# Patient Record
Sex: MALE | Race: WHITE | NOT HISPANIC OR LATINO | Employment: FULL TIME | URBAN - METROPOLITAN AREA
[De-identification: names, ages, dates, MRNs, and addresses within clinical notes are randomized per-mention and may not be internally consistent; named-entity substitution may affect disease eponyms.]

---

## 2023-03-30 ENCOUNTER — APPOINTMENT (EMERGENCY)
Dept: RADIOLOGY | Facility: HOSPITAL | Age: 48
End: 2023-03-30

## 2023-03-30 ENCOUNTER — APPOINTMENT (EMERGENCY)
Dept: CT IMAGING | Facility: HOSPITAL | Age: 48
End: 2023-03-30

## 2023-03-30 ENCOUNTER — HOSPITAL ENCOUNTER (OUTPATIENT)
Dept: RADIOLOGY | Facility: HOSPITAL | Age: 48
Discharge: HOME | End: 2023-03-30

## 2023-03-30 ENCOUNTER — HOSPITAL ENCOUNTER (INPATIENT)
Facility: HOSPITAL | Age: 48
LOS: 1 days | End: 2023-03-30
Attending: STUDENT IN AN ORGANIZED HEALTH CARE EDUCATION/TRAINING PROGRAM | Admitting: STUDENT IN AN ORGANIZED HEALTH CARE EDUCATION/TRAINING PROGRAM

## 2023-03-30 VITALS
DIASTOLIC BLOOD PRESSURE: 129 MMHG | SYSTOLIC BLOOD PRESSURE: 208 MMHG | TEMPERATURE: 98.8 F | RESPIRATION RATE: 23 BRPM | WEIGHT: 315 LBS | OXYGEN SATURATION: 100 % | HEART RATE: 102 BPM

## 2023-03-30 DIAGNOSIS — R41.89 UNRESPONSIVE: Primary | ICD-10-CM

## 2023-03-30 DIAGNOSIS — I63.512 CEREBROVASCULAR ACCIDENT (CVA) DUE TO OCCLUSION OF LEFT MIDDLE CEREBRAL ARTERY (HCC): ICD-10-CM

## 2023-03-30 DIAGNOSIS — I63.9 STROKE OF UNKNOWN CAUSE (HCC): ICD-10-CM

## 2023-03-30 PROBLEM — E66.01 OBESITY, CLASS III, BMI 40-49.9 (MORBID OBESITY) (HCC): Status: ACTIVE | Noted: 2023-03-30

## 2023-03-30 PROBLEM — R73.9 HYPERGLYCEMIA: Status: ACTIVE | Noted: 2023-03-30

## 2023-03-30 PROBLEM — I16.1 HYPERTENSIVE EMERGENCY: Status: ACTIVE | Noted: 2023-03-30

## 2023-03-30 PROBLEM — G93.40 ENCEPHALOPATHY: Status: ACTIVE | Noted: 2023-03-30

## 2023-03-30 PROBLEM — E66.813 OBESITY, CLASS III, BMI 40-49.9 (MORBID OBESITY): Status: ACTIVE | Noted: 2023-03-30

## 2023-03-30 LAB
ALBUMIN SERPL BCP-MCNC: 3.7 G/DL (ref 3.5–5)
ALP SERPL-CCNC: 75 U/L (ref 34–104)
ALT SERPL W P-5'-P-CCNC: 37 U/L (ref 7–52)
AMPHETAMINES SERPL QL SCN: NEGATIVE
ANION GAP SERPL CALCULATED.3IONS-SCNC: 9 MMOL/L (ref 4–13)
APAP SERPL-MCNC: <10 UG/ML (ref 10–20)
AST SERPL W P-5'-P-CCNC: 20 U/L (ref 13–39)
BARBITURATES UR QL: NEGATIVE
BASE EXCESS BLDA CALC-SCNC: 0 MMOL/L (ref -2–3)
BASOPHILS # BLD AUTO: 0.05 THOUSANDS/ÂΜL (ref 0–0.1)
BASOPHILS NFR BLD AUTO: 1 % (ref 0–1)
BENZODIAZ UR QL: NEGATIVE
BILIRUB SERPL-MCNC: 0.53 MG/DL (ref 0.2–1)
BUN SERPL-MCNC: 19 MG/DL (ref 5–25)
CA-I BLD-SCNC: 1.23 MMOL/L (ref 1.12–1.32)
CALCIUM SERPL-MCNC: 9 MG/DL (ref 8.4–10.2)
CARDIAC TROPONIN I PNL SERPL HS: 60 NG/L
CFFMA (FUNCTIONAL FIBRINOGEN MAX AMPLITUDE): 20.2 MM (ref 15–32)
CHLORIDE SERPL-SCNC: 103 MMOL/L (ref 96–108)
CK SERPL-CCNC: 81 U/L (ref 39–308)
CKLY30: 2.2 % (ref 0–2.6)
CKR(REACTION TIME): 5.7 MIN (ref 4.6–9.1)
CO2 SERPL-SCNC: 25 MMOL/L (ref 21–32)
COCAINE UR QL: NEGATIVE
CREAT SERPL-MCNC: 1 MG/DL (ref 0.6–1.3)
CRTMA(RAPIDTEG MAX AMPLITUDE): 61.4 MM (ref 52–70)
EOSINOPHIL # BLD AUTO: 0.4 THOUSAND/ÂΜL (ref 0–0.61)
EOSINOPHIL NFR BLD AUTO: 5 % (ref 0–6)
ERYTHROCYTE [DISTWIDTH] IN BLOOD BY AUTOMATED COUNT: 13.4 % (ref 11.6–15.1)
ETHANOL SERPL-MCNC: <10 MG/DL
GFR SERPL CREATININE-BSD FRML MDRD: 90 ML/MIN/1.73SQ M
GLUCOSE SERPL-MCNC: 275 MG/DL (ref 65–140)
GLUCOSE SERPL-MCNC: 280 MG/DL (ref 65–140)
HCO3 BLDA-SCNC: 26.1 MMOL/L (ref 24–30)
HCT VFR BLD AUTO: 46.6 % (ref 36.5–49.3)
HCT VFR BLD CALC: 44 % (ref 36.5–49.3)
HGB BLD-MCNC: 14.8 G/DL (ref 12–17)
HGB BLDA-MCNC: 15 G/DL (ref 12–17)
IMM GRANULOCYTES # BLD AUTO: 0.04 THOUSAND/UL (ref 0–0.2)
IMM GRANULOCYTES NFR BLD AUTO: 1 % (ref 0–2)
LACTATE SERPL-SCNC: 1.7 MMOL/L (ref 0.5–2)
LYMPHOCYTES # BLD AUTO: 2.04 THOUSANDS/ÂΜL (ref 0.6–4.47)
LYMPHOCYTES NFR BLD AUTO: 25 % (ref 14–44)
MCH RBC QN AUTO: 26.8 PG (ref 26.8–34.3)
MCHC RBC AUTO-ENTMCNC: 31.8 G/DL (ref 31.4–37.4)
MCV RBC AUTO: 84 FL (ref 82–98)
METHADONE UR QL: NEGATIVE
MONOCYTES # BLD AUTO: 0.47 THOUSAND/ÂΜL (ref 0.17–1.22)
MONOCYTES NFR BLD AUTO: 6 % (ref 4–12)
NEUTROPHILS # BLD AUTO: 5.31 THOUSANDS/ÂΜL (ref 1.85–7.62)
NEUTS SEG NFR BLD AUTO: 62 % (ref 43–75)
NRBC BLD AUTO-RTO: 0 /100 WBCS
OPIATES UR QL SCN: NEGATIVE
OXYCODONE+OXYMORPHONE UR QL SCN: NEGATIVE
PCO2 BLD: 27 MMOL/L (ref 21–32)
PCO2 BLD: 45.3 MM HG (ref 42–50)
PCP UR QL: NEGATIVE
PH BLD: 7.37 [PH] (ref 7.3–7.4)
PLATELET # BLD AUTO: 321 THOUSANDS/UL (ref 149–390)
PMV BLD AUTO: 10.4 FL (ref 8.9–12.7)
PO2 BLD: 28 MM HG (ref 35–45)
POTASSIUM BLD-SCNC: 4.1 MMOL/L (ref 3.5–5.3)
POTASSIUM SERPL-SCNC: 4 MMOL/L (ref 3.5–5.3)
PROT SERPL-MCNC: 6.7 G/DL (ref 6.4–8.4)
RBC # BLD AUTO: 5.52 MILLION/UL (ref 3.88–5.62)
SALICYLATES SERPL-MCNC: <5 MG/DL (ref 3–20)
SAO2 % BLD FROM PO2: 49 % (ref 60–85)
SODIUM BLD-SCNC: 139 MMOL/L (ref 136–145)
SODIUM SERPL-SCNC: 137 MMOL/L (ref 135–147)
SPECIMEN SOURCE: ABNORMAL
THC UR QL: NEGATIVE
TSH SERPL DL<=0.05 MIU/L-ACNC: 2.73 UIU/ML (ref 0.45–4.5)
WBC # BLD AUTO: 8.31 THOUSAND/UL (ref 4.31–10.16)

## 2023-03-30 PROCEDURE — 4A133J1 MONITORING OF ARTERIAL PULSE, PERIPHERAL, PERCUTANEOUS APPROACH: ICD-10-PCS | Performed by: STUDENT IN AN ORGANIZED HEALTH CARE EDUCATION/TRAINING PROGRAM

## 2023-03-30 PROCEDURE — 5A1935Z RESPIRATORY VENTILATION, LESS THAN 24 CONSECUTIVE HOURS: ICD-10-PCS | Performed by: EMERGENCY MEDICINE

## 2023-03-30 PROCEDURE — 0BH17EZ INSERTION OF ENDOTRACHEAL AIRWAY INTO TRACHEA, VIA NATURAL OR ARTIFICIAL OPENING: ICD-10-PCS | Performed by: EMERGENCY MEDICINE

## 2023-03-30 PROCEDURE — 4A133B1 MONITORING OF ARTERIAL PRESSURE, PERIPHERAL, PERCUTANEOUS APPROACH: ICD-10-PCS | Performed by: STUDENT IN AN ORGANIZED HEALTH CARE EDUCATION/TRAINING PROGRAM

## 2023-03-30 PROCEDURE — 03HY32Z INSERTION OF MONITORING DEVICE INTO UPPER ARTERY, PERCUTANEOUS APPROACH: ICD-10-PCS | Performed by: STUDENT IN AN ORGANIZED HEALTH CARE EDUCATION/TRAINING PROGRAM

## 2023-03-30 RX ORDER — ROCURONIUM BROMIDE 10 MG/ML
INJECTION, SOLUTION INTRAVENOUS CODE/TRAUMA/SEDATION MEDICATION
Status: COMPLETED | OUTPATIENT
Start: 2023-03-30 | End: 2023-03-30

## 2023-03-30 RX ORDER — FENTANYL CITRATE 50 UG/ML
INJECTION, SOLUTION INTRAMUSCULAR; INTRAVENOUS CODE/TRAUMA/SEDATION MEDICATION
Status: COMPLETED | OUTPATIENT
Start: 2023-03-30 | End: 2023-03-30

## 2023-03-30 RX ORDER — CHLORHEXIDINE GLUCONATE 0.12 MG/ML
15 RINSE ORAL EVERY 12 HOURS SCHEDULED
Status: DISCONTINUED | OUTPATIENT
Start: 2023-03-30 | End: 2023-03-30 | Stop reason: HOSPADM

## 2023-03-30 RX ORDER — PROPOFOL 10 MG/ML
INJECTION, EMULSION INTRAVENOUS
Status: COMPLETED | OUTPATIENT
Start: 2023-03-30 | End: 2023-03-30

## 2023-03-30 RX ORDER — ETOMIDATE 2 MG/ML
INJECTION INTRAVENOUS CODE/TRAUMA/SEDATION MEDICATION
Status: COMPLETED | OUTPATIENT
Start: 2023-03-30 | End: 2023-03-30

## 2023-03-30 RX ORDER — FENTANYL CITRATE-0.9 % NACL/PF 10 MCG/ML
50 PLASTIC BAG, INJECTION (ML) INTRAVENOUS CONTINUOUS
Status: DISCONTINUED | OUTPATIENT
Start: 2023-03-30 | End: 2023-03-30 | Stop reason: HOSPADM

## 2023-03-30 RX ORDER — IODIXANOL 320 MG/ML
100 INJECTION, SOLUTION INTRAVASCULAR
Status: COMPLETED | OUTPATIENT
Start: 2023-03-30 | End: 2023-03-30

## 2023-03-30 RX ORDER — FENTANYL CITRATE-0.9 % NACL/PF 10 MCG/ML
50 PLASTIC BAG, INJECTION (ML) INTRAVENOUS CONTINUOUS
Status: CANCELLED | OUTPATIENT
Start: 2023-03-30

## 2023-03-30 RX ORDER — CHLORHEXIDINE GLUCONATE 0.12 MG/ML
15 RINSE ORAL EVERY 12 HOURS SCHEDULED
Status: DISCONTINUED | OUTPATIENT
Start: 2023-03-30 | End: 2023-03-30

## 2023-03-30 RX ORDER — CHLORHEXIDINE GLUCONATE 0.12 MG/ML
15 RINSE ORAL EVERY 12 HOURS SCHEDULED
Status: CANCELLED | OUTPATIENT
Start: 2023-03-30

## 2023-03-30 RX ORDER — PROPOFOL 10 MG/ML
5-50 INJECTION, EMULSION INTRAVENOUS
Status: DISCONTINUED | OUTPATIENT
Start: 2023-03-30 | End: 2023-03-30 | Stop reason: HOSPADM

## 2023-03-30 RX ADMIN — IOHEXOL 100 ML: 350 INJECTION, SOLUTION INTRAVENOUS at 15:46

## 2023-03-30 RX ADMIN — FENTANYL CITRATE 100 MCG: 50 INJECTION, SOLUTION INTRAMUSCULAR; INTRAVENOUS at 15:27

## 2023-03-30 RX ADMIN — Medication 50 MCG/HR: at 16:09

## 2023-03-30 RX ADMIN — ETOMIDATE 45 MG: 2 INJECTION, SOLUTION INTRAVENOUS at 15:18

## 2023-03-30 RX ADMIN — IODIXANOL 100 ML: 320 INJECTION, SOLUTION INTRAVASCULAR at 17:51

## 2023-03-30 RX ADMIN — ROCURONIUM BROMIDE 150 MG: 10 INJECTION, SOLUTION INTRAVENOUS at 15:19

## 2023-03-30 RX ADMIN — PROPOFOL 20 MCG/KG/MIN: 10 INJECTION, EMULSION INTRAVENOUS at 15:24

## 2023-03-30 NOTE — QUICK NOTE
Patient is transferred from [facility]  He is intubated and sedated with Propofol and Fentanyl on presentation    NIHSS:  1a Level of Consciousness: 2 = Not alert, requires repeated stimulation to attend   1b  LOC Questions: 1 = intubated   1c  LOC Commands: 1 = Obeys one correctly   2  Best Gaze: 0 = Normal   3  Visual: 0 = No visual field loss   4  Facial Palsy: 0=Normal symmetric movement   5a  Motor Right Arm: 3=No effort against gravity, limb falls   5b  Motor Left Arm: 0=No drift, limb holds 90 (or 45) degrees for full 10 seconds   6a  Motor Right Le=No movement   6b  Motor Left Le=No movement   7  Limb Ataxia:  0=Absent   8  Sensory: 0=Normal; no sensory loss   9  Best Language:  3=Mute, global aphasia; no usable speech or auditory comprehension   10  Dysarthria: UN = Intubated or other physical barrier   11   Extinction and Inattention (formerly Neglect): 0=No abnormality   Total Score: 18

## 2023-03-30 NOTE — ASSESSMENT & PLAN NOTE
Shirlene Momin III is a 41 y o  male with no known past medical history who has not seen a medical provider throughout his adulthood, who presents to [facility] ED on 3/30/2023 as a trauma and stroke alert  · NIHSS 27 - Patient examined by neurology after intubation and paralytics, exam limited  · CT head: Unremarkable for evidence of acute hemorrhage or large territory infarct  · CTA head and neck: Demonstrated left M2 occlusion    Thrombolytic Decision: Patient not a candidate  Unclear time of onset outside appropriate time window  Case discussed with on-call neuro interventionalist Dru Stevenson  Decision was made to transfer to [facility] for CT perfusion and potential thrombectomy      Plan:  - Transfer to [facility] for CT perfusion and potential thrombectomy  - Goal BP up to 220/110  - MRI brain when able   - Echo  - Recommend: Fasting lipid panel, Hemoglobin A1c, TSH  - Antiplatelet therapy to be determined following CT perfusion/thrombectomy  - Okay to start Atorvastatin 40 mg daily if able   - Euglycemia, Normothermia   - Telemetry  - Frequent neuro checks  - PT/OT/Speech   - Stroke Education   - STAT CT head for any acute change in neuro exam  - Medical management and supportive care per critical care team, notify with changes  - Correction of any metabolic or infectious disturbances

## 2023-03-30 NOTE — CASE MANAGEMENT
CM responded to trauma alert  Patient was transported into trauma bay by [facility] for evaluation  Patient not responsive upon arrival  CM met with patients mother in ED waiting room  General update provided, notified trauma team will be in touch when able  Trauma AP aware of above  No current identified CM needs  CM will follow and update screening, assessment, and discharge planning as appropriate

## 2023-03-30 NOTE — ED PROVIDER NOTES
Emergency Department Airway Evaluation and Management Form    History  Obtained from: EMS  Patient has no known allergies  Chief Complaint   Patient presents with   • Trauma     Patient found on floor next to bed by mother  GCS 9 per EMS     HPI    29-year-old male, found down, altered on arrival, GCS 9, no medical history    Concern for airway protection, will intubate before trauma team takes over    No past medical history on file  No past surgical history on file  No family history on file  I have reviewed and agree with the history as documented      Review of Systems    Physical Exam  BP (!) 208/129 (BP Location: Right arm)   Pulse 102   Temp 98 8 °F (37 1 °C)   Resp (!) 23   Wt (!) 155 kg (341 lb 11 4 oz)   SpO2 100%     Physical Exam    ED Medications  Medications   etomidate (AMIDATE) 2 mg/mL injection (45 mg Intravenous Given 3/30/23 1518)   ROCuronium (ZEMURON) injection (150 mg Intravenous Given 3/30/23 1519)   propofol (DIPRIVAN) 1000 mg in 100 mL infusion (premix) (20 mcg/kg/min × 155 kg Intravenous New Bag 3/30/23 1524)   fentanyl citrate (PF) 100 MCG/2ML (100 mcg Intravenous Given 3/30/23 1527)   iohexol (OMNIPAQUE) 350 MG/ML injection (SINGLE-DOSE) 100 mL (100 mL Intravenous Given 3/30/23 1546)       Intubation  Procedures    Notes      Final Diagnosis  Final diagnoses:   Unresponsive   Cerebrovascular accident (CVA) due to occlusion of left middle cerebral artery [facility])       ED Provider  Electronically Signed by     Dodie Hartmann MD  03/31/23 3406

## 2023-03-30 NOTE — ED PROCEDURE NOTE
Procedure  Intubation    Date/Time: 3/30/2023 3:21 PM  Performed by: Loraine Brittle, MD  Authorized by: Loraine Brittle, MD     Patient location: trauma bay  Other Assisting Provider: Yes (comment) (Dr Yadiel Benitez)    Consent:     Consent obtained:  Emergent situation  Universal protocol:     Required blood products, implants, devices, and special equipment available: yes      Immediately prior to procedure, a time out was called: yes      Patient identity confirmed: Anonymous protocol, patient vented/unresponsive  Pre-procedure details:     Patient status:  Altered mental status    Pretreatment medications:  Etomidate    Paralytics:  Rocuronium  Indications:     Indications for intubation: airway protection    Procedure details:     Preoxygenation:  Bag valve mask    CPR in progress: no      Intubation method:  Oral    Oral intubation technique:  Glidescope    Laryngoscope blade: Mac 4    Tube size (mm):  8 0    Tube type:  Cuffed    Number of attempts:  1    Cricoid pressure: no      Tube visualized through cords: yes    Placement assessment:     ETT to lip:  26    Tube secured with:  ETT kirk    Breath sounds:  Equal    Placement verification: chest rise, condensation, colorimetric ETCO2 device, CXR verification and equal breath sounds      CXR findings:  ETT in proper place  Post-procedure details:     Patient tolerance of procedure:   Tolerated well, no immediate complications                     Loraine Brittle, MD  03/30/23 0493

## 2023-03-30 NOTE — H&P
H&P - Trauma   Gi Christiansen III 41 y o  male MRN: [id number]  Unit/Bed#: ICU 14 Encounter: [id number]    Trauma Alert: Level A   Model of Arrival: Ambulance    Trauma Team: Attending Eber Prieto, Fellow Dipika and VASU Rodgers  Consultants:     Other: {Neurology - STAT consult; arrived at ;     Assessment/Plan   Active Problems / Assessment:   Acute encephalopathy  Acute respiratory failure  Left MCA inferior M2 division occlusion  Possible aspiration     Plan:   Stroke alert  CT head, c-spine, chest, abdomen, pelvis  CTA head  Intubated in trauma bay  Neurology consult  Admit to critical care  Transfer to [facility] for perfusion scan and possible thrombectomy  Frequent neurochecks  C-collar cleared     History of Present Illness     Chief Complaint: Unresponsive  Mechanism:Fall     HPI:    Gi Christiansen III is a 41 y o  male who presents unresponsive to the trauma bay  Per EMS, patient's mother heard a thud and found him on the floor unresponsive in his bedroom  Last known well time was 0700 when he got home from work  Patient arrives to the bay intermittently moaning with withdraw from pain  GCS 9  Patient was not protecting his airway  Per EMS, patient has not been seen by a doctor in many years  No known medical history  Review of Systems   Unable to perform ROS: Intubated     12-point, complete review of systems was reviewed and negative except as stated above  Historical Information     No past medical history on file  No past surgical history on file  Unable to obtain history due to Intubated         There is no immunization history on file for this patient  Last Tetanus: unknown  Family History: Non-contributory     Meds/Allergies   Per EMS, patient does not take any medications  Allergies have not been reviewed;   Not on File    Objective   Initial Vitals:   Temperature: (!) 96 4 °F (35 8 °C) (03/30/23 1610)  Pulse: (!) 119 (03/30/23 1511)  Respirations: 18 (03/30/23 1511)  Blood Pressure: (!) 222/150 (03/30/23 1523)  FiO2 (%): 100 (03/30/23 1619)    Primary Survey:   Airway:        Status: compromised;        Pre-hospital Interventions: none        Hospital Interventions: intubated in ED/trauma bay  Breathing: Hospital Interventions: Of note, patient was intubated in the trauma bay, and this primary survey was completed after intubation  Patient was GCS 9 prior to intubation  Pre-hospital Interventions: oxygen (4L nasal canula)       Effort: normal       Right breath sounds: normal       Left breath sounds: normal  Circulation:        Rhythm: regular       Rate: tachycardia   Right Pulses Left Pulses    R radial: 1+  R femoral: 2+  R pedal: 2+     L radial: 1+  L femoral: 2+  L pedal: 2+       Disability:        GCS: Eye: 1; Verbal: 1 Motor: 1 Total: 3T;        Right Pupil: 4 mm;  round;  reactive         Left Pupil:  4 mm;  round;  reactive      R Motor Strength L Motor Strength    R : 0/5  R dorsiflex: 0/5  R plantarflex: 0/5 L : 0/5  L dorsiflex: 0/5  L plantarflex: 0/5          Exposure:       Completed: Yes      Secondary Survey:  Physical Exam  Constitutional:       General: He is in acute distress  Appearance: He is obese  He is ill-appearing  HENT:      Head: Normocephalic and atraumatic  Right Ear: External ear normal       Left Ear: External ear normal       Nose: Nose normal       Mouth/Throat:      Mouth: Mucous membranes are moist    Eyes:      Pupils: Pupils are equal, round, and reactive to light  Neck:      Comments: c collar placed  Cardiovascular:      Rate and Rhythm: Regular rhythm  Tachycardia present  Pulses: Normal pulses  Heart sounds: Normal heart sounds  Pulmonary:      Effort: Pulmonary effort is normal       Breath sounds: Normal breath sounds  Comments: Post-intubation  Abdominal:      Palpations: Abdomen is soft     Genitourinary:     Comments: Incontinent of urine  Musculoskeletal:      Comments: Unable to hold RUE against gravity  Moving LUE and b/l LE in response to pain   Neurological:      Mental Status: He is unresponsive  Motor: Weakness present  Invasive Devices     Peripheral Intravenous Line  Duration           Peripheral IV 03/30/23 Left Antecubital <1 day    Peripheral IV 03/30/23 Right Antecubital <1 day    Peripheral IV 03/30/23 Right;Upper;Ventral (anterior) Arm <1 day          Arterial Line  Duration           Arterial Line 03/30/23 Radial <1 day          Drain  Duration           NG/OG/Enteral Tube 8 Fr Right mouth <1 day    Urethral Catheter <1 day          Airway  Duration           ETT  Oral 8 mm <1 day              Lab Results:   Results: I have personally reviewed all pertinent laboratory/tests results, BMP/CMP:   Lab Results   Component Value Date    SODIUM 137 03/30/2023    K 4 0 03/30/2023     03/30/2023    CO2 25 03/30/2023    CO2 27 03/30/2023    BUN 19 03/30/2023    CREATININE 1 00 03/30/2023    GLUCOSE 280 (H) 03/30/2023    CALCIUM 9 0 03/30/2023    AST 20 03/30/2023    ALT 37 03/30/2023    ALKPHOS 75 03/30/2023    EGFR 90 03/30/2023   , CBC:   Lab Results   Component Value Date    WBC 8 31 03/30/2023    HGB 14 8 03/30/2023    HCT 46 6 03/30/2023    MCV 84 03/30/2023     03/30/2023    MCH 26 8 03/30/2023    MCHC 31 8 03/30/2023    RDW 13 4 03/30/2023    MPV 10 4 03/30/2023    NRBC 0 03/30/2023   , Coagulation: No results found for: PT, INR, APTT, Lactate: No results found for: LACTATE, Urinalysis: No results found for: COLORU, CLARITYU, SPECGRAV, PHUR, LEUKOCYTESUR, NITRITE, PROTEINUA, GLUCOSEU, KETONESU, BILIRUBINUR, BLOODU, CK:   Lab Results   Component Value Date    CKTOTAL 81 03/30/2023   , Troponin: No results found for: TROPONINI, ABG: No results found for: PHART, HAT6QJB, PO2ART, RFR2SHT, H4RACLNH, BEART, SOURCE and ISTAT: No components found for: VBG    Imaging Results: I have personally reviewed pertinent reports      Chest Xray(s): See below   FAST exam(s): negative for acute findings   CT Scan(s): pending   Additional Xray(s): pending     Other Studies:   TRAUMA - CT chest abdomen pelvis w contrast   Final Result by Ursula Patino MD (03/30 1643)      1  No acute traumatic injury to the chest, abdomen, or pelvis  2   Evidence of pulmonary edema with bibasilar consolidation which may represent superimposed aspiration  3   Mild subcutaneous stranding in the anterior abdominal wall, nonspecific  This could represent soft tissue injury and clinical correlation recommended  I personally discussed this study with Sam Sousa on 3/30/2023 at 4:31 PM                   Workstation performed: [id number]         TRAUMA - CTA head/neck (stroke alert)   Final Result by Thierno Dominguez MD (03/30 1623)   Left MCA inferior M2 division occlusion       No other significant stenosis or large vessel occlusion  No traumatic injury to the cervical spine  Findings were directly discussed with Rafael Michelle at 3:57 PM       Findings also discussed with Rachelle Avina at 3:54 PM                     Workstation performed: [id number]         CT stroke alert brain   Final Result by Thierno Dominguez MD (03/30 1609)      Limited  No definite acute hemorrhage or large vessel distribution infarct  No mass effect or herniation         Findings were directly discussed with Rafael Michelle at 3:57 PM       Findings also discussed with Rachelle Avina at 3:54 PM         Workstation performed: [id number]         XR Trauma multiple (B/RA trauma bay ONLY)   Final Result by Montana Boss MD (03/30 3743)      Limited examination  Right lung grossly clear  Left lung not adequately evaluated but possible opacity medially at the left base  Endotracheal and endogastric tubes appear to be appropriately positioned  The study was marked in [facility] for immediate notification           Workstation performed: [id number]         XR chest 1 view    (Results Pending) Code Status: Prior  Advance Directive and Living Will:      Power of :    POLST:    I have spent 25 minutes with Patient  today in which greater than 50% of this time was spent in counseling/coordination of care regarding Documenting in the medical record, Reviewing / ordering tests, medicine, procedures   and Obtaining or reviewing history

## 2023-03-30 NOTE — RESPIRATORY THERAPY NOTE
03/30/23 1619   Respiratory Assessment   Assessment Type Assess only   General Appearance Sedated   Respiratory Pattern Normal   Chest Assessment Chest expansion symmetrical   Resp Comments Trauma level A for patient found unresponsive  Arrived in trauma bay on NC  Patient was intubated by ER resident with 8 0 and 25@ the lip  Positive color change and BBS heard  Patient was placed on transport vent and transported to [facility] and to ICU pending transport to [facility],   O2 Device vent   Vent Information   Vent type Marquez G5   Marquez C3/G5 Vent Mode (S)CMV   $ Vent Charge-INITIAL Yes   Ventilator Start Yes   $ Vital Capacity Mech/Peak Flow Yes   $ Pulse Oximetry Spot Check Charge Completed   SpO2 100 %   (S)CMV Settings   Resp Rate (BPM) 20 BPM   VT (mL) 450 mL   FIO2 (%) 100 %   PEEP (cmH2O) 6 cmH2O   I:E Ratio 1:2 7   Insp Time (%) 0 8 %   Flow Trigger (LPM) 3   Humidification Heater   Heater Temperature (Set) 98 6 °F (37 °C)   (S)CMV Actuals   Resp Rate (BPM) 20 BPM   VT (mL) 507   MV 10 2   MAP (cmH2O) 13 cmH2O   Peak Pressure (cmH2O) 38 cmH2O   I:E Ratio (Obs) 1:2 7   Insp Resistance 24   Heater Temperature (Obs) 98 6 °F (37 °C)   Static Compliance (mL/cmH20) 36 5 mL/cmH2O   Plateau Pressure (cm H2O) 24 9 cm H2O   (S)CMV Alarms   High Peak Pressure (cmH2O) 40   Low Pressure (cmH2O) 8 cm H2O   High Resp Rate (BPM) 40 BPM   Low Resp Rate (BPM) 8 BPM   High MV (L/min) 15 L/min   Low MV (L/min) 4 L/min   High VT (mL) 1000 mL   Low VT (mL) 250 mL   Apnea Time (s) 20 S   Maintenance   Alarm (pink) cable attached Yes   Resuscitation bag with peep valve at bedside Yes   Water bag changed Yes   Circuit changed Yes   Daily Screen   Patient safety screen outcome: Failed   Not Ready for Weaning due to:  Underline problem not resolved  (Patient was just intubated)   IHI Ventilator Associated Pneumonia Bundle   Head of Bed Elevated HOB 30   ETT  Oral 8 mm   Placement Date/Time: 03/30/23 1521   Previously placed by?: Attending  Mask Ventilation: Ventilated by mask with oral airway (2)  Preoxygenated: Yes  Type: Oral  Tube Size: 8 mm  Laryngoscope: GlideScope  Location: Oral  Insertion attempts: 1  Placeme       Secured at (cm) 25   Measured from [address] by Commercial tube kirk   Site Condition Dry   Cuff Pressure (cm H2O)   (green mlt)   HI-LO Suction  Capped   Respiratory Charges    $ Intubation/Intubation Assist Yes   $ Resuscitation CPR Yes

## 2023-03-30 NOTE — PROCEDURES
Arterial Line Insertion    Date/Time: 3/30/2023 4:20 PM  Performed by: Tammy Boss MD  Authorized by: Tammy Boss MD     Patient location:  ICU  Consent:     Consent obtained:  Emergent situation  Universal protocol:     Immediately prior to procedure a time out was called: yes      Patient identity confirmed: Anonymous protocol, patient vented/unresponsive  Indications:     Indications: hemodynamic monitoring, multiple ABGs and continuous blood pressure monitoring    Pre-procedure details:     Skin preparation:  Chlorhexidine    Preparation: Patient was prepped and draped in sterile fashion    Anesthesia (see MAR for exact dosages): Anesthesia method:  None  Procedure details:     Location / Tip of Catheter:  Radial    Laterality:  Left    Hamilton's test performed: yes      Hamilton's test abnormal: no      Needle gauge:  20 G    Placement technique:  Percutaneous and Seldinger    Number of attempts:  1    Successful placement: yes      Transducer: waveform confirmed    Post-procedure details:     Post-procedure:  Sutured and sterile dressing applied    CMS:  Normal and unchanged    Patient tolerance of procedure:   Tolerated well, no immediate complications

## 2023-03-30 NOTE — PROCEDURES
POC FAST US    Date/Time: 3/30/2023 3:48 PM  Performed by: Arline Katz PA-C  Authorized by: Arline Katz PA-C     Patient location:  Trauma  Other Assisting Provider: No    Procedure details:     Exam Type:  Diagnostic    Indications: blunt abdominal trauma and blunt chest trauma      Technique: FAST      Views obtained:  Heart - Pericardial sac, LUQ - Splenorenal space, RUQ - Blount's Pouch and Suprapubic - Pouch of Jason  FAST Findings:     RUQ (Hepatorenal) free fluid: absent      LUQ (Splenorenal) free fluid: absent      Suprapubic free fluid: absent      Cardiac wall motion: identified      Pericardial effusion: absent    Interpretation:     Impressions: negative

## 2023-03-30 NOTE — CONSULTS
Consultation - Stroke   Artemio Méndez III 41 y o  male MRN: [id number]  Unit/Bed#: ICU 14 Encounter: [id number]      Assessment/Plan   * CVA (cerebral vascular accident) [facility])  [address] is a 41 y o  male with no known past medical history who has not seen a medical provider throughout his adulthood, who presents to [facility] ED on 3/30/2023 as a trauma and stroke alert  · NIHSS 27 - Patient examined by neurology after intubation and paralytics, exam limited  · CT head: Unremarkable for evidence of acute hemorrhage or large territory infarct  · CTA head and neck: Demonstrated left M2 occlusion    Thrombolytic Decision: Patient not a candidate  Unclear time of onset outside appropriate time window  Case discussed with on-call neuro interventionalist Jose Uriostegui  Decision was made to transfer to hospitals for CT perfusion and potential thrombectomy  Plan:  - Transfer to hospitals for CT perfusion and potential thrombectomy  - Goal BP up to 220/110  - MRI brain when able   - Echo  - Recommend: Fasting lipid panel, Hemoglobin A1c, TSH  - Antiplatelet therapy to be determined following CT perfusion/thrombectomy  - Okay to start Atorvastatin 40 mg daily if able   - Euglycemia, Normothermia   - Telemetry  - Frequent neuro checks  - PT/OT/Speech   - Stroke Education   - STAT CT head for any acute change in neuro exam  - Medical management and supportive care per critical care team, notify with changes  - Correction of any metabolic or infectious disturbances    Recommendations for outpatient neurological follow up have yet to be determined      History of Present Illness     Reason for Consult / Principal Problem: Stroke alert, found out with right-sided weakness  Hx and PE limited by: Patient unable to provide history  Patient last known well: 0700 on 3/30/2023  Stroke alert called: 1524 on 3/30/2023  Neurology time of arrival: Attending neurologist responded immediately to the phone call  HPI: Smiley Zavala Cesar Villanueva is a 41 y o   male with no known past medical history who has not seen a medical provider throughout his adulthood, who presents to [facility] ED on 3/30/2023 as a trauma and stroke alert  History obtained per discussion with trauma team and EMS  Patient came home from working night shift this morning 3/30/2023 at 7 AM, which patient's mother reports is the last time she saw him well  Patient's mother reports he then went to his room  At approximately 200, mother states she heard 2 thuds  In his room on the floor unresponsive  EMS was called, BP per EMS with systolics greater than 024  Patient presented to [facility] ED on 3/30/2023 as a trauma and stroke alert  BP on presentation 222/150  Upon arrival, patient noted to be moaning intermittently  GCS of 9  Per discussion with trauma who evaluated the patient on presentation, patient was localizing to central noxious stimuli with LUE  Patient was unable to elevate RUE off the bed, however did move with noxious stimulation  Patient had minimal withdrawal to distal noxious stim in LLE and only movements and toes in RLE with noxious stimulation  No forced gaze deviation per trauma team     By the time neurology was at bedside, patient was already intubated and on paralytics  CT head unremarkable for evidence of acute hemorrhage or large territory infarct  CTA head and neck demonstrated left M2 occlusion  Patient was not an IV TNK candidate as he was outside of the appropriate time window  With BP goal up to 220/110, patient was started on Cardene drip as patient was above   Case discussed with neuro interventionalist and decision was made to transfer patient to [facility] for CT perfusion and potential thrombectomy      Inpatient consult to Neurology  Consult performed by: Katheryn Amato PA-C  Consult ordered by: Jorge Thompson PA-C        Review of Systems  12 point ROS limited to intubation status  Historical Information   No past medical history on file  No past surgical history on file  Social History   Social History     Substance and Sexual Activity   Alcohol Use Not on file     Social History     Substance and Sexual Activity   Drug Use Not on file     No existing history information found  No existing history information found  Social History     Tobacco Use   Smoking Status Not on file   Smokeless Tobacco Not on file     Family History: No family history on file  Review of previous medical records was completed  Meds/Allergies   all current active meds have been reviewed, current meds:   No current facility-administered medications for this encounter    , PTA meds:   None    and     Not on File    Objective   Vitals:Blood pressure (!) 208/129, pulse 102, temperature 98 8 °F (37 1 °C), resp  rate (!) 23, weight (!) 155 kg (341 lb 11 4 oz), SpO2 100 %  ,There is no height or weight on file to calculate BMI  Intake/Output Summary (Last 24 hours) at 3/30/2023 1707  Last data filed at 3/30/2023 1609  Gross per 24 hour   Intake --   Output 130 ml   Net -130 ml       Invasive Devices: Invasive Devices     Peripheral Intravenous Line  Duration           Peripheral IV 03/30/23 Left Antecubital <1 day    Peripheral IV 03/30/23 Right Antecubital <1 day    Peripheral IV 03/30/23 Right;Upper;Ventral (anterior) Arm <1 day          Arterial Line  Duration           Arterial Line 03/30/23 Radial <1 day          Drain  Duration           NG/OG/Enteral Tube 8 Fr Right mouth <1 day    Urethral Catheter <1 day          Airway  Duration           ETT  Oral 8 mm <1 day              Physical/neurologic exam limited as patient was examined following intubation and paralytics  Physical Exam  Vitals and nursing note reviewed  Constitutional:       General: He is not in acute distress  Appearance: He is not ill-appearing, toxic-appearing or diaphoretic  Comments: Sedated on fentanyl and propofol   HENT:      Head: Normocephalic     Eyes: General: No scleral icterus  Right eye: No discharge  Left eye: No discharge  Conjunctiva/sclera: Conjunctivae normal    Neck:      Comments: C-collar in place  Pulmonary:      Comments: Intubated  Skin:     General: Skin is warm and dry  Coloration: Skin is not jaundiced or pale  Neurologic Exam     Mental Status   Patient is comatose, intubated, sedated on fentanyl and propofol  Patient is unarousable, does not follow any commands  Cranial Nerves   Primary gaze midline, conjugate gaze noted  No forced gaze deviation noted  Pupils pinpoint, round, reactive bilaterally  Blink to threat absent bilaterally  Corneal response intact bilaterally  No obvious facial asymmetry noted, limited to intubation status     Motor Exam   No withdrawal to noxious stimuli in all 4 extremities     Sensory Exam   No grimacing to pain with noxious stimuli in all 4 extremities     Gait, Coordination, and Reflexes     Tremor   Resting tremor: absent  Unable to perform coordination testing    Bilateral toes equivocal    No involuntary movements or rhythmic seizure-like activity noted throughout exam     NIHSS:  1a Level of Consciousness: 3 = Coma   1b  LOC Questions: 1 = Answers one correctly   1c  LOC Commands: 2 = Obeys neither correctly   2  Best Gaze: 0 = Normal   3  Visual: 0 = No visual field loss   4  Facial Palsy: 0=Normal symmetric movement   5a  Motor Right Arm: 4=No movement   5b  Motor Left Arm: 4=No movement   6a  Motor Right Le=No movement   6b  Motor Left Le=No movement   7  Limb Ataxia:  UN = Untestable (amputation, fused joint)   8  Sensory: 2=Severe to total sensory loss; patient is not aware of being touched in face, arm, leg   9  Best Language:  3=Mute, global aphasia; no usable speech or auditory comprehension   10  Dysarthria: UN = Intubated or other physical barrier   11   Extinction and Inattention (formerly Neglect): 0=No abnormality   Total Score: 27     Time NIHSS was completed: 1615    Modified Marydel Score:  0 (No baseline symptoms/disability)    Lab Results: I have personally reviewed pertinent reports    Recent Results (from the past 24 hour(s))   POCT Blood Gas (CG8+)    Collection Time: 03/30/23  3:18 PM   Result Value Ref Range    ph, Valeriano ISTAT 7 368 7 300 - 7 400    pCO2, Valeriano i-STAT 45 3 42 0 - 50 0 mm HG    pO2, Valeriano i-STAT 28 0 (L) 35 0 - 45 0 mm HG    BE, i-STAT 0 -2 - 3 mmol/L    HCO3, Valeriano i-STAT 26 1 24 0 - 30 0 mmol/L    CO2, i-STAT 27 21 - 32 mmol/L    O2 Sat, i-STAT 49 (L) 60 - 85 %    SODIUM, I-STAT 139 136 - 145 mmol/l    Potassium, i-STAT 4 1 3 5 - 5 3 mmol/L    Calcium, Ionized i-STAT 1 23 1 12 - 1 32 mmol/L    Hct, i-STAT 44 36 5 - 49 3 %    Hgb, i-STAT 15 0 12 0 - 17 0 g/dl    Glucose, i-STAT 280 (H) 65 - 140 mg/dl    Specimen Type VENOUS    CBC and differential    Collection Time: 03/30/23  3:33 PM   Result Value Ref Range    WBC 8 31 4 31 - 10 16 Thousand/uL    RBC 5 52 3 88 - 5 62 Million/uL    Hemoglobin 14 8 12 0 - 17 0 g/dL    Hematocrit 46 6 36 5 - 49 3 %    MCV 84 82 - 98 fL    MCH 26 8 26 8 - 34 3 pg    MCHC 31 8 31 4 - 37 4 g/dL    RDW 13 4 11 6 - 15 1 %    MPV 10 4 8 9 - 12 7 fL    Platelets 168 449 - 045 Thousands/uL    nRBC 0 /100 WBCs    Neutrophils Relative 62 43 - 75 %    Immat GRANS % 1 0 - 2 %    Lymphocytes Relative 25 14 - 44 %    Monocytes Relative 6 4 - 12 %    Eosinophils Relative 5 0 - 6 %    Basophils Relative 1 0 - 1 %    Neutrophils Absolute 5 31 1 85 - 7 62 Thousands/µL    Immature Grans Absolute 0 04 0 00 - 0 20 Thousand/uL    Lymphocytes Absolute 2 04 0 60 - 4 47 Thousands/µL    Monocytes Absolute 0 47 0 17 - 1 22 Thousand/µL    Eosinophils Absolute 0 40 0 00 - 0 61 Thousand/µL    Basophils Absolute 0 05 0 00 - 0 10 Thousands/µL   Comprehensive metabolic panel    Collection Time: 03/30/23  3:33 PM   Result Value Ref Range    Sodium 137 135 - 147 mmol/L    Potassium 4 0 3 5 - 5 3 mmol/L    Chloride 103 96 - 108 mmol/L    CO2 25 21 "- 32 mmol/L    ANION GAP 9 4 - 13 mmol/L    BUN 19 5 - 25 mg/dL    Creatinine 1 00 0 60 - 1 30 mg/dL    Glucose 275 (H) 65 - 140 mg/dL    Calcium 9 0 8 4 - 10 2 mg/dL    AST 20 13 - 39 U/L    ALT 37 7 - 52 U/L    Alkaline Phosphatase 75 34 - 104 U/L    Total Protein 6 7 6 4 - 8 4 g/dL    Albumin 3 7 3 5 - 5 0 g/dL    Total Bilirubin 0 53 0 20 - 1 00 mg/dL    eGFR 90 ml/min/1 73sq m   CK    Collection Time: 03/30/23  3:33 PM   Result Value Ref Range    Total CK 81 39 - 308 U/L   TEG 6 Global Hemostasis with Lysis    Collection Time: 03/30/23  3:33 PM   Result Value Ref Range    CKR(REACTION TIME) 5 7 4 6 - 9 1 Min    CKLY30 2 2 0 0 - 2 6 %    CRTMA(RAPIDTEG MAX AMPLITUDE) 61 4 52 - 70 mm    CFFMA (FUNCTIONAL FIBRINOGEN MAX AMPLITUDE) 20 2 15 - 32 mm   HS Troponin 0hr (reflex protocol)    Collection Time: 03/30/23  3:33 PM   Result Value Ref Range    hs TnI 0hr 60 (H) \"Refer to ACS Flowchart\"- see link ng/L   TSH    Collection Time: 03/30/23  3:33 PM   Result Value Ref Range    TSH 3RD GENERATON 2 727 0 450 - 4 500 uIU/mL   Ethanol    Collection Time: 03/30/23  3:33 PM   Result Value Ref Range    Ethanol Lvl <59 <84 mg/dL   Salicylate level    Collection Time: 03/30/23  3:33 PM   Result Value Ref Range    Salicylate Lvl <5 3 - 20 mg/dL   Acetaminophen level-If concentration is detectable, please discuss with medical  on call  Collection Time: 03/30/23  3:33 PM   Result Value Ref Range    Acetaminophen Level <10 (L) 10 - 20 ug/mL   ]  Imaging Studies: I have personally reviewed pertinent reports and I have personally reviewed pertinent films in PACS  EKG, Pathology, and Other Studies: I have personally reviewed pertinent reports  VTE Prophylaxis: Patient to be placed on DVT prophylaxis after transfer to [facility]    Counseling / Coordination of Care  Total time spent today 60 minutes  Greater than 50% of total time was spent with the patient and/or family counseling and/or coordination of care    A " description of the counseling/coordination of care:  Patient was seen and evaluated  Discussed with attending  Chart reviewed thoroughly including laboratory and imaging studies  Plan of care discussed with patient and primary team   Discussed CT head and CTA findings with patient's mother as well as plan to transfer to [facility] for CT perfusion and potential thrombectomy  Dictation voice to text software has been used in the creation of this document  Please consider this in light of any contextual or grammatical errors  Topical Sulfur Applications Counseling: Topical Sulfur Counseling: Patient counseled that this medication may cause skin irritation or allergic reactions.  In the event of skin irritation, the patient was advised to reduce the amount of the drug applied or use it less frequently.   The patient verbalized understanding of the proper use and possible adverse effects of topical sulfur application.  All of the patient's questions and concerns were addressed.

## 2023-03-31 PROBLEM — I63.512 ACUTE ISCHEMIC LEFT MCA STROKE (HCC): Status: ACTIVE | Noted: 2023-03-30

## 2023-04-02 PROBLEM — E11.59 TYPE 2 DIABETES MELLITUS WITH CIRCULATORY DISORDER, WITHOUT LONG-TERM CURRENT USE OF INSULIN (HCC): Status: ACTIVE | Noted: 2023-03-30

## 2023-04-02 PROBLEM — I10 HYPERTENSION: Status: ACTIVE | Noted: 2023-04-02

## 2023-04-02 PROBLEM — J69.0 ASPIRATION PNEUMONIA (HCC): Status: ACTIVE | Noted: 2023-04-02

## 2023-04-02 PROBLEM — I16.1 HYPERTENSIVE EMERGENCY: Status: RESOLVED | Noted: 2023-03-30 | Resolved: 2023-04-02

## 2023-04-03 ENCOUNTER — ANESTHESIA EVENT (INPATIENT)
Dept: NON INVASIVE DIAGNOSTICS | Facility: HOSPITAL | Age: 48
End: 2023-04-03

## 2023-04-03 ENCOUNTER — ANESTHESIA (INPATIENT)
Dept: NON INVASIVE DIAGNOSTICS | Facility: HOSPITAL | Age: 48
End: 2023-04-03

## 2023-04-03 RX ORDER — PROPOFOL 10 MG/ML
INJECTION, EMULSION INTRAVENOUS AS NEEDED
Status: DISCONTINUED | OUTPATIENT
Start: 2023-04-03 | End: 2023-04-03

## 2023-04-03 RX ORDER — PROPOFOL 10 MG/ML
INJECTION, EMULSION INTRAVENOUS CONTINUOUS PRN
Status: DISCONTINUED | OUTPATIENT
Start: 2023-04-03 | End: 2023-04-03

## 2023-04-03 RX ORDER — SODIUM CHLORIDE 9 MG/ML
INJECTION, SOLUTION INTRAVENOUS CONTINUOUS PRN
Status: DISCONTINUED | OUTPATIENT
Start: 2023-04-03 | End: 2023-04-03

## 2023-04-03 RX ADMIN — PROPOFOL 100 MG: 10 INJECTION, EMULSION INTRAVENOUS at 13:03

## 2023-04-03 RX ADMIN — SODIUM CHLORIDE: 0.9 INJECTION, SOLUTION INTRAVENOUS at 12:58

## 2023-04-03 RX ADMIN — TOPICAL ANESTHETIC 1 SPRAY: 200 SPRAY DENTAL; PERIODONTAL at 13:05

## 2023-04-03 RX ADMIN — PROPOFOL 130 MCG/KG/MIN: 10 INJECTION, EMULSION INTRAVENOUS at 13:03

## 2023-04-03 NOTE — ANESTHESIA POSTPROCEDURE EVALUATION
Post-Op Assessment Note    CV Status:  Stable  Pain Score: 0    Pain management: adequate     Mental Status:  Alert and sleepy   Hydration Status:  Euvolemic   PONV Controlled:  Controlled   Airway Patency:  Patent      Post Op Vitals Reviewed: Yes      Staff: CRNA         No notable events documented      BP   167/93   Temp      Pulse  88   Resp   18   SpO2   96

## 2023-04-03 NOTE — ANESTHESIA PREPROCEDURE EVALUATION
Procedure:  HODAN    Relevant Problems   CARDIO   (+) Hypertension      ENDO   (+) Type 2 diabetes mellitus with circulatory disorder, without long-term current use of insulin (HCC)      NEURO/PSYCH   (+) Acute ischemic left MCA stroke (HCC)      PULMONARY   (+) Aspiration pneumonia [facility])        Physical Exam    Airway       Dental       Cardiovascular  Cardiovascular exam normal    Pulmonary  Pulmonary exam normal     Other Findings        Anesthesia Plan  ASA Score- 4     Anesthesia Type- IV sedation with anesthesia with ASA Monitors  Additional Monitors:   Airway Plan:           Plan Factors-Exercise tolerance (METS): >4 METS  Induction- intravenous  Postoperative Plan-     Informed Consent- Anesthetic plan and risks discussed with patient

## 2023-04-03 NOTE — UTILIZATION REVIEW
NOTIFICATION OF INPATIENT ADMISSION   AUTHORIZATION REQUEST   SERVICING FACILITY:   [address] [facility] [place], [address] [place] [address]  Tax ID: [id number]  NPI: [id number]   ATTENDING PROVIDER:  Attending Name and NPI#: Kyler Adams [[id number]]  Address: [address] [place] [address] Dr Jeremiah zapata  [facility] [place], [address] [place] [address]  Phone: [phone]     ADMISSION INFORMATION:  Place of Service: Inpatient [address]  60[address]  Place of Service Code: 21  Inpatient Admission Date/Time: 3/30/23  4:03 PM  Discharge Date/Time: 3/30/2023  4:48 PM  Admitting Diagnosis Code/Description:  Unresponsive [R41 89]     UTILIZATION REVIEW CONTACT:  Eli Anthony Utilization   Network Utilization Review Department  Phone: [phone]  Fax: [phone]  Email: [email or web address]  Contact for approvals/pending authorizations, clinical reviews, and discharge  PHYSICIAN ADVISORY SERVICES:  Medical Necessity Denial & Xvvo-kt-Kttq Review  Phone: [phone]  Fax: [phone]  Email: [email or web address]             Sky Mccullough RN  Registered Nurse  Specialty:  Utilization Review  Utilization Review      Signed  Date of Service:  3/30/2023  4:46 PM     Signed        Expand All Collapse All  Initial Clinical Review     Admission: Date/Time/Statement:       Admission Orders (From admission, onward)       Ordered         03/30/23 1602   Inpatient Admission  Once                               Orders Placed This Encounter   Procedures   • Inpatient Admission       Standing Status:   Standing       Number of Occurrences:   1       Order Specific Question:   Level of Care       Answer:   Critical Care [15]       Order Specific Question:   Estimated length of stay       Answer:   More than 2 Midnights       Order Specific Question:   Certification       Answer:   I certify that inpatient services are medically necessary for this patient for a duration of greater than two midnights   See H&P and MD Progress Notes for additional information about the patient's course of treatment                ED Arrival Information               Expected   -    Arrival   3/30/2023 15:15    Acuity   Immediate              Means of arrival   Ambulance    Escorted by   [facility]    Service   Critical Care/ICU    Admission type   Emergency              Arrival complaint   -                  Chief Complaint   Patient presents with   • Trauma       Patient found on floor next to bed by mother  GCS 9 per EMS         Initial Presentation: 41 y o  male from home to ED admitted inpatient due to acute encephalopathy/acute respiratory failure/Left MCA inferior M2 Division occlusion/Possible aspiration  Presented due to being found unresponsive by mother next to bed on floor on day of arrival with last known wellness about 8 hours prior  On exam: intermittently moaning, withdraw from pain  GCS 9   Not protecting airway  Tachycardic  Hs tnl 60  Glucose 275  Ct chest/abdomen showed pulmonary edema with superimposed aspiration  Possible soft tissue injury in abdomen  Cta head and neck showed left MCA inferior M2 Division occlusion  In the ED stroke alert  intubated, started on propofol, given Fentanyl  Continue mechanical ventilation, consult neurology  Frequent neuro checks  C Collar placed        3/30/23 per neurology - patient has CVA  NIHSS 27  Not a candidate for thrombolytics  Discussed with neuro interventionist and recommend to transfer to [facility] for CT perfusion and potential thrombectomy  Goal BP up to 220/110  MRI brain when able  Echo  Antiplatelet therapy to be determined following CT perfusion/thrombectomy  Statin when able  Telemetry  Frequent neuro checks   STAT CT head for any acute change in neuro exam      3/30/23 procedure - arterial line        3/30/23 1648 Transfer to [facility] as higher level of care   for perfusion scan and possible thrombectomy            ED Triage Vitals   Temperature Pulse Respirations Blood Pressure SpO2   03/30/23 1610 03/30/23 1511 03/30/23 1511 03/30/23 1523 03/30/23 1511   (!) 96 4 °F (35 8 °C) (!) 119 18 (!) 222/150 99 %       Temp Source Heart Rate Source Patient Position - Orthostatic VS BP Location FiO2 (%)   03/30/23 1610 03/30/23 1511 03/30/23 1511 03/30/23 1523 03/30/23 1619   Bladder Monitor Lying Right arm 100       Pain Score           --                              Wt Readings from Last 1 Encounters:   03/30/23 (!) 155 kg (341 lb 11 4 oz)      Additional Vital Signs:  03/30/23 1555 -- -- -- -- 233/143 (Abnormal)   -- -- [facility]   03/30/23 1528 -- 122 (Abnormal)   100 % -- -- -- -- BS   03/30/23 1525 -- 125 (Abnormal)   99 % -- -- -- -- BS   03/30/23 1523 -- 125 (Abnormal)   95 % 18 222/150 (Abnormal)              Date and Time Trauma Responsiveness Trauma Length of LOC (Seconds) R Pupil Size (mm) L Pupil Size (mm) R Pupil Reaction L Pupil Reaction   03/31/23 0600 -- -- 3 3 Brisk Brisk   03/31/23 0500 -- -- 3 3 Brisk Brisk   03/31/23 0400 -- -- 3 3 Brisk Brisk   03/31/23 0300 -- -- 3 3 Brisk Brisk   03/31/23 0200 -- -- 3 3 Brisk Brisk   03/31/23 0100 -- -- 3 3 Brisk Brisk   03/31/23 0000 -- -- 3 3 Brisk Brisk   03/30/23 2300 -- -- 2 2 Sluggish Sluggish   03/30/23 2200 -- -- 2 2 Sluggish Sluggish   03/30/23 2100 -- -- 2 2 Sluggish Sluggish   03/30/23 2000 -- -- 2 2 Sluggish Sluggish   03/30/23 1811 -- -- 1 1 Sluggish Sluggish   03/30/23 1618 -- -- 1 1 Sluggish Sluggish   03/30/23 1515 -- -- 4 4 Brisk Brisk   03/30/23 1511 -- -- 3 3 Brisk Brisk      Date and Time Eye Opening Best Verbal Response Best Motor Response [place] Coma Scale Score   03/31/23 0600 1 1 6 8   03/31/23 0500 1 1 6 8   03/31/23 0400 1 1 5 7   03/31/23 0300 1 1 4 6   03/31/23 0200 1 1 4 6   03/31/23 0100 1 1 4 6   03/31/23 0000 1 1 4 6   03/30/23 2300 1 1 4 6   03/30/23 2200 1 1 4 6   03/30/23 2100 1 1 4 6   03/30/23 2000 1 1 4 6   03/30/23 1811 1 1 4 6   03/30/23 1618 1 1 1 3 03/30/23 1530 1 1 1 3   03/30/23 1515 2 2 3 7   03/30/23 1511 2 2 3 7      Pertinent Labs/Diagnostic Test Results:   TRAUMA - CT chest abdomen pelvis w contrast   Final Result by Santi Garzon MD (03/30 1643)       1  No acute traumatic injury to the chest, abdomen, or pelvis        2   Evidence of pulmonary edema with bibasilar consolidation which may represent superimposed aspiration        3   Mild subcutaneous stranding in the anterior abdominal wall, nonspecific  This could represent soft tissue injury and clinical correlation recommended        I personally discussed this study with Jahaira Clemens on 3/30/2023 at 4:31 PM                        Workstation performed: [id number]           TRAUMA - CTA head/neck (stroke alert)   Final Result by Avila Morocho MD (03/30 1623)   Left MCA inferior M2 division occlusion       No other significant stenosis or large vessel occlusion  No traumatic injury to the cervical spine        Findings were directly discussed with Jose Echevarria at 3:57 PM        Findings also discussed with Dalila Mayers at 3:54 PM                           Workstation performed: [id number]           CT stroke alert brain   Final Result by Avila Morocho MD (03/30 1609)       Limited  No definite acute hemorrhage or large vessel distribution infarct  No mass effect or herniation          Findings were directly discussed with Jose Echevarria at 3:57 PM        Findings also discussed with Dalila Mayers at 3:54 PM           Workstation performed: [id number]           XR Trauma multiple (SLB/RA trauma bay ONLY)   Final Result by Nicole Rosas MD (03/30 6504)       Limited examination  Right lung grossly clear    Left lung not adequately evaluated but possible opacity medially at the left base        Endotracheal and endogastric tubes appear to be appropriately positioned        The study was marked in [facility] for immediate notification            Workstation performed: [id number]                  Results from last 7 days   Lab Units 03/30/23  1533 03/30/23  1518   WBC Thousand/uL 8 31  --    HEMOGLOBIN g/dL 14 8  --    I STAT HEMOGLOBIN g/dl  --  15 0   HEMATOCRIT % 46 6  --    HEMATOCRIT, ISTAT %  --  44   PLATELETS Thousands/uL 321  --    NEUTROS ABS Thousands/µL 5 31  --             Results from last 7 days   Lab Units 03/30/23  1533 03/30/23  1518   SODIUM mmol/L 137  --    POTASSIUM mmol/L 4 0  --    CHLORIDE mmol/L 103  --    CO2 mmol/L 25  --    CO2, I-STAT mmol/L  --  27   ANION GAP mmol/L 9  --    BUN mg/dL 19  --    CREATININE mg/dL 1 00  --    EGFR ml/min/1 73sq m 90  --    CALCIUM mg/dL 9 0  --    CALCIUM, IONIZED, ISTAT mmol/L  --  1 23           Results from last 7 days   Lab Units 03/30/23  1533   AST U/L 20   ALT U/L 37   ALK PHOS U/L 75   TOTAL PROTEIN g/dL 6 7   ALBUMIN g/dL 3 7   TOTAL BILIRUBIN mg/dL 0 53      Results from last 7 days   Lab Units 03/30/23  1533   GLUCOSE RANDOM mg/dL 275*           Results from last 7 days   Lab Units 03/30/23  1518   PH, EVELYN I-STAT   7 368   PCO2, EVELYN ISTAT mm HG 45 3   PO2, EVELYN ISTAT mm HG 28 0*   HCO3, EVELYN ISTAT mmol/L 26 1   I STAT BASE EXC mmol/L 0   I STAT O2 SAT % 49*           Results from last 7 days   Lab Units 03/30/23  1533   CK TOTAL U/L 81           Results from last 7 days   Lab Units 03/30/23  1533   HS TNI 0HR ng/L 60*         Results from last 7 days   Lab Units 03/30/23  1533   TSH 3RD GENERATON uIU/mL 2 727           Results from last 7 days   Lab Units 03/30/23  1533   ETHANOL LVL mg/dL <10   ACETAMINOPHEN LVL ug/mL <86*   SALICYLATE LVL mg/dL <5         ED Treatment:   Medication Administration from 03/30/2023 1457 to 03/30/2023 1608        Date/Time Order Dose Route Action Comments       03/30/2023 1518 EDT etomidate (AMIDATE) 2 mg/mL injection 45 mg Intravenous Given --       03/30/2023 1519 EDT ROCuronium (ZEMURON) injection 150 mg Intravenous Given --       03/30/2023 1524 EDT propofol (DIPRIVAN) 1000 mg in 100 mL infusion (premix) 20 mcg/kg/min Intravenous New Bag --       03/30/2023 1527 EDT fentanyl citrate (PF) 100 MCG/2ML 100 mcg Intravenous Given --          Medical History   No past medical history on file  Present on Admission:  **None**        Admitting Diagnosis: Unresponsive [R41 89]  Age/Sex: 41 y o  male  Admission Orders:  Scheduled Medications:  chlorhexidine, 15 mL, Mouth/Throat, Q12H NORRIS        Continuous IV Infusions:  fentaNYL, 50 mcg/hr, Intravenous, Continuous  niCARdipine, 1-15 mg/hr, Intravenous, Titrated  propofol, 5-50 mcg/kg/min, Intravenous, Titrated        PRN Meds: none   Cardio pulmonary monitoring   NPO  Neuro checks every hour  Bilateral SCDs  Mechanical ventilation       IP CONSULT TO NEUROLOGY     Network Utilization Review Department  ATTENTION: Please call with any questions or concerns to [phone] and carefully listen to the prompts so that you are directed to the right person  All voicemails are confidential   Delmus Goes all requests for admission clinical reviews, approved or denied determinations and any other requests to dedicated fax number below belonging to the campus where the patient is receiving treatment   List of dedicated fax numbers for the Facilities:  [address] DENIALS (Administrative/Medical Necessity) [phone]   [address] (Maternity/NICU/Pediatrics) [phone]   913 Celi Albrecht [phone]   Renolupe Sotelo  [phone]   [address] [place] [address] Tamie Buck 28 [phone]   [facility] Giacomo Botello [facility] 134 1102 [facility] [place] [phone]

## 2023-04-08 PROBLEM — R15.9 BOWEL AND BLADDER INCONTINENCE: Status: ACTIVE | Noted: 2023-04-08

## 2023-04-08 PROBLEM — R32 BOWEL AND BLADDER INCONTINENCE: Status: ACTIVE | Noted: 2023-04-08

## 2023-04-08 PROBLEM — J69.0 ASPIRATION PNEUMONIA (HCC): Status: RESOLVED | Noted: 2023-04-02 | Resolved: 2023-04-08

## 2023-04-11 PROBLEM — R79.89 CREATININE ELEVATION: Status: ACTIVE | Noted: 2023-04-11

## 2023-04-11 PROBLEM — R07.89 LEFT-SIDED CHEST WALL PAIN: Status: ACTIVE | Noted: 2023-04-11

## 2023-04-11 PROBLEM — D69.6 THROMBOCYTOPENIA (HCC): Status: ACTIVE | Noted: 2023-04-11

## 2023-04-11 PROBLEM — IMO0002 CREATININE ELEVATION: Status: ACTIVE | Noted: 2023-04-11

## 2023-04-12 ENCOUNTER — APPOINTMENT (INPATIENT)
Dept: NON INVASIVE DIAGNOSTICS | Facility: HOSPITAL | Age: 48
DRG: 176 | End: 2023-04-12
Payer: COMMERCIAL

## 2023-04-12 ENCOUNTER — HOSPITAL ENCOUNTER (INPATIENT)
Facility: HOSPITAL | Age: 48
LOS: 7 days | DRG: 176 | End: 2023-04-19
Attending: EMERGENCY MEDICINE | Admitting: EMERGENCY MEDICINE
Payer: COMMERCIAL

## 2023-04-12 DIAGNOSIS — I26.92 ACUTE SADDLE PULMONARY EMBOLISM WITHOUT ACUTE COR PULMONALE (HCC): Primary | ICD-10-CM

## 2023-04-12 LAB
2HR DELTA HS TROPONIN: 2 NG/L
2HR DELTA HS TROPONIN: 2 NG/L
4HR DELTA HS TROPONIN: -1 NG/L
4HR DELTA HS TROPONIN: -1 NG/L
ALBUMIN SERPL BCP-MCNC: 2.9 G/DL (ref 3.5–5)
ALBUMIN SERPL BCP-MCNC: 2.9 G/DL (ref 3.5–5)
ALP SERPL-CCNC: 66 U/L (ref 46–116)
ALP SERPL-CCNC: 66 U/L (ref 46–116)
ALT SERPL W P-5'-P-CCNC: 29 U/L (ref 12–78)
ALT SERPL W P-5'-P-CCNC: 29 U/L (ref 12–78)
ANION GAP SERPL CALCULATED.3IONS-SCNC: 6 MMOL/L (ref 4–13)
ANION GAP SERPL CALCULATED.3IONS-SCNC: 6 MMOL/L (ref 4–13)
AORTIC ROOT: 3.9 CM
AORTIC ROOT: 3.9 CM
APICAL FOUR CHAMBER EJECTION FRACTION: 57 %
APICAL FOUR CHAMBER EJECTION FRACTION: 57 %
APTT PPP: 34 SECONDS (ref 23–37)
APTT PPP: 34 SECONDS (ref 23–37)
APTT PPP: 42 SECONDS (ref 23–37)
APTT PPP: 42 SECONDS (ref 23–37)
APTT PPP: 45 SECONDS (ref 23–37)
APTT PPP: 71 SECONDS (ref 23–37)
APTT PPP: 71 SECONDS (ref 23–37)
ASCENDING AORTA: 3.9 CM
ASCENDING AORTA: 3.9 CM
AST SERPL W P-5'-P-CCNC: 25 U/L (ref 5–45)
AST SERPL W P-5'-P-CCNC: 25 U/L (ref 5–45)
BASOPHILS NFR BLD MANUAL: 2 % (ref 0–1)
BASOPHILS NFR BLD MANUAL: 2 % (ref 0–1)
BILIRUB DIRECT SERPL-MCNC: 0.18 MG/DL (ref 0–0.2)
BILIRUB DIRECT SERPL-MCNC: 0.18 MG/DL (ref 0–0.2)
BILIRUB SERPL-MCNC: 0.75 MG/DL (ref 0.2–1)
BILIRUB SERPL-MCNC: 0.75 MG/DL (ref 0.2–1)
BUN SERPL-MCNC: 19 MG/DL (ref 5–25)
BUN SERPL-MCNC: 19 MG/DL (ref 5–25)
CALCIUM SERPL-MCNC: 8.7 MG/DL (ref 8.3–10.1)
CALCIUM SERPL-MCNC: 8.7 MG/DL (ref 8.3–10.1)
CARDIAC TROPONIN I PNL SERPL HS: 26 NG/L
CARDIAC TROPONIN I PNL SERPL HS: 26 NG/L
CARDIAC TROPONIN I PNL SERPL HS: 27 NG/L
CARDIAC TROPONIN I PNL SERPL HS: 27 NG/L
CARDIAC TROPONIN I PNL SERPL HS: 29 NG/L
CARDIAC TROPONIN I PNL SERPL HS: 29 NG/L
CHLORIDE SERPL-SCNC: 107 MMOL/L (ref 96–108)
CHLORIDE SERPL-SCNC: 107 MMOL/L (ref 96–108)
CO2 SERPL-SCNC: 23 MMOL/L (ref 21–32)
CO2 SERPL-SCNC: 23 MMOL/L (ref 21–32)
CREAT SERPL-MCNC: 1.08 MG/DL (ref 0.6–1.3)
CREAT SERPL-MCNC: 1.08 MG/DL (ref 0.6–1.3)
ERYTHROCYTE [DISTWIDTH] IN BLOOD BY AUTOMATED COUNT: 14.2 % (ref 11.6–15.1)
ERYTHROCYTE [DISTWIDTH] IN BLOOD BY AUTOMATED COUNT: 14.2 % (ref 11.6–15.1)
FRACTIONAL SHORTENING: 27 (ref 28–44)
FRACTIONAL SHORTENING: 27 (ref 28–44)
GFR SERPL CREATININE-BSD FRML MDRD: 82 ML/MIN/1.73SQ M
GFR SERPL CREATININE-BSD FRML MDRD: 82 ML/MIN/1.73SQ M
GLUCOSE SERPL-MCNC: 116 MG/DL (ref 65–140)
GLUCOSE SERPL-MCNC: 116 MG/DL (ref 65–140)
GLUCOSE SERPL-MCNC: 118 MG/DL (ref 65–140)
GLUCOSE SERPL-MCNC: 118 MG/DL (ref 65–140)
GLUCOSE SERPL-MCNC: 122 MG/DL (ref 65–140)
GLUCOSE SERPL-MCNC: 122 MG/DL (ref 65–140)
GLUCOSE SERPL-MCNC: 131 MG/DL (ref 65–140)
GLUCOSE SERPL-MCNC: 131 MG/DL (ref 65–140)
GLUCOSE SERPL-MCNC: 154 MG/DL (ref 65–140)
GLUCOSE SERPL-MCNC: 154 MG/DL (ref 65–140)
HCT VFR BLD AUTO: 40.8 % (ref 36.5–49.3)
HCT VFR BLD AUTO: 40.8 % (ref 36.5–49.3)
HGB BLD-MCNC: 12.9 G/DL (ref 12–17)
HGB BLD-MCNC: 12.9 G/DL (ref 12–17)
IMM EOSINOPHIL NFR BLD MANUAL: 2 % (ref 0–6)
IMM EOSINOPHIL NFR BLD MANUAL: 2 % (ref 0–6)
INR PPP: 1.19 (ref 0.84–1.19)
INR PPP: 1.19 (ref 0.84–1.19)
INTERVENTRICULAR SEPTUM IN DIASTOLE (PARASTERNAL SHORT AXIS VIEW): 1.4 CM
INTERVENTRICULAR SEPTUM IN DIASTOLE (PARASTERNAL SHORT AXIS VIEW): 1.4 CM
INTERVENTRICULAR SEPTUM: 1.4 CM (ref 0.6–1.1)
INTERVENTRICULAR SEPTUM: 1.4 CM (ref 0.6–1.1)
IVC: 24 MM
IVC: 24 MM
LAAS-AP2: 13.9 CM2
LAAS-AP2: 13.9 CM2
LAAS-AP4: 15.4 CM2
LAAS-AP4: 15.4 CM2
LEFT ATRIUM AREA SYSTOLE SINGLE PLANE A4C: 15.1 CM2
LEFT ATRIUM AREA SYSTOLE SINGLE PLANE A4C: 15.1 CM2
LEFT ATRIUM SIZE: 4.1 CM
LEFT ATRIUM SIZE: 4.1 CM
LEFT INTERNAL DIMENSION IN SYSTOLE: 3.8 CM (ref 2.1–4)
LEFT INTERNAL DIMENSION IN SYSTOLE: 3.8 CM (ref 2.1–4)
LEFT VENTRICLE DIASTOLIC VOLUME (MOD BIPLANE): 168 ML
LEFT VENTRICLE DIASTOLIC VOLUME (MOD BIPLANE): 168 ML
LEFT VENTRICLE SYSTOLIC VOLUME (MOD BIPLANE): 73 ML
LEFT VENTRICLE SYSTOLIC VOLUME (MOD BIPLANE): 73 ML
LEFT VENTRICULAR INTERNAL DIMENSION IN DIASTOLE: 5.2 CM (ref 3.5–6)
LEFT VENTRICULAR INTERNAL DIMENSION IN DIASTOLE: 5.2 CM (ref 3.5–6)
LEFT VENTRICULAR POSTERIOR WALL IN END DIASTOLE: 1.1 CM
LEFT VENTRICULAR POSTERIOR WALL IN END DIASTOLE: 1.1 CM
LEFT VENTRICULAR STROKE VOLUME: 69 ML
LEFT VENTRICULAR STROKE VOLUME: 69 ML
LV EF: 56 %
LV EF: 56 %
LVSV (TEICH): 69 ML
LVSV (TEICH): 69 ML
LYMPHOCYTES NFR BLD: 13 % (ref 14–44)
LYMPHOCYTES NFR BLD: 13 % (ref 14–44)
MACROCYTES BLD QL AUTO: PRESENT
MACROCYTES BLD QL AUTO: PRESENT
MCH RBC QN AUTO: 26.7 PG (ref 26.8–34.3)
MCH RBC QN AUTO: 26.7 PG (ref 26.8–34.3)
MCHC RBC AUTO-ENTMCNC: 31.6 G/DL (ref 31.4–37.4)
MCHC RBC AUTO-ENTMCNC: 31.6 G/DL (ref 31.4–37.4)
MCV RBC AUTO: 84 FL (ref 82–98)
MCV RBC AUTO: 84 FL (ref 82–98)
MONOCYTES NFR BLD AUTO: 1 % (ref 4–12)
MONOCYTES NFR BLD AUTO: 1 % (ref 4–12)
NEUTS SEG NFR BLD AUTO: 82 % (ref 45–77)
NEUTS SEG NFR BLD AUTO: 82 % (ref 45–77)
NT-PROBNP SERPL-MCNC: 2144 PG/ML
NT-PROBNP SERPL-MCNC: 2144 PG/ML
PLATELET # BLD AUTO: 42 THOUSANDS/UL (ref 149–390)
PLATELET # BLD AUTO: 42 THOUSANDS/UL (ref 149–390)
PLATELET BLD QL SMEAR: ABNORMAL
PLATELET BLD QL SMEAR: ABNORMAL
PMV BLD AUTO: 11.3 FL (ref 8.9–12.7)
PMV BLD AUTO: 11.3 FL (ref 8.9–12.7)
POLYCHROMASIA BLD QL SMEAR: PRESENT
POLYCHROMASIA BLD QL SMEAR: PRESENT
POTASSIUM SERPL-SCNC: 4.3 MMOL/L (ref 3.5–5.3)
POTASSIUM SERPL-SCNC: 4.3 MMOL/L (ref 3.5–5.3)
PROT SERPL-MCNC: 6.7 G/DL (ref 6.4–8.4)
PROT SERPL-MCNC: 6.7 G/DL (ref 6.4–8.4)
PROTHROMBIN TIME: 15.3 SECONDS (ref 11.6–14.5)
PROTHROMBIN TIME: 15.3 SECONDS (ref 11.6–14.5)
RA PRESSURE ESTIMATED: 15 MMHG
RA PRESSURE ESTIMATED: 15 MMHG
RBC # BLD AUTO: 4.84 MILLION/UL (ref 3.88–5.62)
RBC # BLD AUTO: 4.84 MILLION/UL (ref 3.88–5.62)
RIGHT ATRIUM AREA SYSTOLE A4C: 22.3 CM2
RIGHT ATRIUM AREA SYSTOLE A4C: 22.3 CM2
RIGHT VENTRICLE ID DIMENSION: 4.2 CM
RIGHT VENTRICLE ID DIMENSION: 4.2 CM
RV PSP: 56 MMHG
RV PSP: 56 MMHG
SL CV LEFT ATRIUM LENGTH A2C: 4.5 CM
SL CV LEFT ATRIUM LENGTH A2C: 4.5 CM
SL CV LV EF: 55
SL CV LV EF: 55
SL CV PED ECHO LEFT VENTRICLE DIASTOLIC VOLUME (MOD BIPLANE) 2D: 132 ML
SL CV PED ECHO LEFT VENTRICLE DIASTOLIC VOLUME (MOD BIPLANE) 2D: 132 ML
SL CV PED ECHO LEFT VENTRICLE SYSTOLIC VOLUME (MOD BIPLANE) 2D: 63 ML
SL CV PED ECHO LEFT VENTRICLE SYSTOLIC VOLUME (MOD BIPLANE) 2D: 63 ML
SODIUM SERPL-SCNC: 136 MMOL/L (ref 135–147)
SODIUM SERPL-SCNC: 136 MMOL/L (ref 135–147)
TOTAL CELLS COUNTED SPEC: 100
TOTAL CELLS COUNTED SPEC: 100
TR MAX PG: 41 MMHG
TR MAX PG: 41 MMHG
TR PEAK VELOCITY: 3.2 M/S
TR PEAK VELOCITY: 3.2 M/S
TRICUSPID ANNULAR PLANE SYSTOLIC EXCURSION: 2.6 CM
TRICUSPID ANNULAR PLANE SYSTOLIC EXCURSION: 2.6 CM
TRICUSPID VALVE PEAK REGURGITATION VELOCITY: 3.21 M/S
TRICUSPID VALVE PEAK REGURGITATION VELOCITY: 3.21 M/S
WBC # BLD AUTO: 8.04 THOUSAND/UL (ref 4.31–10.16)
WBC # BLD AUTO: 8.04 THOUSAND/UL (ref 4.31–10.16)

## 2023-04-12 PROCEDURE — 83880 ASSAY OF NATRIURETIC PEPTIDE: CPT

## 2023-04-12 PROCEDURE — 85730 THROMBOPLASTIN TIME PARTIAL: CPT | Performed by: EMERGENCY MEDICINE

## 2023-04-12 PROCEDURE — 82948 REAGENT STRIP/BLOOD GLUCOSE: CPT

## 2023-04-12 PROCEDURE — 93325 DOPPLER ECHO COLOR FLOW MAPG: CPT

## 2023-04-12 PROCEDURE — 93308 TTE F-UP OR LMTD: CPT

## 2023-04-12 PROCEDURE — 93321 DOPPLER ECHO F-UP/LMTD STD: CPT

## 2023-04-12 PROCEDURE — 82542 COL CHROMOTOGRAPHY QUAL/QUAN: CPT

## 2023-04-12 PROCEDURE — 93970 EXTREMITY STUDY: CPT | Performed by: SURGERY

## 2023-04-12 PROCEDURE — 85730 THROMBOPLASTIN TIME PARTIAL: CPT

## 2023-04-12 PROCEDURE — 99291 CRITICAL CARE FIRST HOUR: CPT | Performed by: EMERGENCY MEDICINE

## 2023-04-12 PROCEDURE — 93970 EXTREMITY STUDY: CPT

## 2023-04-12 PROCEDURE — 85610 PROTHROMBIN TIME: CPT

## 2023-04-12 PROCEDURE — 93325 DOPPLER ECHO COLOR FLOW MAPG: CPT | Performed by: INTERNAL MEDICINE

## 2023-04-12 PROCEDURE — 93308 TTE F-UP OR LMTD: CPT | Performed by: INTERNAL MEDICINE

## 2023-04-12 PROCEDURE — 97167 OT EVAL HIGH COMPLEX 60 MIN: CPT

## 2023-04-12 PROCEDURE — 97163 PT EVAL HIGH COMPLEX 45 MIN: CPT

## 2023-04-12 PROCEDURE — 80076 HEPATIC FUNCTION PANEL: CPT

## 2023-04-12 PROCEDURE — 85007 BL SMEAR W/DIFF WBC COUNT: CPT

## 2023-04-12 PROCEDURE — 84484 ASSAY OF TROPONIN QUANT: CPT

## 2023-04-12 PROCEDURE — 80048 BASIC METABOLIC PNL TOTAL CA: CPT

## 2023-04-12 PROCEDURE — 85027 COMPLETE CBC AUTOMATED: CPT

## 2023-04-12 PROCEDURE — 93321 DOPPLER ECHO F-UP/LMTD STD: CPT | Performed by: INTERNAL MEDICINE

## 2023-04-12 RX ORDER — TAMSULOSIN HYDROCHLORIDE 0.4 MG/1
0.4 CAPSULE ORAL
Status: DISCONTINUED | OUTPATIENT
Start: 2023-04-12 | End: 2023-04-19 | Stop reason: HOSPADM

## 2023-04-12 RX ORDER — INSULIN LISPRO 100 [IU]/ML
1-5 INJECTION, SOLUTION INTRAVENOUS; SUBCUTANEOUS
Status: DISCONTINUED | OUTPATIENT
Start: 2023-04-12 | End: 2023-04-19 | Stop reason: HOSPADM

## 2023-04-12 RX ORDER — SENNOSIDES 8.6 MG
1 TABLET ORAL
Status: DISCONTINUED | OUTPATIENT
Start: 2023-04-12 | End: 2023-04-19 | Stop reason: HOSPADM

## 2023-04-12 RX ORDER — CHLORHEXIDINE GLUCONATE ORAL RINSE 1.2 MG/ML
15 SOLUTION DENTAL EVERY 12 HOURS SCHEDULED
Status: DISCONTINUED | OUTPATIENT
Start: 2023-04-12 | End: 2023-04-14

## 2023-04-12 RX ORDER — HYDRALAZINE HYDROCHLORIDE 25 MG/1
25 TABLET, FILM COATED ORAL EVERY 8 HOURS PRN
Status: DISCONTINUED | OUTPATIENT
Start: 2023-04-12 | End: 2023-04-12

## 2023-04-12 RX ORDER — AMLODIPINE BESYLATE 5 MG/1
5 TABLET ORAL DAILY
Status: DISCONTINUED | OUTPATIENT
Start: 2023-04-12 | End: 2023-04-19 | Stop reason: HOSPADM

## 2023-04-12 RX ORDER — ONDANSETRON 4 MG/1
4 TABLET, ORALLY DISINTEGRATING ORAL EVERY 6 HOURS PRN
Status: DISCONTINUED | OUTPATIENT
Start: 2023-04-12 | End: 2023-04-19 | Stop reason: HOSPADM

## 2023-04-12 RX ORDER — ACETAMINOPHEN 160 MG/5ML
975 SUSPENSION ORAL EVERY 8 HOURS PRN
Status: DISCONTINUED | OUTPATIENT
Start: 2023-04-12 | End: 2023-04-19 | Stop reason: HOSPADM

## 2023-04-12 RX ORDER — ALBUMIN, HUMAN INJ 5% 5 %
25 SOLUTION INTRAVENOUS ONCE
Status: DISCONTINUED | OUTPATIENT
Start: 2023-04-12 | End: 2023-04-12

## 2023-04-12 RX ORDER — ARGATROBAN 1 MG/ML
.1-3 INJECTION INTRAVENOUS
Status: DISCONTINUED | OUTPATIENT
Start: 2023-04-12 | End: 2023-04-15

## 2023-04-12 RX ORDER — SODIUM CHLORIDE, SODIUM GLUCONATE, SODIUM ACETATE, POTASSIUM CHLORIDE, MAGNESIUM CHLORIDE, SODIUM PHOSPHATE, DIBASIC, AND POTASSIUM PHOSPHATE .53; .5; .37; .037; .03; .012; .00082 G/100ML; G/100ML; G/100ML; G/100ML; G/100ML; G/100ML; G/100ML
100 INJECTION, SOLUTION INTRAVENOUS CONTINUOUS
Status: DISPENSED | OUTPATIENT
Start: 2023-04-12 | End: 2023-04-12

## 2023-04-12 RX ORDER — ARGATROBAN 1 MG/ML
.1-3 INJECTION INTRAVENOUS
Status: DISCONTINUED | OUTPATIENT
Start: 2023-04-12 | End: 2023-04-12

## 2023-04-12 RX ORDER — LEVETIRACETAM 750 MG/1
750 TABLET ORAL EVERY 12 HOURS SCHEDULED
Status: DISCONTINUED | OUTPATIENT
Start: 2023-04-12 | End: 2023-04-19 | Stop reason: HOSPADM

## 2023-04-12 RX ORDER — ASPIRIN 81 MG/1
81 TABLET, CHEWABLE ORAL DAILY
Status: DISCONTINUED | OUTPATIENT
Start: 2023-04-12 | End: 2023-04-19 | Stop reason: HOSPADM

## 2023-04-12 RX ORDER — ATORVASTATIN CALCIUM 40 MG/1
40 TABLET, FILM COATED ORAL EVERY EVENING
Status: DISCONTINUED | OUTPATIENT
Start: 2023-04-12 | End: 2023-04-19 | Stop reason: HOSPADM

## 2023-04-12 RX ORDER — CALCIUM CARBONATE 500 MG/1
1000 TABLET, CHEWABLE ORAL 3 TIMES DAILY PRN
Status: DISCONTINUED | OUTPATIENT
Start: 2023-04-12 | End: 2023-04-19 | Stop reason: HOSPADM

## 2023-04-12 RX ORDER — FAMOTIDINE 20 MG/1
20 TABLET, FILM COATED ORAL EVERY 12 HOURS SCHEDULED
Status: DISCONTINUED | OUTPATIENT
Start: 2023-04-12 | End: 2023-04-19 | Stop reason: HOSPADM

## 2023-04-12 RX ADMIN — LEVETIRACETAM 750 MG: 750 TABLET, FILM COATED ORAL at 09:01

## 2023-04-12 RX ADMIN — METOPROLOL TARTRATE 25 MG: 25 TABLET, FILM COATED ORAL at 21:02

## 2023-04-12 RX ADMIN — INSULIN LISPRO 1 UNITS: 100 INJECTION, SOLUTION INTRAVENOUS; SUBCUTANEOUS at 11:43

## 2023-04-12 RX ADMIN — ARGATROBAN 1.3 MCG/KG/MIN: 50 INJECTION, SOLUTION INTRAVENOUS at 23:22

## 2023-04-12 RX ADMIN — AMLODIPINE BESYLATE 5 MG: 5 TABLET ORAL at 09:01

## 2023-04-12 RX ADMIN — SODIUM CHLORIDE, SODIUM GLUCONATE, SODIUM ACETATE, POTASSIUM CHLORIDE AND MAGNESIUM CHLORIDE 100 ML/HR: 526; 502; 368; 37; 30 INJECTION, SOLUTION INTRAVENOUS at 12:06

## 2023-04-12 RX ADMIN — METOPROLOL TARTRATE 25 MG: 25 TABLET, FILM COATED ORAL at 09:01

## 2023-04-12 RX ADMIN — FAMOTIDINE 20 MG: 20 TABLET, FILM COATED ORAL at 09:01

## 2023-04-12 RX ADMIN — ASPIRIN 81 MG 81 MG: 81 TABLET ORAL at 09:01

## 2023-04-12 RX ADMIN — FAMOTIDINE 20 MG: 20 TABLET, FILM COATED ORAL at 21:02

## 2023-04-12 RX ADMIN — ARGATROBAN 0.8 MCG/KG/MIN: 50 INJECTION, SOLUTION INTRAVENOUS at 12:04

## 2023-04-12 RX ADMIN — TAMSULOSIN HYDROCHLORIDE 0.4 MG: 0.4 CAPSULE ORAL at 17:26

## 2023-04-12 RX ADMIN — ARGATROBAN 1.3 MCG/KG/MIN: 50 INJECTION, SOLUTION INTRAVENOUS at 18:28

## 2023-04-12 RX ADMIN — ARGATROBAN 0.3 MCG/KG/MIN: 50 INJECTION, SOLUTION INTRAVENOUS at 10:38

## 2023-04-12 RX ADMIN — ARGATROBAN 0.2 MCG/KG/MIN: 50 INJECTION, SOLUTION INTRAVENOUS at 03:14

## 2023-04-12 RX ADMIN — ATORVASTATIN CALCIUM 40 MG: 40 TABLET, FILM COATED ORAL at 17:26

## 2023-04-12 RX ADMIN — SENNOSIDES 8.6 MG: 8.6 TABLET, FILM COATED ORAL at 21:02

## 2023-04-12 RX ADMIN — LEVETIRACETAM 750 MG: 750 TABLET, FILM COATED ORAL at 21:02

## 2023-04-12 RX ADMIN — SODIUM CHLORIDE, SODIUM GLUCONATE, SODIUM ACETATE, POTASSIUM CHLORIDE AND MAGNESIUM CHLORIDE 100 ML/HR: 526; 502; 368; 37; 30 INJECTION, SOLUTION INTRAVENOUS at 03:23

## 2023-04-12 NOTE — PHYSICAL THERAPY NOTE
Physical Therapy Evaluation     Patient's Name: Annalee Vitale III    Admitting Diagnosis  No admission diagnoses are documented for this encounter  Problem List  Patient Active Problem List   Diagnosis    Acute ischemic left MCA stroke (HCC)    Obesity, Class III, BMI 40-49 9 (morbid obesity) ([address] )    Encephalopathy    Type 2 diabetes mellitus with circulatory disorder, without long-term current use of insulin (HCC)    Hypertension    Bowel and bladder incontinence    Left-sided chest wall pain    Creatinine elevation    Thrombocytopenia (HCC)    Saddle 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 [facility])       Past Medical History  Past Medical History:   Diagnosis Date    Hypertensive emergency 3/30/2023       Past Surgical History  No past surgical history on file  04/12/23 0948   PT Last Visit   PT Visit Date 04/12/23   Note Type   Note type Evaluation   Pain Assessment   Pain Assessment Tool 0-10   Pain Score No Pain   Restrictions/Precautions   Weight Bearing Precautions Per Order No   Other Precautions Cognitive; Chair Alarm; Bed Alarm;Multiple lines;Telemetry; Fall Risk   Home Living   Type of [address] level   Additional Comments Pt with expressive aphasia, unable to obtain information  Pt admitted [facility] where he was receiving therapy for recent CVA  Prior Function   Level of [place] Independent with ADLs; Independent with functional mobility; Independent with IADLS   Lives With Family  (parents)   IADLs Independent with driving; Independent with meal prep; Independent with medication management   Falls in the last 6 months 1 to 4   General   Family/Caregiver Present No   Cognition   Overall Cognitive Status Impaired   Arousal/Participation Alert   Attention Within functional limits   Memory Decreased recall of precautions   Following Commands Follows one step commands with increased time or repetition   Comments Pt with expressive aphasia from recent stroke    Able to nod head yes/no  Subjective   Subjective Pleasant and agreeable to particiapte  RLE Assessment   RLE Assessment   (grossly 3+/5)   LLE Assessment   LLE Assessment   (grossly 4-/5)   Bed Mobility   Supine to Sit 5  Supervision   Additional items HOB elevated; Increased time required;Verbal cues   Additional Comments Left OOB in chair with chair alarm intact and all needs in reach   Transfers   Sit to Stand 3  Moderate assistance   Additional items Assist x 2; Increased time required;Verbal cues   Stand to Sit 3  Moderate assistance   Additional items Assist x 2; Increased time required;Verbal cues   Additional Comments with b/l HHA   Ambulation/Elevation   Gait pattern Excessively slow; Step to;Short stride;Decreased foot clearance   Gait Assistance 4  Minimal assist   Additional items Assist x 2;Verbal cues; Tactile cues   Assistive Device Other (Comment)  (b/l HHA)   Distance 3 ft from bed to chair   Balance   Static Sitting Fair   Dynamic Sitting Fair -   Static Standing Poor +   Dynamic Standing Poor +   Ambulatory Poor +   Activity Tolerance   Activity Tolerance Patient tolerated treatment well   Medical Staff Made Aware OT Kristen   Nurse Made Aware RN cleared pt to be seen by PT   Assessment   Prognosis Good   Problem List Decreased strength;Decreased endurance; Impaired balance;Decreased mobility; Decreased coordination   Assessment Pt seen for high complexity PT evaluation due to decrease in functional mobility status compared to baseline  Pt with active PT eval/treat orders at this time  Pt is a 41 y o  M who presented to [facility] with saddle PE on 04/12/23  Pt with recent admission from [facility]  Pt  has a past medical history of Hypertensive emergency (3/30/2023)  Pt resides with parents in [facility]  Pt presents with decreased strength, balance, endurance that contribute to limitations in bed mobility, functional transfers, functional mobility    Pt requires supervision for bed mobility, Mod A x 2 for STS, and Min A x 2 for short distance ambulation at this time  Pt left upright in bedside chair with chair alarm intact and with all needs in reach  Pt will benefit from skilled therapy in order to address current impairments and functional limitations  PT to follow pt and recommending rehab once medically cleared  The patient's AM-PAC Basic Mobility Inpatient Short Form Raw Score is 10  A Raw score of less than or equal to 16 suggests the patient may benefit from discharge to post-acute rehabilitation services  Please also refer to the recommendation of the Physical Therapist for safe discharge planning  Barriers to Discharge Decreased caregiver support; Inaccessible home environment   Goals   Patient Goals unable to state 2/2 aphasia   STG Expiration Date 04/26/23   Short Term Goal #1 1  Pt will demonstrate ability to perform all aspects of bed mobility with Mod I in order to increase independence and decrease burden on caregivers  2  Pt will demonstrate ability to perform functional transfers with Mod I in order to increase independence and decrease burden on caregivers  3  Pt will demonstrate ability to ambulate 200 ft with least restrictive AD with Mod I in order to return to mobility safely  4  Pt will demonstrate ability to negotiate full flight steps with/without HR and Mod I in order to return to household/community mobility safely  5  Pt will demonstrate improved balance by one grade order to decrease risk of falls  6  Pt will increase b/l LE strength by 1 grade in order to increase ease of functional mobility and transfers  Plan   Treatment/Interventions Functional transfer training;LE strengthening/ROM; Therapeutic exercise; Endurance training;Patient/family training;Equipment eval/education; Bed mobility;Gait training;Spoke to nursing   PT Frequency 3-5x/wk   Recommendation   PT Discharge Recommendation Post acute rehabilitation services   AM-PAC Basic Mobility Inpatient   Turning in Flat Bed Without Bedrails 3   Lying on Back to Sitting on Edge of Flat Bed Without Bedrails 3   Moving Bed to Chair 1   Standing Up From Chair Using Arms 1   Walk in Room 1   Climb 3-5 Stairs With Railing 1   Basic Mobility Inpatient Raw Score 10   Turning Head Towards Sound 4   Follow Simple Instructions 3   Low Function Basic Mobility Raw Score  17   Low Function Basic Mobility Standardized Score  27 46   Highest Level Of Mobility   -HLM Goal 4: Move to chair/commode   -HLM Achieved 4: Move to chair/commode   Modified Knox Scale   Modified Knox Scale 4       Magda Scales, PT, DPT

## 2023-04-12 NOTE — ASSESSMENT & PLAN NOTE
· No home regimen  · Continue amlodipine, metoprolol  · Consider PRN agents if needed for SBP goal less than 160

## 2023-04-12 NOTE — ASSESSMENT & PLAN NOTE
Lab Results   Component Value Date    HGBA1C 8 0 (H) 03/31/2023       Recent Labs     04/11/23  0627 04/11/23  1048 04/11/23  1614 04/11/23  2250   POCGLU 131 127 122 121       Blood Sugar Average: Last 72 hrs:     · Sliding scale insulin before meals and at bedtime, adjust algorithm as needed

## 2023-04-12 NOTE — ASSESSMENT & PLAN NOTE
· As evident on CTA PE study  · Dimer greater than 20  · PESI score is 75 points which is low risk: 1 7 to 3 5% 30-day mortality  · BNP and troponin now  · Echo  · Bilateral lower extremity venous duplex  · Argatroban infusion  · Continuous cardiopulmonary monitoring  · Case was discussed with IR by [facility] provider, patient is not a candidate for IR thrombectomy

## 2023-04-12 NOTE — UTILIZATION REVIEW
Initial Clinical Review    Admission: Date/Time/Statement:   Admission Orders (From admission, onward)     Ordered        03/30/23 1602  Inpatient Admission  Once                      Orders Placed This Encounter   Procedures   • Inpatient Admission     Standing Status:   Standing     Number of Occurrences:   1     Order Specific Question:   Level of Care     Answer:   Critical Care [15]     Order Specific Question:   Estimated length of stay     Answer:   More than 2 Midnights     Order Specific Question:   Certification     Answer:   I certify that inpatient services are medically necessary for this patient for a duration of greater than two midnights  See H&P and MD Progress Notes for additional information about the patient's course of treatment  ED Arrival Information     Expected   -    Arrival   3/30/2023 15:15    Acuity   Immediate            Means of arrival   Ambulance    Escorted by   [facility]    Service   Critical Care/ICU    Admission type   Emergency            Arrival complaint   -           Chief Complaint   Patient presents with   • Trauma     Patient found on floor next to bed by mother  GCS 9 per EMS       Initial Presentation: 41 y o  male from home to ED admitted inpatient due to acute encephalopathy/acute respiratory failure/Left MCA inferior M2 Division occlusion/Possible aspiration  Presented due to being found unresponsive by mother next to bed on floor on day of arrival with last known wellness about 8 hours prior  On exam: intermittently moaning, withdraw from pain  GCS 9   Not protecting airway  Tachycardic  Hs tnl 60  Glucose 275  Ct chest/abdomen showed pulmonary edema with superimposed aspiration  Possible soft tissue injury in abdomen  Cta head and neck showed left MCA inferior M2 Division occlusion  In the ED stroke alert  intubated, started on propofol, given Fentanyl  Continue mechanical ventilation, consult neurology  Frequent neuro checks  C Collar placed  3/30/23 per neurology - patient has CVA  NIHSS 27  Not a candidate for thrombolytics  Discussed with neuro interventionist and recommend to transfer to [facility] for CT perfusion and potential thrombectomy  Goal BP up to 220/110  MRI brain when able  Echo  Antiplatelet therapy to be determined following CT perfusion/thrombectomy  Statin when able  Telemetry  Frequent neuro checks   STAT CT head for any acute change in neuro exam     3/30/23 procedure - arterial line  3/30/23 1648 Transfer to [facility] as higher level of care   for perfusion scan and possible thrombectomy    ED Triage Vitals   Temperature Pulse Respirations Blood Pressure SpO2   03/30/23 1610 03/30/23 1511 03/30/23 1511 03/30/23 1523 03/30/23 1511   (!) 96 4 °F (35 8 °C) (!) 119 18 (!) 222/150 99 %      Temp Source Heart Rate Source Patient Position - Orthostatic VS BP Location FiO2 (%)   03/30/23 1610 03/30/23 1511 03/30/23 1511 03/30/23 1523 03/30/23 1619   Bladder Monitor Lying Right arm 100      Pain Score       --                 Wt Readings from Last 1 Encounters:   04/12/23 (!) 137 kg (301 lb)     Additional Vital Signs:  03/30/23 1555 -- -- -- -- 233/143 (Abnormal)   -- -- [facility] [place]   03/30/23 1528 -- 122 (Abnormal)   100 % -- -- -- -- BS   03/30/23 1525 -- 125 (Abnormal)   99 % -- -- -- -- BS   03/30/23 1523 -- 125 (Abnormal)   95 % 18 222/150 (Abnormal)          Date and Time Trauma Responsiveness Trauma Length of LOC (Seconds) R Pupil Size (mm) L Pupil Size (mm) R Pupil Reaction L Pupil Reaction   03/31/23 0600 -- -- 3 3 Brisk Brisk   03/31/23 0500 -- -- 3 3 Brisk Brisk   03/31/23 0400 -- -- 3 3 Brisk Brisk   03/31/23 0300 -- -- 3 3 Brisk Brisk   03/31/23 0200 -- -- 3 3 Brisk Brisk   03/31/23 0100 -- -- 3 3 Brisk Brisk   03/31/23 0000 -- -- 3 3 Brisk Brisk   03/30/23 2300 -- -- 2 2 Sluggish Sluggish   03/30/23 2200 -- -- 2 2 Sluggish Sluggish   03/30/23 2100 -- -- 2 2 Sluggish Sluggish   03/30/23 2000 -- -- 2 2 Sluggish Sluggish   03/30/23 1811 -- -- 1 1 Sluggish Sluggish   03/30/23 1618 -- -- 1 1 Sluggish Sluggish   03/30/23 1515 -- -- 4 4 Brisk Brisk   03/30/23 1511 -- -- 3 3 Brisk Brisk     Date and Time Eye Opening Best Verbal Response Best Motor Response [place] Coma Scale Score   03/31/23 0600 1 1 6 8   03/31/23 0500 1 1 6 8   03/31/23 0400 1 1 5 7   03/31/23 0300 1 1 4 6   03/31/23 0200 1 1 4 6   03/31/23 0100 1 1 4 6   03/31/23 0000 1 1 4 6   03/30/23 2300 1 1 4 6   03/30/23 2200 1 1 4 6   03/30/23 2100 1 1 4 6   03/30/23 2000 1 1 4 6   03/30/23 1811 1 1 4 6   03/30/23 1618 1 1 1 3   03/30/23 1530 1 1 1 3   03/30/23 1515 2 2 3 7   03/30/23 1511 2 2 3 7     Pertinent Labs/Diagnostic Test Results:   TRAUMA - CT chest abdomen pelvis w contrast   Final Result by Sallie Apley, MD (03/30 1643)      1  No acute traumatic injury to the chest, abdomen, or pelvis  2   Evidence of pulmonary edema with bibasilar consolidation which may represent superimposed aspiration  3   Mild subcutaneous stranding in the anterior abdominal wall, nonspecific  This could represent soft tissue injury and clinical correlation recommended  I personally discussed this study with Bernardo Celeste on 3/30/2023 at 4:31 PM                   Workstation performed: [id number]         TRAUMA - CTA head/neck (stroke alert)   Final Result by Rob Amanda MD (03/30 1623)   Left MCA inferior M2 division occlusion       No other significant stenosis or large vessel occlusion  No traumatic injury to the cervical spine  Findings were directly discussed with Phoebe Montoya at 3:57 PM       Findings also discussed with Fady Ly at 3:54 PM                     Workstation performed: [id number]         CT stroke alert brain   Final Result by Rob Amanda MD (03/30 1606)      Limited  No definite acute hemorrhage or large vessel distribution infarct  No mass effect or herniation         Findings were directly discussed with Mitchell Morrison at 3:57 PM       Findings also discussed with Zara Andrade at 3:54 PM         Workstation performed: [id number]         XR Trauma multiple (SLB/SLRA trauma bay ONLY)   Final Result by Loretta Jason MD (03/30 2264)      Limited examination  Right lung grossly clear  Left lung not adequately evaluated but possible opacity medially at the left base  Endotracheal and endogastric tubes appear to be appropriately positioned  The study was marked in [facility] for immediate notification           Workstation performed: [id number]           Results from last 7 days   Lab Units 04/12/23  0313 04/11/23  1612 04/11/23  1222 04/08/23  0629 04/06/23  0413   WBC Thousand/uL 8 04 10 05 9 70 7 72 8 78   HEMOGLOBIN g/dL 12 9 14 1 13 9 13 7 13 9   HEMATOCRIT % 40 8 43 2 44 4 44 1 44 6   PLATELETS Thousands/uL 42* 49* 48* 197 306   NEUTROS ABS Thousands/µL  --  6 81 7 01  --   --      Results from last 7 days   Lab Units 04/12/23  0313 04/11/23  1222 04/09/23  0602 04/08/23  0629 04/06/23  0848 04/06/23  0432   SODIUM mmol/L 136 135 136 134*  --  134*   POTASSIUM mmol/L 4 3 4 1 4 4 4 1  --  3 9   CHLORIDE mmol/L 107 104 109* 106  --  107   CO2 mmol/L 23 26 24 23  --  25   ANION GAP mmol/L 6 5 3* 5  --  2*   BUN mg/dL 19 19 20 20  --  17   CREATININE mg/dL 1 08 1 20 1 06 1 00  --  0 85   EGFR ml/min/1 73sq m 82 72 84 90  --  105   CALCIUM mg/dL 8 7 9 7 8 8 9 2  --  8 9   MAGNESIUM mg/dL  --   --   --   --  2 4  --      Results from last 7 days   Lab Units 04/12/23 0313 04/11/23  1222   AST U/L 25 39   ALT U/L 29 36   ALK PHOS U/L 66 71   TOTAL PROTEIN g/dL 6 7 7 4   ALBUMIN g/dL 2 9* 3 4*   TOTAL BILIRUBIN mg/dL 0 75 0 94   BILIRUBIN DIRECT mg/dL 0 18  --      Results from last 7 days   Lab Units 04/12/23  0313 04/11/23  1222 04/09/23  0602 04/08/23  0629 04/06/23  0432   GLUCOSE RANDOM mg/dL 118 92 124 137 130             Results from last 7 days   Lab Units 04/12/23  0728 04/12/23  4947 04/12/23  0313 04/10/23  2108 04/10/23  1842 04/10/23  1655   HS TNI 0HR ng/L  --   --  27  --   --  36   HS TNI 2HR ng/L  --  29  --   --  36  --    HSTNI D2 ng/L  --  2  --   --  0  --    HS TNI 4HR ng/L 26  --   --  35  --   --    HSTNI D4 ng/L -1  --   --  -1  --   --                  ED Treatment:   Medication Administration from 03/30/2023 1457 to 03/30/2023 1608       Date/Time Order Dose Route Action Comments     03/30/2023 1518 EDT etomidate (AMIDATE) 2 mg/mL injection 45 mg Intravenous Given --     03/30/2023 1519 EDT ROCuronium (ZEMURON) injection 150 mg Intravenous Given --     03/30/2023 1524 EDT propofol (DIPRIVAN) 1000 mg in 100 mL infusion (premix) 20 mcg/kg/min Intravenous New Bag --     03/30/2023 1527 EDT fentanyl citrate (PF) 100 MCG/2ML 100 mcg Intravenous Given --        Past Medical History:   Diagnosis Date   • Hypertensive emergency 3/30/2023     Present on Admission:  **None**      Admitting Diagnosis: Unresponsive [R41 89]  Age/Sex: 41 y o  male  Admission Orders:  Scheduled Medications:  No current facility-administered medications for this encounter  Continuous IV Infusions:  fentaNYL, 50 mcg/hr, Intravenous, Continuous  niCARdipine, 1-15 mg/hr, Intravenous, Titrated  propofol, 5-50 mcg/kg/min, Intravenous, Titrated      PRN Meds: none   No current facility-administered medications for this encounter  Cardio pulmonary monitoring   NPO  Neuro checks every hour  Bilateral SCDs  Mechanical ventilation  IP CONSULT TO NEUROLOGY    Network Utilization Review Department  ATTENTION: Please call with any questions or concerns to [phone] and carefully listen to the prompts so that you are directed to the right person  All voicemails are confidential   [facility] all requests for admission clinical reviews, approved or denied determinations and any other requests to dedicated fax number below belonging to the campus where the patient is receiving treatment   List of dedicated fax numbers for the Facilities:  FACILITY NAME UR FAX NUMBER   ADMISSION DENIALS (Administrative/Medical Necessity) [phone]   [address]  (Maternity/NICU/Pediatrics) [phone]   [address] [phone]   [address] [address] [facility] [phone]   [facility] Giacomo joselyn [facility] [address] [facility] [phone]

## 2023-04-12 NOTE — H&P
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)  Assessment & Plan  · As evident on CTA PE study  · Dimer greater than 20  · PESI score is 75 points which is low risk: 1 7 to 3 5% 30-day mortality  · BNP and troponin now  · Echo  · Bilateral lower extremity venous duplex  · Argatroban infusion  · Continuous cardiopulmonary monitoring  · Case was discussed with IR by [facility] provider, patient is not a candidate for IR thrombectomy    Thrombocytopenia ([facility] [address] )  Assessment & Plan  · New onset this admission  · 4Ts for HIT: Score is 8 points which is a high probability of HIT or about 64% likelihood per MD Calc calculator  · Transition from subcu heparin to subcu Arixtra by [facility] providers  · Will transition to argatroban infusion for saddle PE  · Coags now  · CBC now  · Heparin antibody by serotonin release  · Peripheral smear    Acute ischemic left MCA stroke ([facility] [address] )  Assessment & Plan  · With global aphasia, improving R sided weakness, motor planning difficulties, R inattention, and bowel/bladder incontinence  · CTA with L M2 division  · Not a candidate for systemic thrombolytics, and not a candidate for thrombectomy  · 3/31 MRI Brain: Large L MCA infarct with L BG involvement and superimposed microhemorrhage with mild mass effect and stable 3-mm L to R midling shift on most recent CTH (4/3)   · Echo: EF 55%, LA dilatation, no RWMA, no PFO, no thrombus or mass  · Thrombosis panel with abnormal antithrombin, Protein C/S activity, but per Neuro unclear significance in setting of acute stroke  Would repeat as outpatient    · Will need Zio Patch/Loop Recorder with Cards Referral as outpatient  · Discussed with Neurology on 4/9: Continue Keppra until outpatient f/u given location, temporal slowing, extent of lesion  · Outpatient f/u with Neurology, Cardiology, and PCP  Plan:  · Every hour neurochecks while initiating argatroban infusion  · PT OT evaluation and treatment as indicated  · Continue ASA and statin    Hypertension  Assessment & Plan  · No home regimen  · Continue amlodipine, metoprolol  · Consider PRN agents if needed for SBP goal less than 160    Type 2 diabetes mellitus with circulatory disorder, without long-term current use of insulin [facility])  Assessment & Plan  Lab Results   Component Value Date    HGBA1C 8 0 (H) 03/31/2023       Recent Labs     04/11/23  0627 04/11/23  1048 04/11/23  1614 04/11/23  2250   POCGLU 131 127 122 121       Blood Sugar Average: Last 72 hrs:     · Sliding scale insulin before meals and at bedtime, adjust algorithm as needed    Obesity, Class III, BMI 40-49 9 (morbid obesity) ([facility] [address] )  Assessment & Plan  · Low-carb diet  · Encourage lifestyle change         History of Present Illness     HPI: Karen Parada III is a 41 y o  who presents from [facility]  His recent inpatient admission was for a acute ischemic left MCA stroke  He initially presented to [address] [place] [address] on 3/30/2023 to the trauma bay as a level A alert  He was unresponsive and intubated in the trauma bay  He was subsequently found to have a large acute ischemic left MCA stroke  A further detailed history of present illness by Dr Balwinder Berrios of PM&R is found below for reference  He is admitted to P5 ICU today due to a large saddle pulmonary embolus for closer work-up and monitoring  He was also recently found to have thrombocytopenia for which work-up will be initiated  At present, patient offers no complaints  He has no pain and he is not short of breath  He attempts conversation but has significant expressive aphasia  He is able to answer yes or no questions with head nods  He is alert and oriented to person, place, and time      As per discharge summary HPI per "Dr Edmonds Marker of PM&R ([facility]): Rene Yeboah III is a 41 y  o  male with medical history of HTN who presented to the [address] on 3/30 after his mother heard a thud and found him unresponsive in his bedroom  GCS 9 on arrival  Noted to have weakness in RUE/RLE  CTH, C-Spine, CAP were all negative for traumatic imaging  CTA showed M2 occlusion on the L  He was outside of the window for systemic thrombolysis, was loaded with ASA and was transferred to [facility] for evaluation for thrombectomy  He required intubation for airway protection  NIHSS 27 on initial presentation  Perfusion scan showed completed infarct in his L temporal lobe with no salvageable penumbra  No intervention was performed  TTE 3/31 showed EF 55% and dilated L atrium  Neuro recommended HODAN to r/o PFO  He was started on ASA/Lipitor  He was able to be extubated on 3/31  CT C/A/P did showed bibasilar consolidation with UL interlobular thickening and mild ground glass opacity  He was started on CTX for 5 days for aspiration PNA, this was eventually switched to Cefazolin (MRSA, COVID/Flu/RSV/ urine strep/legionella were all negative)  Repeat CTH on 3/31 with MCA infarct and cytotoxic edema and mild mass effect without hemorrhagic transformation  Stroke burden primarily temporal lobe  MRI 3/31 showed petechial hemorrhage in stroke bed with L MCA infarct with BG involvement and microhemorhage with mild mass effect without midline shift  NSx consulted with no plans for intervention  Ok to continue ASA, and start DVT PPx from their standpoint  Repeat CTH on 4/2 showed L MCA infarct with new 3mm R midline shift  He passed SLP eval for reg/thins on 4/1  He continued to have issues with confusion/lethargy  EEG was ordered which was unremarkable   Critical Care started Keppra 750mg BID given the location and extent of his CVA and suspicion that waxing/waning encephalopathy was post-ictal state - plan to treat for 1 week  Ciarra Lovell  He " "transitioned out of the ICU on 4/2  DANO was performed and thrombosis panel was ordered  Dano showed EF 55%, no RWMA, LA dilated, no thrombus/mass, no PFO  Thrombosis panel showed low Protein C/S and antithrombin - but significance per Neuro is unclear in setting of acute CVA  Neuro recommended outpatient loop/Zio  His strength has improved, but remains with global aphasia, R visual inattention, R sided weakness  Course c/b accelerated HTN, and new diagnosis T2DM - started on insulin  Admitted to the [facility] on 4/8/2023  \"    History obtained from chart review  Review of Systems   All other systems reviewed and are negative  Historical Information   Past Medical History:  3/30/2023: Hypertensive emergency No past surgical history on file  Current Outpatient Medications   Medication Instructions   • acetaminophen (TYLENOL) 975 mg, Oral, Every 8 hours PRN   • amLODIPine (NORVASC) 5 mg, Oral, Daily   • aspirin 81 mg, Oral, Daily   • atorvastatin (LIPITOR) 40 mg, Oral, Every evening   • calcium carbonate (TUMS) 1,000 mg, Oral, 3 times daily PRN   • famotidine (PEPCID) 20 mg, Oral, Every 12 hours scheduled   • fondaparinux (ARIXTRA) 10 mg, Subcutaneous, Every 24 hours   • hydrALAZINE (APRESOLINE) 25 mg, Oral, Every 8 hours PRN   • levETIRAcetam (KEPPRA) 750 mg, Oral, Every 12 hours scheduled   • metoprolol tartrate (LOPRESSOR) 25 mg, Oral, Every 12 hours scheduled   • ondansetron (ZOFRAN-ODT) 4 mg, Oral, Every 6 hours PRN   • oxyCODONE (ROXICODONE) 2 5 mg, Oral, Every 6 hours PRN   • senna (SENOKOT) 8 6 mg, Oral, Daily at bedtime   • sodium chloride 125 mL/hr (125 mL/hr), Intravenous, Continuous    No Known Allergies   Social History     Tobacco Use   • Smoking status: Never   • Smokeless tobacco: Never   Vaping Use   • Vaping Use: Never used   Substance Use Topics   • Alcohol use: Never   • Drug use: Never    No family history on file         Objective                            Vitals I/O      Most Recent Min/Max in " 24hrs   Temp 98 °F (36 7 °C) Temp  Min: 97 4 °F (36 3 °C)  Max: 98 4 °F (36 9 °C)   Pulse 77 Pulse  Min: 77  Max: 91   Resp (!) 27 Resp  Min: 16  Max: 27   /84 BP  Min: 119/72  Max: 144/84   O2 Sat 93 % SpO2  Min: 93 %  Max: 98 %    No intake or output data in the 24 hours ending 04/12/23 0304      Diet William/CHO Controlled; Consistent Carbohydrate Diet Level 3 (6 carb servings/90 grams CHO/meal); Sodium 4 GM (CUAUHTEMOC)     Invasive Monitoring Physical exam    Physical Exam  Vitals reviewed  Constitutional:       General: He is not in acute distress  Appearance: He is not ill-appearing, toxic-appearing or diaphoretic  HENT:      Mouth/Throat:      Mouth: Mucous membranes are moist    Eyes:      Extraocular Movements: Extraocular movements intact  Pupils: Pupils are equal, round, and reactive to light  Cardiovascular:      Rate and Rhythm: Normal rate and regular rhythm  Pulses: Normal pulses  Heart sounds: Normal heart sounds  Pulmonary:      Effort: Pulmonary effort is normal       Breath sounds: Normal breath sounds and air entry  Abdominal:      General: There is no distension  Palpations: Abdomen is soft  Tenderness: There is no abdominal tenderness  Musculoskeletal:      Right lower leg: No edema  Left lower leg: No edema  Comments: MUNGUIA  Right hand  is plus 4 out of 5  All other extremities +5 strength  Skin:     General: Skin is warm and dry  Capillary Refill: Capillary refill takes less than 2 seconds  Coloration: Skin is not cyanotic, jaundiced, mottled or pale  Neurological:      General: No focal deficit present  Mental Status: He is alert and oriented to person, place, and time  GCS: GCS eye subscore is 4  GCS verbal subscore is 3  GCS motor subscore is 6  Sensory: Sensation is intact  Motor: Motor function is intact        Comments: Significant expressive aphasia              Diagnostic Studies      EKG: NSR in 70s on telemetry  Imaging:  I have personally reviewed pertinent reports     and I have personally reviewed pertinent films in PACS     Medications:  Scheduled PRN   amLODIPine, 5 mg, Daily  aspirin, 81 mg, Daily  atorvastatin, 40 mg, QPM  chlorhexidine, 15 mL, Q12H NORRIS  famotidine, 20 mg, Q12H [address]  insulin lispro, 1-5 Units, TID AC  insulin lispro, 1-5 Units, HS  levETIRAcetam, 750 mg, Q12H NORRIS  metoprolol tartrate, 25 mg, Q12H NORRIS  senna, 1 tablet, HS      acetaminophen, 975 mg, Q8H PRN  calcium carbonate, 1,000 mg, TID PRN  ondansetron, 4 mg, Q6H PRN  oxyCODONE, 2 5 mg, Q6H PRN       Continuous    argatroban, 0 1-3 mcg/kg/min         Labs:    CBC    Recent Labs     04/11/23  1222 04/11/23  1612   WBC 9 70 10 05   HGB 13 9 14 1   HCT 44 4 43 2   PLT 48* 49*     BMP    Recent Labs     04/11/23  1222   SODIUM 135   K 4 1      CO2 26   AGAP 5   BUN 19   CREATININE 1 20   CALCIUM 9 7       Coags    No recent results     Additional Electrolytes  No recent results       Blood Gas    No recent results  No recent results LFTs  Recent Labs     04/11/23  1222   ALT 36   AST 39   ALKPHOS 71   ALB 3 4*   TBILI 0 94       Infectious  No recent results  Glucose  Recent Labs     04/11/23  1222   GLUC 92             Anticipated Length of Stay is > 2 midnights  Nura Burton PA-C

## 2023-04-12 NOTE — OCCUPATIONAL THERAPY NOTE
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)  Active Problems:    Acute ischemic left MCA stroke (HCC)    Obesity, Class III, BMI 40-49 9 (morbid obesity) ([facility] )    Type 2 diabetes mellitus with circulatory disorder, without long-term current use of insulin ([facility] )    Hypertension    Thrombocytopenia ([facility] )    Past Medical History  Past Medical History:   Diagnosis Date    Hypertensive emergency 3/30/2023     Past Surgical History  No past surgical history on file  04/12/23 0947   OT Last Visit   OT Visit Date 04/12/23   Note Type   Note type Evaluation   Pain Assessment   Pain Assessment Tool 0-10   Pain Score No Pain   Restrictions/Precautions   Weight Bearing Precautions Per Order No   Other Precautions Cognitive; Chair Alarm; Bed Alarm;Multiple lines;Telemetry;O2;Fall Risk;Pain;Visual impairment  (expressive aphasia, R visual inattention,)   Home Living   Type of [address] [place] [address] One level;Stairs to enter with rails; Ramped entrance   Additional Comments unable to gather information from pt at this time 2' aphasia  Prior Function   Level of [place] Independent with functional mobility; Independent with ADLs; Independent with IADLS   Lives With [facility] Help From Family   IADLs Independent with driving; Independent with meal prep; Independent with medication management   Falls in the last 6 months 1 to 4   Vocational Full time employment   Lifestyle   Autonomy pta, pt was I W ADL/IADL, no AD mobility  Reciprocal Relationships supportive family   Service to Others per mothers report 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 [email or web address]   Intrinsic Gratification per pts mother  pt enjoys playing video games, collecting sports memorabilia  likes baseball, phillies, yankees and iron pigs     Subjective   Subjective pt able to appropriately answer yes/no questions with head nods, unable to answer further in depth 2' expressive aphasia  ADL   Where Assessed Edge of bed   Eating Assistance 5  Supervision/Setup   Grooming Assistance 4  Minimal Assistance   UB Bathing Assistance 4  Minimal Assistance   LB Bathing Assistance 3  Moderate Assistance   UB Dressing Assistance 4  Minimal Assistance   LB Dressing Assistance 3  Moderate Assistance   Toileting Assistance  3  Moderate Assistance   Functional Assistance 3  Moderate Assistance   Bed Mobility   Supine to Sit 5  Supervision   Additional items Increased time required;Verbal cues   Additional Comments found in bed, left in chair w all needs in reach and alarm on   Transfers   Sit to Stand 3  Moderate assistance   Additional items Increased time required;Verbal cues; Assist x 2   Stand to Sit 3  Moderate assistance   Additional items Increased time required;Verbal cues; Assist x 2   Additional Comments c b/l HHA   Functional Mobility   Functional Mobility 4  Minimal assistance   Additional Comments ax2, EOB>chair  Additional items Hand hold assistance   Balance   Static Sitting Fair   Dynamic Sitting Fair -   Static Standing Poor +   Dynamic Standing Poor +   Ambulatory Poor +   Activity Tolerance   Activity Tolerance Patient tolerated treatment well   Medical Staff Made Aware DPT Lney Melendrez 2' pts med complexity, comorbidities and regression from baseline  Nurse Made Aware ok per RN   RUE Assessment   RUE Assessment WFL  (overall 4/5 strength)   LUE Assessment   LUE Assessment WFL  (5/5)   Hand Function   Gross Motor Coordination Functional   Fine Motor Coordination Functional   Vision - Complex Assessment   Additional Comments (S)  per OT at [facility], pt w/ R sided visual field cuts- would recommend visual cues/visual stimulation in R visual field to promote R sided attn/visual field attention     Psychosocial   Psychosocial (WDL) WDL   Perception   Inattention/Neglect Cues to attend right visual field;Cues to attend to right side of body   Cognition   Overall Cognitive Status Impaired   Arousal/Participation Alert; Responsive   Attention Within functional limits   Orientation Level Oriented X4   Memory Decreased recall of precautions   Following Commands Follows one step commands with increased time or repetition   Comments pt pleasant and cooperative, overall fair safety awareness and insight to condition  requires inc time to follow one step commands  Assessment   Limitation Decreased ADL status; Decreased UE ROM; Decreased UE strength;Decreased cognition;Decreased Safe judgement during ADL;Decreased endurance;Decreased self-care trans;Decreased high-level ADLs; Decreased fine motor control   Prognosis Good   Assessment Pt is a 41 y o  male admitted 4/12/23 from [facility] w/ acute saddle pulmonary embolism  He was recently dc's from inpatient stay 4/8/23 to [facility]  He initially presented with L MCA, and upon review of [facility] OT notes, it does appear that he was making G progress while there  He does have some residual RUE deficits (strength), R visual field cuts, and expressive aphasia  has a past medical history of Hypertensive emergency  Pt lives w parents  in a [facility] with ramp vs 3 [place]AT[place]- unable to obtain further info regarding home set-up at this time 2' expressive aphasia  Prior to initial presentation, pt was independent w/ ADL/IADL and functional mobility, was driving and was not using any DME at baseline  Currently, pt is Min Ax1 for UB ADL, Mod Ax1 for LB ADL, and completed transfers/FM w Mod Ax2  Pt is limited at this time 2* decreased endurance/activtiy tolerance, decreased cognition, decreased ADL/High-level ADL status, decreased self-care trans, decreased safety awareness, limited home support and is a fall risk   This impacts pt's ability to complete UB and LB dressing and bathing, toileting, transfers, functional mobility, community mobility, home and health maintenance, and safe engagement in typical daily routine  The patient's raw score on the AM-PAC Daily Activity inpatient short form is 15, standardized score is 34 69, less than 39 4  Patients at this level are likely to benefit from discharge to post-acute rehabilitation services  Please refer to the recommendation of the Occupational Therapist for safe discharge planning  From OT standpoint, pt should D/C to STR when medically stable  Pt will benefit from continued acute OT services 2-3 x/wk for 10-14 days to meet goals  Goals   Patient Goals pt unable to vocalize, but does seem eager to engage in OT session, with goals of getting better  LTG Time Frame 10-14   Long Term Goal #1 see below   Plan   Treatment Interventions ADL retraining;Functional transfer training;UE strengthening/ROM; Endurance training;Visual perceptual retraining;Cognitive reorientation;Patient/family training;Equipment evaluation/education; Compensatory technique education; Activityengagement; Energy conservation; Neuromuscular reeducation; Fine motor coordination activities   Goal Expiration Date 04/26/23   OT Frequency 3-5x/wk   Recommendation   OT Discharge Recommendation Post acute rehabilitation services   [facility] Daily Activity Inpatient   Lower Body Dressing 2   Bathing 2   Toileting 2   Upper Body Dressing 3   Grooming 3   Eating 3   Daily Activity Raw Score 15   Daily Activity Standardized Score (Calc for Raw Score >=11) 34 69   AM-[facility] Applied Cognition Inpatient   Following a Speech/Presentation 3   Understanding Ordinary Conversation 3   Taking Medications 2   Remembering Where Things Are Placed or Put Away 2   Remembering List of 4-5 Errands 1   Taking Care of Complicated Tasks 1   Applied Cognition Raw Score 12   Applied Cognition Standardized Score 28 82   Modified Nodaway Scale   Modified Nodaway Scale 4   End of Consult   Education Provided Yes   Patient Position at End of Consult Bedside chair;Bed/Chair alarm activated; All needs within reach   Nurse Communication Nurse aware of consult     Pt will complete functional mobility with Mod I using appropriate DME as needed  Pt will complete UB dressing and bathing with Mod I using appropriate DME as needed  Pt will complete LB dressing and bathing with Mod I using appropriate DME as needed  Pt will complete transfers with Mod I using appropriate DME as needed  Pt will complete toileting with Mod I using appropriate DME as needed  Pt will complete home maintenance task with Mod I using appropriate DME as needed  Pt will utilize energy conservation techniques throughout functional activity/ADL s/p skilled education  Pt will demonstrate increased safety awareness during functional tasks/ADL's s/p skilled education  Pt will increase activity tolerance to 30 minutes in order to complete ADL's/ functional tasks, using appropriate DME as needed  Pt will engage in ongoing cog assessment w/ G participation to assist with safe d/c planning  Pt will inc UE ROM +10 degrees b/l in order to increase overall ability to engage in typical daily routine  Pt will inc UE strength +1 MMT in order to increase overall ability to engage in typical daily routine  Pt will engage in visual perceptual retraining activities to promote overall attn to R side of body/environment       Debbie Silva, MOT, OTR/L

## 2023-04-12 NOTE — ASSESSMENT & PLAN NOTE
· New onset this admission  · 4Ts for HIT: Score is 8 points which is a high probability of HIT or about 64% likelihood per MD Calc calculator  · Transition from subcu heparin to subcu Arixtra by [facility] providers  · Will transition to argatroban infusion for saddle PE  · Coags now  · CBC now  · Heparin antibody by serotonin release  · Peripheral smear

## 2023-04-12 NOTE — ASSESSMENT & PLAN NOTE
· With global aphasia, improving R sided weakness, motor planning difficulties, R inattention, and bowel/bladder incontinence  · CTA with L M2 division  · Not a candidate for systemic thrombolytics, and not a candidate for thrombectomy  · 3/31 MRI Brain: Large L MCA infarct with L BG involvement and superimposed microhemorrhage with mild mass effect and stable 3-mm L to R midling shift on most recent CTH (4/3)   · Echo: EF 55%, LA dilatation, no RWMA, no PFO, no thrombus or mass  · Thrombosis panel with abnormal antithrombin, Protein C/S activity, but per Neuro unclear significance in setting of acute stroke  Would repeat as outpatient  · Will need Zio Patch/Loop Recorder with Cards Referral as outpatient  · Discussed with Neurology on 4/9: Continue Keppra until outpatient f/u given location, temporal slowing, extent of lesion  · Outpatient f/u with Neurology, Cardiology, and PCP      Plan:  · Every hour neurochecks while initiating argatroban infusion  · PT OT evaluation and treatment as indicated  · Continue ASA and statin

## 2023-04-12 NOTE — PLAN OF CARE
Problem: OCCUPATIONAL THERAPY ADULT  Goal: Performs self-care activities at highest level of function for planned discharge setting  See evaluation for individualized goals  Description: Treatment Interventions: ADL retraining, Functional transfer training, UE strengthening/ROM, Endurance training, Visual perceptual retraining, Cognitive reorientation, Patient/family training, Equipment evaluation/education, Compensatory technique education, Activityengagement, Energy conservation, Neuromuscular reeducation, Fine motor coordination activities          See flowsheet documentation for full assessment, interventions and recommendations  Note: Limitation: Decreased ADL status, Decreased UE ROM, Decreased UE strength, Decreased cognition, Decreased Safe judgement during ADL, Decreased endurance, Decreased self-care trans, Decreased high-level ADLs, Decreased fine motor control  Prognosis: Good  Assessment: Pt is a 41 y o  male admitted 4/12/23 from [facility] w/ acute saddle pulmonary embolism  He was recently dc's from inpatient stay 4/8/23 to [facility]  He initially presented with L MCA, and upon review of [facility] OT notes, it does appear that he was making G progress while there  He does have some residual RUE deficits (strength), R visual field cuts, and expressive aphasia  has a past medical history of Hypertensive emergency  Pt lives w parents  in a [facility] with ramp vs 3 [employer]- unable to obtain further info regarding home set-up at this time 2' expressive aphasia  Prior to initial presentation, pt was independent w/ ADL/IADL and functional mobility, was driving and was not using any DME at baseline  Currently, pt is Min Ax1 for UB ADL, Mod Ax1 for LB ADL, and completed transfers/FM w Mod Ax2  Pt is limited at this time 2* decreased endurance/activtiy tolerance, decreased cognition, decreased ADL/High-level ADL status, decreased self-care trans, decreased safety awareness, limited home support and is a fall risk   This impacts pt's ability to complete UB and LB dressing and bathing, toileting, transfers, functional mobility, community mobility, home and health maintenance, and safe engagement in typical daily routine  The patient's raw score on the AM-PAC Daily Activity inpatient short form is 15, standardized score is 34 69, less than 39 4  Patients at this level are likely to benefit from discharge to post-acute rehabilitation services  Please refer to the recommendation of the Occupational Therapist for safe discharge planning  From OT standpoint, pt should D/C to STR when medically stable  Pt will benefit from continued acute OT services 2-3 x/wk for 10-14 days to meet goals       OT Discharge Recommendation: Post acute rehabilitation services

## 2023-04-13 PROBLEM — R33.9 URINARY RETENTION: Status: ACTIVE | Noted: 2023-04-13

## 2023-04-13 PROBLEM — I82.403 DVT OF LOWER EXTREMITY, BILATERAL (HCC): Status: ACTIVE | Noted: 2023-04-13

## 2023-04-13 LAB
ANION GAP SERPL CALCULATED.3IONS-SCNC: 3 MMOL/L (ref 4–13)
ANION GAP SERPL CALCULATED.3IONS-SCNC: 3 MMOL/L (ref 4–13)
APTT PPP: 38 SECONDS (ref 23–37)
APTT PPP: 38 SECONDS (ref 23–37)
APTT PPP: 65 SECONDS (ref 23–37)
APTT PPP: 65 SECONDS (ref 23–37)
APTT PPP: 67 SECONDS (ref 23–37)
APTT PPP: 67 SECONDS (ref 23–37)
APTT PPP: 68 SECONDS (ref 23–37)
APTT PPP: 68 SECONDS (ref 23–37)
BUN SERPL-MCNC: 15 MG/DL (ref 5–25)
BUN SERPL-MCNC: 15 MG/DL (ref 5–25)
CALCIUM SERPL-MCNC: 8.4 MG/DL (ref 8.3–10.1)
CALCIUM SERPL-MCNC: 8.4 MG/DL (ref 8.3–10.1)
CHLORIDE SERPL-SCNC: 108 MMOL/L (ref 96–108)
CHLORIDE SERPL-SCNC: 108 MMOL/L (ref 96–108)
CO2 SERPL-SCNC: 24 MMOL/L (ref 21–32)
CO2 SERPL-SCNC: 24 MMOL/L (ref 21–32)
CREAT SERPL-MCNC: 0.88 MG/DL (ref 0.6–1.3)
CREAT SERPL-MCNC: 0.88 MG/DL (ref 0.6–1.3)
ERYTHROCYTE [DISTWIDTH] IN BLOOD BY AUTOMATED COUNT: 13.9 % (ref 11.6–15.1)
ERYTHROCYTE [DISTWIDTH] IN BLOOD BY AUTOMATED COUNT: 13.9 % (ref 11.6–15.1)
GFR SERPL CREATININE-BSD FRML MDRD: 103 ML/MIN/1.73SQ M
GFR SERPL CREATININE-BSD FRML MDRD: 103 ML/MIN/1.73SQ M
GLUCOSE SERPL-MCNC: 118 MG/DL (ref 65–140)
GLUCOSE SERPL-MCNC: 118 MG/DL (ref 65–140)
GLUCOSE SERPL-MCNC: 119 MG/DL (ref 65–140)
GLUCOSE SERPL-MCNC: 119 MG/DL (ref 65–140)
GLUCOSE SERPL-MCNC: 140 MG/DL (ref 65–140)
GLUCOSE SERPL-MCNC: 140 MG/DL (ref 65–140)
GLUCOSE SERPL-MCNC: 145 MG/DL (ref 65–140)
GLUCOSE SERPL-MCNC: 145 MG/DL (ref 65–140)
GLUCOSE SERPL-MCNC: 152 MG/DL (ref 65–140)
GLUCOSE SERPL-MCNC: 152 MG/DL (ref 65–140)
GLUCOSE SERPL-MCNC: 153 MG/DL (ref 65–140)
GLUCOSE SERPL-MCNC: 153 MG/DL (ref 65–140)
HCT VFR BLD AUTO: 39.9 % (ref 36.5–49.3)
HCT VFR BLD AUTO: 39.9 % (ref 36.5–49.3)
HGB BLD-MCNC: 12.4 G/DL (ref 12–17)
HGB BLD-MCNC: 12.4 G/DL (ref 12–17)
MCH RBC QN AUTO: 26.3 PG (ref 26.8–34.3)
MCH RBC QN AUTO: 26.3 PG (ref 26.8–34.3)
MCHC RBC AUTO-ENTMCNC: 31.1 G/DL (ref 31.4–37.4)
MCHC RBC AUTO-ENTMCNC: 31.1 G/DL (ref 31.4–37.4)
MCV RBC AUTO: 85 FL (ref 82–98)
MCV RBC AUTO: 85 FL (ref 82–98)
PLATELET # BLD AUTO: 51 THOUSANDS/UL (ref 149–390)
PLATELET # BLD AUTO: 51 THOUSANDS/UL (ref 149–390)
PMV BLD AUTO: 12.2 FL (ref 8.9–12.7)
PMV BLD AUTO: 12.2 FL (ref 8.9–12.7)
POTASSIUM SERPL-SCNC: 4 MMOL/L (ref 3.5–5.3)
POTASSIUM SERPL-SCNC: 4 MMOL/L (ref 3.5–5.3)
RBC # BLD AUTO: 4.71 MILLION/UL (ref 3.88–5.62)
RBC # BLD AUTO: 4.71 MILLION/UL (ref 3.88–5.62)
SODIUM SERPL-SCNC: 135 MMOL/L (ref 135–147)
SODIUM SERPL-SCNC: 135 MMOL/L (ref 135–147)
WBC # BLD AUTO: 7.62 THOUSAND/UL (ref 4.31–10.16)
WBC # BLD AUTO: 7.62 THOUSAND/UL (ref 4.31–10.16)

## 2023-04-13 PROCEDURE — 97116 GAIT TRAINING THERAPY: CPT

## 2023-04-13 PROCEDURE — 97530 THERAPEUTIC ACTIVITIES: CPT

## 2023-04-13 PROCEDURE — 85027 COMPLETE CBC AUTOMATED: CPT

## 2023-04-13 PROCEDURE — 97112 NEUROMUSCULAR REEDUCATION: CPT

## 2023-04-13 PROCEDURE — 99233 SBSQ HOSP IP/OBS HIGH 50: CPT | Performed by: ANESTHESIOLOGY

## 2023-04-13 PROCEDURE — 82948 REAGENT STRIP/BLOOD GLUCOSE: CPT

## 2023-04-13 PROCEDURE — 85730 THROMBOPLASTIN TIME PARTIAL: CPT | Performed by: EMERGENCY MEDICINE

## 2023-04-13 PROCEDURE — 97110 THERAPEUTIC EXERCISES: CPT

## 2023-04-13 PROCEDURE — 0T9B70Z DRAINAGE OF BLADDER WITH DRAINAGE DEVICE, VIA NATURAL OR ARTIFICIAL OPENING: ICD-10-PCS | Performed by: ANESTHESIOLOGY

## 2023-04-13 PROCEDURE — 80048 BASIC METABOLIC PNL TOTAL CA: CPT

## 2023-04-13 RX ADMIN — AMLODIPINE BESYLATE 5 MG: 5 TABLET ORAL at 08:55

## 2023-04-13 RX ADMIN — ARGATROBAN 1.8 MCG/KG/MIN: 50 INJECTION, SOLUTION INTRAVENOUS at 14:33

## 2023-04-13 RX ADMIN — ARGATROBAN 1.8 MCG/KG/MIN: 50 INJECTION, SOLUTION INTRAVENOUS at 20:18

## 2023-04-13 RX ADMIN — ARGATROBAN 1.8 MCG/KG/MIN: 50 INJECTION, SOLUTION INTRAVENOUS at 02:59

## 2023-04-13 RX ADMIN — LEVETIRACETAM 750 MG: 750 TABLET, FILM COATED ORAL at 21:07

## 2023-04-13 RX ADMIN — ARGATROBAN 1.8 MCG/KG/MIN: 50 INJECTION, SOLUTION INTRAVENOUS at 17:00

## 2023-04-13 RX ADMIN — METOPROLOL TARTRATE 25 MG: 25 TABLET, FILM COATED ORAL at 21:07

## 2023-04-13 RX ADMIN — ASPIRIN 81 MG 81 MG: 81 TABLET ORAL at 08:55

## 2023-04-13 RX ADMIN — ATORVASTATIN CALCIUM 40 MG: 40 TABLET, FILM COATED ORAL at 17:00

## 2023-04-13 RX ADMIN — METOPROLOL TARTRATE 25 MG: 25 TABLET, FILM COATED ORAL at 08:56

## 2023-04-13 RX ADMIN — CHLORHEXIDINE GLUCONATE 15 ML: 1.2 SOLUTION ORAL at 20:27

## 2023-04-13 RX ADMIN — SENNOSIDES 8.6 MG: 8.6 TABLET, FILM COATED ORAL at 21:07

## 2023-04-13 RX ADMIN — ARGATROBAN 1.8 MCG/KG/MIN: 50 INJECTION, SOLUTION INTRAVENOUS at 06:11

## 2023-04-13 RX ADMIN — CHLORHEXIDINE GLUCONATE 15 ML: 1.2 SOLUTION ORAL at 08:56

## 2023-04-13 RX ADMIN — ARGATROBAN 1.8 MCG/KG/MIN: 50 INJECTION, SOLUTION INTRAVENOUS at 23:45

## 2023-04-13 RX ADMIN — ARGATROBAN 1.8 MCG/KG/MIN: 50 INJECTION, SOLUTION INTRAVENOUS at 09:41

## 2023-04-13 RX ADMIN — INSULIN LISPRO 1 UNITS: 100 INJECTION, SOLUTION INTRAVENOUS; SUBCUTANEOUS at 22:17

## 2023-04-13 RX ADMIN — FAMOTIDINE 20 MG: 20 TABLET, FILM COATED ORAL at 21:07

## 2023-04-13 RX ADMIN — LEVETIRACETAM 750 MG: 750 TABLET, FILM COATED ORAL at 08:55

## 2023-04-13 RX ADMIN — FAMOTIDINE 20 MG: 20 TABLET, FILM COATED ORAL at 08:55

## 2023-04-13 RX ADMIN — TAMSULOSIN HYDROCHLORIDE 0.4 MG: 0.4 CAPSULE ORAL at 17:00

## 2023-04-13 NOTE — ASSESSMENT & PLAN NOTE
· With global aphasia, improving R sided weakness, motor planning difficulties, R inattention, and bowel/bladder incontinence  · CTA with L M2 division  · Not a candidate for systemic thrombolytics, and not a candidate for thrombectomy  · 3/31 MRI Brain: Large L MCA infarct with L BG involvement and superimposed microhemorrhage with mild mass effect and stable 3-mm L to R midling shift on most recent CTH (4/3)   · Echo: EF 55%, LA dilatation, no RWMA, no PFO, no thrombus or mass  · Thrombosis panel with abnormal antithrombin, Protein C/S activity, but per Neuro unclear significance in setting of acute stroke  Would repeat as outpatient  · Will need Zio Patch/Loop Recorder with Cards Referral as outpatient  · Discussed with Neurology on 4/9: Continue Keppra until outpatient f/u given location, temporal slowing, extent of lesion  · Outpatient f/u with Neurology, Cardiology, and PCP      Plan:  · Q2 neuro checks  · PT OT evaluation and treatment as indicated  · Continue ASA and statin

## 2023-04-13 NOTE — ASSESSMENT & PLAN NOTE
· New onset this admission  · 4Ts for HIT: Score is 8 points which is a high probability of HIT or about 64% likelihood per MD Calc calculator  · Transition from subcu heparin to subcu Arixtra by [facility] providers  · Will transition to argatroban infusion for saddle PE  · CBC daily  · Heparin antibody by serotonin release  · Peripheral smear

## 2023-04-13 NOTE — UTILIZATION REVIEW
NOTIFICATION OF INPATIENT ADMISSION   AUTHORIZATION REQUEST   SERVICING FACILITY:   [facility]  Address: [address] [place] [address], [address] [place], [place] [id number]  Tax ID: [id number]  NPI: [id number] ATTENDING PROVIDER:  Attending Name and NPI#: Hilary Arevalo Md [[id number]]  Address: [address] [place] [address], [address] [place] [id number]  Phone: [phone]   ADMISSION INFORMATION:  Place of Service: Inpatient [address]  [address]  Place of Service Code: 21  Inpatient Admission Date/Time: 4/12/23  2:10 AM  Discharge Date/Time: No discharge date for patient encounter  Admitting Diagnosis Code/Description:  No admission diagnoses are documented for this encounter  UTILIZATION REVIEW CONTACT:  Dereck Duenas Utilization   Network Utilization Review Department  Phone: [phone]  Fax: [phone]  Email: [email or web address]  Contact for approvals/pending authorizations, clinical reviews, and discharge  PHYSICIAN ADVISORY SERVICES:  Medical Necessity Denial & Ezcn-rh-Hwti Review  Phone: [phone]  Fax: [phone]  Email: [email or web address]

## 2023-04-13 NOTE — ASSESSMENT & PLAN NOTE
· With global aphasia, improving R sided weakness, motor planning difficulties, R inattention, and bowel/bladder incontinence  · CTA with L M2 division  · Not a candidate for systemic thrombolytics, and not a candidate for thrombectomy  · 3/31 MRI Brain: Large L MCA infarct with L BG involvement and superimposed microhemorrhage with mild mass effect and stable 3-mm L to R midling shift on most recent CTH (4/3)   · Echo: EF 55%, LA dilatation, no RWMA, no PFO, no thrombus or mass  · Thrombosis panel with abnormal antithrombin, Protein C/S activity, but per Neuro unclear significance in setting of acute stroke  Would repeat as outpatient  · Will need Zio Patch/Loop Recorder with Cards Referral as outpatient  · Discussed with Neurology on 4/9: Continue Keppra until outpatient f/u given location, temporal slowing, extent of lesion  · Outpatient f/u with Neurology, Cardiology, and PCP      Plan:  · Decrease neuro checks to q4h as patient has been therapeutic on argatroban infusion x 24h  · PT OT evaluation and treatment as indicated  · Continue ASA and statin

## 2023-04-13 NOTE — ASSESSMENT & PLAN NOTE
· Developed chest pain while at [facility] on 4/11  · 4/11 CTA PE: Saddle  embolus and extensive clot burden throughout the visualized proximal pulmonary arteries  IR consultation suggested  RV/LV ratio less then 0 9, suggesting no elevation of right-sided pressures; however this is discordant with extensive clot burden  No evidence of pulmonary infarct  · Dimer greater than 20  · BNP elevated, troponin normal  · Echo 4/12 shows: LVEF 55%, The right ventricular systolic pressure is moderately elevated  The estimated right ventricular systolic pressure is 72 97 mmHg  The right atrium is mildly dilated    · Bilateral lower extremity venous duplex with bilateral DVTs  · Case was discussed with IR by [facility] provider, patient is not a candidate for IR thrombectomy    Plan:   · Argatroban infusion for [facility] given HIT  · Continuous cardiopulmonary monitoring  · Consider transitioning to oral AC in next 24-48h given stability of neurological exam

## 2023-04-13 NOTE — OCCUPATIONAL THERAPY NOTE
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)  Active Problems:    Acute ischemic left MCA stroke ([facility])    Obesity, Class III, BMI 40-49 9 (morbid obesity) ([facility] )    Type 2 diabetes mellitus with circulatory disorder, without long-term current use of insulin ([facility])    Hypertension    Thrombocytopenia ([facility] )    Urinary retention    DVT of lower extremity, bilateral ([place] [id number] )            04/13/23 1415   OT Last Visit   OT Visit Date 04/13/23   Note Type   Note Type Treatment   Pain Assessment   Pain Assessment Tool 0-10   Pain Score   (w pain at acharya site- RN aware)   Restrictions/Precautions   Weight Bearing Precautions Per Order No   Other Precautions Cognitive; Chair Alarm; Bed Alarm;Multiple lines; Fall Risk;Pain   ADL   LB Dressing Assistance 2  Maximal Assistance   LB Dressing Deficit Don/doff R sock; Don/doff L sock   LB Dressing Comments donned while supine in bed  Functional Standing Tolerance   Time 8 mins   Activity standing during fine motor activity  Comments stands with min A from therapist to maintain upright posture while using B hands to engage in task  Bed Mobility   Supine to Sit 5  Supervision   Additional items Increased time required   Additional Comments found in bed, left in chair w all needs in reach and alarm on  Transfers   Sit to Stand 3  Moderate assistance   Additional items Assist x 1; Increased time required;Verbal cues   Stand to Sit 3  Moderate assistance   Additional items Assist x 1; Increased time required;Verbal cues   Additional Comments c rw   Functional Mobility   Functional Mobility 3  Moderate assistance   Additional Comments ax1, household distance mobility c rw   Additional items Rolling walker   Coordination   Fine Motor pt engaged in fine motor activity of manipulating small object while using varying grasp patterns   Pt w overall G FMC in b/l digits, no further concerns at this time  In Hand Manipulation G in hand manipulation to obtain objects for [address] task  did not drop any objects, was able to perform opposition in all digits  Subjective   Subjective pt able to accurately nod yes/no  Did show some improvement in speech production this date, able to accurately pronounce inc # of words as compared to previous session  Cognition   Overall Cognitive Status Impaired   Arousal/Participation Alert; Responsive   Attention Within functional limits   Orientation Level Oriented X4   Memory Decreased recall of precautions   Following Commands Follows one step commands with increased time or repetition   Comments pt cont to have expressive aphasia, however this does appear to be improving at this time  overall fair safety awareness   Vision   Visual Field Cut Compensatory techniques;Education   Visual Field Cut Comments during FM tasks, evident that pt has R visual field cut on both eyes  vc for scanning enviro for potential threats - appears that he has 3/4 full vision in B eyes, with 1/4 cut in R field  Activity Tolerance   Activity Tolerance Patient tolerated treatment well   Medical Staff Made Aware DPT [place]; OT focusing on [address] [place] and visual perceptual retraining  Assessment   Assessment Pt seen on this date for OT session focusing on ADL retraining, cognitive reorientation, body mechanics, transfer retraining, [address] [place], visual perceptual retraining, increasing activity tolerance/endurance and EOB sitting to increase ability to participate in ADL/functional tasks  Pt was found in bed and was left in chair w/ all needs within reach, chair alarm on  Pt completed bed mob w/ supervision, STS w mod Ax1, FM w mod Ax1, rw for support  Visual perceptual retraining addressed during FM tasks- provided edu to scan enviro and clarified level of field cut- appears that outer 1/4 of R vision in B eyes remains cut at this time   [address] [place] task completed while pt is stance- able to maintain stance with min ax1, [address] w G coordination and strength  No concerns with [address] at this time  He does have trouble w/ speech production (expressive aphasia), but this does appear to be improving at this time  Pt w/ improvements in activity tolerance, transfer ability, however is still limited 2* decreased ADL/High-level ADL status, decreased activity tolerance/endurance, decreased cognition, decreased self-care trans, decreased safety awareness and insight to condition  The patient's raw score on the AM-PAC Daily Activity Inpatient Short Form is 17  A raw score of less than 19 suggests the patient may benefit from discharge to post-acute rehabilitation services  Please refer to the recommendation of the Occupational Therapist for safe discharge planning  Recommending pt D/C to STR when medically stable  Pt will continue to benefit from acute OT services to meet goals  Plan   Treatment Interventions ADL retraining;Functional transfer training; Endurance training;Visual perceptual retraining;Cognitive reorientation;Patient/family training; Compensatory technique education; Energy conservation; Activityengagement   Goal Expiration Date 04/26/23   OT Treatment Day 4   OT Frequency 3-5x/wk   Recommendation   OT Discharge Recommendation Post acute rehabilitation services   AM-[facility] Daily Activity Inpatient   Lower Body Dressing 2   Bathing 2   Toileting 2   Upper Body Dressing 3   Grooming 4   Eating 4   Daily Activity Raw Score 17   Daily Activity Standardized Score (Calc for Raw Score >=11) 37 26   AM-[facility] Applied Cognition Inpatient   Following a Speech/Presentation 4   Understanding Ordinary Conversation 4   Taking Medications 3   Remembering Where Things Are Placed or Put Away 3   Remembering List of 4-5 Errands 2   Taking Care of Complicated Tasks 2   Applied Cognition Raw Score 18   Applied Cognition Standardized Score 38 07   Modified Sherron Scale   Modified Sherron Scale 4   End of Consult Education Provided Yes;Family or social support of family present for education by provider   Patient Position at End of Consult Bedside chair;Bed/Chair alarm activated; All needs within reach       JACK Shaver, OTR/L

## 2023-04-13 NOTE — PLAN OF CARE
Problem: PHYSICAL THERAPY ADULT  Goal: Performs mobility at highest level of function for planned discharge setting  See evaluation for individualized goals  Description: Treatment/Interventions: Functional transfer training, LE strengthening/ROM, Therapeutic exercise, Endurance training, Patient/family training, Equipment eval/education, Bed mobility, Gait training, Spoke to nursing          See flowsheet documentation for full assessment, interventions and recommendations  Outcome: Progressing  Note: Prognosis: Good  Problem List: Decreased strength, Decreased endurance, Decreased mobility, Impaired balance, Decreased coordination, Impaired judgement, Decreased safety awareness, Impaired vision  Assessment: Pt seen for PT treatment session with focus on bed mobility, functional transfers, functional mobility, balance training  Pt making steady progress toward goals this session  Pt able to perform transfers with decreased assist this session compared to last as a result of improving LE strength  Additionally, pt is able to ambulate increased distance this session, though pt with remaining balance impairments requiring use of RW and physical assist for stability  Pt activity tolerance improving as well noted during prolonged dynamic standing activity while performing fine motor task with OT  Pt left upright in bedside chair with chair alarm intact and with all needs in reach  Pt will benefit from skilled therapy in order to address current impairments and functional limitations  PT to follow pt and recommending rehab once medically cleared  The patient's AM-PAC Basic Mobility Inpatient Short Form Raw Score is 13  A Raw score of less than or equal to 16 suggests the patient may benefit from discharge to post-acute rehabilitation services  Please also refer to the recommendation of the Physical Therapist for safe discharge planning    Barriers to Discharge: Inaccessible home environment, Decreased caregiver support     PT Discharge Recommendation: Post acute rehabilitation services    See flowsheet documentation for full assessment

## 2023-04-13 NOTE — ASSESSMENT & PLAN NOTE
· Failed urinary retention protocol requiring replacement of acharya on 4/13  · Flomax initiated 4/12    Plan:   · Attempt voiding trial today tomorrow  · Encourage ambulation  · Continue flomax

## 2023-04-13 NOTE — ASSESSMENT & PLAN NOTE
Lab Results   Component Value Date    HGBA1C 8 0 (H) 03/31/2023       Recent Labs     04/12/23  0728 04/12/23  1142 04/12/23  1616 04/12/23  2133   POCGLU 116 154* 122 131       Blood Sugar Average: Last 72 hrs:  (P) 130 75   · Sliding scale insulin before meals and at bedtime, adjust algorithm as needed  · Goal blood glucose 140-180

## 2023-04-13 NOTE — PROGRESS NOTES
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)  Assessment & Plan  · As evident on CTA PE study  · Dimer greater than 20  · PESI score is 75 points which is low risk: 1 7 to 3 5% 30-day mortality  · BNP elevated, troponin normal  · Echo 4/12 shows: LVEF 55%, The right ventricular systolic pressure is moderately elevated  The estimated right ventricular systolic pressure is 85 23 mmHg  The right atrium is mildly dilated  · Bilateral lower extremity venous duplex with bilateral DVTs  · Argatroban infusion  · Continuous cardiopulmonary monitoring  · Case was discussed with IR by [facility] provider, patient is not a candidate for IR thrombectomy    Thrombocytopenia ([facility] )  Assessment & Plan  · New onset this admission  · 4Ts for HIT: Score is 8 points which is a high probability of HIT or about 64% likelihood per MD Calc calculator  · Transition from subcu heparin to subcu Arixtra by [facility] providers  · Will transition to argatroban infusion for saddle PE  · CBC daily  · Heparin antibody by serotonin release  · Peripheral smear    Acute ischemic left MCA stroke ([facility] )  Assessment & Plan  · With global aphasia, improving R sided weakness, motor planning difficulties, R inattention, and bowel/bladder incontinence  · CTA with L M2 division  · Not a candidate for systemic thrombolytics, and not a candidate for thrombectomy  · 3/31 MRI Brain: Large L MCA infarct with L BG involvement and superimposed microhemorrhage with mild mass effect and stable 3-mm L to R midling shift on most recent CTH (4/3)   · Echo: EF 55%, LA dilatation, no RWMA, no PFO, no thrombus or mass    · Thrombosis panel with abnormal antithrombin, Protein C/S activity, but per Neuro unclear significance in setting of acute stroke  Would repeat as outpatient  · Will need Zio Patch/Loop Recorder with Cards Referral as outpatient  · Discussed with Neurology on 4/9: Continue Keppra until outpatient f/u given location, temporal slowing, extent of lesion  · Outpatient f/u with Neurology, Cardiology, and PCP  Plan:  · Q2 neuro checks  · PT OT evaluation and treatment as indicated  · Continue ASA and statin    Urinary retention  Assessment & Plan  · Urinary retention protocol  · Now straight cath x 3 total in last 24h  · flomax    Hypertension  Assessment & Plan  · No home regimen  · Continue amlodipine, metoprolol  · Consider PRN agents if needed for SBP goal less than 160    Type 2 diabetes mellitus with circulatory disorder, without long-term current use of insulin [facility])  Assessment & Plan  Lab Results   Component Value Date    HGBA1C 8 0 (H) 03/31/2023       Recent Labs     04/12/23  0728 04/12/23  1142 04/12/23  1616 04/12/23  2133   POCGLU 116 154* 122 131       Blood Sugar Average: Last 72 hrs:  (P) 130 75   · Sliding scale insulin before meals and at bedtime, adjust algorithm as needed    Obesity, Class III, BMI 40-49 9 (morbid obesity) ([facility] )  Assessment & Plan  · Low-carb diet  · Encourage lifestyle change           ICU Core Measures     A: Assess, Prevent, and Manage Pain · Has pain been assessed? Yes  · Need for changes to pain regimen? No   B: Both SAT/SAT  · N/A   C: Choice of Sedation · RASS Goal: N/A patient not on sedation  · Need for changes to sedation or analgesia regimen? No   D: Delirium · CAM-ICU: Negative   E: Early Mobility  · Plan for early mobility? Yes   F: Family Engagement · Plan for family engagement today? Yes         Prophylaxis:  VTE VTE covered by:  argatroban, Intravenous, 1 8 mcg/kg/min at 04/13/23 0259       Stress Ulcer  covered byfamotidine (PEPCID) tablet 20 mg [[id number]]       Subjective   HPI/24hr events: Straight cath x2 for >600cc each time   No other events  Review of Systems All other systems reviewed and are negative  Objective                            Vitals I/O      Most Recent Min/Max in 24hrs   Temp 98 3 °F (36 8 °C) Temp  Min: 98 3 °F (36 8 °C)  Max: 99 2 °F (37 3 °C)   Pulse 82 Pulse  Min: 68  Max: 89   Resp 21 Resp  Min: 18  Max: 26   /81 BP  Min: 120/69  Max: 158/81   O2 Sat 95 % SpO2  Min: 93 %  Max: 98 %      Intake/Output Summary (Last 24 hours) at 4/13/2023 0348  Last data filed at 4/13/2023 0200  Gross per 24 hour   Intake 2247 15 ml   Output 1950 ml   Net 297 15 ml         Diet William/CHO Controlled; Consistent Carbohydrate Diet Level 3 (6 carb servings/90 grams CHO/meal); Sodium 4 GM (CUAUHTEMOC)     Invasive Monitoring Physical exam    Physical Exam  Vitals reviewed  Constitutional:       General: He is not in acute distress  Appearance: He is not ill-appearing, toxic-appearing or diaphoretic  HENT:      Mouth/Throat:      Mouth: Mucous membranes are moist    Eyes:      Extraocular Movements: Extraocular movements intact  Pupils: Pupils are equal, round, and reactive to light  Cardiovascular:      Rate and Rhythm: Normal rate and regular rhythm  Pulses: Normal pulses  Heart sounds: Normal heart sounds  Pulmonary:      Effort: Pulmonary effort is normal       Breath sounds: Normal breath sounds and air entry  Abdominal:      General: There is no distension  Palpations: Abdomen is soft  Tenderness: There is no abdominal tenderness  Musculoskeletal:      Right lower leg: No edema  Left lower leg: No edema  Comments: MUNGUIA   Skin:     General: Skin is warm and dry  Capillary Refill: Capillary refill takes less than 2 seconds  Coloration: Skin is not cyanotic, jaundiced, mottled or pale  Neurological:      General: No focal deficit present  Mental Status: He is alert and oriented to person, place, and time  GCS: GCS eye subscore is 4  GCS verbal subscore is 5  GCS motor subscore is 6  Sensory: Sensation is intact  Motor: Motor function is intact  Diagnostic Studies      EKG: nsr tele  Imaging:  I have personally reviewed pertinent reports  and I have personally reviewed pertinent films in PACS     Medications:  Scheduled PRN   amLODIPine, 5 mg, Daily  aspirin, 81 mg, Daily  atorvastatin, 40 mg, QPM  chlorhexidine, 15 mL, Q12H NORRIS  famotidine, 20 mg, Q12H NORRIS  insulin lispro, 1-5 Units, TID AC  insulin lispro, 1-5 Units, HS  levETIRAcetam, 750 mg, Q12H NORRIS  metoprolol tartrate, 25 mg, Q12H NORRIS  senna, 1 tablet, HS  tamsulosin, 0 4 mg, After Dinner      acetaminophen, 975 mg, Q8H PRN  calcium carbonate, 1,000 mg, TID PRN  ondansetron, 4 mg, Q6H PRN  oxyCODONE, 2 5 mg, Q6H PRN       Continuous    argatroban, 0 1-3 mcg/kg/min, Last Rate: 1 8 mcg/kg/min (04/13/23 0259)         Labs:    CBC    Recent Labs     04/11/23  1612 04/12/23  0313   WBC 10 05 8 04   HGB 14 1 12 9   HCT 43 2 40 8   PLT 49* 42*     BMP    Recent Labs     04/11/23  1222 04/12/23  0313   SODIUM 135 136   K 4 1 4 3    107   CO2 26 23   AGAP 5 6   BUN 19 19   CREATININE 1 20 1 08   CALCIUM 9 7 8 7       Coags    Recent Labs     04/12/23  0313 04/12/23  0521 04/12/23  2131 04/13/23  0131   INR 1 19  --   --   --    PTT 34   < > 71* 38*    < > = values in this interval not displayed          Additional Electrolytes  No recent results       Blood Gas    No recent results  No recent results LFTs  Recent Labs     04/11/23  1222 04/12/23  0313   ALT 36 29   AST 39 25   ALKPHOS 71 66   ALB 3 4* 2 9*   TBILI 0 94 0 75       Infectious  No recent results  Glucose  Recent Labs     04/11/23  1222 04/12/23  0313   GLUC 92 118             Nura Burton PA-C

## 2023-04-13 NOTE — ASSESSMENT & PLAN NOTE
· As evident on CTA PE study  · Dimer greater than 20  · PESI score is 75 points which is low risk: 1 7 to 3 5% 30-day mortality  · BNP and troponin now  · Echo  · Bilateral lower extremity venous duplex  · Argatroban infusion  · Continuous cardiopulmonary monitoring  · Case was discussed with IR by [facility] provider, patient is not a candidate for IR thrombectomy

## 2023-04-13 NOTE — ASSESSMENT & PLAN NOTE
· First noted on 4/11  · 4Ts for HIT: Score is 8 points which is a high probability of HIT or about 64% likelihood per MD Calc calculator  · Transitioned from subcu heparin to subcu Arixtra initially given concern for HIT by [facility] providers  · Transitioned to argatroban infusion for saddle PE  · Heparin induced platelet antibody: 5 950  · Heparin antibody by serotonin release pending    Plan:  · Continue to trend platelet count daily  · Avoid all heparin products  · Continue anticoagulation with argatroban infusion with plan to transition to oral AC as above

## 2023-04-13 NOTE — PHYSICAL THERAPY NOTE
PHYSICAL THERAPY NOTE          Patient Name: Aretha Hylton III  QRQRK'N Date: 4/13/2023 04/13/23 1416   PT Last Visit   PT Visit Date 04/13/23   Note Type   Note Type Treatment   Pain Assessment   Pain Assessment Tool 0-10   Pain Score   (discomfort around acharya)   Restrictions/Precautions   Weight Bearing Precautions Per Order No   Other Precautions Chair Alarm; Bed Alarm;Multiple lines;Telemetry; Fall Risk  (expressive aphasia, b/l R visual field cuts)   General   Chart Reviewed Yes   Response to Previous Treatment Patient with no complaints from previous session  Family/Caregiver Present Yes   Cognition   Arousal/Participation Alert   Attention Within functional limits   Memory Decreased recall of precautions   Following Commands Follows one step commands with increased time or repetition   Comments Pt with expressive aphasia from recent CVA - attempts to verbalize, however, difficult to understand at times  Pt has communication board for basic needs  Subjective   Subjective Very pleasant and agreeable to particiapte   Bed Mobility   Supine to Sit 5  Supervision   Additional items HOB elevated; Increased time required;Verbal cues; Bedrails   Additional Comments Left OOB in chair with chair alarm intact and all needs in reach  Transfers   Sit to Stand 3  Moderate assistance   Additional items Assist x 1; Increased time required;Verbal cues   Stand to Sit 3  Moderate assistance   Additional items Assist x 1; Increased time required;Verbal cues   Additional Comments with RW   Ambulation/Elevation   Gait pattern Excessively slow; Short stride;Decreased foot clearance  (mild unsteadiness)   Gait Assistance 3  Moderate assist   Additional items Assist x 1;Verbal cues; Tactile cues  (second and third for lines and chair follow)   Assistive Device Rolling walker   Distance 40 ft x 1, 50 ft x 1, 80 ft x 1 with sitting rest breaks Balance   Static Sitting Fair +   Dynamic Sitting Fair   Static Standing Fair -   Dynamic Standing Poor +   Ambulatory Poor +   Activity Tolerance   Activity Tolerance Patient tolerated treatment well   Medical Staff Made Aware OT Kristen   Nurse Made Aware RN cleared pt to be seen by PT   Exercises   Balance training  Dynamic standing balance with Min A and no UE support while performing fine motor tasks with OT for ~10 minutes  Assessment   Prognosis Good   Problem List Decreased strength;Decreased endurance;Decreased mobility; Impaired balance;Decreased coordination; Impaired judgement;Decreased safety awareness; Impaired vision   Assessment Pt seen for PT treatment session with focus on bed mobility, functional transfers, functional mobility, balance training  Pt making steady progress toward goals this session  Pt able to perform transfers with decreased assist this session compared to last as a result of improving LE strength  Additionally, pt is able to ambulate increased distance this session, though pt with remaining balance impairments requiring use of RW and physical assist for stability  Pt activity tolerance improving as well noted during prolonged dynamic standing activity while performing fine motor task with OT  Pt left upright in bedside chair with chair alarm intact and with all needs in reach  Pt will benefit from skilled therapy in order to address current impairments and functional limitations  PT to follow pt and recommending rehab once medically cleared  The patient's AM-PAC Basic Mobility Inpatient Short Form Raw Score is 13  A Raw score of less than or equal to 16 suggests the patient may benefit from discharge to post-acute rehabilitation services  Please also refer to the recommendation of the Physical Therapist for safe discharge planning     Barriers to Discharge Inaccessible home environment;Decreased caregiver support   Goals   Patient Goals to get his acharya figured out   STG Expiration Date 04/26/23   Plan   Treatment/Interventions Functional transfer training;LE strengthening/ROM; Elevations; Therapeutic exercise; Endurance training;Equipment eval/education; Bed mobility;Gait training;Spoke to nursing   Progress Progressing toward goals   PT Frequency 3-5x/wk   Recommendation   PT Discharge Recommendation Post acute rehabilitation services   AM-PAC Basic Mobility Inpatient   Turning in Flat Bed Without Bedrails 3   Lying on Back to Sitting on Edge of Flat Bed Without Bedrails 3   Moving Bed to Chair 2   Standing Up From Chair Using Arms 2   Walk in Room 2   Climb 3-5 Stairs With Railing 1   Basic Mobility Inpatient Raw Score 13   Basic Mobility Standardized Score 33 99   Highest Level Of Mobility   JH-HLM Goal 4: Move to chair/commode   JH-HLM Achieved 7: Walk 25 feet or more       Rito Cox, PT, DPT

## 2023-04-13 NOTE — PLAN OF CARE
Problem: OCCUPATIONAL THERAPY ADULT  Goal: Performs self-care activities at highest level of function for planned discharge setting  See evaluation for individualized goals  Description: Treatment Interventions: ADL retraining, Functional transfer training, UE strengthening/ROM, Endurance training, Visual perceptual retraining, Cognitive reorientation, Patient/family training, Equipment evaluation/education, Compensatory technique education, Activityengagement, Energy conservation, Neuromuscular reeducation, Fine motor coordination activities          See flowsheet documentation for full assessment, interventions and recommendations  Outcome: Progressing  Note: Limitation: Decreased ADL status, Decreased UE ROM, Decreased UE strength, Decreased cognition, Decreased Safe judgement during ADL, Decreased endurance, Decreased self-care trans, Decreased high-level ADLs, Decreased fine motor control  Prognosis: Good  Assessment: Pt seen on this date for OT session focusing on ADL retraining, cognitive reorientation, body mechanics, transfer retraining, FMC, visual perceptual retraining, increasing activity tolerance/endurance and EOB sitting to increase ability to participate in ADL/functional tasks  Pt was found in bed and was left in chair w/ all needs within reach, chair alarm on  Pt completed bed mob w/ supervision, STS w mod Ax1, FM w mod Ax1, rw for support  Visual perceptual retraining addressed during FM tasks- provided edu to scan enviro and clarified level of field cut- appears that outer 1/4 of R vision in B eyes remains cut at this time  [facility] task completed while pt is stance- able to maintain stance with min ax1, [facility] w G coordination and strength  No concerns with [facility] at this time  He does have trouble w/ speech production (expressive aphasia), but this does appear to be improving at this time   Pt w/ improvements in activity tolerance, transfer ability, however is still limited 2* decreased ADL/High-level ADL status, decreased activity tolerance/endurance, decreased cognition, decreased self-care trans, decreased safety awareness and insight to condition  The patient's raw score on the AM-PAC Daily Activity Inpatient Short Form is 17  A raw score of less than 19 suggests the patient may benefit from discharge to post-acute rehabilitation services  Please refer to the recommendation of the Occupational Therapist for safe discharge planning  Recommending pt D/C to STR when medically stable  Pt will continue to benefit from acute OT services to meet goals       OT Discharge Recommendation: Post acute rehabilitation services

## 2023-04-13 NOTE — CASE MANAGEMENT
Case Management Assessment    Patient name Bridgette Lennox III  Location [facility]PHP 516/PPHP 772-77 MRN [id number]  : 1975 Date 2023       Current Admission Date: 2023  Current Admission Diagnosis:Saddle 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 [facility])   Patient Active Problem List    Diagnosis Date Noted   • Urinary retention 2023   • DVT of lower extremity, bilateral ([facility] [address] ) 2023   • Saddle 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 ([facility] ) 2023   • Left-sided chest wall pain 2023   • Creatinine elevation 2023   • Thrombocytopenia ([facility] ) 2023   • Bowel and bladder incontinence 2023   • Hypertension 2023   • Acute ischemic left MCA stroke ([facility] ) 2023   • Obesity, Class III, BMI 40-49 9 (morbid obesity) ([place] [address] ) 2023   • Encephalopathy 2023   • Type 2 diabetes mellitus with circulatory disorder, without long-term current use of insulin ([facility] ) 2023      LOS (days): 1  Geometric Mean LOS (GMLOS) (days): 3 90  Days to GMLOS:2 3     OBJECTIVE:  Risk of Unplanned Readmission Score: 13 69   Current admission status: Inpatient    Preferred Pharmacy:   [address]  Phone: [phone] Fax: [phone]    Primary Care Provider: No primary care provider on file      Primary Insurance: BLUE CROSS    ASSESSMENT:  [address] [facility], [address] Representative - Mother   Primary Phone: [phone] (Mobile)           Advance Directives  Does patient have a [address]?: No  Does patient have Advance Directives?: No  Primary Contact: Pt's mother Elliott Credit / phone: [phone]    Readmission Root Cause  30 Day Readmission: Yes  Who directed you to return to the hospital?: Specialist  Did you understand whom to contact if you had questions or problems?: Yes  Did you get your prescriptions before you left the hospital?: Yes  Were you able to get your prescriptions filled when you left the hospital?: Yes  Did you take your medications as prescribed?: Yes  Were you able to get to your follow-up appointments?: Yes  During previous admission, was a post-acute recommendation made?: Yes  What post-acute resources were offered?: Other (Acute Rehab - sent to [facility])  Patient was readmitted due to: Saddle Pulmonary Embolism  Action Plan: TBD    Patient Information  Admitted from[de-identified] Facility ([address])  Mental Status: Alert  Assessment information provided by[de-identified] Patient  Primary Caregiver: Self  Support Systems: Parent  [place] of Residence: [address] [place] [address] do you live in?: [place], [address] [place] [address] Street entry access options   Select all that apply : Ramp  Type of Current Residence: [place]  In the last 12 months, was there a time when you were not able to pay the mortgage or rent on time?: No  In the last 12 months, how many places have you lived?: 1  In the last 12 months, was there a time when you did not have a steady place to sleep or slept in a shelter (including now)?: No  Homeless/housing insecurity resource given?: N/A  Living Arrangements: Lives w/ Parent(s)  Is patient a ?: No    Activities of Daily Living Prior to Admission  Functional Status: Independent  Completes ADLs independently?: Yes  Ambulates independently?: Yes  Does patient use assisted devices?: No  Does patient currently own DME?: Yes  What DME does the patient currently own?: Jamee Soares  Does the patient have a history of Short-Term Rehab?: Yes (Pt has current hx of placement at [facility] )  Does patient have a history of HHC?: No    Patient Information Continued  Income Source: Employed  Does patient have prescription coverage?: Yes  Within the past 12 months, you worried that your food would run out before you got the money to buy more : Never true  Within the past 12 months, the food you bought just didn't last and you didn't have money to get more : Never true  Food insecurity resource given?: N/A  Does patient receive dialysis treatments?: No  Does patient have a history of substance abuse?: No  Does patient have a history of Mental Health Diagnosis?: No    Means of Transportation  Means of Transport to Appts[de-identified] Drives Self  In the past 12 months, has lack of transportation kept you from medical appointments or from getting medications?: No  In the past 12 months, has lack of transportation kept you from meetings, work, or from getting things needed for daily living?: No  Was application for public transport provided?: N/A     Additional Comments: CM reviewed d/c planning process including the following: identifying help at home, patient preference for d/c planning needs, Discharge Lounge, [facility] Meds to Bed program, availability of treatment team to discuss questions or concerns patient and/or family may have regarding understanding medications and recognizing signs and symptoms once discharged  CM also encouraged patient to follow up with all recommended appointments after discharge  Patient advised of importance for patient and family to participate in managing patient’s medical well being  Patient/caregiver received discharge checklist  Content reviewed  Patient/caregiver encouraged to participate in discharge plan of care prior to discharge home

## 2023-04-13 NOTE — UTILIZATION REVIEW
Initial Clinical Review    Admission: Date/Time/Statement:   Admission Orders (From admission, onward)     Ordered        04/12/23 0240  Inpatient Admission  Once                      Orders Placed This Encounter   Procedures   • Inpatient Admission     Standing Status:   Standing     Number of Occurrences:   1     Order Specific Question:   Level of Care     Answer:   Level 1 Stepdown [13]     Order Specific Question:   Estimated length of stay     Answer:   More than 2 Midnights     Order Specific Question:   Certification     Answer:   I certify that inpatient services are medically necessary for this patient for a duration of greater than two midnights  See H&P and MD Progress Notes for additional information about the patient's course of treatment  ED Arrival Information     Patient not seen in ED                     No chief complaint on file  Initial Presentation: 41 y o  male with PMH of T2DM, HTN, recently admitted for stroke was transferred from [address] [place] [address] Acute rehab to [facility], admitted as Inpatient for large saddle embolus of pulmonary artery  Pt was recently admitted on 03/30 for acute ischemic left MCA stroke and was discharged to  acute rehab after  He had 2 episodes of CP last night, EKG and trops neg  Noted to have NIEVES this am by therapy  D-dimer elevated, marked decrease in plts  IVF started  CTA Chest showed saddle embolus and large clot burden without evidence of R sided strain incongruent with his clot burden  He is not a candidate for thrombectomy d/t concern for HIT  HIT score -8  Not a tPA candidate either  Transferred to ICU for further tx and mgt of saddle PE and to evaluate for R sided strain  Plan: BNP and troponin now  Echo  Bilateral lower extremity venous duplex  Argatroban infusion  Continuous cardiopulmonary monitoring  CBC now  Heparin antibody by serotonin release  Peripheral smear  Every hour neurochecks while initiating argatroban infusion   Continue ASA and statin  Date: 04/13  Day 2:   Critical Care Notes: Echo showed LVEF 55%, The right ventricular systolic pressure is moderately elevated  The estimated right ventricular systolic pressure is 02 60 mmHg  The right atrium is mildly dilated  B/l LE venous duplex with b/l DVTs  Pt was straight cath x 2 for >600 cc each time  Cont Argatroban  Continuous cardiopulmonary monitoring  CBC daily  q2 neuro checks  PT/OT  Cont ASA, statin  Cont flomax  PE: GCS 15,aaox3, global aphasia present      ED Triage Vitals [04/12/23 0230]   Temperature Pulse Respirations Blood Pressure SpO2   98 °F (36 7 °C) 77 (!) 27 144/84 93 %      Temp Source Heart Rate Source Patient Position - Orthostatic VS BP Location FiO2 (%)   Oral Monitor Lying Left arm --      Pain Score       5          Wt Readings from Last 1 Encounters:   04/13/23 (!) 139 kg (307 lb 5 1 oz)     Additional Vital Signs:   Date/Time Temp Pulse Resp BP MAP (mmHg) SpO2 O2 Device Patient Position - Orthostatic VS   04/13/23 0758 98 5 °F (36 9 °C) -- -- -- -- -- -- --   04/13/23 0700 98 7 °F (37 1 °C) 82 20 134/75 98 94 % -- --   04/13/23 0600 -- 78 19 131/75 98 94 % -- --   04/13/23 0500 -- 82 21 132/76 98 95 % -- --   04/13/23 0400 99 3 °F (37 4 °C) 74 21 131/74 97 93 % None (Room air) Lying   04/13/23 0300 -- 73 20 125/74 95 94 % -- --   04/13/23 0200 -- 82 21 134/81 102 95 % -- --   04/13/23 0100 -- 73 20 120/69 89 94 % -- --   04/13/23 0000 98 3 °F (36 8 °C) 76 21 128/78 99 94 % None (Room air) Lying   04/12/23 2300 -- 82 18 148/82 110 96 % -- --   04/12/23 2200 -- 80 21 138/74 100 94 % -- --   04/12/23 2102 -- 74 -- 136/75 -- -- -- --   04/12/23 2100 -- 78 23 Abnormal  136/75 100 93 % -- --   04/12/23 2000 99 2 °F (37 3 °C) 89 22 144/75 102 96 % None (Room air) Lying   04/12/23 1900 -- 83 26 Abnormal  146/73 103 94 % -- --   04/12/23 1800 -- 85 25 Abnormal  158/81 112 95 % -- --   04/12/23 1700 -- 83 -- 151/86 113 95 % -- --   04/12/23 1600 98 6 °F (37 °C) 82 25 Abnormal  148/78 107 98 % -- --   04/12/23 1500 -- 79 22 146/81 106 97 % -- --   04/12/23 1400 -- 80 26 Abnormal  143/86 108 98 % -- --   04/12/23 1300 -- 68 22 125/71 92 96 % -- --   04/12/23 1200 -- 75 25 Abnormal  130/77 100 97 % -- --   04/12/23 1100 -- 75 22 132/80 101 97 % -- --   04/12/23 1000 -- 75 21 138/73 100 97 % -- --   04/12/23 0900 -- 74 21 132/82 102 97 % -- --   04/12/23 0810 -- 80 -- 127/77 -- -- -- --   04/12/23 0800 -- 82 25 Abnormal  142/90 111 96 % -- --   04/12/23 0752 98 6 °F (37 °C) -- -- -- -- -- -- --   04/12/23 0700 -- 75 22 127/77 97 96 % -- --   04/12/23 0600 -- 76 22 125/77 95 96 % -- --   04/12/23 0500 -- 75 23 Abnormal  126/75 96 97 % -- --   04/12/23 0400 -- 75 22 127/70 92 97 % -- --   04/12/23 0300 -- 75 22 135/79 102 96 % -- --       Pertinent Labs/Diagnostic Test Results:   VAS lower limb venous duplex study, complete bilateral   Final Result by Yoselyn Naik MD (04/12 1426)   RIGHT LOWER LIMB:  Evidence suggestive of Acute occlusive deep vein thrombosis noted in the  Peroneal veins  Non Occlusive Acute deep vein thrombosis in noted in the Posterior TibIal  veins  No evidence of superficial thrombophlebitis noted  No evidence of valvular incompetence noted in the deep veins  Popliteal, posterior tibial and anterior tibial arterial Doppler waveforms are  triphasic  LEFT LOWER LIMB:  Evidence suggestive of Acute occlusive deep vein thrombosis noted in the  Posterior Tiblial, Peroneal, and Soleal veins  Evidence suggestive of Acute non occlusive deep vein thrombosis noted in the  Mid-Distal Femoral and Popliteal veins  No evidence of superficial thrombophlebitis noted  No evidence of valvular incompetence noted in the deep veins  Popliteal, posterior tibial and anterior tibial arterial Doppler waveforms are  triphasic        04/12 ECHO:  Left Ventricle: Left ventricular cavity size is normal  Wall thickness is mildly increased   The left ventricular ejection fraction is 55%  Systolic function is normal  Although no diagnostic regional wall motion abnormality was identified, this possibility cannot be completely excluded on the basis of this study  Diastolic function is normal   •  IVS: There is systolic flattening of the interventricular septum consistent with right ventricle pressure overload  •  Right Ventricle: Right ventricular cavity size is mildly dilated  Wall motion is abnormal   •  Right Atrium: The atrium is mildly dilated  •  Tricuspid Valve: There is mild regurgitation  The right ventricular systolic pressure is moderately elevated  The estimated right ventricular systolic pressure is 72 30 mmHg  04/11 CTA chest PE study:     1  Saddle  embolus and extensive clot burden throughout the visualized proximal pulmonary arteries  IR consultation suggested  2   RV/LV ratio less then 0 9, suggesting no elevation of right-sided pressures; however this is discordant with extensive clot burden    3   No evidence of pulmonary infarct      Date and Time Eye Opening Best Verbal Response Best Motor Response Marcus Coma Scale Score   04/13/23 1000 4 2 6 12   04/13/23 0759 4 2 6 12   04/13/23 0600 4 2 6 12   04/13/23 0400 4 2 6 12   04/13/23 0200 4 2 6 12   04/13/23 0100 4 2 6 12   04/13/23 0000 4 2 6 12   04/12/23 2300 4 2 6 12   04/12/23 2200 4 2 6 12   04/12/23 2100 4 2 6 12   04/12/23 2000 4 2 6 12   04/12/23 1900 4 2 6 12   04/12/23 1800 4 5 6 15   04/12/23 1700 4 5 6 15   04/12/23 1600 4 5 6 15   04/12/23 1500 4 5 6 15   04/12/23 1400 4 5 6 15   04/12/23 1300 4 5 6 15   04/12/23 1200 4 5 6 15   04/12/23 1100 4 5 6 15   04/12/23 1000 4 5 6 15   04/12/23 0900 4 5 6 15   04/12/23 0800 4 5 6 15   04/12/23 0700 4 5 6 15   04/12/23 0400 4 2 6 12   04/12/23 0230 4 2 6 12   04/09/23 1526 4 2 4 10   04/08/23 1530 4 2 4 10   04/08/23 0800 4 2 4 10   04/07/23 2000 4 2 4 10   04/07/23 0900 4 2 4 10   04/06/23 2300 4 2 4 10   04/06/23 0900 4 2 6 12   04/06/23 0026 4 2 6 12 04/05/23 1600 4 2 6 12   04/05/23 1200 4 2 6 12   04/05/23 0800 4 2 6 12   04/04/23 2000 4 2 6 12   04/04/23 1600 4 2 6 12   04/04/23 1200 4 2 6 12   04/04/23 0800 4 2 6 12   04/03/23 1930 4 2 6 12   04/03/23 1345 4 2 6 12   04/03/23 0830 4 2 6 12   04/02/23 2000 4 3 5 12   04/02/23 1130 4 3 5 12   04/02/23 0800 4 2 6 12   04/02/23 0600 3 1 6 10   04/02/23 0400 3 1 6 10   04/02/23 0200 3 1 6 10   04/02/23 0001 3 1 6 10   04/01/23 2200 3 1 6 10   04/01/23 2000 3 1 6 10   04/01/23 1800 3 1 6 10   04/01/23 1600 3 1 6 10   04/01/23 1400 3 1 6 10   04/01/23 1200 3 1 6 10   04/01/23 1000 3 1 6 10   04/01/23 0800 3 1 6 10   04/01/23 0400 3 1 6 10   04/01/23 0000 3 1 6 10   03/31/23 2300 3 1 6 10   03/31/23 2200 3 1 6 10   03/31/23 2100 3 1 6 10   03/31/23 2000 3 1 6 10   03/31/23 1900 3 1 6 10   03/31/23 1800 3 1 6 10   03/31/23 1700 3 1 6 10   03/31/23 1600 3 1 6 10   03/31/23 1500 3 1 6 10   03/31/23 1400 3 1 6 10   03/31/23 1300 3 1 6 10   03/31/23 1200 3 1 6 10   03/31/23 1100 3 1 6 10   03/31/23 1000 1 1 6 8   03/31/23 0900 1 1 6 8   03/31/23 0800 1 1 5 7   03/31/23 0700 1 1 6 8   03/31/23 0600 1 1 6 8   03/31/23 0500 1 1 6 8   03/31/23 0400 1 1 5 7   03/31/23 0300 1 1 4 6   03/31/23 0200 1 1 4 6   03/31/23 0100 1 1 4 6   03/31/23 0000 1 1 4 6   03/30/23 2300 1 1 4 6   03/30/23 2200 1 1 4 6   03/30/23 2100 1 1 4 6   03/30/23 2000 1 1 4 6   03/30/23 1811 1 1 4 6         Results from last 7 days   Lab Units 04/08/23  1043 04/07/23  1704   SARS-COV-2  Negative Negative     Results from last 7 days   Lab Units 04/13/23  0501 04/12/23  0313 04/11/23  1612 04/11/23  1222 04/08/23  0629   WBC Thousand/uL 7 62 8 04 10 05 9 70 7 72   HEMOGLOBIN g/dL 12 4 12 9 14 1 13 9 13 7   HEMATOCRIT % 39 9 40 8 43 2 44 4 44 1   PLATELETS Thousands/uL 51* 42* 49* 48* 197   NEUTROS ABS Thousands/µL  --   --  6 81 7 01  --          Results from last 7 days   Lab Units 04/13/23  0501 04/12/23  0313 04/11/23  1222 04/09/23  0602 04/08/23  0629   SODIUM mmol/L 135 136 135 136 134*   POTASSIUM mmol/L 4 0 4 3 4 1 4 4 4 1   CHLORIDE mmol/L 108 107 104 109* 106   CO2 mmol/L 24 23 26 24 23   ANION GAP mmol/L 3* 6 5 3* 5   BUN mg/dL 15 19 19 20 20   CREATININE mg/dL 0 88 1 08 1 20 1 06 1 00   EGFR ml/min/1 73sq m 103 82 72 84 90   CALCIUM mg/dL 8 4 8 7 9 7 8 8 9 2     Results from last 7 days   Lab Units 04/12/23  0313 04/11/23  1222   AST U/L 25 39   ALT U/L 29 36   ALK PHOS U/L 66 71   TOTAL PROTEIN g/dL 6 7 7 4   ALBUMIN g/dL 2 9* 3 4*   TOTAL BILIRUBIN mg/dL 0 75 0 94   BILIRUBIN DIRECT mg/dL 0 18  --      Results from last 7 days   Lab Units 04/13/23  0747 04/12/23  2133 04/12/23  1616 04/12/23  1142 04/12/23  0728 04/11/23  2250 04/11/23  1614 04/11/23  1048 04/11/23  0627 04/10/23  2051 04/10/23  1551 04/10/23  1015   POC GLUCOSE mg/dl 118 131 122 154* 116 121 122 127 131 117 99 176*     Results from last 7 days   Lab Units 04/13/23  0501 04/12/23  0313 04/11/23  1222 04/09/23  0602 04/08/23  0629   GLUCOSE RANDOM mg/dL 119 118 92 124 137       Results from last 7 days   Lab Units 04/12/23  0728 04/12/23  0521 04/12/23  0313 04/10/23  2108 04/10/23  1842 04/10/23  1655   HS TNI 0HR ng/L  --   --  27  --   --  36   HS TNI 2HR ng/L  --  29  --   --  36  --    HSTNI D2 ng/L  --  2  --   --  0  --    HS TNI 4HR ng/L 26  --   --  35  --   --    HSTNI D4 ng/L -1  --   --  -1  --   --      Results from last 7 days   Lab Units 04/11/23  1222   D-DIMER QUANTITATIVE ug/ml FEU >20 00*     Results from last 7 days   Lab Units 04/13/23  0617 04/13/23  0131 04/12/23  2131 04/12/23  0521 04/12/23  0313   PROTIME seconds  --   --   --   --  15 3*   INR   --   --   --   --  1 19   PTT seconds 65* 38* 71*   < > 34    < > = values in this interval not displayed       Results from last 7 days   Lab Units 04/12/23  0313   NT-PRO BNP pg/mL 2,144*       Results from last 7 days   Lab Units 04/09/23  1130   CLARITY UA  Hazy   COLOR UA  Yellow   SPEC GRAV UA  1 025 PH UA  5 0   GLUCOSE UA mg/dl Negative   KETONES UA mg/dl Negative   BLOOD UA  Negative   PROTEIN UA mg/dl Negative   NITRITE UA  Negative   BILIRUBIN UA  Negative   UROBILINOGEN UA (BE) mg/dl <2 0   LEUKOCYTES UA  Negative     Results from last 7 days   Lab Units 04/07/23  1704   INFLUENZA A PCR  Negative   INFLUENZA B PCR  Negative           Results from last 7 days   Lab Units 04/12/23  0313   TOTAL COUNTED  100           ED Treatment:   Medication Administration - No Administrations Displayed (No Start Event Found)     None        Past Medical History:   Diagnosis Date   • Hypertensive emergency 3/30/2023     Present on Admission:  • Acute ischemic left MCA stroke (HCC)  • Hypertension  • Obesity, Class III, BMI 40-49 9 (morbid obesity) ([facility] [address] )  • 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)  • Thrombocytopenia (HCC)  • Type 2 diabetes mellitus with circulatory disorder, without long-term current use of insulin (HCC)      Admitting Diagnosis: No admission diagnoses are documented for this encounter    Age/Sex: 41 y o  male  Admission Orders:  Elevate HOB  SCD  PT/OT  Cardio-Pulmonary Monitoring, Neuro Checks, Nursing dysphagia assesment, I/O, Daily weights, Vital signs    Scheduled Medications:  amLODIPine, 5 mg, Oral, Daily  aspirin, 81 mg, Oral, Daily  atorvastatin, 40 mg, Oral, QPM  chlorhexidine, 15 mL, Mouth/Throat, Q12H NORRIS  famotidine, 20 mg, Oral, Q12H NORRIS  insulin lispro, 1-5 Units, Subcutaneous, TID AC  insulin lispro, 1-5 Units, Subcutaneous, HS  levETIRAcetam, 750 mg, Oral, Q12H NORRIS  metoprolol tartrate, 25 mg, Oral, Q12H NORRIS  senna, 1 tablet, Oral, HS  tamsulosin, 0 4 mg, Oral, After Dinner      Continuous IV Infusions:  argatroban, 0 1-3 mcg/kg/min, Intravenous, Titrated  multi-electrolyte (PLASMALYTE-A/ISOLYTE-S PH 7 4) IV solution  Rate: 100 mL/hr Dose: 100 mL/hr  Freq: Continuous Route: IV  Last Dose: Stopped (04/12/23 1722)  Start: 04/12/23 0330 End: 04/12/23 1322    PRN Meds:  acetaminophen, 975 mg, Oral, Q8H PRN  calcium carbonate, 1,000 mg, Oral, TID PRN  ondansetron, 4 mg, Oral, Q6H PRN  oxyCODONE, 2 5 mg, Oral, Q6H PRN        IP CONSULT TO CASE MANAGEMENT    Network Utilization Review Department  ATTENTION: Please call with any questions or concerns to [phone] and carefully listen to the prompts so that you are directed to the right person  All voicemails are confidential   [place] all requests for admission clinical reviews, approved or denied determinations and any other requests to dedicated fax number below belonging to the campus where the patient is receiving treatment   List of dedicated fax numbers for the Facilities:  [address] DENIALS (Administrative/Medical Necessity) [phone]   [address] (Maternity/NICU/Pediatrics) [phone]   [address] Celi Albrecht [phone]   [facility] [phone]   [address] [place] [address] Medical [place] [address] Tamie Buck 28 [phone]   1559 [facility] [place] [facility] [place] 134 815 [facility] [phone]

## 2023-04-13 NOTE — ASSESSMENT & PLAN NOTE
Lab Results   Component Value Date    HGBA1C 8 0 (H) 03/31/2023       Recent Labs     04/12/23  0728 04/12/23  1142 04/12/23  1616 04/12/23  2133   POCGLU 116 154* 122 131       Blood Sugar Average: Last 72 hrs:  (P) 130 75   · Sliding scale insulin before meals and at bedtime, adjust algorithm as needed

## 2023-04-14 LAB
ANION GAP SERPL CALCULATED.3IONS-SCNC: 2 MMOL/L (ref 4–13)
ANION GAP SERPL CALCULATED.3IONS-SCNC: 2 MMOL/L (ref 4–13)
APTT PPP: 64 SECONDS (ref 23–37)
APTT PPP: 64 SECONDS (ref 23–37)
BASOPHILS # BLD AUTO: 0.05 THOUSANDS/ÂΜL (ref 0–0.1)
BASOPHILS # BLD AUTO: 0.05 THOUSANDS/ΜL (ref 0–0.1)
BASOPHILS NFR BLD AUTO: 1 % (ref 0–1)
BASOPHILS NFR BLD AUTO: 1 % (ref 0–1)
BUN SERPL-MCNC: 15 MG/DL (ref 5–25)
BUN SERPL-MCNC: 15 MG/DL (ref 5–25)
CALCIUM SERPL-MCNC: 8.8 MG/DL (ref 8.3–10.1)
CALCIUM SERPL-MCNC: 8.8 MG/DL (ref 8.3–10.1)
CHLORIDE SERPL-SCNC: 108 MMOL/L (ref 96–108)
CHLORIDE SERPL-SCNC: 108 MMOL/L (ref 96–108)
CO2 SERPL-SCNC: 26 MMOL/L (ref 21–32)
CO2 SERPL-SCNC: 26 MMOL/L (ref 21–32)
CREAT SERPL-MCNC: 0.9 MG/DL (ref 0.6–1.3)
CREAT SERPL-MCNC: 0.9 MG/DL (ref 0.6–1.3)
EOSINOPHIL # BLD AUTO: 0.41 THOUSAND/ÂΜL (ref 0–0.61)
EOSINOPHIL # BLD AUTO: 0.41 THOUSAND/ΜL (ref 0–0.61)
EOSINOPHIL NFR BLD AUTO: 5 % (ref 0–6)
EOSINOPHIL NFR BLD AUTO: 5 % (ref 0–6)
ERYTHROCYTE [DISTWIDTH] IN BLOOD BY AUTOMATED COUNT: 13.7 % (ref 11.6–15.1)
ERYTHROCYTE [DISTWIDTH] IN BLOOD BY AUTOMATED COUNT: 13.7 % (ref 11.6–15.1)
GFR SERPL CREATININE-BSD FRML MDRD: 102 ML/MIN/1.73SQ M
GFR SERPL CREATININE-BSD FRML MDRD: 102 ML/MIN/1.73SQ M
GLUCOSE SERPL-MCNC: 101 MG/DL (ref 65–140)
GLUCOSE SERPL-MCNC: 101 MG/DL (ref 65–140)
GLUCOSE SERPL-MCNC: 109 MG/DL (ref 65–140)
GLUCOSE SERPL-MCNC: 109 MG/DL (ref 65–140)
GLUCOSE SERPL-MCNC: 119 MG/DL (ref 65–140)
GLUCOSE SERPL-MCNC: 119 MG/DL (ref 65–140)
GLUCOSE SERPL-MCNC: 160 MG/DL (ref 65–140)
GLUCOSE SERPL-MCNC: 160 MG/DL (ref 65–140)
GLUCOSE SERPL-MCNC: 174 MG/DL (ref 65–140)
GLUCOSE SERPL-MCNC: 174 MG/DL (ref 65–140)
HCT VFR BLD AUTO: 39.3 % (ref 36.5–49.3)
HCT VFR BLD AUTO: 39.3 % (ref 36.5–49.3)
HGB BLD-MCNC: 12.8 G/DL (ref 12–17)
HGB BLD-MCNC: 12.8 G/DL (ref 12–17)
IMM GRANULOCYTES # BLD AUTO: 0.04 THOUSAND/UL (ref 0–0.2)
IMM GRANULOCYTES # BLD AUTO: 0.04 THOUSAND/UL (ref 0–0.2)
IMM GRANULOCYTES NFR BLD AUTO: 1 % (ref 0–2)
IMM GRANULOCYTES NFR BLD AUTO: 1 % (ref 0–2)
LYMPHOCYTES # BLD AUTO: 1.42 THOUSANDS/ÂΜL (ref 0.6–4.47)
LYMPHOCYTES # BLD AUTO: 1.42 THOUSANDS/ΜL (ref 0.6–4.47)
LYMPHOCYTES NFR BLD AUTO: 18 % (ref 14–44)
LYMPHOCYTES NFR BLD AUTO: 18 % (ref 14–44)
MAGNESIUM SERPL-MCNC: 2.2 MG/DL (ref 1.6–2.6)
MAGNESIUM SERPL-MCNC: 2.2 MG/DL (ref 1.6–2.6)
MCH RBC QN AUTO: 26.8 PG (ref 26.8–34.3)
MCH RBC QN AUTO: 26.8 PG (ref 26.8–34.3)
MCHC RBC AUTO-ENTMCNC: 32.6 G/DL (ref 31.4–37.4)
MCHC RBC AUTO-ENTMCNC: 32.6 G/DL (ref 31.4–37.4)
MCV RBC AUTO: 82 FL (ref 82–98)
MCV RBC AUTO: 82 FL (ref 82–98)
MONOCYTES # BLD AUTO: 0.74 THOUSAND/ÂΜL (ref 0.17–1.22)
MONOCYTES # BLD AUTO: 0.74 THOUSAND/ΜL (ref 0.17–1.22)
MONOCYTES NFR BLD AUTO: 9 % (ref 4–12)
MONOCYTES NFR BLD AUTO: 9 % (ref 4–12)
NEUTROPHILS # BLD AUTO: 5.45 THOUSANDS/ÂΜL (ref 1.85–7.62)
NEUTROPHILS # BLD AUTO: 5.45 THOUSANDS/ΜL (ref 1.85–7.62)
NEUTS SEG NFR BLD AUTO: 66 % (ref 43–75)
NEUTS SEG NFR BLD AUTO: 66 % (ref 43–75)
NRBC BLD AUTO-RTO: 0 /100 WBCS
NRBC BLD AUTO-RTO: 0 /100 WBCS
PLATELET # BLD AUTO: 81 THOUSANDS/UL (ref 149–390)
PLATELET # BLD AUTO: 81 THOUSANDS/UL (ref 149–390)
PMV BLD AUTO: 10.6 FL (ref 8.9–12.7)
PMV BLD AUTO: 10.6 FL (ref 8.9–12.7)
POTASSIUM SERPL-SCNC: 4 MMOL/L (ref 3.5–5.3)
POTASSIUM SERPL-SCNC: 4 MMOL/L (ref 3.5–5.3)
RBC # BLD AUTO: 4.77 MILLION/UL (ref 3.88–5.62)
RBC # BLD AUTO: 4.77 MILLION/UL (ref 3.88–5.62)
SODIUM SERPL-SCNC: 136 MMOL/L (ref 135–147)
SODIUM SERPL-SCNC: 136 MMOL/L (ref 135–147)
WBC # BLD AUTO: 8.11 THOUSAND/UL (ref 4.31–10.16)
WBC # BLD AUTO: 8.11 THOUSAND/UL (ref 4.31–10.16)

## 2023-04-14 PROCEDURE — 83735 ASSAY OF MAGNESIUM: CPT | Performed by: PHYSICIAN ASSISTANT

## 2023-04-14 PROCEDURE — 82948 REAGENT STRIP/BLOOD GLUCOSE: CPT

## 2023-04-14 PROCEDURE — 99233 SBSQ HOSP IP/OBS HIGH 50: CPT | Performed by: INTERNAL MEDICINE

## 2023-04-14 PROCEDURE — 80048 BASIC METABOLIC PNL TOTAL CA: CPT | Performed by: PHYSICIAN ASSISTANT

## 2023-04-14 PROCEDURE — 85730 THROMBOPLASTIN TIME PARTIAL: CPT | Performed by: EMERGENCY MEDICINE

## 2023-04-14 PROCEDURE — 85730 THROMBOPLASTIN TIME PARTIAL: CPT | Performed by: INTERNAL MEDICINE

## 2023-04-14 PROCEDURE — 85025 COMPLETE CBC W/AUTO DIFF WBC: CPT | Performed by: PHYSICIAN ASSISTANT

## 2023-04-14 PROCEDURE — 99233 SBSQ HOSP IP/OBS HIGH 50: CPT | Performed by: ANESTHESIOLOGY

## 2023-04-14 RX ADMIN — INSULIN LISPRO 1 UNITS: 100 INJECTION, SOLUTION INTRAVENOUS; SUBCUTANEOUS at 21:24

## 2023-04-14 RX ADMIN — LEVETIRACETAM 750 MG: 750 TABLET, FILM COATED ORAL at 21:24

## 2023-04-14 RX ADMIN — METOPROLOL TARTRATE 25 MG: 25 TABLET, FILM COATED ORAL at 21:25

## 2023-04-14 RX ADMIN — ARGATROBAN 1.8 MCG/KG/MIN: 50 INJECTION, SOLUTION INTRAVENOUS at 20:37

## 2023-04-14 RX ADMIN — FAMOTIDINE 20 MG: 20 TABLET, FILM COATED ORAL at 08:56

## 2023-04-14 RX ADMIN — ARGATROBAN 1.8 MCG/KG/MIN: 50 INJECTION, SOLUTION INTRAVENOUS at 13:21

## 2023-04-14 RX ADMIN — ARGATROBAN 1.8 MCG/KG/MIN: 50 INJECTION, SOLUTION INTRAVENOUS at 17:03

## 2023-04-14 RX ADMIN — FAMOTIDINE 20 MG: 20 TABLET, FILM COATED ORAL at 21:27

## 2023-04-14 RX ADMIN — ARGATROBAN 1.8 MCG/KG/MIN: 50 INJECTION, SOLUTION INTRAVENOUS at 23:48

## 2023-04-14 RX ADMIN — METOPROLOL TARTRATE 25 MG: 25 TABLET, FILM COATED ORAL at 08:56

## 2023-04-14 RX ADMIN — INSULIN LISPRO 1 UNITS: 100 INJECTION, SOLUTION INTRAVENOUS; SUBCUTANEOUS at 13:01

## 2023-04-14 RX ADMIN — ARGATROBAN 1.8 MCG/KG/MIN: 50 INJECTION, SOLUTION INTRAVENOUS at 09:54

## 2023-04-14 RX ADMIN — ARGATROBAN 1.8 MCG/KG/MIN: 50 INJECTION, SOLUTION INTRAVENOUS at 06:15

## 2023-04-14 RX ADMIN — TAMSULOSIN HYDROCHLORIDE 0.4 MG: 0.4 CAPSULE ORAL at 17:30

## 2023-04-14 RX ADMIN — AMLODIPINE BESYLATE 5 MG: 5 TABLET ORAL at 08:56

## 2023-04-14 RX ADMIN — ASPIRIN 81 MG 81 MG: 81 TABLET ORAL at 08:55

## 2023-04-14 RX ADMIN — CHLORHEXIDINE GLUCONATE 15 ML: 1.2 SOLUTION ORAL at 08:55

## 2023-04-14 RX ADMIN — ARGATROBAN 1.8 MCG/KG/MIN: 50 INJECTION, SOLUTION INTRAVENOUS at 02:46

## 2023-04-14 RX ADMIN — ATORVASTATIN CALCIUM 40 MG: 40 TABLET, FILM COATED ORAL at 17:30

## 2023-04-14 RX ADMIN — LEVETIRACETAM 750 MG: 750 TABLET, FILM COATED ORAL at 08:56

## 2023-04-14 NOTE — ASSESSMENT & PLAN NOTE
• 4/12 LE duplex: RLE: Evidence suggestive of Acute occlusive deep vein thrombosis noted in the Peroneal veins  Non Occlusive Acute deep vein thrombosis in noted in the Posterior Tibial veins  LLE: Evidence suggestive of Acute occlusive deep vein thrombosis noted in the Posterior Tiblial, Peroneal, and Soleal veins  Evidence suggestive of Acute non occlusive deep vein thrombosis noted in the Mid-Distal Femoral and Popliteal veins      Plan:  • Anticoagulation - see A&P for Saddle Embolus

## 2023-04-14 NOTE — ASSESSMENT & PLAN NOTE
• No home regimen  • SBP in 140s-150s  • Continue Losartan 25 mg qd   • Continue amlodipine 5 mg PO daily and metoprolol 25 mg PO BID  • Blood pressures currently stable

## 2023-04-14 NOTE — ASSESSMENT & PLAN NOTE
Lab Results   Component Value Date    HGBA1C 8 0 (H) 03/31/2023       Recent Labs     04/14/23  1301 04/14/23  1643 04/14/23  2025 04/15/23  0642   POCGLU 160* 101 174* 125       Blood Sugar Average: Last 72 hrs:  (P) 220 [id number]   • Sliding scale insulin before meals and at bedtime, adjust algorithm as needed  • Goal blood glucose 140-180

## 2023-04-14 NOTE — ARC ADMISSION
Referral received for patient consideration of ARC placement for rehab  Will review with [facility] physician as able and update CM

## 2023-04-14 NOTE — ASSESSMENT & PLAN NOTE
Developed chest pain while at [facility] on 4/11 4/11 CTA PE: Saddle embolus and extensive clot burden throughout the visualized proximal pulmonary arteries  IR consultation suggested  RV/LV ratio less then 0 9, suggesting no elevation of right-sided pressures; however this is discordant with extensive clot burden  No evidence of pulmonary infarct  Dimer greater than 20   BNP elevated, troponin normal   Echo 4/12 shows: LVEF 55%, The right ventricular systolic pressure is moderately elevated  The estimated right ventricular systolic pressure is 06 17 mmHg  The right atrium is mildly dilated  Bilateral lower extremity venous duplex with bilateral DVTs  Case was discussed with IR by [facility] provider, patient is not a candidate for IR thrombectomy  PTT 68 -> 67 -> 64      Plan:   Transitioned from Argatroban gtt to Eliquis on 04/15  Remains hemodymically stable  Plt improved to 140  Continue Eliquis 5 mg BID  Medically stable for d/c pending bed placement at [facility]

## 2023-04-14 NOTE — ASSESSMENT & PLAN NOTE
• Failed urinary retention protocol requiring replacement of acharya on 4/13  • Flomax initiated 4/12     Plan:   • Acharya is out => Continue urinary retention protocol   • Continue flomax  • Encourage ambulation

## 2023-04-14 NOTE — ARC ADMISSION
Referral received for patient consideration of ARC placement for rehab  Will review with [facility] physician as able and will update CM

## 2023-04-14 NOTE — PROGRESS NOTES
[address] 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)  Assessment & Plan  · Developed chest pain while at [facility] on 4/11  · 4/11 CTA PE: Saddle  embolus and extensive clot burden throughout the visualized proximal pulmonary arteries  IR consultation suggested  RV/LV ratio less then 0 9, suggesting no elevation of right-sided pressures; however this is discordant with extensive clot burden  No evidence of pulmonary infarct  · Dimer greater than 20  · BNP elevated, troponin normal  · Echo 4/12 shows: LVEF 55%, The right ventricular systolic pressure is moderately elevated  The estimated right ventricular systolic pressure is 51 15 mmHg  The right atrium is mildly dilated    · Bilateral lower extremity venous duplex with bilateral DVTs  · Case was discussed with IR by [facility] provider, patient is not a candidate for IR thrombectomy    Plan:   · Argatroban infusion for [facility] given HIT  · Continuous cardiopulmonary monitoring  · Consider transitioning to oral AC in next 24-48h given stability of neurological exam     Thrombocytopenia (HCC)  Assessment & Plan  · First noted on 4/11  · 4Ts for HIT: Score is 8 points which is a high probability of HIT or about 64% likelihood per MD Calc calculator  · Transitioned from subcu heparin to subcu Arixtra initially given concern for HIT by [facility] providers  · Transitioned to argatroban infusion for saddle PE  · Heparin induced platelet antibody: 9 425  · Heparin antibody by serotonin release pending    Plan:  · Continue to trend platelet count daily  · Avoid all heparin products  · Continue anticoagulation with argatroban infusion with plan to transition to oral AC as above    Acute ischemic left MCA stroke ([facility] [address] )  Assessment & Plan  · With global aphasia, improving R sided weakness, motor planning difficulties, R inattention, and bowel/bladder incontinence  · CTA with L M2 division  · Not a candidate for systemic thrombolytics, and not a candidate for thrombectomy  · 3/31 MRI Brain: Large L MCA infarct with L BG involvement and superimposed microhemorrhage with mild mass effect and stable 3-mm L to R midling shift on most recent CTH (4/3)   · Echo: EF 55%, LA dilatation, no RWMA, no PFO, no thrombus or mass  · Thrombosis panel with abnormal antithrombin, Protein C/S activity, but per Neuro unclear significance in setting of acute stroke  Would repeat as outpatient  · Will need Zio Patch/Loop Recorder with Cards Referral as outpatient  · Discussed with Neurology on 4/9: Continue Keppra until outpatient f/u given location, temporal slowing, extent of lesion  · Outpatient f/u with Neurology, Cardiology, and PCP  Plan:  · Decrease neuro checks to q4h as patient has been therapeutic on argatroban infusion x 24h  · PT OT evaluation and treatment as indicated  · Continue ASA and statin    DVT of lower extremity, bilateral ([address] )  Assessment & Plan  · 4/12 LE duplex: RLE: Evidence suggestive of Acute occlusive deep vein thrombosis noted in the Peroneal veins  Non Occlusive Acute deep vein thrombosis in noted in the Posterior Tibial veins  LLE: Evidence suggestive of Acute occlusive deep vein thrombosis noted in the Posterior Tiblial, Peroneal, and Soleal veins  Evidence suggestive of Acute non occlusive deep vein thrombosis noted in the Mid-Distal Femoral and Popliteal veins      Plan:  · AC as above      Urinary retention  Assessment & Plan  · Failed urinary retention protocol requiring replacement of acharya on 4/13  · Flomax initiated 4/12    Plan:   · Attempt voiding trial today tomorrow  · Encourage ambulation  · Continue flomax    Hypertension  Assessment & Plan  · No home regimen  · Continue amlodipine 5 mg PO daily and metoprolol 25 mg PO BID    Type 2 diabetes mellitus with circulatory disorder, without long-term current use of insulin [facility])  Assessment & Plan  Lab Results   Component Value Date    HGBA1C 8 0 (H) 03/31/2023       Recent Labs     04/12/23  0728 04/12/23  1142 04/12/23  1616 04/12/23  2133   POCGLU 116 154* 122 131       Blood Sugar Average: Last 72 hrs:  (P) 130 75   · Sliding scale insulin before meals and at bedtime, adjust algorithm as needed  · Goal blood glucose 140-180    Obesity, Class III, BMI 40-49 9 (morbid obesity) ([facility])  Assessment & Plan  · Low-carb diet  · Encourage lifestyle change             ICU Core Measures     A: Assess, Prevent, and Manage Pain · Has pain been assessed? Yes  · Need for changes to pain regimen? No   B: Both SAT/SAT  · N/A   C: Choice of Sedation · RASS Goal: N/A patient not on sedation  · Need for changes to sedation or analgesia regimen? NA   D: Delirium · CAM-ICU: Unable to perform secondary to Acute cognitive dysfunction   E: Early Mobility  · Plan for early mobility? Yes   F: Family Engagement · Plan for family engagement today? Yes       Review of Invasive Devices: Donis Plan: Voiding trial after improvement in ambulation         Prophylaxis:  VTE VTE covered by:  argatroban, Intravenous, 1 8 mcg/kg/min at 04/14/23 0246       Stress Ulcer  covered byfamotidine (PEPCID) tablet 20 mg [[id number]]       Subjective   HPI/24hr events: 40 y/o male with recent L MCA CVA presented with acute saddle PE in the setting of TWILA  Donis catheter placed yesterday for urinary retention  Therapeutic on argatroban infusion   No changes in neurological exam      Review of Systems   Unable to perform ROS: Other (expressive aphasia)             Objective                            Vitals I/O      Most Recent Min/Max in 24hrs   Temp 98 6 °F (37 °C) Temp  Min: 98 5 °F (36 9 °C)  Max: 99 9 °F (37 7 °C)   Pulse 78 Pulse  Min: 71  Max: 100   Resp 20 Resp  Min: 16  Max: 33   /81 BP  Min: 115/72  Max: 157/85   O2 Sat 96 % SpO2  Min: 92 %  Max: 97 %      Intake/Output Summary (Last 24 hours) at 4/14/2023 0428  Last data filed at 4/14/2023 0200  Gross per 24 hour   Intake 1285 6 ml   Output 1880 ml   Net -594 4 ml         Diet William/CHO Controlled; Consistent Carbohydrate Diet Level 3 (6 carb servings/90 grams CHO/meal); Sodium 4 GM (CUAUHTEMOC)     Invasive Monitoring Physical exam    Physical Exam  Constitutional:       General: He is not in acute distress  Appearance: He is obese  HENT:      Head: Atraumatic  Mouth/Throat:      Mouth: Mucous membranes are moist    Cardiovascular:      Rate and Rhythm: Normal rate and regular rhythm  Pulses:           Radial pulses are 2+ on the right side and 2+ on the left side  Dorsalis pedis pulses are 2+ on the right side and 2+ on the left side  Pulmonary:      Effort: No respiratory distress  Breath sounds: No wheezing, rhonchi or rales  Abdominal:      General: Bowel sounds are normal  There is no distension  Palpations: Abdomen is soft  Tenderness: There is no abdominal tenderness  Genitourinary:     Comments: + acharya  Musculoskeletal:      Cervical back: Neck supple  Right lower leg: No edema  Left lower leg: No edema  Skin:     General: Skin is warm and dry  Capillary Refill: Capillary refill takes less than 2 seconds  Neurological:      Mental Status: He is alert        Comments: Strength 5/5 on left; 4+/5 on right  Expressive aphasia            Diagnostic Studies      EKG: No new  Imaging: No new     Medications:  Scheduled PRN   amLODIPine, 5 mg, Daily  aspirin, 81 mg, Daily  atorvastatin, 40 mg, QPM  chlorhexidine, 15 mL, Q12H NORRIS  famotidine, 20 mg, Q12H NORRIS  insulin lispro, 1-5 Units, TID AC  insulin lispro, 1-5 Units, HS  levETIRAcetam, 750 mg, Q12H ONRRIS  metoprolol tartrate, 25 mg, Q12H NORRIS  senna, 1 tablet, HS  tamsulosin, 0 4 mg, After Dinner      acetaminophen, 975 mg, Q8H PRN  calcium carbonate, 1,000 mg, TID PRN  ondansetron, 4 mg, Q6H PRN  oxyCODONE, 2 5 mg, Q6H PRN       Continuous    argatroban, 0 1-3 mcg/kg/min, Last Rate: 1 8 mcg/kg/min (04/14/23 0246)         Labs:    CBC    Recent Labs     04/13/23  0501   WBC 7 62   HGB 12 4   HCT 39 9   PLT 51*     BMP    Recent Labs     04/13/23  0501   SODIUM 135   K 4 0      CO2 24   AGAP 3*   BUN 15   CREATININE 0 88   CALCIUM 8 4       Coags    Recent Labs     04/13/23  1002 04/13/23  2216   PTT 68* 67*        Additional Electrolytes  No recent results       Blood Gas    No recent results  No recent results LFTs  No recent results    Infectious  No recent results  Glucose  Recent Labs     04/13/23  0501   GLUC [address] [facility],Third Floor, PADylonC

## 2023-04-14 NOTE — QUICK NOTE
Patient seen and evaluated by Critical Care today and deemed to be appropriate for transfer to Med Surg with Telemetry   Spoke to [employer] from [place] to accept patient transfer  Critical care can be contacted via [employer] with any questions or concerns

## 2023-04-14 NOTE — ASSESSMENT & PLAN NOTE
• With global aphasia, improving R sided weakness, motor planning difficulties, R inattention, and bowel/bladder incontinence  • CTA with L M2 division  • Not a candidate for systemic thrombolytics, and not a candidate for thrombectomy  • 3/31 MRI Brain: Large L MCA infarct with L BG involvement and superimposed microhemorrhage with mild mass effect and stable 3-mm L to R midling shift on most recent CTH (4/3)   • Echo: EF 55%, LA dilatation, no RWMA, no PFO, no thrombus or mass  • Thrombosis panel with abnormal antithrombin, Protein C/S activity, but per Neuro unclear significance in setting of acute stroke  Would repeat as outpatient  • Will need Zio Patch/Loop Recorder with Cards Referral as outpatient  • Discussed with Neurology on 4/9: Continue Keppra until outpatient f/u given location, temporal slowing, extent of lesion    • Outpatient f/u with Neurology, Cardiology, and PCP  • Patient neuro checks were decreased to Q4 as patient has been therapeutic on argatroban infusion x 24h     Plan:  • Continue Q4 neuro checks  • Continue Keppra pending OP follow up  • Continue ASA and statin  • PT/OT recommending post-acute rehab   Accepted to [facility], pending bed availability

## 2023-04-14 NOTE — PROGRESS NOTES
INTERNAL MEDICINE RESIDENCY TRANSFER ACCEPTANCE NOTE     Name: Kevin Crockett   Age & Sex: 41 y o  male   MRN: [id number]  Unit/Bed#: [facility] 516-01   Encounter: [id number]  Hospital Stay Days: 2    Accepting team: SOD Team C   Code Status: Level 1 - Full Code  Disposition: Patient requires Med/Surg with Telemetry    ASSESSMENT/PLAN     Principal Problem:    Saddle 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 ([place] [address] )  Active Problems: Thrombocytopenia ([place] [address] )    Acute ischemic left MCA stroke (HCC)    Obesity, Class III, BMI 40-49 9 (morbid obesity) ([place] [address] )    Type 2 diabetes mellitus with circulatory disorder, without long-term current use of insulin ([place] [address] )    Hypertension    Urinary retention    DVT of lower extremity, bilateral (HCC)      * Saddle 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 [facility])  Assessment & Plan  Developed chest pain while at [facility] on 4/11 4/11 CTA PE: Saddle embolus and extensive clot burden throughout the visualized proximal pulmonary arteries  IR consultation suggested  RV/LV ratio less then 0 9, suggesting no elevation of right-sided pressures; however this is discordant with extensive clot burden  No evidence of pulmonary infarct  Dimer greater than 20   BNP elevated, troponin normal   Echo 4/12 shows: LVEF 55%, The right ventricular systolic pressure is moderately elevated  The estimated right ventricular systolic pressure is 59 73 mmHg  The right atrium is mildly dilated  Bilateral lower extremity venous duplex with bilateral DVTs  Case was discussed with IR by [facility] provider, patient is not a candidate for IR thrombectomy  PTT 68 -> 67 -> 64  Plan:   Argatroban infusion for [facility] [place] given HIT   Monitor PTT  Continuous cardiopulmonary monitoring   Consider transitioning to oral AC in next 24-48h given stability of neurological exam - likely tomorrow      [facility]ocyt[facility] )  Assessment & Plan  • First noted on 4/11  • 4Ts for HIT: Score is 8 points which is a high probability of HIT or about 64% likelihood per MD Calc calculator  • Transitioned from subcu heparin to subcu Arixtra initially given concern for HIT by [facility] providers  • Transitioned to argatroban infusion for saddle PE  • Heparin induced platelet antibody: 3 140  • Heparin antibody by serotonin release pending  • Currently on argatroban gtt, has been therapeutic for 24 hours       Plan:  • Continue to trend platelet count daily  • Avoid all heparin products  • Continue anticoagulation with argatroban infusion with plan to transition to oral AC, likely tomorrow  Acute ischemic left MCA stroke [facility])  Assessment & Plan  • With global aphasia, improving R sided weakness, motor planning difficulties, R inattention, and bowel/bladder incontinence  • CTA with L M2 division  • Not a candidate for systemic thrombolytics, and not a candidate for thrombectomy  • 3/31 MRI Brain: Large L MCA infarct with L BG involvement and superimposed microhemorrhage with mild mass effect and stable 3-mm L to R midling shift on most recent CTH (4/3)   • Echo: EF 55%, LA dilatation, no RWMA, no PFO, no thrombus or mass  • Thrombosis panel with abnormal antithrombin, Protein C/S activity, but per Neuro unclear significance in setting of acute stroke  Would repeat as outpatient  • Will need Zio Patch/Loop Recorder with Cards Referral as outpatient  • Discussed with Neurology on 4/9: Continue Keppra until outpatient f/u given location, temporal slowing, extent of lesion    • Outpatient f/u with Neurology, Cardiology, and PCP  • Patient neuro checks were decreased to Q4 as patient has been therapeutic on argatroban infusion x 24h     Plan:  • Continue Q4 neuro checks  • Continue Keppra pending OP follow up    • PT OT evaluation and treatment as indicated  • Continue ASA and statin    DVT of lower extremity, bilateral ([address] )  Assessment & Plan  • 4/12 LE duplex: RLE: Evidence suggestive of Acute occlusive deep vein thrombosis noted in "the Peroneal veins  Non Occlusive Acute deep vein thrombosis in noted in the Posterior Tibial veins  LLE: Evidence suggestive of Acute occlusive deep vein thrombosis noted in the Posterior Tiblial, Peroneal, and Soleal veins  Evidence suggestive of Acute non occlusive deep vein thrombosis noted in the Mid-Distal Femoral and Popliteal veins      Plan:  • Anticoagulation - see A&P for Saddle Embolus  Urinary retention  Assessment & Plan  • Failed urinary retention protocol requiring replacement of acharya on 4/13  • Flomax initiated 4/12     Plan:   • Attempt voiding trial- potentially tomorrow  • Encourage ambulation  • Continue flomax    Hypertension  Assessment & Plan  • No home regimen  • Continue amlodipine 5 mg PO daily and metoprolol 25 mg PO BID  • Blood pressures currently stable  Type 2 diabetes mellitus with circulatory disorder, without long-term current use of insulin [facility])  Assessment & Plan  Lab Results   Component Value Date    HGBA1C 8 0 (H) 03/31/2023       Recent Labs     04/13/23  1102 04/13/23  1658 04/13/23  2216 04/14/23  0854   POCGLU 140 145* 153* 119       Blood Sugar Average: Last 72 hrs:  (P) 135   • Sliding scale insulin before meals and at bedtime, adjust algorithm as needed  • Goal blood glucose 140-180    Obesity, Class III, BMI 40-49 9 (morbid obesity) (HCC)  Assessment & Plan  Low-carb diet   Encourage lifestyle change        VTE Pharmacologic Prophylaxis: Reason for no pharmacologic prophylaxis on Argatroban  VTE Mechanical Prophylaxis: sequential compression device    HOSPITAL COURSE     Patient is a 40 y/o male with PMH including HTN who presented to [address] [place] [address] on 3/30 after he was found unresponsive in his bedroom  Per prior HPI: Landon Hazel III is a 41 y  o  male with medical history of HTN who presented to the [address] on 3/30 after his mother heard a thud and found him unresponsive in his bedroom   GCS 9 on arrival  Noted to have weakness in " RUE/RLE  CTH, C-Spine, CAP were all negative for traumatic imaging  CTA showed M2 occlusion on the L  He was outside of the window for systemic thrombolysis, was loaded with ASA and was transferred to [facility] for evaluation for thrombectomy  He required intubation for airway protection  NIHSS 27 on initial presentation  Perfusion scan showed completed infarct in his L temporal lobe with no salvageable penumbra  No intervention was performed  TTE 3/31 showed EF 55% and dilated L atrium  Neuro recommended DANO to r/o PFO  He was started on ASA/Lipitor  He was able to be extubated on 3/31  CT C/A/P did showed bibasilar consolidation with UL interlobular thickening and mild ground glass opacity  He was started on CTX for 5 days for aspiration PNA, this was eventually switched to Cefazolin (MRSA, COVID/Flu/RSV/ urine strep/legionella were all negative)  Repeat CTH on 3/31 with MCA infarct and cytotoxic edema and mild mass effect without hemorrhagic transformation  Stroke burden primarily temporal lobe  MRI 3/31 showed petechial hemorrhage in stroke bed with L MCA infarct with BG involvement and microhemorhage with mild mass effect without midline shift  NSx consulted with no plans for intervention  Ok to continue ASA, and start DVT PPx from their standpoint  Repeat CTH on 4/2 showed L MCA infarct with new 3mm R midline shift  He passed SLP eval for reg/thins on 4/1  He continued to have issues with confusion/lethargy  EEG was ordered which was unremarkable   Critical Care started Keppra 750mg BID given the location and extent of his CVA and suspicion that waxing/waning encephalopathy was post-ictal state - plan to treat for 1 week  Shwetha Hauser He transitioned out of the ICU on 4/2  DANO was performed and thrombosis panel was ordered  Dano showed EF 55%, no RWMA, LA dilated, no thrombus/mass, no PFO  Thrombosis panel showed low Protein C/S and antithrombin - but significance per Neuro is unclear in setting of acute CVA   Neuro recommended "outpatient loop/Zio  His strength has improved, but remains with global aphasia, R visual inattention, R sided weakness  Course c/b accelerated HTN, and new diagnosis T2DM - started on insulin  Admitted to the [facility] on 4/8/2023  \"    Patient was discharged on ARC on 4/8  While at [facility], he was noted to have chest pain starting on 4/10  Trops/EKG negative at time  Had an elevated D-Dimer, CTA showed saddle embolus and extensive clot burden with no right heart strain  Found to be thrombocytopenic, suspected HIT on subQ heparin, Heparin induced AB was positive  He was started on an argatroban drip  Not a candidate for mechanical thrombectomy given concern for HIT, not a candidate for TPA  He was admitted to the  ICU for close workup and monitoring  This AM 4/14 he had been therapeutic on argatroban for 24 hours and was found to be stable for med surg  He was transferred to [address] [place] [address]     Patient seen and examined  He is comfortable in no acute distress, sitting up and appears awake  Exam limited by significant expressive aphasia- denies pain and appears to have some understanding of basic ROS questions however unable to verbalize accurate answers beyond occasional yes/no  Unable to answer orientation questions  Review of Systems   Unable to perform ROS: Mental status change       OBJECTIVE     Vitals:    04/14/23 0900 04/14/23 0956 04/14/23 1259 04/14/23 1300   BP: 143/95   (!) 175/94   BP Location:       Pulse: 84 76  88   Resp: 22 22  (!) 25   Temp: 98 8 °F (37 1 °C) 99 °F (37 2 °C) 99 5 °F (37 5 °C) 99 5 °F (37 5 °C)   TempSrc:       SpO2: 98% 98%  96%   Weight:       Height:         I/O last 24 hours: In: 1420 3 [P O :960; I V :460 3]  Out: 2420 [Urine:2420]    Physical Exam  Vitals reviewed  Constitutional:       Appearance: Normal appearance  He is obese  HENT:      Head: Normocephalic and atraumatic        Right Ear: External ear normal       Left Ear: External ear normal       Nose: Nose normal  " Mouth/Throat:      Mouth: Mucous membranes are moist    Eyes:      Extraocular Movements: Extraocular movements intact  Pupils: Pupils are equal, round, and reactive to light  Cardiovascular:      Rate and Rhythm: Normal rate and regular rhythm  Pulses: Normal pulses  Heart sounds: Normal heart sounds  Pulmonary:      Effort: Pulmonary effort is normal       Breath sounds: Normal breath sounds  Abdominal:      General: Abdomen is flat  Bowel sounds are normal  There is no distension  Palpations: Abdomen is soft  Tenderness: There is no abdominal tenderness  Genitourinary:     Comments: Donis in place  Musculoskeletal:         General: Normal range of motion  Cervical back: Normal range of motion  No rigidity or tenderness  Right lower leg: No edema  Left lower leg: No edema  Skin:     General: Skin is warm  Capillary Refill: Capillary refill takes less than 2 seconds  Neurological:      Mental Status: He is alert  He is disoriented  Motor: No weakness  Comments: Unable to answer orientation questions  5/5 strength in all extremities  Unable to perform finger nose testing  Does not smile or close eyes when asked but is able to complete EOMs  Sensory appears intact given limited answer to questions  LABORATORY DATA     Labs: I have personally reviewed pertinent reports      Results from last 7 days   Lab Units 04/14/23  0520 04/13/23  0501 04/12/23  0313 04/11/23  1612 04/11/23  1222   WBC Thousand/uL 8 11 7 62 8 04 10 05 9 70   HEMOGLOBIN g/dL 12 8 12 4 12 9 14 1 13 9   HEMATOCRIT % 39 3 39 9 40 8 43 2 44 4   PLATELETS Thousands/uL 81* 51* 42* 49* 48*   NEUTROS PCT % 66  --   --  67 72   MONOS PCT % 9  --   --  10 10      Results from last 7 days   Lab Units 04/14/23  0520 04/13/23  0501 04/12/23  0313 04/11/23  1222   POTASSIUM mmol/L 4 0 4 0 4 3 4 1   CHLORIDE mmol/L 108 108 107 104   CO2 mmol/L 26 24 23 26   BUN mg/dL 15 15 19 19 CREATININE mg/dL 0 90 0 88 1 08 1 20   CALCIUM mg/dL 8 8 8 4 8 7 9 7   ALK PHOS U/L  --   --  66 71   ALT U/L  --   --  29 36   AST U/L  --   --  25 39     Results from last 7 days   Lab Units 04/14/23  0520   MAGNESIUM mg/dL 2 2          Results from last 7 days   Lab Units 04/14/23  1032 04/13/23  2216 04/13/23  1002 04/12/23  0521 04/12/23  0313   INR   --   --   --   --  1 19   PTT seconds 64* 67* 68*   < > 34    < > = values in this interval not displayed  Micro:  Lab Results   Component Value Date    SPUTUMCULTUR 3+ Growth of 03/31/2023     IMAGING & DIAGNOSTIC TESTING     Imaging: I have personally reviewed pertinent reports  No results found  EKG, Pathology, and Other Studies: I have personally reviewed pertinent reports       ALLERGIES   No Known Allergies  MEDICATIONS     Current Facility-Administered Medications   Medication Dose Route Frequency Provider Last Rate   • acetaminophen  975 mg Oral Q8H PRN Jesica Miner PA-C     • amLODIPine  5 mg Oral Daily Edin Burton PA-C     • argatroban  0 1-3 mcg/kg/min Intravenous Titrated Alvenoe Bowling PA-C 1 8 mcg/kg/min (04/14/23 0954)   • aspirin  81 mg Oral Daily Edin Burton PA-C     • atorvastatin  40 mg Oral QPM Edin Burton PA-C     • calcium carbonate  1,000 mg Oral TID PRN Jesica Miner PA-C     • chlorhexidine  15 mL Mouth/Throat Q12H [address] Nura Burton PA-C     • famotidine  20 mg Oral Q12H [address] Nura Burton PA-C     • insulin lispro  1-5 Units Subcutaneous TID AC Nura Burton PA-C     • insulin lispro  1-5 Units Subcutaneous HS Edin Burton PA-C     • levETIRAcetam  750 mg Oral Q12H [address] Nura Burton PA-C     • metoprolol tartrate  25 mg Oral Q12H [address] Nura Burton PA-C     • ondansetron  4 mg Oral Q6H PRN Jesica Miner PA-C     • oxyCODONE  2 5 mg Oral Q6H PRN Jesica Miner PA-C     • senna  1 tablet Oral HS Verlykatelyn Burton PA-C     • tamsulosin "0 4 mg Oral After [facility], PA-C       argatroban, 0 1-3 mcg/kg/min, Last Rate: 1 8 mcg/kg/min (04/14/23 6800)      acetaminophen, 975 mg, Q8H PRN  calcium carbonate, 1,000 mg, TID PRN  ondansetron, 4 mg, Q6H PRN  oxyCODONE, 2 5 mg, Q6H PRN        Portions of the record may have been created with voice recognition software  Occasional wrong word or \"sound a like\" substitutions may have occurred due to the inherent limitations of voice recognition software    Read the chart carefully and recognize, using context, where substitutions have occurred     ==  Beatriz Mendes MD  [facility]  Internal Medicine Residency PGY-1  "

## 2023-04-14 NOTE — ASSESSMENT & PLAN NOTE
• First noted on 4/11  • 4Ts for HIT: Score is 8 points which is a high probability of HIT or about 64% likelihood per MD Calc calculator  • Transitioned from subcu heparin to subcu Arixtra initially given concern for HIT by [facility] providers  • Transitioned to argatroban infusion for saddle PE  • Heparin induced platelet antibody: 8 375  • Heparin antibody by serotonin release pending  • Currently on argatroban gtt, has been therapeutic for 24 hours       Plan:  • Plt improved to 140  • Continue Eliquis 5 mg BID  • Avoid all heparin products  • Continue to trend platelet count daily

## 2023-04-14 NOTE — ASSESSMENT & PLAN NOTE
· 4/12 LE duplex: RLE: Evidence suggestive of Acute occlusive deep vein thrombosis noted in the Peroneal veins  Non Occlusive Acute deep vein thrombosis in noted in the Posterior Tibial veins  LLE: Evidence suggestive of Acute occlusive deep vein thrombosis noted in the Posterior Tiblial, Peroneal, and Soleal veins  Evidence suggestive of Acute non occlusive deep vein thrombosis noted in the Mid-Distal Femoral and Popliteal veins      Plan:  · AC as above

## 2023-04-15 LAB
ANION GAP SERPL CALCULATED.3IONS-SCNC: 7 MMOL/L (ref 4–13)
ANION GAP SERPL CALCULATED.3IONS-SCNC: 7 MMOL/L (ref 4–13)
APTT PPP: 63 SECONDS (ref 23–37)
APTT PPP: 63 SECONDS (ref 23–37)
APTT PPP: 68 SECONDS (ref 23–37)
APTT PPP: 68 SECONDS (ref 23–37)
BASOPHILS # BLD AUTO: 0.05 THOUSANDS/ÂΜL (ref 0–0.1)
BASOPHILS # BLD AUTO: 0.05 THOUSANDS/ΜL (ref 0–0.1)
BASOPHILS NFR BLD AUTO: 1 % (ref 0–1)
BASOPHILS NFR BLD AUTO: 1 % (ref 0–1)
BUN SERPL-MCNC: 16 MG/DL (ref 5–25)
BUN SERPL-MCNC: 16 MG/DL (ref 5–25)
CA-I BLD-SCNC: 1.11 MMOL/L (ref 1.12–1.32)
CA-I BLD-SCNC: 1.11 MMOL/L (ref 1.12–1.32)
CALCIUM SERPL-MCNC: 9 MG/DL (ref 8.3–10.1)
CALCIUM SERPL-MCNC: 9 MG/DL (ref 8.3–10.1)
CHLORIDE SERPL-SCNC: 107 MMOL/L (ref 96–108)
CHLORIDE SERPL-SCNC: 107 MMOL/L (ref 96–108)
CO2 SERPL-SCNC: 23 MMOL/L (ref 21–32)
CO2 SERPL-SCNC: 23 MMOL/L (ref 21–32)
CREAT SERPL-MCNC: 0.93 MG/DL (ref 0.6–1.3)
CREAT SERPL-MCNC: 0.93 MG/DL (ref 0.6–1.3)
EOSINOPHIL # BLD AUTO: 0.41 THOUSAND/ÂΜL (ref 0–0.61)
EOSINOPHIL # BLD AUTO: 0.41 THOUSAND/ΜL (ref 0–0.61)
EOSINOPHIL NFR BLD AUTO: 5 % (ref 0–6)
EOSINOPHIL NFR BLD AUTO: 5 % (ref 0–6)
ERYTHROCYTE [DISTWIDTH] IN BLOOD BY AUTOMATED COUNT: 13.7 % (ref 11.6–15.1)
ERYTHROCYTE [DISTWIDTH] IN BLOOD BY AUTOMATED COUNT: 13.7 % (ref 11.6–15.1)
GFR SERPL CREATININE-BSD FRML MDRD: 98 ML/MIN/1.73SQ M
GFR SERPL CREATININE-BSD FRML MDRD: 98 ML/MIN/1.73SQ M
GLUCOSE SERPL-MCNC: 101 MG/DL (ref 65–140)
GLUCOSE SERPL-MCNC: 101 MG/DL (ref 65–140)
GLUCOSE SERPL-MCNC: 109 MG/DL (ref 65–140)
GLUCOSE SERPL-MCNC: 109 MG/DL (ref 65–140)
GLUCOSE SERPL-MCNC: 125 MG/DL (ref 65–140)
GLUCOSE SERPL-MCNC: 125 MG/DL (ref 65–140)
GLUCOSE SERPL-MCNC: 176 MG/DL (ref 65–140)
GLUCOSE SERPL-MCNC: 176 MG/DL (ref 65–140)
GLUCOSE SERPL-MCNC: 215 MG/DL (ref 65–140)
GLUCOSE SERPL-MCNC: 215 MG/DL (ref 65–140)
HCT VFR BLD AUTO: 41.9 % (ref 36.5–49.3)
HCT VFR BLD AUTO: 41.9 % (ref 36.5–49.3)
HGB BLD-MCNC: 13.3 G/DL (ref 12–17)
HGB BLD-MCNC: 13.3 G/DL (ref 12–17)
IMM GRANULOCYTES # BLD AUTO: 0.04 THOUSAND/UL (ref 0–0.2)
IMM GRANULOCYTES # BLD AUTO: 0.04 THOUSAND/UL (ref 0–0.2)
IMM GRANULOCYTES NFR BLD AUTO: 1 % (ref 0–2)
IMM GRANULOCYTES NFR BLD AUTO: 1 % (ref 0–2)
LYMPHOCYTES # BLD AUTO: 1.52 THOUSANDS/ÂΜL (ref 0.6–4.47)
LYMPHOCYTES # BLD AUTO: 1.52 THOUSANDS/ΜL (ref 0.6–4.47)
LYMPHOCYTES NFR BLD AUTO: 17 % (ref 14–44)
LYMPHOCYTES NFR BLD AUTO: 17 % (ref 14–44)
MAGNESIUM SERPL-MCNC: 2.4 MG/DL (ref 1.6–2.6)
MAGNESIUM SERPL-MCNC: 2.4 MG/DL (ref 1.6–2.6)
MCH RBC QN AUTO: 26.1 PG (ref 26.8–34.3)
MCH RBC QN AUTO: 26.1 PG (ref 26.8–34.3)
MCHC RBC AUTO-ENTMCNC: 31.7 G/DL (ref 31.4–37.4)
MCHC RBC AUTO-ENTMCNC: 31.7 G/DL (ref 31.4–37.4)
MCV RBC AUTO: 82 FL (ref 82–98)
MCV RBC AUTO: 82 FL (ref 82–98)
MONOCYTES # BLD AUTO: 0.86 THOUSAND/ÂΜL (ref 0.17–1.22)
MONOCYTES # BLD AUTO: 0.86 THOUSAND/ΜL (ref 0.17–1.22)
MONOCYTES NFR BLD AUTO: 10 % (ref 4–12)
MONOCYTES NFR BLD AUTO: 10 % (ref 4–12)
NEUTROPHILS # BLD AUTO: 5.99 THOUSANDS/ÂΜL (ref 1.85–7.62)
NEUTROPHILS # BLD AUTO: 5.99 THOUSANDS/ΜL (ref 1.85–7.62)
NEUTS SEG NFR BLD AUTO: 66 % (ref 43–75)
NEUTS SEG NFR BLD AUTO: 66 % (ref 43–75)
NRBC BLD AUTO-RTO: 0 /100 WBCS
NRBC BLD AUTO-RTO: 0 /100 WBCS
PLATELET # BLD AUTO: 114 THOUSANDS/UL (ref 149–390)
PLATELET # BLD AUTO: 114 THOUSANDS/UL (ref 149–390)
PMV BLD AUTO: 10.6 FL (ref 8.9–12.7)
PMV BLD AUTO: 10.6 FL (ref 8.9–12.7)
POTASSIUM SERPL-SCNC: 3.8 MMOL/L (ref 3.5–5.3)
POTASSIUM SERPL-SCNC: 3.8 MMOL/L (ref 3.5–5.3)
RBC # BLD AUTO: 5.09 MILLION/UL (ref 3.88–5.62)
RBC # BLD AUTO: 5.09 MILLION/UL (ref 3.88–5.62)
SODIUM SERPL-SCNC: 137 MMOL/L (ref 135–147)
SODIUM SERPL-SCNC: 137 MMOL/L (ref 135–147)
WBC # BLD AUTO: 8.87 THOUSAND/UL (ref 4.31–10.16)
WBC # BLD AUTO: 8.87 THOUSAND/UL (ref 4.31–10.16)

## 2023-04-15 PROCEDURE — 99233 SBSQ HOSP IP/OBS HIGH 50: CPT | Performed by: INTERNAL MEDICINE

## 2023-04-15 PROCEDURE — 85025 COMPLETE CBC W/AUTO DIFF WBC: CPT | Performed by: PHYSICIAN ASSISTANT

## 2023-04-15 PROCEDURE — 83735 ASSAY OF MAGNESIUM: CPT | Performed by: PHYSICIAN ASSISTANT

## 2023-04-15 PROCEDURE — 80048 BASIC METABOLIC PNL TOTAL CA: CPT | Performed by: PHYSICIAN ASSISTANT

## 2023-04-15 PROCEDURE — 82948 REAGENT STRIP/BLOOD GLUCOSE: CPT

## 2023-04-15 PROCEDURE — 82330 ASSAY OF CALCIUM: CPT | Performed by: PHYSICIAN ASSISTANT

## 2023-04-15 PROCEDURE — 85730 THROMBOPLASTIN TIME PARTIAL: CPT | Performed by: INTERNAL MEDICINE

## 2023-04-15 RX ADMIN — ARGATROBAN 1.8 MCG/KG/MIN: 50 INJECTION, SOLUTION INTRAVENOUS at 06:18

## 2023-04-15 RX ADMIN — METOPROLOL TARTRATE 25 MG: 25 TABLET, FILM COATED ORAL at 21:48

## 2023-04-15 RX ADMIN — AMLODIPINE BESYLATE 5 MG: 5 TABLET ORAL at 09:25

## 2023-04-15 RX ADMIN — ATORVASTATIN CALCIUM 40 MG: 40 TABLET, FILM COATED ORAL at 17:32

## 2023-04-15 RX ADMIN — SENNOSIDES 8.6 MG: 8.6 TABLET, FILM COATED ORAL at 21:49

## 2023-04-15 RX ADMIN — ARGATROBAN 1.8 MCG/KG/MIN: 50 INJECTION, SOLUTION INTRAVENOUS at 03:16

## 2023-04-15 RX ADMIN — METOPROLOL TARTRATE 25 MG: 25 TABLET, FILM COATED ORAL at 09:26

## 2023-04-15 RX ADMIN — APIXABAN 5 MG: 5 TABLET, FILM COATED ORAL at 17:32

## 2023-04-15 RX ADMIN — LEVETIRACETAM 750 MG: 750 TABLET, FILM COATED ORAL at 21:48

## 2023-04-15 RX ADMIN — APIXABAN 5 MG: 5 TABLET, FILM COATED ORAL at 11:01

## 2023-04-15 RX ADMIN — TAMSULOSIN HYDROCHLORIDE 0.4 MG: 0.4 CAPSULE ORAL at 17:32

## 2023-04-15 RX ADMIN — LEVETIRACETAM 750 MG: 750 TABLET, FILM COATED ORAL at 09:25

## 2023-04-15 RX ADMIN — INSULIN LISPRO 1 UNITS: 100 INJECTION, SOLUTION INTRAVENOUS; SUBCUTANEOUS at 21:46

## 2023-04-15 RX ADMIN — ARGATROBAN 1.8 MCG/KG/MIN: 50 INJECTION, SOLUTION INTRAVENOUS at 10:32

## 2023-04-15 RX ADMIN — INSULIN LISPRO 1 UNITS: 100 INJECTION, SOLUTION INTRAVENOUS; SUBCUTANEOUS at 13:28

## 2023-04-15 RX ADMIN — FAMOTIDINE 20 MG: 20 TABLET, FILM COATED ORAL at 21:48

## 2023-04-15 RX ADMIN — FAMOTIDINE 20 MG: 20 TABLET, FILM COATED ORAL at 09:26

## 2023-04-15 RX ADMIN — ASPIRIN 81 MG 81 MG: 81 TABLET ORAL at 09:25

## 2023-04-15 NOTE — PROGRESS NOTES
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)    Obesity, Class III, BMI 40-49 9 (morbid obesity) ([facility] )    Type 2 diabetes mellitus with circulatory disorder, without long-term current use of insulin ([facility])    Hypertension    Thrombocytopenia ([facility])    Urinary retention    DVT of lower extremity, bilateral ([facility])      DVT of lower extremity, bilateral ([facility] [address] )  Assessment & Plan  • 4/12 LE duplex: RLE: Evidence suggestive of Acute occlusive deep vein thrombosis noted in the Peroneal veins  Non Occlusive Acute deep vein thrombosis in noted in the Posterior Tibial veins  LLE: Evidence suggestive of Acute occlusive deep vein thrombosis noted in the Posterior Tiblial, Peroneal, and Soleal veins  Evidence suggestive of Acute non occlusive deep vein thrombosis noted in the Mid-Distal Femoral and Popliteal veins      Plan:  • Anticoagulation - see A&P for Saddle Embolus      Urinary retention  Assessment & Plan  • Failed urinary retention protocol requiring replacement of acharya on 4/13  • Flomax initiated 4/12     Plan:   • Attempt voiding trial today  • Encourage ambulation  • Continue flomax    Thrombocytopenia ([facility] [address] )  Assessment & Plan  • First noted on 4/11  • 4Ts for HIT: Score is 8 points which is a high probability of HIT or about 64% likelihood per MD Calc calculator  • Transitioned from subcu heparin to subcu Arixtra initially given concern for HIT by [facility] providers  • Transitioned to argatroban infusion for saddle PE  • Heparin induced platelet antibody: 7 418  • Heparin antibody by serotonin release pending  • Currently on argatroban gtt, has been therapeutic for 24 hours       Plan:  • Continue to trend platelet count daily  • Avoid all heparin products  • Continue anticoagulation with argatroban infusion with plan to transition to oral AC, likely tomorrow  Hypertension  Assessment & Plan  • No home regimen  • Continue amlodipine 5 mg PO daily and metoprolol 25 mg PO BID  • Blood pressures currently stable  Type 2 diabetes mellitus with circulatory disorder, without long-term current use of insulin [facility])  Assessment & Plan  Lab Results   Component Value Date    HGBA1C 8 0 (H) 03/31/2023       Recent Labs     04/14/23  1301 04/14/23  1643 04/14/23  2025 04/15/23  0642   POCGLU 160* 101 174* 125       Blood Sugar Average: Last 72 hrs:  (P) 136 [id number]   • Sliding scale insulin before meals and at bedtime, adjust algorithm as needed  • Goal blood glucose 140-180    Obesity, Class III, BMI 40-49 9 (morbid obesity) ([facility])  Assessment & Plan  Low-carb diet   Encourage lifestyle change      Acute ischemic left MCA stroke ([facility] )  Assessment & Plan  • With global aphasia, improving R sided weakness, motor planning difficulties, R inattention, and bowel/bladder incontinence  • CTA with L M2 division  • Not a candidate for systemic thrombolytics, and not a candidate for thrombectomy  • 3/31 MRI Brain: Large L MCA infarct with L BG involvement and superimposed microhemorrhage with mild mass effect and stable 3-mm L to R midling shift on most recent CTH (4/3)   • Echo: EF 55%, LA dilatation, no RWMA, no PFO, no thrombus or mass  • Thrombosis panel with abnormal antithrombin, Protein C/S activity, but per Neuro unclear significance in setting of acute stroke  Would repeat as outpatient    • Will need Zio Patch/Loop Recorder with Cards Referral as outpatient  • Discussed with Neurology on 4/9: Continue Keppra until outpatient f/u given location, temporal slowing, extent of lesion    • Outpatient f/u with Neurology, Cardiology, and PCP  • Patient neuro checks were decreased to Q4 as patient has been therapeutic on argatroban infusion x 24h     Plan:  • Continue Q4 neuro checks  • Continue Keppra pending OP follow up  • PT OT evaluation and treatment as indicated  • Continue ASA and statin    * Saddle 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 [facility])  Assessment & Plan  Developed chest pain while at [facility] on  CTA PE: Saddle embolus and extensive clot burden throughout the visualized proximal pulmonary arteries  IR consultation suggested  RV/LV ratio less then 0 9, suggesting no elevation of right-sided pressures; however this is discordant with extensive clot burden  No evidence of pulmonary infarct  Dimer greater than 20   BNP elevated, troponin normal   Echo  shows: LVEF 55%, The right ventricular systolic pressure is moderately elevated  The estimated right ventricular systolic pressure is 27 88 mmHg  The right atrium is mildly dilated  Bilateral lower extremity venous duplex with bilateral DVTs  Case was discussed with IR by [facility] provider, patient is not a candidate for IR thrombectomy  PTT 68 -> 67 -> 64  Plan:   D/c Argatroban infusion for AC given HIT   Transition to eliquis 5 BID today   Price check eliquis: $30/month       Disposition: continue IP level of care  Pending acceptance by [facility]     Patient seen and examined  No acute events overnight  PTT theraputic  Patient stable on RA  Denies any pain       OBJECTIVE     Vitals:    04/15/23 0600 04/15/23 0715 04/15/23 0925 04/15/23 0930   BP:  150/94 149/95 149/95   BP Location:       Pulse:  90  93   Resp:  17     Temp:  97 9 °F (36 6 °C)     TempSrc:       SpO2:  92%  96%   Weight: 133 kg (294 lb 3 2 oz)      Height:          Temperature:   Temp (24hrs), Av 8 °F (37 1 °C), Min:97 9 °F (36 6 °C), Max:99 7 °F (37 6 °C)    Temperature: 97 9 °F (36 6 °C)  Intake & Output:  I/O        0701   0700  0701  04/15 0700 04/15 0701 04/16 0700    P  O  960 240     I V  (mL/kg) 355 2 (2 6) 105 1 (0 8)     Total Intake(mL/kg) 1315 2 (9 6) 345 1 (2 6)     Urine (mL/kg/hr) 2040 (0 6) 680 (0 2) 100 (0 2)    Emesis/NG output       Stool       Total Output 2040 680 100    Net -724 8 -334 9 -100               Weights:   IBW (Ideal Body Weight): 79 9 kg    Body mass index is 38 82 kg/m²  Weight (last 2 days)     Date/Time Weight    04/15/23 0600 133 (294 2)    04/14/23 0400 137 (302 47)    04/13/23 0600 139 (307 32)        Physical Exam  Vitals and nursing note reviewed  Constitutional:       General: He is not in acute distress  Appearance: He is well-developed  He is obese  HENT:      Head: Normocephalic and atraumatic  Eyes:      Conjunctiva/sclera: Conjunctivae normal    Cardiovascular:      Rate and Rhythm: Normal rate and regular rhythm  Heart sounds: No murmur heard  Pulmonary:      Effort: Pulmonary effort is normal  No respiratory distress  Breath sounds: Normal breath sounds  Abdominal:      Palpations: Abdomen is soft  Tenderness: There is no abdominal tenderness  Musculoskeletal:         General: No swelling  Cervical back: Neck supple  Right lower leg: No edema  Left lower leg: No edema  Skin:     General: Skin is warm and dry  Capillary Refill: Capillary refill takes less than 2 seconds  Neurological:      Mental Status: He is alert  Motor: No weakness  Comments: Able to communicate with yes and no  Unable to answer questions to determine oriented   Strength 5/5 bilateral extremities    Psychiatric:         Mood and Affect: Mood normal        LABORATORY DATA     Labs: I have personally reviewed pertinent reports    Results from last 7 days   Lab Units 04/15/23  0449 04/14/23  0520 04/13/23  0501 04/12/23  0313 04/11/23  1612   WBC Thousand/uL 8 87 8 11 7 62   < > 10 05   HEMOGLOBIN g/dL 13 3 12 8 12 4   < > 14 1   HEMATOCRIT % 41 9 39 3 39 9   < > 43 2   PLATELETS Thousands/uL 114* 81* 51*   < > 49*   NEUTROS PCT % 66 66  --   --  67   MONOS PCT % 10 9  --   --  10    < > = values in this interval not displayed  Results from last 7 days   Lab Units 04/15/23  0449 04/14/23  0520 04/13/23  0501 04/12/23  0313 04/11/23  1222   POTASSIUM mmol/L 3 8 4 0 4 0 4 3 4 1   CHLORIDE mmol/L 107 108 108 107 104   CO2 mmol/L 23 26 24 23 26   BUN mg/dL 16 15 15 19 19   CREATININE mg/dL 0 93 0 90 0 88 1 08 1 20   CALCIUM mg/dL 9 0 8 8 8 4 8 7 9 7   ALK PHOS U/L  --   --   --  66 71   ALT U/L  --   --   --  29 36   AST U/L  --   --   --  25 39     Results from last 7 days   Lab Units 04/15/23  0449 04/14/23  0520   MAGNESIUM mg/dL 2 4 2 2          Results from last 7 days   Lab Units 04/14/23  2334 04/14/23  1032 04/13/23  2216 04/12/23  0521 04/12/23  0313   INR   --   --   --   --  1 19   PTT seconds 68* 64* 67*   < > 34    < > = values in this interval not displayed  IMAGING & DIAGNOSTIC TESTING     Radiology Results: I have personally reviewed pertinent reports  No results found  Other Diagnostic Testing: I have personally reviewed pertinent reports      ACTIVE MEDICATIONS     Current Facility-Administered Medications   Medication Dose Route Frequency   • acetaminophen (TYLENOL) oral suspension 975 mg  975 mg Oral Q8H PRN   • amLODIPine (NORVASC) tablet 5 mg  5 mg Oral Daily   • apixaban (ELIQUIS) tablet 5 mg  5 mg Oral BID   • aspirin chewable tablet 81 mg  81 mg Oral Daily   • atorvastatin (LIPITOR) tablet 40 mg  40 mg Oral QPM   • calcium carbonate (TUMS) chewable tablet 1,000 mg  1,000 mg Oral TID PRN   • famotidine (PEPCID) tablet 20 mg  20 mg Oral Q12H [facility]   • insulin lispro (HumaLOG) 100 units/mL subcutaneous injection 1-5 Units  1-5 Units Subcutaneous TID AC   • insulin lispro (HumaLOG) 100 units/mL subcutaneous injection 1-5 Units  1-5 Units Subcutaneous HS   • levETIRAcetam (KEPPRA) tablet 750 mg  750 mg Oral Q12H NORRIS   • metoprolol tartrate "(LOPRESSOR) tablet 25 mg  25 mg Oral Q12H [address]   • ondansetron (ZOFRAN-ODT) dispersible tablet 4 mg  4 mg Oral Q6H PRN   • oxyCODONE (ROXICODONE) split tablet 2 5 mg  2 5 mg Oral Q6H PRN   • senna (SENOKOT) tablet 8 6 mg  1 tablet Oral HS   • tamsulosin (FLOMAX) capsule 0 4 mg  0 4 mg Oral After Dinner       VTE Pharmacologic Prophylaxis: Fondaparinux (Arixtra)  VTE Mechanical Prophylaxis: sequential compression device    Portions of the record may have been created with voice recognition software  Occasional wrong word or \"sound a like\" substitutions may have occurred due to the inherent limitations of voice recognition software    Read the chart carefully and recognize, using context, where substitutions have occurred   ==  Emma Henriquez, [facility]  Internal Medicine Residency PGY-2     "

## 2023-04-15 NOTE — PLAN OF CARE
Problem: Prexisting or High Potential for Compromised Skin Integrity  Goal: Skin integrity is maintained or improved  Description: INTERVENTIONS:  - Identify patients at risk for skin breakdown  - Assess and monitor skin integrity  - Assess and monitor nutrition and hydration status  - Monitor labs   - Assess for incontinence   - Turn and reposition patient  - Assist with mobility/ambulation  - Relieve pressure over bony prominences  - Avoid friction and shearing  - Provide appropriate hygiene as needed including keeping skin clean and dry  - Evaluate need for skin moisturizer/barrier cream  - Collaborate with interdisciplinary team   - Patient/family teaching  - Consider wound care consult   Outcome: Progressing     Problem: MOBILITY - ADULT  Goal: Maintain or return to baseline ADL function  Description: INTERVENTIONS:  -  Assess patient's ability to carry out ADLs; assess patient's baseline for ADL function and identify physical deficits which impact ability to perform ADLs (bathing, care of mouth/teeth, toileting, grooming, dressing, etc )  - Assess/evaluate cause of self-care deficits   - Assess range of motion  - Assess patient's mobility; develop plan if impaired  - Assess patient's need for assistive devices and provide as appropriate  - Encourage maximum independence but intervene and supervise when necessary  - Involve family in performance of ADLs  - Assess for home care needs following discharge   - Consider OT consult to assist with ADL evaluation and planning for discharge  - Provide patient education as appropriate  Outcome: Progressing     Problem: NEUROSENSORY - ADULT  Goal: Achieves stable or improved neurological status  Description: INTERVENTIONS  - Monitor and report changes in neurological status  - Monitor vital signs such as temperature, blood pressure, glucose, and any other labs ordered   - Initiate measures to prevent increased intracranial pressure  - Monitor for seizure activity and implement precautions if appropriate      Outcome: Progressing

## 2023-04-15 NOTE — CASE MANAGEMENT
Case Management Discharge Planning Note    Patient name Lynette Hdz  Location /-85 MRN [id number]  : 1975 Date 4/15/2023       Current Admission Date: 2023  Current Admission Diagnosis:Saddle 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 [facility])   Patient Active Problem List    Diagnosis Date Noted   • Urinary retention 2023   • DVT of lower extremity, bilateral ([facility] ) 2023   • Saddle 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 ([facility] ) 2023   • Left-sided chest wall pain 2023   • Creatinine elevation 2023   • Thrombocytopenia ([facility] ) 2023   • Bowel and bladder incontinence 2023   • Hypertension 2023   • Acute ischemic left MCA stroke ([facility] ) 2023   • Obesity, Class III, BMI 40-49 9 (morbid obesity) ([place] [address] ) 2023   • Encephalopathy 2023   • Type 2 diabetes mellitus with circulatory disorder, without long-term current use of insulin ([place] [address] ) 2023      LOS (days): 3  Geometric Mean LOS (GMLOS) (days): 3 90  Days to GMLOS:0 2     OBJECTIVE:  Risk of Unplanned Readmission Score: 14 14         Current admission status: Inpatient   Preferred Pharmacy:   [address] [place] [address] [address]  [place] [address] [address]  [address] [place] [address]  Phone: [phone] Fax: [address], [address] [place] [address] [facility] [address] [facility] [address] [address]  Phone: [phone] Fax: [phone]    Primary Care Provider: No primary care provider on file  Primary Insurance: BLUE CROSS  Secondary Insurance:     DISCHARGE DETAILS:       Other Referral/Resources/Interventions Provided:  Interventions: Acute Rehab  Referral Comments: Pt accepted at [facility] once medically stable for dc  Met with pt and made aware and he is in agreement with this plan           Treatment Team Recommendation: Acute Rehab  Discharge Destination Plan[de-identified] Acute Rehab

## 2023-04-15 NOTE — ARC ADMISSION
Admissions team received referral on patient for possible ARC placement  Upon review of patient’s case with [facility] physician, patient is deemed [facility] appropriate at this time pending medical readiness / bed availability  CM made aware    Thank you,  [address] Admissions

## 2023-04-15 NOTE — PLAN OF CARE
Problem: Prexisting or High Potential for Compromised Skin Integrity  Goal: Skin integrity is maintained or improved  Description: INTERVENTIONS:  - Identify patients at risk for skin breakdown  - Assess and monitor skin integrity  - Assess and monitor nutrition and hydration status  - Monitor labs   - Assess for incontinence   - Turn and reposition patient  - Assist with mobility/ambulation  - Relieve pressure over bony prominences  - Avoid friction and shearing  - Provide appropriate hygiene as needed including keeping skin clean and dry  - Evaluate need for skin moisturizer/barrier cream  - Collaborate with interdisciplinary team   - Patient/family teaching  - Consider wound care consult   4/14/2023 2053 by Marilee Moon RN  Outcome: Progressing     Problem: MOBILITY - ADULT  Goal: Maintain or return to baseline ADL function  Description: INTERVENTIONS:  -  Assess patient's ability to carry out ADLs; assess patient's baseline for ADL function and identify physical deficits which impact ability to perform ADLs (bathing, care of mouth/teeth, toileting, grooming, dressing, etc )  - Assess/evaluate cause of self-care deficits   - Assess range of motion  - Assess patient's mobility; develop plan if impaired  - Assess patient's need for assistive devices and provide as appropriate  - Encourage maximum independence but intervene and supervise when necessary  - Involve family in performance of ADLs  - Assess for home care needs following discharge   - Consider OT consult to assist with ADL evaluation and planning for discharge  - Provide patient education as appropriate  4/14/2023 2053 by Marilee Moon RN  Outcome: Progressing  Goal: Maintains/Returns to pre admission functional level  Description: INTERVENTIONS:  - Perform BMAT or MOVE assessment daily    - Set and communicate daily mobility goal to care team and patient/family/caregiver     - Collaborate with rehabilitation services on mobility goals if consulted  - Out of bed for toileting  - Record patient progress and toleration of activity level   Outcome: Progressing     Problem: NEUROSENSORY - ADULT  Goal: Achieves stable or improved neurological status  Description: INTERVENTIONS  - Monitor and report changes in neurological status  - Monitor vital signs such as temperature, blood pressure, glucose, and any other labs ordered   - Initiate measures to prevent increased intracranial pressure  - Monitor for seizure activity and implement precautions if appropriate      Outcome: Progressing  Goal: Remains free of injury related to seizures activity  Description: INTERVENTIONS  - Maintain airway, patient safety  and administer oxygen as ordered  - Monitor patient for seizure activity, document and report duration and description of seizure to physician/advanced practitioner  - If seizure occurs,  ensure patient safety during seizure  - Reorient patient post seizure  - Seizure pads on all 4 side rails  - Instruct patient/family to notify RN of any seizure activity including if an aura is experienced  - Instruct patient/family to call for assistance with activity based on nursing assessment  - Administer anti-seizure medications if ordered    Outcome: Progressing     Problem: CARDIOVASCULAR - ADULT  Goal: Maintains optimal cardiac output and hemodynamic stability  Description: INTERVENTIONS:  - Monitor I/O, vital signs and rhythm  - Monitor for S/S and trends of decreased cardiac output  - Administer and titrate ordered vasoactive medications to optimize hemodynamic stability  - Assess quality of pulses, skin color and temperature  - Assess for signs of decreased coronary artery perfusion  - Instruct patient to report change in severity of symptoms  Outcome: Progressing  Goal: Absence of cardiac dysrhythmias or at baseline rhythm  Description: INTERVENTIONS:  - Continuous cardiac monitoring, vital signs, obtain 12 lead EKG if ordered  - Administer antiarrhythmic and heart rate control medications as ordered  - Monitor electrolytes and administer replacement therapy as ordered  Outcome: Progressing     Problem: HEMATOLOGIC - ADULT  Goal: Maintains hematologic stability  Description: INTERVENTIONS  - Assess for signs and symptoms of bleeding or hemorrhage  - Monitor labs  - Administer supportive blood products/factors as ordered and appropriate  Outcome: Progressing

## 2023-04-16 LAB
ANION GAP SERPL CALCULATED.3IONS-SCNC: 4 MMOL/L (ref 4–13)
ANION GAP SERPL CALCULATED.3IONS-SCNC: 4 MMOL/L (ref 4–13)
BUN SERPL-MCNC: 18 MG/DL (ref 5–25)
BUN SERPL-MCNC: 18 MG/DL (ref 5–25)
CALCIUM SERPL-MCNC: 8.6 MG/DL (ref 8.3–10.1)
CALCIUM SERPL-MCNC: 8.6 MG/DL (ref 8.3–10.1)
CHLORIDE SERPL-SCNC: 109 MMOL/L (ref 96–108)
CHLORIDE SERPL-SCNC: 109 MMOL/L (ref 96–108)
CO2 SERPL-SCNC: 23 MMOL/L (ref 21–32)
CO2 SERPL-SCNC: 23 MMOL/L (ref 21–32)
CREAT SERPL-MCNC: 0.95 MG/DL (ref 0.6–1.3)
CREAT SERPL-MCNC: 0.95 MG/DL (ref 0.6–1.3)
ERYTHROCYTE [DISTWIDTH] IN BLOOD BY AUTOMATED COUNT: 13.8 % (ref 11.6–15.1)
ERYTHROCYTE [DISTWIDTH] IN BLOOD BY AUTOMATED COUNT: 13.8 % (ref 11.6–15.1)
FLUAV RNA RESP QL NAA+PROBE: NEGATIVE
FLUAV RNA RESP QL NAA+PROBE: NEGATIVE
FLUBV RNA RESP QL NAA+PROBE: NEGATIVE
FLUBV RNA RESP QL NAA+PROBE: NEGATIVE
GFR SERPL CREATININE-BSD FRML MDRD: 96 ML/MIN/1.73SQ M
GFR SERPL CREATININE-BSD FRML MDRD: 96 ML/MIN/1.73SQ M
GLUCOSE SERPL-MCNC: 111 MG/DL (ref 65–140)
GLUCOSE SERPL-MCNC: 111 MG/DL (ref 65–140)
GLUCOSE SERPL-MCNC: 122 MG/DL (ref 65–140)
GLUCOSE SERPL-MCNC: 122 MG/DL (ref 65–140)
GLUCOSE SERPL-MCNC: 131 MG/DL (ref 65–140)
GLUCOSE SERPL-MCNC: 131 MG/DL (ref 65–140)
GLUCOSE SERPL-MCNC: 144 MG/DL (ref 65–140)
GLUCOSE SERPL-MCNC: 144 MG/DL (ref 65–140)
GLUCOSE SERPL-MCNC: 171 MG/DL (ref 65–140)
GLUCOSE SERPL-MCNC: 171 MG/DL (ref 65–140)
HCT VFR BLD AUTO: 41.9 % (ref 36.5–49.3)
HCT VFR BLD AUTO: 41.9 % (ref 36.5–49.3)
HGB BLD-MCNC: 13.2 G/DL (ref 12–17)
HGB BLD-MCNC: 13.2 G/DL (ref 12–17)
MCH RBC QN AUTO: 26.1 PG (ref 26.8–34.3)
MCH RBC QN AUTO: 26.1 PG (ref 26.8–34.3)
MCHC RBC AUTO-ENTMCNC: 31.5 G/DL (ref 31.4–37.4)
MCHC RBC AUTO-ENTMCNC: 31.5 G/DL (ref 31.4–37.4)
MCV RBC AUTO: 83 FL (ref 82–98)
MCV RBC AUTO: 83 FL (ref 82–98)
PLATELET # BLD AUTO: 140 THOUSANDS/UL (ref 149–390)
PLATELET # BLD AUTO: 140 THOUSANDS/UL (ref 149–390)
PMV BLD AUTO: 10.5 FL (ref 8.9–12.7)
PMV BLD AUTO: 10.5 FL (ref 8.9–12.7)
POTASSIUM SERPL-SCNC: 3.9 MMOL/L (ref 3.5–5.3)
POTASSIUM SERPL-SCNC: 3.9 MMOL/L (ref 3.5–5.3)
RBC # BLD AUTO: 5.05 MILLION/UL (ref 3.88–5.62)
RBC # BLD AUTO: 5.05 MILLION/UL (ref 3.88–5.62)
RSV RNA RESP QL NAA+PROBE: NEGATIVE
RSV RNA RESP QL NAA+PROBE: NEGATIVE
SARS-COV-2 RNA RESP QL NAA+PROBE: NEGATIVE
SARS-COV-2 RNA RESP QL NAA+PROBE: NEGATIVE
SODIUM SERPL-SCNC: 136 MMOL/L (ref 135–147)
SODIUM SERPL-SCNC: 136 MMOL/L (ref 135–147)
WBC # BLD AUTO: 9.33 THOUSAND/UL (ref 4.31–10.16)
WBC # BLD AUTO: 9.33 THOUSAND/UL (ref 4.31–10.16)

## 2023-04-16 PROCEDURE — 82948 REAGENT STRIP/BLOOD GLUCOSE: CPT

## 2023-04-16 PROCEDURE — 0241U HB NFCT DS VIR RESP RNA 4 TRGT: CPT

## 2023-04-16 PROCEDURE — 99233 SBSQ HOSP IP/OBS HIGH 50: CPT | Performed by: INTERNAL MEDICINE

## 2023-04-16 PROCEDURE — 80048 BASIC METABOLIC PNL TOTAL CA: CPT | Performed by: STUDENT IN AN ORGANIZED HEALTH CARE EDUCATION/TRAINING PROGRAM

## 2023-04-16 PROCEDURE — 85027 COMPLETE CBC AUTOMATED: CPT | Performed by: STUDENT IN AN ORGANIZED HEALTH CARE EDUCATION/TRAINING PROGRAM

## 2023-04-16 RX ADMIN — APIXABAN 5 MG: 5 TABLET, FILM COATED ORAL at 08:11

## 2023-04-16 RX ADMIN — INSULIN LISPRO 1 UNITS: 100 INJECTION, SOLUTION INTRAVENOUS; SUBCUTANEOUS at 21:38

## 2023-04-16 RX ADMIN — METOPROLOL TARTRATE 25 MG: 25 TABLET, FILM COATED ORAL at 08:11

## 2023-04-16 RX ADMIN — AMLODIPINE BESYLATE 5 MG: 5 TABLET ORAL at 08:10

## 2023-04-16 RX ADMIN — METOPROLOL TARTRATE 25 MG: 25 TABLET, FILM COATED ORAL at 20:50

## 2023-04-16 RX ADMIN — ASPIRIN 81 MG 81 MG: 81 TABLET ORAL at 08:10

## 2023-04-16 RX ADMIN — LEVETIRACETAM 750 MG: 750 TABLET, FILM COATED ORAL at 08:10

## 2023-04-16 RX ADMIN — FAMOTIDINE 20 MG: 20 TABLET, FILM COATED ORAL at 08:11

## 2023-04-16 RX ADMIN — LEVETIRACETAM 750 MG: 750 TABLET, FILM COATED ORAL at 20:50

## 2023-04-16 RX ADMIN — FAMOTIDINE 20 MG: 20 TABLET, FILM COATED ORAL at 20:50

## 2023-04-16 RX ADMIN — ATORVASTATIN CALCIUM 40 MG: 40 TABLET, FILM COATED ORAL at 17:48

## 2023-04-16 RX ADMIN — APIXABAN 5 MG: 5 TABLET, FILM COATED ORAL at 17:48

## 2023-04-16 RX ADMIN — TAMSULOSIN HYDROCHLORIDE 0.4 MG: 0.4 CAPSULE ORAL at 17:48

## 2023-04-16 NOTE — CASE MANAGEMENT
Case Management Assessment & Discharge Planning Note    Patient name Ashlee Rubin III  Location /-00 MRN [id number]  : 1975 Date 2023       Current Admission Date: 2023  Current Admission Diagnosis:Saddle 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 [facility])   Patient Active Problem List    Diagnosis Date Noted   • Urinary retention 2023   • DVT of lower extremity, bilateral ([facility] ) 2023   • Saddle 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 ([facility] ) 2023   • Left-sided chest wall pain 2023   • Creatinine elevation 2023   • Thrombocytopenia ([facility] ) 2023   • Bowel and bladder incontinence 2023   • Hypertension 2023   • Acute ischemic left MCA stroke ([facility] ) 2023   • Obesity, Class III, BMI 40-49 9 (morbid obesity) ([facility] ) 2023   • Encephalopathy 2023   • Type 2 diabetes mellitus with circulatory disorder, without long-term current use of insulin ([facility] ) 2023      LOS (days): 4  Geometric Mean LOS (GMLOS) (days): 3 90  Days to GMLOS:-0 5     OBJECTIVE:    Risk of Unplanned Readmission Score: 14 29         Current admission status: Inpatient       Preferred Pharmacy:   [address] [facility] [address]  [facility]   [address]  [address] [place] [address]  Phone: [phone] Fax: [phone] , [facility] [address] [facility] [address] [address] [facility] [address] [address]  Phone: [phone] Fax: [phone]    Primary Care Provider: No primary care provider on file      Primary Insurance: BLUE CROSS  Secondary Insurance:     ASSESSMENT:  [address], [address] Representative - Mother   Primary Phone: [phone] (Mobile)  Home Phone: [phone]                                                                DISCHARGE DETAILS:    Discharge planning discussed with[de-identified] Cm rec'd TT from medical team, per medical team, pt is medically cleared for discharged pending placement  ARC following pt pending medical clearance  ARC liaison updated on medical clearance via TT  awaiting response  Per ARC, pt will need to be transition to PO anticoagulants and off telemetry  auth require as well

## 2023-04-16 NOTE — PROGRESS NOTES
INTERNAL MEDICINE RESIDENCY PROGRESS NOTE     Name: Palomo Vera III   Age & Sex: 41 y o  male   MRN: [id number]  Unit/Bed#: -18   Encounter: [id number]  Team: SOD Team C     PATIENT INFORMATION     Name: Palomo Vera III   Age & Sex: 41 y o  male   MRN: [id number]  Hospital Stay Days: 4    ASSESSMENT/PLAN     Principal Problem:    Saddle 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 ([facility])  Active Problems:    Acute ischemic left MCA stroke ([facility])    Obesity, Class III, BMI 40-49 9 (morbid obesity) ([facility] )    Type 2 diabetes mellitus with circulatory disorder, without long-term current use of insulin ([facility])    Hypertension    Thrombocytopenia ([facility])    Urinary retention    DVT of lower extremity, bilateral ([facility])      DVT of lower extremity, bilateral ([facility] )  Assessment & Plan  • 4/12 LE duplex: RLE: Evidence suggestive of Acute occlusive deep vein thrombosis noted in the Peroneal veins  Non Occlusive Acute deep vein thrombosis in noted in the Posterior Tibial veins  LLE: Evidence suggestive of Acute occlusive deep vein thrombosis noted in the Posterior Tiblial, Peroneal, and Soleal veins  Evidence suggestive of Acute non occlusive deep vein thrombosis noted in the Mid-Distal Femoral and Popliteal veins      Plan:  • Anticoagulation - see A&P for Saddle Embolus      Urinary retention  Assessment & Plan  • Failed urinary retention protocol requiring replacement of acharya on 4/13  • Flomax initiated 4/12     Plan:   • Acharya is out => Continue urinary retention protocol   • Continue flomax  • Encourage ambulation    Thrombocytopenia ([facility] )  Assessment & Plan  • First noted on 4/11  • 4Ts for HIT: Score is 8 points which is a high probability of HIT or about 64% likelihood per MD Calc calculator  • Transitioned from subcu heparin to subcu Arixtra initially given concern for HIT by [facility] providers  • Transitioned to argatroban infusion for saddle PE  • Heparin induced platelet antibody: 5 388  • Heparin antibody by serotonin release pending  • Currently on argatroban gtt, has been therapeutic for 24 hours       Plan:  • Plt improved to 140  • Continue Eliquis 5 mg BID  • Avoid all heparin products  • Continue to trend platelet count daily    Hypertension  Assessment & Plan  • No home regimen  • Per last neurology note (04/08): SBP goal 120-160  • SBP in 140s-150s  • Continue amlodipine 5 mg PO daily and metoprolol 25 mg PO BID  • Blood pressures currently stable  Type 2 diabetes mellitus with circulatory disorder, without long-term current use of insulin [facility])  Assessment & Plan  Lab Results   Component Value Date    HGBA1C 8 0 (H) 03/31/2023       Recent Labs     04/14/23  1301 04/14/23  1643 04/14/23  2025 04/15/23  0642   POCGLU 160* 101 174* 125       Blood Sugar Average: Last 72 hrs:  (P) 136 [id number]   • Sliding scale insulin before meals and at bedtime, adjust algorithm as needed  • Goal blood glucose 140-180    Obesity, Class III, BMI 40-49 9 (morbid obesity) (HCC)  Assessment & Plan  Low-carb diet   Encourage lifestyle change      Acute ischemic left MCA stroke ([facility] )  Assessment & Plan  • With global aphasia, improving R sided weakness, motor planning difficulties, R inattention, and bowel/bladder incontinence  • CTA with L M2 division  • Not a candidate for systemic thrombolytics, and not a candidate for thrombectomy  • 3/31 MRI Brain: Large L MCA infarct with L BG involvement and superimposed microhemorrhage with mild mass effect and stable 3-mm L to R midling shift on most recent CTH (4/3)   • Echo: EF 55%, LA dilatation, no RWMA, no PFO, no thrombus or mass  • Thrombosis panel with abnormal antithrombin, Protein C/S activity, but per Neuro unclear significance in setting of acute stroke  Would repeat as outpatient    • Will need Zio Patch/Loop Recorder with Cards Referral as outpatient  • Discussed with Neurology on 4/9: Continue Keppra until outpatient f/u given location, temporal slowing, extent of lesion    • Outpatient f/u with Neurology, Cardiology, and PCP  • Patient neuro checks were decreased to Q4 as patient has been therapeutic on argatroban infusion x 24h     Plan:  • Continue Q4 neuro checks  • Continue Keppra pending OP follow up  • Continue ASA and statin  • PT/OT recommending post-acute rehab  Accepted to [facility], pending bed availability    * Saddle 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 [facility])  Assessment & Plan  Developed chest pain while at [facility] on 4/11 4/11 CTA PE: Saddle embolus and extensive clot burden throughout the visualized proximal pulmonary arteries  IR consultation suggested  RV/LV ratio less then 0 9, suggesting no elevation of right-sided pressures; however this is discordant with extensive clot burden  No evidence of pulmonary infarct  Dimer greater than 20   BNP elevated, troponin normal   Echo 4/12 shows: LVEF 55%, The right ventricular systolic pressure is moderately elevated  The estimated right ventricular systolic pressure is 85 84 mmHg  The right atrium is mildly dilated  Bilateral lower extremity venous duplex with bilateral DVTs  Case was discussed with IR by [facility] provider, patient is not a candidate for IR thrombectomy  PTT 68 -> 67 -> 64  Plan:   Transitioned from Argatroban gtt to Eliquis on 04/15  Remains hemodymically stable  Plt improved to 140  Continue Eliquis 5 mg BID  Medically stable for d/c pending bed placement at [facility]        Disposition: Medically stable for d/c pending bed availability at [facility]     Patient seen and examined  No acute events overnight  Patient with baseline expressive aphasia  Answers yes/no to questions  Denies any lightheadedness, dizziness, chest pain, SOB, or cough      OBJECTIVE     Vitals:    04/15/23 2135 04/15/23 2148 04/16/23 0600 04/16/23 0721   BP: 155/99 153/96  147/93   Pulse: 93 98  82   Resp:       Temp: (!) 97 3 °F (36 3 °C) (!) 97 3 °F (36 3 °C)  (!) 97 3 °F (36 3 °C) TempSrc:       SpO2: 95% 93%  94%   Weight:   133 kg (293 lb 12 8 oz)    Height:          Temperature:   Temp (24hrs), Av 3 °F (36 3 °C), Min:97 3 °F (36 3 °C), Max:97 3 °F (36 3 °C)    Temperature: (!) 97 3 °F (36 3 °C)  Intake & Output:  I/O        0701  04/15 0700 04/15 07 07 07 07    P  O  240 240     I V  (mL/kg) 105 1 (0 8)      Total Intake(mL/kg) 345 1 (2 6) 240 (1 8)     Urine (mL/kg/hr) 680 (0 2) 807 (0 3)     Total Output 680 807     Net -334 9 -567                Weights:   IBW (Ideal Body Weight): 79 9 kg    Body mass index is 38 76 kg/m²  Weight (last 2 days)     Date/Time Weight    23 0600 133 (293 8)    04/15/23 0600 133 (294 2)    23 0400 137 (302 47)        Physical Exam  Constitutional:       General: He is not in acute distress  HENT:      Head: Normocephalic and atraumatic  Eyes:      General: No scleral icterus  Conjunctiva/sclera: Conjunctivae normal    Cardiovascular:      Rate and Rhythm: Normal rate and regular rhythm  Heart sounds: No murmur heard  No friction rub  No gallop  Pulmonary:      Effort: Pulmonary effort is normal  No respiratory distress  Breath sounds: Normal breath sounds  No wheezing, rhonchi or rales  Abdominal:      General: Abdomen is flat  There is no distension  Palpations: Abdomen is soft  Tenderness: There is no abdominal tenderness  There is no guarding or rebound  Musculoskeletal:      Right lower leg: No edema  Left lower leg: No edema  Skin:     General: Skin is warm and dry  Neurological:      Comments: Expressive aphasia; Unable to assess orientation; Strength 5/5 in b/l upper and lower extremities; No reported sensory deficits        LABORATORY DATA     Labs: I have personally reviewed pertinent reports    Results from last 7 days   Lab Units 23  0506 04/15/23  0449 23  0520 23  0313 23  1612   WBC Thousand/uL 9 33 8 87 8 11   < > 10 05 HEMOGLOBIN g/dL 13 2 13 3 12 8   < > 14 1   HEMATOCRIT % 41 9 41 9 39 3   < > 43 2   PLATELETS Thousands/uL 140* 114* 81*   < > 49*   NEUTROS PCT %  --  66 66  --  67   MONOS PCT %  --  10 9  --  10    < > = values in this interval not displayed  Results from last 7 days   Lab Units 04/16/23  0506 04/15/23  0449 04/14/23  0520 04/13/23  0501 04/12/23  0313 04/11/23  1222   POTASSIUM mmol/L 3 9 3 8 4 0   < > 4 3 4 1   CHLORIDE mmol/L 109* 107 108   < > 107 104   CO2 mmol/L 23 23 26   < > 23 26   BUN mg/dL 18 16 15   < > 19 19   CREATININE mg/dL 0 95 0 93 0 90   < > 1 08 1 20   CALCIUM mg/dL 8 6 9 0 8 8   < > 8 7 9 7   ALK PHOS U/L  --   --   --   --  66 71   ALT U/L  --   --   --   --  29 36   AST U/L  --   --   --   --  25 39    < > = values in this interval not displayed  Results from last 7 days   Lab Units 04/15/23  0449 04/14/23  0520   MAGNESIUM mg/dL 2 4 2 2          Results from last 7 days   Lab Units 04/15/23  1059 04/14/23  2334 04/14/23  1032 04/12/23  0521 04/12/23  0313   INR   --   --   --   --  1 19   PTT seconds 63* 68* 64*   < > 34    < > = values in this interval not displayed  IMAGING & DIAGNOSTIC TESTING     Radiology Results: I have personally reviewed pertinent reports  No results found  Other Diagnostic Testing: I have personally reviewed pertinent reports      ACTIVE MEDICATIONS     Current Facility-Administered Medications   Medication Dose Route Frequency   • acetaminophen (TYLENOL) oral suspension 975 mg  975 mg Oral Q8H PRN   • amLODIPine (NORVASC) tablet 5 mg  5 mg Oral Daily   • apixaban (ELIQUIS) tablet 5 mg  5 mg Oral BID   • aspirin chewable tablet 81 mg  81 mg Oral Daily   • atorvastatin (LIPITOR) tablet 40 mg  40 mg Oral QPM   • calcium carbonate (TUMS) chewable tablet 1,000 mg  1,000 mg Oral TID PRN   • famotidine (PEPCID) tablet 20 mg  20 mg Oral Q12H [address]   • insulin lispro (HumaLOG) 100 units/mL subcutaneous injection 1-5 Units  1-5 Units Subcutaneous "TID AC   • insulin lispro (HumaLOG) 100 units/mL subcutaneous injection 1-5 Units  1-5 Units Subcutaneous HS   • levETIRAcetam (KEPPRA) tablet 750 mg  750 mg Oral Q12H [address]   • metoprolol tartrate (LOPRESSOR) tablet 25 mg  25 mg Oral Q12H [address]   • ondansetron (ZOFRAN-ODT) dispersible tablet 4 mg  4 mg Oral Q6H PRN   • oxyCODONE (ROXICODONE) split tablet 2 5 mg  2 5 mg Oral Q6H PRN   • senna (SENOKOT) tablet 8 6 mg  1 tablet Oral HS   • tamsulosin (FLOMAX) capsule 0 4 mg  0 4 mg Oral After Dinner       VTE Pharmacologic Prophylaxis: Reason for no pharmacologic prophylaxis Systemic AC  VTE Mechanical Prophylaxis: sequential compression device    Portions of the record may have been created with voice recognition software  Occasional wrong word or \"sound a like\" substitutions may have occurred due to the inherent limitations of voice recognition software    Read the chart carefully and recognize, using context, where substitutions have occurred   ==  Leny Marino MD  [facility]  Internal Medicine Residency PGY-1       "

## 2023-04-16 NOTE — CASE MANAGEMENT
Case Management Assessment & Discharge Planning Note    Patient name Alise Dos Santos III  Location /-51 MRN [id number]  : 1975 Date 2023       Current Admission Date: 2023  Current Admission Diagnosis:Saddle 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 [facility])   Patient Active Problem List    Diagnosis Date Noted   • Urinary retention 2023   • DVT of lower extremity, bilateral ([facility] ) 2023   • Saddle 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 ([facility] ) 2023   • Left-sided chest wall pain 2023   • Creatinine elevation 2023   • Thrombocytopenia ([facility] ) 2023   • Bowel and bladder incontinence 2023   • Hypertension 2023   • Acute ischemic left MCA stroke ([facility] ) 2023   • Obesity, Class III, BMI 40-49 9 (morbid obesity) ([place] [address] ) 2023   • Encephalopathy 2023   • Type 2 diabetes mellitus with circulatory disorder, without long-term current use of insulin ([place] [address] ) 2023      LOS (days): 4  Geometric Mean LOS (GMLOS) (days): 3 90  Days to GMLOS:-0 6     OBJECTIVE:    Risk of Unplanned Readmission Score: 14 32         Current admission status: Inpatient       Preferred Pharmacy:   [address] [facility] [address]  [facility]   [address]  [address] [facility] [address]  Phone: [phone] Fax: [address], [facility] [address] [address] [facility] [address] [address]  Phone: [phone] Fax: [phone]    Primary Care Provider: No primary care provider on file  Primary Insurance: BLUE CROSS  Secondary Insurance:     ASSESSMENT:  [address], [address] Representative - Mother   Primary Phone: [phone] (Mobile)  Home Phone: [phone]                                                                DISCHARGE DETAILS:    Discharge planning discussed with[de-identified] Cm to initaite auth pending updated PT/OT notes   PT team made aware for updated notes

## 2023-04-16 NOTE — PLAN OF CARE
Problem: Prexisting or High Potential for Compromised Skin Integrity  Goal: Skin integrity is maintained or improved  Description: INTERVENTIONS:  - Identify patients at risk for skin breakdown  - Assess and monitor skin integrity  - Assess and monitor nutrition and hydration status  - Monitor labs   - Assess for incontinence   - Turn and reposition patient  - Assist with mobility/ambulation  - Relieve pressure over bony prominences  - Avoid friction and shearing  - Provide appropriate hygiene as needed including keeping skin clean and dry  - Evaluate need for skin moisturizer/barrier cream  - Collaborate with interdisciplinary team   - Patient/family teaching  - Consider wound care consult   Outcome: Progressing       Problem: NEUROSENSORY - ADULT  Goal: Achieves stable or improved neurological status  Description: INTERVENTIONS  - Monitor and report changes in neurological status  - Monitor vital signs such as temperature, blood pressure, glucose, and any other labs ordered   - Initiate measures to prevent increased intracranial pressure  - Monitor for seizure activity and implement precautions if appropriate      Outcome: Progressing

## 2023-04-17 LAB
GLUCOSE SERPL-MCNC: 104 MG/DL (ref 65–140)
GLUCOSE SERPL-MCNC: 104 MG/DL (ref 65–140)
GLUCOSE SERPL-MCNC: 131 MG/DL (ref 65–140)
GLUCOSE SERPL-MCNC: 131 MG/DL (ref 65–140)
GLUCOSE SERPL-MCNC: 135 MG/DL (ref 65–140)
GLUCOSE SERPL-MCNC: 135 MG/DL (ref 65–140)
GLUCOSE SERPL-MCNC: 93 MG/DL (ref 65–140)
GLUCOSE SERPL-MCNC: 93 MG/DL (ref 65–140)

## 2023-04-17 PROCEDURE — 97535 SELF CARE MNGMENT TRAINING: CPT

## 2023-04-17 PROCEDURE — 97530 THERAPEUTIC ACTIVITIES: CPT

## 2023-04-17 PROCEDURE — 97116 GAIT TRAINING THERAPY: CPT

## 2023-04-17 PROCEDURE — 99232 SBSQ HOSP IP/OBS MODERATE 35: CPT | Performed by: INTERNAL MEDICINE

## 2023-04-17 PROCEDURE — 82948 REAGENT STRIP/BLOOD GLUCOSE: CPT

## 2023-04-17 RX ORDER — LOSARTAN POTASSIUM 25 MG/1
25 TABLET ORAL DAILY
Status: DISCONTINUED | OUTPATIENT
Start: 2023-04-17 | End: 2023-04-19 | Stop reason: HOSPADM

## 2023-04-17 RX ADMIN — FAMOTIDINE 20 MG: 20 TABLET, FILM COATED ORAL at 08:59

## 2023-04-17 RX ADMIN — TAMSULOSIN HYDROCHLORIDE 0.4 MG: 0.4 CAPSULE ORAL at 17:44

## 2023-04-17 RX ADMIN — SENNOSIDES 8.6 MG: 8.6 TABLET, FILM COATED ORAL at 21:30

## 2023-04-17 RX ADMIN — APIXABAN 5 MG: 5 TABLET, FILM COATED ORAL at 17:44

## 2023-04-17 RX ADMIN — ATORVASTATIN CALCIUM 40 MG: 40 TABLET, FILM COATED ORAL at 17:44

## 2023-04-17 RX ADMIN — LEVETIRACETAM 750 MG: 750 TABLET, FILM COATED ORAL at 08:59

## 2023-04-17 RX ADMIN — APIXABAN 5 MG: 5 TABLET, FILM COATED ORAL at 08:59

## 2023-04-17 RX ADMIN — LEVETIRACETAM 750 MG: 750 TABLET, FILM COATED ORAL at 21:30

## 2023-04-17 RX ADMIN — AMLODIPINE BESYLATE 5 MG: 5 TABLET ORAL at 08:59

## 2023-04-17 RX ADMIN — FAMOTIDINE 20 MG: 20 TABLET, FILM COATED ORAL at 21:30

## 2023-04-17 RX ADMIN — LOSARTAN POTASSIUM 25 MG: 25 TABLET, FILM COATED ORAL at 08:59

## 2023-04-17 RX ADMIN — METOPROLOL TARTRATE 25 MG: 25 TABLET, FILM COATED ORAL at 21:30

## 2023-04-17 RX ADMIN — METOPROLOL TARTRATE 25 MG: 25 TABLET, FILM COATED ORAL at 08:59

## 2023-04-17 RX ADMIN — ASPIRIN 81 MG 81 MG: 81 TABLET ORAL at 08:59

## 2023-04-17 NOTE — OCCUPATIONAL THERAPY NOTE
"  Occupational Therapy Progress Note     Patient Name: Gi Christiansen III  [id number] Date: 4/17/2023  Problem List  Principal Problem:    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)  Active Problems:    Acute ischemic left MCA stroke (HCC)    Obesity, Class III, BMI 40-49 9 (morbid obesity) ([facility] )    Type 2 diabetes mellitus with circulatory disorder, without long-term current use of insulin ([facility] )    Hypertension    Thrombocytopenia ([facility] )    Urinary retention    DVT of lower extremity, bilateral ([facility] )        04/17/23 1150   OT Last Visit   OT Visit Date 04/17/23   Note Type   Note Type Treatment for insurance authorization   Pain Assessment   Pain Score No Pain   Restrictions/Precautions   Weight Bearing Precautions Per Order No   Other Precautions Cognitive; Chair Alarm; Fall Risk  (Decreased coordination on R, expressive aphasia)   ADL   Where Assessed Chair   Grooming Assistance 4  Minimal Assistance   UB Bathing Assistance 4  Minimal Assistance   LB Bathing Assistance 3  Moderate Assistance   UB Dressing Assistance 4  Minimal [address] 3  Moderate [address]  3  Moderate Assistance   Bed Mobility   Supine to Sit 4  Minimal assistance   Additional items Assist x 1   Additional Comments OOB at end of session   Transfers   Sit to Stand 4  Minimal assistance   Additional items Assist x 1; Increased time required;Verbal cues   Stand to Sit 4  Minimal assistance   Additional items Assist x 1; Increased time required;Verbal cues   Stand pivot 4  Minimal assistance   Additional items Assist x 1; Increased time required;Verbal cues   Additional Comments RW increased TC and VC   Functional Mobility   Functional Mobility 4  Minimal assistance   Additional Comments Ax1 with chair follow   Additional items Rolling walker   Subjective   Subjective \"Not too well\"   Cognition   Overall Cognitive Status Impaired   Arousal/Participation Alert   Attention Within " functional limits   Orientation Level Oriented to person   Memory Decreased recall of precautions   Following Commands Follows one step commands with increased time or repetition   Comments Pleasant and cooperative, limited by expressive aphasia   Activity Tolerance   Activity Tolerance Patient limited by fatigue   Medical Staff Made Aware NSG aware   Assessment   Assessment Pt was seen this date for OT tx session focusing on self care tasks, bed mobility, sit to stand progressions, standing tolerance, tranfers, functional mobility, safety awareness, compensatory techniques, energy conservation, and overall activity tolerance  Pt presents    Supine and is agreeable to OT tx session  All vitals WNL, pt does not c/o dizziness this date  BP taken in supine and at end of session with not significant change and WNL  Pt completes previously mentioned tasks at documented assist levels please see above in flow sheet  Pt continues to require overall Min A for transfers and mobility and Min - Mod A for self care tasks  Pt is overall limited by expressive aphasia, decreased balance, increased fatigue, decreased strength, endurance, and activity tolerance and continues to function below baseline level  Pt resting OOB in chair at end of session with all needs in reach and alarm on  Pt would benefit from continued OT Tx to improve overall functional abilities and increase independence  Will continue to follow with current POC  Plan   Treatment Interventions ADL retraining;Visual perceptual retraining;Functional transfer training; Endurance training;Cognitive reorientation;UE strengthening/ROM; Patient/family training;Equipment evaluation/education; Neuromuscular reeducation; Fine motor coordination activities; Compensatory technique education; Activityengagement   Goal Expiration Date 04/26/23   OT Treatment Day 5   OT Frequency 3-5x/wk   Recommendation   OT Discharge Recommendation Post acute rehabilitation services   AM-PAC Daily Activity Inpatient   Lower Body Dressing 2   Bathing 2   Toileting 2   Upper Body Dressing 3   Grooming 4   Eating 4   Daily Activity Raw Score 17   Daily Activity Standardized Score (Calc for Raw Score >=11) 37 26   AM-PAC Applied Cognition Inpatient   Following a Speech/Presentation 3   Understanding Ordinary Conversation 4   Taking Medications 3   Remembering Where Things Are Placed or Put Away 3   Remembering List of 4-5 Errands 3   Taking Care of Complicated Tasks 2   Applied Cognition Raw Score 18   Applied Cognition Standardized Score 38 07

## 2023-04-17 NOTE — CASE MANAGEMENT
Case Management Discharge Planning Note    Patient name Henry Akers  Location /-00 MRN [id number]  : 1975 Date 2023       Current Admission Date: 2023  Current Admission Diagnosis:Saddle 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 [facility])   Patient Active Problem List    Diagnosis Date Noted   • Urinary retention 2023   • DVT of lower extremity, bilateral ([facility] ) 2023   • Saddle 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 ([facility] ) 2023   • Left-sided chest wall pain 2023   • Creatinine elevation 2023   • Thrombocytopenia ([facility] ) 2023   • Bowel and bladder incontinence 2023   • Hypertension 2023   • Acute ischemic left MCA stroke ([facility] ) 2023   • Obesity, Class III, BMI 40-49 9 (morbid obesity) ([place] [address] ) 2023   • Encephalopathy 2023   • Type 2 diabetes mellitus with circulatory disorder, without long-term current use of insulin ([facility] ) 2023      LOS (days): 5  Geometric Mean LOS (GMLOS) (days): 3 90  Days to GMLOS:-1 4     OBJECTIVE:  Risk of Unplanned Readmission Score: 14 5         Current admission status: Inpatient   Preferred Pharmacy:   [address] [place] [address] [address]  [place] [address] [address]  [address] [place] [address]  Phone: [phone] Fax: [address], [place] [address] [facility] [address] [facility] [address]  Phone: [phone] Fax: [phone]    Primary Care Provider: No primary care provider on file  Primary Insurance: BLUE CROSS  Secondary Insurance:     DISCHARGE DETAILS:    Other Referral/Resources/Interventions Provided:  Interventions: Acute Rehab  Referral Comments: Patient medically stable for discharge    CM submitted for insurance authorization to our CM Discharge support team   Pending insurance approval   Discharge Destination Plan[de-identified] Acute Rehab

## 2023-04-17 NOTE — PROGRESS NOTES
INTERNAL MEDICINE RESIDENCY PROGRESS NOTE     Name: Aaron Kowalski III   Age & Sex: 41 y o  male   MRN: [id number]  Unit/Bed#: [facility]   Encounter: [id number]  Team: SOD Team C     PATIENT INFORMATION     Name: Aaron Kowalski III   Age & Sex: 41 y o  male   MRN: [id number]  Hospital Stay Days: 5    ASSESSMENT/PLAN     Principal Problem:    Saddle 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 ([facility])  Active Problems:    Acute ischemic left MCA stroke ([facility])    Obesity, Class III, BMI 40-49 9 (morbid obesity) ([facility] [address] )    Type 2 diabetes mellitus with circulatory disorder, without long-term current use of insulin ([facility])    Hypertension    Thrombocytopenia ([facility])    Urinary retention    DVT of lower extremity, bilateral ([facility])      DVT of lower extremity, bilateral ([facility] )  Assessment & Plan  4/12 LE duplex: RLE: Evidence suggestive of Acute occlusive deep vein thrombosis noted in the Peroneal veins  Non Occlusive Acute deep vein thrombosis in noted in the Posterior Tibial veins  LLE: Evidence suggestive of Acute occlusive deep vein thrombosis noted in the Posterior Tiblial, Peroneal, and Soleal veins  Evidence suggestive of Acute non occlusive deep vein thrombosis noted in the Mid-Distal Femoral and Popliteal veins      Plan:  Anticoagulation - see A&P for Saddle Embolus  Urinary retention  Assessment & Plan  Failed urinary retention protocol requiring replacement of acharya on 4/13  Flomax initiated 4/12     Plan:    Acharya is out => Continue urinary retention protocol   Continue flomax  Encourage ambulation    Thrombocytopenia ([facility] [address] )  Assessment & Plan  First noted on 4/11  4Ts for HIT: Score is 8 points which is a high probability of HIT or about 64% likelihood per MD Calc calculator  Transitioned from subcu heparin to subcu Arixtra initially given concern for HIT by [facility] providers  Transitioned to argatroban infusion for saddle PE  Heparin induced platelet antibody: 0 489  Heparin antibody by serotonin release pending  Currently on argatroban gtt, has been therapeutic for 24 hours       Plan:  Plt improved to 140  Continue Eliquis 5 mg BID  Avoid all heparin products  Continue to trend platelet count daily    Hypertension  Assessment & Plan  No home regimen  SBP in 140s-150s  Will initiate Losartan 25 mg qd   Continue amlodipine 5 mg PO daily and metoprolol 25 mg PO BID  Blood pressures currently stable  Type 2 diabetes mellitus with circulatory disorder, without long-term current use of insulin [facility])  Assessment & Plan  Lab Results   Component Value Date    HGBA1C 8 0 (H) 03/31/2023       Recent Labs     04/14/23  1301 04/14/23  1643 04/14/23  2025 04/15/23  0642   POCGLU 160* 101 174* 125       Blood Sugar Average: Last 72 hrs:  (P) 136 [id number]   Sliding scale insulin before meals and at bedtime, adjust algorithm as needed  Goal blood glucose 140-180    Obesity, Class III, BMI 40-49 9 (morbid obesity) ([facility] [address] )  Assessment & Plan  Low-carb diet   Encourage lifestyle change      Acute ischemic left MCA stroke ([facility] [address] )  Assessment & Plan  With global aphasia, improving R sided weakness, motor planning difficulties, R inattention, and bowel/bladder incontinence  CTA with L M2 division  Not a candidate for systemic thrombolytics, and not a candidate for thrombectomy  3/31 MRI Brain: Large L MCA infarct with L BG involvement and superimposed microhemorrhage with mild mass effect and stable 3-mm L to R midling shift on most recent CTH (4/3)   Echo: EF 55%, LA dilatation, no RWMA, no PFO, no thrombus or mass  Thrombosis panel with abnormal antithrombin, Protein C/S activity, but per Neuro unclear significance in setting of acute stroke  Would repeat as outpatient  Will need Zio Patch/Loop Recorder with Cards Referral as outpatient  Discussed with Neurology on 4/9: Continue Keppra until outpatient f/u given location, temporal slowing, extent of lesion    Outpatient f/u with Neurology, Cardiology, and PCP    Patient neuro checks were decreased to Q4 as patient has been therapeutic on argatroban infusion x 24h     Plan:  Continue Q4 neuro checks  Continue Keppra pending OP follow up  Continue ASA and statin  PT/OT recommending post-acute rehab  Accepted to [facility], pending bed availability    * Saddle 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 [facility])  Assessment & Plan  Developed chest pain while at [facility] on  CTA PE: Saddle embolus and extensive clot burden throughout the visualized proximal pulmonary arteries  IR consultation suggested  RV/LV ratio less then 0 9, suggesting no elevation of right-sided pressures; however this is discordant with extensive clot burden  No evidence of pulmonary infarct  Dimer greater than 20   BNP elevated, troponin normal   Echo  shows: LVEF 55%, The right ventricular systolic pressure is moderately elevated  The estimated right ventricular systolic pressure is 61 61 mmHg  The right atrium is mildly dilated  Bilateral lower extremity venous duplex with bilateral DVTs  Case was discussed with IR by [facility] provider, patient is not a candidate for IR thrombectomy  PTT 68 -> 67 -> 64  Plan:   Transitioned from Argatroban gtt to Eliquis on 04/15  Remains hemodymically stable  Plt improved to 140  Continue Eliquis 5 mg BID  Medically stable for d/c pending bed placement at [facility]        Disposition: Pending insurance auth  Medically stable for discharge to [address] [place] [address]     Patient seen and examined  No acute events overnight  This morning, patient remains with baseline expressive/receptive aphasia  Denies any acute complaints       OBJECTIVE     Vitals:    23 2049 23 2210 23 0438 23 0724   BP: 145/91 150/93  142/81   Pulse: 96 87  85   Resp:    14   Temp:  98 3 °F (36 8 °C)  97 8 °F (36 6 °C)   TempSrc:       SpO2: 94% 100%  95%   Weight:   132 kg (291 lb 10 7 oz)    Height:          Temperature:   Temp (24hrs), Av 1 °F (36 7 °C), Min:97 8 °F (36 6 °C), Max:98 3 °F (36 8 °C)    Temperature: 97 8 °F (36 6 °C)  Intake & Output:  I/O       04/15 0701 04/16 0700 04/16 0701 04/17 0700 04/17 0701 04/18 0700    P  O  240      I V  (mL/kg)       Total Intake(mL/kg) 240 (1 8)      Urine (mL/kg/hr) 807 (0 3) 700 (0 2)     Stool  0     Total Output 807 700     Net -567 -700            Unmeasured Urine Occurrence  1 x     Unmeasured Stool Occurrence  1 x         Weights:   IBW (Ideal Body Weight): 79 9 kg    Body mass index is 38 48 kg/m²  Weight (last 2 days)     Date/Time Weight    04/17/23 0438 132 (291 67)    04/16/23 0600 133 (293 8)    04/15/23 0600 133 (294 2)        Physical Exam  Constitutional:       General: He is not in acute distress  HENT:      Head: Normocephalic and atraumatic  Eyes:      General: No scleral icterus  Conjunctiva/sclera: Conjunctivae normal    Cardiovascular:      Rate and Rhythm: Normal rate and regular rhythm  Heart sounds: No murmur heard  No friction rub  No gallop  Pulmonary:      Effort: Pulmonary effort is normal  No respiratory distress  Breath sounds: Normal breath sounds  No wheezing, rhonchi or rales  Abdominal:      General: Abdomen is flat  There is no distension  Palpations: Abdomen is soft  Tenderness: There is no abdominal tenderness  There is no guarding or rebound  Musculoskeletal:      Right lower leg: No edema  Left lower leg: No edema  Skin:     General: Skin is warm and dry  Neurological:      Comments: Expressive/receptive aphasia       LABORATORY DATA     Labs: I have personally reviewed pertinent reports    Results from last 7 days   Lab Units 04/16/23  0506 04/15/23  0449 04/14/23  0520 04/12/23  0313 04/11/23  1612   WBC Thousand/uL 9 33 8 87 8 11   < > 10 05   HEMOGLOBIN g/dL 13 2 13 3 12 8   < > 14 1   HEMATOCRIT % 41 9 41 9 39 3   < > 43 2   PLATELETS Thousands/uL 140* 114* 81*   < > 49*   NEUTROS PCT %  --  66 66  --  67   MONOS PCT %  --  10 9  --  10    < > = values in this interval not displayed  Results from last 7 days   Lab Units 04/16/23  0506 04/15/23  0449 04/14/23  0520 04/13/23  0501 04/12/23  0313 04/11/23  1222   POTASSIUM mmol/L 3 9 3 8 4 0   < > 4 3 4 1   CHLORIDE mmol/L 109* 107 108   < > 107 104   CO2 mmol/L 23 23 26   < > 23 26   BUN mg/dL 18 16 15   < > 19 19   CREATININE mg/dL 0 95 0 93 0 90   < > 1 08 1 20   CALCIUM mg/dL 8 6 9 0 8 8   < > 8 7 9 7   ALK PHOS U/L  --   --   --   --  66 71   ALT U/L  --   --   --   --  29 36   AST U/L  --   --   --   --  25 39    < > = values in this interval not displayed  Results from last 7 days   Lab Units 04/15/23  0449 04/14/23  0520   MAGNESIUM mg/dL 2 4 2 2          Results from last 7 days   Lab Units 04/15/23  1059 04/14/23  2334 04/14/23  1032 04/12/23  0521 04/12/23  0313   INR   --   --   --   --  1 19   PTT seconds 63* 68* 64*   < > 34    < > = values in this interval not displayed  IMAGING & DIAGNOSTIC TESTING     Radiology Results: I have personally reviewed pertinent reports  No results found  Other Diagnostic Testing: I have personally reviewed pertinent reports      ACTIVE MEDICATIONS     Current Facility-Administered Medications   Medication Dose Route Frequency   • acetaminophen (TYLENOL) oral suspension 975 mg  975 mg Oral Q8H PRN   • amLODIPine (NORVASC) tablet 5 mg  5 mg Oral Daily   • apixaban (ELIQUIS) tablet 5 mg  5 mg Oral BID   • aspirin chewable tablet 81 mg  81 mg Oral Daily   • atorvastatin (LIPITOR) tablet 40 mg  40 mg Oral QPM   • calcium carbonate (TUMS) chewable tablet 1,000 mg  1,000 mg Oral TID PRN   • famotidine (PEPCID) tablet 20 mg  20 mg Oral Q12H [facility]   • insulin lispro (HumaLOG) 100 units/mL subcutaneous injection 1-5 Units  1-5 Units Subcutaneous TID AC   • insulin lispro (HumaLOG) 100 units/mL subcutaneous injection 1-5 Units  1-5 Units Subcutaneous HS   • levETIRAcetam (KEPPRA) tablet 750 mg  750 mg Oral Q12H NORRIS   • losartan (COZAAR) tablet 25 mg  25 "mg Oral Daily   • metoprolol tartrate (LOPRESSOR) tablet 25 mg  25 mg Oral Q12H [address]   • ondansetron (ZOFRAN-ODT) dispersible tablet 4 mg  4 mg Oral Q6H PRN   • oxyCODONE (ROXICODONE) split tablet 2 5 mg  2 5 mg Oral Q6H PRN   • senna (SENOKOT) tablet 8 6 mg  1 tablet Oral HS   • tamsulosin (FLOMAX) capsule 0 4 mg  0 4 mg Oral After Dinner       VTE Pharmacologic Prophylaxis: Reason for no pharmacologic prophylaxis Systemic AC  VTE Mechanical Prophylaxis: sequential compression device    Portions of the record may have been created with voice recognition software  Occasional wrong word or \"sound a like\" substitutions may have occurred due to the inherent limitations of voice recognition software    Read the chart carefully and recognize, using context, where substitutions have occurred   ==  Abigail Mace MD  [address]  Internal Medicine Residency PGY-1     "

## 2023-04-17 NOTE — CASE MANAGEMENT
[address] received request for authorization from Care Manager  Authorization request for: [address] Name: [address] NPI: [id number]  Facility MD: Pete Bower NPI: [id number]  Authorization initiated by contacting insurance: [employer] [address] [employer] Via: Phone  [phone]  Pending Reference #: [id number]  Clinicals submitted via: Fax   [phone]

## 2023-04-17 NOTE — PLAN OF CARE
Problem: PHYSICAL THERAPY ADULT  Goal: Performs mobility at highest level of function for planned discharge setting  See evaluation for individualized goals  Description: Treatment/Interventions: Functional transfer training, LE strengthening/ROM, Therapeutic exercise, Endurance training, Patient/family training, Equipment eval/education, Bed mobility, Gait training, Spoke to nursing          See flowsheet documentation for full assessment, interventions and recommendations  Outcome: Progressing  Note: Prognosis: Good  Problem List: Decreased strength, Decreased endurance, Impaired balance, Decreased mobility, Decreased coordination, Impaired judgement, Decreased safety awareness, Impaired sensation, Obesity, Impaired tone  Assessment: seen for PT session for gait and transfers; neuro re-edu and standing balance  pt limted by aphasia and R sided deficits; required some inc assist today as compared to prior session/ limited giat distnaces to 40'  rehab on d/c recomended to maximize funcitonal potential   Barriers to Discharge: Inaccessible home environment, Decreased caregiver support     PT Discharge Recommendation: Post acute rehabilitation services    See flowsheet documentation for full assessment

## 2023-04-17 NOTE — PHYSICAL THERAPY NOTE
PHYSICAL THERAPY TREATMENT  NAME:  Riki Davila III  DATE: 04/17/23    AGE:   41 y o  Mrn:   [id number]  ADMIT DX:  No admission diagnoses are documented for this encounter  Past Medical History:   Diagnosis Date    Hypertensive emergency 3/30/2023     No past surgical history on file  Length Of Stay: 5     04/17/23 1139   PT Last Visit   PT Visit Date 04/17/23   Note Type   Note Type Treatment for insurance authorization   Education   Education Provided Yes   End of Consult   Patient Position at End of Consult Bedside chair   Pain Assessment   Pain Assessment Tool 0-10   Pain Score No Pain   Cognition   Arousal/Participation Alert; Cooperative   Attention Within functional limits   Following Commands Follows one step commands without difficulty   Comments pleasant and motivated- uses gestures to communicate (thumbs up/ down)- limited by expressive aphasia   Subjective   Subjective very motivated- pleasant  Bed Mobility   Supine to Sit 4  Minimal assistance   Additional items Assist x 1; Increased time required;Verbal cues   Additional Comments sits EOB w/ F+ balance prior to gait trianing- vitals including BP stable on RA- 0 dizziness   Transfers   Sit to Stand 4  Minimal assistance   Additional items Assist x 1; Increased time required;Verbal cues   Stand to Sit 4  Minimal assistance   Additional items Assist x 1; Increased time required;Verbal cues   Stand pivot 4  Minimal assistance   Additional items Assist x 1; Increased time required;Verbal cues   Ambulation/Elevation   Gait pattern Short stride; Inconsistent rui   Gait Assistance 4  Minimal assist  (RW + chair follow for safety)   Additional items Assist x 1;Verbal cues; Tactile cues   Assistive Device Rolling walker   Distance 40+40+40 1 standing rest break     Stair Management Assistance Not tested   Balance   Static Sitting Fair +   Dynamic Sitting Fair   Static Standing Fair -   Dynamic Standing Poor +   Ambulatory Poor +   Endurance Deficit Endurance Deficit Yes   Activity Tolerance   Activity Tolerance Patient tolerated treatment well   Medical Staff Made Aware OT and RN   Nurse Made Aware yes   Exercises   Balance training  dynamic standing balance   Assessment   Prognosis Good   Problem List Decreased strength;Decreased endurance; Impaired balance;Decreased mobility; Decreased coordination; Impaired judgement;Decreased safety awareness; Impaired sensation;Obesity; Impaired tone   Assessment seen for PT session for gait and transfers; neuro re-edu and standing balance  pt limted by aphasia and R sided deficits; required some inc assist today as compared to prior session/ limited giat distnaces to 40'  rehab on d/c recomended to maximize funcitonal potential    Goals   Patient Goals regaining indep   STG Expiration Date 04/26/23   PT Treatment Day 4   Plan   Treatment/Interventions ADL retraining;Functional transfer training;LE strengthening/ROM; Elevations; Therapeutic exercise; Endurance training;Patient/family training;Equipment eval/education; Bed mobility;Gait training; Compensatory technique education;Continued evaluation   Progress Progressing toward goals   PT Frequency 3-5x/wk   Recommendation   PT Discharge Recommendation Post acute rehabilitation services   Equipment Recommended Walker   AM-PAC Basic Mobility Inpatient   Turning in Flat Bed Without Bedrails 3   Lying on Back to Sitting on Edge of Flat Bed Without Bedrails 3   Moving Bed to Chair 2   Standing Up From Chair Using Arms 2   Walk in Room 2   Climb 3-5 Stairs With Railing 1   Basic Mobility Inpatient Raw Score 13   Basic Mobility Standardized Score 33 99   Turning Head Towards Sound 4   Follow Simple Instructions 3   Low Function Basic Mobility Raw Score  20   Low Function Basic Mobility Standardized Score  32 8   Highest Level Of Mobility   JH-HLM Goal 4: Move to chair/commode   JH-HLM Achieved 7: Walk 25 feet or more   Education   Education Provided Mobility training   Patient Explanation/teachback used; Reinforcement needed     The patient's AM-PAC Basic Mobility Inpatient Short Form Raw Score is 13  A Raw score of less than or equal to 16 suggests the patient may benefit from discharge to post-acute rehabilitation services  Please also refer to the recommendation of the Physical Therapist for safe discharge planning              Nathan Joyner, PT

## 2023-04-17 NOTE — PLAN OF CARE
Problem: OCCUPATIONAL THERAPY ADULT  Goal: Performs self-care activities at highest level of function for planned discharge setting  See evaluation for individualized goals  Description: Treatment Interventions: ADL retraining, Functional transfer training, UE strengthening/ROM, Endurance training, Visual perceptual retraining, Cognitive reorientation, Patient/family training, Equipment evaluation/education, Compensatory technique education, Activityengagement, Energy conservation, Neuromuscular reeducation, Fine motor coordination activities          See flowsheet documentation for full assessment, interventions and recommendations  Outcome: Progressing  Note: Limitation: Decreased ADL status, Decreased UE ROM, Decreased UE strength, Decreased cognition, Decreased Safe judgement during ADL, Decreased endurance, Decreased self-care trans, Decreased high-level ADLs, Decreased fine motor control  Prognosis: Good  Assessment: Pt was seen this date for OT tx session focusing on self care tasks, bed mobility, sit to stand progressions, standing tolerance, tranfers, functional mobility, safety awareness, compensatory techniques, energy conservation, and overall activity tolerance  Pt presents    Supine and is agreeable to OT tx session  All vitals WNL, pt does not c/o dizziness this date  BP taken in supine and at end of session with not significant change and WNL  Pt completes previously mentioned tasks at documented assist levels please see above in flow sheet  Pt continues to require overall Min A for transfers and mobility and Min - Mod A for self care tasks  Pt is overall limited by expressive aphasia, decreased balance, increased fatigue, decreased strength, endurance, and activity tolerance and continues to function below baseline level  Pt resting OOB in chair at end of session with all needs in reach and alarm on   Pt would benefit from continued OT Tx to improve overall functional abilities and increase independence  Will continue to follow with current POC       OT Discharge Recommendation: Post acute rehabilitation services

## 2023-04-17 NOTE — PROGRESS NOTES
PHYSICAL MEDICINE AND REHABILITATION   PREADMISSION ASSESSMENT     Projected [facility] Diagnoses:  Impairment of mobility, safety, Activities of Daily Living (ADLs), and cognitive/communication skills due to Stroke:  01 2  Right Body Involvement (Left Brain)    Etiologic: L MCA CVA with Superimposed Micro-hemorrhage    Date of Onset: 3/31/2023       Date of surgery: N/A    PATIENT INFORMATION  Name: Gracie Summers III Phone #: [phone]  Address: [address] [place] [address] [place] [address]  YOB: 1975 Age: 41 y o  SS#   Marital Status: Single  Ethnicity:   Employment Status: currently employed  Extended Emergency Contact Information  Primary Emergency Contact: Sarah Escobar  Address: [address] [place] [address], [address] [place] [address] Phone: [phone]  Mobile Phone: [phone]  Relation: Mother  Advance Directive: Level 1 - Full Code, No Advanced Directives on File    INSURANCE/COVERAGE:     Primary Payor: BLUE CROSS / Plan: Kyle Colungaire / Product Type: Blue Fee for Service /   Secondary Payer:<NONE>   Payer Contact: [facility]   Payer Contact:   Contact Phone: (p) - [phone] (d) [phone] Contact Phone:     Authorization #: [id number]  Coverage Dates: 4/18/2023 - 5/6/2023  LCD: 5/6/2023 - Updates to be faxed to [phone]    Medical Record #: [id number]    REFERRAL SOURCE:   Referring provider: Lesley Baez*  Referring facility: [address] [place] [address]  Room: [facility]/[place] [address]  PCP: No primary care provider on file  PCP phone number: None    MEDICAL INFORMATION  HPI: 41 y  o  male who presents found down by mom at 14:45 after hearing two thuds  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  No known past medical history but did not see a physician for check ups as an adult   Arrived to [facility] as a stroke alert, GCS 9  Central noxious stimuli LUE response, RUE responds to pain, LLE no response, RLE moves toes to "pain  Intubated for AWP  CTH no hemorrhage or other acute intracranial pathology  CTA L M2 occlusion  Sent to B for CT perfusion scan and possible thrombectomy  CT perfusion showed no perfusion mismatching with 80 cc of nonviable brain tissues  Was brought directly to ICU  While in CT scanner noted to be vigirousoly moving LUE not RUE and did inadvertently pull out a line and an IV      Neurosurgery was consulted who recommended no acute neurosurgical intervention given stroke burden on CTH appears to be mostly left temporal lobe  Also, he was not a candidate for TNK or thrombectomy  He remained in ICU for a few days without any complications or need for intubation      MRI brain without contrast on 3/31 showed large left MCA infarct with left basal ganglia involvement and superimposed microhemorrhage noted with small left lateral ventricle mass effect  Repeat CTH on 4/2 showed left MCA infarct, small new 3 mm left to right midline shift, however repeat CTH were stable      TTE revealed LA dilation w/o thrombus, LVEF 55%  Thrombosis panel was ordered which showed diminished protein C and protein S activity, however utility of this in the setting of an acute stroke is unclear so cardioembolic stroke was suspected  HODAN was obtained which showed LVEF 55%, no wall motion abnormalities and no thrombus  Neurology recommended outpatient loop recorder/Zio patch for evaluation of paroxysmal atrial fibrillation      During the hospital course, he remained aphasic with some improvement on neurologic deficits  There was no other complications noted  \"    On 4/11, patient developed chest pain while at [facility]  CTA PE showed Saddle Embolus and extensive clot burden throughout the visualized proximal pulmonary arteries  IR consult suggested  RV/LV radio less than 0 9, suggesting no elevation of right sided pressures  No evidence of pulmonary infarct    Echo on 4/12 shows LVEF 55%, the right ventricular systolic pressure was " moderately elevated  The estimated right ventricular systolic pressure was 56 mmHg  The right atrium was mildly dilated  Venous duplex completed on bilateral lower extremities which showed bilateral DVT's  Patient was not a candidate for IR thrombectomy  He was transitioned from argatroban gtt to Eliquis on 4/15  His platelets improved to 140 and he is to continue Eliquis 5mg BID  He has been deemed hemodynamically stable at this time  PT/OT therapies were consulted and continue to follow patient at this time  PM&R was consulted and are recommending inpatient acute rehab when medically stable  All involved medical disciplines feel/agree patient is medically ready for discharge at this time  Inpatient acute rehabilitation physician was consulted  Upon review of patient’s case and correspondence with PT/OT therapies and PM&R services, [facility] physician feels he will benefit and is a good candidate and appropriate for inpatient acute rehab at this time  He has demonstrated the willingness, desire and tolerance to participate in the required 3 hours or more of therapies per day  COVID Vaccination Status:  Unknown  COVID testing completed 4/16 - NEGATIVE    Past Medical History:   Past Surgical History: Allergies:     Past Medical History:   Diagnosis Date   • Hypertensive emergency 3/30/2023    No past surgical history on file  Allergies   Allergen Reactions   • Heparin Other (See Comments)     HIT         Medical/functional conditions requiring inpatient rehabilitation: Impaired balance, ambulation and mobility and ADL self-care    Risk for medical/clinical complications: Risk for Falls, uncontrolled pain, DVT/PE, skin breakdown, infection, hypo/hypertension, hypo/hyperglycemia      Comorbidities:    • Aspiration pneumonia   • Hypertension  • Diabetes type 2  • Obesity   • DVT ppx: heparin SQ and SCD    CURRENT VITAL SIGNS:   Temp:  [97 6 °F (36 4 °C)-98 1 °F (36 7 °C)] 97 6 °F (36 4 °C)  HR:  Shelley Malik 81  Resp:  [16-17] 17  BP: (123-128)/(83-85) 123/83   Intake/Output Summary (Last 24 hours) at 4/19/2023 1149  Last data filed at 4/19/2023 0953  Gross per 24 hour   Intake 1101 ml   Output --   Net 1101 ml        LABORATORY RESULTS:      Lab Results   Component Value Date    HGB 13 2 04/16/2023    HCT 41 9 04/16/2023    WBC 9 33 04/16/2023     Lab Results   Component Value Date    BUN 18 04/16/2023    K 3 9 04/16/2023     (H) 04/16/2023    GLUCOSE 280 (H) 03/30/2023    CREATININE 0 95 04/16/2023     Lab Results   Component Value Date    PROTIME 15 3 (H) 04/12/2023    INR 1 19 04/12/2023        DIAGNOSTIC STUDIES:  No results found      PRECAUTIONS/SPECIAL NEEDS:  Tobacco:   Social History     Tobacco Use   Smoking Status Never   Smokeless Tobacco Never   , Alcohol:    Social History     Substance and Sexual Activity   Alcohol Use Never    Anticoagulation:  Eliquis, Blood Sugar Management: as per MD recommendations, Edema Management, Safety Concerns, Pain Management, Aspiration Risk/Precautions, Dietary Restrictions: William/CHO Controlled; Consistent Carbohydrate Diet Level 3 (6 carb servings/90 grams CHO/meal)  And Sodium 4 GM (CUAUHTEMOC) and Language Preference: English    MEDICATIONS:     Current Facility-Administered Medications:   •  acetaminophen (TYLENOL) oral suspension 975 mg, 975 mg, Oral, Q8H PRN, Addi Brewster PA-C  •  amLODIPine (NORVASC) tablet 5 mg, 5 mg, Oral, Daily, Addi Brewster PA-C, 5 mg at 04/19/23 0845  •  apixaban (ELIQUIS) tablet 5 mg, 5 mg, Oral, BID, Emma Nuñez DO, 5 mg at 04/19/23 0845  •  aspirin chewable tablet 81 mg, 81 mg, Oral, Daily, Addi Brewster PA-C, 81 mg at 04/19/23 0845  •  atorvastatin (LIPITOR) tablet 40 mg, 40 mg, Oral, QPM, Bentley Navarrete PA-C, 40 mg at 04/18/23 1832  •  calcium carbonate (TUMS) chewable tablet 1,000 mg, 1,000 mg, Oral, TID PRN, Addi Brewster PA-C  •  famotidine (PEPCID) tablet 20 mg, 20 mg, Oral, Q12H [address], Bentley Navarrete PA-C, 20 mg at 04/19/23 0845  •  insulin lispro (HumaLOG) 100 units/mL subcutaneous injection 1-5 Units, 1-5 Units, Subcutaneous, TID AC, 1 Units at 04/15/23 1328 **AND** Fingerstick Glucose (POCT), , , TID AC, Bentley Navarrete PA-C  •  insulin lispro (HumaLOG) 100 units/mL subcutaneous injection 1-5 Units, 1-5 Units, Subcutaneous, HS, Talya Yee PA-C, 1 Units at 04/18/23 2208  •  levETIRAcetam (KEPPRA) tablet 750 mg, 750 mg, Oral, Q12H [facility], Bentley Navarrete PA-C, 750 mg at 04/19/23 0845  •  losartan (COZAAR) tablet 25 mg, 25 mg, Oral, Daily, Coco Holguin MD, 25 mg at 04/19/23 0845  •  metoprolol tartrate (LOPRESSOR) tablet 25 mg, 25 mg, Oral, Q12H [facility], Bentley Navarrete PA-C, 25 mg at 04/19/23 0845  •  ondansetron (ZOFRAN-ODT) dispersible tablet 4 mg, 4 mg, Oral, Q6H PRN, Talya Yee PA-C  •  oxyCODONE (ROXICODONE) split tablet 2 5 mg, 2 5 mg, Oral, Q6H PRN, Talya Yee PA-C  •  senna (SENOKOT) tablet 8 6 mg, 1 tablet, Oral, HS, Bentley Navarrete PA-C, 8 6 mg at 04/18/23 2200  •  tamsulosin (FLOMAX) capsule 0 4 mg, 0 4 mg, Oral, After Dinner, Talya Yee PA-C, 0 4 mg at 04/18/23 1832    SKIN INTEGRITY:   Bruising on B/L arms and abdomen, non-tenting  Rash located on LE's    PRIOR LEVEL OF FUNCTION:  He lives in [facility] single family home  Silva Joseph III is single and lives with his parents  Self Care: Independent, Indoor Mobility: Independent, Stairs (in/outdoor): Independent and Cognition: Independent    FALLS IN THE LAST 6 MONTHS: 1-4    HOME ENVIRONMENT:  The living area: can live on one level  There are stairs with rails and also a ramped entrance to enter the home  The patient will have 24 hour supervision/physical assistance available upon discharge  PREVIOUS DME:  Equipment in home (previous DME): None    FUNCTIONAL STATUS:  Physical Therapy Occupational Therapy Speech Therapy    As per PT: 4/17/23     Subjective   Subjective very motivated- pleasant     Bed Mobility   Supine to Sit 4  Minimal assistance Additional items Assist x 1; Increased time required;Verbal cues   Additional Comments sits EOB w/ F+ balance prior to gait trianing- vitals including BP stable on RA- 0 dizziness   Transfers   Sit to Stand 4  Minimal assistance   Additional items Assist x 1; Increased time required;Verbal cues   Stand to Sit 4  Minimal assistance   Additional items Assist x 1; Increased time required;Verbal cues   Stand pivot 4  Minimal assistance   Additional items Assist x 1; Increased time required;Verbal cues   Ambulation/Elevation   Gait pattern Short stride; Inconsistent rui   Gait Assistance 4  Minimal assist  (RW + chair follow for safety)   Additional items Assist x 1;Verbal cues; Tactile cues   Assistive Device Rolling walker   Distance 40+40+40 1 standing rest break  Stair Management Assistance Not tested   Balance   Static Sitting Fair +   Dynamic Sitting Fair   Static Standing Fair -   Dynamic Standing Poor +   Ambulatory Poor +   Endurance Deficit   Endurance Deficit Yes   Activity Tolerance   Activity Tolerance Patient tolerated treatment well   Medical Staff Made Aware OT and RN   Nurse Made Aware yes   Exercises   Balance training  dynamic standing balance   Assessment   Prognosis Good   Problem List Decreased strength;Decreased endurance; Impaired balance;Decreased mobility; Decreased coordination; Impaired judgement;Decreased safety awareness; Impaired sensation;Obesity; Impaired tone   Assessment seen for PT session for gait and transfers; neuro re-edu and standing balance  pt limted by aphasia and R sided deficits; required some inc assist today as compared to prior session/ limited giat distnaces to 40'  rehab on d/c recomended to maximize funcitonal potential        As per OT:  4/19/23  ADL   Where Assessed Chair   Eating Assistance 5  Supervision/Setup   Eating Deficit Setup; Increased time to complete;Bringing food to mouth assist   Grooming Assistance 4  Minimal Assistance   Grooming Deficit "Setup;Steadying;Verbal cueing;Supervision/safety; Increased time to complete;Wash/dry hands; Wash/dry face;Brushing hair   UB Bathing Assistance 4  Minimal Assistance   UB Bathing Deficit Setup;Steadying;Supervision/safety; Increased time to complete; Chest;Right arm;Left arm; Abdomen   UB Dressing Assistance 4  Minimal Assistance   UB Dressing Deficit Setup;Steadying;Verbal cueing;Supervision/safety; Increased time to complete; Thread RUE; Thread LUE;Pull over head;Pull around back;Pull down in back   LB Dressing Assistance 4  Minimal Assistance   LB Dressing Deficit Setup;Steadying;Supervision/safety; Increased time to complete;Verbal cueing; Don/doff R sock; Don/doff L sock   Toileting Assistance  5  Supervision/Setup   Toileting Deficit Setup;Use of bedpan/urinal setup   Bed Mobility   Supine to Sit 5  Supervision   Additional items HOB elevated; Bedrails; Increased time required;Verbal cues   Sit to Supine Unable to assess   Additional Comments At end of session, pt left sitting upright in recliner with all functional needs in reach with chair alarm activated   Transfers   Sit to Stand 4  Minimal assistance   Additional items Assist x 1; Increased time required;Verbal cues   Stand to Sit 4  Minimal assistance   Additional items Assist x 1; Increased time required;Verbal cues   Additional Comments w/ RW for safety and support   Functional Mobility   Functional Mobility 4  Minimal assistance   Additional Comments Pt completed short household functional mobility distances within room with Min A x1 w/ RW for safety and support with verbal cues for RW management   Additional items Rolling walker   Subjective   Subjective \"Yep yep\"   Cognition   Arousal/Participation Alert; Responsive;Arousable; Cooperative   Attention Within functional limits   Memory Decreased recall of precautions   Following Commands Follows one step commands with increased time or repetition   Comments Pt very pleasant and cooperative; limited by expressive " aphasia   Activity Tolerance   Activity Tolerance Patient tolerated treatment well;Patient limited by fatigue   Medical Staff Made Aware RN cleared   Assessment   Assessment Pt is a 40 yo male who actively participated in skilled OT session on 4/19/2023  Treatment focused to improve functional transfers with fall prevention strategies, static/dynamic balance, postural/trunk control, proper body mechanics, functional use of b/l UE's, higher level cognitive functions, safety awareness, and overall increased activity tolerance in ADL/IADL/leisure tasks  Upon arrival, pt found lying supine in bed and was agreeable to OT session  Pt completed bed mobility tasks @ supervision level and sat EOB to completed UB bathing/dressing tasks w/ Min A, LB dressing tasks w/ Min A, and grooming tasks w/ Min A  During all functional activity, pt demonstrating good fine motor/manipulation skills with common ADL objects  Pt completed STS w/ Min A x1 w/ RW and completed short household functional mobility distances within room w/ Min A x1 w/ RW, requiring verbal cues for RW management @ times  At the end of the session, pt was left sitting upright in recliner with all functional needs in reach  Pt demonstrates gradual functional improvements towards OT goals however continues to be functioning below occupational baseline and is still limited by the following limitations/impairments which were addressed through skilled instruction: expressive aphasia, generalized weakness, balance, endurance/activity tolerance, strength, pain, and safety awareness  At this time, recommend discharge to post-acute rehab when medically stable  The patient's raw score on the AM-PAC Daily Activity Inpatient Short Form is 17  A raw score of less than 19 suggests the patient may benefit from discharge to post-acute rehabilitation services  Please refer to the recommendation of the Occupational Therapist for safe discharge planning   Established OT goals will be continued 3-5x/wk to address immediate acute care needs and underlying performance skills to maximize occupational performance and safety to return to OF  Pt was transferred back to acute care due to change in medical status  Pt was newer to the acute rehab center in which goals were not met at this time, but due to the severity given pt's expressive>receptive language deficits, pt will highly benefit from return to acute rehab once medically stable to continue to maximize overall language skills     CARE SCORES:  Self Care:  Eatin: Supervision or touching  assistance  Oral hygiene: 04: Supervision or touching  assistance  Toilet hygiene: 03: Partial/moderate assistance  Shower/bathing self: 04: Supervision or touching  assistance  Upper body dressin: Supervision or touching  assistance  Lower body dressin: Supervision or touching  assistance  Putting on/taking off footwear: 04: Supervision or touching  assistance  Transfers:  Roll left and right: 04: Supervision or touching  assistance  Sit to lyin: Supervision or touching  assistance  Lying to sitting on side of bed: 04: Supervision or touching  assistance  Sit to stand: 04: Supervision or touching  assistance  Chair/bed to chair transfer: 04: Supervision or touching  assistance  Toilet transfer: 04: Supervision or touching  assistance  Mobility:  Walk 10 ft: 04: Supervision or touching  assistance  Walk 50 ft with two turns: 09: Not applicable  Walk 840ET: 09: Not applicable    CURRENT GAP IN FUNCTION  Prior to Admission: Functional Status: Patient was independent with mobility/ambulation, transfers, ADL's, IADL's  Expected functional outcomes:  It is expected that with skilled acute rehabilitation services the patient will progress to Supervision for self care and Supervision for mobility     Estimated length of stay: 10 to 14 days    Anticipated Post-Discharge Disposition/Treatment  Disposition: Return to previous home/apartment  Outpatient Services: Physical Therapy (PT), Occupational Therapy (OT) and Speech Therapy    BARRIERS TO DISCHARGE  Weakness, Diminished cognition/Mentation change, Balance Difficulty, Fatigue, Home Accessibility, Caregiver Accessibility, Financial Resources, Equipment Needs and Resource Availability    INTERVENTIONS FOR DISCHARGE  Adaptive equipment, Patient/Family/Caregiver Education, [employer], Bariatric Equipment, Support Group, Financial Assistance, Arrange DME needs, Medication Changes as per MD recommendations, Therapy exercises, Center of balance support  and Energy conservation education     REQUIRED THERAPY:  Patient will require PT, OT and ST 60 minutes each per day, five days per week to achieve rehab goals  REQUIRED FUNCTIONAL AND MEDICAL MANAGEMENT FOR INPATIENT REHABILITATION:  Pain Management: Overall pain is well controlled, Deep Vein Thrombosis (DVT) Prophylaxis:  Eliquis, Diabetes Management: continue sliding scale insulin, patient to do finger sticks as ordered, SLIM to continue to manage diabetes, and Nursing education and bowel/bladder management, internal medicine to manage/monitor medical conditions, PM&R to maximize function and provide medical oversight, PT/OT intervention, patient/family education/training, and any consults as needed  ,      RECOMMENDED LEVEL OF CARE: 41 y  o  male who presents found down by mom at 14:45 after hearing two thuds  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  No known past medical history but did not see a physician for check ups as an adult  Arrived to [facility] as a stroke alert, GCS 9  Central noxious stimuli LUE response, RUE responds to pain, LLE no response, RLE moves toes to pain  Intubated for AWP  CTH no hemorrhage or other acute intracranial pathology  CTA L M2 occlusion  [facility] [place] [facility] and possible thrombectomy  CT perfusion showed no perfusion mismatching with 80 cc of nonviable brain tissues   Was "brought directly to ICU  While in CT scanner noted to be vigirousoly moving LUE not RUE and did inadvertently pull out a line and an IV      Neurosurgery was consulted who recommended no acute neurosurgical intervention given stroke burden on CTH appears to be mostly left temporal lobe  Also, he was not a candidate for TNK or thrombectomy  He remained in ICU for a few days without any complications or need for intubation      MRI brain without contrast on 3/31 showed large left MCA infarct with left basal ganglia involvement and superimposed microhemorrhage noted with small left lateral ventricle mass effect  Repeat CTH on 4/2 showed left MCA infarct, small new 3 mm left to right midline shift, however repeat CTH were stable      TTE revealed LA dilation w/o thrombus, LVEF 55%  Thrombosis panel was ordered which showed diminished protein C and protein S activity, however utility of this in the setting of an acute stroke is unclear so cardioembolic stroke was suspected  HODAN was obtained which showed LVEF 55%, no wall motion abnormalities and no thrombus  Neurology recommended outpatient loop recorder/Zio patch for evaluation of paroxysmal atrial fibrillation      During the hospital course, he remained aphasic with some improvement on neurologic deficits  There was no other complications noted  \"    On 4/11, patient developed chest pain while at [facility]  CTA PE showed Saddle Embolus and extensive clot burden throughout the visualized proximal pulmonary arteries  IR consult suggested  RV/LV radio less than 0 9, suggesting no elevation of right sided pressures  No evidence of pulmonary infarct  Echo on 4/12 shows LVEF 55%, the right ventricular systolic pressure was moderately elevated  The estimated right ventricular systolic pressure was 56 mmHg  The right atrium was mildly dilated  Venous duplex completed on bilateral lower extremities which showed bilateral DVT's  Patient was not a candidate for IR thrombectomy   " He was transitioned from argatroban gtt to Eliquis on 4/15  His platelets improved to 140 and he is to continue Eliquis 5mg BID  Prior to admission / hospital stay patient was Independent with all ADLs, functional mobility and IADLs  Currently with PT therapies: Patient is requiring Min A for bed mobility, transfers and ambulation  Currently with OT therapies: Patient is S for eating, Min A for grooming and UB bathe/dress, Min A with LB dressing and S for toileting tasks  Nursing is being recommended for medication distribution and management, bowel and bladder management and educational purposes, internal medicine to continue to monitor and manage medical conditions, PM&R to maximize  function and provide medical oversight, and inpatient rehab to maximize self-care, mobility, strength and endurance upon discharge to home

## 2023-04-18 LAB
GLUCOSE SERPL-MCNC: 116 MG/DL (ref 65–140)
GLUCOSE SERPL-MCNC: 116 MG/DL (ref 65–140)
GLUCOSE SERPL-MCNC: 126 MG/DL (ref 65–140)
GLUCOSE SERPL-MCNC: 126 MG/DL (ref 65–140)
GLUCOSE SERPL-MCNC: 145 MG/DL (ref 65–140)
GLUCOSE SERPL-MCNC: 145 MG/DL (ref 65–140)
GLUCOSE SERPL-MCNC: 150 MG/DL (ref 65–140)
GLUCOSE SERPL-MCNC: 150 MG/DL (ref 65–140)

## 2023-04-18 PROCEDURE — 82948 REAGENT STRIP/BLOOD GLUCOSE: CPT

## 2023-04-18 PROCEDURE — 99232 SBSQ HOSP IP/OBS MODERATE 35: CPT | Performed by: INTERNAL MEDICINE

## 2023-04-18 RX ADMIN — ASPIRIN 81 MG 81 MG: 81 TABLET ORAL at 09:40

## 2023-04-18 RX ADMIN — LEVETIRACETAM 750 MG: 750 TABLET, FILM COATED ORAL at 22:00

## 2023-04-18 RX ADMIN — FAMOTIDINE 20 MG: 20 TABLET, FILM COATED ORAL at 09:40

## 2023-04-18 RX ADMIN — APIXABAN 5 MG: 5 TABLET, FILM COATED ORAL at 09:41

## 2023-04-18 RX ADMIN — SENNOSIDES 8.6 MG: 8.6 TABLET, FILM COATED ORAL at 22:00

## 2023-04-18 RX ADMIN — ATORVASTATIN CALCIUM 40 MG: 40 TABLET, FILM COATED ORAL at 18:32

## 2023-04-18 RX ADMIN — INSULIN LISPRO 1 UNITS: 100 INJECTION, SOLUTION INTRAVENOUS; SUBCUTANEOUS at 22:08

## 2023-04-18 RX ADMIN — TAMSULOSIN HYDROCHLORIDE 0.4 MG: 0.4 CAPSULE ORAL at 18:32

## 2023-04-18 RX ADMIN — LOSARTAN POTASSIUM 25 MG: 25 TABLET, FILM COATED ORAL at 09:41

## 2023-04-18 RX ADMIN — METOPROLOL TARTRATE 25 MG: 25 TABLET, FILM COATED ORAL at 22:00

## 2023-04-18 RX ADMIN — FAMOTIDINE 20 MG: 20 TABLET, FILM COATED ORAL at 22:00

## 2023-04-18 RX ADMIN — AMLODIPINE BESYLATE 5 MG: 5 TABLET ORAL at 09:41

## 2023-04-18 RX ADMIN — LEVETIRACETAM 750 MG: 750 TABLET, FILM COATED ORAL at 09:41

## 2023-04-18 RX ADMIN — METOPROLOL TARTRATE 25 MG: 25 TABLET, FILM COATED ORAL at 09:44

## 2023-04-18 RX ADMIN — APIXABAN 5 MG: 5 TABLET, FILM COATED ORAL at 18:32

## 2023-04-18 NOTE — PROGRESS NOTES
INTERNAL MEDICINE RESIDENCY PROGRESS NOTE     Name: Silva Joseph III   Age & Sex: 41 y o  male   MRN: [id number]  Unit/Bed#: -63   Encounter: [id number]  Team: SOD Team C     PATIENT INFORMATION     Name: Silva Joseph III   Age & Sex: 41 y o  male   MRN: [id number]  Hospital Stay Days: 6    ASSESSMENT/PLAN     Principal Problem:    Saddle 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 ([facility])  Active Problems:    Acute ischemic left MCA stroke ([facility])    Obesity, Class III, BMI 40-49 9 (morbid obesity) ([facility] )    Type 2 diabetes mellitus with circulatory disorder, without long-term current use of insulin ([facility])    Hypertension    Thrombocytopenia ([facility])    Urinary retention    DVT of lower extremity, bilateral ([facility])      DVT of lower extremity, bilateral ([facility] )  Assessment & Plan  • 4/12 LE duplex: RLE: Evidence suggestive of Acute occlusive deep vein thrombosis noted in the Peroneal veins  Non Occlusive Acute deep vein thrombosis in noted in the Posterior Tibial veins  LLE: Evidence suggestive of Acute occlusive deep vein thrombosis noted in the Posterior Tiblial, Peroneal, and Soleal veins  Evidence suggestive of Acute non occlusive deep vein thrombosis noted in the Mid-Distal Femoral and Popliteal veins      Plan:  • Anticoagulation - see A&P for Saddle Embolus      Urinary retention  Assessment & Plan  • Failed urinary retention protocol requiring replacement of acharya on 4/13  • Flomax initiated 4/12     Plan:   • Acharya is out => Continue urinary retention protocol   • Continue flomax  • Encourage ambulation    Thrombocytopenia ([facility] )  Assessment & Plan  • First noted on 4/11  • 4Ts for HIT: Score is 8 points which is a high probability of HIT or about 64% likelihood per MD Calc calculator  • Transitioned from subcu heparin to subcu Arixtra initially given concern for HIT by [facility] providers  • Transitioned to argatroban infusion for saddle PE  • Heparin induced platelet antibody: 5 292  • Heparin antibody by serotonin release pending  • Currently on argatroban gtt, has been therapeutic for 24 hours       Plan:  • Plt improved to 140  • Continue Eliquis 5 mg BID  • Avoid all heparin products  • Continue to trend platelet count daily    Hypertension  Assessment & Plan  • No home regimen  • SBP in 140s-150s  • Continue Losartan 25 mg qd   • Continue amlodipine 5 mg PO daily and metoprolol 25 mg PO BID  • Blood pressures currently stable  Type 2 diabetes mellitus with circulatory disorder, without long-term current use of insulin [facility])  Assessment & Plan  Lab Results   Component Value Date    HGBA1C 8 0 (H) 03/31/2023       Recent Labs     04/14/23  1301 04/14/23  1643 04/14/23  2025 04/15/23  0642   POCGLU 160* 101 174* 125       Blood Sugar Average: Last 72 hrs:  (P) 136 [id number]   • Sliding scale insulin before meals and at bedtime, adjust algorithm as needed  • Goal blood glucose 140-180    Obesity, Class III, BMI 40-49 9 (morbid obesity) ([facility])  Assessment & Plan  Low-carb diet   Encourage lifestyle change      Acute ischemic left MCA stroke ([facility] )  Assessment & Plan  • With global aphasia, improving R sided weakness, motor planning difficulties, R inattention, and bowel/bladder incontinence  • CTA with L M2 division  • Not a candidate for systemic thrombolytics, and not a candidate for thrombectomy  • 3/31 MRI Brain: Large L MCA infarct with L BG involvement and superimposed microhemorrhage with mild mass effect and stable 3-mm L to R midling shift on most recent CTH (4/3)   • Echo: EF 55%, LA dilatation, no RWMA, no PFO, no thrombus or mass  • Thrombosis panel with abnormal antithrombin, Protein C/S activity, but per Neuro unclear significance in setting of acute stroke  Would repeat as outpatient    • Will need Zio Patch/Loop Recorder with Cards Referral as outpatient  • Discussed with Neurology on 4/9: Continue Keppra until outpatient f/u given location, temporal slowing, extent of lesion    • Outpatient f/u with Neurology, Cardiology, and PCP  • Patient neuro checks were decreased to Q4 as patient has been therapeutic on argatroban infusion x 24h     Plan:  • Continue Q4 neuro checks  • Continue Keppra pending OP follow up  • Continue ASA and statin  • PT/OT recommending post-acute rehab  Accepted to [facility], pending bed availability    * Saddle 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 [facility])  Assessment & Plan  Developed chest pain while at [facility] on  CTA PE: Saddle embolus and extensive clot burden throughout the visualized proximal pulmonary arteries  IR consultation suggested  RV/LV ratio less then 0 9, suggesting no elevation of right-sided pressures; however this is discordant with extensive clot burden  No evidence of pulmonary infarct  Dimer greater than 20   BNP elevated, troponin normal   Echo  shows: LVEF 55%, The right ventricular systolic pressure is moderately elevated  The estimated right ventricular systolic pressure is 54 30 mmHg  The right atrium is mildly dilated  Bilateral lower extremity venous duplex with bilateral DVTs  Case was discussed with IR by [facility] provider, patient is not a candidate for IR thrombectomy  PTT 68 -> 67 -> 64  Plan:   Transitioned from Argatroban gtt to Eliquis on 04/15  Remains hemodymically stable  Plt improved to 140  Continue Eliquis 5 mg BID  Medically stable for d/c pending bed placement at [facility]        Disposition: Medically stable; Pending placement     SUBJECTIVE     Patient seen and examined  No acute events overnight  This morning, patient denied any acute complaints and reported feeling well       OBJECTIVE     Vitals:    23 2130 23 2322 23 0600 23 0731   BP: 129/84 125/83  128/85   Pulse: 80 78  82   Resp:    14   Temp:  97 5 °F (36 4 °C)  98 °F (36 7 °C)   TempSrc:       SpO2: 97% 97%  97%   Weight:   133 kg (292 lb 6 4 oz)    Height:          Temperature:   Temp (24hrs), Av 7 °F (36 5 °C), Min:97 5 °F (36 4 °C), Max:98 °F (36 7 °C)    Temperature: 98 °F (36 7 °C)  Intake & Output:  I/O       04/16 0701  04/17 0700 04/17 0701 04/18 0700    P  O   236    Total Intake(mL/kg)  236 (1 8)    Urine (mL/kg/hr) 700 (0 2) 750 (0 2)    Stool 0     Total Output 700 750    Net -700 -514          Unmeasured Urine Occurrence 1 x     Unmeasured Stool Occurrence 1 x         Weights:   IBW (Ideal Body Weight): 79 9 kg    Body mass index is 38 58 kg/m²  Weight (last 2 days)     Date/Time Weight    04/18/23 0600 133 (292 4)    04/17/23 0438 132 (291 67)    04/16/23 0600 133 (293 8)        Physical Exam  Constitutional:       General: He is not in acute distress  HENT:      Head: Normocephalic and atraumatic  Eyes:      General: No scleral icterus  Conjunctiva/sclera: Conjunctivae normal    Cardiovascular:      Rate and Rhythm: Normal rate and regular rhythm  Heart sounds: No murmur heard  No friction rub  No gallop  Pulmonary:      Effort: Pulmonary effort is normal  No respiratory distress  Breath sounds: Normal breath sounds  No wheezing, rhonchi or rales  Abdominal:      General: Abdomen is flat  There is no distension  Palpations: Abdomen is soft  Tenderness: There is no abdominal tenderness  There is no guarding or rebound  Musculoskeletal:      Right lower leg: No edema  Left lower leg: No edema  Skin:     General: Skin is warm and dry  Neurological:      Comments: Expressive/receptive aphasia       LABORATORY DATA     Labs: I have personally reviewed pertinent reports    Results from last 7 days   Lab Units 04/16/23  0506 04/15/23  0449 04/14/23  0520 04/12/23  0313 04/11/23  1612   WBC Thousand/uL 9 33 8 87 8 11   < > 10 05   HEMOGLOBIN g/dL 13 2 13 3 12 8   < > 14 1   HEMATOCRIT % 41 9 41 9 39 3   < > 43 2   PLATELETS Thousands/uL 140* 114* 81*   < > 49*   NEUTROS PCT %  --  66 66  --  67   MONOS PCT %  --  10 9  --  10    < > = values in this interval not displayed  Results from last 7 days   Lab Units 04/16/23  0506 04/15/23  0449 04/14/23  0520 04/13/23  0501 04/12/23  0313 04/11/23  1222   POTASSIUM mmol/L 3 9 3 8 4 0   < > 4 3 4 1   CHLORIDE mmol/L 109* 107 108   < > 107 104   CO2 mmol/L 23 23 26   < > 23 26   BUN mg/dL 18 16 15   < > 19 19   CREATININE mg/dL 0 95 0 93 0 90   < > 1 08 1 20   CALCIUM mg/dL 8 6 9 0 8 8   < > 8 7 9 7   ALK PHOS U/L  --   --   --   --  66 71   ALT U/L  --   --   --   --  29 36   AST U/L  --   --   --   --  25 39    < > = values in this interval not displayed  Results from last 7 days   Lab Units 04/15/23  0449 04/14/23  0520   MAGNESIUM mg/dL 2 4 2 2          Results from last 7 days   Lab Units 04/15/23  1059 04/14/23  2334 04/14/23  1032 04/12/23  0521 04/12/23  0313   INR   --   --   --   --  1 19   PTT seconds 63* 68* 64*   < > 34    < > = values in this interval not displayed  IMAGING & DIAGNOSTIC TESTING     Radiology Results: I have personally reviewed pertinent reports  No results found  Other Diagnostic Testing: I have personally reviewed pertinent reports      ACTIVE MEDICATIONS     Current Facility-Administered Medications   Medication Dose Route Frequency   • acetaminophen (TYLENOL) oral suspension 975 mg  975 mg Oral Q8H PRN   • amLODIPine (NORVASC) tablet 5 mg  5 mg Oral Daily   • apixaban (ELIQUIS) tablet 5 mg  5 mg Oral BID   • aspirin chewable tablet 81 mg  81 mg Oral Daily   • atorvastatin (LIPITOR) tablet 40 mg  40 mg Oral QPM   • calcium carbonate (TUMS) chewable tablet 1,000 mg  1,000 mg Oral TID PRN   • famotidine (PEPCID) tablet 20 mg  20 mg Oral Q12H [facility]   • insulin lispro (HumaLOG) 100 units/mL subcutaneous injection 1-5 Units  1-5 Units Subcutaneous TID AC   • insulin lispro (HumaLOG) 100 units/mL subcutaneous injection 1-5 Units  1-5 Units Subcutaneous HS   • levETIRAcetam (KEPPRA) tablet 750 mg  750 mg Oral Q12H NORRIS   • losartan (COZAAR) tablet 25 mg  25 mg Oral Daily   • metoprolol "tartrate (LOPRESSOR) tablet 25 mg  25 mg Oral Q12H [address]   • ondansetron (ZOFRAN-ODT) dispersible tablet 4 mg  4 mg Oral Q6H PRN   • oxyCODONE (ROXICODONE) split tablet 2 5 mg  2 5 mg Oral Q6H PRN   • senna (SENOKOT) tablet 8 6 mg  1 tablet Oral HS   • tamsulosin (FLOMAX) capsule 0 4 mg  0 4 mg Oral After Dinner       VTE Pharmacologic Prophylaxis: Reason for no pharmacologic prophylaxis Systemic AC  VTE Mechanical Prophylaxis: sequential compression device    Portions of the record may have been created with voice recognition software  Occasional wrong word or \"sound a like\" substitutions may have occurred due to the inherent limitations of voice recognition software    Read the chart carefully and recognize, using context, where substitutions have occurred   ==  Gal Story MD  [facility]  Internal Medicine Residency PGY-1     "

## 2023-04-18 NOTE — CASE MANAGEMENT
Case Management Discharge Planning Note    Patient name Nicki Crow  Location /-26 MRN [id number]  : 1975 Date 2023       Current Admission Date: 2023  Current Admission Diagnosis:Saddle 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 [facility])   Patient Active Problem List    Diagnosis Date Noted   • Urinary retention 2023   • DVT of lower extremity, bilateral ([facility] ) 2023   • Saddle 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 ([facility] ) 2023   • Left-sided chest wall pain 2023   • Creatinine elevation 2023   • Thrombocytopenia ([facility] ) 2023   • Bowel and bladder incontinence 2023   • Hypertension 2023   • Acute ischemic left MCA stroke ([facility] ) 2023   • Obesity, Class III, BMI 40-49 9 (morbid obesity) ([place] [address] ) 2023   • Encephalopathy 2023   • Type 2 diabetes mellitus with circulatory disorder, without long-term current use of insulin ([place] [address] ) 2023      LOS (days): 6  Geometric Mean LOS (GMLOS) (days): 3 90  Days to GMLOS:-2 6     OBJECTIVE:  Risk of Unplanned Readmission Score: 14 92         Current admission status: Inpatient   Preferred Pharmacy:   [address] [place] [address] [address]  [facility]   [address] [facility] [address]  Phone: [phone] Fax: [address], [address] [place] [address] [facility] [address] [address] [facility] [address] [address]  Phone: [phone] Fax: [phone]    Primary Care Provider: No primary care provider on file      Primary Insurance: BLUE CROSS  Secondary Insurance:     DISCHARGE DETAILS:                                                                                                               Facility Insurance Auth Number: [id number]

## 2023-04-18 NOTE — CASE MANAGEMENT
[address] has received approved authorization from insurance: Melissa Mccraryjazmin Yahir in by Rep: Beatriz RUST#  [phone]  Authorization received for: Acute Rehab  Facility: [facility] #: [id number]  Start of Care: 04/18/2023   Next Review Date: 05/06/2023  Submit next review to: Fax   [phone]   Care Manager notified: Majo Almeida

## 2023-04-18 NOTE — UTILIZATION REVIEW
"Continued Stay Review    Date: 04/17/2023                          Current Patient Class: Inpatient  Current Level of Care: Med/Surg    HPI:47 y o  male initially admitted on 04/12/2023    Assessment/Plan: Saddle Embolus of Pulmonary Artery without Acute Cor Pulmonale  Continue Eliquis  Platelets improved to 140  Neuro check q4h  Continue Keppra  Continue ASA, Statin    PT/OT/     Vital Signs: /90   Pulse 80   Temp 97 7 °F (36 5 °C)   Resp 14   Ht 6' 1\" (1 854 m)   Wt 132 kg (291 lb 10 7 oz)   SpO2 96%   BMI 38 48 kg/m²     Pertinent Labs/Diagnostic Results:   Results from last 7 days   Lab Units 04/16/23  1039   SARS-COV-2  Negative     Results from last 7 days   Lab Units 04/16/23  0506 04/15/23  0449 04/14/23  0520 04/13/23  0501 04/12/23  0313 04/11/23  1612   WBC Thousand/uL 9 33 8 87 8 11 7 62 8 04 10 05   HEMOGLOBIN g/dL 13 2 13 3 12 8 12 4 12 9 14 1   HEMATOCRIT % 41 9 41 9 39 3 39 9 40 8 43 2   PLATELETS Thousands/uL 140* 114* 81* 51* 42* 49*   NEUTROS ABS Thousands/µL  --  5 99 5 45  --   --  6 81     Results from last 7 days   Lab Units 04/16/23  0506 04/15/23  0449 04/14/23  0520 04/13/23  0501 04/12/23  0313   SODIUM mmol/L 136 137 136 135 136   POTASSIUM mmol/L 3 9 3 8 4 0 4 0 4 3   CHLORIDE mmol/L 109* 107 108 108 107   CO2 mmol/L 23 23 26 24 23   ANION GAP mmol/L 4 7 2* 3* 6   BUN mg/dL 18 16 15 15 19   CREATININE mg/dL 0 95 0 93 0 90 0 88 1 08   EGFR ml/min/1 73sq m 96 98 102 103 82   CALCIUM mg/dL 8 6 9 0 8 8 8 4 8 7   CALCIUM, IONIZED mmol/L  --  1 11*  --   --   --    MAGNESIUM mg/dL  --  2 4 2 2  --   --      Results from last 7 days   Lab Units 04/12/23  0313 04/11/23  1222   AST U/L 25 39   ALT U/L 29 36   ALK PHOS U/L 66 71   TOTAL PROTEIN g/dL 6 7 7 4   ALBUMIN g/dL 2 9* 3 4*   TOTAL BILIRUBIN mg/dL 0 75 0 94   BILIRUBIN DIRECT mg/dL 0 18  --      Results from last 7 days   Lab Units 04/17/23  1729 04/17/23  1148 04/17/23  0626 04/16/23  2032 04/16/23  1714 04/16/23  1129 " 04/16/23  0622 04/15/23  2039 04/15/23  1706 04/15/23  1106 04/15/23  0642 04/14/23 2025   POC GLUCOSE mg/dl 93 131 104 171* 144* 131 122 176* 101 215* 125 174*     Results from last 7 days   Lab Units 04/16/23  0506 04/15/23  0449 04/14/23  0520 04/13/23  0501 04/12/23  0313 04/11/23  1222   GLUCOSE RANDOM mg/dL 111 109 109 119 118 92     Results from last 7 days   Lab Units 04/12/23  0728 04/12/23  0521 04/12/23  0313 04/10/23  2108   HS TNI 0HR ng/L  --   --  27  --    HS TNI 2HR ng/L  --  29  --   --    HSTNI D2 ng/L  --  2  --   --    HS TNI 4HR ng/L 26  --   --  35   HSTNI D4 ng/L -1  --   --  -1     Results from last 7 days   Lab Units 04/11/23  1222   D-DIMER QUANTITATIVE ug/ml FEU >20 00*     Results from last 7 days   Lab Units 04/15/23  1059 04/14/23  2334 04/14/23  1032 04/12/23  0521 04/12/23  0313   PROTIME seconds  --   --   --   --  15 3*   INR   --   --   --   --  1 19   PTT seconds 63* 68* 64*   < > 34    < > = values in this interval not displayed       Results from last 7 days   Lab Units 04/12/23  0313   NT-PRO BNP pg/mL 2,144*     Results from last 7 days   Lab Units 04/16/23  1039   INFLUENZA A PCR  Negative   INFLUENZA B PCR  Negative   RSV PCR  Negative     Results from last 7 days   Lab Units 04/12/23  0313   TOTAL COUNTED  100     Medications:   Scheduled Medications:  amLODIPine, 5 mg, Oral, Daily  apixaban, 5 mg, Oral, BID  aspirin, 81 mg, Oral, Daily  atorvastatin, 40 mg, Oral, QPM  famotidine, 20 mg, Oral, Q12H NORRIS  insulin lispro, 1-5 Units, Subcutaneous, TID AC  insulin lispro, 1-5 Units, Subcutaneous, HS  levETIRAcetam, 750 mg, Oral, Q12H NORRIS  losartan, 25 mg, Oral, Daily  metoprolol tartrate, 25 mg, Oral, Q12H NORRIS  senna, 1 tablet, Oral, HS  tamsulosin, 0 4 mg, Oral, After Dinner      Continuous IV Infusions:     PRN Meds:  acetaminophen, 975 mg, Oral, Q8H PRN  calcium carbonate, 1,000 mg, Oral, TID PRN  ondansetron, 4 mg, Oral, Q6H PRN  oxyCODONE, 2 5 mg, Oral, Q6H PRN        Discharge Plan: TBD    Network Utilization Review Department  ATTENTION: Please call with any questions or concerns to [phone] and carefully listen to the prompts so that you are directed to the right person  All voicemails are confidential   Ronald Delacruz all requests for admission clinical reviews, approved or denied determinations and any other requests to dedicated fax number below belonging to the campus where the patient is receiving treatment   List of dedicated fax numbers for the Facilities:  [address] DENIALS (Administrative/Medical Necessity) [phone]   [address] (Maternity/NICU/Pediatrics) [phone]   [address] [phone]   [place] [address] [phone]   [address] [place] [address] [address]ie [address] [phone]   [address] [facility] [address] [facility] [phone]

## 2023-04-18 NOTE — CASE MANAGEMENT
Case Management Progress Note    Patient name Artemio Méndez III  Location /-32 MRN [id number]  : 1975 Date 2023       LOS (days): 6  Geometric Mean LOS (GMLOS) (days): 3 90  Days to GMLOS:-2 6        OBJECTIVE:        Current admission status: Inpatient  Preferred Pharmacy:   [address] [facility] [address]  [facility]   [address] [facility] [address]  Phone: [phone] Fax: [address], [address] [place] [address] [facility] [address] [address] [facility] [address] [address]  Phone: [phone] Fax: [phone]    Primary Care Provider: No primary care provider on file  Primary Insurance: BLUE CROSS  Secondary Insurance:     PROGRESS NOTE:  CM advised [address] [place] [address], an Lesly Harness was received for the Pt's admission to their facility  Sofie reported the Pt can be admitted to their facililty tomorrow  CM made resident Irma Patel and will continue to follow to assist with the Pt's d/c to [address] [place] [address] tomorrow

## 2023-04-19 ENCOUNTER — HOSPITAL ENCOUNTER (INPATIENT)
Facility: HOSPITAL | Age: 48
LOS: 16 days | Discharge: HOME | End: 2023-05-05

## 2023-04-19 VITALS
HEIGHT: 73 IN | SYSTOLIC BLOOD PRESSURE: 123 MMHG | WEIGHT: 290.4 LBS | HEIGHT: 73 IN | BODY MASS INDEX: 38.49 KG/M2 | HEART RATE: 81 BPM | DIASTOLIC BLOOD PRESSURE: 83 MMHG | DIASTOLIC BLOOD PRESSURE: 83 MMHG | TEMPERATURE: 97.6 F | OXYGEN SATURATION: 95 % | RESPIRATION RATE: 17 BRPM | HEART RATE: 81 BPM | SYSTOLIC BLOOD PRESSURE: 123 MMHG | TEMPERATURE: 97.6 F | WEIGHT: 290.4 LBS | BODY MASS INDEX: 38.49 KG/M2 | RESPIRATION RATE: 17 BRPM | OXYGEN SATURATION: 95 %

## 2023-04-19 DIAGNOSIS — I10 HYPERTENSION: ICD-10-CM

## 2023-04-19 DIAGNOSIS — I26.92 SADDLE EMBOLUS OF PULMONARY ARTERY WITHOUT ACUTE COR PULMONALE (HCC): Primary | ICD-10-CM

## 2023-04-19 DIAGNOSIS — I10 PRIMARY HYPERTENSION: ICD-10-CM

## 2023-04-19 DIAGNOSIS — Z91.89 AT RISK FOR CONSTIPATION: ICD-10-CM

## 2023-04-19 DIAGNOSIS — I63.512 ACUTE ISCHEMIC LEFT MCA STROKE (HCC): ICD-10-CM

## 2023-04-19 DIAGNOSIS — E11.59 TYPE 2 DIABETES MELLITUS WITH CIRCULATORY DISORDER, WITHOUT LONG-TERM CURRENT USE OF INSULIN (HCC): ICD-10-CM

## 2023-04-19 DIAGNOSIS — Z91.89 AT RISK FOR STRESS ULCER: ICD-10-CM

## 2023-04-19 DIAGNOSIS — I26.92 ACUTE SADDLE PULMONARY EMBOLISM WITHOUT ACUTE COR PULMONALE (HCC): ICD-10-CM

## 2023-04-19 DIAGNOSIS — R33.9 URINARY RETENTION: ICD-10-CM

## 2023-04-19 DIAGNOSIS — D75.829 HIT (HEPARIN-INDUCED THROMBOCYTOPENIA) (HCC): ICD-10-CM

## 2023-04-19 DIAGNOSIS — J31.0 RHINITIS: ICD-10-CM

## 2023-04-19 DIAGNOSIS — I82.403 DVT OF LOWER EXTREMITY, BILATERAL (HCC): ICD-10-CM

## 2023-04-19 LAB
GLUCOSE SERPL-MCNC: 111 MG/DL (ref 65–140)
GLUCOSE SERPL-MCNC: 129 MG/DL (ref 65–140)
GLUCOSE SERPL-MCNC: 136 MG/DL (ref 65–140)
GLUCOSE SERPL-MCNC: 136 MG/DL (ref 65–140)
GLUCOSE SERPL-MCNC: 90 MG/DL (ref 65–140)
GLUCOSE SERPL-MCNC: 90 MG/DL (ref 65–140)

## 2023-04-19 PROCEDURE — 99238 HOSP IP/OBS DSCHRG MGMT 30/<: CPT | Performed by: INTERNAL MEDICINE

## 2023-04-19 PROCEDURE — NC001 PR NO CHARGE

## 2023-04-19 PROCEDURE — 82948 REAGENT STRIP/BLOOD GLUCOSE: CPT

## 2023-04-19 PROCEDURE — 97535 SELF CARE MNGMENT TRAINING: CPT

## 2023-04-19 RX ORDER — CALCIUM CARBONATE 200(500)MG
1000 TABLET,CHEWABLE ORAL 3 TIMES DAILY PRN
Status: DISCONTINUED | OUTPATIENT
Start: 2023-04-19 | End: 2023-05-05 | Stop reason: HOSPADM

## 2023-04-19 RX ORDER — ATORVASTATIN CALCIUM 40 MG/1
40 TABLET, FILM COATED ORAL EVERY EVENING
Status: CANCELLED | OUTPATIENT
Start: 2023-04-19

## 2023-04-19 RX ORDER — INSULIN LISPRO 100 [IU]/ML
1-5 INJECTION, SOLUTION INTRAVENOUS; SUBCUTANEOUS
Status: DISCONTINUED | OUTPATIENT
Start: 2023-04-19 | End: 2023-04-28

## 2023-04-19 RX ORDER — SENNOSIDES 8.6 MG
1 TABLET ORAL
Status: CANCELLED | OUTPATIENT
Start: 2023-04-19

## 2023-04-19 RX ORDER — LEVETIRACETAM 750 MG/1
750 TABLET ORAL EVERY 12 HOURS SCHEDULED
Status: CANCELLED | OUTPATIENT
Start: 2023-04-19

## 2023-04-19 RX ORDER — LEVETIRACETAM 750 MG/1
750 TABLET ORAL EVERY 12 HOURS SCHEDULED
Status: DISCONTINUED | OUTPATIENT
Start: 2023-04-19 | End: 2023-05-05 | Stop reason: HOSPADM

## 2023-04-19 RX ORDER — ONDANSETRON 4 MG/1
4 TABLET, ORALLY DISINTEGRATING ORAL EVERY 6 HOURS PRN
Status: DISCONTINUED | OUTPATIENT
Start: 2023-04-19 | End: 2023-04-19

## 2023-04-19 RX ORDER — ACETAMINOPHEN 160 MG/5ML
975 SUSPENSION, ORAL (FINAL DOSE FORM) ORAL EVERY 8 HOURS PRN
Status: DISCONTINUED | OUTPATIENT
Start: 2023-04-19 | End: 2023-05-05 | Stop reason: HOSPADM

## 2023-04-19 RX ORDER — ACETAMINOPHEN 160 MG/5ML
975 SUSPENSION ORAL EVERY 8 HOURS PRN
Status: CANCELLED | OUTPATIENT
Start: 2023-04-19

## 2023-04-19 RX ORDER — FAMOTIDINE 20 MG/1
20 TABLET, FILM COATED ORAL EVERY 12 HOURS SCHEDULED
Status: CANCELLED | OUTPATIENT
Start: 2023-04-19

## 2023-04-19 RX ORDER — ASPIRIN 81 MG/1
81 TABLET, CHEWABLE ORAL DAILY
Status: DISCONTINUED | OUTPATIENT
Start: 2023-04-20 | End: 2023-05-05 | Stop reason: HOSPADM

## 2023-04-19 RX ORDER — AMLODIPINE BESYLATE 5 MG/1
5 TABLET ORAL DAILY
Status: CANCELLED | OUTPATIENT
Start: 2023-04-20

## 2023-04-19 RX ORDER — ATORVASTATIN CALCIUM 40 MG/1
40 TABLET, FILM COATED ORAL EVERY EVENING
Status: DISCONTINUED | OUTPATIENT
Start: 2023-04-19 | End: 2023-05-05 | Stop reason: HOSPADM

## 2023-04-19 RX ORDER — CALCIUM CARBONATE 500 MG/1
1000 TABLET, CHEWABLE ORAL 3 TIMES DAILY PRN
Status: CANCELLED | OUTPATIENT
Start: 2023-04-19

## 2023-04-19 RX ORDER — TAMSULOSIN HYDROCHLORIDE 0.4 MG/1
0.4 CAPSULE ORAL
Status: CANCELLED | OUTPATIENT
Start: 2023-04-19

## 2023-04-19 RX ORDER — BISACODYL 10 MG
10 SUPPOSITORY, RECTAL RECTAL DAILY PRN
Status: DISCONTINUED | OUTPATIENT
Start: 2023-04-19 | End: 2023-05-05 | Stop reason: HOSPADM

## 2023-04-19 RX ORDER — LOSARTAN POTASSIUM 25 MG/1
25 TABLET ORAL DAILY
Status: DISCONTINUED | OUTPATIENT
Start: 2023-04-20 | End: 2023-05-05 | Stop reason: HOSPADM

## 2023-04-19 RX ORDER — POLYETHYLENE GLYCOL 3350 17 G/17G
17 POWDER, FOR SOLUTION ORAL DAILY PRN
Status: DISCONTINUED | OUTPATIENT
Start: 2023-04-19 | End: 2023-05-05 | Stop reason: HOSPADM

## 2023-04-19 RX ORDER — INSULIN LISPRO 100 [IU]/ML
1-5 INJECTION, SOLUTION INTRAVENOUS; SUBCUTANEOUS
Status: CANCELLED | OUTPATIENT
Start: 2023-04-19

## 2023-04-19 RX ORDER — FAMOTIDINE 20 MG/1
20 TABLET, FILM COATED ORAL EVERY 12 HOURS SCHEDULED
Status: DISCONTINUED | OUTPATIENT
Start: 2023-04-19 | End: 2023-05-05 | Stop reason: HOSPADM

## 2023-04-19 RX ORDER — ONDANSETRON 4 MG/1
4 TABLET, ORALLY DISINTEGRATING ORAL EVERY 8 HOURS PRN
Status: DISCONTINUED | OUTPATIENT
Start: 2023-04-19 | End: 2023-05-05 | Stop reason: HOSPADM

## 2023-04-19 RX ORDER — SENNOSIDES 8.6 MG
1 TABLET ORAL
Status: DISCONTINUED | OUTPATIENT
Start: 2023-04-19 | End: 2023-05-05 | Stop reason: HOSPADM

## 2023-04-19 RX ORDER — LOSARTAN POTASSIUM 25 MG/1
25 TABLET ORAL DAILY
Status: CANCELLED | OUTPATIENT
Start: 2023-04-20

## 2023-04-19 RX ORDER — AMLODIPINE BESYLATE 5 MG/1
5 TABLET ORAL DAILY
Status: DISCONTINUED | OUTPATIENT
Start: 2023-04-20 | End: 2023-05-05 | Stop reason: HOSPADM

## 2023-04-19 RX ORDER — ONDANSETRON 4 MG/1
4 TABLET, ORALLY DISINTEGRATING ORAL EVERY 6 HOURS PRN
Status: CANCELLED | OUTPATIENT
Start: 2023-04-19

## 2023-04-19 RX ORDER — ASPIRIN 81 MG/1
81 TABLET, CHEWABLE ORAL DAILY
Status: CANCELLED | OUTPATIENT
Start: 2023-04-20

## 2023-04-19 RX ORDER — TAMSULOSIN HYDROCHLORIDE 0.4 MG/1
0.4 CAPSULE ORAL
Status: DISCONTINUED | OUTPATIENT
Start: 2023-04-19 | End: 2023-05-05 | Stop reason: HOSPADM

## 2023-04-19 RX ORDER — LIDOCAINE HYDROCHLORIDE 20 MG/ML
JELLY TOPICAL EVERY 4 HOURS PRN
Status: DISCONTINUED | OUTPATIENT
Start: 2023-04-19 | End: 2023-05-05 | Stop reason: HOSPADM

## 2023-04-19 RX ADMIN — LOSARTAN POTASSIUM 25 MG: 25 TABLET, FILM COATED ORAL at 08:45

## 2023-04-19 RX ADMIN — AMLODIPINE BESYLATE 5 MG: 5 TABLET ORAL at 08:45

## 2023-04-19 RX ADMIN — METOPROLOL TARTRATE 25 MG: 25 TABLET, FILM COATED ORAL at 08:45

## 2023-04-19 RX ADMIN — FAMOTIDINE 20 MG: 20 TABLET ORAL at 21:09

## 2023-04-19 RX ADMIN — ASPIRIN 81 MG 81 MG: 81 TABLET ORAL at 08:45

## 2023-04-19 RX ADMIN — ATORVASTATIN CALCIUM 40 MG: 40 TABLET, FILM COATED ORAL at 17:57

## 2023-04-19 RX ADMIN — FAMOTIDINE 20 MG: 20 TABLET, FILM COATED ORAL at 08:45

## 2023-04-19 RX ADMIN — METOPROLOL TARTRATE 25 MG: 25 TABLET, FILM COATED ORAL at 21:08

## 2023-04-19 RX ADMIN — LEVETIRACETAM 750 MG: 750 TABLET, FILM COATED ORAL at 08:45

## 2023-04-19 RX ADMIN — LEVETIRACETAM 750 MG: 750 TABLET, FILM COATED ORAL at 21:09

## 2023-04-19 RX ADMIN — APIXABAN 5 MG: 5 TABLET, FILM COATED ORAL at 17:57

## 2023-04-19 RX ADMIN — APIXABAN 5 MG: 5 TABLET, FILM COATED ORAL at 08:45

## 2023-04-19 RX ADMIN — TAMSULOSIN HYDROCHLORIDE 0.4 MG: 0.4 CAPSULE ORAL at 17:57

## 2023-04-19 RX ADMIN — SENNOSIDES 8.6 MG: 8.6 TABLET, FILM COATED ORAL at 21:09

## 2023-04-19 NOTE — DISCHARGE INSTR - AVS FIRST PAGE
-Start taking Eliquis 5 mg twice daily  -Start taking Amlodipine 5 mg daily and Losartan 25 mg daily  -Please call infolink and cardiologist number below to set up an appointment with PCP within 1 week of discharge

## 2023-04-19 NOTE — CONSULTS
Internal Medicine Consultation Note    Patient: Troy Barber III  Age/sex: 41 y o  male  Medical Record #: [id number]      Assessment/Plan:    Saddle pulmonary embolus without cor pulmonale  · Had nonocclusive saddle embolus with filling defects distal main pulmonary arteries and throughout segmental branches bilaterally  Distal order branches were not clearly visualized due to severe respiratory motion artifact though felt likely containing pulmonary emboli  · Sx were left sided CP, NIEVES and elevated D-dimer  · Continue Eliquis  · Was not a candidate for IR intervention 2/2 HIT    Bilateral LE DVT  · Continue Eliquis    HIT  · Heparin induced antibody was positive  · Heparin by serotonin release pending   · S/p Argatroban  · Platelet count was as low as 42, now recovered to 140    L MCA infarct 3/3/23  · Suspected to be cardioembolic  · Thrombosis panel then showed abnormal antithrombin, Protein C/S activity, but per Neuro unclear significance in setting of acute stroke and therefore wanted repeat as outpatient  · Cont ASA/statin  · Initial Echo/HODAN LVEF 55%; no thrombus/PFO  · Needs outpt LOOP/Zio patch  · Originally to be on Keppra x 7 days then was stopped  Dr Konrad Chaney d/w with Neuro on 4/10 and they wanted to keep the [address] on until outpatient follow-up and was therefore restarted     DM2  · Hgb A1c 8 0  · New diagnosis  · Cont QID Accuchecks/SSI and DM diet  · Home:  No meds  · Here: SSI only  Had been on Metformin when in [facility] before transfer back for PE  · Will consider restart Metformin if needed  · Needs DM teaching and PCP f/u on dc = mother is diabetic and knows how to use meter but not insulin     HTN  · Home: on no meds  · Here:  Norvasc 5mg qd/Lopressor 25 mg q12hrs/Losartan 25mg qd  · stable     Urine retention  · Per PMR  · Continue Flomax    Obesity  · Recommend ongoing attempts at weight loss  · Current BMI 38 5       Discharge date:  Team      Subjective/HPI:    Kingston Pelletierprasanna Mandel is a 41 year old patient with no previously known PMH who had not seen a doctor for many years  He was admitted on 3/30/23 with a left MCA CVA  He was outside of the window for systemic thrombolysis  Perfusion scan showed completed infarct in the left temporal lobe with no salvageable penumbra  No IR intervention was performed  MRI on 3/31 showed petechial hemorrhage in stroke bed with left MCA infarct/basal ganglia involvement and microhemorhage with mild mass effect without midline shift  Neurosurgery was consulted but no surgery was felt to be needed  He underwent echocardiogram/HOADN which both showed preserved EF 55%, no PFO no thrombus  It was suggested that he have an outpatient LOOP recorder/Zio patch placed per neurology  His thrombosis panel showed abnormal antithrombin, Protein C/S activity, but per Neuro unclear significance in setting of acute stroke and therefore wanted repeat as outpatient  He was newly diagnosed diabetic with HbA1C of 8 0 and new HTN  He was transferred to [facility] on 4/8/23 with residual severe global aphasia, right visual inattention and right sided weakness  On 4/11/23, he had left sided chest pain  He ruled out for a MI and EKG was unremarkable  Cardiology saw in consult and no intervention was done  The following day he was noted to have NIEVES and a D-dimer was sent and was elevated at >20  Creatinine was elevated so he was started on IVF and a CTA PE protocol was ordered  He was noted to have also dropped his platelets to 48 and his Heparin was stopped and a HIT workup started  Later that night, his CT was positive for a saddle embolus and he was sent back to acute  There he was placed on an Argatroban infusion  He was deemed not to be a candidate for IR intervention  Venous doppler was positive for bilateral acute DVTs  [facility]  Patient was referred to [facility] for inpatient rehabilitation    We are asked to assist with medical management  Currently, does not have any complaints of CP, SOB, dizziness, N/V/D     ROS:   A 10 point ROS was performed; negative except as noted above  Social History:    Substance Use History:   Social History     Substance and Sexual Activity   Alcohol Use Never     Social History     Tobacco Use   Smoking Status Never   Smokeless Tobacco Never     Social History     Substance and Sexual Activity   Drug Use Never       Family History:    No family history on file        Review of Scheduled Meds:  Current Facility-Administered Medications   Medication Dose Route Frequency Provider Last Rate    acetaminophen  975 mg Oral Q8H PRN Monica Hernandez MD      [START ON 4/20/2023] amLODIPine  5 mg Oral Daily Monica Hernandez MD      apixaban  5 mg Oral BID Monica Hernandez MD      [START ON 4/20/2023] aspirin  81 mg Oral Daily Monica Hernandez, MD      atorvastatin  40 mg Oral QPM Monica Hernandez MD      bisacodyl  10 mg Rectal Daily PRN Monica Hernandez MD      calcium carbonate  1,000 mg Oral TID PRN Monica Hernandez, MD      famotidine  20 mg Oral Q12H [facility] Monica Hernandez MD      insulin lispro  1-5 Units Subcutaneous TID [facility] Monica Hernandez MD      levETIRAcetam  750 mg Oral Q12H [facility] Monica Hernandez MD      lidocaine   Topical Q4H PRN Monica Hernandez MD      [START ON 4/20/2023] losartan  25 mg Oral Daily Monica Hernandez MD      metoprolol tartrate  25 mg Oral Q12H [facility] Monica Hernandez MD      ondansetron  4 mg Oral Q8H PRN Monica Hernandez MD      oxyCODONE  2 5 mg Oral Q6H PRN Monica Hernandez, MD      polyethylene glycol  17 g Oral Daily PRN Monica Hernandez MD      senna  1 tablet Oral HS Monica Hernandez MD      tamsulosin  0 4 mg Oral After Mera Landrum MD         Labs:     Results from last 7 days   Lab Units 04/16/23  0506 04/15/23  0449   WBC Thousand/uL 9 33 8 87   HEMOGLOBIN g/dL 13 2 13 3 HEMATOCRIT % 41 9 41 9   PLATELETS Thousands/uL 140* 114*     Results from last 7 days   Lab Units 23  0506 04/15/23  0449   SODIUM mmol/L 136 137   POTASSIUM mmol/L 3 9 3 8   CHLORIDE mmol/L 109* 107   CO2 mmol/L    BUN mg/dL 18 16   CREATININE mg/dL 0 95 0 93   CALCIUM mg/dL 8 6 9 0                Results from last 7 days   Lab Units 23  1119 23  0615 23   POC GLUCOSE mg/dl 136 90 150*       Lab Results   Component Value Date    SPUTUMCULTUR 3+ Growth of 2023       Input and Output Summary (last 24 hours):     No intake or output data in the 24 hours ending 23 143    Imaging:     No orders to display       *Labs /Radiology studies reviewed  *Medications reviewed and reconciled as needed  *Please refer to order section for additional ordered labs studies  *Case discussed with primary attending during morning huddle case rounds    Vitals:   Temp (24hrs), Av 8 °F (36 6 °C), Min:97 6 °F (36 4 °C), Max:98 1 °F (36 7 °C)    Temp:  [97 6 °F (36 4 °C)-98 1 °F (36 7 °C)] 97 6 °F (36 4 °C)  HR:  [77-86] 86  Resp:  [16-18] 18  BP: (123-150)/(83-90) 150/90  SpO2:  [95 %-98 %] 98 %  Body mass index is 38 45 kg/m²       Physical Exam:   General Appearance: no distress, conversive  HEENT: PERRLA, conjuctiva normal; oropharynx clear; mucous membranes moist   Neck:  Supple, normal ROM  Lungs: CTA, normal respiratory effort, no retractions, expiratory effort normal  CV: regular rate and rhythm; no rubs/murmurs/gallops, PMI normal   ABD: soft; ND/NT; +BS  EXT: no edema  Skin: normal turgor, normal texture, no rashes  Psych: affect normal, mood normal  Neuro: AA; +expressive>receptive aphasia          Invasive Devices     Peripheral Intravenous Line  Duration           Peripheral IV 04/15/23 Proximal;Right;Ventral (anterior) Forearm 4 days                 VTE Pharmacologic Prophylaxis: Eliquis  Code Status: Level 1 - Full Code  Current Length of Stay: 0 day(s)    Total floor / unit time spent today 1 hour with more than 50% spent counseling/coordinating care  Counseling includes discussion with patient re: progress  and discussion with patient of his/her current medical state/information  Coordination of patient's care was performed in conjunction with primary service  Time invested included review of patient's labs, vitals, and management of their comorbidities with continued monitoring  In addition, this patient was discussed with medical team including physician and advanced extenders  The care of the patient was extensively discussed and appropriate treatment plan was formulated unique for this patient by supervising physician unless stated otherwise in their attestation statement  ** Please Note: voice to text software may have been used in the creation of this document   Audio transcription errors may occur**

## 2023-04-19 NOTE — H&P
PHYSICAL MEDICINE AND REHABILITATION H&P/ADMISSION NOTE  Ananya English III 41 y o  male MRN: [id number]  Unit/Bed#: -01 Encounter: [id number]     Rehab Diagnosis: Impairment of mobility, safety, Activities of Daily Living (ADLs), and cognitive/communication skills due to Stroke:  01 2  Right Body Involvement (Left Brain)  Etiologic: L MCA CVA with Superimposed Micro-hemorrhage    Rehabilitation    HPI: Gracie Nieves is a 41year old male with no known medical history who presented to [address] [place], [place] [id number]  on 03/30 after found unresponsive in his bedroom by mother after hearing a thud at 14:45, last known normal at 0700 prior to bed  On presentation, GCS 9, patient was noted to have RUE/RLE weakness  CTH showed no hemorrhage or intracranial pathology  CTA of head and neck showed left M2 occlusion  He was outside of the window for TNK, he was aspirin loaded and transferred to [facility] for CT perfusion scan with possible thrombectomy  CT perfusion showed no perfusion mismatching with 80 cc of nonviable brain tissue  Neurosurgery was consulted, recommended no acute neurosurgical intervention given stroke burden on [facility] appears to be mostly left temporal lobe  He was not a candidate for TNK or thrombectomy  On 03/31, MRI of brain showed large left MCA infarct with left basal ganglia involvement and superimposed petechial hemorrhage noted in small left lateral ventricle mass effect  Per NS ok to continue ASA, and start DVT prophylaxis  On 04/02 repeat CTH showed left MCA infarct, small new 3 mm left to right midline shift, all subsequent CTH were stable  TTE showed EF 55% and dilated left atrium  Neuro recommended HODAN to rule out PFO  CT C/A/P showed bibasilar consolidation with UL interlobular thickening and mild ground glass opacity  He was started on antibiotics for 5 days for aspiration pneumonia  SLP evaluation, passed for regular diet/thin liquids   He continued with confusion/lethargy  EEG was ordered and unremarkable  Joseph Demetrio was started given the location and extent of his CBA and suspicion that waxing/waning encephalopathy was post-ictal state with plans to treat for 1 week  HODAN was performed and thrombosis panel was ordered  HODAN showed EF 55%, no RWMA, LA dilated, no thrombus mass, no PFO  Thrombus panel showed lowe protein C/S and antithrombin- but significance per neurology is unclear in the setting of acute CVA  Neuro recommends outpatient loop/Zio  His overall strength improved, but remains with global aphasia, right visual inattention, right sided weakness  Hospital course complicated by accelerated HTN, new diabetes diagnosis started on insulin  Patient was admitted to [address] [place] [address] on 04/08/23  On 04/11, patient developed chest pain  EKG with no acute changes  CTA PE showed saddle embolus and extensive clot burden throughout the visualized proximal pulmonary arteries  IR consulted  RV/LV ratio less than 0 9, suggesting no elevation of right sided pressures  No evidence of pulmonary infarct  On 04/12 echo shows EF 55%, right ventricular systolic pressure was moderately elevated  The estimated right ventricular systolic pressure was 56 mmHg  The right atrium was mildly dilated  A venous duplex completed on bilateral lower extremities which showed bilateral DVT's  Patient was not a candidate for IR thrombectomy  He was transitioned from argatroban gtt to Eliquis on 4/15  His platelets improved to 140 and he is to continue Eliquis 5mg BID    Functional deficits: impaired mobility, self care, aphasic   Begin PT/OT/SLP  Rehabilitation goals are to achieve a supervision level with mobility and self care  Prognosis is good  ELOS is 10-14 days  Estimated discharge is home   DVT prophylaxis: Eliquis   Pain: tylenol, oxycodone   Bladder plan: incontinence, timed void    Bowel plan:   Incontinence, bowel program   Code Status:  full code     No new Assessment & "Plan notes have been filed under this hospital service since the last note was generated  Service: PMR      Subjective/Interval Events:       Review of Systems  Unable to complete due to global aphasia    Level of function and living situation:   PRIOR LEVEL OF FUNCTION:  He lives in [facility] single family home  Levi Nicole III is single and lives with his parents  Self Care: Independent, Indoor Mobility: Independent, Stairs (in/outdoor): Independent and Cognition: Independent     FALLS IN THE LAST 6 MONTHS: 1-4     HOME ENVIRONMENT:  The living area: can live on one level  There are stairs with rails and also a ramped entrance to enter the home  The patient will have 24 hour supervision/physical assistance available upon discharge  PREVIOUS DME:  Equipment in home (previous DME): None    Current Functional Level  Physical therapy: mobility- min A, transfers- min A, ambulation- min A RW 40'  Occupational therapy:  ADL: bathing- UB min A, LB not assessed, dressing- UB min A, LB min A, toileting- supervision   Speech therapy: receptive/expressive aphasia, regular diet/thin liquids      Physical Exam:  /90 (BP Location: Right arm)   Pulse 86   Temp 97 6 °F (36 4 °C) (Oral)   Resp 18   Ht 6' 1\" (1 854 m)   Wt 132 kg (291 lb 6 4 oz)   SpO2 98%   BMI 38 45 kg/m²      No intake or output data in the 24 hours ending 04/19/23 1640    Body mass index is 38 45 kg/m²  Physical Exam  Constitutional:       Appearance: Normal appearance  HENT:      Head: Normocephalic and atraumatic  Mouth/Throat:      Mouth: Mucous membranes are moist    Eyes:      Pupils: Pupils are equal, round, and reactive to light  Comments: EOM exam limited by aphasia   Cardiovascular:      Rate and Rhythm: Normal rate and regular rhythm  Pulses: Normal pulses  Heart sounds: Normal heart sounds  Pulmonary:      Effort: Pulmonary effort is normal       Breath sounds: Normal breath sounds     Abdominal:      " General: Bowel sounds are normal       Palpations: Abdomen is soft  Tenderness: There is no abdominal tenderness  Musculoskeletal:         General: Normal range of motion  Cervical back: Normal range of motion  Skin:     General: Skin is warm and dry  Capillary Refill: Capillary refill takes less than 2 seconds  Neurological:      Mental Status: He is alert and oriented to person, place, and time  Motor: Weakness present  Coordination: Coordination abnormal       Gait: Gait abnormal       Comments: MMT strength exam limited by global aphasia, FTN bilateral upper extremity uncoordination, ataxia   Psychiatric:         Mood and Affect: Mood normal          Judgment: Judgment normal         Labs, medications, and imaging personally reviewed      Laboratory:    Lab Results   Component Value Date    SODIUM 136 04/16/2023    K 3 9 04/16/2023     (H) 04/16/2023    CO2 23 04/16/2023    BUN 18 04/16/2023    CREATININE 0 95 04/16/2023    GLUC 111 04/16/2023    CALCIUM 8 6 04/16/2023     Lab Results   Component Value Date    WBC 9 33 04/16/2023    HGB 13 2 04/16/2023    HCT 41 9 04/16/2023    MCV 83 04/16/2023     (L) 04/16/2023     Lab Results   Component Value Date    INR 1 19 04/12/2023    PROTIME 15 3 (H) 04/12/2023         Current Facility-Administered Medications:     acetaminophen (TYLENOL) oral suspension 975 mg, 975 mg, Oral, Q8H PRN, MD Christiana Ruelas ON 4/20/2023] amLODIPine (NORVASC) tablet 5 mg, 5 mg, Oral, Daily, Mely Long MD    apixaban Reggy Sires) tablet 5 mg, 5 mg, Oral, BID, MD Christiana Ruelas ON 4/20/2023] aspirin chewable tablet 81 mg, 81 mg, Oral, Daily, Mely Long MD    atorvastatin (LIPITOR) tablet 40 mg, 40 mg, Oral, QPM, Mely Long MD    bisacodyl (DULCOLAX) rectal suppository 10 mg, 10 mg, Rectal, Daily PRN, Mely Long MD    calcium carbonate (TUMS) chewable tablet 1,000 mg, 1,000 mg, Oral, TID PRN, Regina Hollis MD    famotidine (PEPCID) tablet 20 mg, 20 mg, Oral, Q12H [address], Regina Hollis MD    insulin lispro (HumaLOG) 100 units/mL subcutaneous injection 1-5 Units, 1-5 Units, Subcutaneous, TID AC **AND** Fingerstick Glucose (POCT), , , TID AC, Regina Hollis MD    levETIRAcetam (KEPPRA) tablet 750 mg, 750 mg, Oral, Q12H [address], Regina Hollis MD    lidocaine (URO-JET) 2 % urethral/mucosal gel, , Topical, Q4H PRN, MD Laurie Gibbons ON 4/20/2023] losartan (COZAAR) tablet 25 mg, 25 mg, Oral, Daily, Regina Hollis MD    metoprolol tartrate (LOPRESSOR) tablet 25 mg, 25 mg, Oral, Q12H [address], Regina Hollis MD    ondansetron (ZOFRAN-ODT) dispersible tablet 4 mg, 4 mg, Oral, Q8H PRN, Regina Hollis MD    oxyCODONE (ROXICODONE) split tablet 2 5 mg, 2 5 mg, Oral, Q6H PRN, Regina Hollis MD    polyethylene glycol [facility]) packet 17 g, 17 g, Oral, Daily PRN, Regina Hollis MD    [facility]) tablet 8 6 mg, 1 tablet, Oral, HS, Regina Hollis MD    tamsulosin [facility]) capsule 0 4 mg, 0 4 mg, Oral, After Prashant Harmon MD  Allergies   Allergen Reactions    Heparin Other (See Comments)     HIT      Patient Active Problem List    Diagnosis Date Noted    Urinary retention 04/13/2023    DVT of lower extremity, bilateral ([facility] ) 04/13/2023    Saddle 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 ([facility] ) 04/12/2023    Left-sided chest wall pain 04/11/2023    Creatinine elevation 04/11/2023    Thrombocytopenia ([facility] ) 04/11/2023    Bowel and bladder incontinence 04/08/2023    Hypertension 04/02/2023    Acute ischemic left MCA stroke ([facility] ) 03/30/2023    Obesity, Class III, BMI 40-49 9 (morbid obesity) ([place] [address] ) 03/30/2023    Encephalopathy 03/30/2023    Type 2 diabetes mellitus with circulatory disorder, without long-term current use of insulin ([place] [address] ) 03/30/2023     Past Medical History:   Diagnosis Date  Hypertensive emergency 3/30/2023     No past surgical history on file  Social History     Socioeconomic History    Marital status: Single     Spouse name: Not on file    Number of children: Not on file    Years of education: Not on file    Highest education level: Not on file   Occupational History    Not on file   Tobacco Use    Smoking status: Never    Smokeless tobacco: Never   Vaping Use    Vaping Use: Never used   Substance and Sexual Activity    Alcohol use: Never    Drug use: Never    Sexual activity: Not Currently   Other Topics Concern    Not on file   Social History Narrative    Not on file     Social Determinants of Health     Financial Resource Strain: Not on file   Food Insecurity: No Food Insecurity    Worried About Running Out of Food in the Last Year: Never true    [address] in the Last Year: Never true   Transportation Needs: No Transportation Needs    Lack of Transportation (Medical): No    Lack of Transportation (Non-Medical): No   Physical Activity: Not on file   Stress: Not on file   Social Connections: Not on file   Intimate Partner Violence: Not on file   Housing Stability: Low Risk     Unable to Pay for Housing in the Last Year: No    Number of Places Lived in the Last Year: 1    Unstable Housing in the Last Year: No     Social History     Tobacco Use   Smoking Status Never   Smokeless Tobacco Never     Social History     Substance and Sexual Activity   Alcohol Use Never     No family history on file  Medical Necessity Criteria for AR[facility] Admission: {ARC Medical Complexity:55512}  In addition, the preadmission screen, post-admission physical evaluation, overall plan of care and admissions order demonstrate a reasonable expectation that the following criteria were met at the time of [facility]    1  The patient requires active and ongoing therapeutic intervention of multiple therapy disciplines (physical therapy, occupational therapy, speech-language pathology, or prosthetics/orthotics), one of which is physical or occupational therapy  2  Patient requires an intensive rehabilitation therapy program, as defined in Chapter 1, section 110 2 2 of the CMS Medicare Policy Manual  This intensive rehabilitation therapy program will consist of at least 3 hours of therapy per day at least 5 days per week or at least 15 hours of intensive rehabilitation therapy within a 7 consecutive day period, beginning with the date of admission to the [facility]  3  The patient is reasonably expected to actively participate in, and benefit significantly from, the intensive rehabilitation therapy program as defined in Chapter 1, section 110 2 2 of the CMS Medicare Policy Manual at this time of admission to the [facility]  He can reasonably be expected to make measurable improvement (that will be of practical value to improve the patients functional capacity or adaptation to impairments) as a result of the rehabilitation treatment, as defined in section 110 3, and such improvement can be expected to be made within the prescribed period of time  As noted in the CMS Medicare Policy Manual, the patient need not be expected to achieve complete independence in the domain of self-care nor be expected to return to his or her prior level of functioning in order to meet this standard  4  The patient must require physician supervision by a rehabilitation physician  As such, a rehabilitation physician will conduct face-to-face visits with the patient at least 3 days per week throughout the patients stay in the [facility] to assess the patient both medically and functionally, as well as to modify the course of treatment as needed to maximize the patients capacity to benefit from the rehabilitation process    5  The patient requires an intensive and coordinated interdisciplinary approach to providing rehabilitation, as defined in Chapter 1, section 110 2 5 of the CMS Medicare Policy Manual  This will be achieved through periodic team conferences, conducted at least once in a 7-day period, and comprising of an interdisciplinary team of medical professionals consisting of: a rehabilitation physician, registered nurse,  and/or , and a licensed/certified therapist from each therapy discipline involved in treating the patient  Changes Since Pre-admission Assessment: None -This patient's participation in rehab continues to be reasonable, necessary and appropriate  CMS Required Post-Admission Physician Evaluation Elements  History and Physical, including medical history, functional history and active comorbidities as in above text  Post-Admission Physician Evaluation:  The patient has the potential to make improvement and is in need of physical, occupational, and/or therapy services  The patient may also need nutritional services  Given the patient's complex medical condition and risk of further medical complications, rehabilitative services cannot be safely provided at a lower level of care, such as a skilled nursing facility  I have reviewed the patient's functional and medical status at the time of the preadmission screening and they are the same as on the day of this admission  I acknowledge that I have personally performed a full physical examination on this patient within 24 hours of admission  The patient and/or family demonstrated understanding the rehabilitation program and the discharge process after we discussed them       Agree in entirety: yes  Minor adaptions: none    Major changes: none    FANY Mir  Physical Medicine and Lou

## 2023-04-19 NOTE — DISCHARGE SUMMARY
INTERNAL MEDICINE RESIDENCY DISCHARGE SUMMARY     Annalee Vitale III   41 y o  male  MRN: [id number]  Room/Bed: /MS [address] [place] [address]    Encounter: [id number]    Principal Problem:    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)  Active Problems:    Acute ischemic left MCA stroke ([facility])    Obesity, Class III, BMI 40-49 9 (morbid obesity) ([facility] )    Type 2 diabetes mellitus with circulatory disorder, without long-term current use of insulin ([facility])    Hypertension    Thrombocytopenia ([facility])    Urinary retention    DVT of lower extremity, bilateral ([facility])      DVT of lower extremity, bilateral ([facility] )  Assessment & Plan  • 4/12 LE duplex: RLE: Evidence suggestive of Acute occlusive deep vein thrombosis noted in the Peroneal veins  Non Occlusive Acute deep vein thrombosis in noted in the Posterior Tibial veins  LLE: Evidence suggestive of Acute occlusive deep vein thrombosis noted in the Posterior Tiblial, Peroneal, and Soleal veins  Evidence suggestive of Acute non occlusive deep vein thrombosis noted in the Mid-Distal Femoral and Popliteal veins      Plan:  • Anticoagulation - see A&P for Saddle Embolus  Urinary retention  Assessment & Plan  • Failed urinary retention protocol requiring replacement of acharya on 4/13  • Flomax initiated 4/12     Plan:   • Acharya is out => Continue urinary retention protocol   • Continue flomax  • Encourage ambulation    Thrombocytopenia ([facility] )  Assessment & Plan  • First noted on 4/11  • 4Ts for HIT: Score is 8 points which is a high probability of HIT or about 64% likelihood per MD Calc calculator  • Transitioned from subcu heparin to subcu Arixtra initially given concern for HIT by [facility] providers  • Transitioned to argatroban infusion for saddle PE  • Heparin induced platelet antibody: 6 624  • Heparin antibody by serotonin release pending  • Currently on argatroban gtt, has been therapeutic for 24 hours     Plan:  • Plt improved to 140  • Continue Eliquis 5 mg BID  • Avoid all heparin products  • Continue to trend platelet count daily    Hypertension  Assessment & Plan  • No home regimen  • SBP in 140s-150s  • Continue Losartan 25 mg qd   • Continue amlodipine 5 mg PO daily and metoprolol 25 mg PO BID  • Blood pressures currently stable  Type 2 diabetes mellitus with circulatory disorder, without long-term current use of insulin [facility])  Assessment & Plan  Lab Results   Component Value Date    HGBA1C 8 0 (H) 03/31/2023       Recent Labs     04/14/23  1301 04/14/23  1643 04/14/23  2025 04/15/23  0642   POCGLU 160* 101 174* 125       Blood Sugar Average: Last 72 hrs:  (P) 136 [id number]   • Sliding scale insulin before meals and at bedtime, adjust algorithm as needed  • Goal blood glucose 140-180    Obesity, Class III, BMI 40-49 9 (morbid obesity) ([facility])  Assessment & Plan  Low-carb diet   Encourage lifestyle change      Acute ischemic left MCA stroke ([facility] )  Assessment & Plan  • With global aphasia, improving R sided weakness, motor planning difficulties, R inattention, and bowel/bladder incontinence  • CTA with L M2 division  • Not a candidate for systemic thrombolytics, and not a candidate for thrombectomy  • 3/31 MRI Brain: Large L MCA infarct with L BG involvement and superimposed microhemorrhage with mild mass effect and stable 3-mm L to R midling shift on most recent CTH (4/3)   • Echo: EF 55%, LA dilatation, no RWMA, no PFO, no thrombus or mass  • Thrombosis panel with abnormal antithrombin, Protein C/S activity, but per Neuro unclear significance in setting of acute stroke  Would repeat as outpatient  • Will need Zio Patch/Loop Recorder with Cards Referral as outpatient  • Discussed with Neurology on 4/9: Continue Keppra until outpatient f/u given location, temporal slowing, extent of lesion    • Outpatient f/u with Neurology, Cardiology, and PCP    • Patient neuro checks were decreased to Q4 as patient has been therapeutic on argatroban infusion x 24h     Plan:  • Continue Q4 neuro checks  • Continue Keppra pending OP follow up  • Continue ASA and statin  • PT/OT recommending post-acute rehab  Accepted to [facility], pending bed availability    * Saddle 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 [facility])  Assessment & Plan  Developed chest pain while at [facility] on 4/11 4/11 CTA PE: Saddle embolus and extensive clot burden throughout the visualized proximal pulmonary arteries  IR consultation suggested  RV/LV ratio less then 0 9, suggesting no elevation of right-sided pressures; however this is discordant with extensive clot burden  No evidence of pulmonary infarct  Dimer greater than 20   BNP elevated, troponin normal   Echo 4/12 shows: LVEF 55%, The right ventricular systolic pressure is moderately elevated  The estimated right ventricular systolic pressure is 80 41 mmHg  The right atrium is mildly dilated  Bilateral lower extremity venous duplex with bilateral DVTs  Case was discussed with IR by [facility] provider, patient is not a candidate for IR thrombectomy  PTT 68 -> 67 -> 64  Plan:   Transitioned from Argatroban gtt to Eliquis on 04/15  Remains hemodymically stable  Plt improved to 140  Continue Eliquis 5 mg BID  Medically stable for d/c pending bed placement at [facility] is a 40 yo M with hx of HTN, and recent Left MCA stroke who presented from [facility], he was noted to have chest pain starting on 4/10  Trops/EKG negative at time  Had an elevated D-Dimer, CTA showed saddle embolus and extensive clot burden with no right heart strain  LE dopplers positive for bilateral DVTs  Found to be thrombocytopenic, suspected HIT on subQ heparin, Heparin induced AB was positive  He was started on an argatroban drip  Not a candidate for mechanical thrombectomy given concern for HIT, not a candidate for TPA   He was admitted to the ICU for close workup and monitoring  Transferred to floors after being deemed stable  Transitioned to Eliquis 5 mg BID  PT/OT recommending return to [facility]  Discharged back to [facility] on Eliquis  Will need to establish care with PCP and follow-up with Neurology outpatient  On day of discharge, patient reported feeling well and denies any lightheadedness, dizziness, chest pain, SOB, or lower extremity pain  Residual neuro deficits from Lt MCA stroke include Lt Facial droop, LUE strength 0/5, LLE strength 0/5, expressive aphasia  DISCHARGE INFORMATION     PCP at Discharge: No PCP    Admitting Provider: Apryl Dodson MD  Admission Date: 4/12/2023    Discharge Provider: Felicita Quinones MD  Discharge Date: 04/19/2023    Discharge Disposition: PRA - Acute Care  Discharge Condition: fair  Discharge with Lines: no    Discharge Diet: cardiac diet and diabetic diet  Activity Restrictions: none  Test Results Pending at Discharge: None     Discharge Diagnoses:  Principal Problem:    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)  Active Problems:    Acute ischemic left MCA stroke (HCC)    Obesity, Class III, BMI 40-49 9 (morbid obesity) ([facility] [address] )    Type 2 diabetes mellitus with circulatory disorder, without long-term current use of insulin (HCC)    Hypertension    Thrombocytopenia (HCC)    Urinary retention    DVT of lower extremity, bilateral (HCC)  Resolved Problems:    * No resolved hospital problems  *      Consulting Providers:      Diagnostic & Therapeutic Procedures Performed:  No results found  Code Status: Level 1 - Full Code  Advance Directive & Living Will: <no information>  Power of :    POLST:      Medications:  Current Discharge Medication List        Current Discharge Medication List      START taking these medications    Details   apixaban (Eliquis) 5 mg Take 1 tablet (5 mg total) by mouth 2 (two) times a day  Qty: 60 tablet, Refills: 0    Comments: DONT FILL   PRICE CHECK  Associated Diagnoses: Acute saddle pulmonary embolism without acute cor pulmonale (HCC)           Current Discharge Medication List      CONTINUE these medications which have NOT CHANGED    Details   acetaminophen (TYLENOL) 160 mg/5 mL suspension Take 30 4 mL (975 mg total) by mouth every 8 (eight) hours as needed for headaches, fever or mild pain  Refills: 0    Associated Diagnoses: Left-sided chest wall pain      amLODIPine (NORVASC) 5 mg tablet Take 1 tablet (5 mg total) by mouth daily  Refills: 0    Associated Diagnoses: Primary hypertension      aspirin 81 mg chewable tablet Chew 1 tablet (81 mg total) daily  Refills: 0    Associated Diagnoses: Acute ischemic left MCA stroke (HCC)      atorvastatin (LIPITOR) 40 mg tablet Take 1 tablet (40 mg total) by mouth every evening  Refills: 0    Associated Diagnoses: Acute ischemic left MCA stroke (HCC)      calcium carbonate (TUMS) 500 mg chewable tablet Chew 2 tablets (1,000 mg total) 3 (three) times a day as needed for indigestion or heartburn  Refills: 0    Associated Diagnoses: At risk for stress ulcer      famotidine (PEPCID) 20 mg tablet Take 1 tablet (20 mg total) by mouth every 12 (twelve) hours  Refills: 0    Associated Diagnoses: At risk for stress ulcer      fondaparinux (ARIXTRA) 10 mg/0 8 mL Inject 10 mg under the skin every 24 hours  Refills: 0    Associated Diagnoses:  Thrombocytopenia (HCC)      hydrALAZINE (APRESOLINE) 25 mg tablet Take 1 tablet (25 mg total) by mouth every 8 (eight) hours as needed (SBP > 170)  Refills: 0    Associated Diagnoses: Primary hypertension      levETIRAcetam (KEPPRA) 750 mg tablet Take 1 tablet (750 mg total) by mouth every 12 (twelve) hours  Refills: 0    Associated Diagnoses: Acute ischemic left MCA stroke (HCC)      metoprolol tartrate (LOPRESSOR) 25 mg tablet Take 1 tablet (25 mg total) by mouth every 12 (twelve) hours  Refills: 0    Associated Diagnoses: Primary hypertension      ondansetron (ZOFRAN-ODT) 4 mg disintegrating tablet Take 1 "tablet (4 mg total) by mouth every 6 (six) hours as needed for nausea or vomiting  Qty: 20 tablet, Refills: 0    Associated Diagnoses: Acute ischemic left MCA stroke (HCC)      oxyCODONE (ROXICODONE) 5 immediate release tablet Take 0 5 tablets (2 5 mg total) by mouth every 6 (six) hours as needed for severe pain or moderate pain for up to 10 days Max Daily Amount: 10 mg  Refills: 0    Associated Diagnoses: Left-sided chest wall pain      senna (SENOKOT) 8 6 mg Take 1 tablet (8 6 mg total) by mouth daily at bedtime  Refills: 0    Associated Diagnoses: Bowel and bladder incontinence      sodium chloride Inject 125 mL/hr into a catheter in a vein at 125 mL/hr continuous  Refills: 0    Associated Diagnoses: Creatinine elevation             Allergies: Allergies   Allergen Reactions   • Heparin Other (See Comments)     HIT     Visit Vitals  /83 (BP Location: Left arm)   Pulse 81   Temp 97 6 °F (36 4 °C) (Oral)   Resp 17   Ht 6' 1\" (1 854 m)   Wt 132 kg (290 lb 6 4 oz)   SpO2 95%   BMI 38 31 kg/m²   Smoking Status Never   BSA 2 52 m²       Physical Exam  Constitutional:       General: He is not in acute distress  HENT:      Head: Normocephalic and atraumatic  Eyes:      General: No scleral icterus  Conjunctiva/sclera: Conjunctivae normal    Cardiovascular:      Rate and Rhythm: Normal rate and regular rhythm  Heart sounds: No murmur heard  No friction rub  No gallop  Pulmonary:      Effort: Pulmonary effort is normal  No respiratory distress  Breath sounds: Normal breath sounds  No wheezing, rhonchi or rales  Abdominal:      General: Abdomen is flat  There is no distension  Palpations: Abdomen is soft  Tenderness: There is no abdominal tenderness  There is no guarding or rebound  Musculoskeletal:      Right lower leg: No edema  Left lower leg: No edema  Skin:     General: Skin is warm and dry  Neurological:      Mental Status: He is oriented to person, place, and time   " "  Psychiatric:         Mood and Affect: Mood normal          Behavior: Behavior normal          FOLLOW-UP     PCP Outpatient Follow-up:  Establish care with PCP    Consulting Providers Follow-up:  Neurology     Active Issues Requiring Follow-up:   Acute DVT/PE; Left MCA stroke    Discharge Statement:   I spent 30 minutes minutes discharging the patient  This time was spent on the day of discharge  I had direct contact with the patient on the day of discharge  Additional documentation is required if more than 30 minutes were spent on discharge  Portions of the record may have been created with voice recognition software  Occasional wrong word or \"sound a like\" substitutions may have occurred due to the inherent limitations of voice recognition software    Read the chart carefully and recognize, using context, where substitutions have occurred     ==  Adalberto Gomez MD  [facility]  Internal Medicine Resident PGY-1      "

## 2023-04-19 NOTE — OCCUPATIONAL THERAPY NOTE
Occupational Therapy Progress Note     Patient Name: Riki Davila III  GWRFD'T Date: 4/19/2023  Problem List  Principal Problem:    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)  Active Problems:    Acute ischemic left MCA stroke ([facility])    Obesity, Class III, BMI 40-49 9 (morbid obesity) ([facility] )    Type 2 diabetes mellitus with circulatory disorder, without long-term current use of insulin ([facility])    Hypertension    Thrombocytopenia ([facility] )    Urinary retention    DVT of lower extremity, bilateral ([place] [address] )          04/19/23 0817   OT Last Visit   OT Visit Date 04/19/23   Note Type   Note Type Treatment   Pain Assessment   Pain Assessment Tool 0-10   Pain Score No Pain   Hospital Pain Intervention(s) Repositioned; Ambulation/increased activity; Emotional support   Restrictions/Precautions   Weight Bearing Precautions Per Order No   Other Precautions Cognitive; Chair Alarm;Multiple lines; Fall Risk  (expressive aphasia)   Lifestyle   Autonomy pta, pt was I W ADL/IADL, no AD mobility  Reciprocal Relationships supportive family   Service to Others per mothers report 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 [employer]   Intrinsic Gratification per pts mother  pt enjoys playing video games, collecting sports memorabilia  likes baseball, phillies, yankees and iron pigs  ADL   Where Assessed Chair   Eating Assistance 5  Supervision/Setup   Eating Deficit Setup; Increased time to complete;Bringing food to mouth assist   Grooming Assistance 4  Minimal Assistance   Grooming Deficit Setup;Steadying;Verbal cueing;Supervision/safety; Increased time to complete;Wash/dry hands; Wash/dry face;Brushing hair   UB Bathing Assistance 4  Minimal Assistance   UB Bathing Deficit Setup;Steadying;Supervision/safety; Increased time to complete; Chest;Right arm;Left arm; Abdomen   UB Dressing Assistance 4  Minimal Assistance   UB Dressing Deficit Setup;Steadying;Verbal cueing;Supervision/safety; Increased time to "complete; Thread RUE; Thread LUE;Pull over head;Pull around back;Pull down in back   LB Dressing Assistance 4  Minimal Assistance   LB Dressing Deficit Setup;Steadying;Supervision/safety; Increased time to complete;Verbal cueing; Don/doff R sock; Don/doff L sock   Toileting Assistance  5  Supervision/Setup   Toileting Deficit Setup;Use of bedpan/urinal setup   Bed Mobility   Supine to Sit 5  Supervision   Additional items HOB elevated; Bedrails; Increased time required;Verbal cues   Sit to Supine Unable to assess   Additional Comments At end of session, pt left sitting upright in recliner with all functional needs in reach with chair alarm activated   Transfers   Sit to Stand 4  Minimal assistance   Additional items Assist x 1; Increased time required;Verbal cues   Stand to Sit 4  Minimal assistance   Additional items Assist x 1; Increased time required;Verbal cues   Additional Comments w/ RW for safety and support   Functional Mobility   Functional Mobility 4  Minimal assistance   Additional Comments Pt completed short household functional mobility distances within room with Min A x1 w/ RW for safety and support with verbal cues for RW management   Additional items Rolling walker   Subjective   Subjective \"Yep yep\"   Cognition   Arousal/Participation Alert; Responsive;Arousable; Cooperative   Attention Within functional limits   Memory Decreased recall of precautions   Following Commands Follows one step commands with increased time or repetition   Comments Pt very pleasant and cooperative; limited by expressive aphasia   Activity Tolerance   Activity Tolerance Patient tolerated treatment well;Patient limited by fatigue   Medical Staff Made Aware RN cleared   Assessment   Assessment Pt is a 42 yo male who actively participated in skilled OT session on 4/19/2023   Treatment focused to improve functional transfers with fall prevention strategies, static/dynamic balance, postural/trunk control, proper body mechanics, functional " use of b/l UE's, higher level cognitive functions, safety awareness, and overall increased activity tolerance in ADL/IADL/leisure tasks  Upon arrival, pt found lying supine in bed and was agreeable to OT session  Pt completed bed mobility tasks @ supervision level and sat EOB to completed UB bathing/dressing tasks w/ Min A, LB dressing tasks w/ Min A, and grooming tasks w/ Min A  During all functional activity, pt demonstrating good fine motor/manipulation skills with common ADL objects  Pt completed STS w/ Min A x1 w/ RW and completed short household functional mobility distances within room w/ Min A x1 w/ RW, requiring verbal cues for RW management @ times  At the end of the session, pt was left sitting upright in recliner with all functional needs in reach  Pt demonstrates gradual functional improvements towards OT goals however continues to be functioning below occupational baseline and is still limited by the following limitations/impairments which were addressed through skilled instruction: expressive aphasia, generalized weakness, balance, endurance/activity tolerance, strength, pain, and safety awareness  At this time, recommend discharge to post-acute rehab when medically stable  The patient's raw score on the -PAC Daily Activity Inpatient Short Form is 17  A raw score of less than 19 suggests the patient may benefit from discharge to post-acute rehabilitation services  Please refer to the recommendation of the Occupational Therapist for safe discharge planning  Established OT goals will be continued 3-5x/wk to address immediate acute care needs and underlying performance skills to maximize occupational performance and safety to return to OF  Plan   Treatment Interventions ADL retraining;Functional transfer training;UE strengthening/ROM; Endurance training;Patient/family training;Equipment evaluation/education;Cognitive reorientation; Neuromuscular reeducation; Fine motor coordination activities; Compensatory technique education;Continued evaluation; Energy conservation; Activityengagement   Goal Expiration Date 04/26/23   OT Treatment Day 6   OT Frequency 3-5x/wk   Recommendation   OT Discharge Recommendation Post acute rehabilitation services   AM-PAC Daily Activity Inpatient   Lower Body Dressing 2   Bathing 2   Toileting 3   Upper Body Dressing 3   Grooming 3   Eating 4   Daily Activity Raw Score 17   Daily Activity Standardized Score (Calc for Raw Score >=11) 37 26   AM-PAC Applied Cognition Inpatient   Following a Speech/Presentation 3   Understanding Ordinary Conversation 4   Taking Medications 3   Remembering Where Things Are Placed or Put Away 3   Remembering List of 4-5 Errands 3   Taking Care of Complicated Tasks 2   Applied Cognition Raw Score 18   Applied Cognition Standardized Score 38 07   End of Consult   Education Provided Yes   Patient Position at End of Consult Bedside chair; All needs within reach   Nurse Communication Nurse aware of consult       Bernie Ojeda MS, OTR/L

## 2023-04-19 NOTE — PLAN OF CARE
Problem: OCCUPATIONAL THERAPY ADULT  Goal: Performs self-care activities at highest level of function for planned discharge setting  See evaluation for individualized goals  Description: Treatment Interventions: ADL retraining, Functional transfer training, UE strengthening/ROM, Endurance training, Visual perceptual retraining, Cognitive reorientation, Patient/family training, Equipment evaluation/education, Compensatory technique education, Activityengagement, Energy conservation, Neuromuscular reeducation, Fine motor coordination activities          See flowsheet documentation for full assessment, interventions and recommendations  Outcome: Progressing  Note: Limitation: Decreased ADL status, Decreased UE ROM, Decreased UE strength, Decreased cognition, Decreased Safe judgement during ADL, Decreased endurance, Decreased self-care trans, Decreased high-level ADLs, Decreased fine motor control  Prognosis: Good  Assessment: Pt is a 40 yo male who actively participated in skilled OT session on 4/19/2023  Treatment focused to improve functional transfers with fall prevention strategies, static/dynamic balance, postural/trunk control, proper body mechanics, functional use of b/l UE's, higher level cognitive functions, safety awareness, and overall increased activity tolerance in ADL/IADL/leisure tasks  Upon arrival, pt found lying supine in bed and was agreeable to OT session  Pt completed bed mobility tasks @ supervision level and sat EOB to completed UB bathing/dressing tasks w/ Min A, LB dressing tasks w/ Min A, and grooming tasks w/ Min A  During all functional activity, pt demonstrating good fine motor/manipulation skills with common ADL objects  Pt completed STS w/ Min A x1 w/ RW and completed short household functional mobility distances within room w/ Min A x1 w/ RW, requiring verbal cues for RW management @ times   At the end of the session, pt was left sitting upright in recliner with all functional needs in reach  Pt demonstrates gradual functional improvements towards OT goals however continues to be functioning below occupational baseline and is still limited by the following limitations/impairments which were addressed through skilled instruction: expressive aphasia, generalized weakness, balance, endurance/activity tolerance, strength, pain, and safety awareness  At this time, recommend discharge to post-acute rehab when medically stable  The patient's raw score on the AM-PAC Daily Activity Inpatient Short Form is 17  A raw score of less than 19 suggests the patient may benefit from discharge to post-acute rehabilitation services  Please refer to the recommendation of the Occupational Therapist for safe discharge planning  Established OT goals will be continued 3-5x/wk to address immediate acute care needs and underlying performance skills to maximize occupational performance and safety to return to OF       OT Discharge Recommendation: Post acute rehabilitation services

## 2023-04-19 NOTE — CASE MANAGEMENT
Case Management Discharge Planning Note    Patient name Sebastian Zhao  Location /-75 MRN [id number]  : 1975 Date 2023       Current Admission Date: 2023  Current Admission Diagnosis:Saddle 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 [facility])   Patient Active Problem List    Diagnosis Date Noted   • Urinary retention 2023   • D[facility] of lower extremity, bilateral [facility] ) 2023   • Saddle 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 ([facility] ) 2023   • Left-sided chest wall pain 2023   • Creatinine elevation 2023   • Thrombocytopenia ([place] [address] ) 2023   • Bowel and bladder incontinence 2023   • Hypertension 2023   • Acute ischemic left MCA stroke ([place] [address] ) 2023   • Obesity, Class III, BMI 40-49 9 (morbid obesity) ([place] [address] ) 2023   • Encephalopathy 2023   • Type 2 diabetes mellitus with circulatory disorder, without long-term current use of insulin ([place] [address] ) 2023      LOS (days): 7  Geometric Mean LOS (GMLOS) (days): 3 90  Days to GMLOS:-3 4     OBJECTIVE:  Risk of Unplanned Readmission Score: 15 1         Current admission status: Inpatient   Preferred Pharmacy:   [address] [address] [address]  [place] [address] [address]  [address] [place] [address]  Phone: [phone] Fax: [address] [address] [facility] [address] [address] [facility] [address] [address]  Phone: [phone] Fax: [phone]    Primary Care Provider: No primary care provider on file  Primary Insurance: BLUE CROSS  Secondary Insurance:     DISCHARGE DETAILS:    Discharge planning discussed with[de-identified] Pt, Sarah-, Vidal-MELVINA and Faviola SALMON     Comments - Freedom of Choice: Insurance auth received for Pt's admission to [facility] today  CM notified all the above mentioned individuals of the Pt's today  Pt to be [facility] by the [facility] staff         Contacts  Patient Contacts: Sarah  Relationship to Patient[de-identified] Family  Contact Method: Phone  Phone Number: [phone]  Reason/Outcome: Discharge Planning      Other Referral/Resources/Interventions Provided:  Referral Comments: Pt for d/c to  [facility] today  Transport at Discharge :  Other (Comment) [facility])  Dispatcher Contacted: No        ETA of Transport (Date): 04/19/23  ETA of Transport (Time): [address] Name, [facility] 41 : [address] [facility]

## 2023-04-19 NOTE — PLAN OF CARE
Problem: Prexisting or High Potential for Compromised Skin Integrity  Goal: Skin integrity is maintained or improved  Description: INTERVENTIONS:  - Identify patients at risk for skin breakdown  - Assess and monitor skin integrity  - Assess and monitor nutrition and hydration status  - Monitor labs   - Assess for incontinence   - Turn and reposition patient  - Assist with mobility/ambulation  - Relieve pressure over bony prominences  - Avoid friction and shearing  - Provide appropriate hygiene as needed including keeping skin clean and dry  - Evaluate need for skin moisturizer/barrier cream  - Collaborate with interdisciplinary team   - Patient/family teaching  - Consider wound care consult   Outcome: Progressing     Problem: MOBILITY - ADULT  Goal: Maintain or return to baseline ADL function  Description: INTERVENTIONS:  -  Assess patient's ability to carry out ADLs; assess patient's baseline for ADL function and identify physical deficits which impact ability to perform ADLs (bathing, care of mouth/teeth, toileting, grooming, dressing, etc )  - Assess/evaluate cause of self-care deficits   - Assess range of motion  - Assess patient's mobility; develop plan if impaired  - Assess patient's need for assistive devices and provide as appropriate  - Encourage maximum independence but intervene and supervise when necessary  - Involve family in performance of ADLs  - Assess for home care needs following discharge   - Consider OT consult to assist with ADL evaluation and planning for discharge  - Provide patient education as appropriate  Outcome: Progressing  Goal: Maintains/Returns to pre admission functional level  Description: INTERVENTIONS:  - Perform BMAT or MOVE assessment daily    - Set and communicate daily mobility goal to care team and patient/family/caregiver  - Collaborate with rehabilitation services on mobility goals if consulted  - Perform Range of Motion  times a day    - Reposition patient every hours   - Dangle patient  times a day  - Stand patient  times a day  - Ambulate patient  times a day  - Out of bed to chair  times a day   - Out of bed for meal  times a day  - Out of bed for toileting  - Record patient progress and toleration of activity level   Outcome: Progressing     Problem: NEUROSENSORY - ADULT  Goal: Achieves stable or improved neurological status  Description: INTERVENTIONS  - Monitor and report changes in neurological status  - Monitor vital signs such as temperature, blood pressure, glucose, and any other labs ordered   - Initiate measures to prevent increased intracranial pressure  - Monitor for seizure activity and implement precautions if appropriate      Outcome: Progressing  Goal: Remains free of injury related to seizures activity  Description: INTERVENTIONS  - Maintain airway, patient safety  and administer oxygen as ordered  - Monitor patient for seizure activity, document and report duration and description of seizure to physician/advanced practitioner  - If seizure occurs,  ensure patient safety during seizure  - Reorient patient post seizure  - Seizure pads on all 4 side rails  - Instruct patient/family to notify RN of any seizure activity including if an aura is experienced  - Instruct patient/family to call for assistance with activity based on nursing assessment  - Administer anti-seizure medications if ordered    Outcome: Progressing     Problem: CARDIOVASCULAR - ADULT  Goal: Maintains optimal cardiac output and hemodynamic stability  Description: INTERVENTIONS:  - Monitor I/O, vital signs and rhythm  - Monitor for S/S and trends of decreased cardiac output  - Administer and titrate ordered vasoactive medications to optimize hemodynamic stability  - Assess quality of pulses, skin color and temperature  - Assess for signs of decreased coronary artery perfusion  - Instruct patient to report change in severity of symptoms  Outcome: Progressing  Goal: Absence of cardiac dysrhythmias or at baseline rhythm  Description: INTERVENTIONS:  - Continuous cardiac monitoring, vital signs, obtain 12 lead EKG if ordered  - Administer antiarrhythmic and heart rate control medications as ordered  - Monitor electrolytes and administer replacement therapy as ordered  Outcome: Progressing     Problem: HEMATOLOGIC - ADULT  Goal: Maintains hematologic stability  Description: INTERVENTIONS  - Assess for signs and symptoms of bleeding or hemorrhage  - Monitor labs  - Administer supportive blood products/factors as ordered and appropriate  Outcome: Progressing

## 2023-04-20 PROBLEM — D75.829 HIT (HEPARIN-INDUCED THROMBOCYTOPENIA) (HCC): Status: ACTIVE | Noted: 2023-04-20

## 2023-04-20 LAB
ANION GAP SERPL CALCULATED.3IONS-SCNC: 3 MMOL/L (ref 4–13)
BASOPHILS # BLD AUTO: 0.05 THOUSANDS/ΜL (ref 0–0.1)
BASOPHILS NFR BLD AUTO: 1 % (ref 0–1)
BUN SERPL-MCNC: 16 MG/DL (ref 5–25)
CALCIUM SERPL-MCNC: 8.8 MG/DL (ref 8.3–10.1)
CHLORIDE SERPL-SCNC: 110 MMOL/L (ref 96–108)
CO2 SERPL-SCNC: 25 MMOL/L (ref 21–32)
CREAT SERPL-MCNC: 1 MG/DL (ref 0.6–1.3)
EOSINOPHIL # BLD AUTO: 0.51 THOUSAND/ΜL (ref 0–0.61)
EOSINOPHIL NFR BLD AUTO: 7 % (ref 0–6)
ERYTHROCYTE [DISTWIDTH] IN BLOOD BY AUTOMATED COUNT: 13.6 % (ref 11.6–15.1)
GFR SERPL CREATININE-BSD FRML MDRD: 90 ML/MIN/1.73SQ M
GLUCOSE P FAST SERPL-MCNC: 115 MG/DL (ref 65–99)
GLUCOSE SERPL-MCNC: 115 MG/DL (ref 65–140)
GLUCOSE SERPL-MCNC: 116 MG/DL (ref 65–140)
GLUCOSE SERPL-MCNC: 131 MG/DL (ref 65–140)
GLUCOSE SERPL-MCNC: 89 MG/DL (ref 65–140)
GLUCOSE SERPL-MCNC: 99 MG/DL (ref 65–140)
HCT VFR BLD AUTO: 42.4 % (ref 36.5–49.3)
HGB BLD-MCNC: 13.3 G/DL (ref 12–17)
IMM GRANULOCYTES # BLD AUTO: 0.02 THOUSAND/UL (ref 0–0.2)
IMM GRANULOCYTES NFR BLD AUTO: 0 % (ref 0–2)
LYMPHOCYTES # BLD AUTO: 1.71 THOUSANDS/ΜL (ref 0.6–4.47)
LYMPHOCYTES NFR BLD AUTO: 24 % (ref 14–44)
MCH RBC QN AUTO: 26.2 PG (ref 26.8–34.3)
MCHC RBC AUTO-ENTMCNC: 31.4 G/DL (ref 31.4–37.4)
MCV RBC AUTO: 84 FL (ref 82–98)
MONOCYTES # BLD AUTO: 0.6 THOUSAND/ΜL (ref 0.17–1.22)
MONOCYTES NFR BLD AUTO: 8 % (ref 4–12)
NEUTROPHILS # BLD AUTO: 4.26 THOUSANDS/ΜL (ref 1.85–7.62)
NEUTS SEG NFR BLD AUTO: 60 % (ref 43–75)
NRBC BLD AUTO-RTO: 0 /100 WBCS
PLATELET # BLD AUTO: 261 THOUSANDS/UL (ref 149–390)
PMV BLD AUTO: 9.4 FL (ref 8.9–12.7)
POTASSIUM SERPL-SCNC: 3.9 MMOL/L (ref 3.5–5.3)
RBC # BLD AUTO: 5.08 MILLION/UL (ref 3.88–5.62)
SODIUM SERPL-SCNC: 138 MMOL/L (ref 135–147)
WBC # BLD AUTO: 7.15 THOUSAND/UL (ref 4.31–10.16)

## 2023-04-20 RX ORDER — BISACODYL 10 MG
10 SUPPOSITORY, RECTAL RECTAL ONCE
Status: DISCONTINUED | OUTPATIENT
Start: 2023-04-20 | End: 2023-05-05 | Stop reason: HOSPADM

## 2023-04-20 RX ORDER — POLYETHYLENE GLYCOL 3350 17 G/17G
17 POWDER, FOR SOLUTION ORAL DAILY
Status: DISCONTINUED | OUTPATIENT
Start: 2023-04-20 | End: 2023-05-05 | Stop reason: HOSPADM

## 2023-04-20 RX ADMIN — METOPROLOL TARTRATE 25 MG: 25 TABLET, FILM COATED ORAL at 08:03

## 2023-04-20 RX ADMIN — FAMOTIDINE 20 MG: 20 TABLET ORAL at 08:03

## 2023-04-20 RX ADMIN — LEVETIRACETAM 750 MG: 750 TABLET, FILM COATED ORAL at 08:04

## 2023-04-20 RX ADMIN — POLYETHYLENE GLYCOL 3350 17 G: 17 POWDER, FOR SOLUTION ORAL at 08:04

## 2023-04-20 RX ADMIN — ATORVASTATIN CALCIUM 40 MG: 40 TABLET, FILM COATED ORAL at 17:17

## 2023-04-20 RX ADMIN — SENNOSIDES 8.6 MG: 8.6 TABLET, FILM COATED ORAL at 21:59

## 2023-04-20 RX ADMIN — APIXABAN 5 MG: 5 TABLET, FILM COATED ORAL at 08:03

## 2023-04-20 RX ADMIN — TAMSULOSIN HYDROCHLORIDE 0.4 MG: 0.4 CAPSULE ORAL at 17:17

## 2023-04-20 RX ADMIN — LEVETIRACETAM 750 MG: 750 TABLET, FILM COATED ORAL at 21:59

## 2023-04-20 RX ADMIN — AMLODIPINE BESYLATE 5 MG: 5 TABLET ORAL at 08:03

## 2023-04-20 RX ADMIN — LOSARTAN POTASSIUM 25 MG: 25 TABLET, FILM COATED ORAL at 08:02

## 2023-04-20 RX ADMIN — FAMOTIDINE 20 MG: 20 TABLET ORAL at 21:59

## 2023-04-20 RX ADMIN — ASPIRIN 81 MG 81 MG: 81 TABLET ORAL at 08:04

## 2023-04-20 RX ADMIN — METOPROLOL TARTRATE 25 MG: 25 TABLET, FILM COATED ORAL at 21:59

## 2023-04-20 RX ADMIN — APIXABAN 5 MG: 5 TABLET, FILM COATED ORAL at 17:18

## 2023-04-20 NOTE — PROGRESS NOTES
"Physical Medicine and Rehabilitation Progress Note  Megan Saunders III 41 y o  male MRN: [id number]  Unit/Bed#: -01 Encounter: [id number]      Assessment & Plan:     Decline in ADLs and mobility: Functional assessment        FIM  Care Score  Admit Score Recent Score    Total assist  1-100% or 2p    Tot     Max assist 2-51-75%    Sub  toileting hygiene, bathing, lower body dressing    Mod assist 3- 26-50%  Par  upper body dressing    Min assist 3- 25% or < Par     CG assist 4  TA     Sup/Setup 4-5 Sup     Mod-I/Indep 6 MI      Transfers   mod assist to significant assist     Ambulation    50 feet min assist     Stairs    4 steps moderate assist      Goal: Largely supervision for ADLs and mobility  Major barriers: Aphasia, imbalance, incoordination, deconditioning  Dispo: Home with estimate length of stay of 2 5 weeks from admission     * Acute ischemic left MCA stroke [facility])  Assessment & Plan  - Aphasia improving  - Large left MCA infarct  - Residual impairments on admission to [facility]: Aphasia, weakness, imbalance (assess for other impairments during [facility] course)   - Co-morbidities most impacting functional recovery: Morbid obesity    Secondary stroke prevention  - Per prior providers   \"Thrombosis panel with abnormal antithrombin, Protein C/S activity, but per Neuro unclear significance in setting of acute stroke  Would repeat as outpatient     Will need Zio Patch/Loop Recorder with Cards Referral as outpatient   Discussed with Neurology on 4/9: Continue Keppra until outpatient f/u given location, temporal slowing, extent of lesion  \"  - Antithrombotic: Aspirin  - Statin  - Optimal management of blood pressure and diabetes  -  patient and if applicable caregiver on optimal stroke management  - Follow-up with neurology and PCP after d/c   -Continue acute comprehensive interdisciplinary inpatient rehabilitation to include intensive skilled therapies (PT, OT, ST) as outlined with oversight and management by " rehabilitation physician as well as inpatient rehab level nursing, case management and weekly interdisciplinary team meetings           Saddle 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 ([facility])  Assessment & Plan  Saturating well on room air with and without activity so far  Monitor vitals with and without activity  Eliquis  Outpatient hematology consult    HIT (heparin-induced thrombocytopenia) ([facility])  Assessment & Plan  Heparin-induced antibody positive, serotonin release assay pending  Transitioned from Arixtra to Eliquis  Monitor platelet count now improving    DVT of lower extremity, bilateral (HCC)  Assessment & Plan  Eliquis  Ambulation  Hold SCDs with hx of DVT     Urinary retention  Assessment & Plan  Flomax    Bowel and bladder incontinence  Assessment & Plan  PVR so far low  As well as history of urinary retention  Improving  Toileting program  Flomax    Hypertension  Assessment & Plan  Blood pressure acceptable  Internal medicine consulted and co-management with their service  Monitor vitals with and without activity; monitor for orthostasis  Monitor hemoglobin, electrolytes, kidney function, hydration status   Current meds: Amlodipine, losartan, metoprolol      Type 2 diabetes mellitus with circulatory disorder, without long-term current use of insulin ([facility])  Assessment & Plan  Lab Results   Component Value Date    HGBA1C 8 0 (H) 03/31/2023     Internal medicine consulted and management at their discretion  Monitor for signs and symptoms of hypoglycemia   Current meds: Lispro sliding scale      Obesity, Class III, BMI 40-49 9 (morbid obesity) ([address] )  Assessment & Plan   on appropriate nutrition and activity  Adjustments accordingly during skilled therapy and with rehab nursing  Monitor skin closely for breakdown, wounds, rashes; keep clean and dry, turning Q2H   Follow-up with PCP after d/c          Other Medical Issues:       Follow-up providers and other issues to be followed up after discharge   PCP   Neuro   Hematology    CODE: Level 1: Full Code    Restrictions include: Fall precautions    Objective:     Allergies per EMR  Diagnostic Studies: Reviewed, no new imaging  No orders to display     See above as well    Laboratory: Labs reviewed  Results from last 7 days   Lab Units 04/20/23  0705 04/16/23  0506 04/15/23  0449   HEMOGLOBIN g/dL 13 3 13 2 13 3   HEMATOCRIT % 42 4 41 9 41 9   WBC Thousand/uL 7 15 9 33 8 87     Results from last 7 days   Lab Units 04/20/23  0705 04/16/23  0506 04/15/23  0449   BUN mg/dL 16 18 16   SODIUM mmol/L 138 136 137   POTASSIUM mmol/L 3 9 3 9 3 8   CHLORIDE mmol/L 110* 109* 107   CREATININE mg/dL 1 00 0 95 0 93            Drug regimen reviewed, all potential adverse effects identified and addressed:    Scheduled Meds:  Current Facility-Administered Medications   Medication Dose Route Frequency Provider Last Rate    acetaminophen  975 mg Oral Q8H PRN Anabella Martines MD      amLODIPine  5 mg Oral Daily Anabella Martines MD      apixaban  5 mg Oral BID Anabella Martines MD      aspirin  81 mg Oral Daily Anabella Martines MD      atorvastatin  40 mg Oral QPM Anabella Martines MD      bisacodyl  10 mg Rectal Daily PRN Anabella Martines MD      bisacodyl  10 mg Rectal Once Anabella Martines MD      calcium carbonate  1,000 mg Oral TID PRN Anabella Martines MD      famotidine  20 mg Oral Q12H [facility] Anabella Martines MD      insulin lispro  1-5 Units Subcutaneous TID [facility] Anabella Martines MD      levETIRAcetam  750 mg Oral Q12H [facility] Anabella Martines MD      lidocaine   Topical Q4H PRN Anabella Martines MD      losartan  25 mg Oral Daily Anabella Martines MD      metoprolol tartrate  25 mg Oral Q12H [facility] Anabella Martines MD      ondansetron  4 mg Oral Q8H PRN Anabella Martines MD      oxyCODONE  2 5 mg Oral Q6H PRN Anabella Martines MD      polyethylene glycol  17 g Oral Daily PRN Anabella Martines MD  polyethylene glycol  17 g Oral Daily Abelardo Kang MD      senna  1 tablet Oral HS Abelardo Kang MD      tamsulosin  0 4 mg Oral After [place] Branch MD         Chief Complaints: Aphasia    Subjective: On eval, patient continues with aphasia although it is a little bit better today  He appears to deny any significant complaints but communication limited due to aphasia  ROS: Could not be adequately (or fully adequately be) obtained due to degree of impaired cognition/communication at time of encounter  Physical Exam:  Temp:  [97 9 °F (36 6 °C)-98 1 °F (36 7 °C)] 97 9 °F (36 6 °C)  HR:  [74-99] 99  Resp:  [18-19] 19  BP: (133-147)/(63-94) 147/63  SpO2:  [96 %-98 %] 97 %    GEN:  Sitting in NAD   HEENT/NECK: Normocephalic, atraumatic, moist mucous membranes   CARDIAC: Regular rate rhythm, no murmers, no rubs, no gallops  LUNGS:  clear to auscultation, no wheezes, rales, or rhonchi  ABDOMEN: Soft, non-tender, non-distended, normal active bowel sounds  EXTREMITIES/SKIN:  no calf edema, no calf tenderness to palpation  NEURO:   MENTAL STATUS: Slightly improved aphasia, able to lift all limbs to command with some but less apraxia and Strength/MMT:  Near full with subtle right-sided weakness  PSYCH:  Affect:  Euthymic     HPI:  46-year-old male with a history of hypertension, hyperlipidemia, obesity, diabetes status post recent left MCA stroke which was complicated by saddle pulmonary embolism,, HIT, bilateral lower extremity DVTs  Patient was evaluated by skilled therapies and was found to have significant decline in ADLs and ambulation and [facility] [address]      ** Please Note: Fluency Direct voice to text software may have been used in the creation of this document   **

## 2023-04-20 NOTE — ASSESSMENT & PLAN NOTE
Saturating well on room air with and without activity so far  Monitor vitals with and without activity  Eliquis  Outpatient hematology consult

## 2023-04-20 NOTE — PROGRESS NOTES
Internal Medicine Progress Note  Patient: Ananya English III  Age/sex: 41 y o  male  Medical Record #: [id number]      ASSESSMENT/PLAN: (Interval History)  Ananya English III is seen and examined and management for following issues:    Saddle pulmonary embolus without cor pulmonale   Had nonocclusive saddle embolus with filling defects distal main pulmonary arteries and throughout segmental branches bilaterally   Distal order branches were not clearly visualized due to severe respiratory motion artifact though felt likely containing pulmonary emboli   Sx were left sided CP, NIEVES and elevated D-dimer   Continue Eliquis   Was not a candidate for IR intervention 2/2 HIT     Bilateral LE DVT   Continue Eliquis     HIT   Heparin induced antibody was positive   Heparin by serotonin release pending    S/p Argatroban   Platelet count was as low as 42, now recovered to 261     L MCA infarct 3/3/23   Suspected to be cardioembolic   Thrombosis panel then showed abnormal antithrombin, Protein C/S activity, but per Neuro unclear significance in setting of acute stroke and therefore wanted repeat as outpatient   Cont ASA/statin   Initial Echo/HODAN LVEF 55%; no thrombus/PFO   Needs outpt LOOP/Zio patch   Originally to be on Keppra x 7 days then was stopped  Dr Carmel Paredes d/w with Neuro on 4/10 and they wanted to keep the [address] on until outpatient follow-up and was therefore restarted     DM2   Hgb A1c 8 0   New diagnosis   Cont QID Accuchecks/SSI and DM diet   Home:  No meds   Here: SSI only    Had been on Metformin when in [facility] before transfer back for PE   Will consider restart Metformin if needed   Needs DM teaching and PCP f/u on dc = mother is diabetic and knows how to use meter but not insulin     HTN   Home: on no meds   Here:  Norvasc 5mg qd/Lopressor 25 mg q12hrs/Losartan 25mg qd   stable     Urine retention   Per PMR   Continue Flomax     Obesity   Recommend ongoing attempts at weight loss   Current [id number]        Discharge date:  Team    The above assessment and plan was reviewed and updated as determined by my evaluation of the patient on 4/20/2023      Labs:   Results from last 7 days   Lab Units 04/20/23  0705 04/16/23  0506   WBC Thousand/uL 7 15 9 33   HEMOGLOBIN g/dL 13 3 13 2   HEMATOCRIT % 42 4 41 9   PLATELETS Thousands/uL 261 140*     Results from last 7 days   Lab Units 04/20/23  0705 04/16/23  0506   SODIUM mmol/L 138 136   POTASSIUM mmol/L 3 9 3 9   CHLORIDE mmol/L 110* 109*   CO2 mmol/L 25 23   BUN mg/dL 16 18   CREATININE mg/dL 1 00 0 95   CALCIUM mg/dL 8 8 8 6             Results from last 7 days   Lab Units 04/20/23  1111 04/20/23  0647 04/19/23 2031   POC GLUCOSE mg/dl 131 116 129       Review of Scheduled Meds:  Current Facility-Administered Medications   Medication Dose Route Frequency Provider Last Rate    acetaminophen  975 mg Oral Q8H PRN Lynsey Ross MD      amLODIPine  5 mg Oral Daily Lynsey Ross MD      apixaban  5 mg Oral BID Lynsey Ross MD      aspirin  81 mg Oral Daily Lynsey Ross MD      atorvastatin  40 mg Oral QPM Lynsey Ross MD      bisacodyl  10 mg Rectal Daily PRN Lynsey Ross MD      bisacodyl  10 mg Rectal Once Lynsey Ross MD      calcium carbonate  1,000 mg Oral TID PRN Lynsey Ross MD      famotidine  20 mg Oral Q12H [facility] Lynsey Ross MD      insulin lispro  1-5 Units Subcutaneous TID [facility] Lynsey Ross MD      levETIRAcetam  750 mg Oral Q12H [facility] Lynsey Ross MD      lidocaine   Topical Q4H PRN Lynsey Ross MD      losartan  25 mg Oral Daily Lynsey Ross MD      metoprolol tartrate  25 mg Oral Q12H [facility] Lynsey Ross MD      ondansetron  4 mg Oral Q8H PRN Lynsey Ross MD      oxyCODONE  2 5 mg Oral Q6H PRN Lynsey Ross MD      polyethylene glycol  17 g Oral Daily PRN Lynsey Ross MD      polyethylene glycol  17 g Oral Daily Collette Rollins MD      senna  1 tablet Oral HS Collette Rollins MD      tamsulosin  0 4 mg Oral After Bobby Kimble MD         Subjective/ HPI: Patient seen and examined  Patients overnight issues or events were reviewed with nursing or staff during rounds or morning huddle session  New or overnight issues include the following:     No new or overnight issues  Offers no complaints    ROS:   A 10 point ROS was performed; negative except as noted above  Imaging:     No orders to display       *Labs /Radiology studies reviewed  *Medications reviewed and reconciled as needed  *Please refer to order section for additional ordered labs studies  *Case discussed with primary attending during morning huddle case rounds    Physical Examination:  Vitals:   Vitals:    04/19/23 2108 04/20/23 0600 04/20/23 0706 04/20/23 0803   BP: 140/88  133/83 133/77   BP Location:   Right arm    Pulse: 88  80 74   Resp:   18    Temp:   98 1 °F (36 7 °C)    TempSrc:   Oral    SpO2:   96%    Weight:  132 kg (291 lb 14 2 oz)     Height:           General Appearance: no distress, conversive  HEENT:  External ear normal   Nose normal w/o drainage  Mucous membranes are moist  Oropharynx is clear  Conjunctiva clear w/o icterus or redness  Neck:  Supple, normal ROM  Lungs: BBS without crackles/wheeze/rhonchi; respirations unlabored with normal inspiratory/expiratory effort  No retractions noted  On RA  CV: regular rate and rhythm; no rubs/murmurs/gallops, PMI normal   ABD: Abdomen is soft  Bowel sounds all quadrants  Nontender with no distention  EXT: no edema  Skin: normal turgor, normal texture, no rashes  Psych: affect normal, mood normal  Neuro: AA; has severe expressive> receptive aphasia    The above physical exam was reviewed and updated as determined by my evaluation of the patient on 4/20/2023      Invasive Devices     None                    VTE Pharmacologic Prophylaxis: Eliquis  Code Status: Level 1 - Full Code  Current Length of Stay: 1 day(s)      Total time spent:  30 minutes with more than 50% spent counseling/coordinating care  Counseling includes discussion with patient re: progress  and discussion with patient of his/her current medical state/information  Coordination of patient's care was performed in conjunction with primary service  Time invested included review of patient's labs, vitals, and management of their comorbidities with continued monitoring  In addition, this patient was discussed with medical team including physician and advanced extenders  The care of the patient was extensively discussed and appropriate treatment plan was formulated unique for this patient  Medical decision making for the day was made by supervising physician unless otherwise noted in their attestation statement  ** Please Note:  voice to text software may have been used in the creation of this document   Although proof errors in transcription or interpretation are a potential of such software**

## 2023-04-20 NOTE — PROGRESS NOTES
SLP TAA      04/20/23 1000   Patient Data   Rehab Impairment Impairment of mobility, safety, Activities of Daily Living (ADLs), and cognitive/communication skills due to Stroke:  01 2  Right Body Involvement (Left Brain)   Etiologic Diagnosis L MCA CVA with Superimposed Micro-hemorrhage   Date of Onset 03/31/23   Support System   Name Pt is knowne to this services from prior admit and aware of pt living w/ parents prior to admission, but pt was independent, working FT   Prior Level of Function   Functional Cognition 3  Independent - Patient completed the activities by him/herself, with or without an assistive device, with no assistance from a helper  Patient Preference   Nickname (Patient Preference) Giovani   Restrictions/Precautions   Precautions Aphasia;Bed/chair alarms;Cognitive; Fall Risk;Supervision on toilet/commode;Visual deficit   Pain Assessment   Pain Assessment Tool 0-10   Pain Score No Pain   Comprehension   Assist Devices Communication Board   Auditory Basic;Complex   Visual Basic;Complex   Findings Refer to SLP assessment note for details  QI: Comprehension 2  Sometimes Understands: Understands only basic conversations or simple, direct phrases  Frequently requires cues to understand   Comprehension (FIM) 2 - Understands only simple expressions or gestures (waves, hello)   Expression   Verbal Basic;Complex   Non-Verbal Complex;Basic   Intelligibility Word  (Minimal intelligible verbal output, occasional word or phrases  Refer to SLP assessment note for further details )   Findings Refer to SLP assessment note for details  QI: Expression 1  Rarely/Never expresses self of speech is very difficult to understand   Expression (FIM) 1 - Understands basic info/conversation < 25% of the time   Social Interaction   Cooperation with staff   Participation Individual   Findings Refer to SLP assessment note for details     Social Interaction (FIM) 5 - Interacts appropriately with others 90% of time   Problem "Solving   Complex Manages finances;Manages discharge planning;Manages medications;Manages return to work   Routine Manages call bell   Findings Refer to SLP assessment note for details  Problem solving (FIM) 2 - Solves basic problems 25-49% of time   Memory   Initiates Tasks Yes   Short-Term Impaired   Long Term Intact   Recalls Precaution Yes   Findings Refer to SLP assessment note for details  Memory (FIM) 2 - Recognizes, recalls/performs 25-49%   Discharge Information   Vocational Plan Return to work   Barriers to Return to [employer] Access;Skill Set Limitation;Transportation Issues;Strength; Endurance;Communication   Patient's Discharge Plan Home w/ family support/supervision   Patient's Rehab Expectations \"get home\"   Barriers to Discharge Home Limited Family Support; Unsafe Home Setup; Decreased Cognitive Function;Decreased Strength;Decreased Endurance; Safety Considerations   Impressions Pt is a 41 y o  male with medical history of HTN who presented to the [address] [place] [address] on 3/30 with L MCA CVA  Was not a candidate for TNK/tPA, transferred to [facility] for thrombectomy - but not a candidate based on CT Perfusion  Had petechial hemorrhage which was stable on subsequent CTH  Pt was approved for acute rehab and transferred on 4/8/2023  Pt admitted to [facility] but then returned to acute care on 4/11/23 for chest pain, (+) PE  Pt stabilized medically and returned  To [facility] on 4/19/23  At this time, pt is a good rehab candidate to achieve mod A for communication skills  while in the acute rehab center w/ anticipated discharge home w/ family support/supervision   Current barriers which present at this time include: severely impaired expressive and receptive language skills and suspected cognitive deficits with decreased processing, decreased ST/working memory, decreased executive function skills (problem solving, reasoning, organization, judgement) and overall insight to deficits which currently impact safety " and functional mobility  ELOS 10-14 days  Pt will benefit from skilled SLP services at this time to maximize overall speech/language skills to increase communication and decrease burden of care for family at time of discharge     SLP Therapy Minutes   SLP Time In 1000   SLP Time Out 1115   SLP Total Time (minutes) 75   SLP Mode of treatment - Individual (minutes) 60   SLP Mode of treatment - Concurrent (minutes) 15   SLP Mode of treatment - Group (minutes) 0   SLP Mode of treatment - Co-treat (minutes) 0   SLP Mode of Treatment - Total time(minutes) 75 minutes   SLP Cumulative Minutes 75   Cumulative Minutes   Cumulative therapy minutes 135

## 2023-04-20 NOTE — ASSESSMENT & PLAN NOTE
Sometimes related to poor initiation  Toileting program  - CG training to include proactive toileting  Flomax

## 2023-04-20 NOTE — TREATMENT PLAN
Individualized Plan of 2026 [facility] 41 y o  male MRN: [id number]  Unit/Bed#: -01 Encounter: [id number]     PATIENT INFORMATION  ADMISSION DATE: 4/19/2023  1:49 PM JASWINDER CATEGORY:Stroke:  01 2  Right Body Involvement (Left Brain)   ADMISSION DIAGNOSIS: Left MCA territory infarction EXPECTED LOS: 14 days     MEDICAL/FUNCTIONAL PROGNOSIS  Pre-admit screens and post-admit evaluations reviewed and are consistent  Based on my assessment of the patient's medical conditions and current functional status, the prognosis for attaining medical and functional goals or the IRF stay is:  Good  Patient is appropriate for acute rehabilitation  Cardiopulmonary function: Ensure cardiopulmonary stability and optimize cardiopulmonary function not only at rest but with activity as patient's activity level significantly increases in acute rehab compared with prior to transfer in preparation for safe discharge from [facility]  Must closely and frequently monitor blood pressure and HR to ensure adequate cardiac output during ADLs and ambulation as patient is at increased risk for orthostatic hypotension/syncope and potential injury if not monitored for and managed adequately  Blood pressure management:    Frequent monitoring of blood pressure with appropriate adjustments in blood pressure medication management to optimize blood pressure control and prevent/limit renal complications  Monitoring impact of blood pressure and side-effects of blood pressure medications at rest and with activity  DM: Patient will improve/maintain blood sugar control to ensure optimal healing and decrease risks of complications associated with DM through medication monitoring, adjustments as indicated, and optimal dietary intake and education  Cognitive deficits following cerebrovascular disease: intensive skilled therapies including speech language pathology to evaluate and treat deficits in cognition  Close oversight and management by rehab specialized physician of cognitive deficits and potential confounding factors (prevention of, monitoring for, and if found treatment of possible complications such as infections, as well as optimally managing sleep, mood, and meications which can negatively impact cognition and functional recovery)  Inpatient rehabilitation education/teaching: To be provided to patient and typically family/caregiver (if able to be identified) by all skilled therapists, rehab nursing, case management, and rehab specialized physician to ensure optimal recovery and decrease risks of complications in both acute rehabilitation setting as well as after discharge  Bladder dysfunction:  Appropriate bladder management with appropriate toileting program from rehab nursing and staff with oversight management by rehabilitation physicians which include appropriate monitoring and possible adjustments in medications to with goals to optimize bladder function and decrease risk of bladder retention, incontinence, and urinary tract infection  Obesity:  Close monitoring of nutrition status, nutrition specialist with adjustments in diet   on appropriate short and long term nutrition and activity  Obesity increases complexity of patient's overall condition and causes unique challenges during this part of patient's recovery process  Supervise and if necessary make adjustments in rehab nursing care and skilled therapy care to ensure appropriate toileting, bed mobility, other ADLs, and ambulation to decrease risk of falls/injuries, VTE, skin breakdown/ulceration and optimize functional recovery  ANTICIPATED DISCHARGE DISPOSITION AND SERVICES  COMMUNITY SETTING:    Previous community setting      ANTICIPATED FOLLOW-UP SERVICE:   Outpatient Therapy PT, OT ST    DISCIPLINE SPECIFIC PLANS:  Required Disciplines & Services: PT, OT, ST, Rehabilitation Physician, Rehabillitation Nursing, Case Management, Dietary,     REQUIRED THERAPY (expected): Therapy Hours per Day Days per Week Tx Days (Days in ARF)   Physical Therapy 1 5 14   Occupational Therapy 1 5 14   Speech/Language Therapy 1 5 14   NOTE: Additional therapy time(s) may be added as appropriate to meet patient needs and to achieve functional goals  ANTICIPATED FUNCTIONAL OUTCOMES:  ADL: Patient will be largely supervision-modified independent with ADLs upon completion of rehab program    Bladder/Bowel: Patient will be supervision-modified independent with bladder/bowel management upon completion of rehab program   Transfers: Patient will be supervision with transfers upon completion of rehab program   Locomotion: Patient will be at or near supervision with locomotion (wheelchair or ambulation) upon completion of rehab program   Cognitive: Patient will be closer to prior level of cognitive function upon completion of rehab program     DISCHARGE PLANNING NEEDS  Equipment needs: To be determined at team conference  REHAB ANTICIPATED PARTICIPATION RESTRICTIONS:  Uncertain at this time  To be determined closer to discharge

## 2023-04-20 NOTE — ASSESSMENT & PLAN NOTE
"- 5/5 neuro exam stable; function improved; still with significant aphasia  trialing IRP at times > on track for d/c Friday   - CG training to include proactive toileting  -Community re-integration outing went well  - D/C with OP PT, OT, ST   - Mother to assist with iADLs and transportation  - I notified patient/family that due to CVA and residual functional impairments could significantly impact driving  I stated I could not be sure their impact on driving without driving assessment  In interim, I recommend no driving until cleared by outpatient physician and after cleared by formal driving testing along with filling out and faxing [place] [employer] required Initial Reporting Form [id number] which was completed and patient made aware  - Large left MCA infarct    Secondary stroke prevention  - Per prior providers   \"Thrombosis panel with abnormal antithrombin, Protein C/S activity, but per Neuro unclear significance in setting of acute stroke  Would repeat as outpatient   Will need Zio Patch/Loop Recorder with Cards Referral as outpatient   Discussed with Neurology on 4/9: Continue Keppra until outpatient f/u given location, temporal slowing, extent of lesion  \"  - Antithrombotic: Aspirin (+ Eliquis with PE hx)   - Statin  - Optimal management of blood pressure and diabetes  - Follow-up with neurology and PCP after d/c   - Disability/FMLA forms completed and given to CM 4/21 to assist with and fax   - Completed acute comprehensive interdisciplinary inpatient rehabilitation include intensive skilled therapies (PT, OT) as previously outlined with oversight and management by rehabilitation physician, inpatient rehab level nursing, case management and weekly interdisciplinary team meetings       "

## 2023-04-20 NOTE — PROGRESS NOTES
04/20/23 1000   Pain Assessment   Pain Assessment Tool 0-10   Pain Score No Pain   Restrictions/Precautions   Precautions Aphasia;Bed/chair alarms;Cognitive; Fall Risk;Supervision on toilet/commode;Visual deficit   Comprehension   Assist Devices Communication Board   Auditory Basic;Complex   Visual Basic;Complex   Findings Refer to SLP assessment note for details  QI: Comprehension 2  Sometimes Understands: Understands only basic conversations or simple, direct phrases  Frequently requires cues to understand   Comprehension (FIM) 2 - Understands only simple expressions or gestures (waves, hello)   Expression   Verbal Basic;Complex   Non-Verbal Complex;Basic   Intelligibility Word  (Minimal intelligible verbal output, occasional word or phrases  Refer to SLP assessment note for further details )   Findings Refer to SLP assessment note for details  QI: Expression 1  Rarely/Never expresses self of speech is very difficult to understand   Expression (FIM) 1 - Understands basic info/conversation < 25% of the time   Social Interaction   Cooperation with staff   Participation Individual   Findings Refer to SLP assessment note for details  Social Interaction (FIM) 5 - Interacts appropriately with others 90% of time   Problem Solving   Complex Manages finances;Manages discharge planning;Manages medications;Manages return to work   Routine Manages call bell   Findings Refer to SLP assessment note for details  Problem solving (FIM) 2 - Solves basic problems 25-49% of time   Memory   Initiates Tasks Yes   Short-Term Impaired   Long Term Intact   Recalls Precaution Yes   Findings Refer to SLP assessment note for details     Memory (FIM) 2 - Recognizes, recalls/performs 25-49%   Language Assessments   Western Aphasia Battery (WAB) Bedside Western Aphasia Battery        Subtest  Score   Spontaneous Speech: Content 0   Spontaneous Speech: Fluency 3   Auditory Verbal Comprehension: Yes/No Questions 5   Sequential Commands 1 "  Repetition 0   Object Naming 0   Bedside Aphasia Score Very severely impaired   15   Bedside Aphasia Classification Broca's       Pt completing the Bedside WAB in which overall bedside aphasia score deems pt to demonstrate very  severely impaired language deficits  Additionally, pt also demonstrates Broca's type aphasia as per Bedside Aphasia Classification Criteria, when comparing the pt's Fluency  Auditory Verbal Comprehension, Repetition scores  Pt w/ fluent speech jo-ann by jargon, neologisms, blocks and unintelligible mumbling  Pt generally unaware of expressive language impairments, continuing w/ long unintelligible utterances in responding to questions  Pt does present w/increased automatic conversational phrases that are clear and intelligible ie \"hi, whats up\" \"I think that's what it says, \"can you repeat that please? \" than in previous encounter  Written spontaneous expression was also jargon where pt demo awareness of incorrect written expression  Writing was slowed and effortful, however pt able to write his first name w/ all capital letters and the initial two numbers of his address as well as a street name w/ the same initial letter as his current street name  In repetition task, pt often able to produce the correct initial phoneme of intended word then w/ neologisms        Speech/Language/Cognition Assessmetn   Treatment Assessment   Informal Language Assessment  Pt also completed portions of the informal language assessment with scores as follows:   Biographical Yes/No ?'s (w/out use of communication board): 4/8   Biographical Yes/No ?'s (w/ use of yes/no communication board): 6/8   Simple Yes/ No ?'s (w/use of yes/no communication board): 7/10   Moderate Yes/ No ?'s (w/use of yes/no communication board): 1/5     One Step Commands: 1/5     Automatics: 0/4     Phrase completion: 0/10    Pt demo dec processing as cognitive complexity of yes/no questions increased acc decreased, pt also req inc wait time " "as pt would impulsivity point to incorrect answer on yes/ no communication board then self correct  Pt demo intact prosody and intonation on automatic tasks and melodic intonation tasks however w/ jargon and neologisms for all automatic tasks, not responsive to phrase completion or phonemic cues  Of note, it was determined during completion of yes/no questions that pt benefits from yes/no communication board limited to only symbols, pt's acc dec w/ use of written yes/no communication board  Biographical/ Orientation Review     Pt completed biographical/orientation review initially utilizing written cheat sheet of biographical and orientation information where pt was asked to point to the correct information w/ accuracy of 0/5 unresponsive to phrase, phonemic or visual cues  Pt then completed biographical/orientation review w/ use of written binary choice cheat sheet w/ accuracy of 9/10  Pt consistent in stating initial phoneme of name, address and age then w/ jargon/ unintelligible speech  Communication Book Review     Pt engaged in communication book review where pt was asked to pt to the appropriate picture in his communication book presented w/ a given scenario ie \" what would you point to if you were thirsty  \" Pt w/ accuracy of 7/8 increasing to 8/8 w/ verbal cue, however pt not initiating turning of pages independently req student SLP to initiate then selecting the appropriate picture ( in [address] per page)  Overall, pt benefits most from yes/no questions w/ use of yes/no symbol only communication board and written choice of two to recall biographical/ orientation information  Pt also most responsive to verbal or visual cues w/ use of gestures or communication board, unresponsive to phonemic or phrase completion cues at this time  Communication board are present at pt bedside encourage all staff to utilize them when communicating w/ patient to maximize communication effectiveness   With " consideration for all formal and informal assessment measures, pt will benefit from SLP services at this time to maximize receptive and expressive language skills as well as address cognitive linguistic needs while in the acute rehab center  SLP Therapy Minutes   SLP Time In 1000   SLP Time Out 1115   SLP Total Time (minutes) 75   SLP Mode of treatment - Individual (minutes) 60   SLP Mode of treatment - Concurrent (minutes) 15   SLP Mode of treatment - Group (minutes) 0   SLP Mode of treatment - Co-treat (minutes) 0   SLP Mode of Treatment - Total time(minutes) 75 minutes   SLP Cumulative Minutes 75   Therapy Time missed   Time missed?  No

## 2023-04-20 NOTE — ASSESSMENT & PLAN NOTE
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

## 2023-04-20 NOTE — PROGRESS NOTES
[facility] PT GOALS   04/20/23 0830   Rehab Team Goals   Transfer Team Goal Patient will be independent with transfers with least restrictive device upon completion of rehab program   Locomotion Team Goal Patient will be independent with locomotion with least restrictive device upon completion of rehab program   Rehab Team Interventions   PT Interventions Therapeutic Exercise;Gait Training;Neuromuscualr Reeducation;Transfer Training;Bed Mobility;Modalities; Patient/Family Education   PT Transfer Goal   Roll left and right Goal 06  Independent - Patient completes the activity by him/herself with no assistance from a helper  Sit to lying Goal 06  Independent - Patient completes the activity by him/herself with no assistance from a helper  Lying to sitting on side of bed Goal 06  Independent - Patient completes the activity by him/herself with no assistance from a helper  Sit to stand Goal 06  Independent - Patient completes the activity by him/herself with no assistance from a helper  Chair/bed-to-chair transfer Goal 06  Independent - Patient completes the activity by him/herself with no assistance from a helper  Car Transfer Goal 06  Independent - Patient completes the activity by him/herself with no assistance from a helper  Assistive Device   (LRAD)   Environment Level Surface; Well Lit; Tile Floor   Status Ongoing; Target goal - two weeks   Locomotion Goal   Primary discharge mode of locomotion Walking   Target Walk Distance 500 ft   Assist Device   (LRAD)   Gait Pattern Improvement Inconsistant [place]; Improper weight shift;Decreased foot clearance   Environment Level Surface; Well Lit; Tile Floor   Walk 10 feet Goal 06  Independent - Patient completes the activity by him/herself with no assistance from a helper  Walk 50 feet with 2 turns Goal 06  Independent - Patient completes the activity by him/herself with no assistance from a helper  Walk 150 feet Goal 06   Independent - Patient completes the activity by him/herself with no assistance from a helper  Walking 10 feet on uneven surface 04  Supervision or touching assistance- [place] provides VERBAL CUES or supervision throughout activity  Walking Goal Status Ongoing; Target goal - two weeks   Wheel 50 feet with 2 turns Goal 09  Not applicable   Wheel 847 feet Goal 09  Not applicable   Stairs Goal   1 step or curb goal 06  Independent - Patient completes the activity by him/herself with no assistance from a helper  4 steps Goal 06  Independent - Patient completes the activity by him/herself with no assistance from a helper  12 steps Goal 04  Supervision or touching assistance- [place] provides VERBAL CUES or supervision throughout activity  Number of Stairs 1  (1 to enter home, 12 for basement access and community re-entry)   Technique Reciprocal;Non-reciprocal   Hand Rail   (single rail)   Status Ongoing; Target goal - two weeks   Object Retrieval Goal   Picking up object Goal 04  Supervision or touching assistance- [place] provides VERBAL CUES or supervision throughout activity     Assistive Device  None   Small Object Picked Up marker

## 2023-04-20 NOTE — PROGRESS NOTES
[facility] PT EVAL   04/20/23 2521   Patient Data   Rehab Impairment Impairment of mobility, safety, Activities of Daily Living (ADLs), and cognitive/communication skills due to Stroke:  01 2  Right Body Involvement (Left Brain)   Etiologic Diagnosis L MCA CVA with Superimposed Micro-hemorrhage   Date of Onset 03/31/23  (pt sent back to acute care [facility] on 4/11 due to chest pain (+)PE)   Support System   Name Social hx taken from chart review as pt has global aphasia  Pt lives with his parents in a ranch home; chart states there is a ramp to enter  Unclear of how much assistance family can provide   Able to provide physical help?   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)   Transportation    Prior Device(s) Used   (none)   Prior Level of Function   Self-Care 3  Independent - Patient completed the activities by him/herself, with or without an assistive device, with no assistance from a helper  Indoor-Mobility (Ambulation) 3  Independent - Patient completed the activities by him/herself, with or without an assistive device, with no assistance from a helper  Stairs 3  Independent - Patient completed the activities by him/herself, with or without an assistive device, with no assistance from a helper  Functional Cognition 3  Independent - Patient completed the activities by him/herself, with or without an assistive device, with no assistance from a helper  Prior Device Used Z   None of the above   Falls in the Last Year   Number of falls in the past 12 months 0   Patient Preference   Nickname (Patient Preference) [facility]   Psychosocial   Psychosocial (WDL) X   Patient Behaviors/Mood Cooperative;Flat affect;Pleasant   Needs Expressed Other (Comment)  ((+) global aphasia)   Ability to Express Feelings Unable to express   Ability to Express Needs Needs assistance   Ability to Express Thoughts Unable to express   Ability to Understand Others Sometimes understands   Restrictions/Precautions   Precautions Aphasia;Bed/chair alarms;Supervision on toilet/commode; Fall Risk   Weight Bearing Restrictions No   ROM Restrictions No   Pain Assessment   Pain Assessment Tool 0-10   Pain Score No Pain   Toilet Transfer   Surface Assessed Standard Toilet   Transfer Technique Standard   Limitations Noted In UE Strength;LE Strength; Safety   Adaptive Equipment Walker   Physical Assistance Level 26%-50%   Toilet Transfer CARE Score 3   Transfer Bed/Chair/Wheelchair   Limitations Noted In Balance; Coordination;Problem Solving;UE Strength;LE Strength; Sequencing   Type of Assistance Needed Physical assistance   Physical Assistance Level 26%-50%   Comment without RW to assess per PLOF   Chair/Bed-to-Chair Transfer CARE Score 3   Roll Left and Right   Type of Assistance Needed Verbal cues; Supervision   Comment flat bed without rails   Roll Left and Right CARE Score 4   Sit to Lying   Type of Assistance Needed Physical assistance;Verbal cues   Physical Assistance Level 25% or less   Comment flat bed without rails   Sit to Lying CARE Score 3   Lying to Sitting on Side of Bed   Type of Assistance Needed Physical assistance;Verbal cues   Physical Assistance Level 26%-50%   Comment flat bed without rails   Lying to Sitting on Side of Bed CARE Score 3   Sit to Stand   Type of Assistance Needed Physical assistance;Verbal cues   Physical Assistance Level 25% or less   Comment without RW to assess per PLOF   Sit to Stand CARE Score 3   Picking Up Object   Type of Assistance Needed Physical assistance;Verbal cues   Physical Assistance Level 51%-75%   Comment picking up cone from floor; without use of AD per PLOF   Picking Up Object CARE Score 2   Car Transfer   Type of Assistance Needed Physical assistance;Verbal cues   Physical Assistance Level 25% or less   Comment cues for technique to inc safety   Car Transfer CARE Score 3   Ambulation   Primary Mode of Locomotion Prior to Admission Walk   Distance Walked (feet) 150 ft   Assist Device Walker   Gait Pattern Inconsistant Marleni;Decreased foot clearance;Narrow ASHLEY; Improper weight shift   Limitations Noted In Balance; Coordination; Endurance; Heel Strike; Safety;Speed;Strength   Provided Assistance with: Balance;Weight Shift;Direction   Walk Assist Level Contact Guard;Minimum Assist   Findings CGA with RW, trialed shorter distances first with wall rail and then without AD with min A   Walk 10 Feet   Type of Assistance Needed Physical assistance;Verbal cues   Physical Assistance Level 25% or less   Comment min A without AD, CGA with RW   Walk 10 Feet CARE Score 3   Walk 50 Feet with Two Turns   Type of Assistance Needed Physical assistance;Verbal cues   Physical Assistance Level 25% or less   Comment min A without AD per PLOF  CGA with RW  pt demos BUE guarding without AD   Walk 50 Feet with Two Turns CARE Score 3   Walk 150 Feet   Comment able to complete 150 ft with RW ; however, unable to walk full distance without AD to assess per PLOF   Reason if not Attempted Safety concerns   Walk 150 Feet CARE Score 88   Walking 10 Feet on Uneven Surfaces   Type of Assistance Needed Physical assistance;Verbal cues   Physical Assistance Level 26%-50%   Comment 10 ft x 2 over floor mat without RW per PLOF  cues for safety when stepping on/off mat   decreased gait speed   Walking 10 Feet on Uneven Surfaces CARE Score 3   Wheel 50 Feet with Two Turns   Reason if not Attempted Activity not applicable   Wheel 50 Feet with Two Turns CARE Score 9   Wheel 150 Feet   Reason if not Attempted Activity not applicable   Wheel 093 "Feet CARE Score 9   Curb or Single Stair   Style negotiated Single stair   Type of Assistance Needed Physical assistance;Verbal cues   Physical Assistance Level 25% or less   Comment performed with B railings although unsure of pts home set up  step to pattern with cues for sequencing   1 Step (Curb) CARE Score 3   4 Steps   Type of Assistance Needed Physical assistance;Verbal cues   Physical Assistance Level 26%-50%   Comment performed with B railings although unsure of pts home set up  step to pattern with cues for sequencing  completed 2 six-inch  steps x 3 consecutive trials (6 stairs total)   4 Steps CARE Score 3   12 Steps   Reason if not Attempted Safety concerns   12 Steps CARE Score 88   Comprehension   QI: Comprehension 2  Sometimes Understands: Understands only basic conversations or simple, direct phrases  Frequently requires cues to understand   Expression   QI: Expression 1  Rarely/Never expresses self of speech is very difficult to understand   Strength RLE   R Hip Flexion 3+/5   R Hip ABduction 4-/5   R Hip ADduction 4-/5   R Knee Flexion 4-/5   R Knee Extension 4-/5   R Ankle Dorsiflexion 3+/5   R Ankle Plantar Flexion 4-/5   LLE Assessment   LLE Assessment WFL   Strength LLE   LLE Overall Strength 4+/5   Coordination   Movements are Fluid and Coordinated 0   Coordination and Movement Description BLE Soham intact but slow speed; BLE heel to shin test impaired RLE   Sensation   Light Touch No apparent deficits  (difficulty assessing due to aphasia)   Cognition   Arousal/Participation Alert; Cooperative   Attention Attends with cues to redirect   Orientation Level Other (Comment)  (difficulty assessing due to aphasia  Asked pt to state his name but he is unable  Asked him if his name was \"Anthony\" and he said yes   Then asked if his name is \"Jaycob\" and he also respondsyes)   Following Commands Follows one step commands with increased time or repetition   Objective Measure   PT Measure(s) 5 time sit " to stand test without AD but using BUEs: 40 54 seconds  TUG with RW: 33 1 seconds   Discharge Information   Impressions The pt is a 40 yo male with initial hospitalization on 3/31/2023 for code stroke after being found down by his mother  (+) L MCA CVA with L basal ganglia involvement  Pt admitted to [facility] AND [facility] but then returned to acute care on 4/11/23 for chest pain, (+) PE  Pt stabilized medically and returned  To [facility] 4/19/23  He currently presents with impairments in RLE strength and coordination, standing balance, and activity tolerance resulting in decreased (I) with functional mobility skills  Pt requires min to mod A for mobility without AD and CGA to min A with use of RW which he did not use at baseline  Pt will benefit from skilled PT interventions to address deficits, reduce fall risk, and maximize functional independence  Pt will likely return home with family support and  outpatient PT services  Need to clairfy DME and determine needs pending progress  ELOS 10-14 days     PT Therapy Minutes   PT Time In 0830   PT Time Out 0930   PT Total Time (minutes) 60   PT Mode of treatment - Individual (minutes) 60   PT Mode of treatment - Concurrent (minutes) 0   PT Mode of treatment - Group (minutes) 0   PT Mode of treatment - Co-treat (minutes) 0   PT Mode of Treatment - Total time(minutes) 60 minutes   PT Cumulative Minutes 60   Cumulative Minutes   Cumulative therapy minutes 60

## 2023-04-20 NOTE — H&P
"PHYSICAL MEDICINE AND REHABILITATION H&P/ADMISSION NOTE  Clifford Rico III 41 y o  male MRN: [id number]  Unit/Bed#: [facility] 965-01 Encounter: [id number]     Rehab Diagnosis: Stroke:  01 2  Right Body Involvement (Left Brain)    Etiologic Diagnosis: Left MCA stroke    History of Present Illness:   57-year-old male with a history of hypertension, hyperlipidemia, obesity, diabetes status post recent left MCA stroke which was complicated by saddle pulmonary embolism,, HIT, bilateral lower extremity DVTs  Patient was evaluated by skilled therapies and was found to have significant decline in ADLs and ambulation and appears appropriate for admission to [place] [id number]  Chief complaint:  Aphasia     Subjective:   Limited communication related to aphasia but appears to deny any pain or other new complaints     Review of Systems: Likely negative but limited due to aphasia    * Acute ischemic left MCA stroke [facility])  Assessment & Plan  - Large left MCA infarct  - Residual impairments on admission to [facility]: Aphasia, weakness, imbalance (assess for other impairments during [facility] course)   - Co-morbidities most impacting functional recovery: Morbid obesity    Secondary stroke prevention  - Per prior providers   \"Thrombosis panel with abnormal antithrombin, Protein C/S activity, but per Neuro unclear significance in setting of acute stroke  Would repeat as outpatient     Will need Zio Patch/Loop Recorder with Cards Referral as outpatient   Discussed with Neurology on 4/9: Continue Keppra until outpatient f/u given location, temporal slowing, extent of lesion  \"  - Antithrombotic: Aspirin  - Statin  - Optimal management of blood pressure and diabetes  -  patient and if applicable caregiver on optimal stroke management  - Follow-up with neurology and PCP after d/c   - Recommend acute comprehensive interdisciplinary inpatient rehabilitation to include intensive skilled therapies (PT, OT, ST) as outlined " with oversight and management by rehabilitation physician as well as inpatient rehab level nursing, case management and weekly interdisciplinary team meetings  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)  Assessment & Plan  Saturating well on room air  Monitor vitals with and without activity  Eliquis  Outpatient hematology consult    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)  Assessment & Plan  Heparin-induced antibody positive, serotonin release assay pending  Transitioned from Arixtra to Eliquis  Monitor platelet count now improving    DVT of lower extremity, bilateral (HCC)  Assessment & Plan  Eliquis  Ambulation    Urinary retention  Assessment & Plan  Flomax    Bowel and bladder incontinence  Assessment & Plan  As well as history of urinary retention  Improving  Toileting program  Flomax    Hypertension  Assessment & Plan  Internal medicine consulted and co-management with their service  Monitor vitals with and without activity; monitor for orthostasis  Monitor hemoglobin, electrolytes, kidney function, hydration status   Current meds: Amlodipine, losartan, metoprolol      Type 2 diabetes mellitus with circulatory disorder, without long-term current use of insulin ([facility])  Assessment & Plan  Lab Results   Component Value Date    HGBA1C 8 0 (H) 03/31/2023     Internal medicine consulted and management at their discretion  Monitor for signs and symptoms of hypoglycemia   Current meds: Lispro sliding scale      Obesity, Class III, BMI 40-49 9 (morbid obesity) ([facility] [address] )  Assessment & Plan   on appropriate nutrition and activity  Adjustments accordingly during skilled therapy and with rehab nursing  Monitor skin closely for breakdown, wounds, rashes; keep clean and dry, turning Q2H   Follow-up with PCP after d/c          CODE: Level 1: Full Code    Restrictions include:   Fall precautions "    ---------------------------------------------------------------------------------------------------------------------      OBJECTIVE:    Physical Exam:  Temp:  [97 6 °F (36 4 °C)-98 1 °F (36 7 °C)] 98 1 °F (36 7 °C)  HR:  [74-88] 74  Resp:  [18-19] 18  BP: (133-150)/(77-94) 133/77  General: Awake, alert in NAD  HENT: NCAT, MMM  Neck: Supple, no lymphadenopathy  Respiratory: Unlabored breathing, breath sounds equal, Lungs CTA, no wheezes, rales, or rhonchi  Cardiovascular: Regular rate and rhythm, no murmurs, rubs, or gallops  Gastrointestinal: Soft, non-tender, non-distended, normoactive bowel sounds  Genitourinary: No acharya  SkiN/MSK/Extremities:  Distal extremities appropriately warm, normal cap refill distally, no cyanosis or calf edema, no calf tenderness to palpation  Neurologic/Psych:   MENTAL STATUS: \"Wakefulness, able to follow some simple commands but some apraxia  Affect: Euthymic   Strength/MMT: Subtle right-sided weakness otherwise intact    Laboratory:    Results from last 7 days   Lab Units 04/20/23  0705 04/16/23  0506 04/15/23  0449   HEMOGLOBIN g/dL 13 3 13 2 13 3   HEMATOCRIT % 42 4 41 9 41 9   WBC Thousand/uL 7 15 9 33 8 87     Results from last 7 days   Lab Units 04/20/23  0705 04/16/23  0506 04/15/23  0449   BUN mg/dL 16 18 16   POTASSIUM mmol/L 3 9 3 9 3 8   CHLORIDE mmol/L 110* 109* 107   CREATININE mg/dL 1 00 0 95 0 93            Wt Readings from Last 1 Encounters:   04/20/23 132 kg (291 lb 14 2 oz)     Estimated body mass index is 38 51 kg/m² as calculated from the following:    Height as of this encounter: 6' 1\" (1 854 m)  Weight as of this encounter: 132 kg (291 lb 14 2 oz)  Imaging: reviewed    Per EMR:  Past Medical History:   Past Surgical History:   Family History:   Social history:   Past Medical History:   Diagnosis Date    Hypertensive emergency 3/30/2023    No past surgical history on file  No family history on file     Social History     Socioeconomic History    " Marital status: Single     Spouse name: Not on file    Number of children: Not on file    Years of education: Not on file    Highest education level: Not on file   Occupational History    Not on file   Tobacco Use    Smoking status: Never    Smokeless tobacco: Never   Vaping Use    Vaping Use: Never used   Substance and Sexual Activity    Alcohol use: Never    Drug use: Never    Sexual activity: Not Currently   Other Topics Concern    Not on file   Social History Narrative    Not on file     Social Determinants of Health     Financial Resource Strain: Not on file   Food Insecurity: No Food Insecurity    Worried About Running Out of Food in the Last Year: Never true    [address] in the Last Year: Never true   Transportation Needs: No Transportation Needs    Lack of Transportation (Medical): No    Lack of Transportation (Non-Medical):  No   Physical Activity: Not on file   Stress: Not on file   Social Connections: Not on file   Intimate Partner Violence: Not on file   Housing Stability: Low Risk     Unable to Pay for Housing in the Last Year: No    Number of Places Lived in the Last Year: 1    Unstable Housing in the Last Year: No          Current Medical Diagnosis Medications Allergies   Patient Active Problem List   Diagnosis    Acute ischemic left MCA stroke ([place] [address] )    Obesity, Class III, BMI 40-49 9 (morbid obesity) ([place] [address] )    Encephalopathy    Type 2 diabetes mellitus with circulatory disorder, without long-term current use of insulin ([place] [address] )    Hypertension    Bowel and bladder incontinence    Left-sided chest wall pain    Creatinine elevation    Thrombocytopenia ([facility] )    Saddle 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 ([facility] )    Urinary retention    DVT of lower extremity, bilateral (HCC)    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)      Current Facility-Administered Medications:     acetaminophen (TYLENOL) oral suspension 975 mg, 975 mg, Oral, Q8H PRN, Kenneth Dodge Jennifer Leung MD    amLODIPine [facility]) tablet 5 mg, 5 mg, Oral, Daily, Jasmin Marshall MD, 5 mg at 04/20/23 0803    apixaban (ELIQUIS) tablet 5 mg, 5 mg, Oral, BID, Jasmin Marshall MD, 5 mg at 04/20/23 0803    aspirin chewable tablet 81 mg, 81 mg, Oral, Daily, Jasmin Marshall MD, 81 mg at 04/20/23 0804    atorvastatin (LIPITOR) tablet 40 mg, 40 mg, Oral, QPM, Jasmin Marshall MD, 40 mg at 04/19/23 1757    bisacodyl (DULCOLAX) rectal suppository 10 mg, 10 mg, Rectal, Daily PRN, Jasmin Marshall MD    bisacodyl (DULCOLAX) rectal suppository 10 mg, 10 mg, Rectal, Once, Jasmin Marshall MD    calcium carbonate (TUMS) chewable tablet 1,000 mg, 1,000 mg, Oral, TID PRN, Jasmin Marshall MD    famotidine (PEPCID) tablet 20 mg, 20 mg, Oral, Q12H [address], Jasmin Marshall MD, 20 mg at 04/20/23 0803    insulin lispro (HumaLOG) 100 units/mL subcutaneous injection 1-5 Units, 1-5 Units, Subcutaneous, TID AC **AND** Fingerstick Glucose (POCT), , , TID AC, Jasmin Marshall MD    levETIRAcetam (KEPPRA) tablet 750 mg, 750 mg, Oral, Q12H [address], Jasmin Marshall MD, 750 mg at 04/20/23 0804    lidocaine (URO-JET) 2 % urethral/mucosal gel, , Topical, Q4H PRN, Jasmin Marshall MD    losartan (COZAAR) tablet 25 mg, 25 mg, Oral, Daily, Jasmin Marshall MD, 25 mg at 04/20/23 0802    metoprolol tartrate (LOPRESSOR) tablet 25 mg, 25 mg, Oral, Q12H [address], Jasmin Marshall MD, 25 mg at 04/20/23 0803    ondansetron (ZOFRAN-ODT) dispersible tablet 4 mg, 4 mg, Oral, Q8H PRN, Jasmin Marshall MD    oxyCODONE (ROXICODONE) split tablet 2 5 mg, 2 5 mg, Oral, Q6H PRN, Jasmin Marshall MD    polyethylene glycol [facility]) packet 17 g, 17 g, Oral, Daily PRN, Jasmin Marshall MD    polyethylene glycol [facility]) packet 17 g, 17 g, Oral, Daily, Jasmin Marshall MD, 17 g at 04/20/23 0804    senna (SENOKOT) tablet 8 6 mg, 1 tablet, Oral, HS, Jasmin Marshall MD, 8 6 mg at 04/19/23 2109    tamsulosin (FLOMAX) capsule 0 4 mg, 0 4 mg, Oral, After Prashant Harmon MD, 0 4 mg at 04/19/23 8846 Allergies   Allergen Reactions    Heparin Other (See Comments)     HIT            Prior Level of Function and social history:    He lives in [facility] single family home  Laila Patel III is single and lives with his parents  Self Care: Independent, Indoor Mobility: Independent, Stairs (in/outdoor): Independent and Cognition: Independent     FALLS IN THE LAST 6 MONTHS: 1-4     HOME ENVIRONMENT:  The living area: can live on one level  There are stairs with rails and also a ramped entrance to enter the home  The patient will have 24 hour supervision/physical assistance available upon discharge        Current Level of Function:    Mobility:  Transfers min assist  Ambulation/Mobility min assist 40 feet    ADLs:  Min Assist    Potential Barriers to Discharge:  Co-morbidities (see above), new functional deficits, aphasia, apraxia, cognitive impairments, weakness, imbalance    Tolerance for three hours of therapy per therapy day: adequate     Rehabilitation Prognosis: good       Rehabilitation Plan/Therapy Interventions: This patient will have close medical and rehabilitation oversight from a physical medicine and rehabilitation physician and if needed an internal medicine physician to manage the complexity of medical issues to optimize health, ability to participate in therapy, quality of life, and functional outcomes  This patient requires 24 hour rehabilitation nursing to address bowel and bladder management, (pain management if listed below), medication administration, positioning/skin monitoring, fall/injury prevention, and VTE prophylaxis  Physical, occupational and speech therapy: Patient requires PT and OT to improve functional mobility, transfers, upper and lower body strengthening, conditioning, balance, and gait training with appropriate assistive device   Patient also requires "ST to evaluate and if appropriate treat deficits in speech, swallowing, and cognition  PT, OT, ST will be provided approximately 5 times per week for 60 minutes per day  Skilled therapists and nursing will also provide patient/family safety education and training  / will work to ensure proper communication between patient (+/- family) and staff regarding the overall rehabilitation and medical process while patient is in the acute rehabilitation center  / will work with patient (and if applicable family and community resources) to optimize safe discharge  Discharge Planning:    Estimated length of stay:   14 days  Family/Patient Goals:  Patient/family's goals: Return to previous home/apartment  Mobility Goals:   Largely supervision    Activities of Daily Living (ADLs) Goals:  Supervision    Cognition / Communication:  Closer to baseline    Rehabilitation and discharge goals discussed with the patient and/or family  Case Managment and Social Work to review patient/family resources and to coordinate Discharge Planning  Patient and Family Education and Training:  Rehabilitation and discharge goals discussed with the patient and/or family  Patient/family education/training needs to be discussed in weekly team meeting  Other equipment: To be determined    Medical Necessity Criteria for [facility] Admission:  The preadmission screen, post-admission physical evaluation, overall plan of care and admissions order demonstrate a reasonable expectation that the following criteria were met at the time of admission to the [facility]  (See \"Specific areas of management and oversight in ARC setting\" for additional details on medical necessity as outlined below)    1  The patient requires active and ongoing therapeutic intervention of multiple therapy disciplines (physical therapy, occupational therapy, speech-language pathology, or prosthetics/orthotics), one of which is " physical or occupational therapy  2  Patient requires an intensive rehabilitation therapy program, as defined in Chapter 1, section 110 2 2 of the CMS Medicare Policy Manual  This intensive rehabilitation therapy program will consist of at least 3 hours of therapy per day at least 5 days per week or at least 15 hours of intensive rehabilitation therapy within a 7 consecutive day period, beginning with the date of admission to the [facility]  3  The patient is reasonably expected to actively participate in, and benefit significantly from, the intensive rehabilitation therapy program as defined in Chapter 1, section 110 2 2 of the CMS Medicare Policy Manual at this time of admission to the [facility]  He can reasonably be expected to make measurable improvement (that will be of practical value to improve the patients functional capacity or adaptation to impairments) as a result of the rehabilitation treatment, as defined in section 110 3, and such improvement can be expected to be made within the prescribed period of time  As noted in the CMS Medicare Policy Manual, the patient need not be expected to achieve complete independence in the domain of self-care nor be expected to return to his or her prior level of functioning in order to meet this standard  4  The patient must require physician supervision by a rehabilitation physician  As such, a rehabilitation physician will conduct face-to-face visits with the patient at least 3 days per week throughout the patients stay in the [facility] to assess the patient both medically and functionally, as well as to modify the course of treatment as needed to maximize the patients capacity to benefit from the rehabilitation process    5  The patient requires an intensive and coordinated interdisciplinary approach to providing rehabilitation, as defined in Chapter 1, section 110 2 5 of the CMS Medicare Policy Manual  This will be achieved through periodic team conferences, conducted at least once in a 7-day period, and comprising of an interdisciplinary team of medical professionals consisting of: a rehabilitation physician, registered nurse,  and/or , and a licensed/certified therapist from each therapy discipline involved in treating the patient  Changes Since Pre-admission Assessment: None -This patient's participation in rehab continues to be reasonable, necessary and appropriate  CMS Required Post-Admission Physician Evaluation Elements  History and Physical, including medical history, functional history and active comorbidities as in above text  Post-Admission Physician Evaluation:  The patient has the potential to make improvement and is in need of physical, occupational, and/or therapy services  The patient may also need nutritional services  Given the patient's complex medical condition and risk of further medical complications, rehabilitative services cannot be safely provided at a lower level of care, such as a skilled nursing facility  I have reviewed the patient's functional and medical status at the time of the preadmission screening and they are the same as on the day of this admission  I acknowledge that I have personally performed a full physical examination on this patient within 24 hours of admission  The patient demonstrated understanding the rehabilitation program and the discharge process after we discussed them  Agree in entirety: yes  Minor adaptions: none    Major changes: none    Specific areas of management and oversight in [facility] setting:  Cardiopulmonary function: Ensure cardiopulmonary stability and optimize cardiopulmonary function not only at rest but with activity as patient's activity level significantly increases in acute rehab compared with prior to transfer in preparation for safe discharge from [facility]    Must closely and frequently monitor blood pressure and HR to ensure adequate cardiac output during ADLs and ambulation as patient is at increased risk for orthostatic hypotension/syncope and potential injury if not monitored for and managed adequately  Blood pressure management:    Frequent monitoring of blood pressure with appropriate adjustments in blood pressure medication management to optimize blood pressure control and prevent/limit renal complications  Monitoring impact of blood pressure and side-effects of blood pressure medications at rest and with activity  DM: Patient will improve/maintain blood sugar control to ensure optimal healing and decrease risks of complications associated with DM through medication monitoring, adjustments as indicated, and optimal dietary intake and education  Cognitive deficits following cerebrovascular disease: intensive skilled therapies including speech language pathology to evaluate and treat deficits in cognition  Close oversight and management by rehab specialized physician of cognitive deficits and potential confounding factors (prevention of, monitoring for, and if found treatment of possible complications such as infections, as well as optimally managing sleep, mood, and meications which can negatively impact cognition and functional recovery)  ** Please Note: Fluency Direct voice to text software may have been used in the creation of this document  **    75 minutes or greater spent for this encounter which included a combination of face-to-face time with patient and non-face-to-face time which in part specifically includes management of CVA  Face-to-face time included extended discussion with patient regarding current condition, medical history, mood, medical/rehabilitation management, and disposition    Non face-to-face time included coordination of care with patient's co-managing AP and/or physician(s) thru communication and review of their recent documentation as well as reviewing vitals, bowel/bladder function, recent labs, diagnostic imaging, and notes from therapy, CM, and nursing  I personally performed the required components and examined the patient myself in person on 4/19/2023        Lee Mittal MD, [address] [facility]  Physical Medicine and Rehabilitation  Brain Injury Medicine

## 2023-04-20 NOTE — PROGRESS NOTES
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 ([facility] )    Urinary retention    DVT of lower extremity, bilateral (HCC)    HIT (heparin-induced thrombocytopenia) ([facility] )     Past Medical Hx:   Past Medical History:   Diagnosis Date    Hypertensive emergency 3/30/2023     Past Surgical Hx: No past surgical history on file  04/20/23 1230   Patient Data   Rehab Impairment Right Body Involvement (Left Brain   Etiologic Diagnosis L MCA CVA with Superimposed Micro-hemorrhage   Date of Onset 03/31/23   Support System   Name pt is known from previous admission  Pt resides with supportive parents who are retired but alos engage in Sarah Beth care for their grandson  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 [employer]  pt enjoys playing video games, collecting sports memorabilia  likes baseball, phillies, yankees and iron pigs  Home Setup   Type of Home Single Level   Method of Entry Ramp   First Floor Bathroom Shower   Available Equipment Roller Walker;Single Point Cane   Baseline Information   Vocation Work Full Time   Transportation    Prior IADL Participation   Money Management Identify Money;Estimate Costs;Estimate Change;Combine Bills;Manage Checkbook   Meal Preparation Full Participation   Laundry Full Participation   Home Cleaning Full Participation   Prior Level of Function   Self-Care 3   Independent - Patient completed the activities by him/herself, with or without an assistive "device, with no assistance from a helper  Indoor-Mobility (Ambulation) 3  Independent - Patient completed the activities by him/herself, with or without an assistive device, with no assistance from a helper  Stairs 3  Independent - Patient completed the activities by him/herself, with or without an assistive device, with no assistance from a helper  Functional Cognition 3  Independent - Patient completed the activities by him/herself, with or without an assistive device, with no assistance from a helper  Prior Device Used Z  None of the above   Falls in the Last Year   Number of falls in the past 12 months 0   Patient Preference   Nickname (Patient Preference) Kenard Cranker   Restrictions/Precautions   Precautions Aphasia;Bed/chair alarms; Fall Risk;Supervision on toilet/commode   Pain Assessment   Pain Assessment Tool 0-10   Pain Score No Pain   Eating Assessment   Type of Assistance Needed Set-up / clean-up   Eating CARE Score 5   Oral Hygiene   Type of Assistance Needed Physical assistance   Physical Assistance Level 26%-50%   Comment MIN A in stance at sink   Oral Hygiene CARE Score 3   Tub/Shower Transfer   Findings MIN A lateral side step to shower with grab bars   Shower/Bathe Self   Type of Assistance Needed Physical assistance   Physical Assistance Level 51%-75%   Comment pt completes bathing in stance PTA  unable to complete safely without assistance  SLight NIEVES noted with dynamic reaching, pt states, \"im okay :\" mostly completed bathing in sitting in shower  Able to sequence tasks, and terminate tasks   Shower/Bathe Self CARE Score 2   Dressing/Undressing Clothing   Type of Assistance Needed Physical assistance   Physical Assistance Level 26%-50%   Upper Body Dressing CARE Score 3   Type of Assistance Needed Physical assistance   Physical Assistance Level 51%-75%   Comment slight assistance for orientaion to pants  cross leg tech      Lower Body Dressing CARE Score 2   Putting On/Taking Off Footwear   Type " of Assistance Needed Physical assistance   Physical Assistance Level Total assistance   Putting On/Taking Off Footwear CARE Score 1   Toileting Hygiene   Type of Assistance Needed Physical assistance   Physical Assistance Level 76% or more   Toileting Hygiene CARE Score 2   Toilet Transfer   Surface Assessed Standard Toilet   Type of Assistance Needed Physical assistance   Physical Assistance Level 51%-75%   Comment assit for decent to seat and rise from seat with no grab bars  Toilet Transfer CARE Score 2   Transfer Bed/Chair/Wheelchair   Type of Assistance Needed Physical assistance   Physical Assistance Level 26%-50%   Comment MOD A HHA   Chair/Bed-to-Chair Transfer CARE Score 3   Sit to Stand   Type of Assistance Needed Physical assistance   Physical Assistance Level 26%-50%   Sit to Stand CARE Score 3   Comprehension   QI: Comprehension 2  Sometimes Understands: Understands only basic conversations or simple, direct phrases  Frequently requires cues to understand   Expression   QI: Expression 1  Rarely/Never expresses self of speech is very difficult to understand   RUE Assessment   RUE Assessment WFL   LUE Assessment   LUE Assessment WFL   Coordination   Movements are Fluid and Coordinated 0   Coordination and Movement Description slow guarded   Sensation   Light Touch No apparent deficits   Cognition   Overall Cognitive Status Impaired   Comments improved text writing for name compared to previous admission  able to produce accurate clock, impaired time setting  Vision   Vision Comments pt reports no changes since last admission   Objective Measure   OT Findings pt able to state 17 short phrases and single words during session  Discharge Information   Patient's Discharge Plan home with family support   Impressions Pt is a 41 y o  male seen for OT evaluation following admission to [facility] for L MCA CVA    Pt on ARC and developed saddle pulmonary embolism,, HIT, bilateral lower extremity DVTs requiring d/c back to acute  Presents today for OT evaluation and assessment of strength, ADL function, and functional transfers completed  Prior to admission Pt was completing ADLs, IADLs at independent with use of no DME  Pt lives with father and mother and is able to provide physical assistance at time of discharge  Functional  Strength and Gross/Fine motor coordination are within functional limits for task performance without influence of synergies  9 hole peg test completed in 35 seconds and a minimum of 20# gross  strength and pinch patterns    Personal factors affecting the patient at this time include: co-morbidities and fall risk  Pt is currently limited due to the following deficits impacting occupational performance, activity tolerance, endurance, standing balance/tolerance, UE strength, FMC, GMC and safety   The patient has shown a decline from prior level of function  The patient participates in identification of personal goals to address in Occupational Therapy  Recommend skilled OT services for I/ADL retraining to support the ability to safely participate in daily occupations safely and independently in the most least restrictive way  Pt demonstrates Good rehab potential to meet LTG's, Anticipate  2week(s) length of stay  Occupational Therapy plan of care will focus on Remedial strategies   Treatment focus will include: ADL re-training, fxnl xfers, fxnl cognition, short term memory, L attention, standing tolerance, standing balance, UE NMR, UE strengthening, UE endurance, DME training/education, family training/education, EC techniques/education, healthy coping education, leisure pursuits and sitting balance along with neuro based treatments focusing on the following: Repetitive Task training, Trunk restrained Reaching, Functional Orthoses/bracing, Body awareness, Gross motor coordination, Fine motor coordination, motor planning, Spatial awareness, Functional Attention , Constraint Induced Movement Therapy (modified) and REO GO     OT Therapy Minutes   OT Time In 1230   OT Time Out 1420   OT Total Time (minutes) 110   OT Mode of treatment - Individual (minutes) 110   OT Mode of treatment - Concurrent (minutes) 0   OT Mode of treatment - Group (minutes) 0   OT Mode of treatment - Co-treat (minutes) 0   OT Mode of Treatment - Total time(minutes) 110 minutes   OT Cumulative Minutes 110   Cumulative Minutes   Cumulative therapy minutes 245   UE Evaluation:       PRADEEP Bernard            UPPER EXTREMITY FUNCTION     Dominant Hand: RIGHT                         /Pinch Strength         Dynamometer         - Gross Grasp 85,85,80 lbs   95,90,90 lbs        Pinch Meter          - PINCER 13 1 lbs 10 4 lbs      - Lateral key 14 3 lbs 20 3 lbs     9 hole Peg Test  35 17  34 20

## 2023-04-20 NOTE — PLAN OF CARE
Problem: PAIN - ADULT  Goal: Verbalizes/displays adequate comfort level or baseline comfort level  Description: Interventions:  - Encourage patient to monitor pain and request assistance  - Assess pain using appropriate pain scale  - Administer analgesics based on type and severity of pain and evaluate response  - Implement non-pharmacological measures as appropriate and evaluate response  - Consider cultural and social influences on pain and pain management  - Notify physician/advanced practitioner if interventions unsuccessful or patient reports new pain  Outcome: Progressing     Problem: INFECTION - ADULT  Goal: Absence or prevention of progression during hospitalization  Description: INTERVENTIONS:  - Assess and monitor for signs and symptoms of infection  - Monitor lab/diagnostic results  - Monitor all insertion sites, i e  indwelling lines, tubes, and drains  - Monitor endotracheal if appropriate and nasal secretions for changes in amount and color  - [place] appropriate cooling/warming therapies per order  - Administer medications as ordered  - Instruct and encourage patient and family to use good hand hygiene technique  - Identify and instruct in appropriate isolation precautions for identified infection/condition  Outcome: Progressing     Problem: SAFETY ADULT  Goal: Patient will remain free of falls  Description: INTERVENTIONS:  - Educate patient/family on patient safety including physical limitations  - Instruct patient to call for assistance with activity   - Consult OT/PT to assist with strengthening/mobility   - Keep Call bell within reach  - Keep bed low and locked with side rails adjusted as appropriate  - Keep care items and personal belongings within reach  - Initiate and maintain comfort rounds  - Make Fall Risk Sign visible to staff  - Offer Toileting every 2 Hours, in advance of need  - Initiate/Maintain alarm  - Obtain necessary fall risk management equipment:   - Apply yellow socks and bracelet for high fall risk patients  - Consider moving patient to room near nurses station  Outcome: Progressing  Goal: Maintain or return to baseline ADL function  Description: INTERVENTIONS:  -  Assess patient's ability to carry out ADLs; assess patient's baseline for ADL function and identify physical deficits which impact ability to perform ADLs (bathing, care of mouth/teeth, toileting, grooming, dressing, etc )  - Assess/evaluate cause of self-care deficits   - Assess range of motion  - Assess patient's mobility; develop plan if impaired  - Assess patient's need for assistive devices and provide as appropriate  - Encourage maximum independence but intervene and supervise when necessary  - Involve family in performance of ADLs  - Assess for home care needs following discharge   - Consider OT consult to assist with ADL evaluation and planning for discharge  - Provide patient education as appropriate  Outcome: Progressing  Goal: Maintains/Returns to pre admission functional level  Description: INTERVENTIONS:  - Perform BMAT or MOVE assessment daily    - Set and communicate daily mobility goal to care team and patient/family/caregiver  - Collaborate with rehabilitation services on mobility goals if consulted  - Perform Range of Motion 3 times a day  - Reposition patient every 2 hours    - Dangle patient 3 times a day  - Stand patient 3 times a day  - Ambulate patient 3 times a day  - Out of bed to chair 3 times a day   - Out of bed for meals 3 times a day  - Out of bed for toileting  - Record patient progress and toleration of activity level   Outcome: Progressing     Problem: DISCHARGE PLANNING  Goal: Discharge to home or other facility with appropriate resources  Description: INTERVENTIONS:  - Identify barriers to discharge w/patient and caregiver  - Arrange for needed discharge resources and transportation as appropriate  - Identify discharge learning needs (meds, wound care, etc )  - Arrange for interpretive services to assist at discharge as needed  - Refer to Case Management Department for coordinating discharge planning if the patient needs post-hospital services based on physician/advanced practitioner order or complex needs related to functional status, cognitive ability, or social support system  Outcome: Progressing     Problem: Prexisting or High Potential for Compromised Skin Integrity  Goal: Skin integrity is maintained or improved  Description: INTERVENTIONS:  - Identify patients at risk for skin breakdown  - Assess and monitor skin integrity  - Assess and monitor nutrition and hydration status  - Monitor labs   - Assess for incontinence   - Turn and reposition patient  - Assist with mobility/ambulation  - Relieve pressure over bony prominences  - Avoid friction and shearing  - Provide appropriate hygiene as needed including keeping skin clean and dry  - Evaluate need for skin moisturizer/barrier cream  - Collaborate with interdisciplinary team   - Patient/family teaching  - Consider wound care consult   Outcome: Progressing

## 2023-04-20 NOTE — PLAN OF CARE
Problem: PAIN - ADULT  Goal: Verbalizes/displays adequate comfort level or baseline comfort level  Description: Interventions:  - Encourage patient to monitor pain and request assistance  - Assess pain using appropriate pain scale  - Administer analgesics based on type and severity of pain and evaluate response  - Implement non-pharmacological measures as appropriate and evaluate response  - Consider cultural and social influences on pain and pain management  - Notify physician/advanced practitioner if interventions unsuccessful or patient reports new pain  Outcome: Progressing     Problem: INFECTION - ADULT  Goal: Absence or prevention of progression during hospitalization  Description: INTERVENTIONS:  - Assess and monitor for signs and symptoms of infection  - Monitor lab/diagnostic results  - Monitor all insertion sites, i e  indwelling lines, tubes, and drains  - Monitor endotracheal if appropriate and nasal secretions for changes in amount and color  - [place] appropriate cooling/warming therapies per order  - Administer medications as ordered  - Instruct and encourage patient and family to use good hand hygiene technique  - Identify and instruct in appropriate isolation precautions for identified infection/condition  Outcome: Progressing     Problem: SAFETY ADULT  Goal: Patient will remain free of falls  Description: INTERVENTIONS:  - Educate patient/family on patient safety including physical limitations  - Instruct patient to call for assistance with activity   - Consult OT/PT to assist with strengthening/mobility   - Keep Call bell within reach  - Keep bed low and locked with side rails adjusted as appropriate  - Keep care items and personal belongings within reach  - Initiate and maintain comfort rounds  - Make Fall Risk Sign visible to staff  - Offer Toileting every 2 Hours, in advance of need  - Initiate/Maintain bed alarm  - Obtain necessary fall risk management equipment: nonskid footwear  - Apply yellow socks and bracelet for high fall risk patients  - Consider moving patient to room near nurses station  Outcome: Progressing  Goal: Maintain or return to baseline ADL function  Description: INTERVENTIONS:  -  Assess patient's ability to carry out ADLs; assess patient's baseline for ADL function and identify physical deficits which impact ability to perform ADLs (bathing, care of mouth/teeth, toileting, grooming, dressing, etc )  - Assess/evaluate cause of self-care deficits   - Assess range of motion  - Assess patient's mobility; develop plan if impaired  - Assess patient's need for assistive devices and provide as appropriate  - Encourage maximum independence but intervene and supervise when necessary  - Involve family in performance of ADLs  - Assess for home care needs following discharge   - Consider OT consult to assist with ADL evaluation and planning for discharge  - Provide patient education as appropriate  Outcome: Progressing  Goal: Maintains/Returns to pre admission functional level  Description: INTERVENTIONS:  - Perform BMAT or MOVE assessment daily    - Set and communicate daily mobility goal to care team and patient/family/caregiver  - Collaborate with rehabilitation services on mobility goals if consulted  - Perform Range of Motion 3 times a day  - Reposition patient every 2 hours    - Dangle patient 3 times a day  - Stand patient 3 times a day  - Ambulate patient 3 times a day  - Out of bed to chair 3 times a day   - Out of bed for meals 3 times a day  - Out of bed for toileting  - Record patient progress and toleration of activity level   Outcome: Progressing     Problem: DISCHARGE PLANNING  Goal: Discharge to home or other facility with appropriate resources  Description: INTERVENTIONS:  - Identify barriers to discharge w/patient and caregiver  - Arrange for needed discharge resources and transportation as appropriate  - Identify discharge learning needs (meds, wound care, etc )  - Arrange for interpretive services to assist at discharge as needed  - Refer to Case Management Department for coordinating discharge planning if the patient needs post-hospital services based on physician/advanced practitioner order or complex needs related to functional status, cognitive ability, or social support system  Outcome: Progressing     Problem: Prexisting or High Potential for Compromised Skin Integrity  Goal: Skin integrity is maintained or improved  Description: INTERVENTIONS:  - Identify patients at risk for skin breakdown  - Assess and monitor skin integrity  - Assess and monitor nutrition and hydration status  - Monitor labs   - Assess for incontinence   - Turn and reposition patient  - Assist with mobility/ambulation  - Relieve pressure over bony prominences  - Avoid friction and shearing  - Provide appropriate hygiene as needed including keeping skin clean and dry  - Evaluate need for skin moisturizer/barrier cream  - Collaborate with interdisciplinary team   - Patient/family teaching  - Consider wound care consult   Outcome: Progressing

## 2023-04-20 NOTE — CASE MANAGEMENT
Met w/pt and re reviewed rehab routine and cm role  Pt confirmed he lives with his parents in a ranch home w/ramp entrance  Pt confirmed he has a roller walker and spc, no prior experience with outpt therapy or [facility] services  [facility] [place] [facility]  Cm reviewed team mtg process and potential los  Insurance review is due 5/6

## 2023-04-20 NOTE — ASSESSMENT & PLAN NOTE
Blood pressure acceptable  Current meds: Amlodipine 5mg qday, losartan 25mg qday, metoprolol 25mg BID

## 2023-04-20 NOTE — ASSESSMENT & PLAN NOTE
Lab Results   Component Value Date    HGBA1C 8 0 (H) 03/31/2023     Internal medicine consulted and management at their discretion  Monitor for signs and symptoms of hypoglycemia   Current meds: Lispro sliding scale  D C meds: None   Controlled now off meds - OP PCP now

## 2023-04-21 PROBLEM — I69.320 APHASIA DUE TO RECENT CEREBROVASCULAR ACCIDENT (CVA): Status: ACTIVE | Noted: 2023-04-21

## 2023-04-21 LAB
GLUCOSE SERPL-MCNC: 102 MG/DL (ref 65–140)
GLUCOSE SERPL-MCNC: 106 MG/DL (ref 65–140)
GLUCOSE SERPL-MCNC: 112 MG/DL (ref 65–140)
GLUCOSE SERPL-MCNC: 119 MG/DL (ref 65–140)

## 2023-04-21 RX ADMIN — ATORVASTATIN CALCIUM 40 MG: 40 TABLET, FILM COATED ORAL at 17:22

## 2023-04-21 RX ADMIN — APIXABAN 5 MG: 5 TABLET, FILM COATED ORAL at 08:14

## 2023-04-21 RX ADMIN — METOPROLOL TARTRATE 25 MG: 25 TABLET, FILM COATED ORAL at 22:25

## 2023-04-21 RX ADMIN — TAMSULOSIN HYDROCHLORIDE 0.4 MG: 0.4 CAPSULE ORAL at 17:22

## 2023-04-21 RX ADMIN — AMLODIPINE BESYLATE 5 MG: 5 TABLET ORAL at 08:13

## 2023-04-21 RX ADMIN — LEVETIRACETAM 750 MG: 750 TABLET, FILM COATED ORAL at 22:26

## 2023-04-21 RX ADMIN — LOSARTAN POTASSIUM 25 MG: 25 TABLET, FILM COATED ORAL at 08:13

## 2023-04-21 RX ADMIN — FAMOTIDINE 20 MG: 20 TABLET ORAL at 08:13

## 2023-04-21 RX ADMIN — APIXABAN 5 MG: 5 TABLET, FILM COATED ORAL at 17:22

## 2023-04-21 RX ADMIN — METOPROLOL TARTRATE 25 MG: 25 TABLET, FILM COATED ORAL at 08:12

## 2023-04-21 RX ADMIN — ASPIRIN 81 MG 81 MG: 81 TABLET ORAL at 08:12

## 2023-04-21 RX ADMIN — SENNOSIDES 8.6 MG: 8.6 TABLET, FILM COATED ORAL at 22:25

## 2023-04-21 RX ADMIN — FAMOTIDINE 20 MG: 20 TABLET ORAL at 22:26

## 2023-04-21 RX ADMIN — POLYETHYLENE GLYCOL 3350 17 G: 17 POWDER, FOR SOLUTION ORAL at 08:13

## 2023-04-21 RX ADMIN — LEVETIRACETAM 750 MG: 750 TABLET, FILM COATED ORAL at 08:14

## 2023-04-21 NOTE — PROGRESS NOTES
"   04/21/23 4269   Pain Assessment   Pain Assessment Tool 0-10   Pain Score No Pain   Comprehension   Comprehension (FIM) 2 - Understands only simple expressions or gestures (waves, hello)   Expression   Expression (FIM) 2 - Uses only simple expressions or gestures (waves, hello)   Social Interaction   Social Interaction (FIM) 2 - Interacts appropriately 25-49% of time   Problem Solving   Problem solving (FIM) 2 - Needs direction more than ½ time to initiate, plan or complete simple tasks   Memory   Memory (FIM) 2 - Recognizes, recalls/performs 25-49%   Speech/Language/Cognition Assessmetn   Treatment Assessment Pt sitting in recliner working on reading comprehension activity with mother in room  Agreeable to ST session  Pt was working on worksheet given by OT where he had a word bank of single words, fast food category, and then pictures he had to match the words to by writing the word next to the item  He was able to successfully match 3/5 i'ly, and 4/5 with moderate assistance  His spelling/copying of the words from the given word bank observed to have substitution of letters, and he was observed to have significantly increased processing time, likely related to visual field cut in addition to his receptive aphasia  SLP attempted imitation trials of the word bank words, bt pt with severe expressive aphasia as this was very difficult with verbal modeling alone  With requests for pt to look at SLP's mouth for visual cues, pt showing some benefit, able to then imitate the word \"hamburger,\" with additional neologism at end of word  Following this, SLP providing task where pt was given a short phrase or sentence (written form, but read aloud with finger tapping on words) and had to identify in a F03 what word was the go at the end of the phrase or sentence   Pt able to identify the correct word on 1/3 opps i'ly, showing no benefit to max cueing (drawing pictures to represent the choices, placing each option at the end " "of the sentence and reading it aloud)  Expression was again attempted with this activity with imitation of the phrases in attempts to elicit/observed pt's automatic speech skills  Pt with significant amount of paraphasias, and seeming defeated given his body language  Even with cues to look at SLP mouth, attempts with AIMEE, pt showing limited benefit  SLP attempted vowel wheel with phoneme /m/ (CV words and non-words) via imitation where gestures were utilized  Pt able to imitate 2/6 CV /m/ targets  Pt began to get emotional, to which SLP gave pt a break and discussed with him and his mother what aphasia is and all that it entails, as well as ways to work on language skills in ways that have been proven in research to be beneficial  SLP modeled how to cue and support her son as he works on his language skills in structured and unstructured scenarios  Pt's mother expressing gratitude  Following this, SLP gave pt's mother opportunity to demonstrate what she was taught  She was given a worksheet that had written instructions (e g , \"raise your hand\") that her son had to follow as she read each one aloud  This is to demonstrate his auditory comprehension  She was encouraged to read it slow, show her son her mouth as she spoke, and to read it twice through  Additionally, she was instructed to model how to do it if he could not figure it out so that he is able to at least see the action to be able to pair it with the spoken words  With mother leading, pt able to complete 4/20 opps  Based on today's session, it is recommended to continue POC to further optimize the pt's expressive and receptive language skills and to further educate the pt's mother and other family members on ways to support the pt to increase functional independence while at the [facility] so that caregiver/family burden is reduced at time of D/C      SLP Therapy Minutes   SLP Time In 1330   SLP Time Out 1430   SLP Total Time (minutes) 60   SLP Mode of treatment - " Individual (minutes) 60   SLP Mode of treatment - Concurrent (minutes) 0   SLP Mode of treatment - Group (minutes) 0   SLP Mode of treatment - Co-treat (minutes) 0   SLP Mode of Treatment - Total time(minutes) 60 minutes   SLP Cumulative Minutes 135   Therapy Time missed   Time missed?  No

## 2023-04-21 NOTE — PROGRESS NOTES
Occupational Therapy Long Term Goals      04/20/23 1230   Rehab Team Goals   ADL Team Goal Patient will be independent with ADLs with least restrictive device upon completion of rehab program   Rehab Team Interventions   OT Interventions Self Care   Eating Goal   Eating Goal 06  Independent - Patient completes the activity by him/herself with no assistance from a helper  Status Ongoing; Target goal - two weeks; Target goal - one week   Grooming Goal   Oral Hygiene Goal 06  Independent - Patient completes the activity by him/herself with no assistance from a helper  Status Ongoing; Target goal - one week; Target goal - two weeks   Tub/Shower Transfer Goal   Method Tub Shower  (SUP)   Status Ongoing; Target goal - one week; Target goal - two weeks   Bathing Goal   Shower/bathe self Goal 04  Supervision or touching assistance- [place] provides VERBAL CUES or supervision throughout activity  Status Ongoing; Target goal - one week; Target goal - two weeks   Upper Body Dressing Goal   Upper body dressing Goal 06  Independent - Patient completes the activity by him/herself with no assistance from a helper  Status Ongoing; Target goal - one week; Target goal - two weeks   Lower Body Dressing Goal   Lower body dressing Goal 06  Independent - Patient completes the activity by him/herself with no assistance from a helper  Putting on/taking off footwear Goal 06  Independent - Patient completes the activity by him/herself with no assistance from a helper  Status Ongoing; Target goal - one week; Target goal - two weeks   Toileting Transfer Goal   Toilet transfer Goal 06  Independent - Patient completes the activity by him/herself with no assistance from a helper  Status Ongoing; Target goal - one week; Target goal - two weeks   Toileting Goal   Toileting hygiene Goal 06  Independent - Patient completes the activity by him/herself with no assistance from a helper  Status Ongoing; Target goal - one week; Target goal - two weeks   PT Transfer Goal   Roll left and right Goal 06  Independent - Patient completes the activity by him/herself with no assistance from a helper  Sit to lying Goal 06  Independent - Patient completes the activity by him/herself with no assistance from a helper  Lying to sitting on side of bed Goal 06  Independent - Patient completes the activity by him/herself with no assistance from a helper  Sit to stand Goal 06  Independent - Patient completes the activity by him/herself with no assistance from a helper  Chair/bed-to-chair transfer Goal 06  Independent - Patient completes the activity by him/herself with no assistance from a helper  Status Ongoing; Target goal - two weeks; Target goal - one week   Meal Prep and Kitchen Mobility   Assist Level Independent  (Light meal prep)   Status Ongoing; Target goal - two weeks; Target goal - one week   Medication Management   Assist Level Supervision   Status Ongoing; Target goal - one week; Target goal - two weeks

## 2023-04-21 NOTE — PROGRESS NOTES
04/21/23 1230   Pain Assessment   Pain Assessment Tool 0-10   Pain Score No Pain   Restrictions/Precautions   Precautions Aphasia;Bed/chair alarms; Fall Risk;Supervision on toilet/commode;Visual deficit   Subjective   Subjective expressing frustration about vision on right   Sit to Stand   Type of Assistance Needed Supervision   Physical Assistance Level No physical assistance   Comment CS with RW   Sit to Stand CARE Score 4   Bed-Chair Transfer   Type of Assistance Needed Incidental touching; Adaptive equipment;Verbal cues   Physical Assistance Level No physical assistance   Comment using RW, cueing for right side scanning/awareness   Chair/Bed-to-Chair Transfer CARE Score 4   Walk 10 Feet   Type of Assistance Needed Incidental touching;Verbal cues; Adaptive equipment   Physical Assistance Level No physical assistance   Comment using RW, pt requested   Walk 10 Feet CARE Score 4   Walk 50 Feet with Two Turns   Type of Assistance Needed Incidental touching;Verbal cues; Adaptive equipment   Physical Assistance Level No physical assistance   Comment using RW, pt requested   Walk 50 Feet with Two Turns CARE Score 4   Walk 150 Feet   Type of Assistance Needed Incidental touching;Verbal cues; Adaptive equipment   Physical Assistance Level No physical assistance   Comment using RW, pt requested   Walk 150 Feet CARE Score 4   Ambulation   Primary Mode of Locomotion Prior to Admission Walk   Distance Walked (feet) 150 ft  (x4)   Assist Device Roller Walker   Does the patient walk? 2  Yes   Picking Up Object   Type of Assistance Needed Incidental touching; Adaptive equipment   Physical Assistance Level No physical assistance   Comment bending down, L UE support on RW, right hand to grab cone from floor     Picking Up Object CARE Score 4   Therapeutic Interventions   Neuromuscular Re-Education worked on walking while scanning environment to his right, cueing pt with noise, tactile stim every 10 steps, then finding cones at variuos heights from floor to above head height and attempting to say the colors and count how many, 2 bouts completed  Pt finding all cones on his own with brannon simple instructions at start  Able to bend down and grab cones from floor with right hand  Assessment   Treatment Assessment Pt seen for 30 min freida to work on mobility while scanning his enviroonment to right side  Will benefit fom continue practice in various settings to assess carryover and work towards habituation to compensate for vision  PT Therapy Minutes   PT Time In 1230   PT Time Out 1300   PT Total Time (minutes) 30   PT Mode of treatment - Individual (minutes) 30   PT Mode of treatment - Concurrent (minutes) 0   PT Mode of treatment - Group (minutes) 0   PT Mode of treatment - Co-treat (minutes) 0   PT Mode of Treatment - Total time(minutes) 30 minutes   PT Cumulative Minutes 150   Therapy Time missed   Time missed?  No

## 2023-04-21 NOTE — PROGRESS NOTES
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)    Aphasia due to recent cerebrovascular accident (CVA)       Past Medical History:   Diagnosis Date    Hypertensive emergency 3/30/2023     Etiologic Diagnosis: L MCA CVA with Superimposed Micro-hemorrhage  Restrictions/Precautions  Precautions: Aphasia, Bed/chair alarms, Fall Risk, Supervision on toilet/commode  ADL Team Goal: Patient will be independent with ADLs with least restrictive device upon completion of rehab program  Occupational Therapy LTG's  Eating Oral care Bathing LB dress UB dress   Eating Goal: 06  Independent - Patient completes the activity by him/herself with no assistance from a helper  Oral Hygiene Goal: 06  Independent - Patient completes the activity by him/herself with no assistance from a helper  Shower/bathe self Goal: 04  Supervision or touching assistance- [place] provides VERBAL CUES or supervision throughout activity  Lower body dressing Goal: 06  Independent - Patient completes the activity by him/herself with no assistance from a helper  Upper body dressing Goal: 06  Independent - Patient completes the activity by him/herself with no assistance from a helper  Toileting Toilet txf Func txf IADL Med    Toileting hygiene Goal: 06  Independent - Patient completes the activity by him/herself with no assistance from a helper  Toilet transfer Goal: 06   Independent - Patient completes the "activity by him/herself with no assistance from a helper  Chair/bed-to-chair transfer Goal: 06  Independent - Patient completes the activity by him/herself with no assistance from a helper  Assist Level: Independent (Light meal prep) Assist Level: Supervision   OT interventions: Treatment/Interventions: (P) ADL retraining, Functional transfer training, LE strengthening/ROM, Therapeutic exercise, Endurance training, Cognitive reorientation, Patient/family training, Equipment eval/education, Bed mobility, Compensatory technique education  Discharge Plan:    Home with family support  DME:  ,  ,  TBD     04/21/23 1100   Pain Assessment   Pain Assessment Tool 0-10   Pain Score No Pain   Restrictions/Precautions   Precautions Aphasia;Bed/chair alarms; Fall Risk;Supervision on toilet/commode   Lifestyle   Autonomy \"ill try  \"   Grooming   Findings pt w/ progress to sequence task  often appearing unsure of enviroment  cues for visual scanning to R for location of soap dispenser and towel  Bed-Chair Transfer   Type of Assistance Needed Physical assistance   Physical Assistance Level 26%-50%   Chair/Bed-to-Chair Transfer CARE Score 3   Toileting Hygiene   Type of Assistance Needed Physical assistance   Physical Assistance Level 26%-50%   Comment MIN A for CM in stance with mild unsteadiness during dynamic reaching on R side  good use of grab bars as needed  set up for items requierd for hygiene   Toileting Hygiene CARE Score 3   Toilet Transfer   Type of Assistance Needed Physical assistance   Physical Assistance Level 26%-50%   Comment use of grab bars   Toilet Transfer CARE Score 3   Cognition   Comments pt did complete table top \"homework\" of copying over basic demographic information with some minimal noted errors in letter orientation and misplaced letters  provided with another set of overnight HEP to compelte word writing to match pictures with use a word bank     Vision   Vision Comments pt cont to report that he has R " side field cut  pt does demo some carryover for strategies to scan enviroment, but does not always look far enough to the right  Additional Activities   Additional Activities Comments completed object identification on table top with common ADL and basic objects  pt only 3/11 accurate for id with verbal instruciton without cues or written  all others required use of written word and some with clue to use of object  Pt does demo good carryover for scanning entire table for objects often appearing systematic in pointing to each items as to not miss any  Assessment   Treatment Assessment aPt participated in skilled OT treatment session with treatment focus on toileting, functional cognition, and item identification  Pt tolerated session well today  More voice production/attempts noted , however less spontaneous production of applicable words  Pt cont to move slowly and cautiously 2* decreased R side vision  Pt does demo good awareness of deficits  Pt will require furhter training for compensations strategies during functional tasks with functional scanning to R  FOcus tasks all in R visual field to promote independent scanning  Prognosis Good   Problem List Decreased strength;Decreased range of motion; Impaired balance;Decreased endurance;Decreased mobility; Impaired vision;Obesity   Plan   Treatment/Interventions ADL retraining;Functional transfer training;LE strengthening/ROM; Therapeutic exercise; Endurance training;Cognitive reorientation;Patient/family training;Equipment eval/education; Bed mobility; Compensatory technique education   Progress Progressing toward goals   OT Therapy Minutes   OT Time In 1100   OT Time Out 1210   OT Total Time (minutes) 70   OT Mode of treatment - Individual (minutes) 70   OT Mode of treatment - Concurrent (minutes) 0   OT Mode of treatment - Group (minutes) 0   OT Mode of treatment - Co-treat (minutes) 0   OT Mode of Treatment - Total time(minutes) 70 minutes   OT Cumulative Minutes [address] [place] [address]

## 2023-04-21 NOTE — PLAN OF CARE
Problem: PAIN - ADULT  Goal: Verbalizes/displays adequate comfort level or baseline comfort level  Description: Interventions:  - Encourage patient to monitor pain and request assistance  - Assess pain using appropriate pain scale  - Administer analgesics based on type and severity of pain and evaluate response  - Implement non-pharmacological measures as appropriate and evaluate response  - Consider cultural and social influences on pain and pain management  - Notify physician/advanced practitioner if interventions unsuccessful or patient reports new pain  Outcome: Progressing     Problem: INFECTION - ADULT  Goal: Absence or prevention of progression during hospitalization  Description: INTERVENTIONS:  - Assess and monitor for signs and symptoms of infection  - Monitor lab/diagnostic results  - Monitor all insertion sites, i e  indwelling lines, tubes, and drains  - Monitor endotracheal if appropriate and nasal secretions for changes in amount and color  - [place] appropriate cooling/warming therapies per order  - Administer medications as ordered  - Instruct and encourage patient and family to use good hand hygiene technique  - Identify and instruct in appropriate isolation precautions for identified infection/condition  Outcome: Progressing     Problem: SAFETY ADULT  Goal: Patient will remain free of falls  Description: INTERVENTIONS:  - Educate patient/family on patient safety including physical limitations  - Instruct patient to call for assistance with activity   - Consult OT/PT to assist with strengthening/mobility   - Keep Call bell within reach  - Keep bed low and locked with side rails adjusted as appropriate  - Keep care items and personal belongings within reach  - Initiate and maintain comfort rounds  - Make Fall Risk Sign visible to staff  - Offer Toileting every 2 Hours, in advance of need  - Initiate/Maintain chair/bed alarm  - Obtain necessary fall risk management equipment: nonskid footwear  - Apply yellow socks and bracelet for high fall risk patients  - Consider moving patient to room near nurses station  Outcome: Progressing  Goal: Maintain or return to baseline ADL function  Description: INTERVENTIONS:  -  Assess patient's ability to carry out ADLs; assess patient's baseline for ADL function and identify physical deficits which impact ability to perform ADLs (bathing, care of mouth/teeth, toileting, grooming, dressing, etc )  - Assess/evaluate cause of self-care deficits   - Assess range of motion  - Assess patient's mobility; develop plan if impaired  - Assess patient's need for assistive devices and provide as appropriate  - Encourage maximum independence but intervene and supervise when necessary  - Involve family in performance of ADLs  - Assess for home care needs following discharge   - Consider OT consult to assist with ADL evaluation and planning for discharge  - Provide patient education as appropriate  Outcome: Progressing  Goal: Maintains/Returns to pre admission functional level  Description: INTERVENTIONS:  - Perform BMAT or MOVE assessment daily    - Set and communicate daily mobility goal to care team and patient/family/caregiver  - Collaborate with rehabilitation services on mobility goals if consulted  - Perform Range of Motion 3 times a day  - Reposition patient every 2 hours    - Dangle patient 3 times a day  - Stand patient 3 times a day  - Ambulate patient 3 times a day  - Out of bed to chair 3 times a day   - Out of bed for meals 3 times a day  - Out of bed for toileting  - Record patient progress and toleration of activity level   Outcome: Progressing     Problem: DISCHARGE PLANNING  Goal: Discharge to home or other facility with appropriate resources  Description: INTERVENTIONS:  - Identify barriers to discharge w/patient and caregiver  - Arrange for needed discharge resources and transportation as appropriate  - Identify discharge learning needs (meds, wound care, etc )  - Arrange for interpretive services to assist at discharge as needed  - Refer to Case Management Department for coordinating discharge planning if the patient needs post-hospital services based on physician/advanced practitioner order or complex needs related to functional status, cognitive ability, or social support system  Outcome: Progressing     Problem: Prexisting or High Potential for Compromised Skin Integrity  Goal: Skin integrity is maintained or improved  Description: INTERVENTIONS:  - Identify patients at risk for skin breakdown  - Assess and monitor skin integrity  - Assess and monitor nutrition and hydration status  - Monitor labs   - Assess for incontinence   - Turn and reposition patient  - Assist with mobility/ambulation  - Relieve pressure over bony prominences  - Avoid friction and shearing  - Provide appropriate hygiene as needed including keeping skin clean and dry  - Evaluate need for skin moisturizer/barrier cream  - Collaborate with interdisciplinary team   - Patient/family teaching  - Consider wound care consult   Outcome: Progressing     Problem: Nutrition/Hydration-ADULT  Goal: Nutrient/Hydration intake appropriate for improving, restoring or maintaining nutritional needs  Description: Monitor and assess patient's nutrition/hydration status for malnutrition  Collaborate with interdisciplinary team and initiate plan and interventions as ordered  Monitor patient's weight and dietary intake as ordered or per policy  Utilize nutrition screening tool and intervene as necessary  Determine patient's food preferences and provide high-protein, high-caloric foods as appropriate       INTERVENTIONS:  - Monitor oral intake, urinary output, labs, and treatment plans  - Assess nutrition and hydration status and recommend course of action  - Evaluate amount of meals eaten  - Assist patient with eating if necessary   - Allow adequate time for meals  - Recommend/ encourage appropriate diets, oral nutritional supplements, and vitamin/mineral supplements  - Order, calculate, and assess calorie counts as needed  - Recommend, monitor, and adjust tube feedings and TPN/PPN based on assessed needs  - Assess need for intravenous fluids  - Provide specific nutrition/hydration education as appropriate  - Include patient/family/caregiver in decisions related to nutrition  Outcome: Progressing

## 2023-04-21 NOTE — PROGRESS NOTES
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   Had nonocclusive saddle embolus with filling defects distal main pulmonary arteries and throughout segmental branches bilaterally   Distal order branches were not clearly visualized due to severe respiratory motion artifact though felt likely containing pulmonary emboli   Sx were left sided CP, NIEVES and elevated D-dimer   Continue Eliquis   Was not a candidate for IR intervention 2/2 HIT     Bilateral LE DVT   Continue Eliquis     HIT   Heparin induced antibody was positive   Heparin by serotonin release pending    S/p Argatroban   Platelet count was as low as 42, now recovered to 261     L MCA infarct 3/3/23   Suspected to be cardioembolic   Thrombosis panel then showed abnormal antithrombin, Protein C/S activity, but per Neuro unclear significance in setting of acute stroke and therefore wanted repeat as outpatient   Cont ASA/statin   Initial Echo/HODAN LVEF 55%; no thrombus/PFO   Needs outpt LOOP/Zio patch   Originally to be on Keppra x 7 days then was stopped  Dr Walter Hopping d/w with Neuro on 4/10 and they wanted to keep the [address] on until outpatient follow-up and was therefore restarted     DM2   Hgb A1c 8 0   New diagnosis   Cont QID Accuchecks/SSI and DM diet   Home:  No meds   Here: SSI only    Had been on Metformin when in [facility] before transfer back for PE   Will consider restart Metformin if needed   Needs DM teaching and PCP f/u on dc = mother is diabetic and knows how to use meter but not insulin     HTN   Home: on no meds   Here:  Norvasc 5mg qd/Lopressor 25 mg q12hrs/Losartan 25mg qd   stable     Urine retention   Per PMR   Continue Flomax     Obesity   Recommend ongoing attempts at weight loss   Current [id number]        Discharge date:  Team    The above assessment and plan was reviewed and updated as determined by my evaluation of the patient on 4/21/2023      Labs:   Results from last 7 days   Lab Units 04/20/23  0705 04/16/23  0506   WBC Thousand/uL 7 15 9 33   HEMOGLOBIN g/dL 13 3 13 2   HEMATOCRIT % 42 4 41 9   PLATELETS Thousands/uL 261 140*     Results from last 7 days   Lab Units 04/20/23  0705 04/16/23  0506   SODIUM mmol/L 138 136   POTASSIUM mmol/L 3 9 3 9   CHLORIDE mmol/L 110* 109*   CO2 mmol/L 25 23   BUN mg/dL 16 18   CREATININE mg/dL 1 00 0 95   CALCIUM mg/dL 8 8 8 6             Results from last 7 days   Lab Units 04/21/23  0617 04/20/23 2059 04/20/23  1605   POC GLUCOSE mg/dl 102 89 99       Review of Scheduled Meds:  Current Facility-Administered Medications   Medication Dose Route Frequency Provider Last Rate    acetaminophen  975 mg Oral Q8H PRN Keven López MD      amLODIPine  5 mg Oral Daily eKven López MD      apixaban  5 mg Oral BID Keven López MD      aspirin  81 mg Oral Daily Keven López MD      atorvastatin  40 mg Oral QPM Keven López MD      bisacodyl  10 mg Rectal Daily PRN Keven López MD      bisacodyl  10 mg Rectal Once Keven López MD      calcium carbonate  1,000 mg Oral TID PRN Keven López MD      famotidine  20 mg Oral Q12H [address] Keven López MD      insulin lispro  1-5 Units Subcutaneous TID [facility] Keven López MD      levETIRAcetam  750 mg Oral Q12H [address] Keven López MD      lidocaine   Topical Q4H PRN Keven López MD      losartan  25 mg Oral Daily Keven López MD      metoprolol tartrate  25 mg Oral Q12H [address] Keven López MD      ondansetron  4 mg Oral Q8H PRN Keven López MD      oxyCODONE  2 5 mg Oral Q6H PRN Keven López MD      polyethylene glycol  17 g Oral Daily PRN Keven López MD      polyethylene glycol 17 g Oral Daily Ghulam Story MD      senna  1 tablet Oral HS Ghulam Cornea, MD      tamsulosin  0 4 mg Oral After Carlos Manuel Hoskins MD         Subjective/ HPI: Patient seen and examined  Patients overnight issues or events were reviewed with nursing or staff during rounds or morning huddle session  New or overnight issues include the following:     Pt seen in his room  He is speaking more and does answer some questions appropriately  He denies any current complaints  ROS:   A 10 point ROS was performed; negative except as noted above  Imaging:     No orders to display       *Labs /Radiology studies reviewed  *Medications reviewed and reconciled as needed  *Please refer to order section for additional ordered labs studies  *Case discussed with primary attending during morning huddle case rounds    Physical Examination:  Vitals:   Vitals:    04/20/23 1415 04/20/23 2116 04/21/23 0600 04/21/23 0615   BP: 147/63 135/80  131/85   BP Location: Right arm Left arm  Left arm   Pulse: 99 87  73   Resp: 19   18   Temp: 97 9 °F (36 6 °C) 98 °F (36 7 °C)  97 5 °F (36 4 °C)   TempSrc: Oral Oral  Oral   SpO2: 97% 98%  97%   Weight:   132 kg (291 lb 3 6 oz)    Height:           General Appearance: no distress  HEENT:  External ear normal   Nose normal w/o drainage  Mucous membranes are moist  Oropharynx is clear  Conjunctiva clear w/o icterus or redness  Neck:  Supple, normal ROM  Lungs: BBS without crackles/wheeze/rhonchi; respirations unlabored with normal inspiratory/expiratory effort  No retractions noted  On RA  CV: regular rate and rhythm; no rubs/murmurs/gallops, PMI normal   ABD: Abdomen is soft  Bowel sounds all quadrants  Nontender with no distention      EXT: no edema  Skin: normal turgor, normal texture, no rashes  Psych: affect normal, mood normal  Neuro: AA; has expressive> receptive aphasia    The above physical exam was reviewed and updated as determined by my evaluation of the patient on 4/21/2023  Invasive Devices     None                    VTE Pharmacologic Prophylaxis: Eliquis  Code Status: Level 1 - Full Code  Current Length of Stay: 2 day(s)      Total time spent:  30 minutes with more than 50% spent counseling/coordinating care  Counseling includes discussion with patient re: progress  and discussion with patient of his/her current medical state/information  Coordination of patient's care was performed in conjunction with primary service  Time invested included review of patient's labs, vitals, and management of their comorbidities with continued monitoring  In addition, this patient was discussed with medical team including physician and advanced extenders  The care of the patient was extensively discussed and appropriate treatment plan was formulated unique for this patient  Medical decision making for the day was made by supervising physician unless otherwise noted in their attestation statement  ** Please Note:  voice to text software may have been used in the creation of this document   Although proof errors in transcription or interpretation are a potential of such software**

## 2023-04-21 NOTE — PROGRESS NOTES
04/21/23 0830   Pain Assessment   Pain Assessment Tool 0-10   Pain Score No Pain   Cognition   Arousal/Participation Alert; Cooperative   Comments (+) aphasia but pt able to follow commands with min repetition/cues   Subjective   Subjective pt nods yes in agreement to participate in PT tx  upon PT arrival, pt practicing writing his name   Sit to Stand   Type of Assistance Needed Incidental touching;Verbal cues   Comment practiced with and without RW, occasional cues for hand placement   Sit to Stand CARE Score 4   Walk 10 Feet   Type of Assistance Needed Physical assistance;Verbal cues   Physical Assistance Level 25% or less   Comment without AD   Walk 10 Feet CARE Score 3   Walk 50 Feet with Two Turns   Type of Assistance Needed Physical assistance;Verbal cues   Physical Assistance Level 25% or less   Comment without AD   Walk 50 Feet with Two Turns CARE Score 3   Walk 150 Feet   Type of Assistance Needed Verbal cues; Physical assistance   Physical Assistance Level 25% or less   Comment without AD   Walk 150 Feet CARE Score 3   Ambulation   Distance Walked (feet) 160 ft  (x 2 (once with RW once without AD, 100 ft x 2 without AD)   Gait Pattern Inconsistant Marleni; Lateral deviation;Decreased foot clearance; Improper weight shift  (BUE guarding when not using AD, cued pt to relax arms and attempt reciprocal UE swing)   Limitations Noted In Balance; Endurance; Heel Strike;Speed;Strength   Provided Assistance with: Balance;Direction;Weight Shift   Walk Assist Level Contact Guard;Minimum Assist  (CGA with RW, CGA to min A without AD)   Findings increased practice without AD for NPP and to progress pt to PLOF  gait belt worn  Does the patient walk? 2  Yes   4 Steps   Type of Assistance Needed Verbal cues; Incidental touching   Comment BUE support on railings, reciprocal pattern   completed 2 six-inch  steps x 3 consecutive trials without rest   4 Steps CARE Score 4   Therapeutic Interventions   Strengthening sit < > stands from standard chair 3 sets of 10 with use of BUEs  Balance standing on blue foam balance pad with LUE support on parallel bar, pt performed heel raises and marches 3 sets of 10 with CGA for balance  Standing on level ground, pt performed alternating foot taps onto 6 inch stepper, 3 sets of 10 without BUE support and min A to maintain balance, intermittent difficulty coordinating with RLE taps   Equipment Use   NuStep level 1 x 10 mintues BUEs/BLEs for NPP  SpO2 and HR monitored every 3 minutes with SpO2 99% and HR 93-96bpm   Assessment   Treatment Assessment Pt participated in 60 minute PT tx session focusing on dynamic balance activities and increased gait distances without AD  Also initiated endurance traning on NuStep with vitals stable t/o  Pt will continue to benefit from skilled PT interventions to address deficits, reduce fall risk, and maximize functional independence  Problem List Decreased strength;Decreased endurance; Impaired balance;Decreased mobility; Decreased safety awareness   Barriers to Discharge Decreased caregiver support   Plan   Treatment/Interventions Functional transfer training;LE strengthening/ROM; Elevations; Therapeutic exercise; Endurance training;Patient/family training;Bed mobility;Gait training   Progress Progressing toward goals   PT Therapy Minutes   PT Time In 0830   PT Time Out 0930   PT Total Time (minutes) 60   PT Mode of treatment - Individual (minutes) 60   PT Mode of treatment - Concurrent (minutes) 0   PT Mode of treatment - Group (minutes) 0   PT Mode of treatment - Co-treat (minutes) 0   PT Mode of Treatment - Total time(minutes) 60 minutes   PT Cumulative Minutes 120   Therapy Time missed   Time missed?  No

## 2023-04-21 NOTE — PROGRESS NOTES
"Physical Medicine and Rehabilitation Progress Note  Pipo Solano III 41 y o  male MRN: [id number]  Unit/Bed#: [facility] 965-01 Encounter: [id number]      Assessment & Plan:     Decline in ADLs and mobility: Functional assessment        FIM  Care Score  Admit Score Recent Score    Total assist  1-100% or 2p    Tot     Max assist 2-51-75%    Sub  toileting hygiene, bathing, lower body dressing    Mod assist 3- 26-50%  Par  upper body dressing    Min assist 3- 25% or < Par     CG assist 4  TA     Sup/Setup 4-5 Sup     Mod-I/Indep 6 MI      Transfers   mod assist to significant assist  mod to significant assist    Ambulation    50 feet min assist 150 ft min assist     Stairs    4 steps moderate assist    SLP FIM admit - total assist expression, Max assist comp, PS, memory; SI - supervision  Goal: Largely supervision for ADLs and mobility  Major barriers: Aphasia, imbalance, incoordination, deconditioning  Dispo: Home with estimate length of stay of 2 5 weeks from admission     * Acute ischemic left MCA stroke (HCC)  Assessment & Plan  - Aphasia stable, function improving  - Large left MCA infarct  - Residual impairments on admission to [facility]: Aphasia, weakness, imbalance (assess for other impairments during [facility] course)   - Co-morbidities most impacting functional recovery: Morbid obesity    Secondary stroke prevention  - Per prior providers   \"Thrombosis panel with abnormal antithrombin, Protein C/S activity, but per Neuro unclear significance in setting of acute stroke  Would repeat as outpatient     Will need Zio Patch/Loop Recorder with Cards Referral as outpatient   Discussed with Neurology on 4/9: Continue Keppra until outpatient f/u given location, temporal slowing, extent of lesion  \"  - Antithrombotic: Aspirin  - Statin  - Optimal management of blood pressure and diabetes  -  patient and if applicable caregiver on optimal stroke management  - Follow-up with neurology and PCP after d/c   -Continue acute " comprehensive interdisciplinary inpatient rehabilitation to include intensive skilled therapies (PT, OT, ST) as outlined with oversight and management by rehabilitation physician as well as inpatient rehab level nursing, case management and weekly interdisciplinary team meetings           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)  Assessment & Plan  Saturating well on room air with and without activity so far  Monitor vitals with and without activity  Eliquis  Outpatient hematology consult    HIT (heparin-induced thrombocytopenia) ([facility])  Assessment & Plan  Platelets remain improved 4/20  Heparin-induced antibody positive, serotonin release assay pending  Transitioned from Arixtra to Eliquis  Monitor platelet count now improving    DVT of lower extremity, bilateral (HCC)  Assessment & Plan  Eliquis  Ambulation  Hold SCDs with hx of DVT     Urinary retention  Assessment & Plan  Flomax    Bowel and bladder incontinence  Assessment & Plan  Discontinue bladder scans as PVRs low  Toileting program  Flomax    Hypertension  Assessment & Plan  Blood pressure acceptable  Internal medicine consulted and co-management with their service  Monitor vitals with and without activity; monitor for orthostasis  Monitor hemoglobin, electrolytes, kidney function, hydration status   Current meds: Amlodipine, losartan, metoprolol      Type 2 diabetes mellitus with circulatory disorder, without long-term current use of insulin ([facility])  Assessment & Plan  Lab Results   Component Value Date    HGBA1C 8 0 (H) 03/31/2023     Internal medicine consulted and management at their discretion  Monitor for signs and symptoms of hypoglycemia   Current meds: Lispro sliding scale      Obesity, Class III, BMI 40-49 9 (morbid obesity) ([facility] [address] )  Assessment & Plan   on appropriate nutrition and activity  Adjustments accordingly during skilled therapy and with rehab nursing  Monitor skin closely for breakdown, wounds, rashes; keep clean and dry, turning Q2H   Follow-up with PCP after d/c        Other Medical Issues:       Follow-up providers and other issues to be followed up after discharge   PCP   Neuro   Hematology    CODE: Level 1: Full Code    Restrictions include: Fall precautions    Objective:     Allergies per EMR  Diagnostic Studies: Reviewed, no new imaging  No orders to display     See above as well    Laboratory: Labs reviewed  Results from last 7 days   Lab Units 04/20/23  0705 04/16/23  0506 04/15/23  0449   HEMOGLOBIN g/dL 13 3 13 2 13 3   HEMATOCRIT % 42 4 41 9 41 9   WBC Thousand/uL 7 15 9 33 8 87     Results from last 7 days   Lab Units 04/20/23  0705 04/16/23  0506 04/15/23  0449   BUN mg/dL 16 18 16   SODIUM mmol/L 138 136 137   POTASSIUM mmol/L 3 9 3 9 3 8   CHLORIDE mmol/L 110* 109* 107   CREATININE mg/dL 1 00 0 95 0 93            Drug regimen reviewed, all potential adverse effects identified and addressed:    Scheduled Meds:  Current Facility-Administered Medications   Medication Dose Route Frequency Provider Last Rate    acetaminophen  975 mg Oral Q8H PRN Tuyet Newberry MD      amLODIPine  5 mg Oral Daily Tuyet Newberry MD      apixaban  5 mg Oral BID Tuyet Newberry MD      aspirin  81 mg Oral Daily Tuyet Newberry MD      atorvastatin  40 mg Oral QPM Tuyet Newberry MD      bisacodyl  10 mg Rectal Daily PRN Tuyet Newberry MD      bisacodyl  10 mg Rectal Once Tuyet Newberry MD      calcium carbonate  1,000 mg Oral TID PRN Tuyet Newberry MD      famotidine  20 mg Oral Q12H [facility] Tuyet Newberry MD      insulin lispro  1-5 Units Subcutaneous TID [facility] Tuyet Newberry MD      levETIRAcetam  750 mg Oral Q12H [facility] Tuyet Newberry MD      lidocaine   Topical Q4H PRN Tuyet Newberry MD      losartan  25 mg Oral Daily Tuyet Newberry MD      metoprolol tartrate  25 mg Oral Q12H [facility] Tuyet Newberry MD      ondansetron  4 mg Oral Q8H PRN Lynsey Ross MD      oxyCODONE  2 5 mg Oral Q6H PRN Lynsey Ross MD      polyethylene glycol  17 g Oral Daily PRN Lynsey Ross MD      polyethylene glycol  17 g Oral Daily Lynsey Ross MD      senna  1 tablet Oral HS Lynsey Ross MD      tamsulosin  0 4 mg Oral After Jesica Lam MD         Chief Complaints: Aphasia    Subjective: On eval, aphasia stable today but appears to communicate no worsening speech, strength, lightheadedness, pain or other new complaints  ROS: Could not be adequately (or fully adequately be) obtained due to degree of impaired cognition/communication at time of encounter  Physical Exam:  Temp:  [97 5 °F (36 4 °C)-98 °F (36 7 °C)] 97 5 °F (36 4 °C)  HR:  [73-99] 83  Resp:  [18-19] 18  BP: (123-147)/(63-85) 123/85  SpO2:  [97 %-98 %] 97 %    GEN:  Sitting in NAD   HEENT/NECK: MMM  CARDIAC: Regular rate rhythm, no murmers, no rubs, no gallops  LUNGS:  clear to auscultation, no wheezes, rales, or rhonchi  ABDOMEN: Soft, non-tender, non-distended, normal active bowel sounds  EXTREMITIES/SKIN:  no calf edema, no calf tenderness to palpation  NEURO:   MENTAL STATUS: Stable aphasia, able to lift limbs to command and show correct number of fingers and Strength/MMT:  Near full throughout  PSYCH:  Affect:  Euthymic     HPI:  47-year-old male with a history of hypertension, hyperlipidemia, obesity, diabetes status post recent left MCA stroke which was complicated by saddle pulmonary embolism,, HIT, bilateral lower extremity DVTs  Patient was evaluated by skilled therapies and was found to have significant decline in ADLs and ambulation and appears appropriate for admission to [place] [id number]      ** Please Note: Fluency Direct voice to text software may have been used in the creation of this document  **    Updated mother by phone

## 2023-04-22 LAB
GLUCOSE SERPL-MCNC: 143 MG/DL (ref 65–140)
GLUCOSE SERPL-MCNC: 149 MG/DL (ref 65–140)
GLUCOSE SERPL-MCNC: 87 MG/DL (ref 65–140)

## 2023-04-22 RX ADMIN — LEVETIRACETAM 750 MG: 750 TABLET, FILM COATED ORAL at 09:30

## 2023-04-22 RX ADMIN — SENNOSIDES 8.6 MG: 8.6 TABLET, FILM COATED ORAL at 21:14

## 2023-04-22 RX ADMIN — METOPROLOL TARTRATE 25 MG: 25 TABLET, FILM COATED ORAL at 09:30

## 2023-04-22 RX ADMIN — LEVETIRACETAM 750 MG: 750 TABLET, FILM COATED ORAL at 21:14

## 2023-04-22 RX ADMIN — TAMSULOSIN HYDROCHLORIDE 0.4 MG: 0.4 CAPSULE ORAL at 17:35

## 2023-04-22 RX ADMIN — POLYETHYLENE GLYCOL 3350 17 G: 17 POWDER, FOR SOLUTION ORAL at 09:29

## 2023-04-22 RX ADMIN — APIXABAN 5 MG: 5 TABLET, FILM COATED ORAL at 09:29

## 2023-04-22 RX ADMIN — ASPIRIN 81 MG 81 MG: 81 TABLET ORAL at 09:30

## 2023-04-22 RX ADMIN — APIXABAN 5 MG: 5 TABLET, FILM COATED ORAL at 17:35

## 2023-04-22 RX ADMIN — FAMOTIDINE 20 MG: 20 TABLET ORAL at 09:30

## 2023-04-22 RX ADMIN — AMLODIPINE BESYLATE 5 MG: 5 TABLET ORAL at 09:30

## 2023-04-22 RX ADMIN — FAMOTIDINE 20 MG: 20 TABLET ORAL at 21:14

## 2023-04-22 RX ADMIN — LOSARTAN POTASSIUM 25 MG: 25 TABLET, FILM COATED ORAL at 09:29

## 2023-04-22 RX ADMIN — METOPROLOL TARTRATE 25 MG: 25 TABLET, FILM COATED ORAL at 21:14

## 2023-04-22 RX ADMIN — ATORVASTATIN CALCIUM 40 MG: 40 TABLET, FILM COATED ORAL at 17:35

## 2023-04-22 NOTE — PROGRESS NOTES
04/22/23 1430   Pain Assessment   Pain Assessment Tool 0-10   Pain Score No Pain   Restrictions/Precautions   Precautions Aphasia;Bed/chair alarms;Cognitive; Fall Risk;Supervision on toilet/commode;Visual deficit   Comprehension   Comprehension (FIM) 2 - Understands basic info/conversation 25-49% of time   Expression   Expression (FIM) 2 - Uses only simple expressions or gestures (waves, hello)   Social Interaction   Social Interaction (FIM) 5 - Interacts appropriately with others 90% of time   Problem Solving   Problem solving (FIM) 2 - Needs direction more than ½ time to initiate, plan or complete simple tasks   Memory   Memory (FIM) 3 - Recognizes, recalls/performs 50-74%   Speech/Language/Cognition Assessment   Treatment Assessment Pt participated in skilled ST session focusing on cognitive linguistic skills  The following tasks were completed:    Reading Comprehension:  Presented with a Fo3 words, paired with a picture, pt accurately chose the correct name of the picture/item for 7/10 trials  Pt often demonstrated increased time for reading info, noting cautious responses  In errored trials, pt benefited mainly from semantic cues, however, did also benefit from SLP reading aloud word choices to patient  Listening Comprehension:  Presented with [place] pictures of common objects, pt was then verbally provided with the name of an object and requested to point to the named object  Prior to beginning task, SLP reviewed current R sided visual attention deficits in relation to current stroke and provided pt with a strategy of making sure he first scanned to locate all three items, prior to pointing to an item  Pt nodded his head and demonstrated scanning technique immediately to indicate to SLP that he understood  Pt accurately identified the named item in 16/20 opportunities benefiting from repetition of words and increased processing time to indicate a responses across all trials   When correcting errored "responses, pt improved to 18/20 accurate with the use of a gestural cue and a verbal cue  During this time, pt accurately repeated x1 target word and also made very close approximations of x2 target words  Oral Motor Commands: As pt presents with a component of apraxia of speech as well, pt engaged in completing oral motor commands based on verbal instructions which were followed by models from the SLP  Pt was able to accurately demonstrate 12/21 total commands  Pt demonstrated close approximation of an additional 7 oral motor commands to improve to 19/21 but ultimately was unable to complete 2 commands  During this task, pt demonstrated times of oral groping and multiple attempts  Pt benefited from a visual model from SLP, along with SLP providing verbal feedback to improve oral motor commands-will trial use of a mirror for biofeedback in next attempt at task  Verbal Speech/Sound Imitation:  Pt engaged in imitating specific sounds with aim to repeat accurate imitations in order to improve muscle memory  Pt provided with education regarding rationale for completing this task prior to beginning  Regarding bilabial sounds, pt able to imitate the following: /b, p, m, w/ but did require frequent verbal models, visual placement cues and also verbal descriptions of where to place his articulators  Began to target labiodentals to which pt able to imitate /f/ sound but noted difficulty with eliciting voicing to produce /v/  Pt also was then able to imitate the following vowel sounds: /ah/, /ee/, and /oo/, but did have difficulty in imitating other vowel sounds  Pt appeared to be inconsistent in his ability to monitor correct and incorrect production of sounds, noting impact of pt's receptive language and self monitoring skills  Written Expression:  Attempted to trial written communication of pt's biographical info  When requested to write his first name, pt wrote \"ineur  \" However, when given a written model to " copy, pt able to copy without difficulty  Also trialed copying pt's last name, however, pt required notably increased time to copy this name and after he was able to do so, he conveyed increased difficulty with coping his last name vs his first name, as well as conveyed that he was unsure why this occurred  Discussed plan to further target this area this upcoming week  At this time, pt continues to present with expressive and receptive aphasia, characterized by limited spontaneous functional speech and verbal output  Pt is also presenting with components of aprqxia of speech, impacting verbal output and ability to repeat words  Therefore, pt is recommended for further skilled SLP services targeting expressive and receptive speech and language skills in order to improve functional communication abilities  SLP Therapy Minutes   SLP Time In 1430   SLP Time Out [id number]   SLP Total Time (minutes) 60   SLP Mode of treatment - Individual (minutes) 60   SLP Mode of treatment - Concurrent (minutes) 0   SLP Mode of treatment - Group (minutes) 0   SLP Mode of treatment - Co-treat (minutes) 0   SLP Mode of Treatment - Total time(minutes) 60 minutes   SLP Cumulative Minutes 240   Therapy Time missed   Time missed?  No

## 2023-04-22 NOTE — PROGRESS NOTES
"   04/22/23 0700   Pain Assessment   Pain Assessment Tool 0-10   Pain Score No Pain   Restrictions/Precautions   Precautions Aphasia;Bed/chair alarms; Fall Risk;Supervision on toilet/commode;Visual deficit   Weight Bearing Restrictions No   ROM Restrictions No   Lifestyle   Autonomy \"I can\"- when asked if he wants to shower  Oral Hygiene   Type of Assistance Needed Supervision; Adaptive equipment   Physical Assistance Level No physical assistance   Comment CSup standing at sinkside for brushing teeth with RW   Oral Hygiene CARE Score 4   Grooming   Able To Brush/Clean Teeth   Shower/Bathe Self   Type of Assistance Needed Supervision; Adaptive equipment;Verbal cues   Physical Assistance Level No physical assistance   Comment Pt participated in showering routine seated in shower stall on shower bench with pt able to complete all showering tasks at Sup level seated, utilizing weight shift for bottom and perineal area  Pt noted to require x2 verbal cues to attend to wash clothes located on right side when in shower however completed all bathing tasks at CSup level  Shower/Bathe Self CARE Score 4   Bathing   Assessed Bath Style Shower   Positioning Seated   Adaptive Equipment Shower Bars; Shower Seat;Hand [facility]   Limitations Noted In Balance; Other  (visual deficit)   Adaptive Equipment Grab Bars;Seat with Back   Assessed Shower   Findings CGA with verbal cues for safety during functional transfer with RW  Verbal cues needed for safety and scanning right prior to sitting on shower chair     Upper Body Dressing   Type of Assistance Needed Supervision   Physical Assistance Level No physical assistance   Comment Sup seated   Upper Body Dressing CARE Score 4   Lower Body Dressing   Type of Assistance Needed Supervision   Physical Assistance Level No physical assistance   Comment CSup when in stance for CM tasks with RW for balance, Sup seated for threading pants and doffing/donning socks utilizing " cross leg technique  Lower Body Dressing CARE Score 4   Dressing/Undressing Clothing   Remove UB Clothes Pullover Shirt   Don UB Clothes Pullover Shirt   Remove LB Clothes Pants; Undergarment;Socks   Don LB [employer]; Undergarment;Socks   Limitations Noted In Balance; Safety   Positioning Supported Sit;Standing   Lying to Sitting on Side of Bed   Type of Assistance Needed Supervision; Adaptive equipment   Physical Assistance Level No physical assistance   Comment Sup with bedrails and HOB elevated 30*   Lying to Sitting on Side of Bed CARE Score 4   Sit to Stand   Type of Assistance Needed Supervision; Adaptive equipment   Physical Assistance Level No physical assistance   Comment Sup with RW   Sit to Stand CARE Score 4   Bed-Chair Transfer   Type of Assistance Needed Supervision; Adaptive equipment   Physical Assistance Level No physical assistance   Comment Sup with RW   Chair/Bed-to-Chair Transfer CARE Score 4   Cognition   Overall Cognitive Status Impaired   Arousal/Participation Alert; Cooperative   Attention Attends with cues to redirect   Following Commands Follows one step commands with increased time or repetition   Assessment   Treatment Assessment Pt participated in skilled OT services for 90 minutes with fair activity tolerance with focus on ADL training and therapeutic activities  Pt supine in bed upon therapist entering room with pt willing to participate in showering routine  Pt participated in AM care routine ADL at [facility] level  Pt participated in grooming activity standing at sinkside at [address] [facility] level completing teeth brushing without assistance  Pt participated in therapeutic activities with focus on identifying common ADL items (cup, tooth brush, wash cloth, pencil)  Pt able to identify by word 50% of the time with improvement noted with repetition however inconsistency noted and continued work needed   Worked on scanning environment with pt for improved attention to R side with pt requiring min verbal cues to look all the way to R to identify items  Pt seated in bedside recliner at end of therapy session with all items within reach, chair alarm on, and no further needs identified at this time  Pt would benefit from continued OT services to progress pt with focus on tasks within R visual field to promote IND in scanning, increase IND and safety with ADL tasks, and progress pt with item identification for improved participation in daily tasks  Prognosis Good   Problem List Decreased strength;Decreased endurance; Impaired balance;Decreased mobility; Decreased cognition;Decreased safety awareness   Barriers to Discharge Decreased caregiver support   Plan   Treatment/Interventions ADL retraining;Functional transfer training; Therapeutic exercise; Endurance training;Patient/family training;Cognitive reorientation;Equipment eval/education; Bed mobility; Compensatory technique education   Recommendation   OT Discharge Recommendation Home with home health rehabilitation  (Home with family support)   OT Therapy Minutes   OT Time In 0700   OT Time Out 0830   OT Total Time (minutes) 90   OT Mode of treatment - Individual (minutes) 90   OT Mode of treatment - Concurrent (minutes) 0   OT Mode of treatment - Group (minutes) 0   OT Mode of treatment - Co-treat (minutes) 0   OT Mode of Treatment - Total time(minutes) 90 minutes   OT Cumulative Minutes 270   Therapy Time missed   Time missed?  No

## 2023-04-22 NOTE — PLAN OF CARE
Reviewed    Problem: PAIN - ADULT  Goal: Verbalizes/displays adequate comfort level or baseline comfort level  Description: Interventions:  - Encourage patient to monitor pain and request assistance  - Assess pain using appropriate pain scale  - Administer analgesics based on type and severity of pain and evaluate response  - Implement non-pharmacological measures as appropriate and evaluate response  - Consider cultural and social influences on pain and pain management  - Notify physician/advanced practitioner if interventions unsuccessful or patient reports new pain  Outcome: Progressing     Problem: INFECTION - ADULT  Goal: Absence or prevention of progression during hospitalization  Description: INTERVENTIONS:  - Assess and monitor for signs and symptoms of infection  - Monitor lab/diagnostic results  - Monitor all insertion sites, i e  indwelling lines, tubes, and drains  - Monitor endotracheal if appropriate and nasal secretions for changes in amount and color  - [place] appropriate cooling/warming therapies per order  - Administer medications as ordered  - Instruct and encourage patient and family to use good hand hygiene technique  - Identify and instruct in appropriate isolation precautions for identified infection/condition  Outcome: Progressing     Problem: SAFETY ADULT  Goal: Patient will remain free of falls  Description: INTERVENTIONS:  - Educate patient/family on patient safety including physical limitations  - Instruct patient to call for assistance with activity   - Consult OT/PT to assist with strengthening/mobility   - Keep Call bell within reach  - Keep bed low and locked with side rails adjusted as appropriate  - Keep care items and personal belongings within reach  - Initiate and maintain comfort rounds  - Make Fall Risk Sign visible to staff  - Offer Toileting every 4 Hours, in advance of need  - Initiate/Maintain bed and chair alarm  - Apply yellow socks and bracelet for high fall risk patients  - Consider moving patient to room near nurses station  Outcome: Progressing  Goal: Maintain or return to baseline ADL function  Description: INTERVENTIONS:  -  Assess patient's ability to carry out ADLs; assess patient's baseline for ADL function and identify physical deficits which impact ability to perform ADLs (bathing, care of mouth/teeth, toileting, grooming, dressing, etc )  - Assess/evaluate cause of self-care deficits   - Assess range of motion  - Assess patient's mobility; develop plan if impaired  - Assess patient's need for assistive devices and provide as appropriate  - Encourage maximum independence but intervene and supervise when necessary  - Involve family in performance of ADLs  - Assess for home care needs following discharge   - Consider OT consult to assist with ADL evaluation and planning for discharge  - Provide patient education as appropriate  Outcome: Progressing  Goal: Maintains/Returns to pre admission functional level  Description: INTERVENTIONS:  - Set and communicate daily mobility goal to care team and patient/family/caregiver     - Collaborate with rehabilitation services on mobility goals if consulted  - Out of bed for toileting  - Record patient progress and toleration of activity level   Outcome: Progressing     Problem: DISCHARGE PLANNING  Goal: Discharge to home or other facility with appropriate resources  Description: INTERVENTIONS:  - Identify barriers to discharge w/patient and caregiver  - Arrange for needed discharge resources and transportation as appropriate  - Identify discharge learning needs (meds, wound care, etc )  - Arrange for interpretive services to assist at discharge as needed  - Refer to Case Management Department for coordinating discharge planning if the patient needs post-hospital services based on physician/advanced practitioner order or complex needs related to functional status, cognitive ability, or social support system  Outcome: Progressing Problem: Prexisting or High Potential for Compromised Skin Integrity  Goal: Skin integrity is maintained or improved  Description: INTERVENTIONS:  - Identify patients at risk for skin breakdown  - Assess and monitor skin integrity  - Assess and monitor nutrition and hydration status  - Monitor labs   - Assess for incontinence   - Turn and reposition patient  - Assist with mobility/ambulation  - Relieve pressure over bony prominences  - Avoid friction and shearing  - Provide appropriate hygiene as needed including keeping skin clean and dry  - Evaluate need for skin moisturizer/barrier cream  - Collaborate with interdisciplinary team   - Patient/family teaching  - Consider wound care consult   Outcome: Progressing     Problem: Nutrition/Hydration-ADULT  Goal: Nutrient/Hydration intake appropriate for improving, restoring or maintaining nutritional needs  Description: Monitor and assess patient's nutrition/hydration status for malnutrition  Collaborate with interdisciplinary team and initiate plan and interventions as ordered  Monitor patient's weight and dietary intake as ordered or per policy  Utilize nutrition screening tool and intervene as necessary  Determine patient's food preferences and provide high-protein, high-caloric foods as appropriate       INTERVENTIONS:  - Monitor oral intake, urinary output, labs, and treatment plans  - Assess nutrition and hydration status and recommend course of action  - Evaluate amount of meals eaten  - Assist patient with eating if necessary   - Allow adequate time for meals  - Recommend/ encourage appropriate diets, oral nutritional supplements, and vitamin/mineral supplements  - Order, calculate, and assess calorie counts as needed  - Recommend, monitor, and adjust tube feedings and TPN/PPN based on assessed needs  - Assess need for intravenous fluids  - Provide specific nutrition/hydration education as appropriate  - Include patient/family/caregiver in decisions related to nutrition  Outcome: Progressing

## 2023-04-22 NOTE — PLAN OF CARE
Problem: PAIN - ADULT  Goal: Verbalizes/displays adequate comfort level or baseline comfort level  Description: Interventions:  - Encourage patient to monitor pain and request assistance  - Assess pain using appropriate pain scale  - Administer analgesics based on type and severity of pain and evaluate response  - Implement non-pharmacological measures as appropriate and evaluate response  - Consider cultural and social influences on pain and pain management  - Notify physician/advanced practitioner if interventions unsuccessful or patient reports new pain  Outcome: Progressing     Problem: INFECTION - ADULT  Goal: Absence or prevention of progression during hospitalization  Description: INTERVENTIONS:  - Assess and monitor for signs and symptoms of infection  - Monitor lab/diagnostic results  - Monitor all insertion sites, i e  indwelling lines, tubes, and drains  - Monitor endotracheal if appropriate and nasal secretions for changes in amount and color  - [place] appropriate cooling/warming therapies per order  - Administer medications as ordered  - Instruct and encourage patient and family to use good hand hygiene technique  - Identify and instruct in appropriate isolation precautions for identified infection/condition  Outcome: Progressing     Problem: SAFETY ADULT  Goal: Patient will remain free of falls  Description: INTERVENTIONS:  - Educate patient/family on patient safety including physical limitations  - Instruct patient to call for assistance with activity   - Consult OT/PT to assist with strengthening/mobility   - Keep Call bell within reach  - Keep bed low and locked with side rails adjusted as appropriate  - Keep care items and personal belongings within reach  - Initiate and maintain comfort rounds  - Make Fall Risk Sign visible to staff  - Offer Toileting every 2 Hours, in advance of need  - Initiate/Maintain bed alarm  - Obtain necessary fall risk management equipment: bed alarm  - Apply yellow socks and bracelet for high fall risk patients  - Consider moving patient to room near nurses station  Outcome: Progressing  Goal: Maintain or return to baseline ADL function  Description: INTERVENTIONS:  -  Assess patient's ability to carry out ADLs; assess patient's baseline for ADL function and identify physical deficits which impact ability to perform ADLs (bathing, care of mouth/teeth, toileting, grooming, dressing, etc )  - Assess/evaluate cause of self-care deficits   - Assess range of motion  - Assess patient's mobility; develop plan if impaired  - Assess patient's need for assistive devices and provide as appropriate  - Encourage maximum independence but intervene and supervise when necessary  - Involve family in performance of ADLs  - Assess for home care needs following discharge   - Consider OT consult to assist with ADL evaluation and planning for discharge  - Provide patient education as appropriate  Outcome: Progressing  Goal: Maintains/Returns to pre admission functional level  Description: INTERVENTIONS:  - Perform BMAT or MOVE assessment daily    - Set and communicate daily mobility goal to care team and patient/family/caregiver  - Collaborate with rehabilitation services on mobility goals if consulted  - Perform Range of Motion 3 times a day  - Reposition patient every 2 hours    - Dangle patient 3 times a day  - Stand patient 3 times a day  - Ambulate patient 3 times a day  - Out of bed to chair 3 times a day   - Out of bed for meals 3 times a day  - Out of bed for toileting  - Record patient progress and toleration of activity level   Outcome: Progressing     Problem: DISCHARGE PLANNING  Goal: Discharge to home or other facility with appropriate resources  Description: INTERVENTIONS:  - Identify barriers to discharge w/patient and caregiver  - Arrange for needed discharge resources and transportation as appropriate  - Identify discharge learning needs (meds, wound care, etc )  - Arrange for interpretive services to assist at discharge as needed  - Refer to Case Management Department for coordinating discharge planning if the patient needs post-hospital services based on physician/advanced practitioner order or complex needs related to functional status, cognitive ability, or social support system  Outcome: Progressing     Problem: Prexisting or High Potential for Compromised Skin Integrity  Goal: Skin integrity is maintained or improved  Description: INTERVENTIONS:  - Identify patients at risk for skin breakdown  - Assess and monitor skin integrity  - Assess and monitor nutrition and hydration status  - Monitor labs   - Assess for incontinence   - Turn and reposition patient  - Assist with mobility/ambulation  - Relieve pressure over bony prominences  - Avoid friction and shearing  - Provide appropriate hygiene as needed including keeping skin clean and dry  - Evaluate need for skin moisturizer/barrier cream  - Collaborate with interdisciplinary team   - Patient/family teaching  - Consider wound care consult   Outcome: Progressing     Problem: Nutrition/Hydration-ADULT  Goal: Nutrient/Hydration intake appropriate for improving, restoring or maintaining nutritional needs  Description: Monitor and assess patient's nutrition/hydration status for malnutrition  Collaborate with interdisciplinary team and initiate plan and interventions as ordered  Monitor patient's weight and dietary intake as ordered or per policy  Utilize nutrition screening tool and intervene as necessary  Determine patient's food preferences and provide high-protein, high-caloric foods as appropriate       INTERVENTIONS:  - Monitor oral intake, urinary output, labs, and treatment plans  - Assess nutrition and hydration status and recommend course of action  - Evaluate amount of meals eaten  - Assist patient with eating if necessary   - Allow adequate time for meals  - Recommend/ encourage appropriate diets, oral nutritional supplements, and vitamin/mineral supplements  - Order, calculate, and assess calorie counts as needed  - Recommend, monitor, and adjust tube feedings and TPN/PPN based on assessed needs  - Assess need for intravenous fluids  - Provide specific nutrition/hydration education as appropriate  - Include patient/family/caregiver in decisions related to nutrition  Outcome: Progressing

## 2023-04-22 NOTE — PROGRESS NOTES
"   23 0945   Pain Assessment   Pain Assessment Tool 0-10   Pain Score No Pain   Restrictions/Precautions   Precautions Aphasia;Bed/chair alarms;Cognitive; Fall Risk;Supervision on toilet/commode;Visual deficit   Comprehension   Comprehension (FIM) 2 - Understands basic info/conversation 25-49% of time   Expression   Expression (FIM) 2 - Uses only simple expressions or gestures (waves, hello)   Social Interaction   Social Interaction (FIM) 5 - Interacts appropriately with others 90% of time   Problem Solving   Problem solving (FIM) 2 - Needs direction more than ½ time to initiate, plan or complete simple tasks   Memory   Memory (FIM) 2 - Recognizes, recalls/performs 25-49%   Speech/Language/Cognition Assessment   Treatment Assessment Pt participated in skilled ST session focusing on cognitive linguistic skills  The following tasks were completed  Orientation and LTM Biographical Info:  Prior to utilizing visual aids of info, pt made attempts at verbally eliciting all orientation and biographical info, however, attempts were not intelligible as most consisted of paraphasias and neologisms, with the exception of when he verbalized real names of numbers, although the numbers he stated were not accurate regarding his info  Presented with Fo2 written choices per piece of information, pt accurately identified (by pointing) his name, full address (broken up into parts), , and mother's name  Pt was initially errored in pointing to his age, but then self corrected response  Pt was also oriented x4 utilizing this same method of pointing from Fo2 for month, year, place, and situation  Of note, pt made a close justina    Communication Board:   Presented with Fo2 pictures which depicted various basic needs, pt accurately pointed to a picture that would fulfill a verbally presented need with 8/8 accuracy   Of note, SLP verbally provided needed which did not use the exact wording that was presented such as \"Which one would " "you point to if you wanted some juice? If you were exhausted? If your arm hurt? \" in order to increase complexity of comprehension portion of task and to provide more realistic scenarios to simulate in conveying his basic needs  Of note, pt also understood humor component of task as he laughed out loud appropriately at one of the scenarios  Functional Communication Task:  During session, dietary aide present to take lunch meal order  Pt with limited effective output with the exception of common phrases such as \"that's good  \" Presented with options but mainly the use of yes/no questions, pt required increased time for processing and at times different attempts at speech, however, was able to elicit yes/no verbal responses to effectively place his order  Reading Comprehension/Word Knowledge:  Pt completed a category inclusion task which also focused on reading comprehension and word knowledge in which pt was given the written name of a basic category, along with Fo9 words  Pt accurately identified words which belonged to given categories by circling words for 24/27 opportunities, benefiting from only min verbal cues to locate remaining items  Of note, pt did make an additional x4 errors by over-identifying words, however, once reviewed, did nod his head or provide a verbal response to indicate understanding  During task, pt attempted to verbalize words  While most attempts were neologisms or paraphasias, pt occasionally was able to accurately name 2 words  At this time, pt continues to present with expressive and receptive aphasia, characterized by limited spontaneous functional speech and verbal output  Therefore, pt is recommended for further skilled SLP services targeting expressive and receptive language skills in order to improve functional communication abilities      SLP Therapy Minutes   SLP Time In 46   SLP Time Out 1495   SLP Total Time (minutes) 45   SLP Mode of treatment - Individual (minutes) 45 " SLP Mode of treatment - Concurrent (minutes) 0   SLP Mode of treatment - Group (minutes) 0   SLP Mode of treatment - Co-treat (minutes) 0   SLP Mode of Treatment - Total time(minutes) 45 minutes   SLP Cumulative Minutes 180   Therapy Time missed   Time missed?  No

## 2023-04-22 NOTE — PROGRESS NOTES
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   Had nonocclusive saddle embolus with filling defects distal main pulmonary arteries and throughout segmental branches bilaterally   Distal order branches were not clearly visualized due to severe respiratory motion artifact though felt likely containing pulmonary emboli   Sx were left sided CP, NIEVES and elevated D-dimer   Continue Eliquis   Was not a candidate for IR intervention 2/2 HIT     Bilateral LE DVT   Continue Eliquis      HIT   Heparin induced antibody was positive   Heparin by serotonin release pending    S/p Argatroban   Platelet count was as low as 42, now recovered to 261     L MCA infarct 3/3/23   Suspected to be cardioembolic   Thrombosis panel then showed abnormal antithrombin, Protein C/S activity, but per Neuro unclear significance in setting of acute stroke and therefore wanted repeat as outpatient   Cont ASA/statin   Initial Echo/HODAN LVEF 55%; no thrombus/PFO   Needs outpt LOOP/Zio patch   Originally to be on Keppra x 7 days then was stopped  Dr Kodi Holt d/w with Neuro on 4/10 and they wanted to keep the [address] on until outpatient follow-up and was therefore restarted     DM2   Hgb A1c 8 0   New diagnosis   Cont QID Accuchecks/SSI and DM diet   Home:  No meds   Here: SSI only    Had been on Metformin when in [facility] before transfer back for PE   Will consider restart Metformin if needed   Needs DM teaching and PCP f/u on dc = mother is diabetic and knows how to use meter but not insulin     HTN   Home: on no meds   Here:  Norvasc 5mg qd/Lopressor 25 mg q12hrs/Losartan 25mg qd   stable     Urine retention   Per PMR   Continue Flomax     Obesity   Recommend ongoing attempts at weight loss   Current [id number]        Discharge date:  Team    The above assessment and plan was reviewed and updated as determined by my evaluation of the patient on 4/22/2023      Labs:   Results from last 7 days   Lab Units 04/20/23  0705 04/16/23  0506   WBC Thousand/uL 7 15 9 33   HEMOGLOBIN g/dL 13 3 13 2   HEMATOCRIT % 42 4 41 9   PLATELETS Thousands/uL 261 140*     Results from last 7 days   Lab Units 04/20/23  0705 04/16/23  0506   SODIUM mmol/L 138 136   POTASSIUM mmol/L 3 9 3 9   CHLORIDE mmol/L 110* 109*   CO2 mmol/L 25 23   BUN mg/dL 16 18   CREATININE mg/dL 1 00 0 95   CALCIUM mg/dL 8 8 8 6             Results from last 7 days   Lab Units 04/22/23  0630 04/21/23  2200 04/21/23  1547   POC GLUCOSE mg/dl 149* 119 106       Review of Scheduled Meds:  Current Facility-Administered Medications   Medication Dose Route Frequency Provider Last Rate    acetaminophen  975 mg Oral Q8H PRN Sabino Allen MD      amLODIPine  5 mg Oral Daily Sabino Allen MD      apixaban  5 mg Oral BID Sabino Allen MD      aspirin  81 mg Oral Daily Sabino Allen MD      atorvastatin  40 mg Oral QPM Sabino Allen MD      bisacodyl  10 mg Rectal Daily PRN Sabino Allen MD      bisacodyl  10 mg Rectal Once Sabino Allen MD      calcium carbonate  1,000 mg Oral TID PRN Sabino Allen MD      famotidine  20 mg Oral Q12H [address] Sabino Allen MD      insulin lispro  1-5 Units Subcutaneous TID [facility] Sabino Allen MD      levETIRAcetam  750 mg Oral Q12H [address] Sabino Allen MD      lidocaine   Topical Q4H PRN Sabino Allen MD      losartan  25 mg Oral Daily Sabino Allen MD      metoprolol tartrate  25 mg Oral Q12H [address] Sabino Allen MD      ondansetron  4 mg Oral Q8H PRN Sabino Allen MD      oxyCODONE  2 5 mg Oral Q6H PRN Sabino Allen MD      polyethylene glycol  17 g Oral Daily PRN Sabino Allen MD      polyethylene glycol  17 g Oral Daily Jesus Joshi MD      senna  1 tablet Oral HS Jesus Joshi MD      tamsulosin  0 4 mg Oral After Marika Schwartz MD         Subjective/ HPI: Patient seen and examined  Patients overnight issues or events were reviewed with nursing or staff during rounds or morning huddle session  New or overnight issues include the following:     Pt seen in his room  He denies any current complaints  ROS:   A 10 point ROS was performed; negative except as noted above  Imaging:     No orders to display       *Labs /Radiology studies reviewed  *Medications reviewed and reconciled as needed  *Please refer to order section for additional ordered labs studies  *Case discussed with primary attending during morning huddle case rounds    Physical Examination:  Vitals:   Vitals:    04/21/23 1437 04/21/23 2201 04/22/23 0625 04/22/23 0924   BP: 138/88 122/84 131/81 138/84   BP Location: Right arm Right arm Right arm Left arm   Pulse: 88 93 73 95   Resp:  18 18    Temp: 97 9 °F (36 6 °C) 98 °F (36 7 °C) 97 6 °F (36 4 °C)    TempSrc: Oral Oral Oral    SpO2: 96% 97% 96%    Weight:   133 kg (293 lb 3 4 oz)    Height:           General Appearance: no distress, pleasant  HEENT:  External ear normal   Nose normal w/o drainage  Mucous membranes are moist  Oropharynx is clear  Conjunctiva clear w/o icterus or redness  Neck:  Supple, normal ROM  Lungs: BBS without crackles/wheeze/rhonchi; respirations unlabored with normal inspiratory/expiratory effort  No retractions noted  On RA  CV: regular rate and rhythm; no rubs/murmurs/gallops, PMI normal   ABD: Abdomen is soft  Bowel sounds all quadrants  Nontender with no distention      EXT: no edema  Skin: normal turgor, normal texture, no rashes  Psych: affect normal, mood normal  Neuro: AA; has expressive> receptive aphasia    The above physical exam was reviewed and updated as determined by my evaluation of the patient on 4/22/2023  Invasive Devices     None                    VTE Pharmacologic Prophylaxis: Eliquis  Code Status: Level 1 - Full Code  Current Length of Stay: 3 day(s)      Total time spent:  30 minutes with more than 50% spent counseling/coordinating care  Counseling includes discussion with patient re: progress  and discussion with patient of his/her current medical state/information  Coordination of patient's care was performed in conjunction with primary service  Time invested included review of patient's labs, vitals, and management of their comorbidities with continued monitoring  In addition, this patient was discussed with medical team including physician and advanced extenders  The care of the patient was extensively discussed and appropriate treatment plan was formulated unique for this patient  Medical decision making for the day was made by supervising physician unless otherwise noted in their attestation statement  ** Please Note:  voice to text software may have been used in the creation of this document   Although proof errors in transcription or interpretation are a potential of such software**

## 2023-04-23 LAB
GLUCOSE SERPL-MCNC: 102 MG/DL (ref 65–140)
GLUCOSE SERPL-MCNC: 102 MG/DL (ref 65–140)
GLUCOSE SERPL-MCNC: 86 MG/DL (ref 65–140)
GLUCOSE SERPL-MCNC: 91 MG/DL (ref 65–140)

## 2023-04-23 RX ORDER — LORATADINE 10 MG/1
10 TABLET ORAL DAILY
Status: DISCONTINUED | OUTPATIENT
Start: 2023-04-23 | End: 2023-05-05 | Stop reason: HOSPADM

## 2023-04-23 RX ADMIN — ASPIRIN 81 MG 81 MG: 81 TABLET ORAL at 09:57

## 2023-04-23 RX ADMIN — POLYETHYLENE GLYCOL 3350 17 G: 17 POWDER, FOR SOLUTION ORAL at 09:57

## 2023-04-23 RX ADMIN — APIXABAN 5 MG: 5 TABLET, FILM COATED ORAL at 17:31

## 2023-04-23 RX ADMIN — LEVETIRACETAM 750 MG: 750 TABLET, FILM COATED ORAL at 09:57

## 2023-04-23 RX ADMIN — METOPROLOL TARTRATE 25 MG: 25 TABLET, FILM COATED ORAL at 09:57

## 2023-04-23 RX ADMIN — FAMOTIDINE 20 MG: 20 TABLET ORAL at 21:43

## 2023-04-23 RX ADMIN — SENNOSIDES 8.6 MG: 8.6 TABLET, FILM COATED ORAL at 21:43

## 2023-04-23 RX ADMIN — LORATADINE 10 MG: 10 TABLET ORAL at 17:31

## 2023-04-23 RX ADMIN — LEVETIRACETAM 750 MG: 750 TABLET, FILM COATED ORAL at 21:43

## 2023-04-23 RX ADMIN — METOPROLOL TARTRATE 25 MG: 25 TABLET, FILM COATED ORAL at 21:43

## 2023-04-23 RX ADMIN — AMLODIPINE BESYLATE 5 MG: 5 TABLET ORAL at 09:57

## 2023-04-23 RX ADMIN — ATORVASTATIN CALCIUM 40 MG: 40 TABLET, FILM COATED ORAL at 17:31

## 2023-04-23 RX ADMIN — APIXABAN 5 MG: 5 TABLET, FILM COATED ORAL at 09:57

## 2023-04-23 RX ADMIN — FAMOTIDINE 20 MG: 20 TABLET ORAL at 09:57

## 2023-04-23 RX ADMIN — TAMSULOSIN HYDROCHLORIDE 0.4 MG: 0.4 CAPSULE ORAL at 17:31

## 2023-04-23 RX ADMIN — LOSARTAN POTASSIUM 25 MG: 25 TABLET, FILM COATED ORAL at 09:57

## 2023-04-23 NOTE — PLAN OF CARE
Reviewed    Problem: PAIN - ADULT  Goal: Verbalizes/displays adequate comfort level or baseline comfort level  Description: Interventions:  - Encourage patient to monitor pain and request assistance  - Assess pain using appropriate pain scale  - Administer analgesics based on type and severity of pain and evaluate response  - Implement non-pharmacological measures as appropriate and evaluate response  - Consider cultural and social influences on pain and pain management  - Notify physician/advanced practitioner if interventions unsuccessful or patient reports new pain  Outcome: Progressing     Problem: INFECTION - ADULT  Goal: Absence or prevention of progression during hospitalization  Description: INTERVENTIONS:  - Assess and monitor for signs and symptoms of infection  - Monitor lab/diagnostic results  - Monitor all insertion sites, i e  indwelling lines, tubes, and drains  - Monitor endotracheal if appropriate and nasal secretions for changes in amount and color  - [place] appropriate cooling/warming therapies per order  - Administer medications as ordered  - Instruct and encourage patient and family to use good hand hygiene technique  - Identify and instruct in appropriate isolation precautions for identified infection/condition  Outcome: Progressing     Problem: SAFETY ADULT  Goal: Patient will remain free of falls  Description: INTERVENTIONS:  - Educate patient/family on patient safety including physical limitations  - Instruct patient to call for assistance with activity   - Consult OT/PT to assist with strengthening/mobility   - Keep Call bell within reach  - Keep bed low and locked with side rails adjusted as appropriate  - Keep care items and personal belongings within reach  - Initiate and maintain comfort rounds  - Make Fall Risk Sign visible to staff  - Offer Toileting every 4 Hours, in advance of need  - Initiate/Maintain bed and chair alarm  - Apply yellow socks and bracelet for high fall risk patients  - Consider moving patient to room near nurses station  Outcome: Progressing  Goal: Maintain or return to baseline ADL function  Description: INTERVENTIONS:  -  Assess patient's ability to carry out ADLs; assess patient's baseline for ADL function and identify physical deficits which impact ability to perform ADLs (bathing, care of mouth/teeth, toileting, grooming, dressing, etc )  - Assess/evaluate cause of self-care deficits   - Assess range of motion  - Assess patient's mobility; develop plan if impaired  - Assess patient's need for assistive devices and provide as appropriate  - Encourage maximum independence but intervene and supervise when necessary  - Involve family in performance of ADLs  - Assess for home care needs following discharge   - Consider OT consult to assist with ADL evaluation and planning for discharge  - Provide patient education as appropriate  Outcome: Progressing  Goal: Maintains/Returns to pre admission functional level  Description: INTERVENTIONS:  - Set and communicate daily mobility goal to care team and patient/family/caregiver     - Collaborate with rehabilitation services on mobility goals if consulted  - Out of bed for toileting  - Record patient progress and toleration of activity level   Outcome: Progressing     Problem: DISCHARGE PLANNING  Goal: Discharge to home or other facility with appropriate resources  Description: INTERVENTIONS:  - Identify barriers to discharge w/patient and caregiver  - Arrange for needed discharge resources and transportation as appropriate  - Identify discharge learning needs (meds, wound care, etc )  - Arrange for interpretive services to assist at discharge as needed  - Refer to Case Management Department for coordinating discharge planning if the patient needs post-hospital services based on physician/advanced practitioner order or complex needs related to functional status, cognitive ability, or social support system  Outcome: Progressing Problem: Prexisting or High Potential for Compromised Skin Integrity  Goal: Skin integrity is maintained or improved  Description: INTERVENTIONS:  - Identify patients at risk for skin breakdown  - Assess and monitor skin integrity  - Assess and monitor nutrition and hydration status  - Monitor labs   - Assess for incontinence   - Turn and reposition patient  - Assist with mobility/ambulation  - Relieve pressure over bony prominences  - Avoid friction and shearing  - Provide appropriate hygiene as needed including keeping skin clean and dry  - Evaluate need for skin moisturizer/barrier cream  - Collaborate with interdisciplinary team   - Patient/family teaching  - Consider wound care consult   Outcome: Progressing     Problem: Nutrition/Hydration-ADULT  Goal: Nutrient/Hydration intake appropriate for improving, restoring or maintaining nutritional needs  Description: Monitor and assess patient's nutrition/hydration status for malnutrition  Collaborate with interdisciplinary team and initiate plan and interventions as ordered  Monitor patient's weight and dietary intake as ordered or per policy  Utilize nutrition screening tool and intervene as necessary  Determine patient's food preferences and provide high-protein, high-caloric foods as appropriate       INTERVENTIONS:  - Monitor oral intake, urinary output, labs, and treatment plans  - Assess nutrition and hydration status and recommend course of action  - Evaluate amount of meals eaten  - Assist patient with eating if necessary   - Allow adequate time for meals  - Recommend/ encourage appropriate diets, oral nutritional supplements, and vitamin/mineral supplements  - Order, calculate, and assess calorie counts as needed  - Recommend, monitor, and adjust tube feedings and TPN/PPN based on assessed needs  - Assess need for intravenous fluids  - Provide specific nutrition/hydration education as appropriate  - Include patient/family/caregiver in decisions related to nutrition  Outcome: Progressing

## 2023-04-23 NOTE — PROGRESS NOTES
04/23/23 1000   Pain Assessment   Pain Assessment Tool 0-10   Pain Score No Pain   Subjective   Subjective pt agreeable to participate in PT tx  PT noted pts right eye watering and nose running t/o session, notified RN   Sit to Stand   Type of Assistance Needed Supervision;Verbal cues   Comment occasional cues for inc safety (reaching back and ensuring proximity to chair)   Sit to Stand CARE Score 4   Walk 10 Feet   Type of Assistance Needed Verbal cues; Incidental touching   Walk 10 Feet CARE Score 4   Walk 50 Feet with Two Turns   Type of Assistance Needed Incidental touching;Verbal cues   Walk 50 Feet with Two Turns CARE Score 4   Walk 150 Feet   Type of Assistance Needed Verbal cues; Incidental touching   Walk 150 Feet CARE Score 4   Ambulation   Primary Mode of Locomotion Prior to Admission Walk   Distance Walked (feet) 200 ft  (x 2 (once with RW and once without AD), 150 ft x 2 without AD)   Gait Pattern Improper weight shift; Lateral deviation   Limitations Noted In Balance; Endurance; Safety;Speed;Strength   Provided Assistance with: Balance;Direction;Weight Shift   Walk Assist Level Contact Guard   Findings CGA withouit AD with exception of two instances when pt answering a questiion from PT where he turned his head to the right and demo'ed lateral sway requriing min A to correct balance  pt denies dizziness with head turns but unable to assess accuracy due to aphasia   Does the patient walk? 2  Yes   4 Steps   Type of Assistance Needed Incidental touching;Verbal cues   Comment 3 consecutive trials of two 6-inch  steps, trialed with single rail  initialyl min A for reciprocal pattern and then performed non reciprocal with CGA   4 Steps CARE Score 4   Therapeutic Interventions   Strengthening standing: marches, heel raises without UE support 3 sets of 10 with rest breaks provided PRN  sit < > stands while holding onto 4# ball 3 sets of 10 with min A as he fatigues     Neuromuscular Re-Education standing holding onto 2# theraball, performed trunk rotations to L and R for balance, wt shift, and visual scanning  3 sets of 10  Completed ambulatory scavenger hunt to locate cones placed in hallway, attempted to name colors of cones but pt unable  then trialed yes/no with <50% accuracy on correct answer for cone color   Equipment Use   NuStep level 1 x 10 minutes BUEs/BLEs, vitals stable t/o, no SOB   Assessment   Treatment Assessment pt participated in 90 minute PT tx session  focusing on BLE strengthening exericses, balance and endurance activities, and functional mobility training  trialed ambulating in hallway and performing scavenger hunt without use of AD to challenge balance and endurance with gait  pt does well with exception of 2 episodes of LOB when turning head to right, and itnermittent cues for inc attention to right side  pt will continue to benefit from skilled PT interventions to address deficits, reduce fall risk, and maximize functional independence   Problem List Decreased strength;Decreased endurance; Impaired balance;Decreased mobility; Decreased coordination; Impaired judgement; Impaired sensation   Barriers to Discharge Inaccessible home environment;Decreased caregiver support   Plan   Treatment/Interventions Functional transfer training;LE strengthening/ROM; Therapeutic exercise;Elevations; Endurance training;Patient/family training;Bed mobility;Gait training   Progress Progressing toward goals   PT Therapy Minutes   PT Time In 1000   PT Time Out 1130   PT Total Time (minutes) 90   PT Mode of treatment - Individual (minutes) 90   PT Mode of treatment - Concurrent (minutes) 0   PT Mode of treatment - Group (minutes) 0   PT Mode of treatment - Co-treat (minutes) 0   PT Mode of Treatment - Total time(minutes) 90 minutes   PT Cumulative Minutes 240   Therapy Time missed   Time missed?  No

## 2023-04-23 NOTE — PLAN OF CARE
Problem: PAIN - ADULT  Goal: Verbalizes/displays adequate comfort level or baseline comfort level  Description: Interventions:  - Encourage patient to monitor pain and request assistance  - Assess pain using appropriate pain scale  - Administer analgesics based on type and severity of pain and evaluate response  - Implement non-pharmacological measures as appropriate and evaluate response  - Consider cultural and social influences on pain and pain management  - Notify physician/advanced practitioner if interventions unsuccessful or patient reports new pain  Outcome: Progressing     Problem: INFECTION - ADULT  Goal: Absence or prevention of progression during hospitalization  Description: INTERVENTIONS:  - Assess and monitor for signs and symptoms of infection  - Monitor lab/diagnostic results  - Monitor all insertion sites, i e  indwelling lines, tubes, and drains  - Monitor endotracheal if appropriate and nasal secretions for changes in amount and color  - [place] appropriate cooling/warming therapies per order  - Administer medications as ordered  - Instruct and encourage patient and family to use good hand hygiene technique  - Identify and instruct in appropriate isolation precautions for identified infection/condition  Outcome: Progressing     Problem: SAFETY ADULT  Goal: Patient will remain free of falls  Description: INTERVENTIONS:  - Educate patient/family on patient safety including physical limitations  - Instruct patient to call for assistance with activity   - Consult OT/PT to assist with strengthening/mobility   - Keep Call bell within reach  - Keep bed low and locked with side rails adjusted as appropriate  - Keep care items and personal belongings within reach  - Initiate and maintain comfort rounds  - Make Fall Risk Sign visible to staff  - Offer Toileting every 2 Hours, in advance of need  - Initiate/Maintain bed alarm  - Obtain necessary fall risk management equipment: bed alarm  - Apply yellow socks and bracelet for high fall risk patients  - Consider moving patient to room near nurses station  Outcome: Progressing  Goal: Maintain or return to baseline ADL function  Description: INTERVENTIONS:  -  Assess patient's ability to carry out ADLs; assess patient's baseline for ADL function and identify physical deficits which impact ability to perform ADLs (bathing, care of mouth/teeth, toileting, grooming, dressing, etc )  - Assess/evaluate cause of self-care deficits   - Assess range of motion  - Assess patient's mobility; develop plan if impaired  - Assess patient's need for assistive devices and provide as appropriate  - Encourage maximum independence but intervene and supervise when necessary  - Involve family in performance of ADLs  - Assess for home care needs following discharge   - Consider OT consult to assist with ADL evaluation and planning for discharge  - Provide patient education as appropriate  Outcome: Progressing  Goal: Maintains/Returns to pre admission functional level  Description: INTERVENTIONS:  - Perform BMAT or MOVE assessment daily    - Set and communicate daily mobility goal to care team and patient/family/caregiver  - Collaborate with rehabilitation services on mobility goals if consulted  - Perform Range of Motion 3 times a day  - Reposition patient every 2 hours    - Dangle patient 3 times a day  - Stand patient 3 times a day  - Ambulate patient 3 times a day  - Out of bed to chair 3 times a day   - Out of bed for meals 3 times a day  - Out of bed for toileting  - Record patient progress and toleration of activity level   Outcome: Progressing     Problem: DISCHARGE PLANNING  Goal: Discharge to home or other facility with appropriate resources  Description: INTERVENTIONS:  - Identify barriers to discharge w/patient and caregiver  - Arrange for needed discharge resources and transportation as appropriate  - Identify discharge learning needs (meds, wound care, etc )  - Arrange for interpretive services to assist at discharge as needed  - Refer to Case Management Department for coordinating discharge planning if the patient needs post-hospital services based on physician/advanced practitioner order or complex needs related to functional status, cognitive ability, or social support system  Outcome: Progressing     Problem: Prexisting or High Potential for Compromised Skin Integrity  Goal: Skin integrity is maintained or improved  Description: INTERVENTIONS:  - Identify patients at risk for skin breakdown  - Assess and monitor skin integrity  - Assess and monitor nutrition and hydration status  - Monitor labs   - Assess for incontinence   - Turn and reposition patient  - Assist with mobility/ambulation  - Relieve pressure over bony prominences  - Avoid friction and shearing  - Provide appropriate hygiene as needed including keeping skin clean and dry  - Evaluate need for skin moisturizer/barrier cream  - Collaborate with interdisciplinary team   - Patient/family teaching  - Consider wound care consult   Outcome: Progressing     Problem: Nutrition/Hydration-ADULT  Goal: Nutrient/Hydration intake appropriate for improving, restoring or maintaining nutritional needs  Description: Monitor and assess patient's nutrition/hydration status for malnutrition  Collaborate with interdisciplinary team and initiate plan and interventions as ordered  Monitor patient's weight and dietary intake as ordered or per policy  Utilize nutrition screening tool and intervene as necessary  Determine patient's food preferences and provide high-protein, high-caloric foods as appropriate       INTERVENTIONS:  - Monitor oral intake, urinary output, labs, and treatment plans  - Assess nutrition and hydration status and recommend course of action  - Evaluate amount of meals eaten  - Assist patient with eating if necessary   - Allow adequate time for meals  - Recommend/ encourage appropriate diets, oral nutritional supplements, and vitamin/mineral supplements  - Order, calculate, and assess calorie counts as needed  - Recommend, monitor, and adjust tube feedings and TPN/PPN based on assessed needs  - Assess need for intravenous fluids  - Provide specific nutrition/hydration education as appropriate  - Include patient/family/caregiver in decisions related to nutrition  Outcome: Progressing

## 2023-04-23 NOTE — PROGRESS NOTES
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   Had nonocclusive saddle embolus with filling defects distal main pulmonary arteries and throughout segmental branches bilaterally   Distal order branches were not clearly visualized due to severe respiratory motion artifact though felt likely containing pulmonary emboli   Sx were left sided CP, NIEVES and elevated D-dimer   Continue Eliquis   Was not a candidate for IR intervention 2/2 HIT     Bilateral LE DVT   Continue Eliquis      HIT   Heparin induced antibody was positive   Heparin by serotonin release pending    S/p Argatroban   Platelet count was as low as 42, now recovered to 261     L MCA infarct 3/3/23   Suspected to be cardioembolic   Thrombosis panel then showed abnormal antithrombin, Protein C/S activity, but per Neuro unclear significance in setting of acute stroke and therefore wanted repeat as outpatient   Cont ASA/statin   Initial Echo/HODAN LVEF 55%; no thrombus/PFO   Needs outpt LOOP/Zio patch   Originally to be on Keppra x 7 days then was stopped  Dr Jocelyn Whittington d/w with Neuro on 4/10 and they wanted to keep the [address] on until outpatient follow-up and was therefore restarted     DM2   Hgb A1c 8 0   New diagnosis   Cont QID Accuchecks/SSI and DM diet   Home:  No meds   Here: SSI only    Had been on Metformin when in [facility] before transfer back for PE   Will consider restart Metformin if needed   Needs DM teaching and PCP f/u on dc = mother is diabetic and knows how to use meter but not insulin     HTN   Home: on no meds   Here:  Norvasc 5mg qd/Lopressor 25 mg q12hrs/Losartan 25mg qd   stable     Urine retention   Per PMR   Continue Flomax     Obesity   Recommend ongoing attempts at weight loss   Current BMI 38 7        Discharge date:  Team    The above assessment and plan was reviewed and updated as determined by my evaluation of the patient on 4/23/2023      Labs:   Results from last 7 days   Lab Units 04/20/23  0705   WBC Thousand/uL 7 15   HEMOGLOBIN g/dL 13 3   HEMATOCRIT % 42 4   PLATELETS Thousands/uL 261     Results from last 7 days   Lab Units 04/20/23  0705   SODIUM mmol/L 138   POTASSIUM mmol/L 3 9   CHLORIDE mmol/L 110*   CO2 mmol/L 25   BUN mg/dL 16   CREATININE mg/dL 1 00   CALCIUM mg/dL 8 8             Results from last 7 days   Lab Units 04/23/23  0630 04/22/23  1620 04/22/23  1044   POC GLUCOSE mg/dl 102 87 143*       Review of Scheduled Meds:  Current Facility-Administered Medications   Medication Dose Route Frequency Provider Last Rate    acetaminophen  975 mg Oral Q8H PRN Tato Ruiz MD      amLODIPine  5 mg Oral Daily Tato Ruiz MD      apixaban  5 mg Oral BID Tato Ruiz MD      aspirin  81 mg Oral Daily Tato Ruiz MD      atorvastatin  40 mg Oral QPM Tato Ruiz MD      bisacodyl  10 mg Rectal Daily PRN Tato Ruiz MD      bisacodyl  10 mg Rectal Once Tato Ruiz MD      calcium carbonate  1,000 mg Oral TID PRN Tato Ruiz MD      famotidine  20 mg Oral Q12H [address] Tato Ruiz MD      insulin lispro  1-5 Units Subcutaneous TID [facility] Tato Ruiz MD      levETIRAcetam  750 mg Oral Q12H [address] Tato Ruiz MD      lidocaine   Topical Q4H PRN Tato Ruiz MD      losartan  25 mg Oral Daily Tato Ruiz MD      metoprolol tartrate  25 mg Oral Q12H [address] Tato Ruiz MD      ondansetron  4 mg Oral Q8H PRN Tato Ruiz MD      oxyCODONE  2 5 mg Oral Q6H PRN Tato Ruiz MD      polyethylene glycol  17 g Oral Daily PRN Tato Ruiz MD      polyethylene glycol  17 g Oral Daily Tato Ruiz MD      senna  1 tablet Oral HS Anabella Martines MD      tamsulosin  0 4 mg Oral After Eleazar Ramírez MD         Subjective/ HPI: Patient seen and examined  Patients overnight issues or events were reviewed with nursing or staff during rounds or morning huddle session  New or overnight issues include the following:     Pt seen in his room eating lunch  He denies any current complaints  ROS:   A 10 point ROS was performed; negative except as noted above  Imaging:     No orders to display       *Labs /Radiology studies reviewed  *Medications reviewed and reconciled as needed  *Please refer to order section for additional ordered labs studies  *Case discussed with primary attending during morning huddle case rounds    Physical Examination:  Vitals:   Vitals:    04/22/23 2035 04/23/23 0500 04/23/23 0537 04/23/23 0900   BP: 134/91 114/70  120/72   BP Location: Right arm Left arm  Left arm   Pulse: 100 75  88   Resp: 18 17     Temp: 97 6 °F (36 4 °C) 97 5 °F (36 4 °C)     TempSrc: Oral Oral     SpO2: 99% 98%     Weight:   133 kg (293 lb 3 4 oz)    Height:           General Appearance: no distress, pleasant  HEENT:  External ear normal   Nose normal w/o drainage  Mucous membranes are moist  Oropharynx is clear  Conjunctiva clear w/o icterus or redness  Neck:  Supple, normal ROM  Lungs: BBS without crackles/wheeze/rhonchi; respirations unlabored with normal inspiratory/expiratory effort  No retractions noted  On RA  CV: regular rate and rhythm; no rubs/murmurs/gallops, PMI normal   ABD: Abdomen is soft  Bowel sounds all quadrants  Nontender with no distention  EXT: No edema  Skin: normal turgor, normal texture, no rashes  Psych: affect normal, mood normal  Neuro: AA; has expressive> receptive aphasia    The above physical exam was reviewed and updated as determined by my evaluation of the patient on 4/23/2023      Invasive Devices     None                    VTE Pharmacologic Prophylaxis: Eliquis  Code Status: Level 1 - Full Code  Current Length of Stay: 4 day(s)      Total time spent:  30 minutes with more than 50% spent counseling/coordinating care  Counseling includes discussion with patient re: progress  and discussion with patient of his/her current medical state/information  Coordination of patient's care was performed in conjunction with primary service  Time invested included review of patient's labs, vitals, and management of their comorbidities with continued monitoring  In addition, this patient was discussed with medical team including physician and advanced extenders  The care of the patient was extensively discussed and appropriate treatment plan was formulated unique for this patient  Medical decision making for the day was made by supervising physician unless otherwise noted in their attestation statement  ** Please Note:  voice to text software may have been used in the creation of this document   Although proof errors in transcription or interpretation are a potential of such software**

## 2023-04-24 PROBLEM — J34.89 RHINORRHEA: Status: ACTIVE | Noted: 2023-04-24

## 2023-04-24 LAB
ANION GAP SERPL CALCULATED.3IONS-SCNC: 5 MMOL/L (ref 4–13)
BASOPHILS # BLD AUTO: 0.07 THOUSANDS/ΜL (ref 0–0.1)
BASOPHILS NFR BLD AUTO: 1 % (ref 0–1)
BUN SERPL-MCNC: 13 MG/DL (ref 5–25)
CALCIUM SERPL-MCNC: 8.9 MG/DL (ref 8.3–10.1)
CHLORIDE SERPL-SCNC: 108 MMOL/L (ref 96–108)
CO2 SERPL-SCNC: 26 MMOL/L (ref 21–32)
CREAT SERPL-MCNC: 1.01 MG/DL (ref 0.6–1.3)
EOSINOPHIL # BLD AUTO: 0.39 THOUSAND/ΜL (ref 0–0.61)
EOSINOPHIL NFR BLD AUTO: 6 % (ref 0–6)
ERYTHROCYTE [DISTWIDTH] IN BLOOD BY AUTOMATED COUNT: 13.6 % (ref 11.6–15.1)
GFR SERPL CREATININE-BSD FRML MDRD: 89 ML/MIN/1.73SQ M
GLUCOSE P FAST SERPL-MCNC: 100 MG/DL (ref 65–99)
GLUCOSE SERPL-MCNC: 100 MG/DL (ref 65–140)
GLUCOSE SERPL-MCNC: 109 MG/DL (ref 65–140)
GLUCOSE SERPL-MCNC: 93 MG/DL (ref 65–140)
GLUCOSE SERPL-MCNC: 95 MG/DL (ref 65–140)
GLUCOSE SERPL-MCNC: 97 MG/DL (ref 65–140)
HCT VFR BLD AUTO: 40.5 % (ref 36.5–49.3)
HGB BLD-MCNC: 12.6 G/DL (ref 12–17)
IMM GRANULOCYTES # BLD AUTO: 0.01 THOUSAND/UL (ref 0–0.2)
IMM GRANULOCYTES NFR BLD AUTO: 0 % (ref 0–2)
LYMPHOCYTES # BLD AUTO: 1.78 THOUSANDS/ΜL (ref 0.6–4.47)
LYMPHOCYTES NFR BLD AUTO: 29 % (ref 14–44)
MCH RBC QN AUTO: 26.4 PG (ref 26.8–34.3)
MCHC RBC AUTO-ENTMCNC: 31.1 G/DL (ref 31.4–37.4)
MCV RBC AUTO: 85 FL (ref 82–98)
MONOCYTES # BLD AUTO: 0.46 THOUSAND/ΜL (ref 0.17–1.22)
MONOCYTES NFR BLD AUTO: 8 % (ref 4–12)
NEUTROPHILS # BLD AUTO: 3.36 THOUSANDS/ΜL (ref 1.85–7.62)
NEUTS SEG NFR BLD AUTO: 56 % (ref 43–75)
NRBC BLD AUTO-RTO: 0 /100 WBCS
PLATELET # BLD AUTO: 309 THOUSANDS/UL (ref 149–390)
PMV BLD AUTO: 9.8 FL (ref 8.9–12.7)
POTASSIUM SERPL-SCNC: 3.7 MMOL/L (ref 3.5–5.3)
RBC # BLD AUTO: 4.78 MILLION/UL (ref 3.88–5.62)
SODIUM SERPL-SCNC: 139 MMOL/L (ref 135–147)
WBC # BLD AUTO: 6.07 THOUSAND/UL (ref 4.31–10.16)

## 2023-04-24 RX ADMIN — ATORVASTATIN CALCIUM 40 MG: 40 TABLET, FILM COATED ORAL at 17:03

## 2023-04-24 RX ADMIN — POLYETHYLENE GLYCOL 3350 17 G: 17 POWDER, FOR SOLUTION ORAL at 11:03

## 2023-04-24 RX ADMIN — METOPROLOL TARTRATE 25 MG: 25 TABLET, FILM COATED ORAL at 08:45

## 2023-04-24 RX ADMIN — SENNOSIDES 8.6 MG: 8.6 TABLET, FILM COATED ORAL at 21:52

## 2023-04-24 RX ADMIN — AMLODIPINE BESYLATE 5 MG: 5 TABLET ORAL at 08:46

## 2023-04-24 RX ADMIN — LOSARTAN POTASSIUM 25 MG: 25 TABLET, FILM COATED ORAL at 08:46

## 2023-04-24 RX ADMIN — FAMOTIDINE 20 MG: 20 TABLET ORAL at 21:52

## 2023-04-24 RX ADMIN — LORATADINE 10 MG: 10 TABLET ORAL at 08:47

## 2023-04-24 RX ADMIN — APIXABAN 5 MG: 5 TABLET, FILM COATED ORAL at 08:45

## 2023-04-24 RX ADMIN — TAMSULOSIN HYDROCHLORIDE 0.4 MG: 0.4 CAPSULE ORAL at 17:03

## 2023-04-24 RX ADMIN — LEVETIRACETAM 750 MG: 750 TABLET, FILM COATED ORAL at 21:52

## 2023-04-24 RX ADMIN — FAMOTIDINE 20 MG: 20 TABLET ORAL at 08:47

## 2023-04-24 RX ADMIN — ASPIRIN 81 MG 81 MG: 81 TABLET ORAL at 08:46

## 2023-04-24 RX ADMIN — LEVETIRACETAM 750 MG: 750 TABLET, FILM COATED ORAL at 08:45

## 2023-04-24 RX ADMIN — METOPROLOL TARTRATE 25 MG: 25 TABLET, FILM COATED ORAL at 21:52

## 2023-04-24 RX ADMIN — APIXABAN 5 MG: 5 TABLET, FILM COATED ORAL at 17:03

## 2023-04-24 NOTE — PROGRESS NOTES
[facility] Occupational Therapy Daily Note  Patient Active Problem List   Diagnosis    Acute ischemic left MCA stroke (HCC)    Obesity, Class III, BMI 40-49 9 (morbid obesity) ([address] )    Encephalopathy    Type 2 diabetes mellitus with circulatory disorder, without long-term current use of insulin (HCC)    Hypertension    Bowel and bladder incontinence    Left-sided chest wall pain    Creatinine elevation    Thrombocytopenia (HCC)    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)    Urinary retention    DVT of lower extremity, bilateral (HCC)    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)    Aphasia due to recent cerebrovascular accident (CVA)    Rhinorrhea       Past Medical History:   Diagnosis Date    Hypertensive emergency 3/30/2023     Etiologic Diagnosis: L MCA CVA with Superimposed Micro-hemorrhage  Restrictions/Precautions  Precautions: Aphasia, Bed/chair alarms, Fall Risk, Supervision on toilet/commode, Visual deficit  Weight Bearing Restrictions: No  ROM Restrictions: No  ADL Team Goal: Patient will be independent with ADLs with least restrictive device upon completion of rehab program  Occupational Therapy LTG's  Eating Oral care Bathing LB dress UB dress   Eating Goal: 06  Independent - Patient completes the activity by him/herself with no assistance from a helper  Oral Hygiene Goal: 06  Independent - Patient completes the activity by him/herself with no assistance from a helper  Shower/bathe self Goal: 04  Supervision or touching assistance- [place] provides VERBAL CUES or supervision throughout activity  Lower body dressing Goal: 06  Independent - Patient completes the activity by him/herself with no assistance from a helper  Upper body dressing Goal: 06  Independent - Patient completes the activity by him/herself with no assistance from a helper  Toileting Toilet txf Func txf IADL Med    Toileting hygiene Goal: 06   Independent - Patient completes the activity by him/herself with no "assistance from a helper  Toilet transfer Goal: 06  Independent - Patient completes the activity by him/herself with no assistance from a helper  Chair/bed-to-chair transfer Goal: 06  Independent - Patient completes the activity by him/herself with no assistance from a helper  Assist Level: Independent (Light meal prep) Assist Level: Supervision   OT interventions: Treatment/Interventions: ADL retraining, Functional transfer training, Therapeutic exercise, Endurance training, Patient/family training, Cognitive reorientation, Equipment eval/education, Bed mobility, Compensatory technique education  Discharge Plan:  OT Discharge Recommendation: Home with home health rehabilitation (Home with family support)   DME:  ,  ,       04/24/23 0830   Pain Assessment   Pain Assessment Tool 0-10   Pain Score No Pain   Lifestyle   Autonomy \"[place]\"   Lying to Sitting on Side of Bed   Type of Assistance Needed Incidental touching   Lying to Sitting on Side of Bed CARE Score 4   Sit to Stand   Type of Assistance Needed Supervision   Comment CGA/CS with RW  Sit to Stand CARE Score 4   Bed-Chair Transfer   Type of Assistance Needed Incidental touching   Comment RW use  Good awareness/pacing for R side visual deficits in hallways  does safely stop walking when R eye was watering to clear it of tears  Chair/Bed-to-Chair Transfer CARE Score 4   Cognition   Comments provided with another set of overnight HEP to compelte word match pictures with line drawing to reduce demand of task of previous HEP  Additional Activities   Additional Activities Comments Pt engages in item identification and categorization of common daily used objects  Pt provided with categories (ie bathing/taking shower, clothing items, brushing teeth, hair care, doing dishes ect ) pt was to take items and organize them according to category  Pt slow to start for comprehension of task   Most difficulty noted with items in bottle that required reading labels such as " shampoo, vs dish soap  Once gestured use pt able to categorize  At times unsure to start first item but once more than one item in place pt easily able to categorize  Focus of task was to ensure pt was able to identify difference between similar items  Based on performance pt will be able to identify when items are placed in areas of use  Pt may have difficulty with shopping when many items are present  Pt engages in matching task with preferred leisure interests of matching sports teams  Pt to match written name of baseball team to team logo  Pt unable to verbalizes names despite constant effort to try  Increased time to complete pt able to complete with minimal cues  Assessment   Treatment Assessment Pt participated in skilled OT treatment session with treatment focus on ADL retraining with focus on spontaneous categorization of common daily items  Focus on tasks was to establish if pt is able to spontaneously select appropriate items for tasks  Given context of environment anticipate pt would be able to complete without difficulty  Anticipate that pt would require assistance for shopping for items  Pt tolerated session well, appearing more confident to complete activities without RW at table top  Pt w/ good tolerance for stance with prolonged activity     Prognosis Good   Plan   Progress Progressing toward goals   OT Therapy Minutes   OT Time In 0830   OT Time Out 1000   OT Total Time (minutes) 90   OT Mode of treatment - Individual (minutes) 90   OT Mode of treatment - Concurrent (minutes) 0   OT Mode of treatment - Group (minutes) 0   OT Mode of treatment - Co-treat (minutes) 0   OT Mode of Treatment - Total time(minutes) 90 minutes   OT Cumulative Minutes 360

## 2023-04-24 NOTE — PROGRESS NOTES
04/24/23 1230   Pain Assessment   Pain Assessment Tool 0-10   Pain Score No Pain   Restrictions/Precautions   Precautions Aphasia;Bed/chair alarms; Fall Risk;Visual deficit;Supervision on toilet/commode   Subjective   Subjective able to communicate dizziness with quick head turns when asked during activities   Pt wants RW for this evening in case tired   Roll Left and Right   Type of Assistance Needed Supervision   Roll Left and Right CARE Score 4   Sit to Lying   Type of Assistance Needed Supervision   Sit to Lying CARE Score 4   Lying to Sitting on Side of Bed   Type of Assistance Needed Supervision   Lying to Sitting on Side of Bed CARE Score 4   Sit to Stand   Type of Assistance Needed Supervision   Comment no device   Sit to Stand CARE Score 4   Bed-Chair Transfer   Type of Assistance Needed Supervision   Comment no device   Chair/Bed-to-Chair Transfer CARE Score 4   Walk 10 Feet   Type of Assistance Needed Incidental touching;Supervision   Physical Assistance Level No physical assistance   Comment no device   Walk 10 Feet CARE Score 4   Walk 50 Feet with Two Turns   Type of Assistance Needed Incidental touching;Supervision   Physical Assistance Level No physical assistance   Comment no device as long as he moves head/shoulders together with turns to avoid dizziness   Walk 50 Feet with Two Turns CARE Score 4   Walk 150 Feet   Type of Assistance Needed Incidental touching;Supervision   Physical Assistance Level No physical assistance   Comment no device as long as he moves head/shoulders together with turns to avoid dizziness   Walk 150 Feet CARE Score 4   Walking 10 Feet on Uneven Surfaces   Type of Assistance Needed Incidental touching   Comment no device across mat   Walking 10 Feet on Uneven Surfaces CARE Score 4   Ambulation   Primary Mode of Locomotion Prior to Admission Walk   Distance Walked (feet) 350 ft   Assist Device Other  (no device)   Gait Pattern Inconsistant [place]   Walk Assist Level Close "Supervision;Contact Guard   Findings Only gait deviation noted was with quick head turns, can scan environmet slowly and has no LOB or dizziness  Still requires cueing to avoid objects on right side due to vision and inconsistency with scanning   Does the patient walk? 2  Yes   Wheelchair mobility   Does the patient use a wheelchair? 0  No   Curb or Single Stair   Style negotiated Curb   Type of Assistance Needed Verbal cues; Incidental touching   Physical Assistance Level No physical assistance   1 Step (Curb) CARE Score 4   4 Steps   Type of Assistance Needed Supervision   Comment close supervision   4 Steps CARE Score 4   12 Steps   Type of Assistance Needed Supervision   Comment close supervision   12 Steps CARE Score 4   Stairs   # of Steps 12   Picking Up Object   Type of Assistance Needed Incidental touching   Comment bending down to pick ball up from the floor   Picking Up Object CARE Score 4   Therapeutic Interventions   Flexibility Seated passive B HS & calf stretches   Neuromuscular Re-Education worked on coordination, balance, tracking with eyes incoorporating head movements using balls  Pt kicking ball, playing catch static standing, playing catch while walking fwd & bkwd throughout unit  Alt toe taps on step w/o UE support & with head turns  Performing saccades in various planes using marker caps to fix eyes, slight dizziness when having to move eyes and head when moving to another plane  Also performed high marching with arms extended in front of body  Equipment Use   NuStep lvl 3 x 10 min at start of session   Assessment   Treatment Assessment Pt participating well for 90 min session  Worked w/o RW on balance activities with pt demo good righting reactions and brannon c/o dizziness or LOB with quck head turns an more so when turning right  No buckling of knee, foot drag or gait deviations noted otherwise  Pt requesting to use RW this evening for \"safety\" if he gets fatigued     Plan   Progress Progressing " toward goals   PT Therapy Minutes   PT Time In 1230   PT Time Out 1400   PT Total Time (minutes) 90   PT Mode of treatment - Individual (minutes) 90   PT Mode of treatment - Concurrent (minutes) 0   PT Mode of treatment - Group (minutes) 0   PT Mode of treatment - Co-treat (minutes) 0   PT Mode of Treatment - Total time(minutes) 90 minutes   PT Cumulative Minutes 330   Therapy Time missed   Time missed?  No

## 2023-04-24 NOTE — PROGRESS NOTES
Internal Medicine Progress Note  Patient: Aaron Escudero III  Age/sex: 41 y o  male  Medical Record #: [id number]      ASSESSMENT/PLAN: (Interval History)  Aaron Escudero III is seen and examined and management for following issues:    Saddle pulmonary embolus without cor pulmonale   Had nonocclusive saddle embolus with filling defects distal main pulmonary arteries and throughout segmental branches bilaterally   Distal order branches were not clearly visualized due to severe respiratory motion artifact though felt likely containing pulmonary emboli   Sx were left sided CP, NIEVES and elevated D-dimer   Continue Eliquis   Was not a candidate for IR intervention 2/2 HIT     Bilateral LE DVT   Continue Eliquis      HIT   Heparin induced antibody was positive   Heparin by serotonin release is still pending    S/p Argatroban   Platelet count was as low as 42, now recovered to 309     L MCA infarct 3/3/23   Suspected to be cardioembolic   Thrombosis panel then showed abnormal antithrombin, Protein C/S activity, but per Neuro unclear significance in setting of acute stroke and therefore wanted repeat as outpatient   Cont ASA/statin   Initial Echo/HODAN LVEF 55%; no thrombus/PFO   Needs outpt LOOP/Zio patch   Originally to be on Keppra x 7 days then was stopped   Dr Misty Jason d/w with Neuro on 4/10 and they wanted to keep the [address] on until outpatient follow-up and was therefore restarted     DM2   Hgb A1c 8 0   New diagnosis   Cont QID Accuchecks/SSI and DM diet   Home:  No meds   Here: SSI only  Isamar Lacks been on Metformin when in [facility] before transfer back for PE   Will consider restarting Metformin if needed   Needs DM teaching and PCP f/u on dc = mother is diabetic and knows how to use meter but not insulin     HTN   Home: on no meds   Here:  Norvasc 5mg qd/Lopressor 25 mg q12hrs/Losartan 25mg qd   stable     Urine retention   Per PMR   Continue Flomax     Obesity   Recommend ongoing attempts at weight loss   Current BMI 38 7        Discharge date:  Team       The above assessment and plan was reviewed and updated as determined by my evaluation of the patient on 4/24/2023      Labs:   Results from last 7 days   Lab Units 04/24/23  0604 04/20/23  0705   WBC Thousand/uL 6 07 7 15   HEMOGLOBIN g/dL 12 6 13 3   HEMATOCRIT % 40 5 42 4   PLATELETS Thousands/uL 309 261     Results from last 7 days   Lab Units 04/24/23  0604 04/20/23  0705   SODIUM mmol/L 139 138   POTASSIUM mmol/L 3 7 3 9   CHLORIDE mmol/L 108 110*   CO2 mmol/L 26 25   BUN mg/dL 13 16   CREATININE mg/dL 1 01 1 00   CALCIUM mg/dL 8 9 8 8             Results from last 7 days   Lab Units 04/24/23  1028 04/24/23  0647 04/23/23  2119   POC GLUCOSE mg/dl 109 95 102       Review of Scheduled Meds:  Current Facility-Administered Medications   Medication Dose Route Frequency Provider Last Rate    acetaminophen  975 mg Oral Q8H PRN Otis Calderon MD      amLODIPine  5 mg Oral Daily Otis Calderon MD      apixaban  5 mg Oral BID Otis Calderon MD      aspirin  81 mg Oral Daily Otis Calderon MD      atorvastatin  40 mg Oral QPM Otis Calderon MD      bisacodyl  10 mg Rectal Daily PRN Otis Calderon MD      bisacodyl  10 mg Rectal Once Otis Calderon MD      calcium carbonate  1,000 mg Oral TID PRN Otis Calderon MD      famotidine  20 mg Oral Q12H [facility] Otis Calderon MD      insulin lispro  1-5 Units Subcutaneous TID [facility] Otis Calderon MD      levETIRAcetam  750 mg Oral Q12H [facility] Otis Calderon MD      lidocaine   Topical Q4H PRN Otis Calderon MD      loratadine  10 mg Oral Daily Cassia Bernal PA-C      losartan  25 mg Oral Daily Otis Calderon MD      metoprolol tartrate  25 mg Oral Q12H [facility] Otis Calderon MD      ondansetron  4 mg Oral Q8H PRN Otis Calderon MD      oxyCODONE  2 5 mg Oral Q6H PRN Otis Calderon MD      polyethylene glycol  17 g Oral Daily PRN Abelardo Kang MD      polyethylene glycol  17 g Oral Daily Abelardo Kang MD      senna  1 tablet Oral HS Abelardo Kang MD      tamsulosin  0 4 mg Oral After [place]ana Branch MD         Subjective/ HPI: Patient seen and examined  Patients overnight issues or events were reviewed with nursing or staff during rounds or morning huddle session  New or overnight issues include the following:     No new or overnight issues  Offers no complaints    ROS:   A 10 point ROS was performed; negative except as noted above  Imaging:     No orders to display       *Labs /Radiology studies reviewed  *Medications reviewed and reconciled as needed  *Please refer to order section for additional ordered labs studies  *Case discussed with primary attending during morning huddle case rounds    Physical Examination:  Vitals:   Vitals:    04/23/23 2047 04/24/23 0500 04/24/23 0600 04/24/23 0840   BP: 124/77 136/87  123/75   BP Location: Right arm Right arm  Left arm   Pulse: 82 70  90   Resp: 18 18     Temp: 99 7 °F (37 6 °C) 97 8 °F (36 6 °C)     TempSrc: Oral Oral     SpO2: 97% 96%     Weight:   133 kg (293 lb 3 4 oz)    Height:           General Appearance: no distress, conversive  HEENT:  External ear normal   Nose normal w/o drainage  Mucous membranes are moist  Oropharynx is clear  Conjunctiva clear w/o icterus or redness  Neck:  Supple, normal ROM  Lungs: BBS without crackles/wheeze/rhonchi; respirations unlabored with normal inspiratory/expiratory effort  No retractions noted  On RA  CV: regular rate and rhythm; no rubs/murmurs/gallops, PMI normal   ABD: Abdomen is soft  Bowel sounds all quadrants  Nontender with no distention      EXT: no edema  Skin: normal turgor, normal texture, no rashes  Psych: affect normal, mood normal  Neuro: AA; expressive>receptive aphasia      The above physical exam was reviewed and updated as determined by my evaluation of the patient on 4/24/2023  Invasive Devices     None                    VTE Pharmacologic Prophylaxis: Eliquis  Code Status: Level 1 - Full Code  Current Length of Stay: 5 day(s)      Total time spent:  30 minutes with more than 50% spent counseling/coordinating care  Counseling includes discussion with patient re: progress  and discussion with patient of his/her current medical state/information  Coordination of patient's care was performed in conjunction with primary service  Time invested included review of patient's labs, vitals, and management of their comorbidities with continued monitoring  In addition, this patient was discussed with medical team including physician and advanced extenders  The care of the patient was extensively discussed and appropriate treatment plan was formulated unique for this patient  Medical decision making for the day was made by supervising physician unless otherwise noted in their attestation statement  ** Please Note:  voice to text software may have been used in the creation of this document   Although proof errors in transcription or interpretation are a potential of such software**

## 2023-04-24 NOTE — PROGRESS NOTES
"   04/24/23 0260   Pain Assessment   Pain Assessment Tool 0-10   Pain Score No Pain   Restrictions/Precautions   Precautions Aphasia;Bed/chair alarms;Cognitive; Fall Risk;Supervision on toilet/commode;Visual deficit   Comprehension   Comprehension (FIM) 3 - Needs parts of sentences repeated   Expression   Expression (FIM) 2 - Uses only simple expressions or gestures (waves, hello)   Social Interaction   Social Interaction (FIM) 5 - Interacts appropriately with others 90% of time   Problem Solving   Problem solving (FIM) 3 - Solves basic problmes 50-74% of time   Memory   Memory (FIM) 3 - Recognizes, recalls/performs 50-74%   Speech/Language/Cognition Assessmetn   Treatment Assessment PM Session  Pt was in recliner, working on [employer] which was provided by OT  Pt's mother also present at beginning of session, to where SLP introducing self to her and providing update as to role of services and activities which had been targeted in AM session  While pt's mother did not stay for session, SLP did encourage her to be present for other sessions to learn methods as to improve communication (both understanding as well as verbalizing) for pt  Comprehension of Yes/No ?'s  SLP engaged pt in verbal comprehension tasks given basic yes/no ?'s as well as increasing to moderately complex yes/no ?'s  Of note, the use of the communication board was present, BUT it was observed that pt demonstrated preference for verbalizing yes or no to the questions  When starting w/ basic yes/no ?'s, pt was 2/6 accurate in answering w/ appropriate verbal response  During this task, pt was noted to use the communication board to provide answers, which increased to 4/6 accurate  Of note, SLP did provide facial visual cues when answers were incorrect, which did also assist pt in feedback for correct responses  However, when increasing difficulty to moderate yes/no ?'s, no facial cues were provided to pt   Given this, pt was able to answer 5/10 " questions independently  Pt would request repetition for the remaining 5 questions, which pt was able to answer another 3 items, increasing to 8/10 accuracy  Visual Matching of Pictures, Numbers and Words  Next task introduced was matching pictures, numbers w/ single and double digits in 2 different columns  While minimal verbal repetition was needed for pt to comprehend tasks, pt was able to match 5 pictures in L column to the identical pictures in R column w/ 10/10 accuracy  Pt remained 100% accurate in matching single and double digit numbers from L to R column as well (20/20 accurate)  Further challenging pt in this task, SLP provided pt w/ a picture, number or word the L side of the page and then pt was to [place] the same picture, number or word to the R in [place] or [address] choices  For matching pictures, pt was 8/8 accurate in choosing the matching picture given FO3 items  For matching number given double or triple digits, pt remained 10/10 accurate when provided FO4 number choices  When provided letter either in lower case or upper case provided [address] letter to match, pt was 10/10 accurate  Lastly when given pt single words containing 3 to 5 letters in length to match to [address] choices, pt was 10/10 accurate  Word to Picture Matching  Since pt did perform well given tasks above, SLP now introducing word to picture matching task  Words were on the L column of the page and pictures were on the R column of the page  Again, SLP did have to provide verbal repetition of this  Objective given the tasks provided, noting more difficulty in executing this tasks  Pt was 0/5 accurate in matching any word to picture independently  However, when SLP verbally stated the words, pt was able to ID 3 word to the correct pictures  Pt did have difficulty with identifying the last 2 words/pictures (ladder vs pants), to where pt's comprehension even verbally stating the word did not help him   Once SLP provided more directive yes/no "cues, pt was able to problem solve which word when to each picture (pt stating, \"well it has to go to that one \")  Verbal Phrase Completion Task-Opposites  The last task which SLP engaged pt in was a verbal opposite phrase completion activity (ie: up and ___, left and ___) to allow visual rest  Pt was only able to independently complete 1/6 phrases, again noting that most spontaneous responses were jargon and neologisms  Even when SLP providing phonemic cues, minimal success was noted in eliciting target words  It was highly likely by this point in the day as well as session, fatigue impacting verbal expression at this time  Pt will continue to benefit from ongoing skilled SLP services at this time to maximize overall functional independence for basic communication skills, comprehension skills, cognitive linguistic skills in attempts to decrease caregiver burden over time  SLP Therapy Minutes   SLP Time In 1430   SLP Time Out 6723   SLP Total Time (minutes) 60   SLP Mode of treatment - Individual (minutes) 60   SLP Mode of treatment - Concurrent (minutes) 0   SLP Mode of treatment - Group (minutes) 0   SLP Mode of treatment - Co-treat (minutes) 0   SLP Mode of Treatment - Total time(minutes) 60 minutes   SLP Cumulative Minutes 300   Therapy Time missed   Time missed?  No       "

## 2023-04-24 NOTE — PLAN OF CARE
Problem: PAIN - ADULT  Goal: Verbalizes/displays adequate comfort level or baseline comfort level  Description: Interventions:  - Encourage patient to monitor pain and request assistance  - Assess pain using appropriate pain scale  - Administer analgesics based on type and severity of pain and evaluate response  - Implement non-pharmacological measures as appropriate and evaluate response  - Consider cultural and social influences on pain and pain management  - Notify physician/advanced practitioner if interventions unsuccessful or patient reports new pain  Outcome: Progressing     Problem: INFECTION - ADULT  Goal: Absence or prevention of progression during hospitalization  Description: INTERVENTIONS:  - Assess and monitor for signs and symptoms of infection  - Monitor lab/diagnostic results  - Monitor all insertion sites, i e  indwelling lines, tubes, and drains  - Monitor endotracheal if appropriate and nasal secretions for changes in amount and color  - [place] appropriate cooling/warming therapies per order  - Administer medications as ordered  - Instruct and encourage patient and family to use good hand hygiene technique  - Identify and instruct in appropriate isolation precautions for identified infection/condition  Outcome: Progressing     Problem: SAFETY ADULT  Goal: Patient will remain free of falls  Description: INTERVENTIONS:  - Educate patient/family on patient safety including physical limitations  - Instruct patient to call for assistance with activity   - Consult OT/PT to assist with strengthening/mobility   - Keep Call bell within reach  - Keep bed low and locked with side rails adjusted as appropriate  - Keep care items and personal belongings within reach  - Initiate and maintain comfort rounds  - Make Fall Risk Sign visible to staff  - Offer Toileting in advance of need  - Initiate/Ma  - Obtain necessary fall risk manage  - Apply yellow socks and bracelet for high fall risk patients  - Consider moving patient to room near nurses station  Outcome: Progressing  Goal: Maintain or return to baseline ADL function  Description: INTERVENTIONS:  -  Assess patient's ability to carry out ADLs; assess patient's baseline for ADL function and identify physical deficits which impact ability to perform ADLs (bathing, care of mouth/teeth, toileting, grooming, dressing, etc )  - Assess/evaluate cause of self-care deficits   - Assess range of motion  - Assess patient's mobility; develop plan if impaired  - Assess patient's need for assistive devices and provide as appropriate  - Encourage maximum independence but intervene and supervise when necessary  - Involve family in performance of ADLs  - Assess for home care needs following discharge   - Consider OT consult to assist with ADL evaluation and planning for discharge  - Provide patient education as appropriate  Outcome: Progressing  Goal: Maintains/Returns to pre admission functional level  Description: INTERVENTIONS:  - Perform BMAT or MOVE assessment daily    - Set and communicate daily mobility goal to care team and patient/family/caregiver  - Collaborate with rehabilitation services on mobility goals if consulted  - Perform Range es a day  - Reposition patiers    - Dangle patient  - Stand patient  - Ambulate pa  - Out of bed to c  - Out of bed for  - Out of bed for toileting  - Record patient progress and toleration of activity level   Outcome: Progressing     Problem: DISCHARGE PLANNING  Goal: Discharge to home or other facility with appropriate resources  Description: INTERVENTIONS:  - Identify barriers to discharge w/patient and caregiver  - Arrange for needed discharge resources and transportation as appropriate  - Identify discharge learning needs (meds, wound care, etc )  - Arrange for interpretive services to assist at discharge as needed  - Refer to Case Management Department for coordinating discharge planning if the patient needs post-hospital services based on physician/advanced practitioner order or complex needs related to functional status, cognitive ability, or social support system  Outcome: Progressing     Problem: Prexisting or High Potential for Compromised Skin Integrity  Goal: Skin integrity is maintained or improved  Description: INTERVENTIONS:  - Identify patients at risk for skin breakdown  - Assess and monitor skin integrity  - Assess and monitor nutrition and hydration status  - Monitor labs   - Assess for incontinence   - Turn and reposition patient  - Assist with mobility/ambulation  - Relieve pressure over bony prominences  - Avoid friction and shearing  - Provide appropriate hygiene as needed including keeping skin clean and dry  - Evaluate need for skin moisturizer/barrier cream  - Collaborate with interdisciplinary team   - Patient/family teaching  - Consider wound care consult   Outcome: Progressing     Problem: Nutrition/Hydration-ADULT  Goal: Nutrient/Hydration intake appropriate for improving, restoring or maintaining nutritional needs  Description: Monitor and assess patient's nutrition/hydration status for malnutrition  Collaborate with interdisciplinary team and initiate plan and interventions as ordered  Monitor patient's weight and dietary intake as ordered or per policy  Utilize nutrition screening tool and intervene as necessary  Determine patient's food preferences and provide high-protein, high-caloric foods as appropriate       INTERVENTIONS:  - Monitor oral intake, urinary output, labs, and treatment plans  - Assess nutrition and hydration status and recommend course of action  - Evaluate amount of meals eaten  - Assist patient with eating if necessary   - Allow adequate time for meals  - Recommend/ encourage appropriate diets, oral nutritional supplements, and vitamin/mineral supplements  - Order, calculate, and assess calorie counts as needed  - Recommend, monitor, and adjust tube feedings and TPN/PPN based on assessed needs  - Assess need for intravenous fluids  - Provide specific nutrition/hydration education as appropriate  - Include patient/family/caregiver in decisions related to nutrition  Outcome: Progressing

## 2023-04-24 NOTE — PROGRESS NOTES
"Physical Medicine and Rehabilitation Progress Note  Tonio Hendricksoni III 41 y o  male MRN: [id number]  Unit/Bed#: [facility] 965-01 Encounter: [id number]      Assessment & Plan:     Decline in ADLs and mobility: Functional assessment -improving        FIM  Care Score  Admit Score Recent Score    Total assist  1-100% or 2p    Tot     Max assist 2-51-75%    Sub  toileting hygiene, bathing, lower body dressing  toileting hygiene   Mod assist 3- 26-50%  Par  upper body dressing    Min assist 3- 25% or < Par     CG assist 4  TA     Sup/Setup 4-5 Sup   dressing and bathing   Mod-I/Indep 6 MI      Transfers   mod assist to significant assist  moderate assist to supervision    Ambulation    50 feet min assist  50 feet contact-guard    Stairs    4 steps moderate assist  4 steps contact-guard   SLP FIM admit - total assist expression, Max assist comp, PS, memory; SI - supervision  SLP FIM recent -moderate assist memory, significant assist problem solving comprehension and expression  Goal: Largely supervision for ADLs and mobility  Major barriers: Aphasia, imbalance, incoordination, deconditioning  Dispo: Home with estimate length of stay of 2 5 weeks from admission     * Acute ischemic left MCA stroke [facility])  Assessment & Plan  - Aphasia stable again today, function continues to improve  Getting with communication devices and communication boards  - Large left MCA infarct  - Residual impairments on admission to [facility]: Aphasia, weakness, imbalance (assess for other impairments during [facility] course)   - Co-morbidities most impacting functional recovery: Morbid obesity    Secondary stroke prevention  - Per prior providers   \"Thrombosis panel with abnormal antithrombin, Protein C/S activity, but per Neuro unclear significance in setting of acute stroke  Would repeat as outpatient     Will need Zio Patch/Loop Recorder with Cards Referral as outpatient   Discussed with Neurology on 4/9: Continue Keppra until outpatient f/u given location, " "temporal slowing, extent of lesion  \"  - Antithrombotic: Aspirin  - Statin  - Optimal management of blood pressure and diabetes  -  patient and if applicable caregiver on optimal stroke management  - Follow-up with neurology and PCP after d/c   - Disability/FMLA forms completed and given to CM 4/21 to assist with and fax   -Continue acute comprehensive interdisciplinary inpatient rehabilitation to include intensive skilled therapies (PT, OT, ST) as outlined with oversight and management by rehabilitation physician as well as inpatient rehab level nursing, case management and weekly interdisciplinary team meetings           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)  Assessment & Plan  Saturating well on room air with and without activity so far  Monitor vitals with and without activity  Eliquis  Outpatient hematology consult    HIT (heparin-induced thrombocytopenia) ([facility] [address] )  Assessment & Plan  Platelets remain improved on 4/24  Heparin-induced antibody positive, serotonin release assay pending  Transitioned from Arixtra to Eliquis  Monitor platelet count now improving    DVT of lower extremity, bilateral (HCC)  Assessment & Plan  Eliquis  Ambulation  Hold SCDs with hx of DVT     Urinary retention  Assessment & Plan  Flomax    Bowel and bladder incontinence  Assessment & Plan  Discontinue bladder scans as PVRs low  Toileting program  Flomax    Hypertension  Assessment & Plan  Blood pressure acceptable  Internal medicine consulted and co-management with their service  Monitor vitals with and without activity; monitor for orthostasis  Monitor hemoglobin, electrolytes, kidney function, hydration status   Current meds: Amlodipine, losartan, metoprolol      Type 2 diabetes mellitus with circulatory disorder, without long-term current use of insulin (HCC)  Assessment & Plan  Lab Results   Component Value Date    HGBA1C 8 0 (H) 03/31/2023     Internal medicine consulted and management at their " discretion  Monitor for signs and symptoms of hypoglycemia   Current meds: Lispro sliding scale      Obesity, Class III, BMI 40-49 9 (morbid obesity) ([facility] [address] )  Assessment & Plan   on appropriate nutrition and activity  Adjustments accordingly during skilled therapy and with rehab nursing  Monitor skin closely for breakdown, wounds, rashes; keep clean and dry, turning Q2H   Follow-up with PCP after d/c        Other Medical Issues:       Follow-up providers and other issues to be followed up after discharge   PCP   Neuro   Hematology    CODE: Level 1: Full Code    Restrictions include: Fall precautions    Objective:     Allergies per EMR  Diagnostic Studies: Reviewed, no new imaging  No orders to display     See above as well    Laboratory: Labs reviewed  Results from last 7 days   Lab Units 04/24/23  0604 04/20/23  0705   HEMOGLOBIN g/dL 12 6 13 3   HEMATOCRIT % 40 5 42 4   WBC Thousand/uL 6 07 7 15     Results from last 7 days   Lab Units 04/24/23  0604 04/20/23  0705   BUN mg/dL 13 16   SODIUM mmol/L 139 138   POTASSIUM mmol/L 3 7 3 9   CHLORIDE mmol/L 108 110*   CREATININE mg/dL 1 01 1 00            Drug regimen reviewed, all potential adverse effects identified and addressed:    Scheduled Meds:  Current Facility-Administered Medications   Medication Dose Route Frequency Provider Last Rate    acetaminophen  975 mg Oral Q8H PRN Regina Hollis MD      amLODIPine  5 mg Oral Daily Regina Hollis MD      apixaban  5 mg Oral BID Regina Hollis MD      aspirin  81 mg Oral Daily Regina Hollis MD      atorvastatin  40 mg Oral QPM Regina Hollis MD      bisacodyl  10 mg Rectal Daily PRN Regina Hollis MD      bisacodyl  10 mg Rectal Once Regina Hollis MD      calcium carbonate  1,000 mg Oral TID PRN Regina Hollis MD      famotidine  20 mg Oral Q12H [facility] Regina Hollis MD      insulin lispro  1-5 Units Subcutaneous TID MD Hernesto De Leon levETIRAcetam  750 mg Oral Q12H [address] Elis Gutiérrez MD      lidocaine   Topical Q4H PRN Elis Gutiérrez MD      loratadine  10 mg Oral Daily Cassia Bernal PA-C      losartan  25 mg Oral Daily Elis Gutiérrez MD      metoprolol tartrate  25 mg Oral Q12H [address] Elis Gutiérrez MD      ondansetron  4 mg Oral Q8H PRN Elis Gutiérrez MD      oxyCODONE  2 5 mg Oral Q6H PRN Elis Gutiérrez MD      polyethylene glycol  17 g Oral Daily PRN Elis Gutiérrez MD      polyethylene glycol  17 g Oral Daily Elis Gutiérrez MD      senna  1 tablet Oral HS Elis Gutiérrez MD      tamsulosin  0 4 mg Oral After Marie Cameron MD         Chief Complaints: Aphasia    Subjective: On eval, patient continues with significant aphasia  This does limit communication somewhat but if given enough time patient appears to communicate no significant pain, shortness of breath, lightheadedness, constipation, worsening strength, balance, or other new complaints except having a recent runny nose and slightly scratchy throat  Physical Exam:  Temp:  [97 7 °F (36 5 °C)-99 7 °F (37 6 °C)] 97 8 °F (36 6 °C)  HR:  [70-90] 90  Resp:  [18] 18  BP: (123-136)/(75-87) 123/75  SpO2:  [95 %-97 %] 96 %    GEN:  Sitting in NAD   HEENT/NECK: MMM, slight clearish nasal drainage  CARDIAC: Regular rate rhythm, no murmers, no rubs, no gallops  LUNGS:  clear to auscultation, no wheezes, rales, or rhonchi  ABDOMEN: Soft, non-tender, non-distended, normal active bowel sounds  EXTREMITIES/SKIN:  no calf edema, no calf tenderness to palpation  NEURO:   MENTAL STATUS: Stable aphasia and apraxia, adequate wakefulness and Strength/MMT:  Near full throughout  PSYCH:  Affect:  Euthymic     HPI:  77-year-old male with a history of hypertension, hyperlipidemia, obesity, diabetes status post recent left MCA stroke which was complicated by saddle pulmonary embolism,, HIT, bilateral lower extremity DVTs  Patient was evaluated by skilled therapies and was found to have significant decline in ADLs and ambulation and appears [facility] to [facility]      ** Please Note: Fluency Direct voice to text software may have been used in the creation of this document   **

## 2023-04-25 LAB
GLUCOSE SERPL-MCNC: 102 MG/DL (ref 65–140)
GLUCOSE SERPL-MCNC: 124 MG/DL (ref 65–140)
GLUCOSE SERPL-MCNC: 87 MG/DL (ref 65–140)
GLUCOSE SERPL-MCNC: 89 MG/DL (ref 65–140)
SRA .2 IU/ML UFH SER-ACNC: 93 % (ref 0–20)
SRA .2 IU/ML UFH SER-ACNC: 93 % (ref 0–20)
SRA 100IU/ML UFH SER-ACNC: <1 % (ref 0–20)
SRA 100IU/ML UFH SER-ACNC: <1 % (ref 0–20)
SRA UFH SER-IMP: ABNORMAL
SRA UFH SER-IMP: ABNORMAL

## 2023-04-25 RX ADMIN — METOPROLOL TARTRATE 25 MG: 25 TABLET, FILM COATED ORAL at 08:20

## 2023-04-25 RX ADMIN — ASPIRIN 81 MG 81 MG: 81 TABLET ORAL at 08:20

## 2023-04-25 RX ADMIN — POLYETHYLENE GLYCOL 3350 17 G: 17 POWDER, FOR SOLUTION ORAL at 08:20

## 2023-04-25 RX ADMIN — METOPROLOL TARTRATE 25 MG: 25 TABLET, FILM COATED ORAL at 21:31

## 2023-04-25 RX ADMIN — APIXABAN 5 MG: 5 TABLET, FILM COATED ORAL at 17:15

## 2023-04-25 RX ADMIN — FAMOTIDINE 20 MG: 20 TABLET ORAL at 21:31

## 2023-04-25 RX ADMIN — LEVETIRACETAM 750 MG: 750 TABLET, FILM COATED ORAL at 08:21

## 2023-04-25 RX ADMIN — SENNOSIDES 8.6 MG: 8.6 TABLET, FILM COATED ORAL at 21:32

## 2023-04-25 RX ADMIN — LORATADINE 10 MG: 10 TABLET ORAL at 08:20

## 2023-04-25 RX ADMIN — AMLODIPINE BESYLATE 5 MG: 5 TABLET ORAL at 08:20

## 2023-04-25 RX ADMIN — FAMOTIDINE 20 MG: 20 TABLET ORAL at 08:21

## 2023-04-25 RX ADMIN — APIXABAN 5 MG: 5 TABLET, FILM COATED ORAL at 08:20

## 2023-04-25 RX ADMIN — LEVETIRACETAM 750 MG: 750 TABLET, FILM COATED ORAL at 21:31

## 2023-04-25 RX ADMIN — ATORVASTATIN CALCIUM 40 MG: 40 TABLET, FILM COATED ORAL at 17:15

## 2023-04-25 RX ADMIN — TAMSULOSIN HYDROCHLORIDE 0.4 MG: 0.4 CAPSULE ORAL at 17:15

## 2023-04-25 RX ADMIN — LOSARTAN POTASSIUM 25 MG: 25 TABLET, FILM COATED ORAL at 08:20

## 2023-04-25 NOTE — PROGRESS NOTES
Internal Medicine Progress Note  Patient: Celestine Chow III  Age/sex: 41 y o  male  Medical Record #: [id number]      ASSESSMENT/PLAN: (Interval History)  Celestine Chow III is seen and examined and management for following issues:    Saddle pulmonary embolus without cor pulmonale   Had nonocclusive saddle embolus with filling defects distal main pulmonary arteries and throughout segmental branches bilaterally   Distal order branches were not clearly visualized due to severe respiratory motion artifact though felt likely containing pulmonary emboli   Sx were left sided CP, NIEVES and elevated D-dimer   Continue Eliquis   Was not a candidate for IR intervention 2/2 HIT     Bilateral LE DVT   Continue Eliquis      HIT   Heparin induced antibody was positive   Heparin by serotonin release is still pending from 4/12/23   S/p Argatroban   Platelet count was as low as 42, now recovered to 309     L MCA infarct 3/3/23   Suspected to be cardioembolic   Thrombosis panel then showed abnormal antithrombin, Protein C/S activity, but per Neuro unclear significance in setting of acute stroke and therefore wanted repeat as outpatient   Cont ASA/statin   Initial Echo/HODAN LVEF 55%; no thrombus/PFO   Needs outpt LOOP/Zio patch   Originally to be on Keppra x 7 days then was stopped   Dr Wilson Cunningham d/w with Neuro on 4/10 and they wanted to keep the [address] on until outpatient follow-up and was therefore restarted     DM2   Hgb A1c 8 0   New diagnosis   Cont QID Accuchecks/SSI and DM diet   Home:  No meds   Here: SSI only  Maycol Ready been on Metformin when in [facility] before transfer back for PE   Will consider restarting Metformin if needed   Needs DM teaching and PCP f/u on dc = mother is diabetic and knows how to use meter but not insulin     HTN   Home: on no meds   Here:  Norvasc 5mg qd/Lopressor 25 mg q12hrs/Losartan 25mg qd   stable     Urine retention   Per PMR   Continue Flomax     Obesity   Recommend ongoing attempts at weight loss   Current BMI 38 7        Discharge date:  Team       The above assessment and plan was reviewed and updated as determined by my evaluation of the patient on 4/25/2023      Labs:   Results from last 7 days   Lab Units 04/24/23  0604 04/20/23  0705   WBC Thousand/uL 6 07 7 15   HEMOGLOBIN g/dL 12 6 13 3   HEMATOCRIT % 40 5 42 4   PLATELETS Thousands/uL 309 261     Results from last 7 days   Lab Units 04/24/23  0604 04/20/23  0705   SODIUM mmol/L 139 138   POTASSIUM mmol/L 3 7 3 9   CHLORIDE mmol/L 108 110*   CO2 mmol/L 26 25   BUN mg/dL 13 16   CREATININE mg/dL 1 01 1 00   CALCIUM mg/dL 8 9 8 8             Results from last 7 days   Lab Units 04/25/23  1045 04/25/23  0626 04/24/23  2041   POC GLUCOSE mg/dl 124 87 93       Review of Scheduled Meds:  Current Facility-Administered Medications   Medication Dose Route Frequency Provider Last Rate    acetaminophen  975 mg Oral Q8H PRN Dylan Osullivan MD      amLODIPine  5 mg Oral Daily Dylan Osullivan MD      apixaban  5 mg Oral BID Dylan Osullivan MD      aspirin  81 mg Oral Daily Dylan Osullivan MD      atorvastatin  40 mg Oral QPM Dylan Osullivan MD      bisacodyl  10 mg Rectal Daily PRN Dylan Osullivan MD      bisacodyl  10 mg Rectal Once Dylan Osullivan MD      calcium carbonate  1,000 mg Oral TID PRN Dylan Osullivan MD      famotidine  20 mg Oral Q12H [facility] Dylan Osullivan MD      insulin lispro  1-5 Units Subcutaneous TID [facility] Dylan Osullivan MD      levETIRAcetam  750 mg Oral Q12H [facility] Dylan Osullivan MD      lidocaine   Topical Q4H PRN Dylan Osullivan MD      loratadine  10 mg Oral Daily Cassia Bernal PA-C      losartan  25 mg Oral Daily Dylan Osullivan MD      metoprolol tartrate  25 mg Oral Q12H [facility] Dylan Osullivan MD      ondansetron  4 mg Oral Q8H PRN Dylan Osullivan MD      oxyCODONE  2 5 mg Oral Q6H PRN Dylan Osullivan MD      polyethylene glycol  17 g Oral Daily PRN Monica Hernandez MD      polyethylene glycol  17 g Oral Daily Monica Hernandez MD      senna  1 tablet Oral HS Monica Hernandez MD      tamsulosin  0 4 mg Oral After Mera Landrum MD         Subjective/ HPI: Patient seen and examined  Patients overnight issues or events were reviewed with nursing or staff during rounds or morning huddle session  New or overnight issues include the following:     No new or overnight issues  Offers no complaints    ROS:   A 10 point ROS was performed; negative except as noted above  Imaging:     No orders to display       *Labs /Radiology studies reviewed  *Medications reviewed and reconciled as needed  *Please refer to order section for additional ordered labs studies  *Case discussed with primary attending during morning huddle case rounds    Physical Examination:  Vitals:   Vitals:    04/24/23 1404 04/24/23 2122 04/25/23 0604 04/25/23 0820   BP: 127/71 132/80 132/82 126/74   BP Location: Right arm Left arm Left arm    Pulse: 85 74 72 81   Resp: 18 18 18    Temp: 97 5 °F (36 4 °C) (!) 97 4 °F (36 3 °C) 98 1 °F (36 7 °C)    TempSrc: Oral Oral Oral    SpO2: 98% 96% 98%    Weight:   132 kg (291 lb 14 2 oz)    Height:           General Appearance: no distress, conversive  HEENT: PERRLA, conjuctiva normal; oropharynx clear; mucous membranes moist   Neck:  Supple, normal ROM  Lungs: CTA, normal respiratory effort, no retractions, expiratory effort normal  CV: regular rate and rhythm; no rubs/murmurs/gallops, PMI normal   ABD: soft; ND/NT; +BS  EXT: no edema  Skin: normal turgor, normal texture, no rashes  Psych: affect normal, mood normal  Neuro: AAO; global aphasia unchanged from yesterday       The above physical exam was reviewed and updated as determined by my evaluation of the patient on 4/25/2023      Invasive Devices     None                    VTE Pharmacologic Prophylaxis: Eliquis  Code Status: Level 1 - Full Code  Current Length of Stay: 6 day(s)      Total time spent:  30 minutes with more than 50% spent counseling/coordinating care  Counseling includes discussion with patient re: progress  and discussion with patient of his/her current medical state/information  Coordination of patient's care was performed in conjunction with primary service  Time invested included review of patient's labs, vitals, and management of their comorbidities with continued monitoring  In addition, this patient was discussed with medical team including physician and advanced extenders  The care of the patient was extensively discussed and appropriate treatment plan was formulated unique for this patient  Medical decision making for the day was made by supervising physician unless otherwise noted in their attestation statement  ** Please Note:  voice to text software may have been used in the creation of this document   Although proof errors in transcription or interpretation are a potential of such software**

## 2023-04-25 NOTE — PROGRESS NOTES
04/25/23 5315   Pain Assessment   Pain Assessment Tool 0-10   Pain Score No Pain   Restrictions/Precautions   Precautions Aphasia;Bed/chair alarms;Cognitive; Fall Risk;Supervision on toilet/commode;Visual deficit   Comprehension   Comprehension (FIM) 3 - Understands basic info/conversation 50-74% of time   Expression   Expression (FIM) 2 - Uses only simple expressions or gestures (waves, hello)   Social Interaction   Social Interaction (FIM) 5 - Interacts appropriately with others 90% of time   Problem Solving   Problem solving (FIM) 3 - Solves basic problmes 50-74% of time   Memory   Memory (FIM) 3 - Recognizes, recalls/performs 50-74%   Speech/Language/Cognition Assessmetn   Treatment Assessment Pt remained in therapy gym for ST session which focused on targeting both receptive and expressive language skills as by pt completing the following tasks:    Picture to Word Matching (FO2 words to match to picture)  SLP provided pt w/ a single picture and then 2 written word choices for pt to [place] the word which matched the picture provided  Pt was demonstrating slower processing given this task, but likely due to ensuring visual scanning to locate the words which were present to the right of the page  Out of a total of 21 pictures presented, pt was able to independently choose the word which match in 19 items  SLP had to provide pt w/ verbal cue of both words in 2 instances, which pt was then able to accurately id the word which matched the picture  Of note during this task, pt was attempting to state words associated w/ pictures provided to where most verbalizations were jargon and/or neologism  BUT pt was able to spontaneously state 2 words out of the 42 provided w/o cues  Word Discrimination (FO2 Words)  As f/u to another word comprehension task, SLP now presenting 2 written words to pt  The words contained 3 to 6 letters per words provided   SLP verbally stating the target word for pt to ID by circling once auditorily presented  Pt was 15/20 accurate in his ability to verbally recognize and identify these words  The words which pt exhibited difficulty were reviewed again, to where pt continued to demonstrate difficulty in discerning words when SLP verbally provided target word  SLP needing to state target word, then verbally stating both words pointing to each one when stated for maximal comprehension  Again, it was noted during this task that as SLP verbally stated words, he was able to repeat words spontaneously in 6 out of 20 occurrences  Verbal and Written Simple Phrase Completion   Last task targeted was a f/u from yesterday's session targeting pt's ability to complete phrase completion task as this is now targeted prior to increase amounts of therapy in the day  SLP verbally presented the carrier phrases to pt targeting pt to elicit target response  Pt continued to demonstrate significant difficulty given this task, eliciting jargon words for the most part, but at times, some words which were paraphasias  Pt was 1/10 accurate in this task today in his attempts for spontaneous phrase completion  When completing this task again, but now visually providing the carrier phrase and FO3 words to choose from to complete each phrase, pt's ability to ID the target word to complete phrases was 8/10 accurate, increasing to 10/10 upon eliminating error and choosing from last 2 words  Of note, SLP did verbalize carrier phrases for pt during his ability to ID the target words  However, when attempting to state only target words, pt was 2/10 accurate noting more letter/sound substitutions provide answers (ie: book--->brook, dishes-->fishes, cake-->[place])  At end of session, SLP had pt walk back to room providing verbal directions given L vs R, to where pt was able to follow accordingly  Will further investigate L/R discrimination as well in upcoming sessions   Currently pt will continue to benefit from ongoing skilled SLP services in attempts to maximize overall functional expressive and receptive language skills as well as cognitive skills in hopes for decreasing caregiver burden over time  SLP Therapy Minutes   SLP Time In 0930   SLP Time Out 1030   SLP Total Time (minutes) 60   SLP Mode of treatment - Individual (minutes) 60   SLP Mode of treatment - Concurrent (minutes) 0   SLP Mode of treatment - Group (minutes) 0   SLP Mode of treatment - Co-treat (minutes) 0   SLP Mode of Treatment - Total time(minutes) 60 minutes   SLP Cumulative Minutes 420   Therapy Time missed   Time missed?  No

## 2023-04-25 NOTE — PROGRESS NOTES
04/25/23 1230   Pain Assessment   Pain Assessment Tool 0-10   Pain Score No Pain   Restrictions/Precautions   Precautions Aphasia;Bed/chair alarms; Fall Risk;Supervision on toilet/commode;Visual deficit   Weight Bearing Restrictions No   ROM Restrictions No   Cognition   Arousal/Participation Alert; Cooperative   Attention Attends with cues to redirect   Following Commands Follows one step commands with increased time or repetition   Subjective   Subjective Pt agreeable to therapy  Sit to Stand   Type of Assistance Needed Supervision   Comment no AD   Sit to Stand CARE Score 4   Bed-Chair Transfer   Type of Assistance Needed Supervision   Comment no AD   Chair/Bed-to-Chair Transfer CARE Score 4   Walk 10 Feet   Type of Assistance Needed Supervision   Physical Assistance Level No physical assistance   Comment no AD   Walk 10 Feet CARE Score 4   Walk 50 Feet with Two Turns   Type of Assistance Needed Incidental touching;Supervision   Physical Assistance Level No physical assistance   Comment no AD, CS/CGA   Walk 50 Feet with Two Turns CARE Score 4   Walk 150 Feet   Type of Assistance Needed Incidental touching;Supervision   Physical Assistance Level No physical assistance   Comment no AD, CS/CGA   Walk 150 Feet CARE Score 4   Ambulation   Primary Mode of Locomotion Prior to Admission Walk   Distance Walked (feet) 350 ft  (x3)   Provided Assistance with: Balance;Direction   Walk Assist Level Close Supervision;Contact Guard   Findings no AD in this session  CGA to avoid bumping into objects in the hallway when amb, for safety  Does the patient walk? 2  Yes   Wheel 50 Feet with Two Turns   Reason if not Attempted Activity not applicable   Wheel 50 Feet with Two Turns CARE Score 9   Wheel 150 Feet   Reason if not Attempted Activity not applicable   Wheel 086 Feet CARE Score 9   Wheelchair mobility   Does the patient use a wheelchair? 0   No   Picking Up Object   Type of Assistance Needed Incidental touching   Comment "CGA, bending down to  cones  Picking Up Object CARE Score 4   Therapeutic Interventions   Balance standing on blue foam with FTEC and tandem stance CS for 30sec, stepping fwrd/bwd/B side on blue foam,  min/CGA for balance  side stepping with ball toss in hallway 120\" each direction, VC's to avoid objects on his side while performing act, one episode of min LOB though pt able to recover  Head turns horizontal/verticle with repetitive VC's for head turns and direction, lateral deviation with R horizontal head turns  Neuromuscular Re-Education scanning/verbalizing color of cones in hallway, pt had difficulty associating some of the colors  scanning/verbalizing numbers 1-10 on post notes in hallway wall, pt able to associate numbers and verbalize some numbers with repetition  amb with tray with empty cup, filled cup hallway, and #2 weighted ball maintaing the object within the square, increased speed for 2nd/3rd bouts, pt able to maintain balance and was aware of sorroundings when performing act  Assessment   Treatment Assessment Pt participated in skilled PT with increased focus on dynamic balance and dual tasking  Pt reported min dizziness with head turns while amb >R  Pt cont to require VC's to avoid object on R side when amb due to R side inattention  No AD utilized in todays session, AD no longer to be utilized for future session unless needed, pt demonstrated good righting reactions with any gait deviations  repetitive tactile/verbal cues for direction when dual tasking when amb  Cont POC as tolerated to improve remaining deficit  Problem List Decreased strength; Impaired balance;Decreased coordination; Impaired vision   Plan   Treatment/Interventions LE strengthening/ROM; Therapeutic exercise; Endurance training;Patient/family training;Gait training   Progress Progressing toward goals   PT Therapy Minutes   PT Time In 1230   PT Time Out 1400   PT Total Time (minutes) 90   PT Mode of treatment - " Individual (minutes) 90   PT Mode of treatment - Concurrent (minutes) 0   PT Mode of treatment - Group (minutes) 0   PT Mode of treatment - Co-treat (minutes) 0   PT Mode of Treatment - Total time(minutes) 90 minutes   PT Cumulative Minutes 420   Therapy Time missed   Time missed?  No

## 2023-04-25 NOTE — PCC SPEECH THERAPY
"Pt currently being followed for language and cognitive tx sessions  Upon admission to the acute rehab center, pt engaged in completing the Bedside WAB in which overall bedside aphasia score was 15 which deems pt to demonstrate very severely impaired language deficits  Additionally, pt also demonstrates Broca's type aphasia as per Bedside Aphasia Classification Criteria, when comparing the pt's Fluency  Auditory Verbal Comprehension, Repetition scores  Pt w/ fluent speech jo-ann by jargon, neologisms, blocks and unintelligible mumbling  Pt generally unaware of expressive language impairments, continuing w/ long unintelligible utterances in responding to questions  Pt does present w/increased automatic conversational phrases that are clear and intelligible ie \"hi, whats up\" \"I think that's what it says, \"can you repeat that please? \" than in previous encounter  Written spontaneous expression was also jargon where pt demo awareness of incorrect written expression  Writing was slowed and effortful, however pt able to write his first name w/ all capital letters and the initial two numbers of his address as well as a street name w/ the same initial letter as his current street name  In repetition task, pt often able to produce the correct initial phoneme of intended word then w/ neologisms  However, throughout pt's stay, it has been noted that pt does demonstrate improved reading comprehension given biographical information and orientation information provided written binary choices, as well as using very basic communication boards(FO2 pictures/words) to communicate basic wants/needs  Pt's receptive language skills provided visual written or pictures is also noted to be improving at this time   However, pt's biggest barrier continues to be expressive output, to where pt exhibits mumbling, non-fluent, repetative sounds/words, jargon, neologism, paraphasias, where minimal true words are elicited in structured expressive language " "tasks  Pt is observed to verbalize yes/no more appropriately, as well as shorter filler phrases in conversation as well as during structured tasks  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 in which pt does exhibit severe difficulty in ability to formulate sounds given short target words despite all levels of cueing (visual, phrase, phonemic, repetition written, etc)  Written expression is also severely impaired at this time as well  Of note, pt's mother, Natacha Figueroa, has been present in sessions and increased education provided to her on 4/25 given recommendations for 24/7 supervision/assist at d/c due to the severity of his language deficits as well as other functional deficits (visual field cut to R, decreased ST recall, decreased executive functions) impacting ability to complete I ADL tasks at d/c  Mother is able to provide this support upon discussion, which SLP educated pt and mother about continued OP services at time of discharge, which both were in agreement  Currently pt will continue to benefit from skilled SLP services targeting overall functional expressive and receptive language skills as well as cognitive skills in hopes for decreasing caregiver burden over time  Update from week 5/3/2023: Pt continues to be followed for ongoing language and cognitive tx sessions  Pt does still currently exhibit severe expressive language deficits, which is also highly impacted by pt's apraxia of speech when attempting to target speech production in structured tasks  Overall speech output continues to be jargon filled, neologisms, paraphasias in addition to sound substitutions in words plus at times non-sensical output  However, pt is able to provide intermittent spontaneous phrases such as  \"say that again\" \"oh it's going\" \"that was brutal\" in contexts which are appropriate given sessions   As for pt's receptive language skills, pt does range from moderate to severely impaired, noting that overall auditory " comprehension given information still can be difficult for pt  In reverse, longer written information is also more difficult for pt to comprehend, BUT when presented w/ both auditory and written means, pt's comprehension skills DO improve  Began to target more functional tasks, such as money management to where pt again demonstrates difficulty in completing auditory requests for change/dollar amounts, but when presented w/ written information, improvement in his comprehension is noted in addition to providing correct money amounts  Besides pt's speech/language barriers, pt also does exhibit decreased visual attention to R side for tasks, slower processing time, decreased executive functions and decreased ST recall which does impact cognitive/language skills as well as functional mobility  Family training has occurred w/ pt's mother in multiple sessions, to where  community reintegration tasks is to be completed today (5/3) w/ pt and pt's mother for further recommendations to provide pt and mother for means of management in the community  Plan is for discharge home w/ family support/supervision on 5/5/2023 w/ ongoing recommendations for continued OP ST services  Of note, SLP has provided pt's mother w/ written handout given strategies in how to communicate w/ a pt w/ aphasia in addition to SLP providing HEP activities for pt to complete until initiation given OP services  Pt is currently functioning at mod A for comprehension, executive function and memory, max A for expression and supervision level for social interaction skills  Currently pt will continue to benefit from ongoing skilled SLP services targeting functional language/speech and cognitive skills in attempts to decrease overall caregiver burden over time

## 2023-04-25 NOTE — PROGRESS NOTES
04/25/23 0830   Pain Assessment   Pain Assessment Tool 0-10   Pain Score No Pain   Restrictions/Precautions   Precautions Aphasia;Bed/chair alarms;Cognitive; Fall Risk;Supervision on toilet/commode;Visual deficit   Weight Bearing Restrictions No   ROM Restrictions No   Sit to Stand   Type of Assistance Needed Supervision; Adaptive equipment   Physical Assistance Level No physical assistance   Sit to Stand CARE Score 4   Bed-Chair Transfer   Type of Assistance Needed Supervision; Adaptive equipment   Physical Assistance Level No physical assistance   Comment RW   Chair/Bed-to-Chair Transfer CARE Score 4   Kitchen Mobility   Kitchen-Mobility Level Walker   Kitchen Activity Retrieve items;Transport items   Kitchen Mobility Comments Pt engaged in item retrieval/transport task in kitchen with use of RW  Pt utilized RW bag to transport all items  Prior to starting task made written list of frequently used kitchen items including plate, bowl, spoon, cup, and measuring cup  Spent time reading list together and completed word to item matching  Pt provided 2 items at a time and was correct in identifying on 2/5 items  Pt inc to 5/5 accuracy with VC's provided  Pt then progressed to searching kitchen cabinets for said items  Pt overall CS with moving around kitchen with use of RW and bag in stance for total of 15 minutes  Continues to be limited by expressive aphasia and dec RUE coordination and R visual neglect  Pt noted to undershoot when reaching for handles on drawers/cabinets at times  Coordination   Fine Motor Pt seated at tabletop to engage in various [facility] and  visual scanning activities  Pt first engaged in button sorting looking for all buttons with 2 holes and placing into container on L and then locating buttons with 4 holes and placing on container to far R  Pt requires max VC's to constrain LUE and only utilize RUE   Pt with dec RUE [facility] noted when attempting to  button via tip pinch and not compensating by sliding buttons  Pt then engaged in bear sorting activity with color coded cups spontaneously placed on R side to force pt to scan to R side  Pt requires inc time, utilizes tongs in RUE to promote RUE [address] and strength  Cognition   Overall Cognitive Status Impaired   Arousal/Participation Alert; Cooperative   Attention Attends with cues to redirect   Orientation Level Oriented X4   Memory Decreased recall of recent events   Following Commands Follows one step commands with increased time or repetition   Activity Tolerance   Activity Tolerance Patient tolerated treatment well   Assessment   Treatment Assessment Pt participated in skilled OT services with focus on R visual attention/scanning, RUE coordination, functional mobility/txfers, kitchen mobility  Pt continues to be limited by expressive aphasia  Pt improving with accuracy of orientation questions utilizing communication sheets  Pt also improving with accuracy of yes/no questions throughout session for basic info ~75% accuracy throughout  Pt continues to be limited by dec RUE coordination and mild R body inattention requiring mod VCs to incorporate it during functional task  Pt improving with self initiating head turns to R  Pt will continue to benefit from skilled OT services with focus on above mentioned deficits to inc safety and independence with I/ADL task s   Prognosis Good   Problem List Decreased strength;Decreased endurance; Impaired balance;Decreased mobility; Decreased coordination; Impaired judgement; Impaired sensation;Obesity; Impaired vision   Plan   Treatment/Interventions ADL retraining;Functional transfer training; Therapeutic exercise; Endurance training;Patient/family training;Equipment eval/education; Bed mobility; Compensatory technique education;Cognitive reorientation   Progress Progressing toward goals   Recommendation   OT Discharge Recommendation Home with home health rehabilitation   OT Therapy Minutes   OT Time In 0830   OT Time Out 0930 OT Total Time (minutes) 60   OT Mode of treatment - Individual (minutes) 60   OT Mode of treatment - Concurrent (minutes) 0   OT Mode of treatment - Group (minutes) 0   OT Mode of treatment - Co-treat (minutes) 0   OT Mode of Treatment - Total time(minutes) 60 minutes   OT Cumulative Minutes 420   Therapy Time missed   Time missed?  No

## 2023-04-25 NOTE — [facility] OCCUPATIONAL THERAPY
Occupational Therapy Weekly Team Note    Pt continues to make good progress with skilled occupational therapy intervention and is progressing toward long term goals for ADL, IADL's, and functional transfers/mobility  Pts long term goals for ADLs are Independent with Rolling Walker  Pt continues to present with impairments in activity tolerance, endurance, standing balance/tolerance, sitting balance/tolerance, safety , judgement , visual perceptual skills , depth perception , and (R) attention  Occupational performance remains limited by (R) visual deficits  and risk for falls  Family training/education has been ongoing  Pt will continue to benefit from skilled acute rehab OT services to address above mentioned barriers and maximize functional independence in baseline areas of occupation to meet established treatment goals with overall decreased burden of care  Plan of care to continue to focus on ADL Retraining , LB Dressing, IADL training , Kitchen Mobilty, Meal preparation, Medication management , Functional Transfers, Functional Cognition, Functional Attention, Assessment of Cognitive function, Standing tolerance, Standing balance , UE NMR right, Fine motor coordination, Gross motor coordination, Fine motor strengthening , R attention, Visual scanning, Low vision education/training, DME training/education, Family training/education, Energy conservation training/education, and healthy coping education  Goals for the upcoming week are: establish DME needs, focus on IADL management, establish/complete family caregiver training, and continue to progress assess for progression to independent goals in the room  Anticipate Discharge date to be set  Natalia Abt
no

## 2023-04-25 NOTE — PROGRESS NOTES
"   04/25/23 1400   Pain Assessment   Pain Assessment Tool 0-10   Pain Score No Pain   Restrictions/Precautions   Precautions Aphasia;Bed/chair alarms;Cognitive; Fall Risk;Supervision on toilet/commode;Visual deficit   Comprehension   Comprehension (FIM) 3 - Understands basic info/conversation 50-74% of time   Expression   Expression (FIM) 2 - Uses only simple expressions or gestures (waves, hello)   Social Interaction   Social Interaction (FIM) 5 - Interacts appropriately with others 90% of time   Problem Solving   Problem solving (FIM) 3 - Solves basic problmes 50-74% of time   Memory   Memory (FIM) 3 - Recognizes, recalls/performs 50-74%   Speech/Language/Cognition Assessmetn   Treatment Assessment Family Education/Training  Upon arriving into room for afternoon session, pt's mother Shari Barba was present  SLP asked mother to stay for a bit to engage in discussion about pt's current langauge skills, overall progression of expressive language deficits as well as recommendations for 24/7 supervision/assistance needed along w/ recs for OP therapy at d/c  SLP spoke to pt and pt's mother at length about the type of aphasia that pt is currently demonstrating, BUT how his receptive language skills, written>auditory comprehension is continuing to improve  SLP provided multiple examples of tasks which pt has engaged in and which tasks appear to be more difficulty vs easy   Once SLP spoke to pt's expressive language skills, primarily verbal output as written expression was to be addressed in this session, SLP stated that currently true targeted words in structured tasks is highly difficult for pt in addition to eliciting biographical information, etc  SLP did provide examples of how pt will elicit spontaneous \"filler\" phrases, as pt did demo throughout discussion, but as SLP targeted more specfic question of \"what did you have for lunch\" overall speech output was non-fluent, jargon filled, etc  However, pt was able to elicit some " "true words in the verbalizations made, to where SLP f/u w/ probing yes/no ?'s which did improve comprehension of his communicative intent  Pt's mother aware of this and SLP provided strategies to maximize comprehension/communication given the severity of deficits  SLP provided examples of yes/no ?'s, short/clear directions/statements, allowing pt time to get thought across, providing choices and use of visual aids/object or gestures  Pt and pt's mother highly receptive to this education  This lead to SLP asking about overall level of support upon d/c, which pt's mother stated she will likely be his primary support but his father is available/home all the time  SLP provided education in regard to the likelihood of pt needing 24/7 supervision/assistance due to severity of communication breakdown  Mom already has purchased an alert system for pt to use once home  She did comment that the times that pt would be \"alone\" might be during the need to take pt's father to appointments  SLP did provide education that due to the location of stroke and how language skills are then most impacted the overall recovery time is SLOWER and more prolonged  SLP providing education to pt and pt's mother about continued OP ST services at time of discharge which mother appropriately asking for locations  SLP stated to her that a \"[facility]\" OP center, which provides PT, OT and ST is recommended  SLP also stating to pt and pt's mother about team meeting which is held tomorrow to where likely a plan and possible d/c date might be set for pt  SLP stated that family training w/ her would be encouraged and recommended and will call to f/u w/ specific times  Pt's mother agreeable w/ all presented information  No further questions were asked at that time  Written Spontaneous Expression  SLP targeting pt's ability to write biographical info as well as common items around the room   Pt was able to write independently FULL name (first and last) " "w/o cues  However, when SLP asked pt to write address, pt demonstrating increased wait time as well as difficulty in initiation given information  Using pt's communication board choices for bio information, pt was able to copy the ENTIRE address accordingly  As for spontaneous written expression given 4 items around the room contain 2-3 letters in length, pt was not able to demonstrate ability to write items (0/4)  Pt was close given 1 item (TV--->TW), but all other attempts at words were non-related words (ie: real -->bed)  SLP providing word for pt to copy, which given increased time, he was able to write each one  Despite SLP's attempt to focus solely on written expression, pt continues to attempt to verbalize words, where he still was not able to spontaneously state words despite visual, phrase, phonemic and written cues  Of note, pt did spontaneously state \"that was brutal\"     Pt will continue to benefit from ongoing skilled SLP services at this time to maximize overall functional independence for basic communication skills, comprehension skills, cognitive linguistic skills in attempts to decrease caregiver burden over time  SLP Therapy Minutes   SLP Time In 1400   SLP Time Out 1500   SLP Total Time (minutes) 60   SLP Mode of treatment - Individual (minutes) 60   SLP Mode of treatment - Concurrent (minutes) 0   SLP Mode of treatment - Group (minutes) 0   SLP Mode of treatment - Co-treat (minutes) 0   SLP Mode of Treatment - Total time(minutes) 60 minutes   SLP Cumulative Minutes 480   Therapy Time missed   Time missed?  No       "

## 2023-04-26 LAB
GLUCOSE SERPL-MCNC: 125 MG/DL (ref 65–140)
GLUCOSE SERPL-MCNC: 88 MG/DL (ref 65–140)
GLUCOSE SERPL-MCNC: 93 MG/DL (ref 65–140)
GLUCOSE SERPL-MCNC: 95 MG/DL (ref 65–140)

## 2023-04-26 RX ADMIN — LEVETIRACETAM 750 MG: 750 TABLET, FILM COATED ORAL at 22:06

## 2023-04-26 RX ADMIN — SENNOSIDES 8.6 MG: 8.6 TABLET, FILM COATED ORAL at 22:06

## 2023-04-26 RX ADMIN — LOSARTAN POTASSIUM 25 MG: 25 TABLET, FILM COATED ORAL at 09:37

## 2023-04-26 RX ADMIN — FAMOTIDINE 20 MG: 20 TABLET ORAL at 22:06

## 2023-04-26 RX ADMIN — FAMOTIDINE 20 MG: 20 TABLET ORAL at 09:36

## 2023-04-26 RX ADMIN — ATORVASTATIN CALCIUM 40 MG: 40 TABLET, FILM COATED ORAL at 17:34

## 2023-04-26 RX ADMIN — LORATADINE 10 MG: 10 TABLET ORAL at 09:37

## 2023-04-26 RX ADMIN — APIXABAN 5 MG: 5 TABLET, FILM COATED ORAL at 17:34

## 2023-04-26 RX ADMIN — TAMSULOSIN HYDROCHLORIDE 0.4 MG: 0.4 CAPSULE ORAL at 17:34

## 2023-04-26 RX ADMIN — LEVETIRACETAM 750 MG: 750 TABLET, FILM COATED ORAL at 09:36

## 2023-04-26 RX ADMIN — AMLODIPINE BESYLATE 5 MG: 5 TABLET ORAL at 09:37

## 2023-04-26 RX ADMIN — ASPIRIN 81 MG 81 MG: 81 TABLET ORAL at 09:36

## 2023-04-26 RX ADMIN — METOPROLOL TARTRATE 25 MG: 25 TABLET, FILM COATED ORAL at 22:06

## 2023-04-26 RX ADMIN — APIXABAN 5 MG: 5 TABLET, FILM COATED ORAL at 09:37

## 2023-04-26 RX ADMIN — POLYETHYLENE GLYCOL 3350 17 G: 17 POWDER, FOR SOLUTION ORAL at 09:35

## 2023-04-26 RX ADMIN — METOPROLOL TARTRATE 25 MG: 25 TABLET, FILM COATED ORAL at 09:37

## 2023-04-26 NOTE — P[facility] PHYSICAL THERAPY
"  Patient continues to make good progress towards goals with skilled PT intervention  PT sessions continue to focus on balance and dual tasking  Pt reports min dizziness with head turns while amb >R  Pt cont to require VC's to avoid object on R side when amb due to R side inattention  AD no longer to be utilized for future session unless needed, pt demonstrated good righting reactions with any gait deviations  repetitive tactile/verbal cues for direction when dual tasking when amb  Cont POC as tolerated to improve remaining deficit  PT to also focus OT and SLP strategies within POC to overall progress his overall functional mobility (I) and safety  5/2/23 Pt continues to make progress functionally with improved balance and scanning of his environment  He has demo improved righting reactions as well with ability to self correct an minimal LOB  He is still experiencing dizziness with \"quick head turns\", > on right  Recovers quickly w/o need for seated rest breaks  Continues to require Supervision for direction and way finding with aphasia affecting his communication  Continually ambulating safely w/o device and supervision, working toward distant Sup and Ind in room  Goal for d/c home end of week and continue with out patient therapy    "

## 2023-04-26 NOTE — TEAM CONFERENCE
Acute RehabilitationTeam Conference Note  Date: 4/26/2023   Time: 10:56 A  M  Patient Name:  Papa Almanzar III       Medical Record Number: [id number]   YOB: 1975  Sex: Male          Room/Bed:  [facility]5/[facility] 965-01  Payor Info:  Payor: BLUE CROSS / Plan: Dylan Fall / Product Type: Blue Fee for Service /      Admitting Diagnosis: CVA (cerebral vascular accident) ([facility] ) [I63 9]   Admit Date/Time:  4/19/2023  1:49 PM  Admission Comments: No comment available     Primary Diagnosis:  Acute ischemic left MCA stroke (HCC)  Principal Problem: Acute ischemic left MCA stroke [facility])    Patient Active Problem List    Diagnosis Date Noted    Rhinorrhea 04/24/2023    Aphasia due to recent cerebrovascular accident (CVA) 04/21/2023    HIT (heparin-induced thrombocytopenia) (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 ) 04/11/2023    Bowel and bladder incontinence 04/08/2023    Hypertension 04/02/2023    Acute ischemic left MCA stroke ([facility] ) 03/30/2023    Obesity, Class III, BMI 40-49 9 (morbid obesity) ([place] [address] ) 03/30/2023    Encephalopathy 03/30/2023    Type 2 diabetes mellitus with circulatory disorder, without long-term current use of insulin ([place] [address] ) 03/30/2023       Physical Therapy:    Weight Bearing Status: Full Weight Bearing  Transfers: Supervision  Bed Mobility: Independent  Amulation Distance (ft): 300 feet  Ambulation: Incidental Touching  Assistive Device for Stairs: Bilateral Hand Rails  Stair Assistance: Incidental Touching  Discharge Recommendations: Home with:  [address] with[de-identified] 24 Hour Assisteance, 24 Hour Supervision      Patient continues to make good progress towards goals with skilled PT intervention   PT sessions continue to focus on balance and dual tasking  Pt reports min dizziness with head turns while amb >R  Pt cont to require VC's to avoid object on R side when amb due to R side inattention  AD no longer to be utilized for future session unless needed, pt demonstrated good righting reactions with any gait deviations  repetitive tactile/verbal cues for direction when dual tasking when amb  Cont POC as tolerated to improve remaining deficit  PT to also focus OT and SLP strategies within POC to overall progress his overall functional mobility (I) and safety  Occupational Therapy:  Eating: Supervision  Grooming: Supervision, Incidental Touching  Bathing: Incidental Touching  Bathing: Incidental Touching  Upper Body Dressing: Supervision  Lower Body Dressing: Minimal Assistance  Toileting: Incidental Touching  Toilet Transfer: Incidental Touching  Cognition: Exceptions to WNL  Cognition: Decreased Executive Functions, Decreased Attention, Decreased Comprehension, Decreased Safety  Orientation: Person, Place, Time, Situation  Discharge Recommendations: Home with:  [address] with[de-identified] Family Support, 24 Hour Assistance, 24 Hour Supervision, Outpatient Occupational Therapy       Occupational Therapy Weekly Team Note    Pt continues to make good progress with skilled occupational therapy intervention and is progressing toward long term goals for ADL, IADL's, and functional transfers/mobility  Pts long term goals for ADLs are Independent with Rolling Walker  Pt continues to present with impairments in activity tolerance, endurance, standing balance/tolerance, sitting balance/tolerance, safety , judgement , visual perceptual skills , depth perception , and (R) attention  Occupational performance remains limited by (R) visual deficits  and risk for falls  Family training/education will be required prior to D/C       Pt will continue to benefit from skilled acute rehab OT services to address above mentioned barriers and maximize functional independence in "baseline areas of occupation to meet established treatment goals with overall decreased burden of care  Plan of care to continue to focus on ADL Retraining , LB Dressing, IADL training , Kitchen Mobilty, Meal preparation, Medication management , Functional Transfers, Functional Cognition, Functional Attention, Assessment of Cognitive function, Standing tolerance, Standing balance , UE NMR right, Fine motor coordination, Gross motor coordination, Fine motor strengthening , R attention, Visual scanning, Low vision education/training, DME training/education, Family training/education, Energy conservation training/education, and healthy coping education  Goals for the upcoming week are: establish DME needs, focus on IADL management, establish/complete family caregiver training, and continue to progress assess for progression to independent goals in the room  Anticipate Re-team at this time  [facility] [place] Speech Therapy:           Pt currently being followed for language and cognitive tx sessions  Upon admission to the acute rehab center, pt engaged in completing the Bedside WAB in which overall bedside aphasia score was 15 which deems pt to demonstrate very severely impaired language deficits  Additionally, pt also demonstrates Broca's type aphasia as per Bedside Aphasia Classification Criteria, when comparing the pt's Fluency  Auditory Verbal Comprehension, Repetition scores  Pt w/ fluent speech jo-ann by jargon, neologisms, blocks and unintelligible mumbling  Pt generally unaware of expressive language impairments, continuing w/ long unintelligible utterances in responding to questions  Pt does present w/increased automatic conversational phrases that are clear and intelligible ie \"hi, whats up\" \"I think that's what it says, \"can you repeat that please? \" than in previous encounter  Written spontaneous expression was also jargon where pt demo awareness of incorrect written expression   Writing was slowed and effortful, " however pt able to write his first name w/ all capital letters and the initial two numbers of his address as well as a street name w/ the same initial letter as his current street name  In repetition task, pt often able to produce the correct initial phoneme of intended word then w/ neologisms  However, throughout pt's stay, it has been noted that pt does demonstrate improved reading comprehension given biographical information and orientation information provided written binary choices, as well as using very basic communication boards(FO2 pictures/words) to communicate basic wants/needs  Pt's receptive language skills provided visual written or pictures is also noted to be improving at this time  However, pt's biggest barrier continues to be expressive output, to where pt exhibits mumbling, non-fluent, repetative sounds/words, jargon, neologism, paraphasias, where minimal true words are elicited in structured expressive language tasks  Pt is observed to verbalize yes/no more appropriately, as well as shorter filler phrases in conversation as well as during structured tasks  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 in which pt does exhibit severe difficulty in ability to formulate sounds given short target words despite all levels of cueing (visual, phrase, phonemic, repetition written, etc)  Written expression is also severely impaired at this time as well  Of note, pt's mother, Serina Larson, has been present in sessions and increased education provided to her on 4/25 given recommendations for 24/7 supervision/assist at d/c due to the severity of his language deficits as well as other functional deficits (visual field cut to R, decreased ST recall, decreased executive functions) impacting ability to complete I ADL tasks at d/c  Mother is able to provide this support upon discussion, which SLP educated pt and mother about continued OP services at time of discharge, which both were in agreement   Currently pt will continue to benefit from skilled SLP services targeting overall functional expressive and receptive language skills as well as cognitive skills in hopes for decreasing caregiver burden over time  Nursing Notes:  Appetite: Good  Diet Type: Diabetic                                                                        Pain Score: 0                                  4/26 Pt will be monitored for pain and treated to promote adequate pain control  Pt skin intact and will be be monitored for breakdown  Pt will be monitored for bowel and bladder complications and will be treated accordingly for any complications  Reduction of stimulation will be maintained to promote adequate and comfortable sleep  Case Management:     Discharge Planning  Living Arrangements: Lives w/ Parent(s)  Support Systems: Parent  Assistance Needed: TBD  Type of Current Residence: Private residence  Current Home Care Services: No  Pt readmitted to acute rehab for therapy post stroke  Pt has supportive family and is expected to return home  Pts lcd is 5/6 and will require contd outpt therapy services on dc  Is the patient actively participating in therapies? yes  List any modifications to the treatment plan:     Barriers Interventions   Aphasia, apraxia, expressive greater than receptive deficits, mild cog def  Speech therapy exercises, family education   Right visual field cut Compensatory strategies   r ue coordination occuptional therapy exercises             Is the patient making expected progress toward goals?  yes  List any update or changes to goals:     Medical Goals: Patient will be medically stable for discharge to [facility] upon completion of rehab program and Patient will be able to manage medical conditions and comorbid conditions with medications and follow up upon completion of rehab program    Weekly Team Goals:   Rehab Team Goals  ADL Team Goal: Patient will be independent with ADLs with least restrictive device upon completion of rehab program  Transfer Team Goal: Patient will be independent with transfers with least restrictive device upon completion of rehab program  Locomotion Team Goal: Patient will be independent with locomotion with least restrictive device upon completion of rehab program  Cognitive Team Goal: Patient will require supervision for basic and complex tasks upon completion of rehab program    Discussion: pt presents with the above barriers and in additional dual tasking balance deficits  Pt needs assist with sequencing adls due to aphasia  Pt is not using an AD for ambulation and is contact guard  Pt is mod a with comprehension and executive functioning and memory  Pt is max a for expression  Goals are for independent with basic ads  Pt may need supervision for safety strategies  pts mother is very supportive and able to attend sessions for education/training  Recommendations are for contd outpt pt ot and speech    Anticipated Discharge Date:  5/5/23  [facility] Team Members Present: The following team members are supervising care for this patient and were present during this Weekly Team Conference      Physician: Dr Kodi Zazueta MD  : SAIDA Luis  Registered Nurse: Kevon Morris RN  Physical Therapist: Chrissy Felix DPT  Occupational Therapist: Matthias Garcia MS, OTR/L  Speech Therapist: Kathy Wilson, [id number] [facility]

## 2023-04-26 NOTE — PROGRESS NOTES
04/26/23 0800   Pain Assessment   Pain Assessment Tool 0-10   Pain Score No Pain   Restrictions/Precautions   Precautions Aphasia;Bed/chair alarms; Fall Risk;Supervision on toilet/commode;Visual deficit   Weight Bearing Restrictions No   ROM Restrictions No   Cognition   Arousal/Participation Alert; Cooperative   Attention Attends with cues to redirect   Following Commands Follows one step commands with increased time or repetition   Subjective   Subjective Pt agreeable to therapy   Sit to Stand   Type of Assistance Needed Supervision   Physical Assistance Level No physical assistance   Comment no AD   Sit to Stand CARE Score 4   Bed-Chair Transfer   Type of Assistance Needed Supervision   Comment no AD   Chair/Bed-to-Chair Transfer CARE Score 4   Walk 10 Feet   Type of Assistance Needed Supervision   Physical Assistance Level No physical assistance   Comment no AD   Walk 10 Feet CARE Score 4   Walk 50 Feet with Two Turns   Type of Assistance Needed Supervision   Physical Assistance Level No physical assistance   Comment CS, no AD   Walk 50 Feet with Two Turns CARE Score 4   Walk 150 Feet   Type of Assistance Needed Supervision   Physical Assistance Level No physical assistance   Comment CS, no AD   Walk 150 Feet CARE Score 4   Ambulation   Primary Mode of Locomotion Prior to Admission Walk   Distance Walked (feet) 160 ft  (+multiple shorter distance with testing )   Gait Pattern Step through; Inconsistant Mar[place]i; Wide ASHLEY   Limitations Noted In Endurance;Strength   Provided Assistance with: Direction   Walk Assist Level Close Supervision   Findings no AD utilized   Does the patient walk? 2  Yes   Wheel 50 Feet with Two Turns   Reason if not Attempted Activity not applicable   Wheel 50 Feet with Two Turns CARE Score 9   Wheel 150 Feet   Reason if not Attempted Activity not applicable   Wheel 505 Feet CARE Score 9   Wheelchair mobility   Does the patient use a wheelchair? 0   No   Therapeutic Interventions Strengthening dead bug on mat with PPT BLE AAROM 4sets x5 reps, core activation supine with pressing down on small red swiss ball, 4min seated ball toss with #3 bar, bridge with ball squeeze 2x10  Balance Tandem walking, CG/min A for balance  Other (S)  Mini BEST Test performed, including sit to stand, rise to toes for 3sec, standing on one leg, 10sec with LLE 20sec RLE with lean to R  compensatory stepping correction fwrd/bwrd/lateral, stance with feet together eyes open on firm surface, stance with feet together eyes closed on blue foam surface, standing on incline with eyes closed, change in gait speed, walk with horizonatal head turns, walk with pivot turns, step over obstacle, and one trial TUG 11 76sec  total score of 25/28  Please perform dual tasking TUG nex session  Equipment Use   NuStep L2 x12min BUE/LE   Other Comments   Comments BP start of session 150/85, after NU step /90   Assessment   Treatment Assessment Pt participated in skilled PT with focus on core/glute strengthening, endurance, and functional outcome measures for balance  Pt unable to understand PPT and core activation with swiss ball supine due to receptive aphasia, however able to complete other core activation exercises that were more easily demonstrated  Pt able to maintain balance overall with Mini-BEST Test functional score with good righting reaction , min LOB though able to correct  Cont POC as tolerated to improve core/glute/LE strengthening, endurance, balance, and  dual tasking/verbalization when amb  PT to incorporate OT/SLP strategies to assure safe d/c planning  Problem List Decreased strength; Impaired balance;Decreased coordination; Impaired vision   Plan   Treatment/Interventions LE strengthening/ROM; Therapeutic exercise; Endurance training;Gait training;Patient/family training   Progress Progressing toward goals   PT Therapy Minutes   PT Time In 0800   PT Time Out 0930   PT Total Time (minutes) 90   PT Mode of treatment - Individual (minutes) 90   PT Mode of treatment - Concurrent (minutes) 0   PT Mode of treatment - Group (minutes) 0   PT Mode of treatment - Co-treat (minutes) 0   PT Mode of Treatment - Total time(minutes) 90 minutes   PT Cumulative Minutes 510   Therapy Time missed   Time missed?  No

## 2023-04-26 NOTE — PCC CARE MANAGEMENT
Pt is making good progress and is scheduled to return home later this week with family and contd outpt pt, ot and speech services  Family training completed and pts mother will provide transport at dc

## 2023-04-26 NOTE — PROGRESS NOTES
"Physical Medicine and Rehabilitation Progress Note  Dorita Garcia III 41 y o  male MRN: [id number]  Unit/Bed#: -01 Encounter: [id number]      Assessment & Plan:     Decline in ADLs and mobility: Functional assessment -        FIM  Care Score  Admit Score Recent Score    Total assist  1-100% or 2p    Tot     Max assist 2-51-75%    Sub  toileting hygiene, bathing, lower body dressing  toileting hygiene   Mod assist 3- 26-50%  Par  upper body dressing    Min assist 3- 25% or < Par  Bathing, LBD   CG assist 4  TA     Sup/Setup 4-5 Sup  UBD   Mod-I/Indep 6 MI      Transfers   mod assist to significant assist  moderate assist to supervision    Ambulation    50 feet min assist  150 feet contact-guard    Stairs    4 steps moderate assist  4 steps contact-guard   SLP FIM admit - total assist expression, Max assist comp, PS, memory; SI - supervision  SLP FIM recent -moderate assist memory, significant assist problem solving comprehension and expression  Goal: Largely supervision for ADLs and mobility  Major barriers: Aphasia, imbalance, incoordination, deconditioning  Dispo: Home with estimate length of stay of 2 5 weeks from admission     * Acute ischemic left MCA stroke [facility])  Assessment & Plan  - 4/25 neuro exam stable  - Large left MCA infarct  - Residual impairments on admission to [facility]: Aphasia, weakness, imbalance (assess for other impairments during [facility] course)   - Co-morbidities most impacting functional recovery: Morbid obesity    Secondary stroke prevention  - Per prior providers   \"Thrombosis panel with abnormal antithrombin, Protein C/S activity, but per Neuro unclear significance in setting of acute stroke  Would repeat as outpatient     Will need Zio Patch/Loop Recorder with Cards Referral as outpatient   Discussed with Neurology on 4/9: Continue Keppra until outpatient f/u given location, temporal slowing, extent of lesion  \"  - Antithrombotic: Aspirin  - Statin  - Optimal management of blood pressure " and diabetes  -  patient and if applicable caregiver on optimal stroke management  - Follow-up with neurology and PCP after d/c   - Disability/FMLA forms completed and given to CM 4/21 to assist with and fax   -Continue acute comprehensive interdisciplinary inpatient rehabilitation to include intensive skilled therapies (PT, OT, ST) as outlined with oversight and management by rehabilitation physician as well as inpatient rehab level nursing, case management and weekly interdisciplinary team meetings           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)  Assessment & Plan  Saturating well on room air with and without activity so far  Monitor vitals with and without activity  Eliquis  Outpatient hematology consult    Rhinorrhea  Assessment & Plan  Possible allergy related, trial Claritin    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)  Assessment & Plan  Platelets remain improved on 4/24  Heparin-induced antibody positive, serotonin release assay pending  Transitioned from Arixtra to Eliquis  Monitor platelet count now improving    DVT of lower extremity, bilateral (HCC)  Assessment & Plan  Eliquis  Ambulation  Hold SCDs with hx of DVT     Urinary retention  Assessment & Plan  Flomax    Bowel and bladder incontinence  Assessment & Plan  Improving   Discontinue bladder scans as PVRs low  Toileting program  Flomax    Hypertension  Assessment & Plan  Blood pressure acceptable  Internal medicine consulted and co-management with their service  Monitor vitals with and without activity; monitor for orthostasis  Monitor hemoglobin, electrolytes, kidney function, hydration status   Current meds: Amlodipine, losartan, metoprolol      Type 2 diabetes mellitus with circulatory disorder, without long-term current use of insulin (HCC)  Assessment & Plan  Lab Results   Component Value Date    HGBA1C 8 0 (H) 03/31/2023     Internal medicine consulted and management at their discretion  Monitor for signs and symptoms of hypoglycemia   Current meds: Lispro sliding scale      Obesity, Class III, BMI 40-49 9 (morbid obesity) ([facility] [address] )  Assessment & Plan   on appropriate nutrition and activity  Adjustments accordingly during skilled therapy and with rehab nursing  Monitor skin closely for breakdown, wounds, rashes; keep clean and dry, turning Q2H   Follow-up with PCP after d/c        Other Medical Issues:       Follow-up providers and other issues to be followed up after discharge   PCP   Neuro   Hematology    CODE: Level 1: Full Code    Restrictions include: Fall precautions    Objective:     Allergies per EMR  Diagnostic Studies: Reviewed, no new imaging  No orders to display     See above as well    Laboratory: Labs reviewed  Results from last 7 days   Lab Units 04/24/23  0604 04/20/23  0705   HEMOGLOBIN g/dL 12 6 13 3   HEMATOCRIT % 40 5 42 4   WBC Thousand/uL 6 07 7 15     Results from last 7 days   Lab Units 04/24/23  0604 04/20/23  0705   BUN mg/dL 13 16   SODIUM mmol/L 139 138   POTASSIUM mmol/L 3 7 3 9   CHLORIDE mmol/L 108 110*   CREATININE mg/dL 1 01 1 00            Drug regimen reviewed, all potential adverse effects identified and addressed:    Scheduled Meds:  Current Facility-Administered Medications   Medication Dose Route Frequency Provider Last Rate    acetaminophen  975 mg Oral Q8H PRN Otis Calderon MD      amLODIPine  5 mg Oral Daily Otis Calderon MD      apixaban  5 mg Oral BID Otis Calderon MD      aspirin  81 mg Oral Daily Otis Calderon MD      atorvastatin  40 mg Oral QPM Otis Calderon MD      bisacodyl  10 mg Rectal Daily PRN Otis Calderon MD      bisacodyl  10 mg Rectal Once Otis Calderon MD      calcium carbonate  1,000 mg Oral TID PRN Otis Calderon MD      famotidine  20 mg Oral Q12H [address] Otis Calderon MD      insulin lispro  1-5 Units Subcutaneous TID [facility] Otis Calderon MD      levETIRAcetam  750 mg Oral Q12H [address] Everett Cornea, MD      lidocaine   Topical Q4H PRN Everett Cornea, MD      loratadine  10 mg Oral Daily Cassia Bernal PA-C      losartan  25 mg Oral Daily Everett Cornea, MD      metoprolol tartrate  25 mg Oral Q12H [address] Ghulam Cornea, MD      ondansetron  4 mg Oral Q8H PRN Everett Cornea, MD      oxyCODONE  2 5 mg Oral Q6H PRN Ghulam Cornea, MD      polyethylene glycol  17 g Oral Daily PRN Everett Cornea, MD      polyethylene glycol  17 g Oral Daily Everett Cornea, MD      senna  1 tablet Oral HS Ghulam Cornea, MD      tamsulosin  0 4 mg Oral After Carlos Manuel Hoskins MD         Chief Complaints: Aphasia    Subjective: On eval, patient continues with significant aphasia  This does limit communication somewhat but if given enough time patient appears to communicate no significant pain, shortness of breath, lightheadedness, constipation, worsening strength, balance, or other new complaints except having a recent runny nose and slightly scratchy throat  Physical Exam:  Temp:  [97 4 °F (36 3 °C)-98 1 °F (36 7 °C)] 97 5 °F (36 4 °C)  HR:  [72-88] 78  Resp:  [18] 18  BP: (126-141)/(74-86) 141/86  SpO2:  [96 %-98 %] 96 %  Vitals above reviewed on date of encounter    GEN:  Sitting in NAD   HEENT/NECK: MMM  CARDIAC: Regular rate rhythm, no murmers, no rubs, no gallops  LUNGS:  clear to auscultation, no wheezes, rales, or rhonchi  ABDOMEN: Soft, non-tender, non-distended, normal active bowel sounds  EXTREMITIES/SKIN:  no calf edema, no calf tenderness to palpation  NEURO:   MENTAL STATUS: Stable aphasia; good wakefulness and Strength/MMT:  Near full throughout  PSYCH:  Affect:  Euthymic     HPI:  68-year-old male with a history of hypertension, hyperlipidemia, obesity, diabetes status post recent left MCA stroke which was complicated by saddle pulmonary embolism,, HIT, bilateral lower extremity DVTs    Patient was evaluated by skilled therapies and was found to have significant decline in ADLs and ambulation and appears [facility]      ** Please Note: Fluency Direct voice to text software may have been used in the creation of this document  **    I personally performed the required components and examined the patient myself in person on 4/25/23

## 2023-04-26 NOTE — PROGRESS NOTES
"   04/26/23 9615   Pain Assessment   Pain Assessment Tool 0-10   Pain Score No Pain   Restrictions/Precautions   Precautions Aphasia;Bed/chair alarms;Cognitive; Fall Risk;Supervision on toilet/commode;Visual deficit   Comprehension   Comprehension (FIM) 3 - Understands basic info/conversation 50-74% of time   Expression   Expression (FIM) 2 - Uses only simple expressions or gestures (waves, hello)   Social Interaction   Social Interaction (FIM) 5 - Interacts appropriately with others 90% of time   Problem Solving   Problem solving (FIM) 3 - Solves basic problmes 50-74% of time   Memory   Memory (FIM) 3 - Recognizes, recalls/performs 50-74%   Speech/Language/Cognition Assessmetn   Treatment Assessment Pt was in therapy gym for session today after PT session  Focus of session continues to target pt's overall auditory and written comprehension skills given the following activities:    Auditory Simple to Moderate Yes/No ?'s  SLP engaged pt in auditory yea/no ?'s attempting to have pt elicit a verbal yes or no response  Of note, pt was ~50% successful in verbalizing yes or no, benefiting from written yes/no page for confirmation of verbal responses  Pt was 4/10 accurate in ability to answer these questions on the initial presentation of the question  Pt would request for SLP to \"say that again\" for an additional 5 items, where upon single repetition of question, pt was able to still demonstrate accuracy in response, increasing to 7/10 accuracy  The next 2 questions require 3-4 verbal repetitions for pt per his request, which continued to lead to accuracy in responses  Pt was errored on one item, noting decreased comprehension until SLP had to provide visual cues  Word to Picture Matching  Next task which was revisited from a few sessions ago was a task which had words of pictures to the L side of the page and then all corresponding pictures to the R of the page for pt to match accordingly   Of note, the last time this " was completed, pt did exhibit increased difficulty in reading word to match to picture to where SLP did provide pt w/ verbal stating of word which helped pt's comprehension in matching the word to the pictures  This round, SLP did not provide verbal statement of the word to match to the picture  Pt able to demonstrate improvement given his ability to comprehend the word and matching it to the picture, where pt was 18/20 accurate, noting errors given semantically related words (glass-cup) which pt reversed  Phrase Description to Picture Matching  Increasing the difficulty from the last task, SLP presented very similar item but now the description phrase is provided to then match to the corresponding picture  Pt was noted to have difficulty in comprehension given this likely due to the increased visual amount of words presented  As result of this SLP providing verbal/visual statement of phrases, which did improve pt's comprehension given the initial page, where pt was 5/5 accurate  However, when SLP did not provide the verbal cues, pt noted to have increased processing time, hesitancy in answering items at times  It was observed that comprehension given 2-5 word phrases was decreased, to where pt was 1/5 accurate on the next page presented  However, as SLP provided verbal phrases in conjunction w/ reading the phrase, where pt did increased to 5/5 accuracy  When completing task again, still having pt read the phrases to match, where pt was able to demonstrate 3/5 accuracy  Again upon SLP verbally stating the longer phrases, pt still did demonstrate increased processing time, but by process of elimination, pt was able to determine the phrase to picture  Overall during session, when asking generalized questions, pt's overall verbalizations remain to be jargon, neologims, and overall non-sensical speech related output   However, pt was at times able to attempt to emphasize things via use of gestures or pointing to items  Even upon walking back to room, SLP did not have to provide verbal cues for L or R turns as pt is beginning to demonstrate recall given navigation to room from therapy gyms  Once in room, SLP did offer toileting for pt, which pt was noted to have difficulty initially in scanning for location of the light switch, noted to be startled by grab bar which was not initially in site  However, pt did not have LOB when that occurred  Pt does continue to elicit shorter fluent conversational phrases at this time  Pt will continue to benefit from ongoing skilled SLP services at this time to maximize overall functional independence for basic communication skills, comprehension skills, cognitive linguistic skills in attempts to decrease caregiver burden over time  SLP Therapy Minutes   SLP Time In 0930   SLP Time Out 1030   SLP Total Time (minutes) 60   SLP Mode of treatment - Individual (minutes) 60   SLP Mode of treatment - Concurrent (minutes) 0   SLP Mode of treatment - Group (minutes) 0   SLP Mode of treatment - Co-treat (minutes) 0   SLP Mode of Treatment - Total time(minutes) 60 minutes   SLP Cumulative Minutes 540   Therapy Time missed   Time missed?  No

## 2023-04-26 NOTE — CASE MANAGEMENT
Cm s/w pts mother and reviewed team meeting update and informed of dc date of 5/5  She is in agreement and stated she would not be in to see pt today  She asked for CM to investigate options of [facility] for pt ot and speech services  Cm explained referral process and once referral is made [facility] would phone her to schedule appmts

## 2023-04-26 NOTE — PROGRESS NOTES
Pastoral Care Progress Note    2023  Patient: Mariposa Silva : 1975  Admission Date & Time: 2023 1349  MRN: [id number] [facility]: [id number]        First time I've had a chance to meet the PT, who had just received his lunch  Kept introduction brief, assured him of my supportive presence   remains available

## 2023-04-26 NOTE — PCC NURSING
Pt admitted with Saddle pulmonary embolus without cor pulmonale Had nonocclusive saddle embolus with filling defects distal main pulmonary arteries and throughout segmental branches bilaterally  Distal order branches were not clearly visualized due to severe respiratory motion artifact though felt likely containing pulmonary emboli  Sx were left sided CP, NIEVES and elevated D-dimer Continue Eliquis Was not a candidate for IR intervention 2/2 HIT Bilateral LE DVT- Continue Eliquis  HIT Heparin induced antibody/Heparin by serotonin release = both were positive S/p Argatroban Platelets recovered L MCA infarct 3/3/23- Suspected to be cardioembolic  Thrombosis panel then showed abnormal antithrombin, Protein C/S activity, but per Neuro unclear significance in setting of acute stroke and therefore wanted repeat as outpatient Cont ASA/statin Initial Echo/HODAN LVEF 55%; no thrombus/PFO Needs outpt LOOP/Zio patch Cont Keppra until seen by Neuro OP per their request 4/10 DM2- Hgb A1c 8 0 New diagnosis Needs DM teaching and PCP f/u on dc = mother is diabetic and knows how to use meter but not insulin Stable off Metformin and did not require sliding scale so dc'd Only needs repeat HbA1C as OP in 3 months or at PCPs discretion otherwise a meter is optional at this point  HTN- Home: on no meds Here:  Norvasc 5mg qd/Lopressor 25 mg q12hrs/Losartan 25mg qd Urine retention- on Flomax  Pt is continent of bowel and bladder  This week we will encourage independence with ADLs  We will monitor labs and vital signs  WE will educate pt/family about repositioning to prevent skin breakdown  We will assist w repositioning and perform routine skin checks  We will monitor for adequate pain control  We will monitor for constipation and medicate pt as ordered  We will provide pt and family with DM education We will increase safety awareness and keep pt free from falls

## 2023-04-26 NOTE — PLAN OF CARE
Problem: PAIN - ADULT  Goal: Verbalizes/displays adequate comfort level or baseline comfort level  Description: Interventions:  - Encourage patient to monitor pain and request assistance  - Assess pain using appropriate pain scale  - Administer analgesics based on type and severity of pain and evaluate response  - Implement non-pharmacological measures as appropriate and evaluate response  - Consider cultural and social influences on pain and pain management  - Notify physician/advanced practitioner if interventions unsuccessful or patient reports new pain  Outcome: Progressing     Problem: INFECTION - ADULT  Goal: Absence or prevention of progression during hospitalization  Description: INTERVENTIONS:  - Assess and monitor for signs and symptoms of infection  - Monitor lab/diagnostic results  - Monitor all insertion sites, i e  indwelling lines, tubes, and drains  - Monitor endotracheal if appropriate and nasal secretions for changes in amount and color  - [place] appropriate cooling/warming therapies per order  - Administer medications as ordered  - Instruct and encourage patient and family to use good hand hygiene technique  - Identify and instruct in appropriate isolation precautions for identified infection/condition  Outcome: Progressing     Problem: SAFETY ADULT  Goal: Patient will remain free of falls  Description: INTERVENTIONS:  - Educate patient/family on patient safety including physical limitations  - Instruct patient to call for assistance with activity   - Consult OT/PT to assist with strengthening/mobility   - Keep Call bell within reach  - Keep bed low and locked with side rails adjusted as appropriate  - Keep care items and personal belongings within reach  - Initiate and maintain comfort rounds  - Make Fall Risk Sign visible to staff  - Offer Toileting every 2 Hours, in advance of need  - Initiate/Maintain bed/chair alarm  - Obtain necessary fall risk management equipment: alarms  - Apply yellow socks and bracelet for high fall risk patients  - Consider moving patient to room near nurses station  Outcome: Progressing  Goal: Maintain or return to baseline ADL function  Description: INTERVENTIONS:  -  Assess patient's ability to carry out ADLs; assess patient's baseline for ADL function and identify physical deficits which impact ability to perform ADLs (bathing, care of mouth/teeth, toileting, grooming, dressing, etc )  - Assess/evaluate cause of self-care deficits   - Assess range of motion  - Assess patient's mobility; develop plan if impaired  - Assess patient's need for assistive devices and provide as appropriate  - Encourage maximum independence but intervene and supervise when necessary  - Involve family in performance of ADLs  - Assess for home care needs following discharge   - Consider OT consult to assist with ADL evaluation and planning for discharge  - Provide patient education as appropriate  Outcome: Progressing  Goal: Maintains/Returns to pre admission functional level  Description: INTERVENTIONS:  - Perform BMAT or MOVE assessment daily    - Set and communicate daily mobility goal to care team and patient/family/caregiver  - Collaborate with rehabilitation services on mobility goals if consulted  - Perform Range of Motion 3 times a day  - Reposition patient every 2 hours    - Dangle patient 3 times a day  - Stand patient 3 times a day  - Ambulate patient 3 times a day  - Out of bed to chair 3 times a day   - Out of bed for meals 3 times a day  - Out of bed for toileting  - Record patient progress and toleration of activity level   Outcome: Progressing     Problem: DISCHARGE PLANNING  Goal: Discharge to home or other facility with appropriate resources  Description: INTERVENTIONS:  - Identify barriers to discharge w/patient and caregiver  - Arrange for needed discharge resources and transportation as appropriate  - Identify discharge learning needs (meds, wound care, etc )  - Arrange for interpretive services to assist at discharge as needed  - Refer to Case Management Department for coordinating discharge planning if the patient needs post-hospital services based on physician/advanced practitioner order or complex needs related to functional status, cognitive ability, or social support system  Outcome: Progressing     Problem: Prexisting or High Potential for Compromised Skin Integrity  Goal: Skin integrity is maintained or improved  Description: INTERVENTIONS:  - Identify patients at risk for skin breakdown  - Assess and monitor skin integrity  - Assess and monitor nutrition and hydration status  - Monitor labs   - Assess for incontinence   - Turn and reposition patient  - Assist with mobility/ambulation  - Relieve pressure over bony prominences  - Avoid friction and shearing  - Provide appropriate hygiene as needed including keeping skin clean and dry  - Evaluate need for skin moisturizer/barrier cream  - Collaborate with interdisciplinary team   - Patient/family teaching  - Consider wound care consult   Outcome: Progressing     Problem: Nutrition/Hydration-ADULT  Goal: Nutrient/Hydration intake appropriate for improving, restoring or maintaining nutritional needs  Description: Monitor and assess patient's nutrition/hydration status for malnutrition  Collaborate with interdisciplinary team and initiate plan and interventions as ordered  Monitor patient's weight and dietary intake as ordered or per policy  Utilize nutrition screening tool and intervene as necessary  Determine patient's food preferences and provide high-protein, high-caloric foods as appropriate       INTERVENTIONS:  - Monitor oral intake, urinary output, labs, and treatment plans  - Assess nutrition and hydration status and recommend course of action  - Evaluate amount of meals eaten  - Assist patient with eating if necessary   - Allow adequate time for meals  - Recommend/ encourage appropriate diets, oral nutritional supplements, and vitamin/mineral supplements  - Order, calculate, and assess calorie counts as needed  - Recommend, monitor, and adjust tube feedings and TPN/PPN based on assessed needs  - Assess need for intravenous fluids  - Provide specific nutrition/hydration education as appropriate  - Include patient/family/caregiver in decisions related to nutrition  Outcome: Progressing

## 2023-04-26 NOTE — PROGRESS NOTES
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)    Aphasia due to recent cerebrovascular accident (CVA)    Rhinorrhea       Past Medical History:   Diagnosis Date    Hypertensive emergency 3/30/2023     Etiologic Diagnosis: L MCA CVA with Superimposed Micro-hemorrhage  Restrictions/Precautions  Precautions: Aphasia, Bed/chair alarms, Cognitive, Fall Risk, Supervision on toilet/commode, Visual deficit  Weight Bearing Restrictions: No  ROM Restrictions: No  ADL Team Goal: Patient will be independent with ADLs with least restrictive device upon completion of rehab program  Occupational Therapy LTG's  Eating Oral care Bathing LB dress UB dress   Eating Goal: 06  Independent - Patient completes the activity by him/herself with no assistance from a helper  Oral Hygiene Goal: 06  Independent - Patient completes the activity by him/herself with no assistance from a helper  Shower/bathe self Goal: 04  Supervision or touching assistance- [place] provides VERBAL CUES or supervision throughout activity  Lower body dressing Goal: 06  Independent - Patient completes the activity by him/herself with no assistance from a helper  Upper body dressing Goal: 06  Independent - Patient completes the activity by him/herself with no assistance from a helper  Toileting Toilet txf Func txf IADL Med    Toileting hygiene Goal: 06   Independent - Patient completes the activity by him/herself "with no assistance from a helper  Toilet transfer Goal: 06  Independent - Patient completes the activity by him/herself with no assistance from a helper  Chair/bed-to-chair transfer Goal: 06  Independent - Patient completes the activity by him/herself with no assistance from a helper  Assist Level: Independent (Light meal prep) Assist Level: Supervision   OT interventions: Treatment/Interventions: ADL retraining, Functional transfer training, Therapeutic exercise, Endurance training, Patient/family training, Equipment eval/education, Bed mobility, Compensatory technique education, Cognitive reorientation  Discharge Plan:  OT Discharge Recommendation: Home with home health rehabilitation   DME:  ,  ,       04/26/23 1245   Pain Assessment   Pain Assessment Tool 0-10   Pain Score No Pain   Lifestyle   Autonomy Joselito Esteban got to laugh a little  \"   Bed-Chair Transfer   Type of Assistance Needed Supervision   Comment No device   Chair/Bed-to-Chair Transfer CARE Score 4   Meal Prep   Meal Preparation Pt engages in light meal preparation tasks for cold meal prep, and hot meal prep in microwave  Pt instructed to make peanut butter and Jelly sandwich  Pt able to select appropriate items from a cluttered field of items  Only difficulty was locating some items in R visual field that did not look like they are normally stored  Anticipate pt would do better in familiar environment  Verbal instruction to cut the sandwich was unsuccessful, but gestures to cut were  Pt then engaging in microwave use with instruction for cooking macaroni and cheese  Pt requires increased time to manage familiarity with microwave  Once familiar Pt is able to select appropriate 2 min time as indicated by instructions  Instructions for cooking are modified to large print with simple \"cook 2 mins  \"  Pt may benefit from modified instructions on packaging for simple clear instructions   Pt engages in cold meal prep with open instruction to make a bowl of " "cereal, specifically Corn Flakes  Upon looking in cabinet pt able to locate hot oatmeal cereals, but missing item on R with spontaneous searching  Task limited to unfamiliarity of kitchen but instructed to open drawers and look for what he wanted  Potential difficulty with figure ground with locating spoons in the drawer with plastic silverware that was all white  Once pointed out pt states \"oh there it is  \" Pt then required same assistance for searching for milk in fridge as milk is in a small container  Presented with field of three items able to locate milk  Pour into cereal and states \"taada! \" anticipate that pt will require some initial supervision with meal preparation, but should be able to complete  Pt can still benefit from continued meal preparation tasks, including stove top use  Additional Activities   Additional Activities Comments Pt engages in simulated emergency response training  Pt provided with standard phone and instructed to call for help  cautiously pt is able to initiate and dial 911  Pt instructed to dial his mothers phone number from written, able to complete slowly  Pt able to pick out his own phone number from field of three  Pt trialed answering a phone call with basic demographic questions  Unable to verbally report, and unable to report from written script  Introduced use of text to talk justina on phone  Set up with preprogrammed sayings \"my name is Pete Breen, I need help, my address is [address] [facility], my mothers name is Drake Ortega, and I have aphasia  I have difficulty speaking  \" various trials initiated first with MADINA [facility] for carryover of instructions, then progressed to answer with questions from therapist  3/5 accuracy for answering correct question  MIN Verbal prompts to use justina to speak  Pt then complete func mobility around the unit and answered questions from staff with use of justina  Pt overall with improved carryover of using justina when presented   Pt not always accurate in response, " but pt appeared happy and excited to show staff all the responses  Pt demonstrating that he may have the ability to use assistive tech to assist in stating basic demographic information  Assessment   Treatment Assessment Pt participated in skilled OT treatment session with treatment focus on meal preparation, emergency response training and assistive tech education  Pt tolerated session well today  pts demo abilty to perform meal preparation tasks with Supervised cues, anticipate pt will be able to progress to ind eventually  modficitations to food labels and kitchen may be required  R visual field cut is large contributing factor in difficulty when performing IADL tasks  COnt training for spontaneous visual scanning and searching in environments  Cont carryover for emergency response training, hot meal prep, visual scanning     Prognosis Good   Plan   Progress Progressing toward goals   OT Therapy Minutes   OT Time In 1245   OT Time Out 1430   OT Total Time (minutes) 105   OT Mode of treatment - Individual (minutes) 105   OT Mode of treatment - Concurrent (minutes) 0   OT Mode of treatment - Group (minutes) 0   OT Mode of treatment - Co-treat (minutes) 0   OT Mode of Treatment - Total time(minutes) 105 minutes   OT Cumulative Minutes 457

## 2023-04-27 LAB
ANION GAP SERPL CALCULATED.3IONS-SCNC: 2 MMOL/L (ref 4–13)
BASOPHILS # BLD AUTO: 0.06 THOUSANDS/ΜL (ref 0–0.1)
BASOPHILS NFR BLD AUTO: 1 % (ref 0–1)
BUN SERPL-MCNC: 11 MG/DL (ref 5–25)
CALCIUM SERPL-MCNC: 8.7 MG/DL (ref 8.3–10.1)
CHLORIDE SERPL-SCNC: 107 MMOL/L (ref 96–108)
CO2 SERPL-SCNC: 28 MMOL/L (ref 21–32)
CREAT SERPL-MCNC: 1 MG/DL (ref 0.6–1.3)
EOSINOPHIL # BLD AUTO: 0.43 THOUSAND/ΜL (ref 0–0.61)
EOSINOPHIL NFR BLD AUTO: 8 % (ref 0–6)
ERYTHROCYTE [DISTWIDTH] IN BLOOD BY AUTOMATED COUNT: 13.7 % (ref 11.6–15.1)
GFR SERPL CREATININE-BSD FRML MDRD: 90 ML/MIN/1.73SQ M
GLUCOSE P FAST SERPL-MCNC: 93 MG/DL (ref 65–99)
GLUCOSE SERPL-MCNC: 107 MG/DL (ref 65–140)
GLUCOSE SERPL-MCNC: 93 MG/DL (ref 65–140)
GLUCOSE SERPL-MCNC: 93 MG/DL (ref 65–140)
GLUCOSE SERPL-MCNC: 97 MG/DL (ref 65–140)
GLUCOSE SERPL-MCNC: 99 MG/DL (ref 65–140)
HCT VFR BLD AUTO: 40.3 % (ref 36.5–49.3)
HGB BLD-MCNC: 12.6 G/DL (ref 12–17)
IMM GRANULOCYTES # BLD AUTO: 0.01 THOUSAND/UL (ref 0–0.2)
IMM GRANULOCYTES NFR BLD AUTO: 0 % (ref 0–2)
LYMPHOCYTES # BLD AUTO: 1.82 THOUSANDS/ΜL (ref 0.6–4.47)
LYMPHOCYTES NFR BLD AUTO: 32 % (ref 14–44)
MCH RBC QN AUTO: 26.5 PG (ref 26.8–34.3)
MCHC RBC AUTO-ENTMCNC: 31.3 G/DL (ref 31.4–37.4)
MCV RBC AUTO: 85 FL (ref 82–98)
MONOCYTES # BLD AUTO: 0.5 THOUSAND/ΜL (ref 0.17–1.22)
MONOCYTES NFR BLD AUTO: 9 % (ref 4–12)
NEUTROPHILS # BLD AUTO: 2.87 THOUSANDS/ΜL (ref 1.85–7.62)
NEUTS SEG NFR BLD AUTO: 50 % (ref 43–75)
NRBC BLD AUTO-RTO: 0 /100 WBCS
PLATELET # BLD AUTO: 312 THOUSANDS/UL (ref 149–390)
PMV BLD AUTO: 11 FL (ref 8.9–12.7)
POTASSIUM SERPL-SCNC: 3.8 MMOL/L (ref 3.5–5.3)
RBC # BLD AUTO: 4.75 MILLION/UL (ref 3.88–5.62)
SODIUM SERPL-SCNC: 137 MMOL/L (ref 135–147)
WBC # BLD AUTO: 5.69 THOUSAND/UL (ref 4.31–10.16)

## 2023-04-27 RX ADMIN — AMLODIPINE BESYLATE 5 MG: 5 TABLET ORAL at 08:46

## 2023-04-27 RX ADMIN — LOSARTAN POTASSIUM 25 MG: 25 TABLET, FILM COATED ORAL at 08:46

## 2023-04-27 RX ADMIN — SENNOSIDES 8.6 MG: 8.6 TABLET, FILM COATED ORAL at 20:42

## 2023-04-27 RX ADMIN — APIXABAN 5 MG: 5 TABLET, FILM COATED ORAL at 08:46

## 2023-04-27 RX ADMIN — POLYETHYLENE GLYCOL 3350 17 G: 17 POWDER, FOR SOLUTION ORAL at 08:45

## 2023-04-27 RX ADMIN — LEVETIRACETAM 750 MG: 750 TABLET, FILM COATED ORAL at 08:46

## 2023-04-27 RX ADMIN — APIXABAN 5 MG: 5 TABLET, FILM COATED ORAL at 17:18

## 2023-04-27 RX ADMIN — METOPROLOL TARTRATE 25 MG: 25 TABLET, FILM COATED ORAL at 08:46

## 2023-04-27 RX ADMIN — FAMOTIDINE 20 MG: 20 TABLET ORAL at 08:46

## 2023-04-27 RX ADMIN — FAMOTIDINE 20 MG: 20 TABLET ORAL at 20:42

## 2023-04-27 RX ADMIN — LORATADINE 10 MG: 10 TABLET ORAL at 08:46

## 2023-04-27 RX ADMIN — ATORVASTATIN CALCIUM 40 MG: 40 TABLET, FILM COATED ORAL at 17:19

## 2023-04-27 RX ADMIN — LEVETIRACETAM 750 MG: 750 TABLET, FILM COATED ORAL at 20:42

## 2023-04-27 RX ADMIN — TAMSULOSIN HYDROCHLORIDE 0.4 MG: 0.4 CAPSULE ORAL at 17:19

## 2023-04-27 RX ADMIN — ASPIRIN 81 MG 81 MG: 81 TABLET ORAL at 08:46

## 2023-04-27 RX ADMIN — METOPROLOL TARTRATE 25 MG: 25 TABLET, FILM COATED ORAL at 20:42

## 2023-04-27 NOTE — PROGRESS NOTES
"   04/27/23 1314   Pain Assessment   Pain Assessment Tool 0-10   Pain Score No Pain   Restrictions/Precautions   Precautions Aphasia;Bed/chair alarms;Cognitive; Fall Risk;Supervision on toilet/commode;Visual deficit   Weight Bearing Restrictions No   ROM Restrictions No   Comprehension   Comprehension (FIM) 3 - Understands basic info/conversation 50-74% of time   Expression   Expression (FIM) 2 - Uses only simple expressions or gestures (waves, hello)   Social Interaction   Social Interaction (FIM) 5 - Interacts appropriately with others 90% of time   Problem Solving   Problem solving (FIM) 3 - Solves basic problmes 50-74% of time   Memory   Memory (FIM) 3 - Recognizes, recalls/performs 50-74%   Speech/Language/Cognition Assessmetn   Treatment Assessment Pt seen for skilled speech therapy session targeting expressive and receptive language skills  Pt observed to be completing some of the copying/writing homework left from primary SLP- observed pt is able to copy the letters and words  Pt's mother present and observed session as well  Pt completed the following  Written Expression  Trialed writing from verbal dictation- SLP provided single words for pt to attempt to write but unable to complete on 0/3 trials  HOWEVER, the words pt DID write were complete and correct words, observed only phoneme/letter error on one of the words (e g , prompt was \"Shanique\" and pt wrote \"Payton\")  Reading Comprehension  Pt provided with picture of object and Fx2 choice of words  Pt was able to select correct word for picture with 7/8acc  Noted pt needed verbal prompting of SLP to read words to him for first set only, then able to complete on his own without SLP reading word out loud first  Pt then trialed Fx3 task- 5/8acc, increasing with SLP reading the words out loud to then pick  Pt benefits from auditory input to aid in reading comprehension at this time   Pt also started word sorting task- pt provided with 6 categories to sort " single words from list below  SLP assisted in reading off target words with category names for first 2 trials in which pt was able to match words correctly with 4/6acc  Pt benefitting from reducing field to Fx3 options for the categories as well to completed task  During task and general conversation, pt is improving with more vocalizations but also conts with nonsense speech  Reviewed pt's cont'd barriers with expressive/receptive deficits as well as motor speech components  Pt will cont to benefit from skilled SLP services to maximize communication skills for increased independence and decreased burden of care at this time  SLP Therapy Minutes   SLP Time In 4735   SLP Time Out 1400   SLP Total Time (minutes) 45   SLP Mode of treatment - Individual (minutes) 45   SLP Mode of treatment - Concurrent (minutes) 0   SLP Mode of treatment - Group (minutes) 0   SLP Mode of treatment - Co-treat (minutes) 0   SLP Mode of Treatment - Total time(minutes) 45 minutes   SLP Cumulative Minutes 585   Therapy Time missed   Time missed?  No

## 2023-04-27 NOTE — PROGRESS NOTES
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   Had nonocclusive saddle embolus with filling defects distal main pulmonary arteries and throughout segmental branches bilaterally   Distal order branches were not clearly visualized due to severe respiratory motion artifact though felt likely containing pulmonary emboli   Sx were left sided CP, NIEVES and elevated D-dimer   Continue Eliquis   Was not a candidate for IR intervention 2/2 HIT     Bilateral LE DVT   Continue Eliquis      HIT   Heparin induced antibody was positive   Heparin by serotonin release from 4/12/23 was positive   S/p Argatroban   Platelet count was as low as 42, now recovered to 312     L MCA infarct 3/3/23   Suspected to be cardioembolic   Thrombosis panel then showed abnormal antithrombin, Protein C/S activity, but per Neuro unclear significance in setting of acute stroke and therefore wanted repeat as outpatient   Cont ASA/statin   Initial Echo/HODAN LVEF 55%; no thrombus/PFO   Needs outpt LOOP/Zio patch   Originally to be on Keppra x 7 days then was stopped   Dr Jc Beverage d/w with Neuro on 4/10 and they wanted to keep the [address] on until outpatient follow-up and was therefore restarted     DM2   Hgb A1c 8 0   New diagnosis   Cont QID Accuchecks/SSI and DM diet   Home:  No meds   Here: SSI only  Elfredia Honey been on Metformin when in [facility] before transfer back for PE   Will consider restarting Metformin if needed   Needs DM teaching and PCP f/u on dc = mother is diabetic and knows how to use meter but not insulin     HTN   Home: on no meds   Here:  Norvasc 5mg qd/Lopressor 25 mg q12hrs/Losartan 25mg qd   stable     Urine retention   Per PMR   Continue Flomax     Obesity   Current BMI 38 7   Wt loss counseling when recovered        Discharge date:  5/5/23       The above assessment and plan was reviewed and updated as determined by my evaluation of the patient on 4/27/2023      Labs:   Results from last 7 days   Lab Units 04/27/23  0514 04/24/23  0604   WBC Thousand/uL 5 69 6 07   HEMOGLOBIN g/dL 12 6 12 6   HEMATOCRIT % 40 3 40 5   PLATELETS Thousands/uL 312 309     Results from last 7 days   Lab Units 04/27/23  0514 04/24/23  0604   SODIUM mmol/L 137 139   POTASSIUM mmol/L 3 8 3 7   CHLORIDE mmol/L 107 108   CO2 mmol/L 28 26   BUN mg/dL 11 13   CREATININE mg/dL 1 00 1 01   CALCIUM mg/dL 8 7 8 9             Results from last 7 days   Lab Units 04/27/23  1107 04/27/23  0701 04/26/23  2058   POC GLUCOSE mg/dl 99 93 93       Review of Scheduled Meds:  Current Facility-Administered Medications   Medication Dose Route Frequency Provider Last Rate    acetaminophen  975 mg Oral Q8H PRN Kamila Wu MD      amLODIPine  5 mg Oral Daily Kamila Wu MD      apixaban  5 mg Oral BID Kamila Wu MD      aspirin  81 mg Oral Daily Kamila Wu MD      atorvastatin  40 mg Oral QPM Kamila Wu MD      bisacodyl  10 mg Rectal Daily PRN Kamila Wu MD      bisacodyl  10 mg Rectal Once Kamila Wu MD      calcium carbonate  1,000 mg Oral TID PRN Kamila Wu MD      famotidine  20 mg Oral Q12H [address] Kamila Wu MD      insulin lispro  1-5 Units Subcutaneous TID [facility] Kamila Wu MD      levETIRAcetam  750 mg Oral Q12H [address] Kamila Wu MD      lidocaine   Topical Q4H PRN Kamila Wu MD      loratadine  10 mg Oral Daily Cassia Bernal PA-C      losartan  25 mg Oral Daily Kamila Wu MD      metoprolol tartrate  25 mg Oral Q12H [address] Kamila Wu MD      ondansetron  4 mg Oral Q8H PRN Kamila Wu MD      oxyCODONE  2 5 mg Oral Q6H PRN Kamila Wu MD      polyethylene glycol  17 g Oral Daily PRN Ghulam Cornea, MD      polyethylene glycol  17 g Oral Daily Ghulam Story MD      senna  1 tablet Oral HS Ghulam Cornea, MD      tamsulosin  0 4 mg Oral After Carlos Manuel Hoskins MD         Subjective/ HPI: Patient seen and examined  Patients overnight issues or events were reviewed with nursing or staff during rounds or morning huddle session  New or overnight issues include the following:     No new or overnight issues  Offers no complaints    ROS:   A 10 point ROS was performed; negative except as noted above  Imaging:     No orders to display       *Labs /Radiology studies reviewed  *Medications reviewed and reconciled as needed  *Please refer to order section for additional ordered labs studies  *Case discussed with primary attending during morning huddle case rounds    Physical Examination:  Vitals:   Vitals:    04/26/23 2055 04/27/23 0517 04/27/23 0521 04/27/23 0846   BP: 139/87  127/84 127/82   BP Location: Right arm  Right arm    Pulse: 77  70 86   Resp: 17  18    Temp: 97 5 °F (36 4 °C)  97 7 °F (36 5 °C)    TempSrc: Oral  Oral    SpO2: 97%  93%    Weight:  132 kg (291 lb 14 2 oz)     Height:           General Appearance: no distress, conversive  HEENT: PERRLA, conjuctiva normal; oropharynx clear; mucous membranes moist   Neck:  Supple, normal ROM  Lungs: CTA, normal respiratory effort, no retractions, expiratory effort normal  CV: regular rate and rhythm; no rubs/murmurs/gallops, PMI normal   ABD: soft; ND/NT; +BS  EXT: no edema  Skin: normal turgor, normal texture, no rashes  Psych: affect normal, mood normal  Neuro: AA; +expressive/receptive aphasia      The above physical exam was reviewed and updated as determined by my evaluation of the patient on 4/27/2023      Invasive Devices     None                    VTE Pharmacologic Prophylaxis: Eliquis  Code Status: Level 1 - Full Code  Current Length of Stay: 8 day(s)      Total time spent:  30 minutes with more than 50% spent counseling/coordinating care  Counseling includes discussion with patient re: progress  and discussion with patient of his/her current medical state/information  Coordination of patient's care was performed in conjunction with primary service  Time invested included review of patient's labs, vitals, and management of their comorbidities with continued monitoring  In addition, this patient was discussed with medical team including physician and advanced extenders  The care of the patient was extensively discussed and appropriate treatment plan was formulated unique for this patient  Medical decision making for the day was made by supervising physician unless otherwise noted in their attestation statement  ** Please Note:  voice to text software may have been used in the creation of this document   Although proof errors in transcription or interpretation are a potential of such software**

## 2023-04-27 NOTE — PROGRESS NOTES
04/27/23 0700   Pain Assessment   Pain Assessment Tool 0-10   Pain Score No Pain   Restrictions/Precautions   Precautions Aphasia;Bed/chair alarms;Cognitive; Fall Risk;Supervision on toilet/commode   Weight Bearing Restrictions No   ROM Restrictions No   Eating   Type of Assistance Needed Independent   Physical Assistance Level No physical assistance   Eating CARE Score 6   Oral Hygiene   Type of Assistance Needed Adaptive equipment; Incidental touching   Physical Assistance Level No physical assistance   Comment CGA while in stance at sink, improved RUE coordinationn and natural incorporation into task  Oral Hygiene CARE Score 4   Shower/Bathe Self   Type of Assistance Needed Incidental touching   Physical Assistance Level No physical assistance   Comment CGA while in stance for rear/chay hygiene  Shower/Bathe Self CARE Score 4   Tub/Shower Transfer   Reason Not Assessed Sponge Bath;Patient refusal   Upper Body Dressing   Type of Assistance Needed Incidental touching   Physical Assistance Level No physical assistance   Comment CGA in stance  Upper Body Dressing CARE Score 4   Lower Body Dressing   Type of Assistance Needed Incidental touching   Physical Assistance Level No physical assistance   Comment CGA while in stance for CM up/down over hips  Lower Body Dressing CARE Score 4   Putting On/Taking Off Footwear   Type of Assistance Needed Supervision   Physical Assistance Level No physical assistance   Comment seated via cross leg technique   Putting On/Taking Off Footwear CARE Score 4   Lying to Sitting on Side of Bed   Type of Assistance Needed Physical assistance   Physical Assistance Level 25% or less   Comment on standard bed with no rails     Lying to Sitting on Side of Bed CARE Score 3   Sit to Stand   Type of Assistance Needed Supervision   Physical Assistance Level No physical assistance   Sit to Stand CARE Score 4   Bed-Chair Transfer   Type of Assistance Needed Supervision   Physical Assistance Level No physical assistance   Comment no AD   Chair/Bed-to-Chair Transfer CARE Score 4   Cognition   Overall Cognitive Status Impaired   Arousal/Participation Alert; Cooperative   Attention Attends with cues to redirect   Orientation Level Oriented X4   Memory Decreased recall of recent events   Following Commands Follows one step commands with increased time or repetition   Activity Tolerance   Activity Tolerance Patient tolerated treatment well   Assessment   Treatment Assessment Pt participated in skilled OT services with focus on ADL retraining, dual tasking, standing balance/tolerance, functional mobility, RUE NMR, and visual scanning  Pt continues to be limited by expressive aphasia, he is improving with accuracy with yes/no's and is improving with simple conversational speech  R visual neglect remains large deficit and continues to require min VC's for scanning to R fully  Pt with inc challenge during dual tasking  Pt improving to overall CS without an AD  Pt will continue to benefit from skilled OT services with focus on above mentioned deficits to inc safety and independence with I/ADL tasks  Prognosis Good   Problem List Decreased strength; Impaired balance;Decreased coordination; Impaired vision   Plan   Treatment/Interventions ADL retraining;Functional transfer training; Therapeutic exercise; Endurance training;Patient/family training;Equipment eval/education; Bed mobility; Compensatory technique education   Progress Progressing toward goals   Recommendation   OT Discharge Recommendation Home with home health rehabilitation   OT Therapy Minutes   OT Time In 0700   OT Time Out 0830   OT Total Time (minutes) 90   OT Mode of treatment - Individual (minutes) 90   OT Mode of treatment - Concurrent (minutes) 0   OT Mode of treatment - Group (minutes) 0   OT Mode of treatment - Co-treat (minutes) 0   OT Mode of Treatment - Total time(minutes) 90 minutes   OT Cumulative Minutes 615   Therapy Time missed   Time missed?  No

## 2023-04-27 NOTE — PROGRESS NOTES
"   04/27/23 4092   Pain Assessment   Pain Assessment Tool 0-10   Pain Score No Pain   Restrictions/Precautions   Precautions Aphasia;Bed/chair alarms;Supervision on toilet/commode;Cognitive   Weight Bearing Restrictions No   ROM Restrictions No   Subjective   Subjective Pt agreeable to therapy  Pt stated he has allergies today \"its the weather\"  Roll Left and Right   Type of Assistance Needed Supervision   Comment on mat   Roll Left and Right CARE Score 4   Sit to Lying   Type of Assistance Needed Supervision   Comment on mat   Sit to Lying CARE Score 4   Lying to Sitting on Side of Bed   Type of Assistance Needed Physical assistance   Physical Assistance Level 25% or less   Comment min A on mat  Lying to Sitting on Side of Bed CARE Score 3   Sit to Stand   Type of Assistance Needed Supervision   Comment no AD   Sit to Stand CARE Score 4   Bed-Chair Transfer   Type of Assistance Needed Supervision   Comment no AD   Chair/Bed-to-Chair Transfer CARE Score 4   Walk 10 Feet   Type of Assistance Needed Supervision   Physical Assistance Level No physical assistance   Comment no AD   Walk 10 Feet CARE Score 4   Walk 50 Feet with Two Turns   Type of Assistance Needed Supervision   Comment CS, no AD   Walk 50 Feet with Two Turns CARE Score 4   Walk 150 Feet   Type of Assistance Needed Supervision   Comment CS, no AD   Walk 150 Feet CARE Score 4   Ambulation   Primary Mode of Locomotion Prior to Admission Walk   Distance Walked (feet) 350 ft  (159)   Gait Pattern Wide ASHLEY;Step through; Inconsistant Marleni   Limitations Noted In Endurance;Strength   Provided Assistance with: Direction   Walk Assist Level Close Supervision   Findings no AD   Does the patient walk? 2   Yes   Wheel 50 Feet with Two Turns   Reason if not Attempted Activity not applicable   Wheel 50 Feet with Two Turns CARE Score 9   Wheel 150 Feet   Reason if not Attempted Activity not applicable   Wheel 343 Feet CARE Score 9   Wheelchair mobility   Does the " patient use a wheelchair? 0  No   Toilet Transfer   Comment CS   Toilet Transfer   Surface Assessed Standard Toilet   Findings required guidence to where soap, paper towels is and to turn on water to wash hands  Therapeutic Interventions   Strengthening bridge with ball squeeze 3x10, clamshell #3 3x10, supine knee fall out with swiss ball 3x10, standing swiss ball press with marches 3x15, ball toss with #3 bar while amb fwrd/bwrd 4 min, toe tap on step while holding #6 ball close to pt x10, away from pt x10, & in and out 2x10  Balance reaching for cones from mat<>chair with opposite UE standing on blue foam, incorporated pt to choose colors verbalized, 1st trial- wide ASHLEY, 2nd trial- feet together & staggered stance  Neuromuscular Re-Education amb with horizontal head turns while verbalizing numbers- one episode of min LOB with R head turn, 2nd bout- incorportating vertical/horizontal head turns while verbalizing alphabet- Pt required VC's for head turns direction  amb with change in speed and incorporated verbalization of animals  Other Dual task TUG while verbalizing colors average 17 6 sec  Manual TUG while holding cup of water(R) average 16 3 sec  Assessment   Treatment Assessment Pt participated in skilled PT with increased focus on dynamic balance act, dual tasking, and core/hip strengthening  Functional outcome measures of dynamic and manual TUG performed, with an increased average score, remaining a fall risk  Pt challenged with verbalizing and dual tasking with TUG, requiring him to slow down  Pt did not report any dizziness today throughout session  Pt more challeged with different directional head turns while verbalizing, with one episode of min LOB with R head turn, though able to self correct  Pt required VC's for direction, to avoid bumping into wall/objects on R side and guidance to location of objects in bathroom to wash hands due to R side inattention   Cont POC as tolerated to improve strengthening, endurance, dual tasking, and dynamic balance/act  Problem List Decreased strength;Decreased endurance; Impaired balance;Decreased coordination   Plan   Treatment/Interventions LE strengthening/ROM; Therapeutic exercise; Endurance training;Patient/family training;Gait training   Progress Progressing toward goals   PT Therapy Minutes   PT Time In 0930   PT Time Out 1100   PT Total Time (minutes) 90   PT Mode of treatment - Individual (minutes) 90   PT Mode of treatment - Concurrent (minutes) 0   PT Mode of treatment - Group (minutes) 0   PT Mode of treatment - Co-treat (minutes) 0   PT Mode of Treatment - Total time(minutes) 90 minutes   PT Cumulative Minutes 600   Therapy Time missed   Time missed?  No

## 2023-04-28 LAB
GLUCOSE SERPL-MCNC: 102 MG/DL (ref 65–140)
GLUCOSE SERPL-MCNC: 103 MG/DL (ref 65–140)
GLUCOSE SERPL-MCNC: 89 MG/DL (ref 65–140)
GLUCOSE SERPL-MCNC: 99 MG/DL (ref 65–140)

## 2023-04-28 RX ADMIN — METOPROLOL TARTRATE 25 MG: 25 TABLET, FILM COATED ORAL at 08:24

## 2023-04-28 RX ADMIN — ATORVASTATIN CALCIUM 40 MG: 40 TABLET, FILM COATED ORAL at 17:30

## 2023-04-28 RX ADMIN — SENNOSIDES 8.6 MG: 8.6 TABLET, FILM COATED ORAL at 21:44

## 2023-04-28 RX ADMIN — LEVETIRACETAM 750 MG: 750 TABLET, FILM COATED ORAL at 08:24

## 2023-04-28 RX ADMIN — LOSARTAN POTASSIUM 25 MG: 25 TABLET, FILM COATED ORAL at 08:24

## 2023-04-28 RX ADMIN — TAMSULOSIN HYDROCHLORIDE 0.4 MG: 0.4 CAPSULE ORAL at 17:30

## 2023-04-28 RX ADMIN — POLYETHYLENE GLYCOL 3350 17 G: 17 POWDER, FOR SOLUTION ORAL at 08:23

## 2023-04-28 RX ADMIN — LEVETIRACETAM 750 MG: 750 TABLET, FILM COATED ORAL at 21:43

## 2023-04-28 RX ADMIN — AMLODIPINE BESYLATE 5 MG: 5 TABLET ORAL at 08:24

## 2023-04-28 RX ADMIN — LORATADINE 10 MG: 10 TABLET ORAL at 08:24

## 2023-04-28 RX ADMIN — METOPROLOL TARTRATE 25 MG: 25 TABLET, FILM COATED ORAL at 21:44

## 2023-04-28 RX ADMIN — FAMOTIDINE 20 MG: 20 TABLET ORAL at 08:24

## 2023-04-28 RX ADMIN — APIXABAN 5 MG: 5 TABLET, FILM COATED ORAL at 17:30

## 2023-04-28 RX ADMIN — FAMOTIDINE 20 MG: 20 TABLET ORAL at 21:44

## 2023-04-28 RX ADMIN — ASPIRIN 81 MG 81 MG: 81 TABLET ORAL at 08:24

## 2023-04-28 RX ADMIN — APIXABAN 5 MG: 5 TABLET, FILM COATED ORAL at 08:24

## 2023-04-28 NOTE — PLAN OF CARE
Problem: PAIN - ADULT  Goal: Verbalizes/displays adequate comfort level or baseline comfort level  Description: Interventions:  - Encourage patient to monitor pain and request assistance  - Assess pain using appropriate pain scale  - Administer analgesics based on type and severity of pain and evaluate response  - Implement non-pharmacological measures as appropriate and evaluate response  - Consider cultural and social influences on pain and pain management  - Notify physician/advanced practitioner if interventions unsuccessful or patient reports new pain  Outcome: Progressing     Problem: INFECTION - ADULT  Goal: Absence or prevention of progression during hospitalization  Description: INTERVENTIONS:  - Assess and monitor for signs and symptoms of infection  - Monitor lab/diagnostic results  - Monitor all insertion sites, i e  indwelling lines, tubes, and drains  - Monitor endotracheal if appropriate and nasal secretions for changes in amount and color  - [place] appropriate cooling/warming therapies per order  - Administer medications as ordered  - Instruct and encourage patient and family to use good hand hygiene technique  - Identify and instruct in appropriate isolation precautions for identified infection/condition  Outcome: Progressing     Problem: SAFETY ADULT  Goal: Patient will remain free of falls  Description: INTERVENTIONS:  - Educate patient/family on patient safety including physical limitations  - Instruct patient to call for assistance with activity   - Consult OT/PT to assist with strengthening/mobility   - Keep Call bell within reach  - Keep bed low and locked with side rails adjusted as appropriate  - Keep care items and personal belongings within reach  - Initiate and maintain comfort rounds  - Make Fall Risk Sign visible to staff  - Offer Toileting every 2 Hours, in advance of need  - Initiate/Maintain bed alarm  - Obtain necessary fall risk management equipment: bed alarm  - Apply yellow socks and bracelet for high fall risk patients  - Consider moving patient to room near nurses station  Outcome: Progressing  Goal: Maintain or return to baseline ADL function  Description: INTERVENTIONS:  -  Assess patient's ability to carry out ADLs; assess patient's baseline for ADL function and identify physical deficits which impact ability to perform ADLs (bathing, care of mouth/teeth, toileting, grooming, dressing, etc )  - Assess/evaluate cause of self-care deficits   - Assess range of motion  - Assess patient's mobility; develop plan if impaired  - Assess patient's need for assistive devices and provide as appropriate  - Encourage maximum independence but intervene and supervise when necessary  - Involve family in performance of ADLs  - Assess for home care needs following discharge   - Consider OT consult to assist with ADL evaluation and planning for discharge  - Provide patient education as appropriate  Outcome: Progressing  Goal: Maintains/Returns to pre admission functional level  Description: INTERVENTIONS:  - Perform BMAT or MOVE assessment daily    - Set and communicate daily mobility goal to care team and patient/family/caregiver  - Collaborate with rehabilitation services on mobility goals if consulted  - Perform Range of Motion 3 times a day  - Reposition patient every 2 hours    - Dangle patient 3 times a day  - Stand patient 3 times a day  - Ambulate patient 3 times a day  - Out of bed to chair 3 times a day   - Out of bed for meals 3 times a day  - Out of bed for toileting  - Record patient progress and toleration of activity level   Outcome: Progressing     Problem: DISCHARGE PLANNING  Goal: Discharge to home or other facility with appropriate resources  Description: INTERVENTIONS:  - Identify barriers to discharge w/patient and caregiver  - Arrange for needed discharge resources and transportation as appropriate  - Identify discharge learning needs (meds, wound care, etc )  - Arrange for interpretive services to assist at discharge as needed  - Refer to Case Management Department for coordinating discharge planning if the patient needs post-hospital services based on physician/advanced practitioner order or complex needs related to functional status, cognitive ability, or social support system  Outcome: Progressing     Problem: Prexisting or High Potential for Compromised Skin Integrity  Goal: Skin integrity is maintained or improved  Description: INTERVENTIONS:  - Identify patients at risk for skin breakdown  - Assess and monitor skin integrity  - Assess and monitor nutrition and hydration status  - Monitor labs   - Assess for incontinence   - Turn and reposition patient  - Assist with mobility/ambulation  - Relieve pressure over bony prominences  - Avoid friction and shearing  - Provide appropriate hygiene as needed including keeping skin clean and dry  - Evaluate need for skin moisturizer/barrier cream  - Collaborate with interdisciplinary team   - Patient/family teaching  - Consider wound care consult   Outcome: Progressing     Problem: Nutrition/Hydration-ADULT  Goal: Nutrient/Hydration intake appropriate for improving, restoring or maintaining nutritional needs  Description: Monitor and assess patient's nutrition/hydration status for malnutrition  Collaborate with interdisciplinary team and initiate plan and interventions as ordered  Monitor patient's weight and dietary intake as ordered or per policy  Utilize nutrition screening tool and intervene as necessary  Determine patient's food preferences and provide high-protein, high-caloric foods as appropriate       INTERVENTIONS:  - Monitor oral intake, urinary output, labs, and treatment plans  - Assess nutrition and hydration status and recommend course of action  - Evaluate amount of meals eaten  - Assist patient with eating if necessary   - Allow adequate time for meals  - Recommend/ encourage appropriate diets, oral nutritional supplements, and vitamin/mineral supplements  - Order, calculate, and assess calorie counts as needed  - Recommend, monitor, and adjust tube feedings and TPN/PPN based on assessed needs  - Assess need for intravenous fluids  - Provide specific nutrition/hydration education as appropriate  - Include patient/family/caregiver in decisions related to nutrition  Outcome: Progressing

## 2023-04-28 NOTE — CASE MANAGEMENT
Case Management Update:  Cm contacted [facility] in [place] to inquire if they have OP ST services, they reported they have speech therapy but it is split between [facility] and [place] locations  Cm made pt's mom aware and she is agreeable for PT OT St at [facility] due to location to home, cm faxed scripts and face sheet to [phone]  Pt's mom to schedule with their office once they contact her, cm to f/u to ensure all therapies are scheduled at [place]

## 2023-04-28 NOTE — PROGRESS NOTES
04/28/23 1401   Pain Assessment   Pain Assessment Tool 0-10   Pain Score No Pain   Restrictions/Precautions   Precautions Aphasia;Bed/chair alarms;Cognitive; Fall Risk;Supervision on toilet/commode;Visual deficit   Weight Bearing Restrictions No   ROM Restrictions No   Cognition   Overall Cognitive Status Impaired   Arousal/Participation Alert; Cooperative   Attention Attends with cues to redirect   Orientation Level Oriented X4   Memory Decreased recall of recent events   Following Commands Follows one step commands with increased time or repetition   Additional Activities   Additional Activities Comments Pt engaged in one step verbal command following, body awareness, and L/R discrimination  Pt independently accurate on 1/10 including thumbs up, raise R/L arm, touch R/L knee, touch R/L foot, touch R/L elbow, touch head  Pt provided with picture of body part which improved pt's accuracy to 6/10  Pt inconsistent with L vs R  Activity Tolerance   Activity Tolerance Patient tolerated treatment well   Assessment   Treatment Assessment Session focused on basic command following, scavenger hunt around unit and providing pt's mother Dustin Slaughter with a functional update from previous sessions and discussed ongoing plan  She plans to be here this Sunday for training 10-2, Monday , and again for outing on Wednesday at 1230  Pt continues to be limited by aphasia, dec comprehension, dec command following, R visual neglect, dec higher level standing balance  Pt will continue to benefit from skilled OT services following POC with focus on above mentioned deficits to inc safety and independence with I/ADL tasks  Prognosis Good   Problem List Decreased strength;Decreased endurance; Impaired balance;Decreased coordination   Plan   Treatment/Interventions ADL retraining;Functional transfer training; Therapeutic exercise; Endurance training;Patient/family training;Equipment eval/education; Bed mobility; Compensatory technique education   Progress Progressing toward goals   Recommendation   OT Discharge Recommendation Home with home health rehabilitation   OT Therapy Minutes   OT Time In 1400   OT Time Out 1430   OT Total Time (minutes) 30   OT Mode of treatment - Individual (minutes) 30   OT Mode of treatment - Concurrent (minutes) 0   OT Mode of treatment - Group (minutes) 0   OT Mode of treatment - Co-treat (minutes) 0   OT Mode of Treatment - Total time(minutes) 30 minutes   OT Cumulative Minutes 499   Therapy Time missed   Time missed?  No

## 2023-04-28 NOTE — PROGRESS NOTES
04/28/23 1030   Pain Assessment   Pain Assessment Tool 0-10   Pain Score No Pain   Restrictions/Precautions   Precautions Aphasia;Bed/chair alarms;Cognitive; Fall Risk;Supervision on toilet/commode;Visual deficit   Weight Bearing Restrictions No   ROM Restrictions No   Sit to Stand   Type of Assistance Needed Supervision   Physical Assistance Level No physical assistance   Comment no AD   Sit to Stand CARE Score 4   Bed-Chair Transfer   Type of Assistance Needed Supervision   Physical Assistance Level No physical assistance   Comment no AD   Chair/Bed-to-Chair Transfer CARE Score 4   Cognition   Overall Cognitive Status Impaired   Arousal/Participation Alert; Cooperative   Attention Attends with cues to redirect   Orientation Level Oriented X4   Memory Decreased recall of recent events   Following Commands Follows one step commands with increased time or repetition   Activity Tolerance   Activity Tolerance Patient tolerated treatment well   Assessment   Treatment Assessment Pt participated in skilled OT services with focus on finance mgmt, topographic orientation, community reintegration  In prep for community outing next week began session by creating a list of frequently bought items in cafeteria including pizza, chips, soda, and cake  Pt additionally provided with images of each to inc comprehension  Pt does better with photos to reinforce  Pt then gave prices for items prior to starting task and was able to distribute exact dollar/change amount for each item with 100% accuracy with inc time  Pt then asked to total cost of all items, pt requires extensive time for task completion but was able to complete with 100% accuracy with mental calculation  Pt then progressed to traveling down to cafeteria with no AD  Pt requires max cues to choose correct elevator button despite all cues/strategies  Pt able to follow L and R verbal commands with 75% accuracy with inc time   Of note pt with dec R visual attention to elevator buttons on R side, continues to press symbol on sign but not actual button to R  In overstimulating environment pt with dec R attention and requires mod to max VC's for R side attention and for obstacle negotiation  Pt will continue to benefit from skilled OT services with focus on above mentioned deficits to inc safety and independence with I/ADL tasks  Prognosis Good   Problem List Decreased strength;Decreased endurance; Impaired balance;Decreased coordination   Plan   Treatment/Interventions ADL retraining;Functional transfer training; Therapeutic exercise; Endurance training;Patient/family training;Equipment eval/education; Bed mobility; Compensatory technique education   Progress Progressing toward goals   Recommendation   OT Discharge Recommendation Home with home health rehabilitation   OT Therapy Minutes   OT Time In 1030   OT Time Out 1130   OT Total Time (minutes) 60   OT Mode of treatment - Individual (minutes) 60   OT Mode of treatment - Concurrent (minutes) 0   OT Mode of treatment - Group (minutes) 0   OT Mode of treatment - Co-treat (minutes) 0   OT Mode of Treatment - Total time(minutes) 60 minutes   OT Cumulative Minutes 675   Therapy Time missed   Time missed?  No

## 2023-04-28 NOTE — PROGRESS NOTES
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   Had nonocclusive saddle embolus with filling defects distal main pulmonary arteries and throughout segmental branches bilaterally   Distal order branches were not clearly visualized due to severe respiratory motion artifact though felt likely containing pulmonary emboli   Sx were left sided CP, NIEVES and elevated D-dimer   Continue Eliquis   Was not a candidate for IR intervention 2/2 HIT     Bilateral LE DVT   Continue Eliquis      HIT   Heparin induced antibody was positive   Heparin by serotonin release from 4/12/23 was positive   S/p Argatroban   Platelet count was as low as 42, now recovered to 312     L MCA infarct 3/3/23   Suspected to be cardioembolic   Thrombosis panel then showed abnormal antithrombin, Protein C/S activity, but per Neuro unclear significance in setting of acute stroke and therefore wanted repeat as outpatient   Cont ASA/statin   Initial Echo/HODAN LVEF 55%; no thrombus/PFO   Needs outpt LOOP/Zio patch   Originally to be on Keppra x 7 days then was stopped  Dr Izabel Jade d/w with Neuro on 4/10 and they wanted to keep the [address] on until outpatient follow-up and was therefore restarted     DM2   Hgb A1c 8 0   New diagnosis   Cont QID Accuchecks/SSI and DM diet   Home:  No meds   Here: SSI only  Kolleen Flavors been on Metformin when in [facility] before transfer back for PE   Needs DM teaching and PCP f/u on dc = mother is diabetic and knows how to use meter but not insulin   Hasnt needed Metformin so far since returned to [facility] and BSs have been 90's to low 100s so will stop Accuchecks    Repeat HbA1C as OP     HTN   Home: on no meds   Here:  Norvasc 5mg qd/Lopressor 25 mg q12hrs/Losartan 25mg qd   stable     Urine retention   Per PMR   Continue Flomax     Obesity   Current BMI 38 7   Wt loss counseling when recovered        Discharge date:  5/5/23       The above assessment and plan was reviewed and updated as determined by my evaluation of the patient on 4/28/2023      Labs:   Results from last 7 days   Lab Units 04/27/23  0514 04/24/23  0604   WBC Thousand/uL 5 69 6 07   HEMOGLOBIN g/dL 12 6 12 6   HEMATOCRIT % 40 3 40 5   PLATELETS Thousands/uL 312 309     Results from last 7 days   Lab Units 04/27/23  0514 04/24/23  0604   SODIUM mmol/L 137 139   POTASSIUM mmol/L 3 8 3 7   CHLORIDE mmol/L 107 108   CO2 mmol/L 28 26   BUN mg/dL 11 13   CREATININE mg/dL 1 00 1 01   CALCIUM mg/dL 8 7 8 9             Results from last 7 days   Lab Units 04/28/23  0617 04/27/23 2058 04/27/23  1539   POC GLUCOSE mg/dl 89 107 97       Review of Scheduled Meds:  Current Facility-Administered Medications   Medication Dose Route Frequency Provider Last Rate    acetaminophen  975 mg Oral Q8H PRN Regina Hollis MD      amLODIPine  5 mg Oral Daily Regina Hollis MD      apixaban  5 mg Oral BID Regina Hollis MD      aspirin  81 mg Oral Daily Regina Hollis MD      atorvastatin  40 mg Oral QPM Regina Hollis MD      bisacodyl  10 mg Rectal Daily PRN Regina Hollis MD      bisacodyl  10 mg Rectal Once Regina Hollis MD      calcium carbonate  1,000 mg Oral TID PRN Regina Hollis MD      famotidine  20 mg Oral Q12H [address] Regina Hollis MD      insulin lispro  1-5 Units Subcutaneous TID [facility] Regina Hollis MD      levETIRAcetam  750 mg Oral Q12H [address] Regina Hollis MD      lidocaine   Topical Q4H PRN Regina Hollis MD      loratadine  10 mg Oral Daily Cassia Bernal PA-C      losartan  25 mg Oral Daily Regina Hollis MD      metoprolol tartrate  25 mg Oral Q12H [address] Regina Hollis MD      ondansetron  4 mg Oral Q8H PRN Chris Guevara Jennifer Modi MD      oxyCODONE  2 5 mg Oral Q6H PRN Regina Hollis MD      polyethylene glycol  17 g Oral Daily PRN Regina Hollis MD      polyethylene glycol  17 g Oral Daily Regina Hollis MD      senna  1 tablet Oral HS Regina Hollis MD      tamsulosin  0 4 mg Oral After Pauline Zimmerman MD         Subjective/ HPI: Patient seen and examined  Patients overnight issues or events were reviewed with nursing or staff during rounds or morning huddle session  New or overnight issues include the following:     No new or overnight issues  Offers no complaints    ROS:   A 10 point ROS was performed; negative except as noted above  Imaging:     No orders to display       *Labs /Radiology studies reviewed  *Medications reviewed and reconciled as needed  *Please refer to order section for additional ordered labs studies  *Case discussed with primary attending during morning huddle case rounds    Physical Examination:  Vitals:   Vitals:    04/27/23 2111 04/28/23 0528 04/28/23 0532 04/28/23 0824   BP:  128/85  156/83   BP Location:  Right arm     Pulse:  74  93   Resp: 18 18     Temp: (!) 97 4 °F (36 3 °C) 98 °F (36 7 °C)     TempSrc: Oral Oral     SpO2: 96% 96%     Weight:  132 kg (292 lb 1 8 oz) 132 kg (292 lb 1 8 oz)    Height:           General Appearance: no distress, conversive  HEENT:  External ear normal   Nose normal w/o drainage  Mucous membranes are moist  Oropharynx is clear  Conjunctiva clear w/o icterus or redness  Neck:  Supple, normal ROM  Lungs: BBS without crackles/wheeze/rhonchi; respirations unlabored with normal inspiratory/expiratory effort  No retractions noted  On RA  CV: regular rate and rhythm; no rubs/murmurs/gallops, PMI normal   ABD: Abdomen is soft  Bowel sounds all quadrants  Nontender with no distention      EXT: no edema  Skin: normal turgor, normal texture, no rashes  Psych: affect normal, mood normal  Neuro: AA; +expressive >receptive aphasia as before    The above physical exam was reviewed and updated as determined by my evaluation of the patient on 4/28/2023  Invasive Devices     None                    VTE Pharmacologic Prophylaxis: Eliquis  Code Status: Level 1 - Full Code  Current Length of Stay: 9 day(s)      Total time spent:  30 minutes with more than 50% spent counseling/coordinating care  Counseling includes discussion with patient re: progress  and discussion with patient of his/her current medical state/information  Coordination of patient's care was performed in conjunction with primary service  Time invested included review of patient's labs, vitals, and management of their comorbidities with continued monitoring  In addition, this patient was discussed with medical team including physician and advanced extenders  The care of the patient was extensively discussed and appropriate treatment plan was formulated unique for this patient  Medical decision making for the day was made by supervising physician unless otherwise noted in their attestation statement  ** Please Note:  voice to text software may have been used in the creation of this document   Although proof errors in transcription or interpretation are a potential of such software**

## 2023-04-28 NOTE — PROGRESS NOTES
04/28/23 0830   Pain Assessment   Pain Assessment Tool 0-10   Pain Score No Pain   Restrictions/Precautions   Precautions Aphasia;Bed/chair alarms;Cognitive; Fall Risk;Supervision on toilet/commode;Visual deficit   Weight Bearing Restrictions No   ROM Restrictions No   Cognition   Arousal/Participation Alert; Cooperative   Attention Attends with cues to redirect   Following Commands Follows one step commands with increased time or repetition   Subjective   Subjective no new complaints     Sit to Lying   Type of Assistance Needed Supervision   Physical Assistance Level No physical assistance   Comment on mat   Sit to Lying CARE Score 4   Lying to Sitting on Side of Bed   Type of Assistance Needed Incidental touching   Physical Assistance Level No physical assistance   Comment CGA on mat   Lying to Sitting on Side of Bed CARE Score 4   Sit to Stand   Type of Assistance Needed Supervision   Physical Assistance Level No physical assistance   Comment no AD   Sit to Stand CARE Score 4   Bed-Chair Transfer   Type of Assistance Needed Supervision   Physical Assistance Level No physical assistance   Comment no AD   Chair/Bed-to-Chair Transfer CARE Score 4   Walk 10 Feet   Type of Assistance Needed Supervision   Physical Assistance Level No physical assistance   Comment no AD   Walk 10 Feet CARE Score 4   Walk 50 Feet with Two Turns   Type of Assistance Needed Supervision   Physical Assistance Level No physical assistance   Comment CS, no AD   Walk 50 Feet with Two Turns CARE Score 4   Walk 150 Feet   Type of Assistance Needed Supervision   Physical Assistance Level No physical assistance   Comment CS, no AD   Walk 150 Feet CARE Score 4   Walking 10 Feet on Uneven Surfaces   Type of Assistance Needed Incidental touching   Physical Assistance Level No physical assistance   Comment no AD on outdoor ramp   Walking 10 Feet on Uneven Surfaces CARE Score 4   Ambulation   Primary Mode of Locomotion Prior to Admission Walk   Distance Walked (feet) 350 ft   Gait Pattern Wide ASHLEY;Step through; Inconsistant Marleni   Limitations Noted In Strength; Endurance   Provided Assistance with: Direction   Walk Assist Level Close Supervision   Findings no AD   Does the patient walk? 2  Yes   Wheel 50 Feet with Two Turns   Reason if not Attempted Activity not applicable   Wheel 50 Feet with Two Turns CARE Score 9   Wheel 150 Feet   Reason if not Attempted Activity not applicable   Wheel 392 Feet CARE Score 9   Wheelchair mobility   Does the patient use a wheelchair? 0  No   Curb or Single Stair   Style negotiated Single stair   Type of Assistance Needed Incidental touching   Comment CS/CGA on outdoor FF stairs and P9 stairs  1 Step (Curb) CARE Score 4   4 Steps   Type of Assistance Needed Incidental touching   Comment CS/CGA on outdoor FF stairs and P9 stairs  4 Steps CARE Score 4   12 Steps   Type of Assistance Needed Incidental touching   Comment CS/CGA on outdoor FF stairs and P9 stairs  12 Steps CARE Score 4   Stairs   Type Stairs   # of Steps 12  (x3)   Assist Devices Single Rail   Findings performed on outdoor FF stairs CS with LHR descending and CGA with RHR ascending, with min LOB though able to correct  P9 FF stairs CGA ascend with LHR/RHR and descend LHR/RHR, with reciprical gait  Therapeutic Interventions   Strengthening supine swiss ball in & out 3x10, and side to side 2x10  bridge 2x10, standing blue swiss ball press with marches 2x10 & with hip ext 3x10, noted pt unstable with RLE stance while performing act though able to maintain balance, pt expressed its more challenging/weaker on RLE  Equipment Use   NuStep L2 x4min, L3 x8 min BUE/LE   Other Comments   Comments seated /79 HR 89 start of session, seated /70 HR 86 after x12min on NU step   Assessment   Treatment Assessment Pt participated in PT treatment sesion with cont focus on balance, strength, and community reintegration   Will benefit from continued focus on all of the above with special consideration for dual tasking and new enviroment negotiation  Pt had difficulty following VC's for choosing correct level floor button on elevator and directions outdoors, had moments of confusion/unable to understand requiring guidence  VC's required to avoid bumping to objects on R side, though able to be aware of R side at times  Practiced safety awareness outside, involving head turns to look out for cars to cross street, no LOB with uneven surface  Pt's mother will be coming in on Sunday 4/30/23  to observe PT session and review guarding/A mother should provide for pt when d/c and Wednesday 5/3/23 for OT/SPT  Plan   Treatment/Interventions LE strengthening/ROM; Therapeutic exercise; Endurance training;Patient/family training;Gait training   Progress Progressing toward goals   PT Therapy Minutes   PT Time In 0830   PT Time Out 1000   PT Total Time (minutes) 90   PT Mode of treatment - Individual (minutes) 90   PT Mode of treatment - Concurrent (minutes) 0   PT Mode of treatment - Group (minutes) 0   PT Mode of treatment - Co-treat (minutes) 0   PT Mode of Treatment - Total time(minutes) 90 minutes   PT Cumulative Minutes 690   Therapy Time missed   Time missed?  No

## 2023-04-28 NOTE — PLAN OF CARE
Problem: PAIN - ADULT  Goal: Verbalizes/displays adequate comfort level or baseline comfort level  Description: Interventions:  - Encourage patient to monitor pain and request assistance  - Assess pain using appropriate pain scale  - Administer analgesics based on type and severity of pain and evaluate response  - Implement non-pharmacological measures as appropriate and evaluate response  - Consider cultural and social influences on pain and pain management  - Notify physician/advanced practitioner if interventions unsuccessful or patient reports new pain  Outcome: Progressing     Problem: INFECTION - ADULT  Goal: Absence or prevention of progression during hospitalization  Description: INTERVENTIONS:  - Assess and monitor for signs and symptoms of infection  - Monitor lab/diagnostic results  - Monitor all insertion sites, i e  indwelling lines, tubes, and drains  - Monitor endotracheal if appropriate and nasal secretions for changes in amount and color  - [place] appropriate cooling/warming therapies per order  - Administer medications as ordered  - Instruct and encourage patient and family to use good hand hygiene technique  - Identify and instruct in appropriate isolation precautions for identified infection/condition  Outcome: Progressing     Problem: SAFETY ADULT  Goal: Patient will remain free of falls  Description: INTERVENTIONS:  - Educate patient/family on patient safety including physical limitations  - Instruct patient to call for assistance with activity   - Consult OT/PT to assist with strengthening/mobility   - Keep Call bell within reach  - Keep bed low and locked with side rails adjusted as appropriate  - Keep care items and personal belongings within reach  - Initiate and maintain comfort rounds  - Make Fall Risk Sign visible to staff  - Offer Toileting every  Hours, in advance of need  - Initiate/Maintain alarm  - Obtain necessary fall risk management equipment:   - Apply yellow socks and bracelet for high fall risk patients  - Consider moving patient to room near nurses station  Outcome: Progressing  Goal: Maintain or return to baseline ADL function  Description: INTERVENTIONS:  -  Assess patient's ability to carry out ADLs; assess patient's baseline for ADL function and identify physical deficits which impact ability to perform ADLs (bathing, care of mouth/teeth, toileting, grooming, dressing, etc )  - Assess/evaluate cause of self-care deficits   - Assess range of motion  - Assess patient's mobility; develop plan if impaired  - Assess patient's need for assistive devices and provide as appropriate  - Encourage maximum independence but intervene and supervise when necessary  - Involve family in performance of ADLs  - Assess for home care needs following discharge   - Consider OT consult to assist with ADL evaluation and planning for discharge  - Provide patient education as appropriate  Outcome: Progressing  Goal: Maintains/Returns to pre admission functional level  Description: INTERVENTIONS:  - Perform BMAT or MOVE assessment daily    - Set and communicate daily mobility goal to care team and patient/family/caregiver  - Collaborate with rehabilitation services on mobility goals if consulted  - Perform Range of Motion times a day  - Reposition patient every  hours    - Dangle patient times a day  - Stand patient  times a day  - Ambulate patient  times a day  - Out of bed to chair  times a day   - Out of bed for meals times a day  - Out of bed for toileting  - Record patient progress and toleration of activity level   Outcome: Progressing     Problem: DISCHARGE PLANNING  Goal: Discharge to home or other facility with appropriate resources  Description: INTERVENTIONS:  - Identify barriers to discharge w/patient and caregiver  - Arrange for needed discharge resources and transportation as appropriate  - Identify discharge learning needs (meds, wound care, etc )  - Arrange for interpretive services to assist at discharge as needed  - Refer to Case Management Department for coordinating discharge planning if the patient needs post-hospital services based on physician/advanced practitioner order or complex needs related to functional status, cognitive ability, or social support system  Outcome: Progressing     Problem: Prexisting or High Potential for Compromised Skin Integrity  Goal: Skin integrity is maintained or improved  Description: INTERVENTIONS:  - Identify patients at risk for skin breakdown  - Assess and monitor skin integrity  - Assess and monitor nutrition and hydration status  - Monitor labs   - Assess for incontinence   - Turn and reposition patient  - Assist with mobility/ambulation  - Relieve pressure over bony prominences  - Avoid friction and shearing  - Provide appropriate hygiene as needed including keeping skin clean and dry  - Evaluate need for skin moisturizer/barrier cream  - Collaborate with interdisciplinary team   - Patient/family teaching  - Consider wound care consult   Outcome: Progressing     Problem: Nutrition/Hydration-ADULT  Goal: Nutrient/Hydration intake appropriate for improving, restoring or maintaining nutritional needs  Description: Monitor and assess patient's nutrition/hydration status for malnutrition  Collaborate with interdisciplinary team and initiate plan and interventions as ordered  Monitor patient's weight and dietary intake as ordered or per policy  Utilize nutrition screening tool and intervene as necessary  Determine patient's food preferences and provide high-protein, high-caloric foods as appropriate       INTERVENTIONS:  - Monitor oral intake, urinary output, labs, and treatment plans  - Assess nutrition and hydration status and recommend course of action  - Evaluate amount of meals eaten  - Assist patient with eating if necessary   - Allow adequate time for meals  - Recommend/ encourage appropriate diets, oral nutritional supplements, and vitamin/mineral supplements  - Order, calculate, and assess calorie counts as needed  - Recommend, monitor, and adjust tube feedings and TPN/PPN based on assessed needs  - Assess need for intravenous fluids  - Provide specific nutrition/hydration education as appropriate  - Include patient/family/caregiver in decisions related to nutrition  Outcome: Progressing

## 2023-04-28 NOTE — PROGRESS NOTES
"   04/28/23 1430   Pain Assessment   Pain Assessment Tool 0-10   Pain Score No Pain   Restrictions/Precautions   Precautions Aphasia;Bed/chair alarms;Cognitive; Fall Risk;Supervision on toilet/commode;Visual deficit   Weight Bearing Restrictions No   ROM Restrictions No   Comprehension   Comprehension (FIM) 3 - Understands basic info/conversation 50-74% of time   Expression   Expression (FIM) 2 - Uses only simple expressions or gestures (waves, hello)   Social Interaction   Social Interaction (FIM) 5 - Interacts appropriately with others 90% of time   Problem Solving   Problem solving (FIM) 3 - Solves basic problmes 50-74% of time   Memory   Memory (FIM) 3 - Recognizes, recalls/performs 50-74%   Speech/Language/Cognition Assessmetn   Treatment Assessment Pt seen for skilled speech therapy session targeting expressive and receptive language skills  Pt completed the following  P completed additional portions of word sorting task as started yesterday- pt provided with 6 categories to sort single words from list below  Pt was able to sort 9/12acc on his own, increasing with verbal cues when SLP would read target word outloud for increased auditory comprehension of well to then place in correct category  Pt next completed matching of written object description to the picture (Fx5)  Pt completed first worksheet where he went one by one to try to match, needing increasing cuing and able to get the last 2 correct given 50/50 chance  Next trial SLP instructed to look at all options and jump around to match the ones he knows first  Pt was able to correct do first 3 independently, needing verbal cues and reading off phrases to pt for final two  Last, pt completed reading of yes/no questions  Noted increased fatigue at this point therefore SLP assisting in reading off the questions to him, encouraging him to verbalize more for the \"yes\" and \"no\" portions   Pt correctly answering questions with 11/15acc increasing with repetition " "and rephrasing of questions  Pt also able to increase his verbal output with 11/15acc as well  As pt had completed all of his written homework, SLP provided with word search puzzle for him to visually scan and locate words on his down time  Pt agreeable and stated \"thanks\"  Pt will cont to benefit from skilled SLP services to maximize communication skills for increased independence and decreased burden of care at this time  SLP Therapy Minutes   SLP Time In 1430   SLP Time Out 4019   SLP Total Time (minutes) 60   SLP Mode of treatment - Individual (minutes) 60   SLP Mode of treatment - Concurrent (minutes) 0   SLP Mode of treatment - Group (minutes) 0   SLP Mode of treatment - Co-treat (minutes) 0   SLP Mode of Treatment - Total time(minutes) 60 minutes   SLP Cumulative Minutes 542   Therapy Time missed   Time missed?  No       "

## 2023-04-29 RX ADMIN — LORATADINE 10 MG: 10 TABLET ORAL at 10:09

## 2023-04-29 RX ADMIN — ASPIRIN 81 MG 81 MG: 81 TABLET ORAL at 10:09

## 2023-04-29 RX ADMIN — FAMOTIDINE 20 MG: 20 TABLET ORAL at 10:10

## 2023-04-29 RX ADMIN — AMLODIPINE BESYLATE 5 MG: 5 TABLET ORAL at 10:11

## 2023-04-29 RX ADMIN — LEVETIRACETAM 750 MG: 750 TABLET, FILM COATED ORAL at 21:37

## 2023-04-29 RX ADMIN — FAMOTIDINE 20 MG: 20 TABLET ORAL at 21:37

## 2023-04-29 RX ADMIN — ATORVASTATIN CALCIUM 40 MG: 40 TABLET, FILM COATED ORAL at 17:15

## 2023-04-29 RX ADMIN — POLYETHYLENE GLYCOL 3350 17 G: 17 POWDER, FOR SOLUTION ORAL at 10:13

## 2023-04-29 RX ADMIN — METOPROLOL TARTRATE 25 MG: 25 TABLET, FILM COATED ORAL at 21:38

## 2023-04-29 RX ADMIN — LOSARTAN POTASSIUM 25 MG: 25 TABLET, FILM COATED ORAL at 10:10

## 2023-04-29 RX ADMIN — APIXABAN 5 MG: 5 TABLET, FILM COATED ORAL at 17:15

## 2023-04-29 RX ADMIN — APIXABAN 5 MG: 5 TABLET, FILM COATED ORAL at 10:09

## 2023-04-29 RX ADMIN — LEVETIRACETAM 750 MG: 750 TABLET, FILM COATED ORAL at 10:09

## 2023-04-29 RX ADMIN — METOPROLOL TARTRATE 25 MG: 25 TABLET, FILM COATED ORAL at 10:10

## 2023-04-29 RX ADMIN — SENNOSIDES 8.6 MG: 8.6 TABLET, FILM COATED ORAL at 21:37

## 2023-04-29 RX ADMIN — TAMSULOSIN HYDROCHLORIDE 0.4 MG: 0.4 CAPSULE ORAL at 17:15

## 2023-04-29 NOTE — PLAN OF CARE
Problem: PAIN - ADULT  Goal: Verbalizes/displays adequate comfort level or baseline comfort level  Description: Interventions:  - Encourage patient to monitor pain and request assistance  - Assess pain using appropriate pain scale  - Administer analgesics based on type and severity of pain and evaluate response  - Implement non-pharmacological measures as appropriate and evaluate response  - Consider cultural and social influences on pain and pain management  - Notify physician/advanced practitioner if interventions unsuccessful or patient reports new pain  Outcome: Progressing     Problem: INFECTION - ADULT  Goal: Absence or prevention of progression during hospitalization  Description: INTERVENTIONS:  - Assess and monitor for signs and symptoms of infection  - Monitor lab/diagnostic results  - Monitor all insertion sites, i e  indwelling lines, tubes, and drains  - Monitor endotracheal if appropriate and nasal secretions for changes in amount and color  - [place] appropriate cooling/warming therapies per order  - Administer medications as ordered  - Instruct and encourage patient and family to use good hand hygiene technique  - Identify and instruct in appropriate isolation precautions for identified infection/condition  Outcome: Progressing     Problem: SAFETY ADULT  Goal: Patient will remain free of falls  Description: INTERVENTIONS:  - Educate patient/family on patient safety including physical limitations  - Instruct patient to call for assistance with activity   - Consult OT/PT to assist with strengthening/mobility   - Keep Call bell within reach  - Keep bed low and locked with side rails adjusted as appropriate  - Keep care items and personal belongings within reach  - Initiate and maintain comfort rounds  - Make Fall Risk Sign visible to staff  - Offer Toileting s, in advance of need  - Initiate/M  - Obtain necessary fall risk management equipme  - Apply yellow socks and bracelet for high fall risk patients  - Consider moving patient to room near nurses station  Outcome: Progressing  Goal: Maintain or return to baseline ADL function  Description: INTERVENTIONS:  -  Assess patient's ability to carry out ADLs; assess patient's baseline for ADL function and identify physical deficits which impact ability to perform ADLs (bathing, care of mouth/teeth, toileting, grooming, dressing, etc )  - Assess/evaluate cause of self-care deficits   - Assess range of motion  - Assess patient's mobility; develop plan if impaired  - Assess patient's need for assistive devices and provide as appropriate  - Encourage maximum independence but intervene and supervise when necessary  - Involve family in performance of ADLs  - Assess for home care needs following discharge   - Consider OT consult to assist with ADL evaluation and planning for discharge  - Provide patient education as appropriate  Outcome: Progressing  Goal: Maintains/Returns to pre admission functional level  Description: INTERVENTIONS:  - Perform BMAT or MOVE assessment daily    - Set and communicate daily mobility goal to care team and patient/family/caregiver  - Collaborate with rehabilitation services on mobility goals if consulted  - Perform Range of s a day  - Repositions    - Dangle markus  - Stand patie  - Ambulate   - Out of bed to c  - Out of bed for me  - Out of bed for toileting  - Record patient progress and toleration of activity level   Outcome: Progressing     Problem: DISCHARGE PLANNING  Goal: Discharge to home or other facility with appropriate resources  Description: INTERVENTIONS:  - Identify barriers to discharge w/patient and caregiver  - Arrange for needed discharge resources and transportation as appropriate  - Identify discharge learning needs (meds, wound care, etc )  - Arrange for interpretive services to assist at discharge as needed  - Refer to Case Management Department for coordinating discharge planning if the patient needs post-hospital services based on physician/advanced practitioner order or complex needs related to functional status, cognitive ability, or social support system  Outcome: Progressing     Problem: Prexisting or High Potential for Compromised Skin Integrity  Goal: Skin integrity is maintained or improved  Description: INTERVENTIONS:  - Identify patients at risk for skin breakdown  - Assess and monitor skin integrity  - Assess and monitor nutrition and hydration status  - Monitor labs   - Assess for incontinence   - Turn and reposition patient  - Assist with mobility/ambulation  - Relieve pressure over bony prominences  - Avoid friction and shearing  - Provide appropriate hygiene as needed including keeping skin clean and dry  - Evaluate need for skin moisturizer/barrier cream  - Collaborate with interdisciplinary team   - Patient/family teaching  - Consider wound care consult   Outcome: Progressing     Problem: Nutrition/Hydration-ADULT  Goal: Nutrient/Hydration intake appropriate for improving, restoring or maintaining nutritional needs  Description: Monitor and assess patient's nutrition/hydration status for malnutrition  Collaborate with interdisciplinary team and initiate plan and interventions as ordered  Monitor patient's weight and dietary intake as ordered or per policy  Utilize nutrition screening tool and intervene as necessary  Determine patient's food preferences and provide high-protein, high-caloric foods as appropriate       INTERVENTIONS:  - Monitor oral intake, urinary output, labs, and treatment plans  - Assess nutrition and hydration status and recommend course of action  - Evaluate amount of meals eaten  - Assist patient with eating if necessary   - Allow adequate time for meals  - Recommend/ encourage appropriate diets, oral nutritional supplements, and vitamin/mineral supplements  - Order, calculate, and assess calorie counts as needed  - Recommend, monitor, and adjust tube feedings and TPN/PPN based on assessed needs  - Assess need for intravenous fluids  - Provide specific nutrition/hydration education as appropriate  - Include patient/family/caregiver in decisions related to nutrition  Outcome: Progressing

## 2023-04-29 NOTE — PROGRESS NOTES
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   Had nonocclusive saddle embolus with filling defects distal main pulmonary arteries and throughout segmental branches bilaterally   Distal order branches were not clearly visualized due to severe respiratory motion artifact though felt likely containing pulmonary emboli   Sx were left sided CP, NIEVES and elevated D-dimer   Continue Eliquis   Was not a candidate for IR intervention 2/2 HIT     Bilateral LE DVT   Continue Eliquis      HIT   Heparin induced antibody was positive   Heparin by serotonin release from 4/12/23 was positive   S/p Argatroban   Platelets recovered     L MCA infarct 3/3/23   Suspected to be cardioembolic   Thrombosis panel then showed abnormal antithrombin, Protein C/S activity, but per Neuro unclear significance in setting of acute stroke and therefore wanted repeat as outpatient   Cont ASA/statin   Initial Echo/HODAN LVEF 55%; no thrombus/PFO   Needs outpt LOOP/Zio patch   Cont keppra until OP f/u     DM2   Hgb A1c 8 0   New diagnosis   Cont QID Accuchecks/SSI and DM diet   Home:  No meds   Here: SSI only   Had been on Metformin when in [facility] before transfer back for PE   Needs DM teaching and PCP f/u on dc = mother is diabetic and knows how to use meter but not insulin   Hasnt needed Metformin so far since returned to [facility] and BSs have been 90's to low 100s so will stop Accuchecks    Repeat HbA1C as OP     HTN   Home: on no meds   Here:  Norvasc 5mg qd/Lopressor 25 mg q12hrs/Losartan 25mg qd   stable     Urine retention   Per PMR   Continue Flomax     Obesity   Current BMI 38 5   Wt loss counseling when recovered        Discharge date:  5/5/23       The above assessment and plan was reviewed and updated as determined by my evaluation of the patient on 4/29/2023      Labs:   Results from last 7 days   Lab Units 04/27/23  0514 04/24/23  0604   WBC Thousand/uL 5 69 6 07   HEMOGLOBIN g/dL 12 6 12 6   HEMATOCRIT % 40 3 40 5   PLATELETS Thousands/uL 312 309     Results from last 7 days   Lab Units 04/27/23  0514 04/24/23  0604   SODIUM mmol/L 137 139   POTASSIUM mmol/L 3 8 3 7   CHLORIDE mmol/L 107 108   CO2 mmol/L 28 26   BUN mg/dL 11 13   CREATININE mg/dL 1 00 1 01   CALCIUM mg/dL 8 7 8 9             Results from last 7 days   Lab Units 04/28/23  2120 04/28/23  1552 04/28/23  1128   POC GLUCOSE mg/dl 99 103 102       Review of Scheduled Meds:  Current Facility-Administered Medications   Medication Dose Route Frequency Provider Last Rate    acetaminophen  975 mg Oral Q8H PRN May MD Gila      amLODIPine  5 mg Oral Daily May MD Gila      apixaban  5 mg Oral BID Shelly Uriostegui MD      aspirin  81 mg Oral Daily May MD Gila      atorvastatin  40 mg Oral QPM May MD Gila      bisacodyl  10 mg Rectal Daily PRN May MD Gila      bisacodyl  10 mg Rectal Once May MD Gila      calcium carbonate  1,000 mg Oral TID PRN Shelly Uriostegui MD      famotidine  20 mg Oral Q12H [address] May MD Gila      levETIRAcetam  750 mg Oral Q12H [address] May MD Gila      lidocaine   Topical Q4H PRN Shelly Uriostegui MD      loratadine  10 mg Oral Daily Cassia Bernal PA-C      losartan  25 mg Oral Daily May MD Gila      metoprolol tartrate  25 mg Oral Q12H [address] May MD Gila      ondansetron  4 mg Oral Q8H PRN Shelly Uriostegui MD      oxyCODONE  2 5 mg Oral Q6H PRN May MD Gila      polyethylene glycol  17 g Oral Daily PRN May MD Gila      polyethylene glycol  17 g Oral Daily May MD Gila      senna  1 tablet Oral HS May MD Gila      tamsulosin  0 4 mg Oral After Pauline Zimmerman MD         Subjective/ HPI: Patient seen and examined  Patients overnight issues or events were reviewed with nursing or staff during rounds or morning huddle session  New or overnight issues include the following:     Pt seen in his room  No overnight issues; ongoing aphasia but significantly improved from previous    ROS:   A 10 point ROS was performed; negative except as noted above  Imaging:     No orders to display       *Labs /Radiology studies reviewed  *Medications reviewed and reconciled as needed  *Please refer to order section for additional ordered labs studies  *Case discussed with primary attending during morning huddle case rounds    Physical Examination:  Vitals:   Vitals:    04/28/23 1345 04/28/23 2049 04/29/23 0500 04/29/23 0600   BP: 129/71 126/84 128/83    BP Location: Left arm Right arm Right arm    Pulse: 77 86 67    Resp: 18 18 18    Temp: 97 7 °F (36 5 °C) 97 8 °F (36 6 °C) 97 8 °F (36 6 °C)    TempSrc: Oral Oral Oral    SpO2: 98% 98% 96%    Weight:    132 kg (291 lb 14 2 oz)   Height:           GEN: No apparent distress, interactive  NEURO: Alert and aphasic  HEENT: Pupils are equal and reactive, EOMI, mucous membranes are moist, face asymmetrical  CV: S1 S2 regular, no MRG, trace b/l LE peripheral edema noted  RESP: Lungs are clear bilaterally, no wheezes, rales or rhonchi noted, on room air, respirations easy and non labored  GI: trace, soft non tender, non distended; +BS x4  : Voiding without difficulty  MUSC: Moves all extremities  SKIN: pink, warm and dry, normal turgor, no rashes, lesions        The above physical exam was reviewed and updated as determined by my evaluation of the patient on 4/29/2023      Invasive Devices     None                    VTE Pharmacologic Prophylaxis: Eliquis  Code Status: Level 1 - Full Code  Current Length of Stay: 10 day(s)      Total time spent:  30 minutes with more than 50% spent counseling/coordinating care  Counseling includes discussion with patient re: progress  and discussion with patient of his/her current medical state/information  Coordination of patient's care was performed in conjunction with primary service  Time invested included review of patient's labs, vitals, and management of their comorbidities with continued monitoring  In addition, this patient was discussed with medical team including physician and advanced extenders  The care of the patient was extensively discussed and appropriate treatment plan was formulated unique for this patient  Medical decision making for the day was made by supervising physician unless otherwise noted in their attestation statement  ** Please Note:  voice to text software may have been used in the creation of this document   Although proof errors in transcription or interpretation are a potential of such software**

## 2023-04-29 NOTE — PROGRESS NOTES
04/29/23 1030   Pain Assessment   Pain Assessment Tool 0-10   Pain Score No Pain   Restrictions/Precautions   Precautions Aphasia;Bed/chair alarms;Cognitive; Fall Risk;Supervision on toilet/commode;Visual deficit   Comprehension   Comprehension (FIM) 3 - Understands basic info/conversation 50-74% of time   Expression   Expression (FIM) 2 - Uses only simple expressions or gestures (waves, hello)   Social Interaction   Social Interaction (FIM) 5 - Interacts appropriately with others 90% of time   Problem Solving   Problem solving (FIM) 3 - Solves basic problmes 50-74% of time   Memory   Memory (FIM) 3 - Recognizes, recalls/performs 50-74%   Speech/Language/Cognition Assessmetn   Treatment Assessment Session completed in therapy gym today, engaging pt in generalized conversation, to where pt's spontaneous speech output remains to be jargon, neologism, nonsensical verbalizations, but overall intonation and prosody is similar to conversation  Still remains to be inconsistent in recognizing speech errors when engaged in generalized speech  More awareness and recognition given speech errors is noted in structured speech tasks  However, SLP engaged pt in the following tasks today  Written Expression Task by Placing Word List into Categories  SLP looked through tasks which pt had completed in session from prior days, to where pt was continuing to complete a word categorization task  A list of 12 words remained to where pt was writing in the words which belonged to 6 possible categories  Of note, the categories were concrete in nature (ie: drinks, months, colors, etc)  Pt's ability to place remaining words into the correct categories was 11/12 accurate, noting difficulty given 'airplane' wanting to place that into Jobs category  However, as SLP provided pt w/ verbally stating word, given increased processing time, pt was able to place word into correct category (transportation) w/o further cues   It was also noted when pt "would write in the words for the categories, pt was able to self correct errors in letters when writing out the words independently  Still pt unable to spontaneously state any of the 12 words provided, despite his attempts at doing so  Word Discrimination-Three Words  SLP then circling back to an activity w/ pt targeting verbal comprehension or a word presented and then matching to a [address] choices given the verbalized word  During this task, pt did request SLP to \"say that one more time\" frequently for optimal comprehension given the word stated  When SLP only provided pt w/ target word ONCE, ability for pt to correctly choose the word given FO3 options was 5/20 accurate  When SLP provided pt w/ single repetition of target word, pt increased to 10/20 accurate  Repetition of words x2, pt able to ID another 4 words increasing accuracy to 14/20  All remaining words required at least 4 repetitions, but pt remained errored in choosing to the word to which matched the verbalized target word  Pt required max cues for these remaining words, narrowing choices down to the FO2 which pt then able to id 3 more words increasing to 17/20 accurate  Pt continued to have increased difficulty w/ last 3 words presented  It was noted during this task that pt was able to spontaneously repeat the words given in 4 out of 20 attempts  Other attempted verbalizations were phonemic substitutions (ie: lock-->clock, keep-->cheek) or close approximations to words stated (ie: toe-->tooch, step-->stech)  Money Management Given Tangible Coins and Dollars  Last activity targeted was functional money management providing pt's comprehension given change and dollar amounts  Began just w/ coins (quarters, dimes, nickels and pennies) for determining pt's comprehension given these items  When SLP asked pt to point to the groups of coins when verbally giving name, pt unable to ID any of the groups (0/4) accurate   However, when SLP provided pt a sticky " "note w/ the written coin names on them, pt was 4/4 accurate in ability to ID the coin groups  Again, when attempting to just verbally provide pt w/ a change amount to pull the correct coins to match, pt w/ increased processing time, in addition to having increased difficulty in comprehension  However, as SLP provided written cue of the coin amounts to pull, pt was 7/8 accurate, needing cues to \"check\" 99 cents, which when provided increased time, pt was able to eliminate the extra coin amount for correct total  Since pt was able to complete this task w/ improvement given visual/written cue, SLP did [place] back to trying to have pt pull coin amounts by just providing verbal amounts  Pt was able to determine only 1/3 items when trialed, still exhibiting difficulty in verbal comprehension when provided amount  When switching to dollars ($1, $5, $10, $20), no review was needed given these as the number amounts were located on the dollars  However, just as when completing coin amounts, pt unable to pull dollar amounts when SLP provided only verbal amount to pull  Continued to benefit from visual/written cue to where pt was 7/7 accurate  Then last thing attempted w/ functional money was 2 different amounts to add and provide a total  Pt had SIGNIFICANT difficulty in this task, to where unable to complete and was highly perseverative in stating amount \"65\" but pt was unaware of this despite SLP providing written and verbal feedback given this  Currently pt will continue to benefit from ongoing skilled SLP services at this time to maximize overall functional independence for basic communication skills, comprehension skills, cognitive linguistic skills in attempts to decrease caregiver burden over time  Family training/education w/ pt's mother is scheduled for 4/30/2023      SLP Therapy Minutes   SLP Time In 6532   SLP Time Out 1200   SLP Total Time (minutes) 90   SLP Mode of treatment - Individual (minutes) 90   SLP " Mode of treatment - Concurrent (minutes) 0   SLP Mode of treatment - Group (minutes) 0   SLP Mode of treatment - Co-treat (minutes) 0   SLP Mode of Treatment - Total time(minutes) 90 minutes   SLP Cumulative Minutes 735   Therapy Time missed   Time missed?  No

## 2023-04-29 NOTE — PLAN OF CARE
Problem: PAIN - ADULT  Goal: Verbalizes/displays adequate comfort level or baseline comfort level  Description: Interventions:  - Encourage patient to monitor pain and request assistance  - Assess pain using appropriate pain scale  - Administer analgesics based on type and severity of pain and evaluate response  - Implement non-pharmacological measures as appropriate and evaluate response  - Consider cultural and social influences on pain and pain management  - Notify physician/advanced practitioner if interventions unsuccessful or patient reports new pain  Outcome: Progressing     Problem: INFECTION - ADULT  Goal: Absence or prevention of progression during hospitalization  Description: INTERVENTIONS:  - Assess and monitor for signs and symptoms of infection  - Monitor lab/diagnostic results  - Monitor all insertion sites, i e  indwelling lines, tubes, and drains  - Monitor endotracheal if appropriate and nasal secretions for changes in amount and color  - [place] appropriate cooling/warming therapies per order  - Administer medications as ordered  - Instruct and encourage patient and family to use good hand hygiene technique  - Identify and instruct in appropriate isolation precautions for identified infection/condition  Outcome: Progressing     Problem: SAFETY ADULT  Goal: Patient will remain free of falls  Description: INTERVENTIONS:  - Educate patient/family on patient safety including physical limitations  - Instruct patient to call for assistance with activity   - Consult OT/PT to assist with strengthening/mobility   - Keep Call bell within reach  - Keep bed low and locked with side rails adjusted as appropriate  - Keep care items and personal belongings within reach  - Initiate and maintain comfort rounds  - Make Fall Risk Sign visible to staff  - Offer Toileting every 2 Hours, in advance of need  - Initiate/Maintain bed alarm  - Obtain necessary fall risk management equipment: bed alarm  - Apply yellow socks and bracelet for high fall risk patients  - Consider moving patient to room near nurses station  Outcome: Progressing  Goal: Maintain or return to baseline ADL function  Description: INTERVENTIONS:  -  Assess patient's ability to carry out ADLs; assess patient's baseline for ADL function and identify physical deficits which impact ability to perform ADLs (bathing, care of mouth/teeth, toileting, grooming, dressing, etc )  - Assess/evaluate cause of self-care deficits   - Assess range of motion  - Assess patient's mobility; develop plan if impaired  - Assess patient's need for assistive devices and provide as appropriate  - Encourage maximum independence but intervene and supervise when necessary  - Involve family in performance of ADLs  - Assess for home care needs following discharge   - Consider OT consult to assist with ADL evaluation and planning for discharge  - Provide patient education as appropriate  Outcome: Progressing  Goal: Maintains/Returns to pre admission functional level  Description: INTERVENTIONS:  - Perform BMAT or MOVE assessment daily    - Set and communicate daily mobility goal to care team and patient/family/caregiver  - Collaborate with rehabilitation services on mobility goals if consulted  - Perform Range of Motion 3 times a day  - Reposition patient every 2 hours    - Dangle patient 3 times a day  - Stand patient 3 times a day  - Ambulate patient 3 times a day  - Out of bed to chair 3 times a day   - Out of bed for meals 3 times a day  - Out of bed for toileting  - Record patient progress and toleration of activity level   Outcome: Progressing     Problem: DISCHARGE PLANNING  Goal: Discharge to home or other facility with appropriate resources  Description: INTERVENTIONS:  - Identify barriers to discharge w/patient and caregiver  - Arrange for needed discharge resources and transportation as appropriate  - Identify discharge learning needs (meds, wound care, etc )  - Arrange for interpretive services to assist at discharge as needed  - Refer to Case Management Department for coordinating discharge planning if the patient needs post-hospital services based on physician/advanced practitioner order or complex needs related to functional status, cognitive ability, or social support system  Outcome: Progressing     Problem: Prexisting or High Potential for Compromised Skin Integrity  Goal: Skin integrity is maintained or improved  Description: INTERVENTIONS:  - Identify patients at risk for skin breakdown  - Assess and monitor skin integrity  - Assess and monitor nutrition and hydration status  - Monitor labs   - Assess for incontinence   - Turn and reposition patient  - Assist with mobility/ambulation  - Relieve pressure over bony prominences  - Avoid friction and shearing  - Provide appropriate hygiene as needed including keeping skin clean and dry  - Evaluate need for skin moisturizer/barrier cream  - Collaborate with interdisciplinary team   - Patient/family teaching  - Consider wound care consult   Outcome: Progressing     Problem: Nutrition/Hydration-ADULT  Goal: Nutrient/Hydration intake appropriate for improving, restoring or maintaining nutritional needs  Description: Monitor and assess patient's nutrition/hydration status for malnutrition  Collaborate with interdisciplinary team and initiate plan and interventions as ordered  Monitor patient's weight and dietary intake as ordered or per policy  Utilize nutrition screening tool and intervene as necessary  Determine patient's food preferences and provide high-protein, high-caloric foods as appropriate       INTERVENTIONS:  - Monitor oral intake, urinary output, labs, and treatment plans  - Assess nutrition and hydration status and recommend course of action  - Evaluate amount of meals eaten  - Assist patient with eating if necessary   - Allow adequate time for meals  - Recommend/ encourage appropriate diets, oral nutritional supplements, and vitamin/mineral supplements  - Order, calculate, and assess calorie counts as needed  - Recommend, monitor, and adjust tube feedings and TPN/PPN based on assessed needs  - Assess need for intravenous fluids  - Provide specific nutrition/hydration education as appropriate  - Include patient/family/caregiver in decisions related to nutrition  Outcome: Progressing

## 2023-04-30 RX ADMIN — ASPIRIN 81 MG 81 MG: 81 TABLET ORAL at 08:39

## 2023-04-30 RX ADMIN — SENNOSIDES 8.6 MG: 8.6 TABLET, FILM COATED ORAL at 21:42

## 2023-04-30 RX ADMIN — FAMOTIDINE 20 MG: 20 TABLET ORAL at 08:39

## 2023-04-30 RX ADMIN — FAMOTIDINE 20 MG: 20 TABLET ORAL at 21:42

## 2023-04-30 RX ADMIN — AMLODIPINE BESYLATE 5 MG: 5 TABLET ORAL at 08:39

## 2023-04-30 RX ADMIN — POLYETHYLENE GLYCOL 3350 17 G: 17 POWDER, FOR SOLUTION ORAL at 08:39

## 2023-04-30 RX ADMIN — APIXABAN 5 MG: 5 TABLET, FILM COATED ORAL at 17:49

## 2023-04-30 RX ADMIN — APIXABAN 5 MG: 5 TABLET, FILM COATED ORAL at 08:39

## 2023-04-30 RX ADMIN — METOPROLOL TARTRATE 25 MG: 25 TABLET, FILM COATED ORAL at 21:42

## 2023-04-30 RX ADMIN — LEVETIRACETAM 750 MG: 750 TABLET, FILM COATED ORAL at 08:39

## 2023-04-30 RX ADMIN — TAMSULOSIN HYDROCHLORIDE 0.4 MG: 0.4 CAPSULE ORAL at 17:49

## 2023-04-30 RX ADMIN — ATORVASTATIN CALCIUM 40 MG: 40 TABLET, FILM COATED ORAL at 17:49

## 2023-04-30 RX ADMIN — LOSARTAN POTASSIUM 25 MG: 25 TABLET, FILM COATED ORAL at 08:39

## 2023-04-30 RX ADMIN — METOPROLOL TARTRATE 25 MG: 25 TABLET, FILM COATED ORAL at 08:39

## 2023-04-30 RX ADMIN — LEVETIRACETAM 750 MG: 750 TABLET, FILM COATED ORAL at 21:42

## 2023-04-30 RX ADMIN — LORATADINE 10 MG: 10 TABLET ORAL at 08:39

## 2023-04-30 NOTE — PROGRESS NOTES
"   04/30/23 1532   Pain Assessment   Pain Assessment Tool 0-10   Pain Score No Pain   Restrictions/Precautions   Precautions Aphasia;Bed/chair alarms;Cognitive; Fall Risk;Supervision on toilet/commode;Visual deficit   Comprehension   Comprehension (FIM) 3 - Understands basic info/conversation 50-74% of time   Expression   Expression (FIM) 2 - Uses only simple expressions or gestures (waves, hello)   Social Interaction   Social Interaction (FIM) 5 - Interacts appropriately with others 90% of time   Problem Solving   Problem solving (FIM) 3 - Solves basic problmes 50-74% of time   Memory   Memory (FIM) 3 - Recognizes, recalls/performs 50-74%   Speech/Language/Cognition Assessmetn   Treatment Assessment Session today focused on family training w/ pt and pt's mother, Ludmila Almendarez  Since last update/education which was provided to pt's mother last week by current SLP, able to continue to speak to pt's ongoing improvement given comprehension/expression and cognitive skills, but still highlighting overall deficits which continue to present  In preparation for Wednesday (5/3) community outing, SLP provided upadte to pt's mother about how functional money management task went  Education given towards still ongoing difficulty when only auditorily presented w/ a dollar or change amount, where pt highly benefits from both written and verbal cues to determine money amounts which pt WAS successful in determining  When completing outing, further recommendations will be made in how to maximize pt's overall comprehension given stimuli presented in the \"real world\" setting  However, since it has been observed in multiple sessions that pt does benefit MOST from both visual and verbal feedback/cues, SLP suggesting to pt and pt's mother about labeling rooms of the house, items in shelves/pantry in the kitchen or dresser/closet items in pt's room   SLP also offering suggestion to label areas for pt if needed initially to maximize familiarity in " "the house for rooms, etc  Pt and pt's mother receptive to this education  Otherwise, SLP targeted a number of different activities for pt's mother to observe and learn means for cues as needed  Picture-Word Matching Tasks  Pt was presented w/ a picture of a common object and then given [address] written words to choose the word which matched to the picture provided  Upon completing the initial page of this task, pt was 8/8 accurate in determining correct word which corresponded to the picture  Even given this, pt was able to spontaneously elicit target words for the pictures on 3 out of the 8 items  When completing the next page given new pictures but still [address] words to choose from, pt was 6/8 accurate this round, noticing that his choices were semantically related in the errors  However, when reviewing this, pt was able to ID the correct word to match the pictures, increasing to 8/8 accuracy  This task was completed by pt solely using reading comprehension given the words to match to picture, until the errors did occur  SLP providing verbal/visual feedback of words which did improve his ability to ID correct words to match the pictures  Automatic Speech Task: Counting 1-10  As SLP provided education to pt and mother about how spontaneous words can occur given these more structured tasks, pt does overall continue to demonstrate most difficulty given targeted speech tasks, including still w/ biograpical information as well as even w/ automatic speech tasks  To demonstrate this, SLP engaged pt in counting w/ verbal and visual cues of using fingers as counting up  Pt was able to elicit \"two, three, four, five\" on the initial attempt, where all other numbers were close approximations or jargon  This was trialed on multiple attempts where pt was eventually able to state \"1-5\" but still exhibited increased difficulty w/ the numbers 6-10   SLP using written aid which had both the number and the number written out for " "pt, which did initially improve his ability to state \"1-5\" noting ability to state \"6, 7\" only on the first attempt  Significant difficulty was noted w/ remaining numbers 8-10  SLP using this task as an education piece to mother about pt;s apraxia of speech  SLP did state the more attempts being made can not always improve pt's ability to say the numbers, even worsening speech output, which was occurring  However as SLP finished the education about apraxia, pt then spontaneously was able to elicit 1-8 on own w/ use of written visual aid  SLP stating to pt and mother, this is an example of the inconsistency in structured speech tasks that occurs with the apraxia  Furthermore stating that rest breaks are highly warranted when pt's expression decreased given tasks as such  The other activity which SLP educated pt and mother on is melodic intonation, singing to songs which are most repetition, familiarity, etc  Both were receptive to this strategy as well  Written Phrase Completion Tasks  The last activity completed in session today was targeting pt's reading comprehension by completing simple sentences (ie: read the ___, open your ___, etc)  Today, SLP provided NO verbal feedback given carrier phrase for pt to determine the target word to complete each phrase  Pt was given and Fo3 words to choose from to complete each sentence  Ability for pt to determine the target words independently today was 6/10 accuracy  The errored items, SLP provided verbal feedback of just the phrase, to which pt was able to determine an additional 3 target words to complete each phrase  The last one, SLP did have to provide full verbal/visual feedback for maximal comprehension of correct target word  Of note during this task, pt was able to provide appropriate words to complete 3 out the 10 phrases w/o his awareness  Pt's mother was able to notice this as the task progressed         At end of session, SLP asked both pt and pt's mother about " any pressing questions currently, where none were provided  SLP did provide written handout to mother which was a resource for Ways to Communicate with an Aphasic Person  SLP did state that all the information provided in the handout has been reviewed in sessions  SLP further stated to pt and pt's mother about having an HEP given activities which pt engages in during sessions to have at time of discharge until initiation of OP ST services  Plan for community outing w/ pt and mother on Wednesday, 5/3 w/ current ST and primary OT in hopes for more education and means for providing pt and mother strategies for managing tasks in a new environment and situation  At this time, pt will continue to benefit from ongoing skilled SLP services at this time to maximize overall functional independence for basic communication skills, comprehension skills, cognitive linguistic skills in attempts to decrease caregiver burden over time  SLP Therapy Minutes   SLP Time In 1330   SLP Time Out 1430   SLP Total Time (minutes) 60   SLP Mode of treatment - Individual (minutes) 60   SLP Mode of treatment - Concurrent (minutes) 0   SLP Mode of treatment - Group (minutes) 0   SLP Mode of treatment - Co-treat (minutes) 0   SLP Mode of Treatment - Total time(minutes) 60 minutes   SLP Cumulative Minutes 795   Therapy Time missed   Time missed?  No

## 2023-04-30 NOTE — PLAN OF CARE
Problem: PAIN - ADULT  Goal: Verbalizes/displays adequate comfort level or baseline comfort level  Description: Interventions:  - Encourage patient to monitor pain and request assistance  - Assess pain using appropriate pain scale  - Administer analgesics based on type and severity of pain and evaluate response  - Implement non-pharmacological measures as appropriate and evaluate response  - Consider cultural and social influences on pain and pain management  - Notify physician/advanced practitioner if interventions unsuccessful or patient reports new pain  Outcome: Progressing     Problem: INFECTION - ADULT  Goal: Absence or prevention of progression during hospitalization  Description: INTERVENTIONS:  - Assess and monitor for signs and symptoms of infection  - Monitor lab/diagnostic results  - Monitor all insertion sites, i e  indwelling lines, tubes, and drains  - Monitor endotracheal if appropriate and nasal secretions for changes in amount and color  - [place] appropriate cooling/warming therapies per order  - Administer medications as ordered  - Instruct and encourage patient and family to use good hand hygiene technique  - Identify and instruct in appropriate isolation precautions for identified infection/condition  Outcome: Progressing     Problem: SAFETY ADULT  Goal: Patient will remain free of falls  Description: INTERVENTIONS:  - Educate patient/family on patient safety including physical limitations  - Instruct patient to call for assistance with activity   - Consult OT/PT to assist with strengthening/mobility   - Keep Call bell within reach  - Keep bed low and locked with side rails adjusted as appropriate  - Keep care items and personal belongings within reach  - Initiate and maintain comfort rounds  - Make Fall Risk Sign visible to staff  - Offer Toileting every  Hours, in advance of need  - Initiate/Maintain alarm  - Obtain necessary fall risk management equipment:   - Apply yellow socks and bracelet for high fall risk patients  - Consider moving patient to room near nurses station  Outcome: Progressing  Goal: Maintain or return to baseline ADL function  Description: INTERVENTIONS:  -  Assess patient's ability to carry out ADLs; assess patient's baseline for ADL function and identify physical deficits which impact ability to perform ADLs (bathing, care of mouth/teeth, toileting, grooming, dressing, etc )  - Assess/evaluate cause of self-care deficits   - Assess range of motion  - Assess patient's mobility; develop plan if impaired  - Assess patient's need for assistive devices and provide as appropriate  - Encourage maximum independence but intervene and supervise when necessary  - Involve family in performance of ADLs  - Assess for home care needs following discharge   - Consider OT consult to assist with ADL evaluation and planning for discharge  - Provide patient education as appropriate  Outcome: Progressing  Goal: Maintains/Returns to pre admission functional level  Description: INTERVENTIONS:  - Perform BMAT or MOVE assessment daily    - Set and communicate daily mobility goal to care team and patient/family/caregiver  - Collaborate with rehabilitation services on mobility goals if consulted  - Perform Range of Motion  times a day  - Reposition patient every hours    - Dangle patient  times a day  - Stand patient  times a day  - Ambulate patient  times a day  - Out of bed to chair  times a day   - Out of bed for meals  times a day  - Out of bed for toileting  - Record patient progress and toleration of activity level   Outcome: Progressing     Problem: DISCHARGE PLANNING  Goal: Discharge to home or other facility with appropriate resources  Description: INTERVENTIONS:  - Identify barriers to discharge w/patient and caregiver  - Arrange for needed discharge resources and transportation as appropriate  - Identify discharge learning needs (meds, wound care, etc )  - Arrange for interpretive services to assist at discharge as needed  - Refer to Case Management Department for coordinating discharge planning if the patient needs post-hospital services based on physician/advanced practitioner order or complex needs related to functional status, cognitive ability, or social support system  Outcome: Progressing     Problem: Prexisting or High Potential for Compromised Skin Integrity  Goal: Skin integrity is maintained or improved  Description: INTERVENTIONS:  - Identify patients at risk for skin breakdown  - Assess and monitor skin integrity  - Assess and monitor nutrition and hydration status  - Monitor labs   - Assess for incontinence   - Turn and reposition patient  - Assist with mobility/ambulation  - Relieve pressure over bony prominences  - Avoid friction and shearing  - Provide appropriate hygiene as needed including keeping skin clean and dry  - Evaluate need for skin moisturizer/barrier cream  - Collaborate with interdisciplinary team   - Patient/family teaching  - Consider wound care consult   Outcome: Progressing     Problem: Nutrition/Hydration-ADULT  Goal: Nutrient/Hydration intake appropriate for improving, restoring or maintaining nutritional needs  Description: Monitor and assess patient's nutrition/hydration status for malnutrition  Collaborate with interdisciplinary team and initiate plan and interventions as ordered  Monitor patient's weight and dietary intake as ordered or per policy  Utilize nutrition screening tool and intervene as necessary  Determine patient's food preferences and provide high-protein, high-caloric foods as appropriate       INTERVENTIONS:  - Monitor oral intake, urinary output, labs, and treatment plans  - Assess nutrition and hydration status and recommend course of action  - Evaluate amount of meals eaten  - Assist patient with eating if necessary   - Allow adequate time for meals  - Recommend/ encourage appropriate diets, oral nutritional supplements, and vitamin/mineral supplements  - Order, calculate, and assess calorie counts as needed  - Recommend, monitor, and adjust tube feedings and TPN/PPN based on assessed needs  - Assess need for intravenous fluids  - Provide specific nutrition/hydration education as appropriate  - Include patient/family/caregiver in decisions related to nutrition  Outcome: Progressing

## 2023-04-30 NOTE — DISCHARGE INSTR - OTHER ORDERS
"\"How to Reduce Risk for Falls\":    1  Patient should wear supportive footwear such as sneakers or gripped socks at all times  No bare foot or regular socks  2  Use night lights throughout home for when the environment is dark to decrease risk of tripping on obstacles/items on the floor  3  Arrange the environment to reduce obstacles  Open concept is best   Rearrange any cords, etc that may be a tripping hazard  Remove throw rugs  4  If you have pets, ensure they will not be a tripping hazard, jump up onto patient, or hang near feet  5  Encouraged to keep bell on cat so that Louisa Castellonlles could hear if she is on the Right side  6  Encouraged to use urinal at bedside to limit to instances needed to negotiate to the bathroom in the middle of the night     "

## 2023-04-30 NOTE — PROGRESS NOTES
"Physical Medicine and Rehabilitation Progress Note  Ananya English III 41 y o  male MRN: [id number]  Unit/Bed#: -01 Encounter: [id number]      Assessment & Plan:     Decline in ADLs and mobility: Functional assessment - improving        FIM  Care Score  Admit Score Recent Score    Total assist  1-100% or 2p    Tot     Max assist 2-51-75%    Sub  toileting hygiene, bathing, lower body dressing  toileting hygiene   Mod assist 3- 26-50%  Par  upper body dressing    Min assist 3- 25% or < Par     CG assist 4  TA  Bathing, dressing   Sup/Setup 4-5 Sup     Mod-I/Indep 6 MI      Transfers   mod assist to significant assist  CS-supervision     Ambulation    50 feet min assist  150 feet supervision    Stairs    4 steps moderate assist  12 steps contact-guard   SLP FIM admit - total assist expression, Max assist comp, PS, memory; SI - supervision  SLP FIM recent -moderate assist memory, problem solving, comp; significant assist expression > improving   Goal: Largely supervision for ADLs and mobility  Major barriers: Aphasia, imbalance, incoordination, deconditioning  Dispo: Home with estimate length of stay Friday 5/5 with OP PT, OT, ST      * Acute ischemic left MCA stroke [facility])  Assessment & Plan  - 4/28 neuro exam stable  - Plan for community re-integration outing with tx/family next week   - D/C set tentatively for Friday 5/5 with OP PT, OT, ST - Rx entered per CM rec  - Mother to assist with iADLs   - Large left MCA infarct  - Residual impairments on admission to [facility]: Aphasia, weakness, imbalance (assess for other impairments during [facility] course)   - Co-morbidities most impacting functional recovery: Morbid obesity    Secondary stroke prevention  - Per prior providers   \"Thrombosis panel with abnormal antithrombin, Protein C/S activity, but per Neuro unclear significance in setting of acute stroke  Would repeat as outpatient     Will need Zio Patch/Loop Recorder with Cards Referral as outpatient   Discussed with " "Neurology on 4/9: Continue Keppra until outpatient f/u given location, temporal slowing, extent of lesion  \"  - Antithrombotic: Aspirin  - Statin  - Optimal management of blood pressure and diabetes  -  patient and if applicable caregiver on optimal stroke management  - Follow-up with neurology and PCP after d/c   - Disability/FMLA forms completed and given to CM 4/21 to assist with and fax   -Continue acute comprehensive interdisciplinary inpatient rehabilitation to include intensive skilled therapies (PT, OT, ST) as outlined with oversight and management by rehabilitation physician as well as inpatient rehab level nursing, case management and weekly interdisciplinary team meetings           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)  Assessment & Plan  Saturating well on room air with and without activity so far  Monitor vitals with and without activity  Eliquis  Outpatient hematology consult    Rhinorrhea  Assessment & Plan  Improved  Claritin     HIT (heparin-induced thrombocytopenia) ([facility])  Assessment & Plan  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   (\"The patient's serum tested positive by FRANKY and supports a diagnosis of heparin-induced-thrombocytopenia\")  Transitioned from Arixtra to Eliquis  Platelets remain recovered 4/27     DVT of lower extremity, bilateral ([address] )  Assessment & Plan  Eliquis  Ambulation  Hold SCDs with hx of DVT     Urinary retention  Assessment & Plan  Flomax    Bowel and bladder incontinence  Assessment & Plan  Improving   Discontinue bladder scans as PVRs low  Toileting program  Flomax    Hypertension  Assessment & Plan  Blood pressure acceptable  Internal medicine consulted and co-management with their service  Monitor vitals with and without activity; monitor for orthostasis  Monitor hemoglobin, electrolytes, kidney function, hydration status   Current meds: Amlodipine, losartan, metoprolol      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, without long-term current use of insulin [facility])  Assessment & Plan  Lab Results   Component Value Date    HGBA1C 8 0 (H) 03/31/2023     Internal medicine consulted and management at their discretion  Monitor for signs and symptoms of hypoglycemia   Current meds: Lispro sliding scale      Obesity, Class III, BMI 40-49 9 (morbid obesity) ([facility] [address] )  Assessment & Plan   on appropriate nutrition and activity  Adjustments accordingly during skilled therapy and with rehab nursing  Monitor skin closely for breakdown, wounds, rashes; keep clean and dry, turning Q2H   Follow-up with PCP after d/c        Other Medical Issues:       Follow-up providers and other issues to be followed up after discharge   PCP   Neuro   Hematology    CODE: Level 1: Full Code    Restrictions include: Fall precautions    Objective:     Allergies per EMR  Diagnostic Studies: Reviewed, no new imaging  No orders to display     See above as well    Laboratory: Labs reviewed  Results from last 7 days   Lab Units 04/27/23  0514 04/24/23  0604   HEMOGLOBIN g/dL 12 6 12 6   HEMATOCRIT % 40 3 40 5   WBC Thousand/uL 5 69 6 07     Results from last 7 days   Lab Units 04/27/23  0514 04/24/23  0604   BUN mg/dL 11 13   POTASSIUM mmol/L 3 8 3 7   CHLORIDE mmol/L 107 108   CREATININE mg/dL 1 00 1 01              Drug regimen reviewed, all potential adverse effects identified and addressed:    Scheduled Meds:  Current Facility-Administered Medications   Medication Dose Route Frequency Provider Last Rate    acetaminophen  975 mg Oral Q8H PRN Mely Long MD      amLODIPine  5 mg Oral Daily Mely Long MD      apixaban  5 mg Oral BID Mely Long MD      aspirin  81 mg Oral Daily Mely Long MD      atorvastatin  40 mg Oral QPM Mely Long MD      bisacodyl  10 mg Rectal Daily PRN Mely Long MD      bisacodyl  10 mg Rectal Once Mely Long MD      calcium carbonate 1,000 mg Oral TID PRN Keven López MD      famotidine  20 mg Oral Q12H [facility] Keven López MD      insulin lispro  1-5 Units Subcutaneous TID [facility] Keven López MD      levETIRAcetam  750 mg Oral Q12H [facility] Keven López MD      lidocaine   Topical Q4H PRN Keven López MD      loratadine  10 mg Oral Daily Cassia Bernal PA-C      losartan  25 mg Oral Daily Keven López MD      metoprolol tartrate  25 mg Oral Q12H [facility] Keven López MD      ondansetron  4 mg Oral Q8H PRN Keven López MD      oxyCODONE  2 5 mg Oral Q6H PRN Keven López MD      polyethylene glycol  17 g Oral Daily PRN Keven López MD      polyethylene glycol  17 g Oral Daily Keven López MD      senna  1 tablet Oral HS Keven López MD      tamsulosin  0 4 mg Oral After Rosebud Bumpers, MD         Chief Complaints:   Rehab follow-up     Subjective:     Patient with stable aphasia today  He communicates no pain, worsening strength or significant frustrations or other complaints  Physical Exam:  04/28/23 0528 98 °F (36 7 °C) 74 18 128/85 -- 96 % None (Room air)     Vitals above reviewed on date of encounter    GEN:  Lying in bed in NAD   HEENT/NECK: MMM  CARDIAC: Regular rate rhythm, no murmers, no rubs, no gallops  LUNGS:  clear to auscultation, no wheezes, rales, or rhonchi  ABDOMEN: Soft, non-tender, non-distended, normal active bowel sounds  EXTREMITIES/SKIN:  no calf edema, no calf tenderness to palpation  NEURO:   MENTAL STATUS: Adequate wakefulness, able to communicate with aphasia fairly well if given time to correct  and Strength/MMT:  Unchanged  PSYCH:  Affect:  Euthymic     HPI:  79-year-old male with a history of hypertension, hyperlipidemia, obesity, diabetes status post recent left MCA stroke which was complicated by saddle pulmonary embolism,, HIT, bilateral lower extremity DVTs    Patient was evaluated by skilled therapies and was found to have significant decline in ADLs and ambulation and [facility]      ** Please Note: Fluency Direct voice to text software may have been used in the creation of this document   **    I personally performed the required components and examined the patient myself in person on 4/28/23

## 2023-04-30 NOTE — PROGRESS NOTES
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   Had nonocclusive saddle embolus with filling defects distal main pulmonary arteries and throughout segmental branches bilaterally   Distal order branches were not clearly visualized due to severe respiratory motion artifact though felt likely containing pulmonary emboli     Sx were left sided CP, NIEVES and elevated D-dimer   Continue Eliquis   Was not a candidate for IR intervention 2/2 HIT     Bilateral LE DVT   Continue Eliquis      HIT   Heparin induced antibody was positive   Heparin by serotonin release from 4/12/23 was positive   S/p Argatroban   Platelets recovered     L MCA infarct 3/3/23   Suspected to be cardioembolic   Thrombosis panel then showed abnormal antithrombin, Protein C/S activity, but per Neuro unclear significance in setting of acute stroke and therefore wanted repeat as outpatient   Cont ASA/statin   Initial Echo/HODAN LVEF 55%; no thrombus/PFO   Needs outpt LOOP/Zio patch   Cont keppra until OP f/u     DM2   Hgb A1c 8 0   New diagnosis   Cont QID Accuchecks/SSI and DM diet   Home:  No meds   Here: SSI only   Had been on Metformin when in [facility] before transfer back for PE   Needs DM teaching and PCP f/u on dc = mother is diabetic and knows how to use meter but not insulin   Stable off metformin and did not require sliding scale so dc'd     HTN   Home: on no meds   Here:  Norvasc 5mg qd/Lopressor 25 mg q12hrs/Losartan 25mg qd   stable     Urine retention   Per PMR   Continue Flomax     Obesity   Current BMI 38 5   Wt loss counseling when recovered        Discharge date:  5/5/23       The above assessment and plan was reviewed and updated as determined by my evaluation of the patient on 4/30/2023      Labs:   Results from last 7 days   Lab Units 04/27/23  0514 04/24/23  0604   WBC Thousand/uL 5 69 6 07   HEMOGLOBIN g/dL 12 6 12 6   HEMATOCRIT % 40 3 40 5   PLATELETS Thousands/uL 312 309     Results from last 7 days   Lab Units 04/27/23  0514 04/24/23  0604   SODIUM mmol/L 137 139   POTASSIUM mmol/L 3 8 3 7   CHLORIDE mmol/L 107 108   CO2 mmol/L 28 26   BUN mg/dL 11 13   CREATININE mg/dL 1 00 1 01   CALCIUM mg/dL 8 7 8 9             Results from last 7 days   Lab Units 04/28/23  2120 04/28/23  1552 04/28/23  1128   POC GLUCOSE mg/dl 99 103 102       Review of Scheduled Meds:  Current Facility-Administered Medications   Medication Dose Route Frequency Provider Last Rate    acetaminophen  975 mg Oral Q8H PRN Ritchie MD Raf      amLODIPine  5 mg Oral Daily Ritchie MD Raf      apixaban  5 mg Oral BID Ritchie MD Raf      aspirin  81 mg Oral Daily Ritchie MD Raf      atorvastatin  40 mg Oral QPM Ritchie MD Raf      bisacodyl  10 mg Rectal Daily PRN Ritchie MD Raf      bisacodyl  10 mg Rectal Once Ritchie MD Raf      calcium carbonate  1,000 mg Oral TID PRN Ritchie MD Raf      famotidine  20 mg Oral Q12H [facility] Ritchie MD Raf      levETIRAcetam  750 mg Oral Q12H [facility] Ritchie MD Raf      lidocaine   Topical Q4H PRN Ritchie MD Raf      loratadine  10 mg Oral Daily Cassia Bernal PA-C      losartan  25 mg Oral Daily Ritchie MD Raf      metoprolol tartrate  25 mg Oral Q12H [facility] Ritchie MD Raf      ondansetron  4 mg Oral Q8H PRN Ritchie MD Raf      oxyCODONE  2 5 mg Oral Q6H PRN Rtichie MD Raf      polyethylene glycol  17 g Oral Daily PRN Ritchie MD Raf      polyethylene glycol  17 g Oral Daily Ritchie MD Raf      senna  1 tablet Oral HS Ritchie MD Raf      tamsulosin  0 4 mg Oral After Mercy Chanel MD Subjective/ HPI: Patient seen and examined  Patients overnight issues or events were reviewed with nursing or staff during rounds or morning huddle session  New or overnight issues include the following:     Pt seen in his room  No issues overnight    ROS:   A 10 point ROS was performed; negative except as noted above  Imaging:     No orders to display       *Labs /Radiology studies reviewed  *Medications reviewed and reconciled as needed  *Please refer to order section for additional ordered labs studies  *Case discussed with primary attending during morning huddle case rounds    Physical Examination:  Vitals:   Vitals:    04/29/23 1437 04/29/23 2038 04/30/23 0542 04/30/23 0600   BP: 130/81 137/79 126/78    BP Location: Right arm Right arm Left arm    Pulse: 80 82 72    Resp: 18 18 18    Temp: 97 6 °F (36 4 °C) 97 7 °F (36 5 °C) 97 8 °F (36 6 °C)    TempSrc: Oral Oral Oral    SpO2: 98% 100% 96%    Weight:    133 kg (292 lb 5 3 oz)   Height:           GEN: No apparent distress, interactive  NEURO: Alert and aphasic  HEENT: Pupils are equal and reactive, EOMI, mucous membranes are moist, face asymmetrical  CV: S1 S2 regular, no MRG, trace b/l LE peripheral edema noted  RESP: Lungs are clear bilaterally, no wheezes, rales or rhonchi noted, on room air, respirations easy and non labored  GI: trace, soft non tender, non distended; +BS x4  : Voiding without difficulty  MUSC: Moves all extremities  SKIN: pink, warm and dry, normal turgor, no rashes, lesions        The above physical exam was reviewed and updated as determined by my evaluation of the patient on 4/30/2023  Invasive Devices     None                    VTE Pharmacologic Prophylaxis: Eliquis  Code Status: Level 1 - Full Code  Current Length of Stay: 11 day(s)      Total time spent:  30 minutes with more than 50% spent counseling/coordinating care   Counseling includes discussion with patient re: progress  and discussion with patient of his/her current medical state/information  Coordination of patient's care was performed in conjunction with primary service  Time invested included review of patient's labs, vitals, and management of their comorbidities with continued monitoring  In addition, this patient was discussed with medical team including physician and advanced extenders  The care of the patient was extensively discussed and appropriate treatment plan was formulated unique for this patient  Medical decision making for the day was made by supervising physician unless otherwise noted in their attestation statement  ** Please Note:  voice to text software may have been used in the creation of this document   Although proof errors in transcription or interpretation are a potential of such software**

## 2023-04-30 NOTE — PROGRESS NOTES
OT Treatment Note       04/30/23 1000   Pain Assessment   Pain Assessment Tool 0-10   Pain Score No Pain   Restrictions/Precautions   Precautions Aphasia;Bed/chair alarms;Cognitive; Fall Risk;Supervision on toilet/commode;Visual deficit   Weight Bearing Restrictions No   ROM Restrictions No   Lifestyle   Autonomy Pt agreeable to therapy session, mom present  Tub/Shower Transfer   Findings Pt completing dry shower transfer to tub bench without AD or GB use with CGA and vc's for safety and hand placement  Putting On/Taking Off Footwear   Comment setup to don crocs seated in recliner   Sit to Stand   Type of Assistance Needed Supervision   Physical Assistance Level No physical assistance   Comment without AD   Sit to Stand CARE Score 4   Bed-Chair Transfer   Type of Assistance Needed Supervision   Physical Assistance Level No physical assistance   Comment without AD   Chair/Bed-to-Chair Transfer CARE Score 4   Toilet Transfer   Type of Assistance Needed Incidental touching   Physical Assistance Level No physical assistance   Comment using L grab bar only (vc's to not use R grab bar) as mom reports countertop only on L side at home   Toilet Transfer CARE Score 4   Meal Prep   Meal Preparation mom reports pt did not complete much cooking if any PTA, often got take out or would heat up leftovers from fridge  Kitchen Mobility   Kitchen Mobility Comments Provided pt with written list of 4 food items to find in kitchen cabinets/fridge, read list together and completed word and item matching  Pt then functionally mobilizing through kitchen without AD with close SUP/CGA to locate items  Pt requiring mod vc's to locate milk in fridge, although it was individual serving container and pt may have had increased success with typical home items  Pt also requiring vc's to recall 2/4 items to find, able to find remaining items with min vc's for recall and 2* visual field deficits, and no LOB noted     Light Housekeeping   Light "Housekeeping Mom reports laundry in basement of house  Pt participating in item retrieval and weighted carry task of functionally mobilizing throughout OT gym while holding 10# weighted laundry basket to retrieve clothing at various heights including floor level and place into laundry basket  Pt then carrying full basket throughout hallway back to room  Pt completing task with close SUP/CGA and no LOB noted  Pt requiring min vc's to fully scan to R environment and avoid obstacles/door frames on R side in hallway  Health Management   Health Management mom to assist with task upon return home, plans to use pill organizer   Cognition   Overall Cognitive Status Impaired   Arousal/Participation Alert; Cooperative   Attention Attends with cues to redirect   Orientation Level Oriented X4   Following Commands Follows one step commands with increased time or repetition   Vision   Vision Comments Pt participating in visual scanning task of matching cards on tabletop with relative ease, no vc's required to fully scan  Pt then functionally mobilizing around therapy gym to visually scan full environment to locate and retrieve cards at various heights  Pt requiring mod vc's and gesturing to comprehend instructions for this part of the task, but then able to complete with min vc's to fully scan environment to R side to locate all necessary cards  Functioanlly mobilizing with close SUP without AD during task  Additional Activities   Additional Activities Comments Pt participating in dynamic balance activity of functionally mobilizing through obstacle course and visually scan to avoid potential obstacles in path at home and in community - pt tapping cones, stepping over ~4\" obstacles and navigating around furniture without AD with close SUP/CGA  Pt requiring min vc's to fully navigate around all furniture safely on R side   Pt's mom reporting cat Shanique at home that often gets under your feet when walking - pt would benefit from " higher level reactive balance activities to simulate this in future sessions  Activity Tolerance   Activity Tolerance Patient tolerated treatment well   Assessment   Treatment Assessment Pt seen for 90 min OT session focusing on functional transfers/mobility, visual scanning tasks, kitchen mobility and laundry tasks, and dynamic standing balance activities, along with initiating family training with pricila Smith  Pt tolerated session well, completing all tasks with CGA/close SUP without AD, with min vc's required to fully scan environment at times to both locate items and avoid environmental obstacles  Pt requiring mod vc's at times to comprehend instructions for tasks during session  FT with mom went well with mom denise'ing ability to safely transfer and complete functional mobility with pt; see below for further details  OT to continue POC to maximize safety and functional (I) and decrease burden of care prior to d/c  Anticipate further family training with pricila Smith tomorrow 5/1 from 1-2p  OT Family training done with: Family training initiated with pricila Smith this AM  OT edu mom re: pt's current level of function with all BADL tasks as well as simple meal prep activities  Mom edu re: cueing for visual scanning with mobility and safe techniques when mobilizing/transferring with pt  Mom denise'ed ability to safely assist pt functionally mobilizing within room, bathroom, and hallway and complete toilet, chair, and dry shower transfers with pt with min vc's for proper body positioning on R side and guarding techniques  Sarah providing OT with further info re: home layout and PLOF (i e  wooden bench and no GB in shower with glass door, Countertop on L side only by toilet  Pt did not cook meals PTA, only heated up leftovers  Laundry is in basement  )  See above for further details  Pt's mom to return for further family training with OT tomorrow Monday 5/1     Prognosis Good   Problem List Decreased strength;Decreased endurance; Impaired balance;Decreased mobility; Decreased coordination;Decreased cognition;Decreased safety awareness; Impaired vision   Barriers to Discharge Inaccessible home environment   Plan   Treatment/Interventions ADL retraining;Functional transfer training; Therapeutic exercise; Endurance training;Cognitive reorientation;Patient/family training;Equipment eval/education; Bed mobility;Gait training; Compensatory technique education   Progress Progressing toward goals   Recommendation   OT Discharge Recommendation Home with home health rehabilitation   OT Therapy Minutes   OT Time In 1000   OT Time Out 1130   OT Total Time (minutes) 90   OT Mode of treatment - Individual (minutes) 90   OT Mode of treatment - Concurrent (minutes) 0   OT Mode of treatment - Group (minutes) 0   OT Mode of treatment - Co-treat (minutes) 0   OT Mode of Treatment - Total time(minutes) 90 minutes   OT Cumulative Minutes 795   Therapy Time missed   Time missed?  No

## 2023-04-30 NOTE — PROGRESS NOTES
"Physical Medicine and Rehabilitation Progress Note  Celestine Chow III 41 y o  male MRN: [id number]  Unit/Bed#: [facility] 965-01 Encounter: [id number]      Assessment & Plan:     Decline in ADLs and mobility: Functional assessment -        FIM  Care Score  Admit Score Recent Score    Total assist  1-100% or 2p    Tot     Max assist 2-51-75%    Sub  toileting hygiene, bathing, lower body dressing  toileting hygiene   Mod assist 3- 26-50%  Par  upper body dressing    Min assist 3- 25% or < Par  Bathing, LBD   CG assist 4  TA     Sup/Setup 4-5 Sup  UBD   Mod-I/Indep 6 MI      Transfers   mod assist to significant assist  moderate assist to supervision    Ambulation    50 feet min assist  150 feet contact-guard    Stairs    4 steps moderate assist  4 steps contact-guard   SLP FIM admit - total assist expression, Max assist comp, PS, memory; SI - supervision  SLP FIM recent -moderate assist memory, significant assist problem solving comprehension and expression  Goal: Largely supervision for ADLs and mobility  Major barriers: Aphasia, imbalance, incoordination, deconditioning  Dispo: Home with estimate length of stay Friday 5/5 with OP PT, OT, ST      * Acute ischemic left MCA stroke [facility])  Assessment & Plan  - 4/26 neuro exam stable; function improving  - D/C set tentatively for Friday 5/5  - Large left MCA infarct  - Residual impairments on admission to [facility]: Aphasia, weakness, imbalance (assess for other impairments during [facility] course)   - Co-morbidities most impacting functional recovery: Morbid obesity    Secondary stroke prevention  - Per prior providers   \"Thrombosis panel with abnormal antithrombin, Protein C/S activity, but per Neuro unclear significance in setting of acute stroke  Would repeat as outpatient     Will need Zio Patch/Loop Recorder with Cards Referral as outpatient   Discussed with Neurology on 4/9: Continue Keppra until outpatient f/u given location, temporal slowing, extent of lesion  \"  - " Antithrombotic: Aspirin  - Statin  - Optimal management of blood pressure and diabetes  -  patient and if applicable caregiver on optimal stroke management  - Follow-up with neurology and PCP after d/c   - Disability/FMLA forms completed and given to CM 4/21 to assist with and fax   -Continue acute comprehensive interdisciplinary inpatient rehabilitation to include intensive skilled therapies (PT, OT, ST) as outlined with oversight and management by rehabilitation physician as well as inpatient rehab level nursing, case management and weekly interdisciplinary team meetings           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)  Assessment & Plan  Saturating well on room air with and without activity so far  Monitor vitals with and without activity  Eliquis  Outpatient hematology consult    Rhinorrhea  Assessment & Plan  Improved  Claritin     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)  Assessment & Plan  Platelets remain improved on 4/24- repeat CBC tomorrow   Heparin-induced antibody positive, serotonin release assay pending  Transitioned from Arixtra to Eliquis  Monitor platelet count now improving    DVT of lower extremity, bilateral (HCC)  Assessment & Plan  Eliquis  Ambulation  Hold SCDs with hx of DVT     Urinary retention  Assessment & Plan  Flomax    Bowel and bladder incontinence  Assessment & Plan  Improving   Discontinue bladder scans as PVRs low  Toileting program  Flomax    Hypertension  Assessment & Plan  Blood pressure acceptable  Internal medicine consulted and co-management with their service  Monitor vitals with and without activity; monitor for orthostasis  Monitor hemoglobin, electrolytes, kidney function, hydration status   Current meds: Amlodipine, losartan, metoprolol      Type 2 diabetes mellitus with circulatory disorder, without long-term current use of insulin [facility])  Assessment & Plan  Lab Results   Component Value Date    HGBA1C 8 0 (H) 03/31/2023 Internal medicine consulted and management at their discretion  Monitor for signs and symptoms of hypoglycemia   Current meds: Lispro sliding scale      Obesity, Class III, BMI 40-49 9 (morbid obesity) ([facility])  Assessment & Plan   on appropriate nutrition and activity  Adjustments accordingly during skilled therapy and with rehab nursing  Monitor skin closely for breakdown, wounds, rashes; keep clean and dry, turning Q2H   Follow-up with PCP after d/c        Other Medical Issues:       Follow-up providers and other issues to be followed up after discharge   PCP   Neuro   Hematology    CODE: Level 1: Full Code    Restrictions include: Fall precautions    Objective:     Allergies per EMR  Diagnostic Studies: Reviewed, no new imaging  No orders to display     See above as well    Laboratory: Labs reviewed  Results from last 7 days   Lab Units 04/24/23  0604 04/20/23  0705   HEMOGLOBIN g/dL 12 6 13 3   HEMATOCRIT % 40 5 42 4   WBC Thousand/uL 6 07 7 15     Results from last 7 days   Lab Units 04/24/23  0604 04/20/23  0705   BUN mg/dL 13 16   SODIUM mmol/L 139 138   POTASSIUM mmol/L 3 7 3 9   CHLORIDE mmol/L 108 110*   CREATININE mg/dL 1 01 1 00            Drug regimen reviewed, all potential adverse effects identified and addressed:    Scheduled Meds:  Current Facility-Administered Medications   Medication Dose Route Frequency Provider Last Rate    acetaminophen  975 mg Oral Q8H PRN Santiago Cherry MD      amLODIPine  5 mg Oral Daily Santiago Cherry MD      apixaban  5 mg Oral BID Santiago Cherry MD      aspirin  81 mg Oral Daily Santiago Cherry MD      atorvastatin  40 mg Oral QPM Santiago Cherry MD      bisacodyl  10 mg Rectal Daily PRN Santiago Cherry MD      bisacodyl  10 mg Rectal Once Santiago Cherry MD      calcium carbonate  1,000 mg Oral TID PRN Santiago Cherry MD      famotidine  20 mg Oral Q12H [address] Santiago Cherry MD      insulin lispro  1-5 Units Subcutaneous TID [facility] Luis M Dimas MD      levETIRAcetam  750 mg Oral Q12H [address] Luis M Dimas MD      lidocaine   Topical Q4H PRN Luis M Dimas MD      loratadine  10 mg Oral Daily Cassia Bernal PA-C      losartan  25 mg Oral Daily Luis M Dimas MD      metoprolol tartrate  25 mg Oral Q12H [address] Luis M Dimas MD      ondansetron  4 mg Oral Q8H PRN Luis M Dimas MD      oxyCODONE  2 5 mg Oral Q6H PRN Luis M Dimas MD      polyethylene glycol  17 g Oral Daily PRN Luis M Dimas MD      polyethylene glycol  17 g Oral Daily Luis M Dimas MD      senna  1 tablet Oral HS Luis M Dimas MD      tamsulosin  0 4 mg Oral After Rudy Rahman MD         Chief Complaints:   Rehab follow-up     Subjective: On eval, patient denies pain, lightheadedness, SOB, worsening strength or speech  Physical Exam:  T 97 5 P 71 /88 SpO2 95%  Vitals above reviewed on date of encounter    GEN:  Sitting in NAD   HEENT/NECK: MMM  CARDIAC: Regular rate rhythm, no murmers, no rubs, no gallops  LUNGS:  clear to auscultation, no wheezes, rales, or rhonchi  ABDOMEN: Soft, non-tender, non-distended, normal active bowel sounds  EXTREMITIES/SKIN:  no calf edema, no calf tenderness to palpation  NEURO:   MENTAL STATUS: Adequate wakefulness, able to write some short words with extended time, stable aphasia and Strength/MMT:  Near full throughout  [facility]:  Affect:  Euthymic     HPI:  29-year-old male with a history of hypertension, hyperlipidemia, obesity, diabetes status post recent left MCA stroke which was complicated by saddle pulmonary embolism,, HIT, bilateral lower extremity DVTs    Patient was evaluated by skilled therapies and was found to have significant decline in ADLs and ambulation and appears appropriate for admission to [facility]      ** Please Note: Fluency Direct voice to text software may have been used in the creation of this document  **    I personally performed the required components and examined the patient myself in person on 4/26/23    50 minutes or greater spent for this encounter which included a combination of face-to-face time with the patient and non-face-to-face time which in part specifically includes management of CVA and dispo  Face-to-face time included extended discussion with patient regarding current condition, medical history, mood, medical/rehabilitation management, and disposition  Non face-to-face time included coordination of care with patient's co-managing AP and/or physician(s) thru communication and review of their recent documentation as well as reviewing vitals, bowel/bladder function, recent labs, and notes from therapy, CM, and nursing  In addition, this patient was discussed by the interdisciplinary team in weekly case conference today  The care of the patient was extensively discussed with all care providers and an appropriate rehabilitation plan was formulated unique for this patient  Barriers were identified preventing progression of therapy and appropriate interventions were discussed with each discipline   Please see the team note for input from all disciplines regarding barriers, intervention, and discharge planning

## 2023-04-30 NOTE — PLAN OF CARE
Problem: PAIN - ADULT  Goal: Verbalizes/displays adequate comfort level or baseline comfort level  Description: Interventions:  - Encourage patient to monitor pain and request assistance  - Assess pain using appropriate pain scale  - Administer analgesics based on type and severity of pain and evaluate response  - Implement non-pharmacological measures as appropriate and evaluate response  - Consider cultural and social influences on pain and pain management  - Notify physician/advanced practitioner if interventions unsuccessful or patient reports new pain  Outcome: Progressing     Problem: INFECTION - ADULT  Goal: Absence or prevention of progression during hospitalization  Description: INTERVENTIONS:  - Assess and monitor for signs and symptoms of infection  - Monitor lab/diagnostic results  - Monitor all insertion sites, i e  indwelling lines, tubes, and drains  - Monitor endotracheal if appropriate and nasal secretions for changes in amount and color  - Gordon appropriate cooling/warming therapies per order  - Administer medications as ordered  - Instruct and encourage patient and family to use good hand hygiene technique  - Identify and instruct in appropriate isolation precautions for identified infection/condition  Outcome: Progressing     Problem: SAFETY ADULT  Goal: Patient will remain free of falls  Description: INTERVENTIONS:  - Educate patient/family on patient safety including physical limitations  - Instruct patient to call for assistance with activity   - Consult OT/PT to assist with strengthening/mobility   - Keep Call bell within reach  - Keep bed low and locked with side rails adjusted as appropriate  - Keep care items and personal belongings within reach  - Initiate and maintain comfort rounds  - Make Fall Risk Sign visible to staff  - Offer Toileting every 2 Hours, in advance of need  - Initiate/Maintain bed alarm  - Obtain necessary fall risk management equipment: bed alarm  - Apply yellow socks and bracelet for high fall risk patients  - Consider moving patient to room near nurses station  Outcome: Progressing  Goal: Maintain or return to baseline ADL function  Description: INTERVENTIONS:  -  Assess patient's ability to carry out ADLs; assess patient's baseline for ADL function and identify physical deficits which impact ability to perform ADLs (bathing, care of mouth/teeth, toileting, grooming, dressing, etc )  - Assess/evaluate cause of self-care deficits   - Assess range of motion  - Assess patient's mobility; develop plan if impaired  - Assess patient's need for assistive devices and provide as appropriate  - Encourage maximum independence but intervene and supervise when necessary  - Involve family in performance of ADLs  - Assess for home care needs following discharge   - Consider OT consult to assist with ADL evaluation and planning for discharge  - Provide patient education as appropriate  Outcome: Progressing  Goal: Maintains/Returns to pre admission functional level  Description: INTERVENTIONS:  - Perform BMAT or MOVE assessment daily    - Set and communicate daily mobility goal to care team and patient/family/caregiver  - Collaborate with rehabilitation services on mobility goals if consulted  - Perform Range of Motion 3 times a day  - Reposition patient every 2 hours    - Dangle patient 3 times a day  - Stand patient 3 times a day  - Ambulate patient 3 times a day  - Out of bed to chair 3 times a day   - Out of bed for meals 3 times a day  - Out of bed for toileting  - Record patient progress and toleration of activity level   Outcome: Progressing     Problem: DISCHARGE PLANNING  Goal: Discharge to home or other facility with appropriate resources  Description: INTERVENTIONS:  - Identify barriers to discharge w/patient and caregiver  - Arrange for needed discharge resources and transportation as appropriate  - Identify discharge learning needs (meds, wound care, etc )  - Arrange for interpretive services to assist at discharge as needed  - Refer to Case Management Department for coordinating discharge planning if the patient needs post-hospital services based on physician/advanced practitioner order or complex needs related to functional status, cognitive ability, or social support system  Outcome: Progressing     Problem: Prexisting or High Potential for Compromised Skin Integrity  Goal: Skin integrity is maintained or improved  Description: INTERVENTIONS:  - Identify patients at risk for skin breakdown  - Assess and monitor skin integrity  - Assess and monitor nutrition and hydration status  - Monitor labs   - Assess for incontinence   - Turn and reposition patient  - Assist with mobility/ambulation  - Relieve pressure over bony prominences  - Avoid friction and shearing  - Provide appropriate hygiene as needed including keeping skin clean and dry  - Evaluate need for skin moisturizer/barrier cream  - Collaborate with interdisciplinary team   - Patient/family teaching  - Consider wound care consult   Outcome: Progressing     Problem: Nutrition/Hydration-ADULT  Goal: Nutrient/Hydration intake appropriate for improving, restoring or maintaining nutritional needs  Description: Monitor and assess patient's nutrition/hydration status for malnutrition  Collaborate with interdisciplinary team and initiate plan and interventions as ordered  Monitor patient's weight and dietary intake as ordered or per policy  Utilize nutrition screening tool and intervene as necessary  Determine patient's food preferences and provide high-protein, high-caloric foods as appropriate       INTERVENTIONS:  - Monitor oral intake, urinary output, labs, and treatment plans  - Assess nutrition and hydration status and recommend course of action  - Evaluate amount of meals eaten  - Assist patient with eating if necessary   - Allow adequate time for meals  - Recommend/ encourage appropriate diets, oral nutritional supplements, and vitamin/mineral supplements  - Order, calculate, and assess calorie counts as needed  - Recommend, monitor, and adjust tube feedings and TPN/PPN based on assessed needs  - Assess need for intravenous fluids  - Provide specific nutrition/hydration education as appropriate  - Include patient/family/caregiver in decisions related to nutrition  Outcome: Progressing

## 2023-04-30 NOTE — PROGRESS NOTES
"Physical Medicine and Rehabilitation Progress Note  Darinel Henning III 41 y o  male MRN: [id number]  Unit/Bed#: -01 Encounter: [id number]      Assessment & Plan:     Decline in ADLs and mobility: Functional assessment -        FIM  Care Score  Admit Score Recent Score    Total assist  1-100% or 2p    Tot     Max assist 2-51-75%    Sub  toileting hygiene, bathing, lower body dressing  toileting hygiene   Mod assist 3- 26-50%  Par  upper body dressing    Min assist 3- 25% or < Par     CG assist 4  TA  Bathing, dressing   Sup/Setup 4-5 Sup     Mod-I/Indep 6 MI      Transfers   mod assist to significant assist  CS-supervision     Ambulation    50 feet min assist  150 feet supervision    Stairs    4 steps moderate assist  4 steps contact-guard   SLP FIM admit - total assist expression, Max assist comp, PS, memory; SI - supervision  SLP FIM recent -moderate assist memory, significant assist problem solving comprehension and expression  Goal: Largely supervision for ADLs and mobility  Major barriers: Aphasia, imbalance, incoordination, deconditioning  Dispo: Home with estimate length of stay Friday 5/5 with OP PT, OT, ST      * Acute ischemic left MCA stroke [facility])  Assessment & Plan  - 4/27 neuro exam stable; function improving; aphasia slowly improving   - D/C set tentatively for Friday 5/5  - Mother to assist with iADLs   - Large left MCA infarct  - Residual impairments on admission to [facility]: Aphasia, weakness, imbalance (assess for other impairments during [facility]C course)   - Co-morbidities most impacting functional recovery: Morbid obesity    Secondary stroke prevention  - Per prior providers   \"Thrombosis panel with abnormal antithrombin, Protein C/S activity, but per Neuro unclear significance in setting of acute stroke  Would repeat as outpatient     Will need Zio Patch/Loop Recorder with Cards Referral as outpatient   Discussed with Neurology on 4/9: Continue Keppra until outpatient f/u given location, temporal " "slowing, extent of lesion  \"  - Antithrombotic: Aspirin  - Statin  - Optimal management of blood pressure and diabetes  -  patient and if applicable caregiver on optimal stroke management  - Follow-up with neurology and PCP after d/c   - Disability/FMLA forms completed and given to CM 4/21 to assist with and fax   -Continue acute comprehensive interdisciplinary inpatient rehabilitation to include intensive skilled therapies (PT, OT, ST) as outlined with oversight and management by rehabilitation physician as well as inpatient rehab level nursing, case management and weekly interdisciplinary team meetings           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)  Assessment & Plan  Saturating well on room air with and without activity so far  Monitor vitals with and without activity  Eliquis  Outpatient hematology consult    Rhinorrhea  Assessment & Plan  Improved  Claritin     HIT (heparin-induced thrombocytopenia) ([facility])  Assessment & Plan  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   (\"The patient's serum tested positive by FRANKY and supports a diagnosis of heparin-induced-thrombocytopenia\")  Transitioned from Arixtra to Eliquis  Platelets remain recovered 4/27     DVT of lower extremity, bilateral ([address] )  Assessment & Plan  Eliquis  Ambulation  Hold SCDs with hx of DVT     Urinary retention  Assessment & Plan  Flomax    Bowel and bladder incontinence  Assessment & Plan  Improving   Discontinue bladder scans as PVRs low  Toileting program  Flomax    Hypertension  Assessment & Plan  Blood pressure acceptable  Internal medicine consulted and co-management with their service  Monitor vitals with and without activity; monitor for orthostasis  Monitor hemoglobin, electrolytes, kidney function, hydration status   Current meds: Amlodipine, losartan, metoprolol      Type 2 diabetes mellitus with circulatory disorder, without long-term current use of insulin ([address] )  Assessment & " Plan  Lab Results   Component Value Date    HGBA1C 8 0 (H) 03/31/2023     Internal medicine consulted and management at their discretion  Monitor for signs and symptoms of hypoglycemia   Current meds: Lispro sliding scale      Obesity, Class III, BMI 40-49 9 (morbid obesity) ([facility] [address] )  Assessment & Plan   on appropriate nutrition and activity  Adjustments accordingly during skilled therapy and with rehab nursing  Monitor skin closely for breakdown, wounds, rashes; keep clean and dry, turning Q2H   Follow-up with PCP after d/c        Other Medical Issues:       Follow-up providers and other issues to be followed up after discharge   PCP   Neuro   Hematology    CODE: Level 1: Full Code    Restrictions include: Fall precautions    Objective:     Allergies per EMR  Diagnostic Studies: Reviewed, no new imaging  No orders to display     See above as well    Laboratory: Labs reviewed  Results from last 7 days   Lab Units 04/27/23  0514 04/24/23  0604   HEMOGLOBIN g/dL 12 6 12 6   HEMATOCRIT % 40 3 40 5   WBC Thousand/uL 5 69 6 07     Results from last 7 days   Lab Units 04/27/23  0514 04/24/23  0604   BUN mg/dL 11 13   POTASSIUM mmol/L 3 8 3 7   CHLORIDE mmol/L 107 108   CREATININE mg/dL 1 00 1 01              Drug regimen reviewed, all potential adverse effects identified and addressed:    Scheduled Meds:  Current Facility-Administered Medications   Medication Dose Route Frequency Provider Last Rate    acetaminophen  975 mg Oral Q8H PRN Cee Mcmahon MD      amLODIPine  5 mg Oral Daily Cee Mcmahon MD      apixaban  5 mg Oral BID Cee Mcmahon MD      aspirin  81 mg Oral Daily Cee Mcmahon MD      atorvastatin  40 mg Oral QPM Cee Mcmahon MD      bisacodyl  10 mg Rectal Daily PRN Cee Mcmahon MD      bisacodyl  10 mg Rectal Once Cee Mcmahon MD      calcium carbonate  1,000 mg Oral TID PRN Cee Mcmahon MD      famotidine  20 mg Oral Q12H [address] Marcela Mcclelland MD      insulin lispro  1-5 Units Subcutaneous TID [facility] Marcela Mcclelland MD      levETIRAcetam  750 mg Oral Q12H [facility] Marcela Mcclelland MD      lidocaine   Topical Q4H PRN Marcela Mcclelland MD      loratadine  10 mg Oral Daily Cassia Bernal PA-C      losartan  25 mg Oral Daily Marcela Mcclelland MD      metoprolol tartrate  25 mg Oral Q12H [facility] Marcela Mcclelland MD      ondansetron  4 mg Oral Q8H PRN Marcela Mcclelland MD      oxyCODONE  2 5 mg Oral Q6H PRN Marcela Mcclelland MD      polyethylene glycol  17 g Oral Daily PRN Marcela Mcclelland MD      polyethylene glycol  17 g Oral Daily Marcela Mcclelland MD      senna  1 tablet Oral HS Marcela Mcclelland MD      tamsulosin  0 4 mg Oral After Cindy Voss MD         Chief Complaints:   Rehab follow-up     Subjective: On eval, patient denies pain, lightheadedness, SOB, worsening strength or speech  Physical Exam:  T afebrile, P 70 /84  Vitals above reviewed on date of encounter    GEN:  Sitting in NAD   HEENT/NECK: MMM  CARDIAC: Regular rate rhythm, no murmers, no rubs, no gallops  LUNGS:  clear to auscultation, no wheezes, rales, or rhonchi  ABDOMEN: Soft, non-tender, non-distended, normal active bowel sounds  EXTREMITIES/SKIN:  no calf edema, no calf tenderness to palpation  NEURO:   MENTAL STATUS: Stable wakefulness and aphasia;  and Strength/MMT:  Near full throughout - improved ambulation  PSYCH:  Affect:  Euthymic     HPI:  44-year-old male with a history of hypertension, hyperlipidemia, obesity, diabetes status post recent left MCA stroke which was complicated by saddle pulmonary embolism,, HIT, bilateral lower extremity DVTs    Patient was evaluated by skilled therapies and was found to have significant decline in ADLs and ambulation and [facility]      ** Please Note: Fluency Direct voice to text software may have been used in the creation of this document  **    I personally performed the required components and examined the patient myself in person on 4/27/23

## 2023-04-30 NOTE — PROGRESS NOTES
04/30/23 1230   Pain Assessment   Pain Assessment Tool 0-10   Pain Score No Pain   Restrictions/Precautions   Precautions Aspiration;Bed/chair alarms;Cognitive; Fall Risk;Supervision on toilet/commode;Visual deficit   Weight Bearing Restrictions No   ROM Restrictions No   Braces or Orthoses   (TEDs/binder)   Cognition   Overall Cognitive Status Impaired   Arousal/Participation Alert; Cooperative   Attention Attends with cues to redirect   Subjective   Subjective Patient ready to particiapte in PT session   Roll Left and Right   Type of Assistance Needed Supervision   Physical Assistance Level No physical assistance   Roll Left and Right CARE Score 4   Sit to Lying   Type of Assistance Needed Supervision   Physical Assistance Level No physical assistance   Sit to Lying CARE Score 4   Lying to Sitting on Side of Bed   Type of Assistance Needed Supervision   Physical Assistance Level No physical assistance   Lying to Sitting on Side of Bed CARE Score 4   Sit to Stand   Type of Assistance Needed Supervision   Physical Assistance Level No physical assistance   Sit to Stand CARE Score 4   Bed-Chair Transfer   Type of Assistance Needed Supervision   Physical Assistance Level No physical assistance   Chair/Bed-to-Chair Transfer CARE Score 4   Transfer Bed/Chair/Wheelchair   Findings S no AD   Car Transfer   Type of Assistance Needed Supervision   Physical Assistance Level No physical assistance   Car Transfer CARE Score 4   Walk 10 Feet   Type of Assistance Needed Supervision   Physical Assistance Level No physical assistance   Walk 10 Feet CARE Score 4   Walk 50 Feet with Two Turns   Type of Assistance Needed Supervision   Physical Assistance Level No physical assistance   Walk 50 Feet with Two Turns CARE Score 4   Walk 150 Feet   Type of Assistance Needed Supervision   Physical Assistance Level No physical assistance   Walk 150 Feet CARE Score 4   Ambulation   Primary Mode of Locomotion Prior to Admission Walk   Distance "Walked (feet) 160 ft   Gait Pattern Wide ASHLEY;Lateral deviation   Limitations Noted In Endurance;Speed   Provided Assistance with: Direction   Findings Discussed with mom use of gait belt to increase her comfort with guarding, especialy in the community  Recommend CGA for community mobility especially for increased unplanned obstacles, uneven terrain, and distractions  Handout provded on examples from [place]- encouraged to read reviews, etc    Does the patient walk? 2  Yes   Wheel 50 Feet with Two Turns   Reason if not Attempted Activity not applicable   Wheel 50 Feet with Two Turns CARE Score 9   Wheel 150 Feet   Reason if not Attempted Activity not applicable   Wheel 814 Feet CARE Score 9   Curb or Single Stair   Style negotiated Curb   Type of Assistance Needed Incidental touching   Physical Assistance Level No physical assistance   Comment performed 6\" curb step 2x with PT, 8\" 2x with PT, and 6\" curb step 2x with mother providing hands on, CGA with use of gait belt  Mother demonstrating good guarding techniques   1 Step (Curb) CARE Score 4   4 Steps   Type of Assistance Needed Supervision   Physical Assistance Level No physical assistance   Comment CS on FF stairs performed 1x with L rail and 1x with R rail   4 Steps CARE Score 4   12 Steps   Type of Assistance Needed Supervision   Physical Assistance Level No physical assistance   Comment CS on FF stairs performed 1x with L rail and 1x with R rail   12 Steps CARE Score 4   Stairs   Type Stairs   # of Steps 21   Weight Bearing Precautions Fall Risk;Neglect   Assist Devices Single Rail   Picking Up Object   Type of Assistance Needed Supervision   Physical Assistance Level No physical assistance   Comment no AD from stance   Picking Up Object CARE Score 4   Therapeutic Interventions   Neuromuscular Re-Education Patient able to get into quadruped on high/low mat->sidelying->quadruped on mat   Then progressed to quadruped on floor-> crawled to chair->used supports of " "arm rest to stand and sit  We focused this in the event patient fell, to determine if he was able to get back up without assistance  No assistance needed  Patient at a higher risk for falls at this time  See below for review of \"how to decrease risk for falls\" which was reviewed with patient and mother  Patient's mom made aware of his fall risk factors  Encouraged if unwitnessed fall ot fall with head strike to be overly cautious as patient on blood thinners  Unknown at this time if patient would seek out help, or know how to get help, if hurt without witness  Other PT reviewed tips on \"How to Reduce Risk for Falls\": 1  Patient should wear supportive footwear such as sneakers or gripped socks at all times  No bare foot or regular socks  2  Use night lights throughout home for when the environment is dark to decrease risk of tripping on obstacles/items on the floor  3  Arrange the environment to reduce obstacles  Open concept is best   Rearrange any cords, etc that may be a tripping hazard  Remove throw rugs  4  If you have pets, ensure they will not be a tripping hazard, jump up onto patient, or hang near feet  5  Encouraged to keep bell on cat so that Adrienne Fang could hear if she is on the Right side  6  Encouraged to use urinal at bedside to limit to instances needed to negotiate to the bathroom in the middle of the night  Assessment   Treatment Assessment Patient and mother engaged in PT treatment session with focus on family training  Mother able to observe the above activities in regards to functional mobility and safety discharge planning  At this time, patient is S for most mobility without use of AD  She is recommended to provide CGA on curb steps to enter home and any outdoor/community mobility  She was able to demonstrate good guarding techniques with use of gait belt; information provided in handout form   We addressed Giovani's righting reactions, fall risk factors, and what to do in the event of a fall " with and without injury  We discussed factors that may increase his fall risk which include unplanned obstacles, obstacles on his right side, increased distractions, head turns, turning, and stairs  She was made aware of the need to verbally direct Blanka Head to avoid obstacles, objects, etc on his right side especially if they could lead to injury (ie  If patient unaware a fight of stairs is on his right side, something sharp, ledge, etc)  She was able to verbalize understanding of this  She was made aware of the recommendation to follow up with OPPT services at time of discharge (as well as OT and SLP) to progress his functional mobility (I) and safety  PT FT completed  Mother to observe OT tomorrow and OT/SLP Wednesday with a planned d/c of Friday  Family/Caregiver Present mother- see assessment   Problem List Impaired balance;Decreased endurance; Impaired vision;Decreased coordination;Decreased cognition   Plan   Progress Progressing toward goals   Recommendation   PT Discharge Recommendation Home with outpatient rehabilitation   PT Therapy Minutes   PT Time In 1230   PT Time Out 1330   PT Total Time (minutes) 60   PT Mode of treatment - Individual (minutes) 60   PT Mode of treatment - Concurrent (minutes) 0   PT Mode of treatment - Group (minutes) 0   PT Mode of treatment - Co-treat (minutes) 0   PT Mode of Treatment - Total time(minutes) 60 minutes   PT Cumulative Minutes 750

## 2023-05-01 RX ADMIN — LOSARTAN POTASSIUM 25 MG: 25 TABLET, FILM COATED ORAL at 08:14

## 2023-05-01 RX ADMIN — APIXABAN 5 MG: 5 TABLET, FILM COATED ORAL at 17:25

## 2023-05-01 RX ADMIN — SENNOSIDES 8.6 MG: 8.6 TABLET, FILM COATED ORAL at 21:33

## 2023-05-01 RX ADMIN — TAMSULOSIN HYDROCHLORIDE 0.4 MG: 0.4 CAPSULE ORAL at 17:25

## 2023-05-01 RX ADMIN — FAMOTIDINE 20 MG: 20 TABLET ORAL at 08:14

## 2023-05-01 RX ADMIN — METOPROLOL TARTRATE 25 MG: 25 TABLET, FILM COATED ORAL at 21:33

## 2023-05-01 RX ADMIN — FAMOTIDINE 20 MG: 20 TABLET ORAL at 21:33

## 2023-05-01 RX ADMIN — LEVETIRACETAM 750 MG: 750 TABLET, FILM COATED ORAL at 21:33

## 2023-05-01 RX ADMIN — POLYETHYLENE GLYCOL 3350 17 G: 17 POWDER, FOR SOLUTION ORAL at 08:14

## 2023-05-01 RX ADMIN — ATORVASTATIN CALCIUM 40 MG: 40 TABLET, FILM COATED ORAL at 17:25

## 2023-05-01 RX ADMIN — AMLODIPINE BESYLATE 5 MG: 5 TABLET ORAL at 08:14

## 2023-05-01 RX ADMIN — LEVETIRACETAM 750 MG: 750 TABLET, FILM COATED ORAL at 08:14

## 2023-05-01 RX ADMIN — LORATADINE 10 MG: 10 TABLET ORAL at 08:14

## 2023-05-01 RX ADMIN — ASPIRIN 81 MG 81 MG: 81 TABLET ORAL at 08:14

## 2023-05-01 RX ADMIN — APIXABAN 5 MG: 5 TABLET, FILM COATED ORAL at 08:14

## 2023-05-01 RX ADMIN — METOPROLOL TARTRATE 25 MG: 25 TABLET, FILM COATED ORAL at 08:14

## 2023-05-01 NOTE — PLAN OF CARE
Problem: PAIN - ADULT  Goal: Verbalizes/displays adequate comfort level or baseline comfort level  Description: Interventions:  - Encourage patient to monitor pain and request assistance  - Assess pain using appropriate pain scale  - Administer analgesics based on type and severity of pain and evaluate response  - Implement non-pharmacological measures as appropriate and evaluate response  - Consider cultural and social influences on pain and pain management  - Notify physician/advanced practitioner if interventions unsuccessful or patient reports new pain  Outcome: Progressing     Problem: INFECTION - ADULT  Goal: Absence or prevention of progression during hospitalization  Description: INTERVENTIONS:  - Assess and monitor for signs and symptoms of infection  - Monitor lab/diagnostic results  - Monitor all insertion sites, i e  indwelling lines, tubes, and drains  - Monitor endotracheal if appropriate and nasal secretions for changes in amount and color  - [place] appropriate cooling/warming therapies per order  - Administer medications as ordered  - Instruct and encourage patient and family to use good hand hygiene technique  - Identify and instruct in appropriate isolation precautions for identified infection/condition  Outcome: Progressing     Problem: SAFETY ADULT  Goal: Patient will remain free of falls  Description: INTERVENTIONS:  - Educate patient/family on patient safety including physical limitations  - Instruct patient to call for assistance with activity   - Consult OT/PT to assist with strengthening/mobility   - Keep Call bell within reach  - Keep bed low and locked with side rails adjusted as appropriate  - Keep care items and personal belongings within reach  - Initiate and maintain comfort rounds  - Make Fall Risk Sign visible to staff  - Offer Toileting every 2 Hours, in advance of need  - Initiate/Maintain bed/chair alarm  - Obtain necessary fall risk management equipment: non skid footwear  - Apply yellow socks and bracelet for high fall risk patients  - Consider moving patient to room near nurses station  Outcome: Progressing  Goal: Maintain or return to baseline ADL function  Description: INTERVENTIONS:  -  Assess patient's ability to carry out ADLs; assess patient's baseline for ADL function and identify physical deficits which impact ability to perform ADLs (bathing, care of mouth/teeth, toileting, grooming, dressing, etc )  - Assess/evaluate cause of self-care deficits   - Assess range of motion  - Assess patient's mobility; develop plan if impaired  - Assess patient's need for assistive devices and provide as appropriate  - Encourage maximum independence but intervene and supervise when necessary  - Involve family in performance of ADLs  - Assess for home care needs following discharge   - Consider OT consult to assist with ADL evaluation and planning for discharge  - Provide patient education as appropriate  Outcome: Progressing  Goal: Maintains/Returns to pre admission functional level  Description: INTERVENTIONS:  - Perform BMAT or MOVE assessment daily    - Set and communicate daily mobility goal to care team and patient/family/caregiver  - Collaborate with rehabilitation services on mobility goals if consulted  - Perform Range of Motion 3 times a day  - Reposition patient every 2 hours    - Dangle patient 3 times a day  - Stand patient 3 times a day  - Ambulate patient 3 times a day  - Out of bed to chair 3 times a day   - Out of bed for meals 3 times a day  - Out of bed for toileting  - Record patient progress and toleration of activity level   Outcome: Progressing     Problem: DISCHARGE PLANNING  Goal: Discharge to home or other facility with appropriate resources  Description: INTERVENTIONS:  - Identify barriers to discharge w/patient and caregiver  - Arrange for needed discharge resources and transportation as appropriate  - Identify discharge learning needs (meds, wound care, etc )  - Arrange for interpretive services to assist at discharge as needed  - Refer to Case Management Department for coordinating discharge planning if the patient needs post-hospital services based on physician/advanced practitioner order or complex needs related to functional status, cognitive ability, or social support system  Outcome: Progressing     Problem: Prexisting or High Potential for Compromised Skin Integrity  Goal: Skin integrity is maintained or improved  Description: INTERVENTIONS:  - Identify patients at risk for skin breakdown  - Assess and monitor skin integrity  - Assess and monitor nutrition and hydration status  - Monitor labs   - Assess for incontinence   - Turn and reposition patient  - Assist with mobility/ambulation  - Relieve pressure over bony prominences  - Avoid friction and shearing  - Provide appropriate hygiene as needed including keeping skin clean and dry  - Evaluate need for skin moisturizer/barrier cream  - Collaborate with interdisciplinary team   - Patient/family teaching  - Consider wound care consult   Outcome: Progressing     Problem: Nutrition/Hydration-ADULT  Goal: Nutrient/Hydration intake appropriate for improving, restoring or maintaining nutritional needs  Description: Monitor and assess patient's nutrition/hydration status for malnutrition  Collaborate with interdisciplinary team and initiate plan and interventions as ordered  Monitor patient's weight and dietary intake as ordered or per policy  Utilize nutrition screening tool and intervene as necessary  Determine patient's food preferences and provide high-protein, high-caloric foods as appropriate       INTERVENTIONS:  - Monitor oral intake, urinary output, labs, and treatment plans  - Assess nutrition and hydration status and recommend course of action  - Evaluate amount of meals eaten  - Assist patient with eating if necessary   - Allow adequate time for meals  - Recommend/ encourage appropriate diets, oral nutritional supplements, and vitamin/mineral supplements  - Order, calculate, and assess calorie counts as needed  - Recommend, monitor, and adjust tube feedings and TPN/PPN based on assessed needs  - Assess need for intravenous fluids  - Provide specific nutrition/hydration education as appropriate  - Include patient/family/caregiver in decisions related to nutrition  Outcome: Progressing

## 2023-05-01 NOTE — PROGRESS NOTES
05/01/23 0930   Pain Assessment   Pain Assessment Tool 0-10   Pain Score No Pain   Restrictions/Precautions   Precautions Aphasia;Bed/chair alarms;Cognitive; Fall Risk;Supervision on toilet/commode;Visual deficit   Weight Bearing Restrictions No   ROM Restrictions No   Cognition   Arousal/Participation Alert; Cooperative   Attention Attends with cues to redirect   Subjective   Subjective Pt agreeable to therapy   Roll Left and Right   Type of Assistance Needed Supervision   Physical Assistance Level No physical assistance   Comment on mat   Roll Left and Right CARE Score 4   Sit to Lying   Type of Assistance Needed Supervision   Physical Assistance Level No physical assistance   Comment on mat   Sit to Lying CARE Score 4   Lying to Sitting on Side of Bed   Type of Assistance Needed Supervision   Physical Assistance Level No physical assistance   Comment on mat   Lying to Sitting on Side of Bed CARE Score 4   Sit to Stand   Type of Assistance Needed Supervision   Physical Assistance Level No physical assistance   Comment no AD   Sit to Stand CARE Score 4   Bed-Chair Transfer   Type of Assistance Needed Supervision   Physical Assistance Level No physical assistance   Comment no AD   Chair/Bed-to-Chair Transfer CARE Score 4   Walk 10 Feet   Type of Assistance Needed Supervision   Physical Assistance Level No physical assistance   Comment no AD   Walk 10 Feet CARE Score 4   Walk 50 Feet with Two Turns   Type of Assistance Needed Supervision   Physical Assistance Level No physical assistance   Comment no AD   Walk 50 Feet with Two Turns CARE Score 4   Walk 150 Feet   Type of Assistance Needed Supervision   Physical Assistance Level No physical assistance   Comment no AD   Walk 150 Feet CARE Score 4   Ambulation   Primary Mode of Locomotion Prior to Admission Walk   Distance Walked (feet) 380 ft  (x2, 240, 160)   Does the patient walk? 2   Yes   Wheel 50 Feet with Two Turns   Reason if not Attempted Activity not applicable   Wheel 50 Feet with Two Turns CARE Score 9   Wheel 150 Feet   Reason if not Attempted Activity not applicable   Wheel 165 Feet CARE Score 9   Picking Up Object   Type of Assistance Needed Incidental touching   Physical Assistance Level No physical assistance   Comment CS/CGA no AD picking up ball from floor   Picking Up Object CARE Score 4   Toilet Transfer   Type of Assistance Needed Incidental touching   Physical Assistance Level No physical assistance   Comment CGA with A from L grab gar  Toilet Transfer CARE Score 4   Toilet Transfer   Surface Assessed Standard Toilet   Therapeutic Interventions   Strengthening Supine bridge with ball squeeze 3x10, clamshell #3 2x10, swiss ball with knee flex/ext & side to side 2x10 each, quadruped on mat alternating arms x10, alternating LE x5-unable to tolerate position for prolonged time, CGA with standing swiss ball press with march on foam 3x10  Neuromuscular Re-Education amb while bouncing yellow ball in hallway- Pt reported some dizziness when bouncing ball on R side, marching while holding red swiss ball in front, amb tray with small blue ball - Pt able to maintain ball in the square while amb with no LOB, noted pt aware of sorroundings & looking ahead while amb with tray  Head turns while holding cup on R hand- Pt reported min dizziness with R head turns  Assessment   Treatment Assessment Pt participated in skilled PT with increased focus on glute/core strengthening, dynamic balance act and dual tasking  Pt cont to report min dizziness with R head turns and dual tasking act on R side, though no LOB noted  Pt cont to present with R side inattention, requiring VC's to avoid R side objects  Pt required repetitive VC's and tactile cues for directions with head turns while amb  Oriented pt on location and use of call bell if he needs to go to the bathroom   Pt cont to benefit from PT to improve strengthening, dynamic balance act and dual tasking act with focus on R side scanning  Problem List Decreased strength;Decreased endurance; Impaired vision;Decreased cognition;Decreased coordination   PT Barriers   Functional Limitation Walking;Stair negotiation   Plan   Treatment/Interventions LE strengthening/ROM; Therapeutic exercise; Endurance training;Patient/family training;Gait training   Progress Progressing toward goals   Recommendation   PT Discharge Recommendation Home with outpatient rehabilitation   PT Therapy Minutes   PT Time In 0930   PT Time Out 1100   PT Total Time (minutes) 90   PT Mode of treatment - Individual (minutes) 90   PT Mode of treatment - Concurrent (minutes) 0   PT Mode of treatment - Group (minutes) 0   PT Mode of treatment - Co-treat (minutes) 0   PT Mode of Treatment - Total time(minutes) 90 minutes   PT Cumulative Minutes 840   Therapy Time missed   Time missed?  No

## 2023-05-01 NOTE — PROGRESS NOTES
"   05/01/23 0835   Pain Assessment   Pain Assessment Tool 0-10   Pain Score No Pain   Restrictions/Precautions   Precautions Aphasia;Bed/chair alarms;Cognitive; Fall Risk;Supervision on toilet/commode;Visual deficit   Weight Bearing Restrictions No   ROM Restrictions No   Comprehension   Comprehension (FIM) 3 - Needs parts of sentences repeated   Expression   Expression (FIM) 2 - Uses only simple expressions or gestures (waves, hello)   Social Interaction   Social Interaction (FIM) 5 - Interacts appropriately with others 90% of time   Problem Solving   Problem solving (FIM) 3 - Solves basic problmes 50-74% of time   Memory   Memory (FIM) 3 - Recognizes, recalls/performs 50-74%   Speech/Language/Cognition Assessmetn   Treatment Assessment Pt seen for skilled SLP session targeting expressive/receptive communication skills  Pt engaged in the following- Reading comprehension task with phrase completions provided Fx3 options- Pt was able to complete with 8/15acc increasing with verbal cues  Noted pt was able to complete 5 of these independently reading, but required SLP to read the rest outloud to him including options  Pt benefitted from extended time for this task  Next pt engaged in functional reading comprehension task with grocery ad  Pt provided picture of ad and asked comprehension questions below- SLP reading them to him as well as rephrasing as needed to maximize comprehension of what he was looking for in the ad  Pt was able to complete with 3/7acc increasing with direct modeling cues  SLP attempting to maximize his attention on the initial part of the questions as \"where\" or [employer]" as well as assist with determining if question was open ended or a simple yes/no question  Pt last completed automatics task with flashcards for Tray   Pt attempted to speak in unison with SLP- noted some approximations for the initial sounds on 3/7 as well as increased accuracy with stating \"day\" at the end of each of his " utterances  However overall pt's speech conts to be heavy with jargon and motor apraxia where pt requires max cues for more approximated sounds  Pt will cont to benefit from skilled SLP services to maximize communication skills for increased independence and decreased burden of care at this time  SLP Therapy Minutes   SLP Time In [id number]   SLP Time Out 0930   SLP Total Time (minutes) 55   SLP Mode of treatment - Individual (minutes) 55   SLP Mode of treatment - Concurrent (minutes) 0   SLP Mode of treatment - Group (minutes) 0   SLP Mode of treatment - Co-treat (minutes) 0   SLP Mode of Treatment - Total time(minutes) 55 minutes   SLP Cumulative Minutes 850   Therapy Time missed   Time missed?  No

## 2023-05-01 NOTE — PROGRESS NOTES
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)    Urinary retention    DVT of lower extremity, bilateral (HCC)    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)    Aphasia due to recent cerebrovascular accident (CVA)    Rhinorrhea       Past Medical History:   Diagnosis Date    Hypertensive emergency 3/30/2023     Etiologic Diagnosis: L MCA CVA with Superimposed Micro-hemorrhage  Restrictions/Precautions  Precautions: Aphasia, Bed/chair alarms, Cognitive, Fall Risk, Supervision on toilet/commode, Visual deficit  Weight Bearing Restrictions: No  ROM Restrictions: No  ADL Team Goal: Patient will be independent with ADLs with least restrictive device upon completion of rehab program  Occupational Therapy LTG's  Eating Oral care Bathing LB dress UB dress   Eating Goal: 06  Independent - Patient completes the activity by him/herself with no assistance from a helper  Oral Hygiene Goal: 06  Independent - Patient completes the activity by him/herself with no assistance from a helper  Shower/bathe self Goal: 04  Supervision or touching assistance- [place] provides VERBAL CUES or supervision throughout activity  Lower body dressing Goal: 06  Independent - Patient completes the activity by him/herself with no assistance from a helper  Upper body dressing Goal: 06  Independent - Patient completes the activity by him/herself with no assistance from a helper  Toileting Toilet txf Func txf IADL Med    Toileting hygiene Goal: 06   Independent - Patient completes the activity "by him/herself with no assistance from a helper  Toilet transfer Goal: 06  Independent - Patient completes the activity by him/herself with no assistance from a helper  Chair/bed-to-chair transfer Goal: 06  Independent - Patient completes the activity by him/herself with no assistance from a helper  Assist Level: Independent (Light meal prep) Assist Level: Supervision   OT interventions: Treatment/Interventions: ADL retraining, Functional transfer training, Therapeutic exercise, Endurance training, Cognitive reorientation, Patient/family training, Equipment eval/education, Bed mobility, Gait training, Compensatory technique education  Discharge Plan:  OT Discharge Recommendation: (P)  (home with family support- OUT OT NEURO)   DME:  ,  ,       05/01/23 1300   Pain Assessment   Pain Assessment Tool 0-10   Pain Score No Pain   Lifestyle   Autonomy \"you mean these? \"   Assessment   Treatment Assessment Pt engages in Family training session with Mother and sister  Family educated on Role of Occupational Therapy in Acute rehab setting  Pt family updated on current level of function for ADLs at this time  Reviewed goals for ADLs as independent for basic ADLS, and Supervision for IADLs  Recommendations made for safe completion of ADLs with recommended DME/AE as shower chair  Reviewed photos of pts home setting  Pt w/ good set up bathroom for safety with shower stool/bench in place  Home surfaces are low and pt does demo the ability to complete sit-stand from them with effort  Educated family on updating toilet to comfort height vs use fo toilet riser  completed shower transfers with mother  Pt able to complete at Supervision level  Discussed use of supervision initially at home to inc safety and evaluate performance  Had pt demonstrate sequence for use of toileting  Allowed pt to engage in some tasks at table that family felt he was not able to do, and he was   Discussed allowing time for pt to problem solve, and allow time " for vision deficits  Pt performing nail care with Supervision  Focus on use of pts personal cell phone  Inc time to manage use of personal pin code  Difficulty at times with long nails, VC's at times for placement into L visual field  Pt demo difficulty with follow through with verbal prompts, but able to complete with gestures  Able to locate mother and sister phone number without cues  Discussed family community outing to [facility]  Pt with interest and history of going to [facility] regularly  Mother will be in at 18 on wed 5/3 for outing  Focus on pt navigating room and moving tray table and simulated events in preparation for advancement to in room privileges, will finalize tomorrow as pt performed well  Prognosis Good   Plan   Progress Progressing toward goals   Recommendation   OT Discharge Recommendation   (home with family support- OUT OT NEURO)   OT Therapy Minutes   OT Time In 1300   OT Time Out 1430   OT Total Time (minutes) 90   OT Mode of treatment - Individual (minutes) 90   OT Mode of treatment - Concurrent (minutes) 0   OT Mode of treatment - Group (minutes) 0   OT Mode of treatment - Co-treat (minutes) 0   OT Mode of Treatment - Total time(minutes) 90 minutes   OT Cumulative Minutes 885       Stroke Education Series    Pt participated in skilled Stroke Education Series in an individualized setting to address the topic of What Comes Next post stroke in both verbal and written formats  Education within this session included information on recommendations and resources after leaving the [facility]  This education session links together what has been covered in previous stroke education classes to improve the patient's understanding of how managing risk factors for stroke, participating in therapy, working with family and friends in the rehab process, and reviewing their role in the rehab process will maximize outcomes and their functional gains   This education also incorporates what the patient wants to achieve and helps them set realistic goals  To individualize this education the following topics were covered: continued therapy (home versus outpatient), driving programs and vocational rehab  Following education, family response to education is: verbalizes understanding  Start Time: 0914-0927  Stroke Education Series    Pt participated in skilled Stroke Education Series in an individualized setting to address the topic of Preparing To Go Home  This education was provided in both verbal and written formats  Education within this session focused on providing safety strategies including environmental and lifestyle changes that if made will support a safe discharge to his/her home setting  One goal of this session is to focus on ways to improve the patient's independence while maintaining safety and decreasing fall risk  Following education, pt's response to education is: verbalizes understanding  To individualize this session, the following topics were reviewed: ADL recommendations, IADL recommendations  and emergency preparedness        Start Time: 1400    End Time: 1415

## 2023-05-01 NOTE — PROGRESS NOTES
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   Had nonocclusive saddle embolus with filling defects distal main pulmonary arteries and throughout segmental branches bilaterally   Distal order branches were not clearly visualized due to severe respiratory motion artifact though felt likely containing pulmonary emboli     Sx were left sided CP, NIEVES and elevated D-dimer   Continue Eliquis   Was not a candidate for IR intervention 2/2 HIT     Bilateral LE DVT   Continue Eliquis      HIT   Heparin induced antibody/Heparin by serotonin release = both were positive   S/p Argatroban   Platelets recovered     L MCA infarct 3/3/23   Suspected to be cardioembolic   Thrombosis panel then showed abnormal antithrombin, Protein C/S activity, but per Neuro unclear significance in setting of acute stroke and therefore wanted repeat as outpatient   Cont ASA/statin   Initial Echo/HODAN LVEF 55%; no thrombus/PFO   Needs outpt LOOP/Zio patch   Cont Keppra until seen by Neuro OP per their request 4/10     DM2   Hgb A1c 8 0   New diagnosis   Cont QID Accuchecks/SSI and DM diet   Home:  No meds   Here: SSI only   Had been on Metformin when in [facility] before transfer back for PE   Needs DM teaching and PCP f/u on dc = mother is diabetic and knows how to use meter but not insulin   Stable off Metformin and did not require sliding scale so dc'd     HTN   Home: on no meds   Here:  Norvasc 5mg qd/Lopressor 25 mg q12hrs/Losartan 25mg qd   stable     Urine retention   Per PMR   Continue Flomax     Obesity   Current BMI 38 5   Wt loss counseling when recovered        Discharge date:  5/5/23       The above assessment and plan was reviewed and updated as determined by my evaluation of the patient on 5/1/2023  Labs:   Results from last 7 days   Lab Units 04/27/23  0514   WBC Thousand/uL 5 69   HEMOGLOBIN g/dL 12 6   HEMATOCRIT % 40 3   PLATELETS Thousands/uL 312     Results from last 7 days   Lab Units 04/27/23  0514   SODIUM mmol/L 137   POTASSIUM mmol/L 3 8   CHLORIDE mmol/L 107   CO2 mmol/L 28   BUN mg/dL 11   CREATININE mg/dL 1 00   CALCIUM mg/dL 8 7             Results from last 7 days   Lab Units 04/28/23  2120 04/28/23  1552 04/28/23  1128   POC GLUCOSE mg/dl 99 103 102       Review of Scheduled Meds:  Current Facility-Administered Medications   Medication Dose Route Frequency Provider Last Rate    acetaminophen  975 mg Oral Q8H PRN Jesus Joshi MD      amLODIPine  5 mg Oral Daily Jesus Joshi MD      apixaban  5 mg Oral BID Jesus Joshi MD      aspirin  81 mg Oral Daily Jesus Joshi MD      atorvastatin  40 mg Oral QPM Jesus Joshi MD      bisacodyl  10 mg Rectal Daily PRN Jesus Joshi MD      bisacodyl  10 mg Rectal Once Jesus Joshi MD      calcium carbonate  1,000 mg Oral TID PRN Jesus Joshi MD      famotidine  20 mg Oral Q12H [address] Jesus Joshi MD      levETIRAcetam  750 mg Oral Q12H [address] Jesus Joshi MD      lidocaine   Topical Q4H PRN Jesus Joshi MD      loratadine  10 mg Oral Daily Cassia Bernal PA-C      losartan  25 mg Oral Daily Jesus Joshi MD      metoprolol tartrate  25 mg Oral Q12H [address] Jesus Joshi MD      ondansetron  4 mg Oral Q8H PRN Jesus Joshi MD      oxyCODONE  2 5 mg Oral Q6H PRN Jesus Joshi MD      polyethylene glycol  17 g Oral Daily PRN Jesus Joshi MD      polyethylene glycol  17 g Oral Daily Jesus Joshi MD      senna  1 tablet Oral HS Jesus Joshi MD      tamsulosin  0 4 mg Oral After Marika Schwartz MD         Subjective/ HPI: Patient seen and examined   Patients overnight issues or events were reviewed with nursing or staff during rounds or morning huddle session  New or overnight issues include the following:     No new or overnight issues  Offers no complaints    ROS:   A 10 point ROS was performed; negative except as noted above  Imaging:     No orders to display       *Labs /Radiology studies reviewed  *Medications reviewed and reconciled as needed  *Please refer to order section for additional ordered labs studies  *Case discussed with primary attending during morning huddle case rounds    Physical Examination:  Vitals:   Vitals:    04/30/23 2048 05/01/23 0528 05/01/23 0600 05/01/23 0814   BP: 143/77 134/87  136/66   BP Location: Left arm Right leg     Pulse: 76 76  70   Resp: 18 18     Temp: 97 5 °F (36 4 °C) 97 6 °F (36 4 °C)     TempSrc: Oral Oral     SpO2: 94% 97%     Weight:  133 kg (293 lb 3 4 oz) 133 kg (292 lb 15 9 oz)    Height:           General Appearance: no distress, conversive  HEENT:  External ear normal   Nose normal w/o drainage  Mucous membranes are moist  Oropharynx is clear  Conjunctiva clear w/o icterus or redness  Neck:  Supple, normal ROM  Lungs: BBS without crackles/wheeze/rhonchi; respirations unlabored with normal inspiratory/expiratory effort  No retractions noted  On RA  CV: regular rate and rhythm; no rubs/murmurs/gallops, PMI normal   ABD: Abdomen is soft  Bowel sounds all quadrants  Nontender with no distention  EXT: no edema  Skin: normal turgor, normal texture, no rashes  Psych: affect normal, mood normal  Neuro: AA; +expressive>receptive aphasia     The above physical exam was reviewed and updated as determined by my evaluation of the patient on 5/1/2023  Invasive Devices     None                    VTE Pharmacologic Prophylaxis: Eliquis  Code Status: Level 1 - Full Code  Current Length of Stay: 12 day(s)      Total time spent:  30 minutes with more than 50% spent counseling/coordinating care   Counseling includes discussion with patient re: progress  and discussion with patient of his/her current medical state/information  Coordination of patient's care was performed in conjunction with primary service  Time invested included review of patient's labs, vitals, and management of their comorbidities with continued monitoring  In addition, this patient was discussed with medical team including physician and advanced extenders  The care of the patient was extensively discussed and appropriate treatment plan was formulated unique for this patient  Medical decision making for the day was made by supervising physician unless otherwise noted in their attestation statement  ** Please Note:  voice to text software may have been used in the creation of this document   Although proof errors in transcription or interpretation are a potential of such software**

## 2023-05-01 NOTE — PROGRESS NOTES
Pastoral Care Progress Note    2023  Patient: Mariano Chew : 1975  Admission Date & Time: 2023 1349  MRN: [id number] CSN: [id number]                     Visited and kept conversation brief so as to not tax Pt's brain  Encouraged him in the progress he is making with PT, as I watched him walk the holt the other day   remains available

## 2023-05-02 RX ADMIN — AMLODIPINE BESYLATE 5 MG: 5 TABLET ORAL at 09:49

## 2023-05-02 RX ADMIN — LORATADINE 10 MG: 10 TABLET ORAL at 09:49

## 2023-05-02 RX ADMIN — LOSARTAN POTASSIUM 25 MG: 25 TABLET, FILM COATED ORAL at 09:48

## 2023-05-02 RX ADMIN — LEVETIRACETAM 750 MG: 750 TABLET, FILM COATED ORAL at 21:04

## 2023-05-02 RX ADMIN — METOPROLOL TARTRATE 25 MG: 25 TABLET, FILM COATED ORAL at 09:49

## 2023-05-02 RX ADMIN — APIXABAN 5 MG: 5 TABLET, FILM COATED ORAL at 18:01

## 2023-05-02 RX ADMIN — FAMOTIDINE 20 MG: 20 TABLET ORAL at 21:04

## 2023-05-02 RX ADMIN — LEVETIRACETAM 750 MG: 750 TABLET, FILM COATED ORAL at 09:49

## 2023-05-02 RX ADMIN — TAMSULOSIN HYDROCHLORIDE 0.4 MG: 0.4 CAPSULE ORAL at 18:01

## 2023-05-02 RX ADMIN — SENNOSIDES 8.6 MG: 8.6 TABLET, FILM COATED ORAL at 21:04

## 2023-05-02 RX ADMIN — ATORVASTATIN CALCIUM 40 MG: 40 TABLET, FILM COATED ORAL at 18:01

## 2023-05-02 RX ADMIN — APIXABAN 5 MG: 5 TABLET, FILM COATED ORAL at 09:48

## 2023-05-02 RX ADMIN — METOPROLOL TARTRATE 25 MG: 25 TABLET, FILM COATED ORAL at 21:04

## 2023-05-02 RX ADMIN — FAMOTIDINE 20 MG: 20 TABLET ORAL at 09:48

## 2023-05-02 RX ADMIN — POLYETHYLENE GLYCOL 3350 17 G: 17 POWDER, FOR SOLUTION ORAL at 09:48

## 2023-05-02 RX ADMIN — ASPIRIN 81 MG 81 MG: 81 TABLET ORAL at 09:49

## 2023-05-02 NOTE — PROGRESS NOTES
"   05/02/23 1000   Pain Assessment   Pain Assessment Tool 0-10   Pain Score No Pain   Comprehension   Comprehension (FIM) 2 - Understands only simple expressions or gestures (waves, hello)   Expression   Expression (FIM) 2 - Uses only simple expressions or gestures (waves, hello)   Social Interaction   Social Interaction (FIM) 3 - Interacts 50-74% of time   Problem Solving   Problem solving (FIM) 2 - Needs direction more than ½ time to initiate, plan or complete simple tasks   Memory   Memory (FIM) 2 - Recognizes, recalls/performs 25-49%   Speech/Language/Cognition Assessmetn   Treatment Assessment Pt sitting upright in recliner upon SLP entering, agreeable to ST session  Pt completing dual auditory and reading comprehension task where he was required to listen to a portion of a sentence, then asked to identify the best word (written) in a F03 to complete the sentence  Able to identify 3/15 where F03 options, 8/12 on second attempt where dropped to [facility]  Given pt's significant language deficits, increased processing time needed  Additionally, pt benefits from slow rate of speech for auditory comprehension and when covering up additional items when working on reading comprehension to limit distractions  Following, pt pt given reading and object identification task where asked to match the sentence to the picture in F05  3/5 i'ly, increasing to 5/5 where auditory cue provided (SLP reading the sentence aloud while tapping each word)  Pt given additional worksheet to complete during his downtime as desired  Following this, pt asked to complete additional auditory and reading comprehension task where given a menu and asked dictated questions pertaining it  Pt asked to point to where the answer could be found, or to express \"yes\" or \"no\" depending on the question type; auditory comprehension for 2/6 and with visual cue (gesture) increasing to 4/6  Finally, pt completing session with GLADYS   He was provided with his name " "\"Joanna Luz\" in written format and max verbal modeling with cues to watch SLP mouth for further support in his speech motor plan  First trial, pt asked to listen only to SLP  Second trial, cued to state together  Third Trial, SLP stating first name with pt, but not last name  Pt unable to reach final trial of stating his name independently  Second phrase was [place] name is\" which followed the same design as above  Pt with increased difficulty for this phrase as compared to his first and last name  Final phrase was putting the two together \"my name is joanna stewart,\" to which the patient completed with 20% intelligibility where max modeling and repeated trials were provided  Recommend the pt continue to receive skilled ST in order to further optimize his expressive-receptive language skills while at the [facility] to maximize his meaningful independence and reduce family burden upon D/C  SLP Therapy Minutes   SLP Time In 1000   SLP Time Out 1100   SLP Total Time (minutes) 60   SLP Mode of treatment - Individual (minutes) 60   SLP Mode of treatment - Concurrent (minutes) 0   SLP Mode of treatment - Group (minutes) 0   SLP Mode of treatment - Co-treat (minutes) 0   SLP Mode of Treatment - Total time(minutes) 60 minutes   SLP Cumulative Minutes 910   Therapy Time missed   Time missed?  No       "

## 2023-05-02 NOTE — PROGRESS NOTES
[facility] Occupational Therapy Daily Note  Patient Active Problem List   Diagnosis    Acute ischemic left MCA stroke (HCC)    Obesity, Class III, BMI 40-49 9 (morbid obesity) ([facility] [address] )    Encephalopathy    Type 2 diabetes mellitus with circulatory disorder, without long-term current use of insulin (HCC)    Hypertension    Bowel and bladder incontinence    Left-sided chest wall pain    Creatinine elevation    Thrombocytopenia (HCC)    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)    Urinary retention    DVT of lower extremity, bilateral (HCC)    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)    Aphasia due to recent cerebrovascular accident (CVA)    Rhinorrhea       Past Medical History:   Diagnosis Date    Hypertensive emergency 3/30/2023     Etiologic Diagnosis: L MCA CVA with Superimposed Micro-hemorrhage  Restrictions/Precautions  Precautions: Aphasia, Bed/chair alarms, Cognitive, Fall Risk, Supervision on toilet/commode, Visual deficit  Weight Bearing Restrictions: No  ROM Restrictions: No  ADL Team Goal: Patient will be independent with ADLs with least restrictive device upon completion of rehab program  Occupational Therapy LTG's  Eating Oral care Bathing LB dress UB dress   Eating Goal: 06  Independent - Patient completes the activity by him/herself with no assistance from a helper  Oral Hygiene Goal: 06  Independent - Patient completes the activity by him/herself with no assistance from a helper  Shower/bathe self Goal: 04  Supervision or touching assistance- [place] provides VERBAL CUES or supervision throughout activity  Lower body dressing Goal: 06  Independent - Patient completes the activity by him/herself with no assistance from a helper  Upper body dressing Goal: 06  Independent - Patient completes the activity by him/herself with no assistance from a helper  Toileting Toilet txf Func txf IADL Med    Toileting hygiene Goal: 06   Independent - Patient completes the activity by him/herself "with no assistance from a helper  Toilet transfer Goal: 06  Independent - Patient completes the activity by him/herself with no assistance from a helper  Chair/bed-to-chair transfer Goal: 06  Independent - Patient completes the activity by him/herself with no assistance from a helper  Assist Level: Independent (Light meal prep) Assist Level: Supervision   OT interventions: Treatment/Interventions: ADL retraining, Functional transfer training, Therapeutic exercise, Endurance training, Cognitive reorientation, Patient/family training, Equipment eval/education, Bed mobility, Gait training, Compensatory technique education  Discharge Plan:  OT Discharge Recommendation:  (home with family support- OUT OT NEURO)   DME:  ,  ,       05/02/23 1230   Pain Assessment   Pain Assessment Tool 0-10   Pain Score No Pain   Lifestyle   Autonomy \"thats more like her  \"   Eating   Type of Assistance Needed Independent   Eating CARE Score 6   Oral Hygiene   Type of Assistance Needed Independent   Oral Hygiene CARE Score 6   Roll Left and Right   Type of Assistance Needed Independent   Roll Left and Right CARE Score 6   Sit to Lying   Type of Assistance Needed Independent   Sit to Lying CARE Score 6   Lying to Sitting on Side of Bed   Type of Assistance Needed Independent   Lying to Sitting on Side of Bed CARE Score 6   Sit to Stand   Type of Assistance Needed Independent   Sit to Stand CARE Score 6   Bed-Chair Transfer   Type of Assistance Needed Independent   Comment NO AD   Chair/Bed-to-Chair Transfer CARE Score 6   [place] Hygiene CARE Score 6   Toilet Transfer   Type of Assistance Needed Independent   Toilet Transfer CARE Score 6   Additional Activities   Additional Activities Comments Pt engages in preparatory activity for community outing tomorrow  Pt set up with [facility] website with focus on establishing order for getting lunch tomorrow   Pt demo some hesitation initially " when navigating website  Use of visual anchor initially to scan to R  Pt demo familiarity with computer use  Pt able to self navigate website with increased time to locate sandwich  Menu  Pt scrolled through all sandwich items and options to select and build a sandwich  Pt then instructed to copy menu down to have for tomorrow, 1x error in copy  Pt then selecting sandwich item for his mother  Pt perusing items leisurely and taking time to think what his mother would want to have  Able to copy menu with no errors  Then task focusing on verbal instruction for navigation of menu for selection of sandwich  Pt does require visual cues for navigation at times as session progresses  Pt able to copy menu with no errors  Pt then instructed to locate from web search [employer] playing schedule  Inc time to complete typing items, requires written cue to follow  Pt able to read schedule when prompted for varying dates  Assessment   Treatment Assessment Pt participated in skilled OT treatment session with treatment focus on functional cognition with community based activity preparation  Pt tolerated session well today with no complaints  Pt is progressed to in room privileges with no device at this time, team notified  Pt continues to be making progress with conversational language  Improving reading comprehension, still req alternate cueing methods for verbal instruction  Pt completing stroke education series with many visual aids and written instruction from online support from [employer]  Family training/community outing tomorrow as scheduled at 12:30 with mother Katerina Lindsay     Prognosis Good   Plan   Progress Progressing toward goals   OT Therapy Minutes   OT Time In 1230   OT Time Out 1430   OT Total Time (minutes) 120   OT Mode of treatment - Individual (minutes) 120   OT Mode of treatment - Concurrent (minutes) 0   OT Mode of treatment - Group (minutes) 0   OT Mode of treatment - Co-treat (minutes) 0   OT Mode of Treatment - Total time(minutes) 120 minutes   OT Cumulative Minutes 1005     Stroke Education Series    Pt participated in skilled Stroke Education Series in an individual setting to address the topic of Stroke 101: Understanding the Basics of Stroke in both verbal and written formats  Education within this session reviewed the basic structural and functional components of the brain and included information on the causes of stroke, related signs/symptoms, risk factors, and the process of stroke rehabilitation  The goal of this education was to provide the patient with general understanding of how the brain functions and how a stroke can impact his/her functional mobility and independence  In addition, the intention of this education is to provide the patient with the information to reduce the risk of a second stroke  Following education, pt's response to education is: verbalizes understanding and needs reinforcment   To individualize the education, the following topics were included based upon the patients' past and current medical history: diabetes mellitus, hyperlipidemia and hypertension  Additional topics that were covered include decreased coordination, aphasia, visual deficits and cognitive changes  Provided with BE FAST handout  Provided with large visual aides/written material during education from online support  Start Time: 1400    End Time: 26    Stroke Education Series    Pt participated in skilled Stroke Education Series in an individual setting to address the topic of Purpose of Rehab post stroke in both verbal and written formats  Education within this session included a review of the individual roles of the rehab team, functions of the acute rehab center, and neuro rehabilitation treatment strategies  The goal of this education session was to provide the patient with an understanding of the overall importance of the therapy process   This section reviews neuroplasticity principles as well that includes how patiens can incorporate specificity, intensity, repetition and salience into their home exercise program  This allows the patient to connect neuro rehabilitation treatment strategies to his or her individual therapy process  The patients are engaged in conversation to incorporate activities they like to do into therapy sessions  Following education, the patient's response to education is: verbalizes understanding  Additional topics to individualize this section of stroke education include: adapting to stroke       Start Time: 1415    End Time: 1430

## 2023-05-02 NOTE — PROGRESS NOTES
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   Had nonocclusive saddle embolus with filling defects distal main pulmonary arteries and throughout segmental branches bilaterally   Distal order branches were not clearly visualized due to severe respiratory motion artifact though felt likely containing pulmonary emboli     Sx were left sided CP, NIEVES and elevated D-dimer   Continue Eliquis   Was not a candidate for IR intervention 2/2 HIT     Bilateral LE DVT   Continue Eliquis      HIT   Heparin induced antibody/Heparin by serotonin release = both were positive   S/p Argatroban   Platelets recovered     L MCA infarct 3/3/23   Suspected to be cardioembolic   Thrombosis panel then showed abnormal antithrombin, Protein C/S activity, but per Neuro unclear significance in setting of acute stroke and therefore wanted repeat as outpatient   Cont ASA/statin   Initial Echo/HODAN LVEF 55%; no thrombus/PFO   Needs outpt LOOP/Zio patch   Cont Keppra until seen by Neuro OP per their request 4/10     DM2   Hgb A1c 8 0   New diagnosis   Cont QID Accuchecks/SSI and DM diet   Home:  No meds   Here: SSI only   Had been on Metformin when in [facility] before transfer back for PE   Needs DM teaching and PCP f/u on dc = mother is diabetic and knows how to use meter but not insulin   Stable off Metformin and did not require sliding scale so dc'd   Only needs repeat HbA1C as OP in 3 months or at PCPs discretion otherwise a meter is optional at this point     HTN   Home: on no meds   Here:  Norvasc 5mg qd/Lopressor 25 mg q12hrs/Losartan 25mg qd   stable     Urine retention   Per PMR   Continue Flomax     Obesity   Current BMI 38 5   Wt loss counseling when recovered        Discharge date:  5/5/23       The above assessment and plan was reviewed and updated as determined by my evaluation of the patient on 5/2/2023      Labs:   Results from last 7 days   Lab Units 04/27/23  0514   WBC Thousand/uL 5 69   HEMOGLOBIN g/dL 12 6   HEMATOCRIT % 40 3   PLATELETS Thousands/uL 312     Results from last 7 days   Lab Units 04/27/23  0514   SODIUM mmol/L 137   POTASSIUM mmol/L 3 8   CHLORIDE mmol/L 107   CO2 mmol/L 28   BUN mg/dL 11   CREATININE mg/dL 1 00   CALCIUM mg/dL 8 7             Results from last 7 days   Lab Units 04/28/23  2120 04/28/23  1552 04/28/23  1128   POC GLUCOSE mg/dl 99 103 102       Review of Scheduled Meds:  Current Facility-Administered Medications   Medication Dose Route Frequency Provider Last Rate    acetaminophen  975 mg Oral Q8H PRN Kim Young, MD      amLODIPine  5 mg Oral Daily Kim Brain, MD      apixaban  5 mg Oral BID Kim Young, MD      aspirin  81 mg Oral Daily Kim Brain, MD      atorvastatin  40 mg Oral QPM Kim Brain, MD      bisacodyl  10 mg Rectal Daily PRN Kim Brain, MD      bisacodyl  10 mg Rectal Once Kim Brain, MD      calcium carbonate  1,000 mg Oral TID PRN Kmi Young, MD      famotidine  20 mg Oral Q12H [address] Kim Brain, MD      levETIRAcetam  750 mg Oral Q12H [address] Kim Brain, MD      lidocaine   Topical Q4H PRN Kim Young, MD      loratadine  10 mg Oral Daily Cassia Bernal PA-C      losartan  25 mg Oral Daily Kim Young, MD      metoprolol tartrate  25 mg Oral Q12H [address] Kim Young, MD      ondansetron  4 mg Oral Q8H PRN Kim Young, MD      oxyCODONE  2 5 mg Oral Q6H PRN Kim Young, MD      polyethylene glycol  17 g Oral Daily PRN Kim Young, MD      polyethylene glycol  17 g Oral Daily Kim Brain, MD      senna  1 tablet Oral HS Kim Young, MD      tamsulosin  0 4 mg Oral After Marika Schwartz MD         Subjective/ HPI: Patient seen and examined  Patients overnight issues or events were reviewed with nursing or staff during rounds or morning huddle session  New or overnight issues include the following:     No new or overnight issues  Offers no complaints    ROS:   A 10 point ROS was performed; negative except as noted above  Imaging:     No orders to display       *Labs /Radiology studies reviewed  *Medications reviewed and reconciled as needed  *Please refer to order section for additional ordered labs studies  *Case discussed with primary attending during morning huddle case rounds    Physical Examination:  Vitals:   Vitals:    05/01/23 2132 05/02/23 0549 05/02/23 0555 05/02/23 0949   BP: 137/93 135/86  128/82   BP Location: Right arm Right arm     Pulse: 80 65  70   Resp: 16 18     Temp: 98 °F (36 7 °C) 97 7 °F (36 5 °C)     TempSrc: Oral Oral     SpO2: 98% 95%     Weight:   132 kg (291 lb 7 2 oz)    Height:           General Appearance: no distress, conversive  HEENT: PERRLA, conjuctiva normal; oropharynx clear; mucous membranes moist   Neck:  Supple, normal ROM  Lungs: CTA, normal respiratory effort, no retractions, expiratory effort normal  CV: regular rate and rhythm; no rubs/murmurs/gallops, PMI normal   ABD: soft; ND/NT; +BS  EXT: no edema  Skin: normal turgor, normal texture, no rashes  Psych: affect normal, mood normal  Neuro: AA; expressive>receptive aphasia      The above physical exam was reviewed and updated as determined by my evaluation of the patient on 5/2/2023  Invasive Devices     None                    VTE Pharmacologic Prophylaxis: Eliquis  Code Status: Level 1 - Full Code  Current Length of Stay: 13 day(s)      Total time spent:  30 minutes with more than 50% spent counseling/coordinating care  Counseling includes discussion with patient re: progress  and discussion with patient of his/her current medical state/information   Coordination of patient's care was performed in conjunction with primary service  Time invested included review of patient's labs, vitals, and management of their comorbidities with continued monitoring  In addition, this patient was discussed with medical team including physician and advanced extenders  The care of the patient was extensively discussed and appropriate treatment plan was formulated unique for this patient  Medical decision making for the day was made by supervising physician unless otherwise noted in their attestation statement  ** Please Note:  voice to text software may have been used in the creation of this document   Although proof errors in transcription or interpretation are a potential of such software**

## 2023-05-02 NOTE — PROGRESS NOTES
05/02/23 0830   Pain Assessment   Pain Assessment Tool 0-10   Pain Score No Pain   Restrictions/Precautions   Precautions Aphasia;Bed/chair alarms; Fall Risk;Cognitive;Supervision on toilet/commode   Weight Bearing Restrictions No   ROM Restrictions No   Cognition   Overall Cognitive Status Impaired   Arousal/Participation Alert; Cooperative   Attention Attends with cues to redirect   Orientation Level Oriented X4   Memory Decreased recall of recent events   Following Commands Follows one step commands with increased time or repetition   Subjective   Subjective Pt agreeable to therapy   Sit to Lying   Type of Assistance Needed Supervision   Physical Assistance Level No physical assistance   Comment on mat   Sit to Lying CARE Score 4   Lying to Sitting on Side of Bed   Type of Assistance Needed Supervision   Physical Assistance Level No physical assistance   Comment on mat   Lying to Sitting on Side of Bed CARE Score 4   Sit to Stand   Type of Assistance Needed Supervision   Physical Assistance Level No physical assistance   Comment no AD   Sit to Stand CARE Score 4   Bed-Chair Transfer   Type of Assistance Needed Supervision   Physical Assistance Level No physical assistance   Comment no AD   Chair/Bed-to-Chair Transfer CARE Score 4   Walk 10 Feet   Type of Assistance Needed Supervision   Physical Assistance Level No physical assistance   Comment no AD   Walk 10 Feet CARE Score 4   Walk 50 Feet with Two Turns   Type of Assistance Needed Supervision   Physical Assistance Level No physical assistance   Comment no AD   Walk 50 Feet with Two Turns CARE Score 4   Walk 150 Feet   Type of Assistance Needed Supervision   Physical Assistance Level No physical assistance   Comment no AD   Walk 150 Feet CARE Score 4   Ambulation   Primary Mode of Locomotion Prior to Admission Walk   Assist Device Other  (no AD)   Gait Pattern Improper weight shift; Wide ASHLEY   Limitations Noted In Strength; Endurance;Speed   Provided Assistance "with: Direction   Walk Assist Level Close Supervision   Does the patient walk? 2  Yes   Wheel 50 Feet with Two Turns   Reason if not Attempted Activity not applicable   Wheel 50 Feet with Two Turns CARE Score 9   Wheel 150 Feet   Reason if not Attempted Activity not applicable   Wheel 461 Feet CARE Score 9   Wheelchair mobility   Does the patient use a wheelchair? 0  No   Curb or Single Stair   Style negotiated Curb   Type of Assistance Needed Incidental touching   Physical Assistance Level No physical assistance   Comment CS/CGA with no AD on 6\" curb step   1 Step (Curb) CARE Score 4   Therapeutic Interventions   Strengthening side stepping with red TB while ball toss, supine bridge with hip ABD -green TB 2x10, standing ball press with hip ext x15  swiss ball knee flex/ext 3x10, side to side 2x10  lunges squats 2x10, x10 lunges BLE  Balance reaching cones from counter <>chair with staggered stance on foam, min LOB though able to recover, more challenged with LLE stance & reaching with LUE to R  marching on blue foam with [place]/CGA, no UE support  Neuromuscular Re-Education head turns while amb and holding cup of water on R, Pt reported min dizziness with quick head turns  holding red swiss ball while marching & verbalizing colors, more challenged for pt to verbalizing colors while dual tasking  Other Comments   Comments /82   Assessment   Treatment Assessment Pt participated in skilled PT with increased focus on dynamic balance act, core/glute strengthening, and dual tasking  Pt showed improvement with min VC's with directions of head turns while dual tasking, no LOB present, min dizziness with quick head turns  Pt required reminders for R side objects due to R side inattention  D/c date for 5/5/23 home with outpatient PT to cont rehab services  Cont POC as tolerated to improve dual tasking, strengthening, dynamic balance act and scanning/awareness of R     Problem List Decreased strength;Decreased " endurance; Impaired balance;Decreased coordination;Decreased cognition; Impaired vision   PT Barriers   Functional Limitation Walking;Stair negotiation   Plan   Treatment/Interventions LE strengthening/ROM; Therapeutic exercise; Endurance training;Patient/family training;Gait training   Progress Progressing toward goals   Recommendation   PT Discharge Recommendation Home with outpatient rehabilitation   Equipment Recommended Walker   PT Therapy Minutes   PT Time In 0830   PT Time Out 1000   PT Total Time (minutes) 90   PT Mode of treatment - Individual (minutes) 90   PT Mode of treatment - Concurrent (minutes) 0   PT Mode of treatment - Group (minutes) 0   PT Mode of treatment - Co-treat (minutes) 0   PT Mode of Treatment - Total time(minutes) 90 minutes   PT Cumulative Minutes 930   Therapy Time missed   Time missed?  No

## 2023-05-02 NOTE — PLAN OF CARE
Problem: PAIN - ADULT  Goal: Verbalizes/displays adequate comfort level or baseline comfort level  Description: Interventions:  - Encourage patient to monitor pain and request assistance  - Assess pain using appropriate pain scale  - Administer analgesics based on type and severity of pain and evaluate response  - Implement non-pharmacological measures as appropriate and evaluate response  - Consider cultural and social influences on pain and pain management  - Notify physician/advanced practitioner if interventions unsuccessful or patient reports new pain  Outcome: Progressing     Problem: INFECTION - ADULT  Goal: Absence or prevention of progression during hospitalization  Description: INTERVENTIONS:  - Assess and monitor for signs and symptoms of infection  - Monitor lab/diagnostic results  - Monitor all insertion sites, i e  indwelling lines, tubes, and drains  - Monitor endotracheal if appropriate and nasal secretions for changes in amount and color  - [place] appropriate cooling/warming therapies per order  - Administer medications as ordered  - Instruct and encourage patient and family to use good hand hygiene technique  - Identify and instruct in appropriate isolation precautions for identified infection/condition  Outcome: Progressing     Problem: SAFETY ADULT  Goal: Patient will remain free of falls  Description: INTERVENTIONS:  - Educate patient/family on patient safety including physical limitations  - Instruct patient to call for assistance with activity   - Consult OT/PT to assist with strengthening/mobility   - Keep Call bell within reach  - Keep bed low and locked with side rails adjusted as appropriate  - Keep care items and personal belongings within reach  - Initiate and maintain comfort rounds  - Make Fall Risk Sign visible to staff  - Offer [address], in advance of need  - Initiate/Main  - Obtain necessary fall risk managem  - Apply yellow socks and bracelet for high fall risk patients  - Consider moving patient to room near nurses station  Outcome: Progressing  Goal: Maintain or return to baseline ADL function  Description: INTERVENTIONS:  -  Assess patient's ability to carry out ADLs; assess patient's baseline for ADL function and identify physical deficits which impact ability to perform ADLs (bathing, care of mouth/teeth, toileting, grooming, dressing, etc )  - Assess/evaluate cause of self-care deficits   - Assess range of motion  - Assess patient's mobility; develop plan if impaired  - Assess patient's need for assistive devices and provide as appropriate  - Encourage maximum independence but intervene and supervise when necessary  - Involve family in performance of ADLs  - Assess for home care needs following discharge   - Consider OT consult to assist with ADL evaluation and planning for discharge  - Provide patient education as appropriate  Outcome: Progressing  Goal: Maintains/Returns to pre admission functional level  Description: INTERVENTIONS:  - Perform BMAT or MOVE assessment daily    - Set and communicate daily mobility goal to care team and patient/family/caregiver  - Collaborate with rehabilitation services on mobility goals if consulted  - Perform Rangees a day  - Repositiurs    - Dangle patient  - Stand pat  - Ambulat  -   - Out of bed   - Out of bed for toileting  - Record patient progress and toleration of activity level   Outcome: Progressing     Problem: DISCHARGE PLANNING  Goal: Discharge to home or other facility with appropriate resources  Description: INTERVENTIONS:  - Identify barriers to discharge w/patient and caregiver  - Arrange for needed discharge resources and transportation as appropriate  - Identify discharge learning needs (meds, wound care, etc )  - Arrange for interpretive services to assist at discharge as needed  - Refer to Case Management Department for coordinating discharge planning if the patient needs post-hospital services based on physician/advanced practitioner order or complex needs related to functional status, cognitive ability, or social support system  Outcome: Progressing     Problem: Prexisting or High Potential for Compromised Skin Integrity  Goal: Skin integrity is maintained or improved  Description: INTERVENTIONS:  - Identify patients at risk for skin breakdown  - Assess and monitor skin integrity  - Assess and monitor nutrition and hydration status  - Monitor labs   - Assess for incontinence   - Turn and reposition patient  - Assist with mobility/ambulation  - Relieve pressure over bony prominences  - Avoid friction and shearing  - Provide appropriate hygiene as needed including keeping skin clean and dry  - Evaluate need for skin moisturizer/barrier cream  - Collaborate with interdisciplinary team   - Patient/family teaching  - Consider wound care consult   Outcome: Progressing     Problem: Nutrition/Hydration-ADULT  Goal: Nutrient/Hydration intake appropriate for improving, restoring or maintaining nutritional needs  Description: Monitor and assess patient's nutrition/hydration status for malnutrition  Collaborate with interdisciplinary team and initiate plan and interventions as ordered  Monitor patient's weight and dietary intake as ordered or per policy  Utilize nutrition screening tool and intervene as necessary  Determine patient's food preferences and provide high-protein, high-caloric foods as appropriate       INTERVENTIONS:  - Monitor oral intake, urinary output, labs, and treatment plans  - Assess nutrition and hydration status and recommend course of action  - Evaluate amount of meals eaten  - Assist patient with eating if necessary   - Allow adequate time for meals  - Recommend/ encourage appropriate diets, oral nutritional supplements, and vitamin/mineral supplements  - Order, calculate, and assess calorie counts as needed  - Recommend, monitor, and adjust tube feedings and TPN/PPN based on assessed needs  - Assess need for intravenous fluids  - Provide specific nutrition/hydration education as appropriate  - Include patient/family/caregiver in decisions related to nutrition  Outcome: Progressing

## 2023-05-02 NOTE — PROGRESS NOTES
"Physical Medicine and Rehabilitation Progress Note  Pipo Solano III 41 y o  male MRN: [id number]  Unit/Bed#: -01 Encounter: [id number]      Assessment & Plan:     Decline in ADLs and mobility: Functional assessment - improving        FIM  Care Score  Admit Score Recent Score    Total assist  1-100% or 2p    Tot     Max assist 2-51-75%    Sub  toileting hygiene, bathing, lower body dressing     Mod assist 3- 26-50%  Par  upper body dressing To hygiene   Min assist 3- 25% or < Par     CG assist 4  TA  Bathing, LB dressing   Sup/Setup 4-5 Sup  UBD   Mod-I/Indep 6 MI      Transfers   mod assist to significant assist  CS-supervision     Ambulation    50 feet min assist  150 feet supervision    Stairs    4 steps moderate assist  12 steps close supervision   SLP FIM admit - total assist expression, Max assist comp, PS, memory; SI - supervision  SLP FIM recent -moderate assist memory, problem solving, comp; significant assist expression > improving   Goal: Largely supervision for ADLs and mobility  Major barriers: Aphasia, imbalance, incoordination, deconditioning  Dispo: Home with estimate length of stay Friday 5/5 with OP PT, OT, ST      * Acute ischemic left MCA stroke [facility])  Assessment & Plan  - 5/2 neuro exam stable again today; function improving adequately   - CG training to include proactive toileting  - Plan for community re-integration outing with tx/family Wednesday  - D/C set tentatively for Friday 5/5 with OP PT, OT, ST - Rx entered per CM rec  - Mother to assist with iADLs   - Large left MCA infarct  - Residual impairments on admission to [facility]: Aphasia, weakness, imbalance (assess for other impairments during [facility] course)   - Co-morbidities most impacting functional recovery: Morbid obesity    Secondary stroke prevention  - Per prior providers   \"Thrombosis panel with abnormal antithrombin, Protein C/S activity, but per Neuro unclear significance in setting of acute stroke   Would repeat as " "outpatient   Will need Zio Patch/Loop Recorder with Cards Referral as outpatient   Discussed with Neurology on 4/9: Continue Keppra until outpatient f/u given location, temporal slowing, extent of lesion  \"  - Antithrombotic: Aspirin  - Statin  - Optimal management of blood pressure and diabetes  -  patient and if applicable caregiver on optimal stroke management  - Follow-up with neurology and PCP after d/c   - Disability/FMLA forms completed and given to CM 4/21 to assist with and fax   -Continue acute comprehensive interdisciplinary inpatient rehabilitation to include intensive skilled therapies (PT, OT, ST) as outlined with oversight and management by rehabilitation physician as well as inpatient rehab level nursing, case management and weekly interdisciplinary team meetings           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)  Assessment & Plan  Saturating well on room air with and without activity so far  Monitor vitals with and without activity  Eliquis  Outpatient hematology consult    Rhinorrhea  Assessment & Plan  Improved  Claritin     Aphasia due to recent cerebrovascular accident (CVA)  Assessment & Plan  SLP  PT/OT  CG training    HIT (heparin-induced thrombocytopenia) ([facility])  Assessment & Plan  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   (\"The patient's serum tested positive by FRANKY and supports a diagnosis of heparin-induced-thrombocytopenia\")  Transitioned from Arixtra to Eliquis  Platelets remain recovered 4/27     DVT of lower extremity, bilateral ([address] )  Assessment & Plan  Eliquis  Ambulation  Hold SCDs with hx of DVT     Urinary retention  Assessment & Plan  Flomax    Bowel and bladder incontinence  Assessment & Plan  Sometimes related to poor initiation  Toileting program  - CG training to include proactive toileting  Flomax    Hypertension  Assessment & Plan  Blood pressure acceptable  Internal medicine consulted and co-management with " their service  Monitor vitals with and without activity; monitor for orthostasis  Monitor hemoglobin, electrolytes, kidney function, hydration status   Current meds: Amlodipine, losartan, metoprolol      Type 2 diabetes mellitus with circulatory disorder, without long-term current use of insulin [facility])  Assessment & Plan  Lab Results   Component Value Date    HGBA1C 8 0 (H) 03/31/2023     Internal medicine consulted and management at their discretion  Monitor for signs and symptoms of hypoglycemia   Current meds: Lispro sliding scale      Obesity, Class III, BMI 40-49 9 (morbid obesity) ([facility] [address] )  Assessment & Plan   on appropriate nutrition and activity  Adjustments accordingly during skilled therapy and with rehab nursing  Monitor skin closely for breakdown, wounds, rashes; keep clean and dry, turning Q2H   Follow-up with PCP after d/c        Other Medical Issues:       Follow-up providers and other issues to be followed up after discharge   PCP   Neuro   Hematology    CODE: Level 1: Full Code    Restrictions include: Fall precautions    Objective:     Allergies per EMR  Diagnostic Studies: Reviewed, no new imaging  No orders to display     See above as well    Laboratory: Labs reviewed  Results from last 7 days   Lab Units 04/27/23  0514   HEMOGLOBIN g/dL 12 6   HEMATOCRIT % 40 3   WBC Thousand/uL 5 69     Results from last 7 days   Lab Units 04/27/23  0514   BUN mg/dL 11   POTASSIUM mmol/L 3 8   CHLORIDE mmol/L 107   CREATININE mg/dL 1 00              Drug regimen reviewed, all potential adverse effects identified and addressed:    Current Facility-Administered Medications   Medication Dose Route Frequency Provider Last Rate    acetaminophen  975 mg Oral Q8H PRN Je Thompson MD      amLODIPine  5 mg Oral Daily Je Thompson MD      apixaban  5 mg Oral BID Je Thompson MD      aspirin  81 mg Oral Daily Je Thompson MD      atorvastatin  40 mg Oral QPM Je Thompson MD      bisacodyl  10 mg Rectal Daily PRN Reshma Alexander MD      bisacodyl  10 mg Rectal Once Reshma Alexander MD      calcium carbonate  1,000 mg Oral TID PRN Reshma Alexander MD      famotidine  20 mg Oral Q12H [address] Reshma Alexander MD      levETIRAcetam  750 mg Oral Q12H [address] Reshma Alexander MD      lidocaine   Topical Q4H PRN Reshma Alexander MD      loratadine  10 mg Oral Daily Cassia Bernal PA-C      losartan  25 mg Oral Daily Reshma Alexander MD      metoprolol tartrate  25 mg Oral Q12H [address] Reshma Alexander MD      ondansetron  4 mg Oral Q8H PRN Reshma Alexander MD      oxyCODONE  2 5 mg Oral Q6H PRN Reshma Alexander MD      polyethylene glycol  17 g Oral Daily PRN Reshma Alexander MD      polyethylene glycol  17 g Oral Daily Reshma Alexander MD      senna  1 tablet Oral HS Reshma Alexander MD      tamsulosin  0 4 mg Oral After Sheri Boyer MD           acetaminophen    bisacodyl    calcium carbonate    lidocaine    ondansetron    oxyCODONE    polyethylene glycol      Chief Complaints:   Rehab follow-up     Subjective:     Patient with stable aphasia again  He denies pain, uncontrolled mood, SOB, or other new complaints  A 10 point ROS was performed; negative except as noted above         Physical Exam:  Vitals:    05/02/23 0949   BP: 128/82   Pulse: 70   Resp:    Temp:    SpO2:        GEN:  Sitting in NAD   HEENT/NECK: MMM  CARDIAC: Regular rate rhythm, no murmers, no rubs, no gallops  LUNGS:  clear to auscultation, no wheezes, rales, or rhonchi  ABDOMEN: Soft, non-tender, non-distended, normal active bowel sounds  EXTREMITIES/SKIN:  no calf edema, no calf tenderness to palpation  NEURO:   MENTAL STATUS: Stable aphasia, adequate wakefulness and Strength/MMT:  Near full throughout  PSYCH:  Affect:  Euthymic     HPI:  68-year-old male with a history of hypertension, hyperlipidemia, obesity, diabetes status post recent left MCA stroke which was complicated by saddle pulmonary embolism,, HIT, bilateral lower extremity DVTs  Patient was evaluated by skilled therapies and was found to have significant decline in ADLs and ambulation and appears appropriate for admission to [facility]      ** Please Note: Fluency Direct voice to text software may have been used in the creation of this document   **

## 2023-05-03 RX ADMIN — ASPIRIN 81 MG 81 MG: 81 TABLET ORAL at 09:31

## 2023-05-03 RX ADMIN — FAMOTIDINE 20 MG: 20 TABLET ORAL at 20:09

## 2023-05-03 RX ADMIN — TAMSULOSIN HYDROCHLORIDE 0.4 MG: 0.4 CAPSULE ORAL at 17:05

## 2023-05-03 RX ADMIN — LORATADINE 10 MG: 10 TABLET ORAL at 09:31

## 2023-05-03 RX ADMIN — SENNOSIDES 8.6 MG: 8.6 TABLET, FILM COATED ORAL at 20:09

## 2023-05-03 RX ADMIN — APIXABAN 5 MG: 5 TABLET, FILM COATED ORAL at 09:31

## 2023-05-03 RX ADMIN — METOPROLOL TARTRATE 25 MG: 25 TABLET, FILM COATED ORAL at 09:32

## 2023-05-03 RX ADMIN — METOPROLOL TARTRATE 25 MG: 25 TABLET, FILM COATED ORAL at 20:09

## 2023-05-03 RX ADMIN — ATORVASTATIN CALCIUM 40 MG: 40 TABLET, FILM COATED ORAL at 17:05

## 2023-05-03 RX ADMIN — FAMOTIDINE 20 MG: 20 TABLET ORAL at 09:32

## 2023-05-03 RX ADMIN — POLYETHYLENE GLYCOL 3350 17 G: 17 POWDER, FOR SOLUTION ORAL at 09:31

## 2023-05-03 RX ADMIN — APIXABAN 5 MG: 5 TABLET, FILM COATED ORAL at 17:05

## 2023-05-03 RX ADMIN — LEVETIRACETAM 750 MG: 750 TABLET, FILM COATED ORAL at 09:31

## 2023-05-03 RX ADMIN — AMLODIPINE BESYLATE 5 MG: 5 TABLET ORAL at 09:32

## 2023-05-03 RX ADMIN — LOSARTAN POTASSIUM 25 MG: 25 TABLET, FILM COATED ORAL at 09:32

## 2023-05-03 RX ADMIN — LEVETIRACETAM 750 MG: 750 TABLET, FILM COATED ORAL at 20:09

## 2023-05-03 NOTE — TEAM CONFERENCE
Acute RehabilitationTeam Conference Note  Date: 5/3/2023   Time: 11:03 AM       Patient Name:  Rosana Polo III       Medical Record Number: [id number]   YOB: 1975  Sex:  Male          Room/Bed:  [facility]5/[facility] 965-01  Payor Info:  Payor: NICOLAS SINGH / Plan: Asa Orellana / Product Type: Blue Fee for Service /      Admitting Diagnosis: CVA (cerebral vascular accident) ([place] [address] ) [I63 9]   Admit Date/Time:  4/19/2023  1:49 PM  Admission Comments: No comment available     Primary Diagnosis:  Acute ischemic left MCA stroke (HCC)  Principal Problem: Acute ischemic left MCA stroke [facility])    Patient Active Problem List    Diagnosis Date Noted    Rhinorrhea 04/24/2023    Aphasia due to recent cerebrovascular accident (CVA) 04/21/2023    HIT (heparin-induced thrombocytopenia) ([place] [address] ) 04/20/2023    Urinary retention 04/13/2023    DVT of lower extremity, bilateral ([place] [address] ) 04/13/2023    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) 04/12/2023    Left-sided chest wall pain 04/11/2023    Creatinine elevation 04/11/2023    Thrombocytopenia ([place] [address] ) 04/11/2023    Bowel and bladder incontinence 04/08/2023    Hypertension 04/02/2023    Acute ischemic left MCA stroke ([place] [address] ) 03/30/2023    Obesity, Class III, BMI 40-49 9 (morbid obesity) ([place] [address] ) 03/30/2023    Encephalopathy 03/30/2023    Type 2 diabetes mellitus with circulatory disorder, without long-term current use of insulin ([place] [address] ) 03/30/2023       Physical Therapy:    Weight Bearing Status: Full Weight Bearing  Transfers: Independent, Supervision (depends on fatigue)  Bed Mobility: Independent  Amulation Distance (ft): 350 feet  Ambulation: Incidental Touching, Supervision  Assistive Device for Ambulation: Other (No device)  Wheelchair Mobility Distance:  (NA)  Number of Stairs: 12  Assistive Device for Stairs: Lehft Hand Rail  Stair Assistance: Supervision  Ramp: Supervision  Assistive Device for Ramp: None  Discharge Recommendations: Home Requested most recent colonoscopy from Dr. Tee's office.    "with:  [address] with[de-identified] Family Support, Outpatient Physical Therapy      Patient continues to make good progress towards goals with skilled PT intervention  PT sessions continue to focus on balance and dual tasking  Pt reports min dizziness with head turns while amb >R  Pt cont to require VC's to avoid object on R side when amb due to R side inattention  AD no longer to be utilized for future session unless needed, pt demonstrated good righting reactions with any gait deviations  repetitive tactile/verbal cues for direction when dual tasking when amb  Cont POC as tolerated to improve remaining deficit  PT to also focus OT and SLP strategies within POC to overall progress his overall functional mobility (I) and safety  5/2/23 Pt continues to make progress functionally with improved balance and scanning of his environment  He has demo improved righting reactions as well with ability to self correct an minimal LOB  He is still experiencing dizziness with \"quick head turns\", > on right  Recovers quickly w/o need for seated rest breaks  Continues to require Supervision for direction and way finding with aphasia affecting his communication  Continually ambulating safely w/o device and supervision, working toward distant Sup and Ind in room  Goal for d/c home end of week and continue with out patient therapy        Occupational Therapy:  Eating: Independent  Grooming: Supervision  Bathing: Supervision  Bathing: Supervision  Upper Body Dressing: Independent  Lower Body Dressing: Independent  Toileting: Independent  Toilet Transfer: Independent  Cognition: Within Defined Limits  Cognition: Decreased Executive Functions, Decreased Attention, Decreased Comprehension, Decreased Safety  Orientation: Person, Place, Time, Situation  Discharge Recommendations: Home with:  [address] with[de-identified] Outpatient Occupational Therapy, Family Support, 24 Hour Supervision       Occupational Therapy Weekly Team Note    Pt continues to make good " progress with skilled occupational therapy intervention and is progressing toward long term goals for ADL, IADL's, and functional transfers/mobility  Pts long term goals for ADLs are Independent with Rolling Walker  Pt continues to present with impairments in activity tolerance, endurance, standing balance/tolerance, sitting balance/tolerance, safety , judgement , visual perceptual skills , depth perception , and (R) attention  Occupational performance remains limited by (R) visual deficits  and risk for falls  Family training/education has been ongoing  Pt will continue to benefit from skilled acute rehab OT services to address above mentioned barriers and maximize functional independence in baseline areas of occupation to meet established treatment goals with overall decreased burden of care  Plan of care to continue to focus on ADL Retraining , LB Dressing, IADL training , Kitchen Mobilty, Meal preparation, Medication management , Functional Transfers, Functional Cognition, Functional Attention, Assessment of Cognitive function, Standing tolerance, Standing balance , UE NMR right, Fine motor coordination, Gross motor coordination, Fine motor strengthening , R attention, Visual scanning, Low vision education/training, DME training/education, Family training/education, Energy conservation training/education, and healthy coping education  Goals for the upcoming week are: establish DME needs, focus on IADL management, establish/complete family caregiver training, and continue to progress assess for progression to independent goals in the room  Anticipate Discharge date to be set  Sharri Michele                  Speech Therapy:  Mode of Communication:  (both verbal and non verbal)  Speech/Language: Apraxia (expressive >recpetive aphasia)  Cognition: Exceptions to WNL  Cognition: Decreased Memory, Decreased Executive Functions, Decreased Attention, Decreased Comprehension  Orientation: Person, Place, Time, Situation (given use "of written communication board)  Discharge Recommendations: Home with:  [address] with[de-identified] 24 Hour Supervision, 24 Hour Assisteance, Family Support, Outpatient Speech Therapy  Pt currently being followed for language and cognitive tx sessions  Upon admission to the acute rehab center, pt engaged in completing the Bedside WAB in which overall bedside aphasia score was 15 which deems pt to demonstrate very severely impaired language deficits  Additionally, pt also demonstrates Broca's type aphasia as per Bedside Aphasia Classification Criteria, when comparing the pt's Fluency  Auditory Verbal Comprehension, Repetition scores  Pt w/ fluent speech jo-ann by jargon, neologisms, blocks and unintelligible mumbling  Pt generally unaware of expressive language impairments, continuing w/ long unintelligible utterances in responding to questions  Pt does present w/increased automatic conversational phrases that are clear and intelligible ie \"hi, whats up\" \"I think that's what it says, \"can you repeat that please? \" than in previous encounter  Written spontaneous expression was also jargon where pt demo awareness of incorrect written expression  Writing was slowed and effortful, however pt able to write his first name w/ all capital letters and the initial two numbers of his address as well as a street name w/ the same initial letter as his current street name  In repetition task, pt often able to produce the correct initial phoneme of intended word then w/ neologisms  However, throughout pt's stay, it has been noted that pt does demonstrate improved reading comprehension given biographical information and orientation information provided written binary choices, as well as using very basic communication boards(FO2 pictures/words) to communicate basic wants/needs  Pt's receptive language skills provided visual written or pictures is also noted to be improving at this time   However, pt's biggest barrier continues to be expressive output, " "to where pt exhibits mumbling, non-fluent, repetative sounds/words, jargon, neologism, paraphasias, where minimal true words are elicited in structured expressive language tasks  Pt is observed to verbalize yes/no more appropriately, as well as shorter filler phrases in conversation as well as during structured tasks  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 in which pt does exhibit severe difficulty in ability to formulate sounds given short target words despite all levels of cueing (visual, phrase, phonemic, repetition written, etc)  Written expression is also severely impaired at this time as well  Of note, pt's mother, Serina Larson, has been present in sessions and increased education provided to her on 4/25 given recommendations for 24/7 supervision/assist at d/c due to the severity of his language deficits as well as other functional deficits (visual field cut to R, decreased ST recall, decreased executive functions) impacting ability to complete I ADL tasks at d/c  Mother is able to provide this support upon discussion, which SLP educated pt and mother about continued OP services at time of discharge, which both were in agreement  Currently pt will continue to benefit from skilled SLP services targeting overall functional expressive and receptive language skills as well as cognitive skills in hopes for decreasing caregiver burden over time  Update from week 5/3/2023: Pt continues to be followed for ongoing language and cognitive tx sessions  Pt does still currently exhibit severe expressive language deficits, which is also highly impacted by pt's apraxia of speech when attempting to target speech production in structured tasks  Overall speech output continues to be jargon filled, neologisms, paraphasias in addition to sound substitutions in words plus at times non-sensical output   However, pt is able to provide intermittent spontaneous phrases such as  \"say that again\" \"oh it's going\" \"that was brutal\" " in contexts which are appropriate given sessions  As for pt's receptive language skills, pt does range from moderate to severely impaired, noting that overall auditory comprehension given information still can be difficult for pt  In reverse, longer written information is also more difficult for pt to comprehend, BUT when presented w/ both auditory and written means, pt's comprehension skills DO improve  Began to target more functional tasks, such as money management to where pt again demonstrates difficulty in completing auditory requests for change/dollar amounts, but when presented w/ written information, improvement in his comprehension is noted in addition to providing correct money amounts  Besides pt's speech/language barriers, pt also does exhibit decreased visual attention to R side for tasks, slower processing time, decreased executive functions and decreased ST recall which does impact cognitive/language skills as well as functional mobility  Family training has occurred w/ pt's mother in multiple sessions, to where  community reintegration tasks is to be completed today (5/3) w/ pt and pt's mother for further recommendations to provide pt and mother for means of management in the community  Plan is for discharge home w/ family support/supervision on 5/5/2023 w/ ongoing recommendations for continued OP ST services  Of note, SLP has provided pt's mother w/ written handout given strategies in how to communicate w/ a pt w/ aphasia in addition to SLP providing HEP activities for pt to complete until initiation given OP services  Pt is currently functioning at mod A for comprehension, executive function and memory, max A for expression and supervision level for social interaction skills  Currently pt will continue to benefit from ongoing skilled SLP services targeting functional language/speech and cognitive skills in attempts to decrease overall caregiver burden over time        Nursing Notes:  Appetite: Good  Diet Type: Diabetic                      Diet Patient/Family Education Complete: Yes                            Bladder: Continent     Bladder Patient/Family Education: Yes  Bowel: Continent     Bowel Patient/Family Education: Yes     Pain Score: 0                       Hospital Pain Intervention(s): Repositioned, Rest  Pain Patient/Family Education: Yes  Medication Management/Safety  Safe Administration: Yes (pt will likely need some supervision w meds)  Medication Patient/Family Education Complete: Yes (ongoing)    Pt admitted with Saddle pulmonary embolus without cor pulmonale Had nonocclusive saddle embolus with filling defects distal main pulmonary arteries and throughout segmental branches bilaterally  Distal order branches were not clearly visualized due to severe respiratory motion artifact though felt likely containing pulmonary emboli  Sx were left sided CP, NIEVES and elevated D-dimer Continue Eliquis Was not a candidate for IR intervention 2/2 HIT Bilateral LE DVT- Continue Eliquis  HIT Heparin induced antibody/Heparin by serotonin release = both were positive S/p Argatroban Platelets recovered L MCA infarct 3/3/23- Suspected to be cardioembolic  Thrombosis panel then showed abnormal antithrombin, Protein C/S activity, but per Neuro unclear significance in setting of acute stroke and therefore wanted repeat as outpatient Cont ASA/statin Initial Echo/HODAN LVEF 55%; no thrombus/PFO Needs outpt LOOP/Zio patch Cont Keppra until seen by Neuro OP per their request 4/10 DM2- Hgb A1c 8 0 New diagnosis Needs DM teaching and PCP f/u on dc = mother is diabetic and knows how to use meter but not insulin Stable off Metformin and did not require sliding scale so dc'd Only needs repeat HbA1C as OP in 3 months or at PCPs discretion otherwise a meter is optional at this point  HTN- Home: on no meds Here:  Norvasc 5mg qd/Lopressor 25 mg q12hrs/Losartan 25mg qd Urine retention- on Flomax   Pt is continent of bowel and bladder  This week we will encourage independence with ADLs  We will monitor labs and vital signs  WE will educate pt/family about repositioning to prevent skin breakdown  We will assist w repositioning and perform routine skin checks  We will monitor for adequate pain control  We will monitor for constipation and medicate pt as ordered  We will provide pt and family with DM education We will increase safety awareness and keep pt free from falls  Case Management:     Discharge Planning  Living Arrangements: Lives w/ Parent(s)  Support Systems: Parent  Assistance Needed: TBD  Type of Current Residence: Private residence  Current Home Care Services: No  Pt is making good progress and is scheduled to return home later this week with family and contd outpt pt, ot and speech services  Family training completed and pts mother will provide transport at [place]  Is the patient actively participating in therapies? yes  List any modifications to the treatment plan:     Barriers Interventions   All functional barriers resolved                      Is the patient making expected progress toward goals?  yes  List any update or changes to goals:     Medical Goals: Patient will be medically stable for discharge to [facility] upon completion of rehab program and Patient will be able to manage medical conditions and comorbid conditions with medications and follow up upon completion of rehab program    Weekly Team Goals:   Rehab Team Goals  ADL Team Goal: Patient will be independent with ADLs with least restrictive device upon completion of rehab program  Transfer Team Goal: Patient will be independent with transfers with least restrictive device upon completion of rehab program  Locomotion Team Goal: Patient will be independent with locomotion with least restrictive device upon completion of rehab program  Cognitive Team Goal: Patient will require supervision for basic and complex tasks upon completion of rehab program    Discussion: pt has made good progress and is functioning overall at independent for adls, and mobility  Pt is having inroom priveledges and participating in an outing with therapy to [facility] today  Pt will continue with speech defiicts and ongoing outpt therapy will be recommended  pts mother has been present for multiple training sessions and will be providing supervision and transportation assist on dc  Pt will be continuing with outpt pt ot and speech services  Anticipated Discharge Date:  5/5/23  [facility] Team Members Present: The following team members are supervising care for this patient and were present during this Weekly Team Conference      Physician: Dr Edgar Chew MD  : SAIDA Petty  Registered Nurse: Jian Hurst RN  Physical Therapist: Eddi Muñoz DPT  Occupational Therapist: Donte Perez MS, OTR/L  Speech Therapist: [address], [id number] [facility]

## 2023-05-03 NOTE — PROGRESS NOTES
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)    Urinary retention    DVT of lower extremity, bilateral (HCC)    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)    Aphasia due to recent cerebrovascular accident (CVA)    Rhinorrhea       Past Medical History:   Diagnosis Date    Hypertensive emergency 3/30/2023     Etiologic Diagnosis: L MCA CVA with Superimposed Micro-hemorrhage  Restrictions/Precautions  Precautions: Aphasia, Bed/chair alarms, Cognitive, Fall Risk, Supervision on toilet/commode, Visual deficit  Weight Bearing Restrictions: No  ROM Restrictions: No  ADL Team Goal: Patient will be independent with ADLs with least restrictive device upon completion of rehab program  Occupational Therapy LTG's  Eating Oral care Bathing LB dress UB dress   Eating Goal: 06  Independent - Patient completes the activity by him/herself with no assistance from a helper  Oral Hygiene Goal: 06  Independent - Patient completes the activity by him/herself with no assistance from a helper  Shower/bathe self Goal: 04  Supervision or touching assistance- [facility] provides VERBAL CUES or supervision throughout activity  Lower body dressing Goal: 06  Independent - Patient completes the activity by him/herself with no assistance from a helper  Upper body dressing Goal: 06  Independent - Patient completes the activity by him/herself with no assistance from a helper  Toileting Toilet txf Func txf IADL Med    Toileting hygiene Goal: 06   Independent - Patient completes the activity Subjective   Patient ID: Seda Gonzalez a 48 year old female.    Chief Complaint   Patient presents with   • ER F/U     Kidney stones [facility]     PCP: Dr. 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  Seda  has a past surgical history that includes  section, low transverse; Knee Arthroplasty; [place] tooth extraction; and joint replacement.  Her family history includes Cancer in her maternal grandmother; Diabetes in her maternal grandfather; Stroke in her mother.  Seda Stack has a current medication list which includes the following prescription(s): insulin regular 70-30, albuterol, albuterol, albuterol, azithromycin, benzonatate, budesonide/formoterol, dapagliflozin, guaifenesin-dm), fluconazole, fluticasone-vilanterol, ibuprofen, albuterol, loperamide, ondansetron, and pantoprazole.  Seda   Current Outpatient Medications   Medication Sig Dispense Refill   • insulin regular 70-30 (NOVOLIN 70/30) (70-30) 100 UNIT/ML injectable suspension      • albuterol (ACCUNEB) 1.25 MG/3ML  "by him/herself with no assistance from a helper  Toilet transfer Goal: 06  Independent - Patient completes the activity by him/herself with no assistance from a helper  Chair/bed-to-chair transfer Goal: 06  Independent - Patient completes the activity by him/herself with no assistance from a helper  Assist Level: Independent (Light meal prep) Assist Level: Supervision   OT interventions: Treatment/Interventions: ADL retraining, Functional transfer training, Therapeutic exercise, Endurance training, Cognitive reorientation, Patient/family training, Equipment eval/education, Bed mobility, Gait training, Compensatory technique education  Discharge Plan:  OT Discharge Recommendation:  (home with family support- OUT OT NEURO)   DME: none     05/03/23 1230   Pain Assessment   Pain Assessment Tool 0-10   Pain Score No Pain   Lifestyle   Autonomy \"this shoudle be fine  \"   Sit to Stand   Type of Assistance Needed Independent   Sit to Stand CARE Score 6   Bed-Chair Transfer   Type of Assistance Needed Independent   Comment no AD   Chair/Bed-to-Chair Transfer CARE Score 6   [place] Hygiene CARE Score 6   Toilet Transfer   Type of Assistance Needed Independent   Toilet Transfer CARE Score 6   Assessment   Treatment Assessment Pt engages in community outing/practice activity with ST co-tx  pts mother, Ludmila Lorenz present for session today for trip to [facility]  All outing paperwork presented to doctor and signed for pass  Sarah providing CGA during outdoor/public areas including sidewalks and stairs with navigation from mother  Cues at times for body positioning to provide best support  Sarah at time would cue pt to not crowd her as she was guarding him on he right  Ludmila Lorenz was able to provide adequate cues for visual scanning to the right  Pt was able to navigate physical areas of [place] with verbal instruction to locate [employer], bathroom, ordering kiosks   Pt initially " nebulizer solution Inhale 2.5 mg into the lungs.     • albuterol (VENTOLIN) (2.5 MG/3ML) 0.083% nebulizer solution Inhale 2.5 mg into the lungs every 4 hours as needed.     • albuterol (PROVENTIL HFA) 108 (90 Base) MCG/ACT inhaler      • azithromycin (ZITHROMAX) 250 MG tablet      • benzonatate (TESSALON PERLES) 100 MG capsule Take 100 mg by mouth 3 times daily as needed.     • budesonide/formoterol (SYMBICORT) 80-4.5 MCG/ACT inhaler Inhale into the lungs every 12 hours.     • dapagliflozin (FARXIGA) 10 MG tablet Take by mouth daily.     • guaiFENesin-DM, (ROBITUSSIN DM) 100-10 MG/5ML syrup Take 10 mLs by mouth every 6 hours as needed.     • fluconazole (DIFLUCAN) 150 MG tablet      • fluticasone-vilanterol (BREO ELLIPTA) 100-25 MCG/INH inhaler Inhale 1 puff into the lungs.     • ibuprofen (MOTRIN) 800 MG tablet Take 800 mg by mouth every 8 hours as needed.     • albuterol 108 (90 Base) MCG/ACT inhaler Inhale 2 puffs into the lungs every 6 hours as needed.     • loperamide (IMODIUM A-D) 2 MG tablet Take 1 tablet by mouth 4 times daily as needed for Diarrhea. 30 tablet 0   • ondansetron (ZOFRAN ODT) 4 MG disintegrating tablet Place 1 tablet onto the tongue every 8 hours as needed for Nausea. 20 tablet 0   • pantoprazole (PROTONIX) 40 MG tablet Take 1 tablet by mouth daily. 30 tablet 1     No current facility-administered medications for this visit.      Seda is allergic to contrast media; penicillins; and povidone iodine.    Review of Systems   Constitutional: Negative for activity change, appetite change, chills, diaphoresis, fatigue, fever and unexpected weight change.   HENT: Negative for congestion, dental problem, drooling, ear discharge, ear pain, facial swelling, hearing loss, mouth sores, nosebleeds, postnasal drip, rhinorrhea, sinus pressure, sinus pain, sneezing, sore throat, tinnitus, trouble swallowing and voice change.    Eyes: Negative for photophobia, pain, discharge, redness, itching and visual  apprehensive about moving around and interacting with environment, improves as session progression  Pt was able to recall order details from previous days selection for sandwich  Pt does demo initially good scanning strategies on touch screen  When pt became flustered visual scanning strategy decreased, mother able to cue as needed  Pt's mother provided good cues throughout to promote therapeutic approach  Cues at times for navigation of elevators when door presented open to the right  Improved ability to perform button pushing today compared to previous trial in elevator  Pt w/ good awareness of scanning and looking L+R before crossing parking lot  Pt overall demo abilty to complete community mobility with mother  pts mother encouraged to take time to allow pt to engage in community activities     Prognosis Good   OT Therapy Minutes   OT Time In 1230   OT Time Out 1400   OT Total Time (minutes) 90   OT Mode of treatment - Individual (minutes) 0   OT Mode of treatment - Concurrent (minutes) 0   OT Mode of treatment - Group (minutes) 0   OT Mode of treatment - Co-treat (minutes) 90   OT Mode of Treatment - Total time(minutes) 90 minutes   OT Cumulative Minutes 1095 disturbance.   Respiratory: Negative for apnea, cough, choking, chest tightness, shortness of breath, wheezing and stridor.    Cardiovascular: Negative for chest pain, palpitations and leg swelling.   Gastrointestinal: Negative for abdominal distention, abdominal pain, anal bleeding, blood in stool, constipation, diarrhea, nausea, rectal pain and vomiting.   Endocrine: Negative for cold intolerance, heat intolerance, polydipsia, polyphagia and polyuria.   Genitourinary: Negative for decreased urine volume, difficulty urinating, dysuria, enuresis, flank pain, frequency, genital sores, hematuria and urgency.   Musculoskeletal: Positive for back pain. Negative for arthralgias, gait problem, joint swelling, myalgias, neck pain and neck stiffness.   Skin: Negative for color change, pallor, rash and wound.   Allergic/Immunologic: Negative for environmental allergies, food allergies and immunocompromised state.   Neurological: Negative for dizziness, tremors, seizures, syncope, facial asymmetry, speech difficulty, weakness, light-headedness, numbness and headaches.   Hematological: Negative for adenopathy. Does not bruise/bleed easily.   Psychiatric/Behavioral: Negative for agitation, behavioral problems, confusion, decreased concentration, dysphoric mood, hallucinations, self-injury, sleep disturbance and suicidal ideas. The patient is not nervous/anxious and is not hyperactive.        Objective   Visit Vitals  /68 (BP Location: [facility], Patient Position: Sitting)   Pulse 79   Temp 98.1 °F (36.7 °C) (Oral)   Resp 16   Ht 5' 5\" (1.651 m)   Wt 103 kg (226 lb 15.4 oz)   LMP  (LMP Unknown)   BMI 37.77 kg/m²     Physical Exam   Constitutional: She is oriented to person, place, and time. She appears well-developed and well-nourished.   HENT:   Head: Normocephalic.   Right Ear: External ear normal.   Left Ear: External ear normal.   Nose: Nose normal.   Mouth/Throat: Oropharynx is clear and moist.   Eyes: Conjunctivae and EOM  are normal. Pupils are equal, round, and reactive to light. Right eye exhibits no discharge. No scleral icterus.   Neck: Normal range of motion. Neck supple. No JVD present. No thyromegaly present.   Cardiovascular: Normal rate, regular rhythm, normal heart sounds and intact distal pulses. Exam reveals no gallop and no friction rub.   No murmur heard.  Pulmonary/Chest: Effort normal and breath sounds normal. No stridor. No respiratory distress. She has no wheezes. She has no rales. She exhibits no tenderness.   Abdominal: Soft. Bowel sounds are normal. She exhibits no distension and no mass. There is no hepatosplenomegaly or splenomegaly. There is no tenderness. There is no rigidity, no rebound, no guarding, no CVA tenderness, no tenderness at McBurney's point and negative Burnett's sign. No hernia. Hernia confirmed negative in the ventral area.   Musculoskeletal: Normal range of motion. She exhibits no edema or deformity.        Right shoulder: She exhibits normal range of motion, no tenderness, no bony tenderness, no swelling, no effusion, no crepitus, no deformity, no laceration, no pain, no spasm, normal pulse and normal strength.        Lumbar back: She exhibits tenderness. She exhibits normal range of motion, no bony tenderness, no swelling, no edema, no deformity, no laceration, no pain, no spasm and normal pulse.        Back:         Arms:  Lymphadenopathy:     She has no cervical adenopathy.   Neurological: She is alert and oriented to person, place, and time. She has normal reflexes. No cranial nerve deficit. She exhibits normal muscle tone. Coordination normal.   Skin: Skin is warm and dry. No rash noted. No erythema. No pallor.   Psychiatric: She has a normal mood and affect. Her behavior is normal. Judgment and thought content normal.   Nursing note and vitals reviewed.      Assessment   Problem List Items Addressed This Visit        Digestive    IBS (irritable bowel syndrome)  Advised to avoid food  triggers  Refer to GI for evaluation patient to schedule      Relevant Orders    COMPREHENSIVE METABOLIC PANEL    LIPID PANEL WITHOUT REFLEX    CBC & AUTO DIFFERENTIAL    URINALYSIS & REFLEX MICRO WITH CULTURE IF INDICATED    THYROID STIMULATING HORMONE    AMYLASE LEVEL    SERVICE TO GASTROENTEROLOGY    Gall bladder stones - Primary  ER visit 3/26/2019- medical records form signed by patient today   Per patient CT results revealed gallstones  Today normal physical exam   Tenderness on the right sided abdominal wall   Will refer to surgery- advised patient to bring CT results      Relevant Orders    SERVICE TO SURGERY GENERAL    COMPREHENSIVE METABOLIC PANEL    LIPID PANEL WITHOUT REFLEX    CBC & AUTO DIFFERENTIAL    URINALYSIS & REFLEX MICRO WITH CULTURE IF INDICATED    THYROID STIMULATING HORMONE    AMYLASE LEVEL    LIPASE LEVEL    SERVICE TO GASTROENTEROLOGY       Endocrine    Type 2 diabetes mellitus, uncontrolled (CMS/HCC)  Patient stopped medications.  Due for A1C today .  Continue current medical regimen.  Reminded to bring in BS diary at next visit .  Dietary recommendation for ADA diet.  Regular aerobic exercise.  Discussed ways to avoid symptomatic hypoglycemia.  Discussed sick day management .  Discussed foot and eye care.  Diabetes will be reassessed in 1 month.      Relevant Orders    COMPREHENSIVE METABOLIC PANEL    GLYCOHEMOGLOBIN    LIPID PANEL WITHOUT REFLEX    CBC & AUTO DIFFERENTIAL    URINALYSIS & REFLEX MICRO WITH CULTURE IF INDICATED    THYROID STIMULATING HORMONE    AMYLASE LEVEL       Other    Follow-up exam    Relevant Orders    COMPREHENSIVE METABOLIC PANEL    LIPID PANEL WITHOUT REFLEX    CBC & AUTO DIFFERENTIAL    URINALYSIS & REFLEX MICRO WITH CULTURE IF INDICATED    THYROID STIMULATING HORMONE    AMYLASE LEVEL      Medical compliance with plan discussed and risks of non-compliance reviewed . Patient education completed on disease process, etiology and prognosis. Patient expresses  understanding of the plan . Proper usage and side effects of medication reviewed and discussed. Medical compliance with plan discussed and risks of non-compliance reviewed . Patient education completed on disease process, etiology and prognosis. Patient expresses understanding of the plan .             Magy Marion, CNP

## 2023-05-03 NOTE — CASE MANAGEMENT
Met w/pt and mother and reviewed dc arrangements for Friday  Cm received pt s ID number for a handicap placard  Mother inquired about short term disability and medicaid and provided counseling and education on the process  She is aware that pt is not yet eligible for medicaid as records will show he has insurance coverage  Mother stated she had not yet received a call from [facility] to schedule appmts  Cm phoned [place] location, which was the manager's personal cell number, provided pt and mothers name, mother phone number for contact  Cm to be contacted if the fax was not received that was sent on 4/28

## 2023-05-03 NOTE — PROGRESS NOTES
Pastoral Care Progress Note    5/3/2023  Patient: Mayra Zuniga : 1975  Admission Date & Time: 2023 1349  MRN: [id number] [facility] [place]: [id number]        Brief visit with Pt to see if I could have a short, simple conversation  Pt able to indicate he's doing well by trying to say so, then reverting to a 2 thumbs up  We laughed together, and I encouraged him to keep working hard in therapy   remains available

## 2023-05-03 NOTE — PROGRESS NOTES
Internal Medicine Progress Note  Patient: Catrina Morgan III  Age/sex: 41 y o  male  Medical Record #: [id number]      ASSESSMENT/PLAN: (Interval History)  Catrina Morgan III is seen and examined and management for following issues:    Saddle pulmonary embolus without cor pulmonale   Had nonocclusive saddle embolus with filling defects distal main pulmonary arteries and throughout segmental branches bilaterally   Distal order branches were not clearly visualized due to severe respiratory motion artifact though felt likely containing pulmonary emboli     Sx were left sided CP, NIEVES and elevated D-dimer   Continue Eliquis   Was not a candidate for IR intervention 2/2 HIT     Bilateral LE DVT   Continue Eliquis      HIT   Heparin induced antibody/Heparin by serotonin release = both were positive   S/p Argatroban   Platelets recovered     L MCA infarct 3/3/23   Suspected to be cardioembolic   Thrombosis panel then showed abnormal antithrombin, Protein C/S activity, but per Neuro unclear significance in setting of acute stroke and therefore wanted repeat as outpatient   Cont ASA/statin   Initial Echo/HODAN LVEF 55%; no thrombus/PFO   Needs outpt LOOP/Zio patch   Cont Keppra until seen by Neuro OP per their request 4/10     DM2   Hgb A1c 8 0   New diagnosis   Cont QID Accuchecks/SSI and DM diet   Home:  No meds   Here: SSI only   Had been on Metformin when in [facility] before transfer back for PE   Mom said Nutr did review DM diet   PCP f/u on dc = mother is diabetic and knows how to use meter   Stable off Metformin and did not require sliding scale so dc'd   Only needs repeat HbA1C as OP in 3 months or at PCPs discretion otherwise a meter is optional at this point        HTN   Home: on no meds   Here:  Norvasc 5mg qd/Lopressor 25 mg q12hrs/Losartan 25mg qd   stable     Urine retention   Per PMR   Continue Flomax     Obesity   Current BMI 38 5   Wt loss counseling when recovered        Discharge date:  5/5/23    The above assessment and plan was reviewed and updated as determined by my evaluation of the patient on 5/3/2023      Labs:   Results from last 7 days   Lab Units 04/27/23  0514   WBC Thousand/uL 5 69   HEMOGLOBIN g/dL 12 6   HEMATOCRIT % 40 3   PLATELETS Thousands/uL 312     Results from last 7 days   Lab Units 04/27/23  0514   SODIUM mmol/L 137   POTASSIUM mmol/L 3 8   CHLORIDE mmol/L 107   CO2 mmol/L 28   BUN mg/dL 11   CREATININE mg/dL 1 00   CALCIUM mg/dL 8 7             Results from last 7 days   Lab Units 04/28/23  2120 04/28/23  1552 04/28/23  1128   POC GLUCOSE mg/dl 99 103 102       Review of Scheduled Meds:  Current Facility-Administered Medications   Medication Dose Route Frequency Provider Last Rate    acetaminophen  975 mg Oral Q8H PRN Kim Young, MD      amLODIPine  5 mg Oral Daily Kim Brain, MD      apixaban  5 mg Oral BID Kim Young, MD      aspirin  81 mg Oral Daily Kim Brain, MD      atorvastatin  40 mg Oral QPM Kim Young, MD      bisacodyl  10 mg Rectal Daily PRN Kim Brain, MD      bisacodyl  10 mg Rectal Once Kim Brain, MD      calcium carbonate  1,000 mg Oral TID PRN Kim Young, MD      famotidine  20 mg Oral Q12H [address] Kim Young, MD      levETIRAcetam  750 mg Oral Q12H [address] Kim Brain, MD      lidocaine   Topical Q4H PRN Kim Young, MD      loratadine  10 mg Oral Daily Cassia Bernal PA-C      losartan  25 mg Oral Daily Kim Young, MD      metoprolol tartrate  25 mg Oral Q12H [address] Kim Young, MD      ondansetron  4 mg Oral Q8H PRN Kim Young, MD      oxyCODONE  2 5 mg Oral Q6H PRN Kim Young, MD      polyethylene glycol  17 g Oral Daily PRN Kim Young, MD      polyethylene glycol  17 g Oral Daily Kim Brain, MD      senna  1 tablet Oral HS Kim Young, MD      tamsulosin  0 4 mg Oral After [facility] Keven López MD         Subjective/ HPI: Patient seen and examined  Patients overnight issues or events were reviewed with nursing or staff during rounds or morning huddle session  New or overnight issues include the following:     No new or overnight issues  Offers no complaints    ROS:   A 10 point ROS was performed; negative except as noted above  Imaging:     No orders to display       *Labs /Radiology studies reviewed  *Medications reviewed and reconciled as needed  *Please refer to order section for additional ordered labs studies  *Case discussed with primary attending during morning huddle case rounds    Physical Examination:  Vitals:   Vitals:    05/02/23 2041 05/03/23 0548 05/03/23 0550 05/03/23 0932   BP: 142/85 133/87  112/76   BP Location: Left arm Left arm     Pulse: 81 71  72   Resp: 14 18     Temp: (!) 97 4 °F (36 3 °C) 98 1 °F (36 7 °C)     TempSrc: Oral Oral     SpO2: 100% 96%     Weight:   131 kg (288 lb 12 8 oz)    Height:           General Appearance: no distress, conversive  HEENT:  External ear normal   Nose normal w/o drainage  Mucous membranes are moist  Oropharynx is clear  Conjunctiva clear w/o icterus or redness  Neck:  Supple, normal ROM  Lungs: BBS without crackles/wheeze/rhonchi; respirations unlabored with normal inspiratory/expiratory effort  No retractions noted  On RA  CV: regular rate and rhythm; no rubs/murmurs/gallops, PMI normal   ABD: Abdomen is soft  Bowel sounds all quadrants  Nontender with no distention  EXT: no edema  Skin: normal turgor, normal texture, no rashes  Psych: affect normal, mood normal  Neuro: AA; +expressive >receptive aphasia    The above physical exam was reviewed and updated as determined by my evaluation of the patient on 5/3/2023      Invasive Devices     None                    VTE Pharmacologic Prophylaxis: Eliquis  Code Status: Level 1 - Full Code  Current Length of Stay: 14 day(s)      Total time spent:  30 minutes with more than 50% spent counseling/coordinating care  Counseling includes discussion with patient re: progress  and discussion with patient of his/her current medical state/information  Coordination of patient's care was performed in conjunction with primary service  Time invested included review of patient's labs, vitals, and management of their comorbidities with continued monitoring  In addition, this patient was discussed with medical team including physician and advanced extenders  The care of the patient was extensively discussed and appropriate treatment plan was formulated unique for this patient  Medical decision making for the day was made by supervising physician unless otherwise noted in their attestation statement  ** Please Note:  voice to text software may have been used in the creation of this document   Although proof errors in transcription or interpretation are a potential of such software**

## 2023-05-03 NOTE — PROGRESS NOTES
05/03/23 0930   Pain Assessment   Pain Assessment Tool 0-10   Pain Score No Pain   Restrictions/Precautions   Precautions Aphasia;Cognitive; Fall Risk   Weight Bearing Restrictions No   ROM Restrictions No   Cognition   Overall Cognitive Status Impaired   Arousal/Participation Alert; Cooperative   Attention Attends with cues to redirect   Following Commands Follows one step commands with increased time or repetition   Subjective   Subjective Pt agreeable to therapy   Roll Left and Right   Type of Assistance Needed Independent   Physical Assistance Level No physical assistance   Comment on mat   Roll Left and Right CARE Score 6   Sit to Lying   Type of Assistance Needed Independent   Physical Assistance Level No physical assistance   Comment on mat   Sit to Lying CARE Score 6   Lying to Sitting on Side of Bed   Type of Assistance Needed Independent   Physical Assistance Level No physical assistance   Comment on mat   Lying to Sitting on Side of Bed CARE Score 6   Sit to Stand   Type of Assistance Needed Independent   Physical Assistance Level No physical assistance   Comment no AD   Sit to Stand CARE Score 6   Bed-Chair Transfer   Type of Assistance Needed Independent   Physical Assistance Level No physical assistance   Comment no AD   Chair/Bed-to-Chair Transfer CARE Score 6   Walk 10 Feet   Type of Assistance Needed Independent   Physical Assistance Level No physical assistance   Comment no AD   Walk 10 Feet CARE Score 6   Walk 50 Feet with Two Turns   Type of Assistance Needed Independent   Physical Assistance Level No physical assistance   Comment no AD   Walk 50 Feet with Two Turns CARE Score 6   Walk 150 Feet   Type of Assistance Needed Independent   Physical Assistance Level No physical assistance   Comment no AD   Walk 150 Feet CARE Score 6   Ambulation   Primary Mode of Locomotion Prior to Admission Walk   Distance Walked (feet) 160 ft  (x2)   Gait Pattern Improper weight shift; Wide ASHLEY   Limitations Noted "In Strength; Endurance;Speed   Provided Assistance with: Direction   Does the patient walk? 2  Yes   Wheel 50 Feet with Two Turns   Reason if not Attempted Activity not applicable   Wheel 50 Feet with Two Turns CARE Score 9   Wheel 150 Feet   Reason if not Attempted Activity not applicable   Wheel 022 Feet CARE Score 9   Curb or Single Stair   Style negotiated Curb   Type of Assistance Needed Supervision   Physical Assistance Level No physical assistance   Comment CS on 6\" curb initially but on 2nd trial CGA as he had R lateral lean   1 Step (Curb) CARE Score 4   4 Steps   Type of Assistance Needed Supervision   Physical Assistance Level No physical assistance   Comment CS on FF single rail   4 Steps CARE Score 4   12 Steps   Type of Assistance Needed Supervision   Physical Assistance Level No physical assistance   Comment CS on FF single rail   12 Steps CARE Score 4   Stairs   Type Stairs   # of Steps 12   Therapeutic Interventions   Strengthening Reviewed HEP with pt on mat including supine bridge 4x10, supine bridge with green TB 2x10, supine bridge with ball squeeze 2x10,marching bridge 2x10, single leg bridge tolerated 2x5, clamshell with green TB 2x10, clamshell 2x10  Green TB given to pt for home  Assessment   Treatment Assessment Today's session consisted in review and performance of HEP  Pt demonstrated understanding with HEP when viewing picture and directions on packet  Reviewed tolerated reps and sets with pt  Pt fatigued throughout session though able to complete with rest  D/c date 5/5 home with outpatient to cont rehab services  Pt cont to benefit from skilled PT to improve balance, dual tasking, strengthening, endurance, and R side awareness/scanning  Problem List Decreased strength;Decreased endurance; Impaired balance; Impaired vision   Plan   Treatment/Interventions LE strengthening/ROM; Therapeutic exercise; Endurance training;Patient/family training;Gait training   Progress Progressing toward " goals   Recommendation   PT Discharge Recommendation Home with outpatient rehabilitation   Equipment Recommended Walker   PT Therapy Minutes   PT Time In 0930   PT Time Out 1030   PT Total Time (minutes) 60   PT Mode of treatment - Individual (minutes) 30   PT Mode of treatment - Concurrent (minutes) 30   PT Mode of treatment - Group (minutes) 0   PT Mode of treatment - Co-treat (minutes) 0   PT Mode of Treatment - Total time(minutes) 60 minutes   PT Cumulative Minutes 1050   Therapy Time missed   Time missed?  No

## 2023-05-03 NOTE — PROGRESS NOTES
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)    Urinary retention    DVT of lower extremity, bilateral (HCC)    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)    Aphasia due to recent cerebrovascular accident (CVA)    Rhinorrhea       Past Medical History:   Diagnosis Date    Hypertensive emergency 3/30/2023     Etiologic Diagnosis: L MCA CVA with Superimposed Micro-hemorrhage  Restrictions/Precautions  Precautions: Aphasia, Bed/chair alarms, Cognitive, Fall Risk, Supervision on toilet/commode, Visual deficit  Weight Bearing Restrictions: No  ROM Restrictions: No  ADL Team Goal: Patient will be independent with ADLs with least restrictive device upon completion of rehab program  Occupational Therapy LTG's  Eating Oral care Bathing LB dress UB dress   Eating Goal: 06  Independent - Patient completes the activity by him/herself with no assistance from a helper  Oral Hygiene Goal: 06  Independent - Patient completes the activity by him/herself with no assistance from a helper  Shower/bathe self Goal: 04  Supervision or touching assistance- [place] provides VERBAL CUES or supervision throughout activity  Lower body dressing Goal: 06  Independent - Patient completes the activity by him/herself with no assistance from a helper  Upper body dressing Goal: 06  Independent - Patient completes the activity by him/herself with no assistance from a helper  Toileting Toilet txf Func txf IADL Med    Toileting hygiene Goal: 06   Independent - Patient completes the activity by him/herself "with no assistance from a helper  Toilet transfer Goal: 06  Independent - Patient completes the activity by him/herself with no assistance from a helper  Chair/bed-to-chair transfer Goal: 06  Independent - Patient completes the activity by him/herself with no assistance from a helper  Assist Level: Independent (Light meal prep) Assist Level: Supervision   OT interventions: Treatment/Interventions: ADL retraining, Functional transfer training, Therapeutic exercise, Endurance training, Cognitive reorientation, Patient/family training, Equipment eval/education, Bed mobility, Gait training, Compensatory technique education  Discharge Plan:  OT Discharge Recommendation:  (home with family support- OUT OT NEURO)   DME:  ,  ,      05/03/23 6219   Pain Assessment   Pain Assessment Tool 0-10   Pain Score No Pain   Eating   Type of Assistance Needed Independent   Eating CARE Score 6   Oral Hygiene   Type of Assistance Needed Independent   Oral Hygiene CARE Score 6   Shower/Bathe Self   Type of Assistance Needed Independent   Comment Full shower majority completed in stance  pt has 17\" tall shower bench at home   Shower/Bathe Self CARE Score 6   Tub/Shower Transfer   Findings IND shower transfer with lateral side step over small simulated lip  Upper Body Dressing   Type of Assistance Needed Independent   Comment gathering all items without difficulty     Upper Body Dressing CARE Score 6   Lower Body Dressing   Type of Assistance Needed Independent   Comment gathering all items without diffculty   Lower Body Dressing CARE Score 6   Putting On/Taking Off Footwear   Type of Assistance Needed Independent   Putting On/Taking Off Footwear CARE Score 6   Roll Left and Right   Type of Assistance Needed Independent   Roll Left and Right CARE Score 6   Sit to Lying   Type of Assistance Needed Independent   Sit to Lying CARE Score 6   Lying to Sitting on Side of Bed   Type of Assistance Needed Independent   Lying to Sitting on Side of " Bed CARE Score 6   Sit to Stand   Type of Assistance Needed Independent   Sit to Stand CARE Score 6   Bed-Chair Transfer   Type of Assistance Needed Independent   Chair/Bed-to-Chair Transfer CARE Score 6   Toileting Hygiene   Type of Assistance Needed Independent   Toileting Hygiene CARE Score 6   Assessment   Treatment Assessment pt engages in PM ADL session for full shower  Pt completing tasks independently today     OT Therapy Minutes   OT Time In 1450   OT Time Out 1525   OT Total Time (minutes) 35   OT Mode of treatment - Individual (minutes) 35   OT Mode of treatment - Concurrent (minutes) 0   OT Mode of treatment - Group (minutes) 0   OT Mode of treatment - Co-treat (minutes) 0   OT Mode of Treatment - Total time(minutes) 35 minutes   OT Cumulative Minutes 1130

## 2023-05-03 NOTE — PROGRESS NOTES
05/03/23 1231   Pain Assessment   Pain Assessment Tool 0-10   Pain Score No Pain   Restrictions/Precautions   Precautions Aphasia;Cognitive; Fall Risk   Comprehension   Comprehension (FIM) 4 - Needs words repeated   Expression   Expression (FIM) 2 - Uses only simple expressions or gestures (waves, hello)   Social Interaction   Social Interaction (FIM) 5 - Interacts appropriately with others 90% of time   Problem Solving   Problem solving (FIM) 4 - Solves basic problems 75-89% of time   Memory   Memory (FIM) 3 - Recognizes, recalls/performs 50-74%   Speech/Language/Cognition Assessmetn   Treatment Assessment Focus of session today was completing community reintegration task to [place] w/ pt and pt's mother, Donita Whitfield as well as co-tx session w/ OT  Refer to OT note for details and focus given task management given targets given session  As for SLP focus, overall targeted pt's auditory/reading comprehension skills in food ordering task, ability to complete functional money management withy cash, recall of orders, ability to locate specific items located within the store, spontaneous expressive language skills in an unfamiliar environment as well as targeting pt's mother's ability to provided cues/promptes in this setting  Locating Common Areas/Food Items Given Verbal/Auditory Statements:   When provided only auditory stating 3 core common locations in store, pt was able to locate the ordering station, [employer] machine as well as bathroom w/o increased repetition given these items nor use of written word cues today  Of note, pt did allow for increased processing time prior to begin to walk around store today  Once all these items were located, SLP did challenge pt to locate 2 different food items  The initial item verbally presented was a bag of Cheetos   Pt was able to appropriately locate the area in which one would locate this food, but pointed to [employer] first  SLP provided repetition of the name of food again, to "where given increased time and scanning, pt was able to locate a bag of Cheetos on the next attempt, retrieving this item for SLP  The other food item which SLP wanted pt to locate was an [address]  It was observed that pt did have a question as to this time via facial expression, to where he did appropriately state \"what was it again? \" SLP provided repetition of the name of the food item  Of note, pt's mother did provide additional cues \"it would be where you'd get a pastry  \" This semantic clue was able to help pt walk towards the items listed under Bakery  Also, SLP did provide pt w/ a written visual aid of the word apple fritter as well as mom provided the additional semantic cue, which also improved pt's comprehension as to where to locate the food item  However, pt did exhibit increased difficulty in his ability to locate this item  Multiple repetitions of the word was provided both verbally and visually, which did not appear to help pt locate this item  Pt was choosing other items next to the location of the apple fritter  Of note, pt's mom continued to provide great cues to locate the items (stating that it's on the 4th row down-->providing gesture cue as well), but SLP needing to show pt each label provided for maximal comprehension and eventual ability to locate the item  Ability to Complete Ordering Multiple Sandwiches Using Ordering Station Targeting Picture/Written Comprehension and Recall: Once pt was able to locate the ordering kiosk at the store, pt initiating ordering sandwiches for himself and his mom  It was observed that his order he had written down in prior OT session which SLP did have on stand by for pt to reference as needed, HOWEVER, pt did not reference the sheet once during this task   When allowing increased time to locate the items which he wished to order were located accordingly given the following items:   1- type of meal or item to order  2- type of sandwich   3- type of " "roll/size of sandwich   4- toasted or not toasted  5- cheese options  6- spreads options  7- toppings options  8- any additional items to add to order  Given increased time to locate the items which pt needed for self and mom's order, pt did locate all items on touch screen accordingly  Of note, once orders were placed, pt was not able to locate the \"complete and place order\" button at very bottom of screen and due to time out occurrence, the orders were cancelled  Pt was mildly thrown off by this, BUT encouragement provided by mom as well as therapists to just take time and do it again  When repeating these steps, pt was noted to be slightly less self assured in choosing items, where pt's mom stepped in to provide cues accordingly  Pt still needed to provide verbal cues to scan to bottom portion of the ordering screen to touch the \"complete and place order\" button again  Throughout this process, pt w/ minimal spontaneous verbalizations, but would provide gestures for communicating mild frustration as order was cancelled during this task  Functional Money Management Using Cash: Once ordering the sandwiches was completed, the worker at the counter did state that payment was needed prior to picking up sandwiches  As result, pt's mom did have cash on hand for pt to use to pay for their purchases  Pt does continues to demonstrate decreased auditory comprehension when presented the total verbally by adriano  Of note, adriano did provide repetition of amount, in addition to pt's mother  Pt did initially pull only a $5 and $1 bill, BUT when mom showed pt the total which was located on the payment machine, pt increased comprehension of the amount and was aware that he did not have enough  Pt able to take 2 $5 bills and then 4 $1 bills for total of $14 (as total was $13 93)  Overall, the task went well w/ pt and mom today   She was able to demonstrate appropriate cueing and clues to pt for maximizing his " comprehension in locating items, ensure accuracy in ordering and payment by using both verbal and written/visual cues as needed  SLP reviewing w/ pt and pt's mother about using lists for things as in today's activity, as well as making grocery lists, to practice not only written expression, but targeting stating food items, etc  Briefly review additional speech/language strategies as discussed w/ both over the weekend to where pt's mom is feeling more confident in having pt home  At this time, pt will continue to benefit from ongoing skilled SLP services at this time to maximize overall functional independence for basic communication skills, comprehension skills, cognitive linguistic skills in attempts to decrease caregiver burden over time  SLP Therapy Minutes   SLP Time In 18   SLP Time Out 1401   SLP Total Time (minutes) 90   SLP Mode of treatment - Individual (minutes) 0   SLP Mode of treatment - Concurrent (minutes) 0   SLP Mode of treatment - Group (minutes) 0   SLP Mode of treatment - Co-treat (minutes) 90   SLP Mode of Treatment - Total time(minutes) 90 minutes   SLP Cumulative Minutes 1000   Therapy Time missed   Time missed?  No

## 2023-05-03 NOTE — PLAN OF CARE
Problem: PAIN - ADULT  Goal: Verbalizes/displays adequate comfort level or baseline comfort level  Description: Interventions:  - Encourage patient to monitor pain and request assistance  - Assess pain using appropriate pain scale  - Administer analgesics based on type and severity of pain and evaluate response  - Implement non-pharmacological measures as appropriate and evaluate response  - Consider cultural and social influences on pain and pain management  - Notify physician/advanced practitioner if interventions unsuccessful or patient reports new pain  Outcome: Progressing     Problem: INFECTION - ADULT  Goal: Absence or prevention of progression during hospitalization  Description: INTERVENTIONS:  - Assess and monitor for signs and symptoms of infection  - Monitor lab/diagnostic results  - Monitor all insertion sites, i e  indwelling lines, tubes, and drains  - Monitor endotracheal if appropriate and nasal secretions for changes in amount and color  - [place] appropriate cooling/warming therapies per order  - Administer medications as ordered  - Instruct and encourage patient and family to use good hand hygiene technique  - Identify and instruct in appropriate isolation precautions for identified infection/condition  Outcome: Progressing     Problem: SAFETY ADULT  Goal: Patient will remain free of falls  Description: INTERVENTIONS:  - Educate patient/family on patient safety including physical limitations  - Instruct patient to call for assistance with activity   - Consult OT/PT to assist with strengthening/mobility   - Keep Call bell within reach  - Keep bed low and locked with side rails adjusted as appropriate  - Keep care items and personal belongings within reach  - Initiate and maintain comfort rounds  - Make Fall Risk Sign visible to staff  - Offer Toileting every 2 Hours, in advance of need  - Initiate/Maintain bed  chair alarm  - Obtain necessary fall risk management equipment: nonskid socks  - Apply yellow socks and bracelet for high fall risk patients  - Consider moving patient to room near nurses station  Outcome: Progressing  Goal: Maintain or return to baseline ADL function  Description: INTERVENTIONS:  -  Assess patient's ability to carry out ADLs; assess patient's baseline for ADL function and identify physical deficits which impact ability to perform ADLs (bathing, care of mouth/teeth, toileting, grooming, dressing, etc )  - Assess/evaluate cause of self-care deficits   - Assess range of motion  - Assess patient's mobility; develop plan if impaired  - Assess patient's need for assistive devices and provide as appropriate  - Encourage maximum independence but intervene and supervise when necessary  - Involve family in performance of ADLs  - Assess for home care needs following discharge   - Consider OT consult to assist with ADL evaluation and planning for discharge  - Provide patient education as appropriate  Outcome: Progressing  Goal: Maintains/Returns to pre admission functional level  Description: INTERVENTIONS:  - Perform BMAT or MOVE assessment daily    - Set and communicate daily mobility goal to care team and patient/family/caregiver  - Collaborate with rehabilitation services on mobility goals if consulted  - Perform Range of Motion 3 times a day  - Reposition patient every 2 hours    - Dangle patient 3 times a day  - Stand patient 3 times a day  - Ambulate patient 3 times a day  - Out of bed to chair 3 times a day   - Out of bed for meals 3 times a day  - Out of bed for toileting  - Record patient progress and toleration of activity level   Outcome: Progressing     Problem: DISCHARGE PLANNING  Goal: Discharge to home or other facility with appropriate resources  Description: INTERVENTIONS:  - Identify barriers to discharge w/patient and caregiver  - Arrange for needed discharge resources and transportation as appropriate  - Identify discharge learning needs (meds, wound care, etc )  - Arrange for interpretive services to assist at discharge as needed  - Refer to Case Management Department for coordinating discharge planning if the patient needs post-hospital services based on physician/advanced practitioner order or complex needs related to functional status, cognitive ability, or social support system  Outcome: Progressing     Problem: Prexisting or High Potential for Compromised Skin Integrity  Goal: Skin integrity is maintained or improved  Description: INTERVENTIONS:  - Identify patients at risk for skin breakdown  - Assess and monitor skin integrity  - Assess and monitor nutrition and hydration status  - Monitor labs   - Assess for incontinence   - Turn and reposition patient  - Assist with mobility/ambulation  - Relieve pressure over bony prominences  - Avoid friction and shearing  - Provide appropriate hygiene as needed including keeping skin clean and dry  - Evaluate need for skin moisturizer/barrier cream  - Collaborate with interdisciplinary team   - Patient/family teaching  - Consider wound care consult   Outcome: Progressing     Problem: Nutrition/Hydration-ADULT  Goal: Nutrient/Hydration intake appropriate for improving, restoring or maintaining nutritional needs  Description: Monitor and assess patient's nutrition/hydration status for malnutrition  Collaborate with interdisciplinary team and initiate plan and interventions as ordered  Monitor patient's weight and dietary intake as ordered or per policy  Utilize nutrition screening tool and intervene as necessary  Determine patient's food preferences and provide high-protein, high-caloric foods as appropriate       INTERVENTIONS:  - Monitor oral intake, urinary output, labs, and treatment plans  - Assess nutrition and hydration status and recommend course of action  - Evaluate amount of meals eaten  - Assist patient with eating if necessary   - Allow adequate time for meals  - Recommend/ encourage appropriate diets, oral nutritional supplements, and vitamin/mineral supplements  - Order, calculate, and assess calorie counts as needed  - Recommend, monitor, and adjust tube feedings and TPN/PPN based on assessed needs  - Assess need for intravenous fluids  - Provide specific nutrition/hydration education as appropriate  - Include patient/family/caregiver in decisions related to nutrition  Outcome: Progressing

## 2023-05-04 LAB
ANION GAP SERPL CALCULATED.3IONS-SCNC: 2 MMOL/L (ref 4–13)
BASOPHILS # BLD AUTO: 0.04 THOUSANDS/ΜL (ref 0–0.1)
BASOPHILS NFR BLD AUTO: 1 % (ref 0–1)
BUN SERPL-MCNC: 11 MG/DL (ref 5–25)
CALCIUM SERPL-MCNC: 9 MG/DL (ref 8.3–10.1)
CHLORIDE SERPL-SCNC: 108 MMOL/L (ref 96–108)
CO2 SERPL-SCNC: 28 MMOL/L (ref 21–32)
CREAT SERPL-MCNC: 0.95 MG/DL (ref 0.6–1.3)
EOSINOPHIL # BLD AUTO: 0.3 THOUSAND/ΜL (ref 0–0.61)
EOSINOPHIL NFR BLD AUTO: 6 % (ref 0–6)
ERYTHROCYTE [DISTWIDTH] IN BLOOD BY AUTOMATED COUNT: 14.5 % (ref 11.6–15.1)
GFR SERPL CREATININE-BSD FRML MDRD: 96 ML/MIN/1.73SQ M
GLUCOSE P FAST SERPL-MCNC: 86 MG/DL (ref 65–99)
GLUCOSE SERPL-MCNC: 86 MG/DL (ref 65–140)
HCT VFR BLD AUTO: 40 % (ref 36.5–49.3)
HGB BLD-MCNC: 12.3 G/DL (ref 12–17)
IMM GRANULOCYTES # BLD AUTO: 0.01 THOUSAND/UL (ref 0–0.2)
IMM GRANULOCYTES NFR BLD AUTO: 0 % (ref 0–2)
LYMPHOCYTES # BLD AUTO: 1.8 THOUSANDS/ΜL (ref 0.6–4.47)
LYMPHOCYTES NFR BLD AUTO: 34 % (ref 14–44)
MCH RBC QN AUTO: 26.5 PG (ref 26.8–34.3)
MCHC RBC AUTO-ENTMCNC: 30.8 G/DL (ref 31.4–37.4)
MCV RBC AUTO: 86 FL (ref 82–98)
MONOCYTES # BLD AUTO: 0.53 THOUSAND/ΜL (ref 0.17–1.22)
MONOCYTES NFR BLD AUTO: 10 % (ref 4–12)
NEUTROPHILS # BLD AUTO: 2.65 THOUSANDS/ΜL (ref 1.85–7.62)
NEUTS SEG NFR BLD AUTO: 49 % (ref 43–75)
NRBC BLD AUTO-RTO: 0 /100 WBCS
PLATELET # BLD AUTO: 256 THOUSANDS/UL (ref 149–390)
PMV BLD AUTO: 11.1 FL (ref 8.9–12.7)
POTASSIUM SERPL-SCNC: 3.7 MMOL/L (ref 3.5–5.3)
RBC # BLD AUTO: 4.65 MILLION/UL (ref 3.88–5.62)
SODIUM SERPL-SCNC: 138 MMOL/L (ref 135–147)
WBC # BLD AUTO: 5.33 THOUSAND/UL (ref 4.31–10.16)

## 2023-05-04 RX ADMIN — FAMOTIDINE 20 MG: 20 TABLET ORAL at 20:53

## 2023-05-04 RX ADMIN — LOSARTAN POTASSIUM 25 MG: 25 TABLET, FILM COATED ORAL at 08:02

## 2023-05-04 RX ADMIN — METOPROLOL TARTRATE 25 MG: 25 TABLET, FILM COATED ORAL at 08:02

## 2023-05-04 RX ADMIN — LEVETIRACETAM 750 MG: 750 TABLET, FILM COATED ORAL at 20:52

## 2023-05-04 RX ADMIN — METOPROLOL TARTRATE 25 MG: 25 TABLET, FILM COATED ORAL at 20:53

## 2023-05-04 RX ADMIN — LORATADINE 10 MG: 10 TABLET ORAL at 08:02

## 2023-05-04 RX ADMIN — APIXABAN 5 MG: 5 TABLET, FILM COATED ORAL at 08:02

## 2023-05-04 RX ADMIN — ASPIRIN 81 MG 81 MG: 81 TABLET ORAL at 08:02

## 2023-05-04 RX ADMIN — FAMOTIDINE 20 MG: 20 TABLET ORAL at 08:02

## 2023-05-04 RX ADMIN — AMLODIPINE BESYLATE 5 MG: 5 TABLET ORAL at 08:02

## 2023-05-04 RX ADMIN — TAMSULOSIN HYDROCHLORIDE 0.4 MG: 0.4 CAPSULE ORAL at 17:04

## 2023-05-04 RX ADMIN — ATORVASTATIN CALCIUM 40 MG: 40 TABLET, FILM COATED ORAL at 17:04

## 2023-05-04 RX ADMIN — LEVETIRACETAM 750 MG: 750 TABLET, FILM COATED ORAL at 08:02

## 2023-05-04 RX ADMIN — APIXABAN 5 MG: 5 TABLET, FILM COATED ORAL at 17:04

## 2023-05-04 NOTE — PROGRESS NOTES
05/04/23 0854   Pain Assessment   Pain Assessment Tool 0-10   Pain Score No Pain   Restrictions/Precautions   Precautions Aphasia;Cognitive; Fall Risk;Visual deficit   Comprehension   Comprehension (FIM) 4 - Understands basic info/conversation 75-90% of time   Expression   Expression (FIM) 2 - Uses only simple expressions or gestures (waves, hello)   Social Interaction   Social Interaction (FIM) 5 - Interacts appropriately with others 90% of time   Problem Solving   Problem solving (FIM) 3 - Solves basic problmes 50-74% of time   Memory   Memory (FIM) 3 - Recognizes, recalls/performs 50-74%   Speech/Language/Cognition Assessmetn   Treatment Assessment Pt was awake, alert and in recliner upon arrival to room, but agreeable to go to the therapy gym for session  Pt continues to be able to navigate to therapy gym w/o verbal or visual cues from SLP  Focus of session targeted the following tasks:      Reading Comprehension Provide by Simple Sentence Completion:  SLP re-introducing simple phrase completion task which provided pt w/ a carrier phrase and the Fo3 words to choose from to determine the target word which would complete the phrase  For this task today, SLP wanting pt to complete on own w/o providing any cues/prompting to monitor pt's overall reading comprehension given this activity  It was noted that pt was able to independently choose 8 out of 15 target words to complete the sentences  For the 7 items which were errored, SLP completing full review w/ pt  With only providing pt w/ the carrier phrase verbally, pt was able to increase ability to ID the target word which completes the phrase to 11/15 accurate  The remaining sentences require FULL verbal review to where SLP provided the carrier phrase plus each word to complete the phrase for pt to determine the target words   Again, given this task which the focus was towards reading comprehension skills, pt was noted to attempt to verbalize phrases/target words to complete the phrases to where pt was able to spontaneously state target words (after repetition of verbal presentation by SLP) on 5 words  Pt continues to elicit close approximations to target words ~40% of the time during this task  Body Part and Face ID:   SLP presented a picture given a body to where the written words for the generalized body parts were to be placed onto the corresponding areas  Again, targeting reading comprehension given just the word of body part was 3/8 accurate  Of note, when pt was able to provide verbal cue of stating written word, pt was able to then match the body part to the picture increasing accuracy to 7/8 accurate  When presented a picture of a face and again matching the words to particular areas of the face, SLP wanting pt to read the part to match to the face where pt was 5/8 accurate, increasing to 8/8 when provided w/ verbal cue of the written word  Of note, pt w/ increased improvement in completing this task in comparison from when it was initiated over a week prior to this  Pt was able to spontaneously name body parts x2 out of all 16 words presented as well    Throughout these tasks, pt continues to demonstrate increased processing time and overall cautiousness when choosing items  Pt is demonstrating more awareness if not sure in responses which could be errored  Pt does also continue to elicit shorter fluent phrases throughout session, but when targeting specific naming tasks (ie: asking about food items consumed at breakfast) pt does elicit jargon, neologisms, but at times is noted to make closer approximations given words  Additionally observed is pt's ongoing use of gestures as well as good prosody and intonation in his attempts to effectively communicate   At this time, ongoing skilled SLP services are warranted in attempts to maximize overall functional independence for basic communication skills, comprehension skills, cognitive linguistic skills in attempts to decrease caregiver burden over time  SLP Therapy Minutes   SLP Time In 0830   SLP Time Out 0930   SLP Total Time (minutes) 60   SLP Mode of treatment - Individual (minutes) 0   SLP Mode of treatment - Concurrent (minutes) 0   SLP Mode of treatment - Group (minutes) 0   SLP Mode of treatment - Co-treat (minutes) 0   SLP Mode of Treatment - Total time(minutes) 0 minutes   SLP Cumulative Minutes 1000   Therapy Time missed   Time missed?  No

## 2023-05-04 NOTE — PROGRESS NOTES
05/04/23 1230   Pain Assessment   Pain Assessment Tool 0-10   Pain Score No Pain   Restrictions/Precautions   Precautions Aphasia;Cognitive   Weight Bearing Restrictions No   ROM Restrictions No   Cognition   Arousal/Participation Alert; Cooperative   Attention Attends with cues to redirect   Subjective   Subjective Pt agreeable to therapy   Roll Left and Right   Type of Assistance Needed Independent   Roll Left and Right CARE Score 6   Sit to Lying   Type of Assistance Needed Independent   Sit to Lying CARE Score 6   Lying to Sitting on Side of Bed   Type of Assistance Needed Independent   Lying to Sitting on Side of Bed CARE Score 6   Sit to Stand   Type of Assistance Needed Independent   Sit to Stand CARE Score 6   Bed-Chair Transfer   Type of Assistance Needed Independent   Chair/Bed-to-Chair Transfer CARE Score 6   Car Transfer   Type of Assistance Needed Independent   Car Transfer CARE Score 6   Walk 10 Feet   Type of Assistance Needed Independent   Walk 10 Feet CARE Score 6   Walk 50 Feet with Two Turns   Type of Assistance Needed Independent   Walk 50 Feet with Two Turns CARE Score 6   Walk 150 Feet   Type of Assistance Needed Independent   Walk 150 Feet CARE Score 6   Walking 10 Feet on Uneven Surfaces   Type of Assistance Needed Supervision   Comment on floor mat   Walking 10 Feet on Uneven Surfaces CARE Score 4   Ambulation   Primary Mode of Locomotion Prior to Admission Walk   Distance Walked (feet) 160 ft  (250)   Provided Assistance with: Direction   Walk Assist Level Independent   Does the patient walk? 2  Yes   Wheel 50 Feet with Two Turns   Reason if not Attempted Activity not applicable   Wheel 50 Feet with Two Turns CARE Score 9   Wheel 150 Feet   Reason if not Attempted Activity not applicable   Wheel 606 Feet CARE Score 9   Wheelchair mobility   Does the patient use a wheelchair? 0   No   Curb or Single Stair   Style negotiated Curb   Type of Assistance Needed Supervision   Physical Assistance Level No physical assistance   1 Step (Curb) CARE Score 4   4 Steps   Type of Assistance Needed Independent   Physical Assistance Level No physical assistance   Comment RHR up/down & LHR up/down   4 Steps CARE Score 6   12 Steps   Type of Assistance Needed Supervision   Physical Assistance Level No physical assistance   Comment FF of stairs with RHR up/down & LHR up/down   12 Steps CARE Score 4   Stairs   Type Stairs   # of Steps 12  (x2)   Assist Devices Single Rail   Findings FF x2   Picking Up Object   Type of Assistance Needed Supervision   Comment no reacher   Picking Up Object CARE Score 4   Therapeutic Interventions   Flexibility seated BLE calf stretch & pinch stretch on plantar surface of R foot  Other 5x STS- 26 sec, TUG average 12 91 sec, TUG cognitive average 12 18 sec verbalizing animals, TUG manual average 11 65 sec, 10 MWT self selective 1 05 m/s, fast gait speed 1 28 m/s  Assessment   Treatment Assessment Pt participated in skilled PT with focus on outcome measures, stair management, and functional transfers  Pt showed improvement throughout outcome measures starting with 5x STS, TUG, TUG cognitive, TUG manual, 10 MWT self selective and fast gait speed, with an average score not indicating risk for falls vs previous outcome  Even though pt performed steps at I/S here with therapy, he is recommended CGA at home from mom  Pt anticipates d/c 5/5/23 home with outpatient PT to cont rehab services  Cont POC as tolerated  Problem List Decreased strength;Decreased endurance; Impaired vision   Plan   Treatment/Interventions LE strengthening/ROM; Therapeutic exercise; Endurance training;Patient/family training;Gait training   Progress Progressing toward goals   Recommendation   PT Discharge Recommendation Home with outpatient rehabilitation   PT Therapy Minutes   PT Time In 1230   PT Time Out 1330   PT Total Time (minutes) 60   PT Mode of treatment - Individual (minutes) 60   PT Mode of treatment - Concurrent (minutes) 0   PT Mode of treatment - Group (minutes) 0   PT Mode of treatment - Co-treat (minutes) 0   PT Mode of Treatment - Total time(minutes) 60 minutes   PT Cumulative Minutes 1110   Therapy Time missed   Time missed?  No     Outcome Measure: Date:   4/10/23  4/11/23 Date:  5/4/23 Date:   5x STS  (used arm support) 35 94 seconds  26 sec     TUG  23 23 seconds no AD  12 91 sec no AD     Self Selected Gait Speed   0 6 m/s   1 05 m/s     Fast Paced Gait Speed  0 8 m/s  1 28 m/s

## 2023-05-04 NOTE — PROGRESS NOTES
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   Had nonocclusive saddle embolus with filling defects distal main pulmonary arteries and throughout segmental branches bilaterally   Distal order branches were not clearly visualized due to severe respiratory motion artifact though felt likely containing pulmonary emboli     Sx were left sided CP, NIEVES and elevated D-dimer   Continue Eliquis   Was not a candidate for IR intervention 2/2 HIT     Bilateral LE DVT   Continue Eliquis      HIT   Heparin induced antibody/Heparin by serotonin release = both were positive   S/p Argatroban   Platelets recovered     L MCA infarct 3/3/23   Suspected to be cardioembolic   Thrombosis panel then showed abnormal antithrombin, Protein C/S activity, but per Neuro unclear significance in setting of acute stroke and therefore wanted repeat as outpatient   Cont ASA/statin   Initial Echo/HODAN LVEF 55%; no thrombus/PFO   Needs outpt LOOP/Zio patch   Cont Keppra until seen by Neuro OP per their request 4/10     DM2   Hgb A1c 8 0   New diagnosis   Cont QID Accuchecks/SSI and DM diet   Home:  No meds   Here: SSI only   Had been on Metformin when in [facility] before transfer back for PE   Mom said Nutr did review DM diet   PCP f/u on dc = mother is diabetic and knows how to use meter   Stable off Metformin and did not require sliding scale so dc'd   Only needs repeat HbA1C as OP in 3 months or at PCPs discretion otherwise a meter is optional at this point    I discussed this with pt and his mother 5/3/23     HTN   Home: on no meds   Here:  Norvasc 5mg qd/Lopressor 25 mg q12hrs/Losartan 25mg qd   stable     Urine retention   Per PMR   Continue Flomax     Obesity   Current BMI 38 5   Wt loss counseling when recovered        Discharge date:  5/5/23       The above assessment and plan was reviewed and updated as determined by my evaluation of the patient on 5/4/2023      Labs:   Results from last 7 days   Lab Units 05/04/23  0524   WBC Thousand/uL 5 33   HEMOGLOBIN g/dL 12 3   HEMATOCRIT % 40 0   PLATELETS Thousands/uL 256     Results from last 7 days   Lab Units 05/04/23  0524   SODIUM mmol/L 138   POTASSIUM mmol/L 3 7   CHLORIDE mmol/L 108   CO2 mmol/L 28   BUN mg/dL 11   CREATININE mg/dL 0 95   CALCIUM mg/dL 9 0             Results from last 7 days   Lab Units 04/28/23  2120 04/28/23  1552 04/28/23  1128   POC GLUCOSE mg/dl 99 103 102       Review of Scheduled Meds:  Current Facility-Administered Medications   Medication Dose Route Frequency Provider Last Rate    acetaminophen  975 mg Oral Q8H PRN Lee Mittal MD      amLODIPine  5 mg Oral Daily Lee Mittal MD      apixaban  5 mg Oral BID Lee Mittal MD      aspirin  81 mg Oral Daily Lee Mittal MD      atorvastatin  40 mg Oral QPM Lee Mittal MD      bisacodyl  10 mg Rectal Daily PRN Lee Mittal MD      bisacodyl  10 mg Rectal Once Lee Mittal MD      calcium carbonate  1,000 mg Oral TID PRN Lee Mittal MD      famotidine  20 mg Oral Q12H [facility] Lee Mittal MD      levETIRAcetam  750 mg Oral Q12H [facility] Lee Mittal MD      lidocaine   Topical Q4H PRN Lee Mittal MD      loratadine  10 mg Oral Daily Cassia Bernal PA-C      losartan  25 mg Oral Daily Lee Mittal MD      metoprolol tartrate  25 mg Oral Q12H [facility] Lee Mittal MD      ondansetron  4 mg Oral Q8H PRN Lee Mittal MD      oxyCODONE  2 5 mg Oral Q6H PRN Lee Mittal MD      polyethylene glycol  17 g Oral Daily PRN Lee Mittal MD      polyethylene glycol  17 g Oral Daily Lee Mittal MD      senna  1 tablet Oral HS Blanche Garcia Amara Naranjo MD      tamsulosin  0 4 mg Oral After Rafe Fothergill, MD         Subjective/ HPI: Patient seen and examined  Patients overnight issues or events were reviewed with nursing or staff during rounds or morning huddle session  New or overnight issues include the following:     No new or overnight issues  Offers no complaints    ROS:   A 10 point ROS was performed; negative except as noted above  Imaging:     No orders to display       *Labs /Radiology studies reviewed  *Medications reviewed and reconciled as needed  *Please refer to order section for additional ordered labs studies  *Case discussed with primary attending during morning huddle case rounds    Physical Examination:  Vitals:   Vitals:    05/03/23 1418 05/03/23 2007 05/04/23 0516 05/04/23 0700   BP: 130/81 128/76 121/84 120/78   BP Location: Right arm Right arm Right arm Left arm   Pulse: 88 84 68    Resp: 18 18 18    Temp: 97 9 °F (36 6 °C) 97 9 °F (36 6 °C) 97 7 °F (36 5 °C)    TempSrc: Oral Oral Oral    SpO2: 98% 97% 96%    Weight:   132 kg (291 lb 7 2 oz)    Height:           General Appearance: no distress, conversive  HEENT: PERRLA, conjuctiva normal; oropharynx clear; mucous membranes moist   Neck:  Supple, normal ROM  Lungs: CTA, normal respiratory effort, no retractions, expiratory effort normal  CV: regular rate and rhythm; no rubs/murmurs/gallops, PMI normal   ABD: soft; ND/NT; +BS  EXT: no edema  Skin: normal turgor, normal texture, no rashes  Psych: affect normal, mood normal  Neuro: AA; expressive aphasia>receptive aphasia but improving slowly     The above physical exam was reviewed and updated as determined by my evaluation of the patient on 5/4/2023  Invasive Devices     None                    VTE Pharmacologic Prophylaxis: Eliquis  Code Status: Level 1 - Full Code  Current Length of Stay: 15 day(s)      Total time spent:  30 minutes with more than 50% spent counseling/coordinating care   Counseling includes discussion with patient re: progress  and discussion with patient of his/her current medical state/information  Coordination of patient's care was performed in conjunction with primary service  Time invested included review of patient's labs, vitals, and management of their comorbidities with continued monitoring  In addition, this patient was discussed with medical team including physician and advanced extenders  The care of the patient was extensively discussed and appropriate treatment plan was formulated unique for this patient  Medical decision making for the day was made by supervising physician unless otherwise noted in their attestation statement  ** Please Note:  voice to text software may have been used in the creation of this document   Although proof errors in transcription or interpretation are a potential of such software**

## 2023-05-04 NOTE — PLAN OF CARE
Problem: PAIN - ADULT  Goal: Verbalizes/displays adequate comfort level or baseline comfort level  Description: Interventions:  - Encourage patient to monitor pain and request assistance  - Assess pain using appropriate pain scale  - Administer analgesics based on type and severity of pain and evaluate response  - Implement non-pharmacological measures as appropriate and evaluate response  - Consider cultural and social influences on pain and pain management  - Notify physician/advanced practitioner if interventions unsuccessful or patient reports new pain  Outcome: Progressing     Problem: INFECTION - ADULT  Goal: Absence or prevention of progression during hospitalization  Description: INTERVENTIONS:  - Assess and monitor for signs and symptoms of infection  - Monitor lab/diagnostic results  - Monitor all insertion sites, i e  indwelling lines, tubes, and drains  - Monitor endotracheal if appropriate and nasal secretions for changes in amount and color  - [place] appropriate cooling/warming therapies per order  - Administer medications as ordered  - Instruct and encourage patient and family to use good hand hygiene technique  - Identify and instruct in appropriate isolation precautions for identified infection/condition  Outcome: Progressing     Problem: SAFETY ADULT  Goal: Patient will remain free of falls  Description: INTERVENTIONS:  - Educate patient/family on patient safety including physical limitations  - Instruct patient to call for assistance with activity   - Consult OT/PT to assist with strengthening/mobility   - Keep Call bell within reach  - Keep bed low and locked with side rails adjusted as appropriate  - Keep care items and personal belongings within reach  - Initiate and maintain comfort rounds  - Make Fall Risk Sign visible to staff  - Offer Toileting every 2 Hours, in advance of need  - Initiate/Maintain bed  chair alarm  - Obtain necessary fall risk management equipment: nonskid socks  - Apply yellow socks and bracelet for high fall risk patients  - Consider moving patient to room near nurses station  Outcome: Progressing  Goal: Maintain or return to baseline ADL function  Description: INTERVENTIONS:  -  Assess patient's ability to carry out ADLs; assess patient's baseline for ADL function and identify physical deficits which impact ability to perform ADLs (bathing, care of mouth/teeth, toileting, grooming, dressing, etc )  - Assess/evaluate cause of self-care deficits   - Assess range of motion  - Assess patient's mobility; develop plan if impaired  - Assess patient's need for assistive devices and provide as appropriate  - Encourage maximum independence but intervene and supervise when necessary  - Involve family in performance of ADLs  - Assess for home care needs following discharge   - Consider OT consult to assist with ADL evaluation and planning for discharge  - Provide patient education as appropriate  Outcome: Progressing  Goal: Maintains/Returns to pre admission functional level  Description: INTERVENTIONS:  - Perform BMAT or MOVE assessment daily    - Set and communicate daily mobility goal to care team and patient/family/caregiver  - Collaborate with rehabilitation services on mobility goals if consulted  - Perform Range of Motion 3 times a day  - Reposition patient every 2 hours    - Dangle patient 3 times a day  - Stand patient 3 times a day  - Ambulate patient 3 times a day  - Out of bed to chair 3 times a day   - Out of bed for meals 3 times a day  - Out of bed for toileting  - Record patient progress and toleration of activity level   Outcome: Progressing     Problem: DISCHARGE PLANNING  Goal: Discharge to home or other facility with appropriate resources  Description: INTERVENTIONS:  - Identify barriers to discharge w/patient and caregiver  - Arrange for needed discharge resources and transportation as appropriate  - Identify discharge learning needs (meds, wound care, etc )  - Arrange for interpretive services to assist at discharge as needed  - Refer to Case Management Department for coordinating discharge planning if the patient needs post-hospital services based on physician/advanced practitioner order or complex needs related to functional status, cognitive ability, or social support system  Outcome: Progressing     Problem: Prexisting or High Potential for Compromised Skin Integrity  Goal: Skin integrity is maintained or improved  Description: INTERVENTIONS:  - Identify patients at risk for skin breakdown  - Assess and monitor skin integrity  - Assess and monitor nutrition and hydration status  - Monitor labs   - Assess for incontinence   - Turn and reposition patient  - Assist with mobility/ambulation  - Relieve pressure over bony prominences  - Avoid friction and shearing  - Provide appropriate hygiene as needed including keeping skin clean and dry  - Evaluate need for skin moisturizer/barrier cream  - Collaborate with interdisciplinary team   - Patient/family teaching  - Consider wound care consult   Outcome: Progressing     Problem: Nutrition/Hydration-ADULT  Goal: Nutrient/Hydration intake appropriate for improving, restoring or maintaining nutritional needs  Description: Monitor and assess patient's nutrition/hydration status for malnutrition  Collaborate with interdisciplinary team and initiate plan and interventions as ordered  Monitor patient's weight and dietary intake as ordered or per policy  Utilize nutrition screening tool and intervene as necessary  Determine patient's food preferences and provide high-protein, high-caloric foods as appropriate       INTERVENTIONS:  - Monitor oral intake, urinary output, labs, and treatment plans  - Assess nutrition and hydration status and recommend course of action  - Evaluate amount of meals eaten  - Assist patient with eating if necessary   - Allow adequate time for meals  - Recommend/ encourage appropriate diets, oral nutritional supplements, and vitamin/mineral supplements  - Order, calculate, and assess calorie counts as needed  - Recommend, monitor, and adjust tube feedings and TPN/PPN based on assessed needs  - Assess need for intravenous fluids  - Provide specific nutrition/hydration education as appropriate  - Include patient/family/caregiver in decisions related to nutrition  Outcome: Progressing

## 2023-05-04 NOTE — PHYSICAL THERAPY NOTE
Outcome Measure: Date:   4/10/23  4/11/23 Date: Date:   5x STS  (used arm support) 35 94 seconds       TUG  23 23 seconds no AD       Self Selected Gait Speed   0 6 m/s        Fast Paced Gait Speed  0 8 m/s

## 2023-05-04 NOTE — PROGRESS NOTES
"   05/04/23 1400   Restrictions/Precautions   Precautions Aspiration;Cognitive; Fall Risk   Comprehension   Comprehension (FIM) 3 - Understands basic info/conversation 50-74% of time   Expression   Expression (FIM) 2 - Uses only simple expressions or gestures (waves, hello)   Social Interaction   Social Interaction (FIM) 5 - Interacts appropriately with others 90% of time   Problem Solving   Problem solving (FIM) 3 - Solves basic problmes 50-74% of time   Memory   Memory (FIM) 3 - Recognizes, recalls/performs 50-74%   Speech/Language/Cognition Assessmetn   Treatment Assessment Pt was in recliner, awake, alert and ready for session to where pt continues to be able to navigate to therapy gym w/o any directional cues from SLP  Once in therapy gym, the following tasks were targeted:    Reading Comprehension: Sentence to Picture Matching: For this session, SLP presented [id number] [place] sentences to match to a picture scene  Of note, the sentences were to the L of the page and the picture was to the R side of the page  As SLP had provided the objective and directions for completing this activity, pt did present w/ a look of questioning how to start this task  SLP \"talked\" pt thru the initial page for pt to hopefully demonstrate comprehension given this task  SLP verbally stated the 3 sentence options to match to the picture  Pt was able to accurately ID the sentences which correctly matched those pictures  To increase pt's reading comprehension of this task moving forward, SLP not providing verbal prompting of sentences, but only for errors  Pt was able to accurately choose the correct sentence to match the pictures w/ 3/8 accuracy, BUT when provided that verbal feedback of sentences, pt was able to ID the correct match on 2nd attempt to 8/8 accuracy   Of note, this task was more challenging as the sentences provided did all have an association to the picture given, which shows that pt is making sematic correlations to words-pictures, " "but when increased length and complexity of written information is given, more difficult it is for pt to determine matches  However, it is observed that when pt is provided both the visual/written and auditory feedback, improvement overall is noted in comprehension skills  Moderate Yes/No?'s:   SLP circling back to pt's auditory comprehension as well as ability to verbally state yes or no to questions provided  It is observed that pt still demonstrate difficulty in just auditory comprehension as well as consistency in verbalizing yes or no to question  Pt's ability to answer questions w/o repetition and only w/ verbal yes/no response was 3/10 accurate  However, when using written cue of yes or no, pt did increase to 5/10 accurate  Pt required repetition of questions to improve to 8/10 accuracy w/ use of written cue and the last 2 questions, SLP did provide pt w/ reading question which pt was able to answer last 2 question accordingly  Finding Pictures That Begin with a Target Letter: The last task which SLP engaged pt w/ for session today was a page containing 16 pictures randomly placed  Pt was to locate the pictures which began with the letter \"S  \" It was observed that pt did require repetition of the directions, but also did have increased processing time to initiate task  Of note, pt was able to ID the first picture, which then build pt's confidence to locate an additional 6 other pictures w/o cues from SLP  The remaining 4 pictures, pt did have to provide verbal binary choices for pt to ID the picture which did increase pt's recognition to 8/10 accuracy  The last 2 pictures required both verbal and written binary choices for maximal comprehension given the picture names  Of note, this session is targeting more complex information for pt, to where he is slowly continuing to demonstrate improvement given verbal and written comprehension skills   However, even when still attempting to elicit bio " information, such as name, pt is unable to fluently do so at this time, BUT is beginning to make closer approximations, but overall speech output continues to be jargon, neologisms, paraphasias  Pt also continues w/ apraxia of speech when attempting to name pictures/objects  Pt is planned for d/c home w/ family support/supervision w/ recommendations for continued OP ST services to continue to maximize overall functional independence for basic communication skills, comprehension skills, cognitive linguistic skills in attempts to decrease caregiver burden over time  SLP Therapy Minutes   SLP Time In 1400   SLP Time Out 1500   SLP Total Time (minutes) 60   SLP Mode of treatment - Individual (minutes) 60   SLP Mode of treatment - Concurrent (minutes) 0   SLP Mode of treatment - Group (minutes) 0   SLP Mode of treatment - Co-treat (minutes) 0   SLP Mode of Treatment - Total time(minutes) 60 minutes   SLP Cumulative Minutes 1060   Therapy Time missed   Time missed?  No

## 2023-05-04 NOTE — PHYSICAL THERAPY NOTE
PHYSICAL THERAPY DISCHARGE SUMMARY    Patient made good progress during his stay at the acute rehab center where he presented with an Impairment of mobility, safety, Activities of Daily Living (ADLs), and cognitive/communication skills due to Stroke  He presented initially on 3/30/23  R body weakness and unresponsiveness  He was found to have a large MCA CVA with superimposed microhemorrhage, +L BG involvement, mild mass effect with stable L to R midline shift  He initially presented to rehab but required a transfer back 2* PE  He returned with profound global aphasia, +expressive > receptive, and deficits in motor planning, coordination, and functional mobility  He responded well to therapeutic activity and exercise, Neuro re-ed, transfer, gait and stair training  He was able to progress to an (I) level for mobility but needed S in the community more for safety and 2* his communication strategies  Patient lives with his mother  Mother was in during multiple sessions and participated in family training  Patient was provided with a HEP which he was able to read/look at pictures and demonstrate understanding  He was also recommended to follow up with OPPT services to continued progression of his strength, safety, and functional mobility (I)  Outcome Measures: See below       Megan Molina PT, DPT

## 2023-05-04 NOTE — PROGRESS NOTES
05/04/23 1230   Pain Assessment   Pain Assessment Tool 0-10   Pain Score No Pain   Restrictions/Precautions   Precautions Aphasia;Cognitive   Weight Bearing Restrictions No   ROM Restrictions No   Cognition   Arousal/Participation Alert; Cooperative   Attention Attends with cues to redirect   Subjective   Subjective Pt agreeable to therapy   Roll Left and Right   Type of Assistance Needed Independent   Roll Left and Right CARE Score 6   Sit to Lying   Type of Assistance Needed Independent   Sit to Lying CARE Score 6   Lying to Sitting on Side of Bed   Type of Assistance Needed Independent   Lying to Sitting on Side of Bed CARE Score 6   Sit to Stand   Type of Assistance Needed Independent   Sit to Stand CARE Score 6   Bed-Chair Transfer   Type of Assistance Needed Independent   Chair/Bed-to-Chair Transfer CARE Score 6   Car Transfer   Type of Assistance Needed Independent   Car Transfer CARE Score 6   Walk 10 Feet   Type of Assistance Needed Independent   Walk 10 Feet CARE Score 6   Walk 50 Feet with Two Turns   Type of Assistance Needed Independent   Walk 50 Feet with Two Turns CARE Score 6   Walk 150 Feet   Type of Assistance Needed Independent   Walk 150 Feet CARE Score 6   Walking 10 Feet on Uneven Surfaces   Type of Assistance Needed Supervision   Comment on floor mat   Walking 10 Feet on Uneven Surfaces CARE Score 4   Ambulation   Primary Mode of Locomotion Prior to Admission Walk   Distance Walked (feet) 160 ft  (250)   Provided Assistance with: Direction   Walk Assist Level Independent   Does the patient walk? 2  Yes   Wheel 50 Feet with Two Turns   Reason if not Attempted Activity not applicable   Wheel 50 Feet with Two Turns CARE Score 9   Wheel 150 Feet   Reason if not Attempted Activity not applicable   Wheel 848 Feet CARE Score 9   Wheelchair mobility   Does the patient use a wheelchair? 0   No   Curb or Single Stair   Style negotiated Curb   Type of Assistance Needed Supervision   Physical Assistance Level No physical assistance   1 Step (Curb) CARE Score 4   4 Steps   Type of Assistance Needed Independent   Physical Assistance Level No physical assistance   Comment RHR up/down & LHR up/down   4 Steps CARE Score 6   12 Steps   Type of Assistance Needed Supervision   Physical Assistance Level No physical assistance   Comment FF of stairs with RHR up/down & LHR up/down   12 Steps CARE Score 4   Stairs   Type Stairs   # of Steps 12  (x2)   Assist Devices Single Rail   Findings FF x2   Picking Up Object   Type of Assistance Needed Supervision   Comment no reacher   Picking Up Object CARE Score 4   Therapeutic Interventions   Flexibility seated BLE calf stretch & pinch stretch on plantar surface of R foot  Other 5x STS- 26 sec, TUG average 12 91 sec, TUG cognitive average 12 18 sec verbalizing animals, TUG manual average 11 65 sec, 10 MWT self selective 1 05 m/s, fast gait speed 1 28 m/s  Assessment   Treatment Assessment Pt participated in skilled PT with focus on outcome measures, stair management, and functional transfers  Pt showed improvement throughout outcome measures starting with 5x STS, TUG, TUG cognitive, TUG manual, 10 MWT self selective and fast gait speed, with an average score not indicating risk for falls vs previous outcome  Even though pt performed steps at I/S here with therapy, he is recommended CGA at home from mom  Pt anticipates d/c 5/5/23 home with outpatient PT to cont rehab services  Cont POC as tolerated  Problem List Decreased strength;Decreased endurance; Impaired vision   Plan   Treatment/Interventions LE strengthening/ROM; Therapeutic exercise; Endurance training;Patient/family training;Gait training   Progress Progressing toward goals   Recommendation   PT Discharge Recommendation Home with outpatient rehabilitation   PT Therapy Minutes   PT Time In 1230   PT Time Out 1330   PT Total Time (minutes) 60   PT Mode of treatment - Individual (minutes) 60   PT Mode of treatment - Concurrent (minutes) 0   PT Mode of treatment - Group (minutes) 0   PT Mode of treatment - Co-treat (minutes) 0   PT Mode of Treatment - Total time(minutes) 60 minutes   PT Cumulative Minutes 1110   Therapy Time missed   Time missed?  No

## 2023-05-04 NOTE — PROGRESS NOTES
05/04/23 1000   Pain Assessment   Pain Assessment Tool 0-10   Pain Score No Pain   Restrictions/Precautions   Precautions Aphasia;Cognitive   Weight Bearing Restrictions No   ROM Restrictions No   Eating   Type of Assistance Needed Independent   Physical Assistance Level No physical assistance   Eating CARE Score 6   Oral Hygiene   Type of Assistance Needed Independent   Physical Assistance Level No physical assistance   Oral Hygiene CARE Score 6   Shower/Bathe Self   Type of Assistance Needed Independent   Physical Assistance Level No physical assistance   Shower/Bathe Self CARE Score 6   Upper Body Dressing   Type of Assistance Needed Independent   Physical Assistance Level No physical assistance   Upper Body Dressing CARE Score 6   Lower Body Dressing   Type of Assistance Needed Independent   Physical Assistance Level No physical assistance   Lower Body Dressing CARE Score 6   Putting On/Taking Off Footwear   Type of Assistance Needed Independent   Physical Assistance Level No physical assistance   Putting On/Taking Off Footwear CARE Score 6   Sit to Lying   Type of Assistance Needed Independent   Physical Assistance Level No physical assistance   Sit to Lying CARE Score 6   Lying to Sitting on Side of Bed   Type of Assistance Needed Independent   Physical Assistance Level No physical assistance   Lying to Sitting on Side of Bed CARE Score 6   Sit to Stand   Type of Assistance Needed Independent   Physical Assistance Level No physical assistance   Sit to Stand CARE Score 6   Bed-Chair Transfer   Type of Assistance Needed Independent   Physical Assistance Level No physical assistance   Comment no AD   Chair/Bed-to-Chair Transfer CARE Score 6   Toileting Hygiene   Type of Assistance Needed Independent   Physical Assistance Level No physical assistance   Toileting Hygiene CARE Score 6   Toilet Transfer   Type of Assistance Needed Independent   Physical Assistance Level No physical assistance   Toilet Transfer CARE Score 6   Cognition   Overall Cognitive Status Impaired   Arousal/Participation Alert; Cooperative   Attention Attends with cues to redirect   Orientation Level Oriented X4   Memory Decreased recall of recent events   Following Commands Follows one step commands with increased time or repetition   Activity Tolerance   Activity Tolerance Patient tolerated treatment well   Assessment   Treatment Assessment Pt participated in skilled OT services with focus on administering outcome measures including 9HPT and pinch meter/dynamometer, d/c planning, and finalizing HEPs  Pt continues to present with expressive aphasia, however, is improving with basic communication  Pt overall has progressed to an independent level for basic mobility, ADL tasks, and functional txfers without an AD  Pt to d/c home with family support and outpt OT services  Prognosis Good   Problem List Decreased strength;Decreased endurance; Impaired balance; Impaired vision   Recommendation   OT Discharge Recommendation Home with outpatient rehabilitation   OT Therapy Minutes   OT Time In 1000   OT Time Out 1130   OT Total Time (minutes) 90   OT Mode of treatment - Individual (minutes) 90   OT Mode of treatment - Concurrent (minutes) 0   OT Mode of treatment - Group (minutes) 0   OT Mode of treatment - Co-treat (minutes) 0   OT Mode of Treatment - Total time(minutes) 90 minutes   OT Cumulative Minutes 1220   Therapy Time missed   Time missed?  No     UE D/C Scores 5/4:       PRADEEP HERNANDEZ Comments            UPPER EXTREMITY FUNCTION     Dominant Hand: RIGHT                         /Pinch Strength         Dynamometer         - Gross Grasp  90, 75, 90 lbs   110, 100, 100 lbs        Pinch Meter          - PINCER 12 7, 13 4, 12 lbs 11 1, 12, 10 lbs      - Lateral key 25 2, 24 7, 23 6 lbs 25 6, 25 3, 24 6 lbs     9 hole Peg Test   30 15 seconds 26 73 seconds

## 2023-05-04 NOTE — PLAN OF CARE
Problem: PAIN - ADULT  Goal: Verbalizes/displays adequate comfort level or baseline comfort level  Description: Interventions:  - Encourage patient to monitor pain and request assistance  - Assess pain using appropriate pain scale  - Administer analgesics based on type and severity of pain and evaluate response  - Implement non-pharmacological measures as appropriate and evaluate response  - Consider cultural and social influences on pain and pain management  - Notify physician/advanced practitioner if interventions unsuccessful or patient reports new pain  Outcome: Progressing     Problem: INFECTION - ADULT  Goal: Absence or prevention of progression during hospitalization  Description: INTERVENTIONS:  - Assess and monitor for signs and symptoms of infection  - Monitor lab/diagnostic results  - Monitor all insertion sites, i e  indwelling lines, tubes, and drains  - Monitor endotracheal if appropriate and nasal secretions for changes in amount and color  - [place] appropriate cooling/warming therapies per order  - Administer medications as ordered  - Instruct and encourage patient and family to use good hand hygiene technique  - Identify and instruct in appropriate isolation precautions for identified infection/condition  Outcome: Progressing     Problem: SAFETY ADULT  Goal: Patient will remain free of falls  Description: INTERVENTIONS:  - Educate patient/family on patient safety including physical limitations  - Instruct patient to call for assistance with activity   - Consult OT/PT to assist with strengthening/mobility   - Keep Call bell within reach  - Keep bed low and locked with side rails adjusted as appropriate  - Keep care items and personal belongings within reach  - Initiate and maintain comfort rounds  - Make Fall Risk Sign visible to staff  - Apply yellow socks and bracelet for high fall risk patients  - Consider moving patient to room near nurses station  Outcome: Progressing  Goal: Maintain or return to baseline ADL function  Description: INTERVENTIONS:  -  Assess patient's ability to carry out ADLs; assess patient's baseline for ADL function and identify physical deficits which impact ability to perform ADLs (bathing, care of mouth/teeth, toileting, grooming, dressing, etc )  - Assess/evaluate cause of self-care deficits   - Assess range of motion  - Assess patient's mobility; develop plan if impaired  - Assess patient's need for assistive devices and provide as appropriate  - Encourage maximum independence but intervene and supervise when necessary  - Involve family in performance of ADLs  - Assess for home care needs following discharge   - Consider OT consult to assist with ADL evaluation and planning for discharge  - Provide patient education as appropriate  Outcome: Progressing  Goal: Maintains/Returns to pre admission functional level  Description: INTERVENTIONS:  - Perform BMAT or MOVE assessment daily    - Set and communicate daily mobility goal to care team and patient/family/caregiver     - Collaborate with rehabilitation services on mobility goals if consulted  - Out of bed for toileting  - Record patient progress and toleration of activity level   Outcome: Progressing     Problem: DISCHARGE PLANNING  Goal: Discharge to home or other facility with appropriate resources  Description: INTERVENTIONS:  - Identify barriers to discharge w/patient and caregiver  - Arrange for needed discharge resources and transportation as appropriate  - Identify discharge learning needs (meds, wound care, etc )  - Arrange for interpretive services to assist at discharge as needed  - Refer to Case Management Department for coordinating discharge planning if the patient needs post-hospital services based on physician/advanced practitioner order or complex needs related to functional status, cognitive ability, or social support system  Outcome: Progressing     Problem: Prexisting or High Potential for Compromised Skin Integrity  Goal: Skin integrity is maintained or improved  Description: INTERVENTIONS:  - Identify patients at risk for skin breakdown  - Assess and monitor skin integrity  - Assess and monitor nutrition and hydration status  - Monitor labs   - Assess for incontinence   - Turn and reposition patient  - Assist with mobility/ambulation  - Relieve pressure over bony prominences  - Avoid friction and shearing  - Provide appropriate hygiene as needed including keeping skin clean and dry  - Evaluate need for skin moisturizer/barrier cream  - Collaborate with interdisciplinary team   - Patient/family teaching  - Consider wound care consult   Outcome: Progressing     Problem: Nutrition/Hydration-ADULT  Goal: Nutrient/Hydration intake appropriate for improving, restoring or maintaining nutritional needs  Description: Monitor and assess patient's nutrition/hydration status for malnutrition  Collaborate with interdisciplinary team and initiate plan and interventions as ordered  Monitor patient's weight and dietary intake as ordered or per policy  Utilize nutrition screening tool and intervene as necessary  Determine patient's food preferences and provide high-protein, high-caloric foods as appropriate       INTERVENTIONS:  - Monitor oral intake, urinary output, labs, and treatment plans  - Assess nutrition and hydration status and recommend course of action  - Evaluate amount of meals eaten  - Assist patient with eating if necessary   - Allow adequate time for meals  - Recommend/ encourage appropriate diets, oral nutritional supplements, and vitamin/mineral supplements  - Order, calculate, and assess calorie counts as needed  - Recommend, monitor, and adjust tube feedings and TPN/PPN based on assessed needs  - Assess need for intravenous fluids  - Provide specific nutrition/hydration education as appropriate  - Include patient/family/caregiver in decisions related to nutrition  Outcome: Progressing

## 2023-05-05 ENCOUNTER — TELEPHONE (OUTPATIENT)
Dept: HEMATOLOGY ONCOLOGY | Facility: CLINIC | Age: 48
End: 2023-05-05

## 2023-05-05 VITALS
DIASTOLIC BLOOD PRESSURE: 75 MMHG | OXYGEN SATURATION: 100 % | BODY MASS INDEX: 38.51 KG/M2 | SYSTOLIC BLOOD PRESSURE: 138 MMHG | WEIGHT: 290.57 LBS | TEMPERATURE: 98 F | HEIGHT: 73 IN | RESPIRATION RATE: 16 BRPM | HEART RATE: 89 BPM

## 2023-05-05 RX ORDER — POLYETHYLENE GLYCOL 3350 17 G/17G
17 POWDER, FOR SOLUTION ORAL DAILY PRN
Qty: 507 G | Refills: 0 | Status: SHIPPED | OUTPATIENT
Start: 2023-05-05 | End: 2023-05-05 | Stop reason: SDUPTHER

## 2023-05-05 RX ORDER — CALCIUM CARBONATE 200(500)MG
1000 TABLET,CHEWABLE ORAL 3 TIMES DAILY PRN
Refills: 0
Start: 2023-05-05

## 2023-05-05 RX ORDER — AMLODIPINE BESYLATE 5 MG/1
5 TABLET ORAL DAILY
Qty: 30 TABLET | Refills: 0 | Status: SHIPPED | OUTPATIENT
Start: 2023-05-05

## 2023-05-05 RX ORDER — AMLODIPINE BESYLATE 5 MG/1
5 TABLET ORAL DAILY
Qty: 30 TABLET | Refills: 0 | Status: SHIPPED | OUTPATIENT
Start: 2023-05-05 | End: 2023-05-05 | Stop reason: SDUPTHER

## 2023-05-05 RX ORDER — LEVETIRACETAM 750 MG/1
750 TABLET ORAL EVERY 12 HOURS SCHEDULED
Qty: 60 TABLET | Refills: 0 | Status: SHIPPED | OUTPATIENT
Start: 2023-05-05

## 2023-05-05 RX ORDER — FAMOTIDINE 20 MG/1
20 TABLET, FILM COATED ORAL EVERY 12 HOURS SCHEDULED
Qty: 60 TABLET | Refills: 0 | Status: SHIPPED | OUTPATIENT
Start: 2023-05-05

## 2023-05-05 RX ORDER — ASPIRIN 81 MG/1
81 TABLET, CHEWABLE ORAL DAILY
Qty: 30 TABLET | Refills: 0 | Status: SHIPPED | OUTPATIENT
Start: 2023-05-05

## 2023-05-05 RX ORDER — ATORVASTATIN CALCIUM 40 MG/1
40 TABLET, FILM COATED ORAL EVERY EVENING
Qty: 30 TABLET | Refills: 0 | Status: SHIPPED | OUTPATIENT
Start: 2023-05-05

## 2023-05-05 RX ORDER — LOSARTAN POTASSIUM 25 MG/1
25 TABLET ORAL DAILY
Qty: 30 TABLET | Refills: 0 | Status: SHIPPED | OUTPATIENT
Start: 2023-05-05 | End: 2023-05-05 | Stop reason: SDUPTHER

## 2023-05-05 RX ORDER — ASPIRIN 81 MG/1
81 TABLET, CHEWABLE ORAL DAILY
Qty: 30 TABLET | Refills: 0 | Status: SHIPPED | OUTPATIENT
Start: 2023-05-05 | End: 2023-05-05 | Stop reason: SDUPTHER

## 2023-05-05 RX ORDER — LORATADINE 10 MG/1
10 TABLET ORAL DAILY PRN
Refills: 0
Start: 2023-05-05

## 2023-05-05 RX ORDER — LEVETIRACETAM 750 MG/1
750 TABLET ORAL EVERY 12 HOURS SCHEDULED
Qty: 60 TABLET | Refills: 0 | Status: SHIPPED | OUTPATIENT
Start: 2023-05-05 | End: 2023-05-05 | Stop reason: SDUPTHER

## 2023-05-05 RX ORDER — TAMSULOSIN HYDROCHLORIDE 0.4 MG/1
0.4 CAPSULE ORAL
Qty: 30 CAPSULE | Refills: 0 | Status: SHIPPED | OUTPATIENT
Start: 2023-05-05

## 2023-05-05 RX ORDER — POLYETHYLENE GLYCOL 3350 17 G/17G
17 POWDER, FOR SOLUTION ORAL DAILY PRN
Qty: 507 G | Refills: 0
Start: 2023-05-05

## 2023-05-05 RX ORDER — FAMOTIDINE 20 MG/1
20 TABLET, FILM COATED ORAL EVERY 12 HOURS SCHEDULED
Qty: 60 TABLET | Refills: 0 | Status: SHIPPED | OUTPATIENT
Start: 2023-05-05 | End: 2023-05-05 | Stop reason: SDUPTHER

## 2023-05-05 RX ORDER — ATORVASTATIN CALCIUM 40 MG/1
40 TABLET, FILM COATED ORAL EVERY EVENING
Qty: 30 TABLET | Refills: 0 | Status: SHIPPED | OUTPATIENT
Start: 2023-05-05 | End: 2023-05-05 | Stop reason: SDUPTHER

## 2023-05-05 RX ORDER — TAMSULOSIN HYDROCHLORIDE 0.4 MG/1
0.4 CAPSULE ORAL
Qty: 30 CAPSULE | Refills: 0 | Status: SHIPPED | OUTPATIENT
Start: 2023-05-05 | End: 2023-05-05 | Stop reason: SDUPTHER

## 2023-05-05 RX ORDER — LOSARTAN POTASSIUM 25 MG/1
25 TABLET ORAL DAILY
Qty: 30 TABLET | Refills: 0 | Status: SHIPPED | OUTPATIENT
Start: 2023-05-05

## 2023-05-05 RX ADMIN — POLYETHYLENE GLYCOL 3350 17 G: 17 POWDER, FOR SOLUTION ORAL at 08:40

## 2023-05-05 RX ADMIN — ASPIRIN 81 MG 81 MG: 81 TABLET ORAL at 08:41

## 2023-05-05 RX ADMIN — AMLODIPINE BESYLATE 5 MG: 5 TABLET ORAL at 08:41

## 2023-05-05 RX ADMIN — METOPROLOL TARTRATE 25 MG: 25 TABLET, FILM COATED ORAL at 08:41

## 2023-05-05 RX ADMIN — LOSARTAN POTASSIUM 25 MG: 25 TABLET, FILM COATED ORAL at 08:40

## 2023-05-05 RX ADMIN — FAMOTIDINE 20 MG: 20 TABLET ORAL at 08:41

## 2023-05-05 RX ADMIN — LORATADINE 10 MG: 10 TABLET ORAL at 08:41

## 2023-05-05 RX ADMIN — LEVETIRACETAM 750 MG: 750 TABLET, FILM COATED ORAL at 08:41

## 2023-05-05 RX ADMIN — APIXABAN 5 MG: 5 TABLET, FILM COATED ORAL at 08:41

## 2023-05-05 NOTE — PLAN OF CARE
Problem: PAIN - ADULT  Goal: Verbalizes/displays adequate comfort level or baseline comfort level  Description: Interventions:  - Encourage patient to monitor pain and request assistance  - Assess pain using appropriate pain scale  - Administer analgesics based on type and severity of pain and evaluate response  - Implement non-pharmacological measures as appropriate and evaluate response  - Consider cultural and social influences on pain and pain management  - Notify physician/advanced practitioner if interventions unsuccessful or patient reports new pain  5/5/2023 1425 by Yadiel Vieira RN  Outcome: Completed  5/5/2023 1425 by Yadiel Vieira RN  Outcome: Adequate for Discharge     Problem: INFECTION - ADULT  Goal: Absence or prevention of progression during hospitalization  Description: INTERVENTIONS:  - Assess and monitor for signs and symptoms of infection  - Monitor lab/diagnostic results  - Monitor all insertion sites, i e  indwelling lines, tubes, and drains  - Monitor endotracheal if appropriate and nasal secretions for changes in amount and color  - [place] appropriate cooling/warming therapies per order  - Administer medications as ordered  - Instruct and encourage patient and family to use good hand hygiene technique  - Identify and instruct in appropriate isolation precautions for identified infection/condition  5/5/2023 1425 by Yadiel Vieira RN  Outcome: Completed  5/5/2023 1425 by Yadiel Vieira RN  Outcome: Adequate for Discharge     Problem: SAFETY ADULT  Goal: Patient will remain free of falls  Description: INTERVENTIONS:  - Educate patient/family on patient safety including physical limitations  - Instruct patient to call for assistance with activity   - Consult OT/PT to assist with strengthening/mobility   - Keep Call bell within reach  - Keep bed low and locked with side rails adjusted as appropriate  - Keep care items and personal belongings within reach  - Initiate and maintain comfort rounds  - Make Fall Risk Sign visible to staff  - Offer Toileting rs, in advance of need  - Initiate/Main  - Obtain necessary fall risk management   - Apply yellow socks and bracelet for high fall risk patients  - Consider moving patient to room near nurses station  5/5/2023 1425 by Samara Landa RN  Outcome: Completed  5/5/2023 1425 by Samara Landa RN  Outcome: Adequate for Discharge  Goal: Maintain or return to baseline ADL function  Description: INTERVENTIONS:  -  Assess patient's ability to carry out ADLs; assess patient's baseline for ADL function and identify physical deficits which impact ability to perform ADLs (bathing, care of mouth/teeth, toileting, grooming, dressing, etc )  - Assess/evaluate cause of self-care deficits   - Assess range of motion  - Assess patient's mobility; develop plan if impaired  - Assess patient's need for assistive devices and provide as appropriate  - Encourage maximum independence but intervene and supervise when necessary  - Involve family in performance of ADLs  - Assess for home care needs following discharge   - Consider OT consult to assist with ADL evaluation and planning for discharge  - Provide patient education as appropriate  5/5/2023 1425 by Samara Landa RN  Outcome: Completed  5/5/2023 1425 by Samara Landa RN  Outcome: Adequate for Discharge  Goal: Maintains/Returns to pre admission functional level  Description: INTERVENTIONS:  - Perform BMAT or MOVE assessment daily    - Set and communicate daily mobility goal to care team and patient/family/caregiver  - Collaborate with rehabilitation services on mobility goals if consulted  - Perform Range of Ms a day  - Reposition patient rs    - Dangle pa  - Stand p  - Ambulate pat  - Out of bed to chair  - Out of bed for toileting  - Record patient progress and toleration of activity level   5/5/2023 1425 by Samara Landa RN  Outcome: Completed  5/5/2023 1425 by Samara Landa RN  Outcome: Adequate for Discharge Problem: DISCHARGE PLANNING  Goal: Discharge to home or other facility with appropriate resources  Description: INTERVENTIONS:  - Identify barriers to discharge w/patient and caregiver  - Arrange for needed discharge resources and transportation as appropriate  - Identify discharge learning needs (meds, wound care, etc )  - Arrange for interpretive services to assist at discharge as needed  - Refer to Case Management Department for coordinating discharge planning if the patient needs post-hospital services based on physician/advanced practitioner order or complex needs related to functional status, cognitive ability, or social support system  5/5/2023 1425 by Argelia Brennan RN  Outcome: Completed  5/5/2023 1425 by Argelia Brennan RN  Outcome: Adequate for Discharge     Problem: Prexisting or High Potential for Compromised Skin Integrity  Goal: Skin integrity is maintained or improved  Description: INTERVENTIONS:  - Identify patients at risk for skin breakdown  - Assess and monitor skin integrity  - Assess and monitor nutrition and hydration status  - Monitor labs   - Assess for incontinence   - Turn and reposition patient  - Assist with mobility/ambulation  - Relieve pressure over bony prominences  - Avoid friction and shearing  - Provide appropriate hygiene as needed including keeping skin clean and dry  - Evaluate need for skin moisturizer/barrier cream  - Collaborate with interdisciplinary team   - Patient/family teaching  - Consider wound care consult   5/5/2023 1425 by Argelia Brennan RN  Outcome: Completed  5/5/2023 1425 by Argelia Brennan RN  Outcome: Adequate for Discharge     Problem: Nutrition/Hydration-ADULT  Goal: Nutrient/Hydration intake appropriate for improving, restoring or maintaining nutritional needs  Description: Monitor and assess patient's nutrition/hydration status for malnutrition  Collaborate with interdisciplinary team and initiate plan and interventions as ordered    Monitor patient's weight and dietary intake as ordered or per policy  Utilize nutrition screening tool and intervene as necessary  Determine patient's food preferences and provide high-protein, high-caloric foods as appropriate       INTERVENTIONS:  - Monitor oral intake, urinary output, labs, and treatment plans  - Assess nutrition and hydration status and recommend course of action  - Evaluate amount of meals eaten  - Assist patient with eating if necessary   - Allow adequate time for meals  - Recommend/ encourage appropriate diets, oral nutritional supplements, and vitamin/mineral supplements  - Order, calculate, and assess calorie counts as needed  - Recommend, monitor, and adjust tube feedings and TPN/PPN based on assessed needs  - Assess need for intravenous fluids  - Provide specific nutrition/hydration education as appropriate  - Include patient/family/caregiver in decisions related to nutrition  5/5/2023 1425 by Jose L Snowden RN  Outcome: Completed  5/5/2023 1425 by Jose L Snowden, RN  Outcome: Adequate for Discharge

## 2023-05-05 NOTE — TELEPHONE ENCOUNTER
I called [facility] to schedule a new patient consultation with [facility] in response to a referral sent to our department  I left a voicemail making patient aware of their referral and instructing them to call Gabriel at [phone] to schedule  Another attempt will be made to schedule patient

## 2023-05-05 NOTE — PROGRESS NOTES
"Physical Medicine and Rehabilitation Progress Note  Kristina Parnell III 41 y o  male MRN: [id number]  Unit/Bed#: -01 Encounter: [id number]      Assessment & Plan:     Decline in ADLs and mobility: Functional assessment - improving        FIM  Care Score  Admit Score Recent Score    Total assist  1-100% or 2p    Tot     Max assist 2-51-75%    Sub  toileting hygiene, bathing, lower body dressing     Mod assist 3- 26-50%  Par  upper body dressing    Min assist 3- 25% or < Par     CG assist 4  TA     Sup/Setup 4-5 Sup     Mod-I/Indep 6 MI  ADLs    Transfers   mod assist to significant assist Indep    Ambulation    50 feet min assist Indep     Stairs    4 steps moderate assist 4 steps mod indep   12 steps supervision   SLP FIM admit - total assist expression, Max assist comp, PS, memory; SI - supervision  SLP FIM recent -moderate assist memory, problem solving, comp; significant assist expression > improving   Goal: Largely supervision for ADLs and mobility  Major barriers: Aphasia, imbalance, incoordination, deconditioning  Dispo: Home with estimate length of stay Friday 5/5 with OP PT, OT, ST      * Acute ischemic left MCA stroke [facility])  Assessment & Plan  - 5/4 neuro exam stable; function improved; trialing IRP at times > on track for d/c Friday   - CG training to include proactive toileting  -Community re-integration outing went well  - D/C set tentatively for Friday 5/5 with OP PT, OT, ST - Rx entered per CM rec  - Mother to assist with iADLs   - Large left MCA infarct  - Residual impairments on admission to [facility]: Aphasia, weakness, imbalance (assess for other impairments during [facility] course)   - Co-morbidities most impacting functional recovery: Morbid obesity    Secondary stroke prevention  - Per prior providers   \"Thrombosis panel with abnormal antithrombin, Protein C/S activity, but per Neuro unclear significance in setting of acute stroke  Would repeat as outpatient     Will need Zio Patch/Loop Recorder with " "Cards Referral as outpatient   Discussed with Neurology on 4/9: Continue Keppra until outpatient f/u given location, temporal slowing, extent of lesion  \"  - Antithrombotic: Aspirin  - Statin  - Optimal management of blood pressure and diabetes  -  patient and if applicable caregiver on optimal stroke management  - Follow-up with neurology and PCP after d/c   - Disability/FMLA forms completed and given to CM 4/21 to assist with and fax   -Continue acute comprehensive interdisciplinary inpatient rehabilitation to include intensive skilled therapies (PT, OT, ST) as outlined with oversight and management by rehabilitation physician as well as inpatient rehab level nursing, case management and weekly interdisciplinary team meetings           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)  Assessment & Plan  Saturating well on room air with and without activity so far  Monitor vitals with and without activity  Eliquis  Outpatient hematology consult    Rhinorrhea  Assessment & Plan  Improved  Claritin     Aphasia due to recent cerebrovascular accident (CVA)  Assessment & Plan  Improving slowly   SLP  PT/OT  CG training    HIT (heparin-induced thrombocytopenia) ([facility])  Assessment & Plan  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   (\"The patient's serum tested positive by FRANKY and supports a diagnosis of heparin-induced-thrombocytopenia\")  Transitioned from Arixtra to Eliquis  Platelets remain recovered 4/27 and 5/4     DVT of lower extremity, bilateral ([address] )  Assessment & Plan  Eliquis  Ambulation  Hold SCDs with hx of DVT     Urinary retention  Assessment & Plan  Flomax    Bowel and bladder incontinence  Assessment & Plan  Sometimes related to poor initiation  Toileting program  - CG training to include proactive toileting  Flomax    Hypertension  Assessment & Plan  Blood pressure acceptable  Internal medicine consulted and co-management with their service  Monitor vitals " with and without activity; monitor for orthostasis  Monitor hemoglobin, electrolytes, kidney function, hydration status   Current meds: Amlodipine, losartan, metoprolol      Type 2 diabetes mellitus with circulatory disorder, without long-term current use of insulin [facility])  Assessment & Plan  Lab Results   Component Value Date    HGBA1C 8 0 (H) 03/31/2023     Internal medicine consulted and management at their discretion  Monitor for signs and symptoms of hypoglycemia   Current meds: Lispro sliding scale      Obesity, Class III, BMI 40-49 9 (morbid obesity) ([facility] [address] )  Assessment & Plan   on appropriate nutrition and activity  Adjustments accordingly during skilled therapy and with rehab nursing  Monitor skin closely for breakdown, wounds, rashes; keep clean and dry, turning Q2H   Follow-up with PCP after d/c        Other Medical Issues:       Follow-up providers and other issues to be followed up after discharge   PCP   Neuro   Hematology    CODE: Level 1: Full Code    Restrictions include: Fall precautions    Objective:     Allergies per EMR  Diagnostic Studies: Reviewed, no new imaging  No orders to display     See above as well    Laboratory: Labs reviewed    Drug regimen reviewed, all potential adverse effects identified and addressed:    Current Facility-Administered Medications   Medication Dose Route Frequency Provider Last Rate    acetaminophen  975 mg Oral Q8H PRN Troy Ulloa MD      amLODIPine  5 mg Oral Daily Troy Ulloa MD      apixaban  5 mg Oral BID Troy Ulloa MD      aspirin  81 mg Oral Daily Troy Ulloa MD      atorvastatin  40 mg Oral QPM Troy Ulloa MD      bisacodyl  10 mg Rectal Daily PRN Troy Ulloa MD      bisacodyl  10 mg Rectal Once Troy Ulloa MD      calcium carbonate  1,000 mg Oral TID PRN Troy Ulloa MD      famotidine  20 mg Oral Q12H [address] Troy Ulloa MD      levETIRAcetam  750 mg Oral Q12H [address] Monica Hernandez MD      lidocaine   Topical Q4H PRN Monica Hernandez, MD      loratadine  10 mg Oral Daily Cassia Bernal PA-C      losartan  25 mg Oral Daily Monica Hernandez, MD      metoprolol tartrate  25 mg Oral Q12H [address] Monica Hernandez MD      ondansetron  4 mg Oral Q8H PRN Monica Hernandez, MD      oxyCODONE  2 5 mg Oral Q6H PRN Monica Hernandez, MD      polyethylene glycol  17 g Oral Daily PRN Monica Hernandez, MD      polyethylene glycol  17 g Oral Daily Monica Hernandez, MD      senna  1 tablet Oral HS Monica Hernandez, MD      tamsulosin  0 4 mg Oral After Mera Landrum MD           acetaminophen    bisacodyl    calcium carbonate    lidocaine    ondansetron    oxyCODONE    polyethylene glycol      Chief Complaints:   Rehab follow-up     Subjective: On eval, patient with stable aphasia but appears to communicate no significant concerns  ROS: A 10 point ROS was performed; negative except as noted above  Physical Exam:  05/04/23 0516 97 7 °F (36 5 °C) 68 18 121/84 96 % None (Room air)     Vitals above reviewed on date of encounter    GEN:  Seen sitting and ambulating in NAD  HEENT/NECK: MMM  CARDIAC: Regular rate rhythm, no murmers, no rubs, no gallops  LUNGS:  clear to auscultation, no wheezes, rales, or rhonchi  ABDOMEN: Soft, non-tender, non-distended, normal active bowel sounds  EXTREMITIES/SKIN:  no calf edema, no calf tenderness to palpation  NEURO:   MENTAL STATUS: Stable aphasia, adequate wakefulness and interaction and Strength/MMT:  Grossly stable  PSYCH:  Affect:  Euthymic     HPI:  28-year-old male with a history of hypertension, hyperlipidemia, obesity, diabetes status post recent left MCA stroke which was complicated by saddle pulmonary embolism,, HIT, bilateral lower extremity DVTs    Patient was evaluated by skilled therapies and was found to have significant decline in ADLs and ambulation and appears appropriate for admission to [facility]      ** Please Note: Fluency Direct voice to text software may have been used in the creation of this document  **    I personally performed the required components and examined the patient myself in person on 5/4/23

## 2023-05-05 NOTE — PROGRESS NOTES
Internal Medicine Progress Note  Patient: Troy Barber III  Age/sex: 41 y o  male  Medical Record #: [id number]      ASSESSMENT/PLAN: (Interval History)  Troy Barber III is seen and examined and management for following issues:    Saddle pulmonary embolus without cor pulmonale   Had nonocclusive saddle embolus with filling defects distal main pulmonary arteries and throughout segmental branches bilaterally   Distal order branches were not clearly visualized due to severe respiratory motion artifact though felt likely containing pulmonary emboli     Sx were left sided CP, NIEVES and elevated D-dimer   Continue Eliquis   Was not a candidate for IR intervention 2/2 HIT     Bilateral LE DVT   Continue Eliquis      HIT   Heparin induced antibody/Heparin by serotonin release = both were positive   S/p Argatroban   Platelets recovered     L MCA infarct 3/3/23   Suspected to be cardioembolic   Thrombosis panel then showed abnormal antithrombin, Protein C/S activity, but per Neuro unclear significance in setting of acute stroke and therefore wanted repeat as outpatient   Cont ASA/statin   Initial ECHO/HODAN LVEF 55%; no thrombus/PFO   Needs outpt LOOP/Zio patch   Cont Keppra until seen by Neuro OP per their request 4/10/23     DM2   Hgb A1c 8 0   New diagnosis   Cont QID Accuchecks/SSI and DM diet   Home:  No meds   Here: SSI only   Had been on Metformin when in [facility] before transfer back for PE   Mom said Nutr did review DM diet   PCP f/u on dc = mother is diabetic and knows how to use meter   Stable off Metformin and did not require sliding scale so dc'd   Only needs repeat HbA1C as OP in 3 months or at PCPs discretion otherwise a meter is optional at this point    I discussed this with pt and his mother 5/3/23     HTN   Home: on no meds   Here:  Norvasc 5mg qd/Lopressor 25 mg q12hrs/Losartan 25mg qd   stable     Urine retention   Per PMR   Continue Flomax     Obesity   Current BMI 38 5   Wt loss counseling when recovered        Discharge date:  5/5/23       The above assessment and plan was reviewed and updated as determined by my evaluation of the patient on 5/5/2023      Labs:   Results from last 7 days   Lab Units 05/04/23  0524   WBC Thousand/uL 5 33   HEMOGLOBIN g/dL 12 3   HEMATOCRIT % 40 0   PLATELETS Thousands/uL 256     Results from last 7 days   Lab Units 05/04/23  0524   SODIUM mmol/L 138   POTASSIUM mmol/L 3 7   CHLORIDE mmol/L 108   CO2 mmol/L 28   BUN mg/dL 11   CREATININE mg/dL 0 95   CALCIUM mg/dL 9 0             Results from last 7 days   Lab Units 04/28/23  2120 04/28/23  1552 04/28/23  1128   POC GLUCOSE mg/dl 99 103 102       Review of Scheduled Meds:  Current Facility-Administered Medications   Medication Dose Route Frequency Provider Last Rate    acetaminophen  975 mg Oral Q8H PRN Mely Long MD      amLODIPine  5 mg Oral Daily Mely Long MD      apixaban  5 mg Oral BID Mely Long MD      aspirin  81 mg Oral Daily Mely Long MD      atorvastatin  40 mg Oral QPM Mely Long MD      bisacodyl  10 mg Rectal Daily PRN Mely Long MD      bisacodyl  10 mg Rectal Once Mely Long MD      calcium carbonate  1,000 mg Oral TID PRN Mely Long MD      famotidine  20 mg Oral Q12H [facility] Mely Long MD      levETIRAcetam  750 mg Oral Q12H [facility] Mely Long MD      lidocaine   Topical Q4H PRN Mely Long MD      loratadine  10 mg Oral Daily Cassia Bernal PA-C      losartan  25 mg Oral Daily Mely Long MD      metoprolol tartrate  25 mg Oral Q12H [facility] Mely Long MD      ondansetron  4 mg Oral Q8H PRN Mely Long MD      oxyCODONE  2 5 mg Oral Q6H PRN Mely Long, MD      polyethylene glycol  17 g Oral Daily PRN Mely Long MD      polyethylene glycol  17 g Oral Daily Mely Long, MD      senna  1 tablet Oral HS Dimitris Francis Lucina Luo MD      tamsulosin  0 4 mg Oral After Rosebud Bumpers, MD         Subjective/ HPI: Patient seen and examined  Patients overnight issues or events were reviewed with nursing or staff during rounds or morning huddle session  New or overnight issues include the following:     No new or overnight issues  Offers no complaints    ROS:   A 10 point ROS was performed; negative except as noted above  Imaging:     No orders to display       *Labs /Radiology studies reviewed  *Medications reviewed and reconciled as needed  *Please refer to order section for additional ordered labs studies  *Case discussed with primary attending during morning huddle case rounds    Physical Examination:  Vitals:   Vitals:    05/04/23 2031 05/05/23 0553 05/05/23 0600 05/05/23 0833   BP: 153/75 132/70  138/75   BP Location: Left arm Left arm  Left arm   Pulse: 75   89   Resp: 18 16  16   Temp: 97 6 °F (36 4 °C) 98 °F (36 7 °C)     TempSrc: Oral Tympanic Core     SpO2: 100% 100%     Weight:   132 kg (290 lb 9 1 oz)    Height:           General Appearance: no distress, conversive  HEENT:  External ear normal   Nose normal w/o drainage  Mucous membranes are moist  Oropharynx is clear  Conjunctiva clear w/o icterus or redness  Neck:  Supple, normal ROM  Lungs: BBS without crackles/wheeze/rhonchi; respirations unlabored with normal inspiratory/expiratory effort  No retractions noted  On RA  CV: regular rate and rhythm; no rubs/murmurs/gallops, PMI normal   ABD: Abdomen is soft  Bowel sounds all quadrants  Nontender with no distention  EXT: no edema  Skin: normal turgor, normal texture, no rashes  Psych: affect normal, mood normal  Neuro: AA; +expressive>receptive aphasia as before     The above physical exam was reviewed and updated as determined by my evaluation of the patient on 5/5/2023      Invasive Devices     None                    VTE Pharmacologic Prophylaxis: Eliquis  Code Status: Level 1 - Full Code  Current Length of Stay: 16 day(s)      Total time spent:  30 minutes with more than 50% spent counseling/coordinating care  Counseling includes discussion with patient re: progress  and discussion with patient of his/her current medical state/information  Coordination of patient's care was performed in conjunction with primary service  Time invested included review of patient's labs, vitals, and management of their comorbidities with continued monitoring  In addition, this patient was discussed with medical team including physician and advanced extenders  The care of the patient was extensively discussed and appropriate treatment plan was formulated unique for this patient  Medical decision making for the day was made by supervising physician unless otherwise noted in their attestation statement  ** Please Note:  voice to text software may have been used in the creation of this document   Although proof errors in transcription or interpretation are a potential of such software**

## 2023-05-05 NOTE — DISCHARGE INSTR - AVS FIRST PAGE
DISCHARGE INSTRUCTIONS: Sonia Sanz 67 22    Bring these instructions with you to your Outpatient Physician appointments so they can order and follow-up any additional lab work or imaging recommended at time of discharge  Be sure to establish care with primary care physician (PCP) right away  It  is you or your caregivers responsibility to obtain follow-up MEDICATION REFILLS  As indicated through your Primary Care Physician (PCP) and other outpatient specialty provider(s) after discharge  Please follow-up with your PCP as soon as possible after discharge to set-up follow-up management and when appropriate refills  You have been determined to have some cognitive impairments  - It is YOUR CAREGIVER'S RESPONSIBILITY to ensure appropriate follow-up which includes:  - APPOINTMENTS are scheduled and safe transportation is arranged  - LABS and IMAGING are completed  - MEDICATION MANAGEMENT at home is carried out appropriately     You remain a fall and injury risk which could be severe  - Your risk of fall has decreased however since admission to acute rehab  Caregiver training has been completed with our staff  - Continue skilled therapy as discussed after discharge to further decrease this risk    If you (or your health care proxy) have any questions or concerns regarding your acute rehabilitation stay including issues with medications, rehabilitation, and follow-up plan, please call:          [address] [place] [address] in [place] at [phone] or [phone]  Should you develop fevers, chills, new weakness, changes in sensation, difficulty speaking, facial weakness, confusion, shortness of breath, chest pain, or other concerning symptoms please call 911 and/or obtain transportation to nearest ER immediately      Should you develop worsening pain, swelling, or drainage notify your surgeon right away or obtain transportation to nearest ER for "evaluation  Should you develop feelings of significant hopelessness, severe depression, severe anxiety, or suicide you should call 911 or obtain transportation to nearest ER  24-7 Nationwide suicide and crisis lifeline call \"682\"  National Suicide Prevention Lifeline is 2-457.302.9478 and is available 24 hrs/7 days a week  [facility] [phone] is available 24 hrs/7 days for mental health  [facility] ([facility]) AT [place] [phone] is available 24 hrs/7 days for mental health   [address]  [address] [facility] TipNoapearl [facility] includes the [facility] [phone]    PHYSICIANS to see:  Please see your doctors listed in the follow up providers section of your discharge paperwork, and take the discharge paperwork with you to your appointments  LAB WORK, IMAGING, ADDITIONAL FINDINGS and ISSUES to follow-up:  Check under the \"DISCHARGE PROVIDER\" section of these DISCHARGE INSTRUCTIONS for provider contact information and specific issues as well  STROKE related issues  - Follow-up with neurology and discuss with them the following :  History of stroke, obtain referral to cardiology through neurology for additional cardiac monitoring for abnormal heart rhythm as well as to repeat thrombosis panel with recent abnormal labs - antithrombin, Protein C/S activity     History of leg and lung clots and abnormal blood clotting labs  - Follow-up with primary care physician and hematology after discharge; in the interim continue blood thinner (s) as prescribed)    Excessive delay or lack of appropriate follow-up could potentially increase your risk of complications which could be severe and even life-threatening  It is you or your caregiver's responsibility to ensure these tests are ordered by your outpatient providers and followed up with accordingly  WEIGHTBEARING/ACTIVITY PRECAUTIONS to follow:    24 hours/7 days per week supervision by caregiver recommended initially     Weightbearing as tolerated      Driving " restrictions: You are recommended against driving until cleared by an outpatient physician and cleared through a formal driving evaluation  Driving at current time can increase your risk of injury and can increase risk of injury of others  **As outlined in [address], healthcare personnel are required to report every person over 13years of age diagnosed as having a condition that could impair their ability to drive, with the exception of medical condition expected to last less than 90 days (most commonly orthopedic fractures and post orthopedic surgery conditions without other co-morbidities)  Your medical condition hopefully will have adequately improved by that time but at this time it is not certain  Work restrictions: You should NOT return to work (if working) until cleared by an outpatient physician  You should not operate heavy machinery (if applicable) until cleared by an outpatient physician  Alcohol restrictions: You are recommended to not drink alcohol at this time unless cleared by an outpatient physician  Drinking alcohol in your current functional condition can increase your risk of injury which could be severe  Drinking alcohol given your current health problems can lead to increased medical complications which could be severe  Combining alcohol with your current medications can increase your risk of injury which could be severe  Smoking restrictions: You are recommended to not smoke nicotine  Smoking increases your risk of heart attack, stroke, emphysema/COPD, and lung cancer  Cannabis restrictions: You are recommended to not smoke/ingest/consume/use cannabis/marijuana at this time unless cleared by an outpatient physician  Cannabis use/intoxication in your current functional condition can increase your risk of injury which could be severe      MEDICATIONS:  Please see a full list of your medications outlined in the After Visit Summary that is attached to these Discharge Instructions  Please note changes may have been made to your medications please refer to your discharge paperwork for your current medications and take this list with you to all your doctors appointments for your doctors to review  Please do not resume a home medication unless the medication reconciliation sheet indicates to do so, please do not assume that a medication that you were given a prescription for is the same as a medication you have at home based on both medications having the same name as dosages and frequency may have changed  Unless specifically noted in your medication list provided to you in your discharge paper work do not resume prior vitamins, minerals, or supplements you may have been taking prior to your hospitalization unless instructed by an outpatient physician in the future  Discuss with your primary care at next visit if applicable  NSAID Warning:  Please avoid NSAID (including but not limited to advil, aleve, motrin, naproxen, ibuprofen, mobic, meloxicam, diclofenac etc) medications as NSAID medications may increase your risk of bleeding (which can be life-threatening), stroke  Blood thinners prescribed to optimize long and short term health and decrease risk of complications but with risks related to bleeding and other complications  Eliquis (Apixiban) instructions: You have been prescribed apixiban(Eliquis)  This medication is used to prevent clots which can cause severe disability and even death  This medication will need to be managed by your outpatient physician(s) after discharge  Follow-up with the appropriate provider as soon as possible to ensure appropriate use  This is a strong anticoagulant (blood thinner)  It is being recommended for use by you (or your family) to decrease the risk of clotting which can be severely disabling and even life-threatening    Even when provided as recommended it can cause severe disabling and even life-threatening bleeding  Too much or too little of this blood thinner further increases your risk of this medication causing serious and even life-threatening complications such as severe bleeding, clots, strokes, and death  With that said, at this time based on available evidence and consensus agreement, your physicians recommend you take Eliquis (Jonna Bimler) medication based on your overall risks and benefits in your specific medical situation  If you (or your family/caregiver) notice black stools, bloody stools, vomit blood, develop new weakness, slurred speech, confusion or have any other concerning symptoms call 911 or obtain transportation to nearest emergency room immediately  Aspirin instructions  TAKE aspirin daily unless instructed otherwise by a doctor  This medication will need to be managed by your outpatient physician(s) after discharge  Follow-up with the appropriate provider as soon as possible to ensure appropriate use  This is a blood thinner  It is being recommended for use by you (or your family) to decrease the risk of clotting which can be severely disabling and even life-threatening  Even when provided as recommended it can cause severe disabling and even life-threatening bleeding  Too much or too little of this blood thinner further increases your risk of this medication causing serious and even life-threatening complications such as severe bleeding, clots, strokes, and death  With that said, at this time based on best available evidence and consensus agreement, your physicians recommend you take aspirin based on your overall risks and benefits in your specific medical situation  If you (or your family/caregiver) notice black stools, bloody stools, vomit blood, develop new weakness, slurred speech, confusion or have any other concerning symptoms call 911 or obtain transportation to nearest emergency room immediately       MEDICAL MANAGEMENT AT HOME specific to you:    Diabetes Management:    Follow-up with PCP/family doctor regularly to ensure blood sugars remain adequately controlled  Hypertension Management:  Only take the medications prescribed for you at time of discharge - overly high or low blood pressure increases your risk for health complications    Follow-up with PCP/family doctor regularly to ensure blood pressure remains adequately controlled  Urinary retention risk: You may have had or are at risk for urinary retention (bladder filling up with excessive urine and inability to adequately expel it)  Urinary retention can lead to significant kidney injury and infection which can be serious  If you do not urinate for 8 hours or more or you have the urge to urinate and are unable to, please obtain transportation to nearest emergency room (or urologist office), for likely placement of acharya  If you develop fever, chills, sweats, confusion, or other concerning symptoms seek medical management right away  Follow-up with primary care and if needed urology after discharge  Please note a summary of your hospital stay with relevant information for your doctors will try to be sent to them  Please confirm with your doctors at your follow up visits that they have received this summary and have them contact [address] [place] [address] if they have not received them along with any other medical records they may require       Lynne Smith Phone Number:  [phone]

## 2023-05-05 NOTE — PROGRESS NOTES
"Physical Medicine and Rehabilitation Progress Note  Denis Cobos III 41 y o  male MRN: [id number]  Unit/Bed#: -01 Encounter: [id number]      Assessment & Plan:     Decline in ADLs and mobility: Functional assessment - improving        FIM  Care Score  Admit Score Recent Score    Total assist  1-100% or 2p    Tot     Max assist 2-51-75%    Sub  toileting hygiene, bathing, lower body dressing     Mod assist 3- 26-50%  Par  upper body dressing To hygiene   Min assist 3- 25% or < Par     CG assist 4  TA  Bathing, LB dressing   Sup/Setup 4-5 Sup  UBD   Mod-I/Indep 6 MI      Transfers   mod assist to significant assist  CS-supervision     Ambulation    50 feet min assist  150 feet supervision    Stairs    4 steps moderate assist  12 steps close supervision   SLP FIM admit - total assist expression, Max assist comp, PS, memory; SI - supervision  SLP FIM recent -moderate assist memory, problem solving, comp; significant assist expression > improving   Goal: Largely supervision for ADLs and mobility  Major barriers: Aphasia, imbalance, incoordination, deconditioning  Dispo: Home with estimate length of stay Friday 5/5 with OP PT, OT, ST      * Acute ischemic left MCA stroke [facility])  Assessment & Plan  - 5/3 neuro exam stable  - CG training to include proactive toileting  - Plan for community re-integration outing with tx/family Wednesday  - D/C set tentatively for Friday 5/5 with OP PT, OT, ST - Rx entered per CM rec  - Mother to assist with iADLs   - Large left MCA infarct  - Residual impairments on admission to [facility]: Aphasia, weakness, imbalance (assess for other impairments during [facility] course)   - Co-morbidities most impacting functional recovery: Morbid obesity    Secondary stroke prevention  - Per prior providers   \"Thrombosis panel with abnormal antithrombin, Protein C/S activity, but per Neuro unclear significance in setting of acute stroke  Would repeat as outpatient     Will need Zio Patch/Loop Recorder with " "Cards Referral as outpatient   Discussed with Neurology on 4/9: Continue Keppra until outpatient f/u given location, temporal slowing, extent of lesion  \"  - Antithrombotic: Aspirin  - Statin  - Optimal management of blood pressure and diabetes  -  patient and if applicable caregiver on optimal stroke management  - Follow-up with neurology and PCP after d/c   - Disability/FMLA forms completed and given to CM 4/21 to assist with and fax   -Continue acute comprehensive interdisciplinary inpatient rehabilitation to include intensive skilled therapies (PT, OT, ST) as outlined with oversight and management by rehabilitation physician as well as inpatient rehab level nursing, case management and weekly interdisciplinary team meetings           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)  Assessment & Plan  Saturating well on room air with and without activity so far  Monitor vitals with and without activity  Eliquis  Outpatient hematology consult    Rhinorrhea  Assessment & Plan  Improved  Claritin     Aphasia due to recent cerebrovascular accident (CVA)  Assessment & Plan  Improving slowly   SLP  PT/OT  CG training    HIT (heparin-induced thrombocytopenia) (formerly [facility])  Assessment & Plan  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   (\"The patient's serum tested positive by FRANKY and supports a diagnosis of heparin-induced-thrombocytopenia\")  Transitioned from Arixtra to Eliquis  Platelets remain recovered 4/27   CBC 5/4     DVT of lower extremity, bilateral (HCC)  Assessment & Plan  Eliquis  Ambulation  Hold SCDs with hx of DVT     Urinary retention  Assessment & Plan  Flomax    Bowel and bladder incontinence  Assessment & Plan  Sometimes related to poor initiation  Toileting program  - CG training to include proactive toileting  Flomax    Hypertension  Assessment & Plan  Blood pressure acceptable  Internal medicine consulted and co-management with their service  Monitor " vitals with and without activity; monitor for orthostasis  Monitor hemoglobin, electrolytes, kidney function, hydration status   Current meds: Amlodipine, losartan, metoprolol      Type 2 diabetes mellitus with circulatory disorder, without long-term current use of insulin [facility])  Assessment & Plan  Lab Results   Component Value Date    HGBA1C 8 0 (H) 03/31/2023     Internal medicine consulted and management at their discretion  Monitor for signs and symptoms of hypoglycemia   Current meds: Lispro sliding scale      Obesity, Class III, BMI 40-49 9 (morbid obesity) ([facility] [address] )  Assessment & Plan   on appropriate nutrition and activity  Adjustments accordingly during skilled therapy and with rehab nursing  Monitor skin closely for breakdown, wounds, rashes; keep clean and dry, turning Q2H   Follow-up with PCP after d/c        Other Medical Issues:       Follow-up providers and other issues to be followed up after discharge   PCP   Neuro   Hematology    CODE: Level 1: Full Code    Restrictions include: Fall precautions    Objective:     Allergies per EMR  Diagnostic Studies: Reviewed, no new imaging  No orders to display     See above as well    Laboratory: Labs reviewed    Drug regimen reviewed, all potential adverse effects identified and addressed:    Current Facility-Administered Medications   Medication Dose Route Frequency Provider Last Rate    acetaminophen  975 mg Oral Q8H PRN Tuyet Newberry MD      amLODIPine  5 mg Oral Daily Tuyet Newberry MD      apixaban  5 mg Oral BID Tuyet Newberry MD      aspirin  81 mg Oral Daily Tuyet Newberry MD      atorvastatin  40 mg Oral QPM Tuyet Newberry MD      bisacodyl  10 mg Rectal Daily PRN Tuyet Newberry MD      bisacodyl  10 mg Rectal Once Tuyet Newberry MD      calcium carbonate  1,000 mg Oral TID PRN Tuyet Newberry MD      famotidine  20 mg Oral Q12H [address] Tuyet Newberry MD      levETIRAcetam  750 mg Oral Q12H [facility] Shelly Uriostegui MD      lidocaine   Topical Q4H PRN Shelly Uriostegui MD      loratadine  10 mg Oral Daily Cassia Bernal PA-C      losartan  25 mg Oral Daily Shelly Uriostegui MD      metoprolol tartrate  25 mg Oral Q12H [facility] Shelly Uriostegui MD      ondansetron  4 mg Oral Q8H PRN Shelly Uriostegui MD      oxyCODONE  2 5 mg Oral Q6H PRN Shelly Uriostegui MD      polyethylene glycol  17 g Oral Daily PRN Shelly Uriostegui MD      polyethylene glycol  17 g Oral Daily Shelly Uriostegui MD      senna  1 tablet Oral HS Shelly Uriostegui MD      tamsulosin  0 4 mg Oral After Awilda Diop MD           acetaminophen    bisacodyl    calcium carbonate    lidocaine    ondansetron    oxyCODONE    polyethylene glycol      Chief Complaints:   Rehab follow-up     Subjective: On eval, patient denies pain, SOB, worsening strength, speech, or other new complaints  A 10 point ROS was performed; negative except as noted above  Physical Exam:  05/03/23 0548 98 1 °F (36 7 °C) 71 18 133/87 96 % None (Room air)     Vitals above reviewed on date of encounter    GEN:  Sitting in NAD   HEENT/NECK: MMM  CARDIAC: Regular rate rhythm, no murmers, no rubs, no gallops  LUNGS:  clear to auscultation, no wheezes, rales, or rhonchi  ABDOMEN: Soft, non-tender, non-distended, normal active bowel sounds  EXTREMITIES/SKIN:  no calf edema, no calf tenderness to palpation  NEURO:   MENTAL STATUS: Aphasia slightly improved; able to communicate somewhat better if given time to correct and Strength/MMT:  Grossly stable  PSYCH:  Affect:  Euthymic     HPI:  59-year-old male with a history of hypertension, hyperlipidemia, obesity, diabetes status post recent left MCA stroke which was complicated by saddle pulmonary embolism,, HIT, bilateral lower extremity DVTs    Patient was evaluated by skilled therapies and was found to have significant decline in ADLs and ambulation and appears appropriate for admission to [facility]      ** Please Note: Fluency Direct voice to text software may have been used in the creation of this document  **    40 minutes or greater spent for this encounter which included a combination of face-to-face time with the patient and non-face-to-face time which in part specifically includes management of CVA  Face-to-face time included extended discussion with patient regarding current condition, medical history, mood, medical/rehabilitation management, and disposition  Non face-to-face time included coordination of care with patient's co-managing AP and/or physician(s) thru communication and review of their recent documentation as well as reviewing vitals, bowel/bladder function, recent labs, and notes from therapy, CM, and nursing  In addition, this patient was discussed by the interdisciplinary team in weekly case conference today  The care of the patient was extensively discussed with all care providers and an appropriate rehabilitation plan was formulated unique for this patient  Barriers were identified preventing progression of therapy and appropriate interventions were discussed with each discipline  Please see the team note for input from all disciplines regarding barriers, intervention, and discharge planning    I personally performed the required components and examined the patient myself in person on 5/3/23

## 2023-05-05 NOTE — PLAN OF CARE
Problem: PAIN - ADULT  Goal: Verbalizes/displays adequate comfort level or baseline comfort level  Description: Interventions:  - Encourage patient to monitor pain and request assistance  - Assess pain using appropriate pain scale  - Administer analgesics based on type and severity of pain and evaluate response  - Implement non-pharmacological measures as appropriate and evaluate response  - Consider cultural and social influences on pain and pain management  - Notify physician/advanced practitioner if interventions unsuccessful or patient reports new pain  Outcome: Adequate for Discharge     Problem: INFECTION - ADULT  Goal: Absence or prevention of progression during hospitalization  Description: INTERVENTIONS:  - Assess and monitor for signs and symptoms of infection  - Monitor lab/diagnostic results  - Monitor all insertion sites, i e  indwelling lines, tubes, and drains  - Monitor endotracheal if appropriate and nasal secretions for changes in amount and color  - [place] appropriate cooling/warming therapies per order  - Administer medications as ordered  - Instruct and encourage patient and family to use good hand hygiene technique  - Identify and instruct in appropriate isolation precautions for identified infection/condition  Outcome: Adequate for Discharge     Problem: SAFETY ADULT  Goal: Patient will remain free of falls  Description: INTERVENTIONS:  - Educate patient/family on patient safety including physical limitations  - Instruct patient to call for assistance with activity   - Consult OT/PT to assist with strengthening/mobility   - Keep Call bell within reach  - Keep bed low and locked with side rails adjusted as appropriate  - Keep care items and personal belongings within reach  - Initiate and maintain comfort rounds  - Make Fall Risk Sign visible to staff  - Offer Toileting rs, in advance of need  - Initiate/M  - Obtain necessary fall risk managemen  - Apply yellow socks and bracelet for high fall risk patients  - Consider moving patient to room near nurses station  Outcome: Adequate for Discharge  Goal: Maintain or return to baseline ADL function  Description: INTERVENTIONS:  -  Assess patient's ability to carry out ADLs; assess patient's baseline for ADL function and identify physical deficits which impact ability to perform ADLs (bathing, care of mouth/teeth, toileting, grooming, dressing, etc )  - Assess/evaluate cause of self-care deficits   - Assess range of motion  - Assess patient's mobility; develop plan if impaired  - Assess patient's need for assistive devices and provide as appropriate  - Encourage maximum independence but intervene and supervise when necessary  - Involve family in performance of ADLs  - Assess for home care needs following discharge   - Consider OT consult to assist with ADL evaluation and planning for discharge  - Provide patient education as appropriate  Outcome: Adequate for Discharge  Goal: Maintains/Returns to pre admission functional level  Description: INTERVENTIONS:  - Perform BMAT or MOVE assessment daily    - Set and communicate daily mobility goal to care team and patient/family/caregiver  - Collaborate with rehabilitation services on mobility goals if consulted  - Perform Range of es a day  - Reposition patienrs    - Dangle markus  - Stand patient  - Ambulate pat  - Out of bed to c  - Out of be  - Out of bed for toileting  - Record patient progress and toleration of activity level   Outcome: Adequate for Discharge     Problem: DISCHARGE PLANNING  Goal: Discharge to home or other facility with appropriate resources  Description: INTERVENTIONS:  - Identify barriers to discharge w/patient and caregiver  - Arrange for needed discharge resources and transportation as appropriate  - Identify discharge learning needs (meds, wound care, etc )  - Arrange for interpretive services to assist at discharge as needed  - Refer to Case Management Department for coordinating discharge planning if the patient needs post-hospital services based on physician/advanced practitioner order or complex needs related to functional status, cognitive ability, or social support system  Outcome: Adequate for Discharge     Problem: Prexisting or High Potential for Compromised Skin Integrity  Goal: Skin integrity is maintained or improved  Description: INTERVENTIONS:  - Identify patients at risk for skin breakdown  - Assess and monitor skin integrity  - Assess and monitor nutrition and hydration status  - Monitor labs   - Assess for incontinence   - Turn and reposition patient  - Assist with mobility/ambulation  - Relieve pressure over bony prominences  - Avoid friction and shearing  - Provide appropriate hygiene as needed including keeping skin clean and dry  - Evaluate need for skin moisturizer/barrier cream  - Collaborate with interdisciplinary team   - Patient/family teaching  - Consider wound care consult   Outcome: Adequate for Discharge     Problem: Nutrition/Hydration-ADULT  Goal: Nutrient/Hydration intake appropriate for improving, restoring or maintaining nutritional needs  Description: Monitor and assess patient's nutrition/hydration status for malnutrition  Collaborate with interdisciplinary team and initiate plan and interventions as ordered  Monitor patient's weight and dietary intake as ordered or per policy  Utilize nutrition screening tool and intervene as necessary  Determine patient's food preferences and provide high-protein, high-caloric foods as appropriate       INTERVENTIONS:  - Monitor oral intake, urinary output, labs, and treatment plans  - Assess nutrition and hydration status and recommend course of action  - Evaluate amount of meals eaten  - Assist patient with eating if necessary   - Allow adequate time for meals  - Recommend/ encourage appropriate diets, oral nutritional supplements, and vitamin/mineral supplements  - Order, calculate, and assess calorie counts as needed  - Recommend, monitor, and adjust tube feedings and TPN/PPN based on assessed needs  - Assess need for intravenous fluids  - Provide specific nutrition/hydration education as appropriate  - Include patient/family/caregiver in decisions related to nutrition  Outcome: Adequate for Discharge

## 2023-05-05 NOTE — CASE MANAGEMENT
Cm asked to follow up on scripts sent to [facility] and a pcp for pt  [facility] and learned [facility]  Dr Leeann Jones made aware and he sent scripts to [facility]  Cm phoned pts mother and made her aware  Cm inquired if she made a pcp appmt for pt or needed assist  She wishes to use [facility], either dr Anabella Daniels or dr Ana Gay  Cm scheduled appmt online for 5/22 at 8am  Info placed on dc instructions  Pts mother confirmed receiving call from [facility] however they state script was not received for speech therapy  Cm faxed speech script to [phone]

## 2023-05-05 NOTE — PLAN OF CARE
Problem: PAIN - ADULT  Goal: Verbalizes/displays adequate comfort level or baseline comfort level  Description: Interventions:  - Encourage patient to monitor pain and request assistance  - Assess pain using appropriate pain scale  - Administer analgesics based on type and severity of pain and evaluate response  - Implement non-pharmacological measures as appropriate and evaluate response  - Consider cultural and social influences on pain and pain management  - Notify physician/advanced practitioner if interventions unsuccessful or patient reports new pain  Outcome: Progressing     Problem: INFECTION - ADULT  Goal: Absence or prevention of progression during hospitalization  Description: INTERVENTIONS:  - Assess and monitor for signs and symptoms of infection  - Monitor lab/diagnostic results  - Monitor all insertion sites, i e  indwelling lines, tubes, and drains  - Monitor endotracheal if appropriate and nasal secretions for changes in amount and color  - [place] appropriate cooling/warming therapies per order  - Administer medications as ordered  - Instruct and encourage patient and family to use good hand hygiene technique  - Identify and instruct in appropriate isolation precautions for identified infection/condition  Outcome: Progressing     Problem: SAFETY ADULT  Goal: Patient will remain free of falls  Description: INTERVENTIONS:  - Educate patient/family on patient safety including physical limitations  - Instruct patient to call for assistance with activity   - Consult OT/PT to assist with strengthening/mobility   - Keep Call bell within reach  - Keep bed low and locked with side rails adjusted as appropriate  - Keep care items and personal belongings within reach  - Initiate and maintain comfort rounds  - Make Fall Risk Sign visible to staff  - Apply yellow socks and bracelet for high fall risk patients  - Consider moving patient to room near nurses station  Outcome: Progressing  Goal: Maintain or return to baseline ADL function  Description: INTERVENTIONS:  -  Assess patient's ability to carry out ADLs; assess patient's baseline for ADL function and identify physical deficits which impact ability to perform ADLs (bathing, care of mouth/teeth, toileting, grooming, dressing, etc )  - Assess/evaluate cause of self-care deficits   - Assess range of motion  - Assess patient's mobility; develop plan if impaired  - Assess patient's need for assistive devices and provide as appropriate  - Encourage maximum independence but intervene and supervise when necessary  - Involve family in performance of ADLs  - Assess for home care needs following discharge   - Consider OT consult to assist with ADL evaluation and planning for discharge  - Provide patient education as appropriate  Outcome: Progressing  Goal: Maintains/Returns to pre admission functional level  Description: INTERVENTIONS:  - Perform BMAT or MOVE assessment daily    - Set and communicate daily mobility goal to care team and patient/family/caregiver     - Collaborate with rehabilitation services on mobility goals if consulted  - Out of bed for toileting  - Record patient progress and toleration of activity level   Outcome: Progressing     Problem: DISCHARGE PLANNING  Goal: Discharge to home or other facility with appropriate resources  Description: INTERVENTIONS:  - Identify barriers to discharge w/patient and caregiver  - Arrange for needed discharge resources and transportation as appropriate  - Identify discharge learning needs (meds, wound care, etc )  - Arrange for interpretive services to assist at discharge as needed  - Refer to Case Management Department for coordinating discharge planning if the patient needs post-hospital services based on physician/advanced practitioner order or complex needs related to functional status, cognitive ability, or social support system  Outcome: Progressing     Problem: Prexisting or High Potential for Compromised Skin Integrity  Goal: Skin integrity is maintained or improved  Description: INTERVENTIONS:  - Identify patients at risk for skin breakdown  - Assess and monitor skin integrity  - Assess and monitor nutrition and hydration status  - Monitor labs   - Assess for incontinence   - Turn and reposition patient  - Assist with mobility/ambulation  - Relieve pressure over bony prominences  - Avoid friction and shearing  - Provide appropriate hygiene as needed including keeping skin clean and dry  - Evaluate need for skin moisturizer/barrier cream  - Collaborate with interdisciplinary team   - Patient/family teaching  - Consider wound care consult   Outcome: Progressing     Problem: Nutrition/Hydration-ADULT  Goal: Nutrient/Hydration intake appropriate for improving, restoring or maintaining nutritional needs  Description: Monitor and assess patient's nutrition/hydration status for malnutrition  Collaborate with interdisciplinary team and initiate plan and interventions as ordered  Monitor patient's weight and dietary intake as ordered or per policy  Utilize nutrition screening tool and intervene as necessary  Determine patient's food preferences and provide high-protein, high-caloric foods as appropriate       INTERVENTIONS:  - Monitor oral intake, urinary output, labs, and treatment plans  - Assess nutrition and hydration status and recommend course of action  - Evaluate amount of meals eaten  - Assist patient with eating if necessary   - Allow adequate time for meals  - Recommend/ encourage appropriate diets, oral nutritional supplements, and vitamin/mineral supplements  - Order, calculate, and assess calorie counts as needed  - Recommend, monitor, and adjust tube feedings and TPN/PPN based on assessed needs  - Assess need for intravenous fluids  - Provide specific nutrition/hydration education as appropriate  - Include patient/family/caregiver in decisions related to nutrition  Outcome: Progressing

## 2023-05-07 NOTE — PROGRESS NOTES
Occupational Therapy Discharge Summary       Pt has made good Progress in Occupational Therapy  Pt MET long term goals  Pt progressed to Independent  level for functional transfers with NO  Independent level for ADL function with use of NO  Pt discharged home with family support  OT recommending use of shower chair  and pt/family received and/or purchased prior to d/c for home use  Family training was completed prior to d/c to maximize independence and safety with ADL/IADL function at home environment  Pt safe to d/c home at current level of function and continues to present with deficits of decreased coordination and visual deficits, communication  Recommending 24 hour Supervision for ADLS and daily activities at time of D/C  Pt does require further OT needs with recommendations for OP OT at time of D/C to focus on self care, health maintenance, functional mobility, community mobility, emergency response, money management, job performance and play/ leisure

## 2023-05-07 NOTE — DISCHARGE SUMMARY
Discharge Summary - PMR   Pipo Solano III 41 y o  male MRN: [id number]  Unit/Bed#: -01 Encounter: [id number]    Admission Date: 4/19/2023     Discharge Date: 5/5/2023    Rehabilitation/Etiologic Diagnosis:   Stroke:  01 2  Right Body Involvement (Left Brain)  Left MCA stroke    Discharge Diagnoses:      Patient Active Problem List   Diagnosis    Acute ischemic left MCA stroke (HCC)    Obesity, Class III, BMI 40-49 9 (morbid obesity) ([facility] [address] )    Encephalopathy    Type 2 diabetes mellitus with circulatory disorder, without long-term current use of insulin (HCC)    Hypertension    Bowel and bladder incontinence    Left-sided chest wall pain    Creatinine elevation    Thrombocytopenia (HCC)    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)    Urinary retention    DVT of lower extremity, bilateral (HCC)    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)    Aphasia due to recent cerebrovascular accident (CVA)    Rhinorrhea       Acute Rehabilitation Center Course:      Patient participated in a comprehensive interdisciplinary inpatient rehabilitation program which included involvment of MD, therapies (PT, OT, and SLP), RN, CM/SW, dietary  He made significant functional gains and was able to be advanced to a largely mod indep level of assist for ADls and mobility but needs some assist for iADLs related to residual cog deficits and aphasia  He is considered adequately safe for discharge home with family  Medical issues with comanagement from our internal medicine team as outlined below  Please see below for patient's hospital course and day to day management of medical needs with significant findings, complications (if applicable), treatment, and services provided in problem list format        Decline in ADLs and mobility: Functional assessment - improving                                                    FIM  Care Score   Admit Score Recent Score    Total assist  1-100% or "2p    Tot       Max assist 2-51-75%    Sub  toileting hygiene, bathing, lower body dressing     Mod assist 3- 26-50%  Par  upper body dressing     Min assist 3- 25% or < Par       CG assist 4  TA       Sup/Setup 4-5 Sup       Mod-I/Indep 6 MI   ADLs     Transfers    mod assist to significant assist Indep     Ambulation     50 feet min assist Indep      Stairs     4 steps moderate assist 4 steps mod indep   12 steps supervision   SLP FIM admit - total assist expression, Max assist comp, PS, memory; SI - supervision  SLP FIM recent -moderate assist memory, problem solving, comp; significant assist expression > improving   Dispo: Home with with OP PT, OT, ST      * Acute ischemic left MCA stroke [facility])  Assessment & Plan  - 5/5 neuro exam stable; function improved; still with significant aphasia  trialing IRP at times > on track for d/c Friday   - CG training to include proactive toileting  -Community re-integration outing went well  - D/C with OP PT, OT, ST   - Mother to assist with iADLs and transportation  - I notified patient/family that due to CVA and residual functional impairments could significantly impact driving  I stated I could not be sure their impact on driving without driving assessment  In interim, I recommend no driving until cleared by outpatient physician and after cleared by formal driving testing along with filling out and faxing Fredi Dot required Initial Reporting Form [id number] which was completed and patient made aware  - Large left MCA infarct    Secondary stroke prevention  - Per prior providers   \"Thrombosis panel with abnormal antithrombin, Protein C/S activity, but per Neuro unclear significance in setting of acute stroke  Would repeat as outpatient     Will need Zio Patch/Loop Recorder with Cards Referral as outpatient   Discussed with Neurology on 4/9: Continue Keppra until outpatient f/u given location, temporal slowing, extent of lesion  \"  - Antithrombotic: Aspirin (+ Eliquis with PE " "hx)   - Statin  - Optimal management of blood pressure and diabetes  - Follow-up with neurology and PCP after d/c   - Disability/FMLA forms completed and given to CM 4/21 to assist with and fax   - Completed acute comprehensive interdisciplinary inpatient rehabilitation include intensive skilled therapies (PT, OT) as previously outlined with oversight and management by rehabilitation physician, inpatient rehab level nursing, case management and weekly interdisciplinary team meetings         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)  Assessment & Plan  Saturating well on room air with and without activity so far  Monitor vitals with and without activity  Eliquis  Outpatient hematology consult    Rhinorrhea  Assessment & Plan  Improved  Claritin PRN     Aphasia due to recent cerebrovascular accident (CVA)  Assessment & Plan  Improving slowly   SLP  PT/OT  CG training    HIT (heparin-induced thrombocytopenia) ([facility])  Assessment & Plan  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   (\"The patient's serum tested positive by FRANKY and supports a diagnosis of heparin-induced-thrombocytopenia\")  Transitioned from Arixtra to Eliquis  Platelets remain recovered 4/27 and 5/4     DVT of lower extremity, bilateral ([address] )  Assessment & Plan  Eliquis  Ambulation  Hold SCDs with hx of DVT     Urinary retention  Assessment & Plan  Flomax    Bowel and bladder incontinence  Assessment & Plan  Sometimes related to poor initiation  Toileting program  - CG training to include proactive toileting  Flomax    Hypertension  Assessment & Plan  Blood pressure acceptable  Current meds: Amlodipine 5mg qday, losartan 25mg qday, metoprolol 25mg BID       Type 2 diabetes mellitus with circulatory disorder, without long-term current use of insulin ([facility])  Assessment & Plan  Lab Results   Component Value Date    HGBA1C 8 0 (H) 03/31/2023     Internal medicine consulted and management at their " discretion  Monitor for signs and symptoms of hypoglycemia   Current meds: Lispro sliding scale  D C meds: None   Controlled now off meds - OP PCP now       Obesity, Class III, BMI 40-49 9 (morbid obesity) ([facility] [address] )  Assessment & Plan   on appropriate nutrition and activity  Adjustments accordingly during skilled therapy and with rehab nursing  Monitor skin closely for breakdown, wounds, rashes; keep clean and dry, turning Q2H   Follow-up with PCP after d/c          Follow-up providers (see above for specific issues to follow-up):  PCP  Neuro (will likely c/s cards for loop recorder)  Hematology     - See AVS (After visit summary) with discharge instructions, discharge medications, and other details  Imaging (See above as well):   No orders to display       Pertinent Recent Labs (See Course above, EPIC EMR, and if needed request additional records):   Latest Reference Range & Units 05/04/23 05:24   Sodium 135 - 147 mmol/L 138   Potassium 3 5 - 5 3 mmol/L 3 7   Chloride 96 - 108 mmol/L 108   CO2 21 - 32 mmol/L 28   Anion Gap 4 - 13 mmol/L 2 (L)   BUN 5 - 25 mg/dL 11   Creatinine 0 60 - 1 30 mg/dL 0 95   Glucose, Random 65 - 140 mg/dL 86   GLUCOSE FASTING 65 - 99 mg/dL 86   Calcium 8 3 - 10 1 mg/dL 9 0   eGFR ml/min/1 73sq m 96   WBC 4 31 - 10 16 Thousand/uL 5 33   Red Blood Cell Count 3 88 - 5 62 Million/uL 4 65   Hemoglobin 12 0 - 17 0 g/dL 12 3   HCT 36 5 - 49 3 % 40 0   MCV 82 - 98 fL 86   MCH 26 8 - 34 3 pg 26 5 (L)   MCHC 31 4 - 37 4 g/dL 30 8 (L)   RDW 11 6 - 15 1 % 14 5   Platelet Count 497 - 390 Thousands/uL 256   (L): Data is abnormally low    Procedures Performed During [facility] Admission: None    Discharge Physical Examination:      Vitals:    05/05/23 0833   BP: 138/75   Pulse: 89   Resp: 16   Temp:    SpO2:      GEN:  Sitting in NAD   HEENT/NECK: MMM  CARDIAC: Regular rate rhythm, no murmers, no rubs, no gallops  LUNGS:  clear to auscultation, no wheezes, rales, or rhonchi  ABDOMEN: Soft, non-tender, "non-distended, normal active bowel sounds  EXTREMITIES/SKIN:  no calf edema, no calf tenderness to palpation  NEURO:   MENTAL STATUS: Adequate wakefulness, stable aphasia, able to communicate better if given more time to correct, Strength/MMT:  5/5  and NA  PSYCH:  Affect:  Euthymic     HPI:   61-year-old male with a history of hypertension, hyperlipidemia, obesity, diabetes status post recent left MCA stroke which was complicated by saddle pulmonary embolism,, HIT, bilateral lower extremity DVTs  Patient was evaluated by skilled therapies and was found to have significant decline in ADLs and ambulation and appears appropriate for admission to [place] [address]      Chief complaint:  Aphasia     Subjective:   Limited communication related to aphasia but appears to deny any pain or other new complaints     Review of Systems: Likely negative but limited due to aphasia    Per IM c/s 4/19  \"Randall S Severo Oh is a 41year old patient with no previously known PMH who had not seen a doctor for many years  He was admitted on 3/30/23 with a left MCA CVA  He was outside of the window for systemic thrombolysis  Perfusion scan showed completed infarct in the left temporal lobe with no salvageable penumbra  No IR intervention was performed  MRI on 3/31 showed petechial hemorrhage in stroke bed with left MCA infarct/basal ganglia involvement and microhemorhage with mild mass effect without midline shift  Neurosurgery was consulted but no surgery was felt to be needed  He underwent echocardiogram/HODAN which both showed preserved EF 55%, no PFO no thrombus  It was suggested that he have an outpatient LOOP recorder/Zio patch placed per neurology   His thrombosis panel showed abnormal antithrombin, Protein C/S activity, but per Neuro unclear significance in setting of acute stroke and therefore wanted repeat as outpatient  He was newly diagnosed diabetic with HbA1C of 8 0 and new HTN    He was transferred to [facility] " "on 4/8/23 with residual severe global aphasia, right visual inattention and right sided weakness      On 4/11/23, he had left sided chest pain  He ruled out for a MI and EKG was unremarkable  Cardiology saw in consult and no intervention was done  The following day he was noted to have NIEVES and a D-dimer was sent and was elevated at >20  Creatinine was elevated so he was started on IVF and a CTA PE protocol was ordered  He was noted to have also dropped his platelets to 48 and his Heparin was stopped and a HIT workup started  Later that night, his CT was positive for a saddle embolus and he was sent back to acute  There he was placed on an Argatroban infusion  He was deemed not to be a candidate for IR intervention  Venous doppler was positive for bilateral acute DVTs  He was eventually transitioned to [facility]        Patient was referred to [facility] for inpatient rehabilitation  We are asked to assist with medical management      Currently, does not have any complaints of CP, SOB, dizziness, N/V/D  \"    Condition at Discharge: good     Discharge instructions/Information to patient and family:   See after visit summary for information provided to patient and family  Provisions for Follow-Up Care:  See after visit summary for information related to follow-up care and any pertinent home health orders  Disposition: Home with family     Planned Readmission:  No    Discharge Statement   Total time spent examining Denis Cobos III, counseling patient (and family) on condition, medication, rehabilitation/medical plan, and coordinating care on day of discharge: at least 45 minutes  Greater than 50% of the total time was spent examining patient, answering all questions, directly discussing plan of care and post-discharge instructions with patient (or patient and family) some of which specifically related to recent CVA  Additional time spent on coordinating care and other discharge activities      Discharge " Medications:  See after visit summary for reconciled discharge medications provided to patient and family  I personally performed the required components and examined the patient myself in person on 5/5/23

## 2023-05-08 ENCOUNTER — TELEPHONE (OUTPATIENT)
Dept: HEMATOLOGY ONCOLOGY | Facility: CLINIC | Age: 48
End: 2023-05-08

## 2023-05-08 ENCOUNTER — TELEPHONE (OUTPATIENT)
Dept: NEUROLOGY | Facility: CLINIC | Age: 48
End: 2023-05-08

## 2023-05-08 NOTE — TELEPHONE ENCOUNTER
I called Mario to schedule a new patient consultation with [facility] in response to a referral sent to our department  I left a voicemail making patient aware of their referral and instructing them to call [facility] at [phone] to schedule  Another attempt will be made to schedule patient

## 2023-05-08 NOTE — TELEPHONE ENCOUNTER
SLB/3/30-4/8/Acute ischemic left MCA stroke    Melia Westville III will need follow up in 6-8 weeks with neurovascular ap or attending  No outpatient testing requested    HFU 6/15/23 @ 10:00 with Dr Marni Ray in [place]

## 2023-05-08 NOTE — CASE MANAGEMENT
Team dc summary - pt made good progress and returned home w/parents with continued outpt pt ot and speech at [facility] in [place]  appmts were made between [facility] and pts mother  No dme needs identified for dc  pts mother present frequently and for dc instructions  Pt received a temporary handicap placard  appmt scheduled with [facility] primary care for pts follow up

## 2023-05-08 NOTE — PROGRESS NOTES
05/08/23 1105   Hello, [Guardian’s Name / Patient’s Vish Garner, this is [Caller Vish Pascual from [facility], and our clinical care team wanted to check on you / your child after your recent visit to the hospital  It will only take 3-5 minutes  Is this a good time? Discharge Call Type/ Specific Diagnosis: General Call   Call Complete   Attempted Number of Calls 1   Discharge phone call complete?  Left Message

## 2023-05-09 NOTE — PROGRESS NOTES
05/09/23 1243   Hello, [Guardian’s Name / Patient’s Yumi Harris, this is [Caller Yumi Harris from [facility], and our clinical care team wanted to check on you / your child after your recent visit to the hospital  It will only take 3-5 minutes  Is this a good time? Discharge Call Type/ Specific Diagnosis: General Call   General Discharge Phone Call   Were your/your child's discharge instructions clear and understandable? Please tell me in your own words how to care for yourself/your child now that you're home Yes;Patient understood instructions   Have you filled your/your child's new prescriptions yet? Yes   What questions do you have about those medications? No questions   Are you/your child having any unusual symptoms or problems? (Specific to problem- i e , dressing, pain, bruising or swelling, procedure, etc ) No reported symptoms/problems   Do you have follow up appointment with your/your child's physician? No   Is there anything preventing you from keeping that appointment? No;Patient able to keep appointment   Are there any physicians, nurses, or hospital staff you would like us to recognize for doing a very good job? Nurse;PCA/Tech;PT/OT/RT/SLP   Thank you for taking the time to share with me about your care and recovery  Do you have any suggestions for us? No   This call resulted in: No interventions needed   Call Complete   Attempted Number of Calls 2   Discharge phone call complete?  Complete

## 2023-05-11 NOTE — SPEECH THERAPY NOTE
"SLP Discharge Summary    Pt was discharge home w/ family support/supervision on 5/5/2023  While on the acute rehab center, pt was making slower and steady gains towards overall receptive and expressive language skills as well as functional cognitive skills  Functioning by time of discharge was min-mod A for comprehension, max A for expression, supervision level for social interaction, min-mod A for executive functions and mod A for memory  Pt was able to achieve all LTG's which were established on initial assessment except for expression by time of discharge, however, ongoing recommendations were to continue w/ skilled SLP services at the OP level to maximize language/congitive skills  Throughout pt's stay on the acute rehab center, primary focus of ST sessions targeted language skills, but at times, also able to focus on cognitive tasks  Upon admission to the acute rehab center, pt engaged in completing the Bedside WAB in which overall bedside aphasia score was 15 which deems pt to demonstrate very severely impaired language deficits  Additionally, pt also demonstrates Broca's type aphasia as per Bedside Aphasia Classification Criteria, when comparing the pt's Fluency  Auditory Verbal Comprehension, Repetition scores  Pt w/ fluent speech jo-ann by jargon, neologisms, blocks and unintelligible mumbling  Pt generally unaware of expressive language impairments, continuing w/ long unintelligible utterances in responding to questions  Pt does present w/increased automatic conversational phrases that are clear and intelligible ie \"hi, whats up\" \"I think that's what it says, \"can you repeat that please? \" than in previous encounter  Written spontaneous expression was also jargon where pt demo awareness of incorrect written expression   Writing was slowed and effortful, however pt able to write his first name w/ all capital letters and the initial two numbers of his address as well as a street name w/ the same initial " letter as his current street name  In repetition task, pt often able to produce the correct initial phoneme of intended word then w/ neologisms  However, throughout pt's stay, it has been noted that pt does demonstrate improved reading comprehension given biographical information and orientation information provided written binary choices, as well as using very basic communication boards(FO2 pictures/words) to communicate basic wants/needs  Pt's receptive language skills provided visual written or pictures is also noted to be improving at this time  However, pt's biggest barrier continues to be expressive output, to where pt exhibits mumbling, non-fluent, repetative sounds/words, jargon, neologism, paraphasias, where minimal true words are elicited in structured expressive language tasks  Pt is observed to verbalize yes/no more appropriately, as well as shorter filler phrases in conversation as well as during structured tasks  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 in which pt does exhibit severe difficulty in ability to formulate sounds given short target words despite all levels of cueing (visual, phrase, phonemic, repetition written, etc)  Written expression is also severely impaired at this time as well  Of note, pt's mother, Allison Kong, has been present in sessions and increased education provided to her on 4/25 given recommendations for 24/7 supervision/assist at d/c due to the severity of his language deficits as well as other functional deficits (visual field cut to R, decreased ST recall, decreased executive functions) impacting ability to complete I ADL tasks at d/c  Mother is able to provide this support upon discussion, which SLP educated pt and mother about continued OP services at time of discharge, which both were in agreement   As sessions progressed,  pt still currently exhibited severe expressive language deficits, which was also highly impacted by pt's apraxia of speech when attempting to "target speech production in structured tasks  Overall speech output continued to be jargon filled, neologisms, paraphasias in addition to sound substitutions in words plus at times non-sensical output  However, pt was able to provide intermittent spontaneous phrases such as  \"say that again\" \"oh it's going\" \"that was brutal\" in contexts which are appropriate given sessions  As for pt's receptive language skills, pt did range from moderate to severely impaired, noting that overall auditory comprehension given information still can be difficult for pt  In reverse, longer written information is also more difficult for pt to comprehend, BUT when presented w/ both auditory and written means, pt's comprehension skills DID improve  Began to target more functional tasks, such as money management to where pt again demonstrates difficulty in completing auditory requests for change/dollar amounts, but when presented w/ written information, improvement in his comprehension is noted in addition to providing correct money amounts  Besides pt's speech/language barriers, pt also does exhibit decreased visual attention to R side for tasks, slower processing time, decreased executive functions and decreased ST recall which does impact cognitive/language skills as well as functional mobility  Family training has occurred w/ pt's mother in multiple sessions, to where  community reintegration tasks is to be completed today (5/3) w/ pt and pt's mother for further recommendations to provide pt and mother for means of management in the community  The task went well w/ pt and mom today  She was able to demonstrate appropriate cueing and clues to pt for maximizing his comprehension in locating items, ensure accuracy in ordering and payment by using both verbal and written/visual cues as needed   SLP reviewing w/ pt and pt's mother about using lists for things as in today's activity, as well as making grocery lists, to practice not only " written expression, but targeting stating food items, etc  Briefly review additional speech/language strategies as discussed w/ both over the weekend to where pt's mom is feeling more confident in having pt home  Of note, SLP has provided pt's mother w/ written handout given strategies in how to communicate w/ a pt w/ aphasia in addition to SLP providing HEP activities for pt to complete until initiation given OP services  Currently, pt will continue to benefit from ongoing OP SLP services targeting functional language/speech and cognitive skills in attempts to decrease overall caregiver burden over time

## 2023-05-19 ENCOUNTER — TELEPHONE (OUTPATIENT)
Dept: NEUROLOGY | Facility: CLINIC | Age: 48
End: 2023-05-19

## 2023-05-19 DIAGNOSIS — I63.512 ACUTE ISCHEMIC LEFT MCA STROKE (HCC): ICD-10-CM

## 2023-05-19 NOTE — TELEPHONE ENCOUNTER
Post CVA Discharge Follow Up  Hospitalization: 3/30/23-4/8/23, 4/8/23-4/12/23 [facility], 4/12/23-4/19/23, 4/19/23-5/5/23    Per chart review in the referral to neurology, it says to call the patient's mother  Called patient's mother at the phone number on file  No answer  Left a voice message requesting for a return call  Provided the office's phone number

## 2023-05-22 ENCOUNTER — TELEPHONE (OUTPATIENT)
Dept: FAMILY MEDICINE CLINIC | Facility: MEDICAL CENTER | Age: 48
End: 2023-05-22

## 2023-05-22 ENCOUNTER — OFFICE VISIT (OUTPATIENT)
Dept: FAMILY MEDICINE CLINIC | Facility: MEDICAL CENTER | Age: 48
End: 2023-05-22

## 2023-05-22 VITALS
TEMPERATURE: 97.8 F | DIASTOLIC BLOOD PRESSURE: 78 MMHG | BODY MASS INDEX: 37.64 KG/M2 | HEART RATE: 78 BPM | HEIGHT: 73 IN | WEIGHT: 284 LBS | SYSTOLIC BLOOD PRESSURE: 130 MMHG | OXYGEN SATURATION: 99 %

## 2023-05-22 DIAGNOSIS — R15.9 BOWEL AND BLADDER INCONTINENCE: ICD-10-CM

## 2023-05-22 DIAGNOSIS — Z86.73 HISTORY OF CVA (CEREBROVASCULAR ACCIDENT): Primary | ICD-10-CM

## 2023-05-22 DIAGNOSIS — I10 HYPERTENSION: ICD-10-CM

## 2023-05-22 DIAGNOSIS — R56.9 SEIZURES (HCC): ICD-10-CM

## 2023-05-22 DIAGNOSIS — Z09 HOSPITAL DISCHARGE FOLLOW-UP: Primary | ICD-10-CM

## 2023-05-22 DIAGNOSIS — Z23 IMMUNIZATION DUE: ICD-10-CM

## 2023-05-22 DIAGNOSIS — I26.92 ACUTE SADDLE PULMONARY EMBOLISM WITHOUT ACUTE COR PULMONALE (HCC): ICD-10-CM

## 2023-05-22 DIAGNOSIS — Z86.73 HISTORY OF CVA (CEREBROVASCULAR ACCIDENT): ICD-10-CM

## 2023-05-22 DIAGNOSIS — I63.512 ACUTE ISCHEMIC LEFT MCA STROKE (HCC): ICD-10-CM

## 2023-05-22 DIAGNOSIS — I10 PRIMARY HYPERTENSION: ICD-10-CM

## 2023-05-22 DIAGNOSIS — R32 BOWEL AND BLADDER INCONTINENCE: ICD-10-CM

## 2023-05-22 DIAGNOSIS — E11.59 TYPE 2 DIABETES MELLITUS WITH OTHER CIRCULATORY COMPLICATION, WITHOUT LONG-TERM CURRENT USE OF INSULIN (HCC): ICD-10-CM

## 2023-05-22 DIAGNOSIS — R33.9 URINARY RETENTION: ICD-10-CM

## 2023-05-22 RX ORDER — LOSARTAN POTASSIUM 25 MG/1
25 TABLET ORAL DAILY
Qty: 90 TABLET | Refills: 0 | Status: SHIPPED | OUTPATIENT
Start: 2023-05-22

## 2023-05-22 RX ORDER — ATORVASTATIN CALCIUM 40 MG/1
40 TABLET, FILM COATED ORAL EVERY EVENING
Qty: 90 TABLET | Refills: 0 | Status: SHIPPED | OUTPATIENT
Start: 2023-05-22

## 2023-05-22 RX ORDER — TAMSULOSIN HYDROCHLORIDE 0.4 MG/1
0.4 CAPSULE ORAL
Qty: 30 CAPSULE | Refills: 0 | Status: SHIPPED | OUTPATIENT
Start: 2023-05-22

## 2023-05-22 RX ORDER — AMLODIPINE BESYLATE 5 MG/1
5 TABLET ORAL DAILY
Qty: 90 TABLET | Refills: 0 | Status: SHIPPED | OUTPATIENT
Start: 2023-05-22

## 2023-05-22 RX ORDER — ASPIRIN 81 MG/1
81 TABLET, CHEWABLE ORAL DAILY
Qty: 90 TABLET | Refills: 0 | Status: SHIPPED | OUTPATIENT
Start: 2023-05-22

## 2023-05-22 NOTE — TELEPHONE ENCOUNTER
Pt's mother called to make Dr Judith Champion aware she called [facility] to get the holter monitor  They transferred her to Cardiology, who told her they don't just give out the holter monitor and that pt would need to have an appt with them  Mother is wondering if she should be scheduling an appt with cardiology for pt      Routed to Dr Judith Champion

## 2023-05-22 NOTE — TELEPHONE ENCOUNTER
Spoke with pt's mother to make her aware referral has been placed to Cardiology    Mother will call to schedule

## 2023-05-22 NOTE — TELEPHONE ENCOUNTER
Post CVA Discharge Follow Up  Hospitalization: 3/30/23-4/8/23, 4/8/23-4/12/23 [facility], 4/12/23-4/19/23, 4/19/23-5/5/23    Called provided phone number  Spoke with Erin woodruff  She reports how they are at the speech therapy appointment now and how this is not a good time to talk  Will call her back at a later time  She was appreciative

## 2023-05-22 NOTE — PATIENT INSTRUCTIONS
Goal Blood Sugars:  Pre-meal , even better <110  2hr after a meal <180, even better <140    Basic Carbohydrate Counting   AMBULATORY CARE:   Carbohydrate counting  is a way to plan your meals by counting the amount of carbohydrate in foods  Carbohydrates are the sugars, starches, and fiber found in fruit, grains, vegetables, and milk products  Carbohydrates increase your blood sugar levels  Carbohydrate counting can help you eat the right amount of carbohydrate to keep your blood sugar levels under control  What you need to know about planning meals using carbohydrate counting:  • A dietitian or healthcare provider will help you develop a healthy meal plan that works best for you  You will be taught how much carbohydrate to eat or drink for each meal and snack  Your meal plan will be based on your age, weight, usual food intake, and physical activity level  If you have diabetes, it will also include your blood sugar levels and diabetes medicine  Once you know how much carbohydrate you should eat, you can decide what type of food you want to eat  • You will need to know what foods contain carbohydrate and how much they contain  Keep track of the amount of carbohydrate in meals and snacks in order to follow your meal plan  Do not avoid carbohydrates or skip meals  Your blood sugar may fall too low if you do not eat enough carbohydrate or you skip meals  Foods that contain carbohydrate:   • Breads:  Each serving of food listed below contains about 15 g of carbohydrate   ? 1 slice of bread (1 ounce) or 1 flour or corn tortilla (6 inch)    ? ½ of a hamburger bun or ¼ of a large bagel (about 1 ounce)    ? 1 pancake (about 4 inches across and ¼ inch thick)    • Cereals and grains:  Serving sizes of ready-to-eat cereals vary  Look at the serving size and the total carbohydrate amount listed on the food label  Each serving of food listed below contains about 15 g of carbohydrate   ?  ¾ cup of dry, unsweetened, ready-to-eat cereal or ¼ cup of low-fat granola     ? ½ cup of oatmeal or other cooked cereal     ? ? cup of cooked rice or pasta    • Starchy vegetables and beans:  Each serving of food listed below contains about 15 g of carbohydrate   ? ½ cup of corn, green peas, sweet potatoes, or mashed potatoes    ? ¼ of a large baked potato    ? ½ cup of beans, lentils, and peas (garbanzo, kennedy, kidney, white, split, black-eyed)    • Crackers and snacks:  Each serving of food listed below contains about 15 g of carbohydrate   ? 3 arnoldo cracker squares or 8 animal crackers     ? 6 saltine-type crackers    ? 3 cups of popcorn or ¾ ounce of pretzels, potato chips, or tortilla chips    • Fruit:  Each serving of food listed below contains about 15 g of carbohydrate   ? 1 small (4 ounce) piece of fresh fruit or ¾ to 1 cup of fresh fruit    ? ½ cup of canned or frozen fruit, packed in natural juice    ? ½ cup (4 ounces) of unsweetened fruit juice    ? 2 tablespoons of dried fruit    • Desserts or sugary foods:  Each serving of food listed below contains about 15 g of carbohydrate   ? 2-inch square unfrosted cake or brownie     ? 2 small cookies    ? ½ cup of ice cream, frozen yogurt, or nondairy frozen yogurt    ? ¼ cup of sherbet or sorbet    ? 1 tablespoon of regular syrup, jam, or jelly    ? 2 tablespoons of light syrup    • Milk and yogurt:  Foods from the milk group contain about 12 g of carbohydrate per serving  ? 1 cup of fat-free or low-fat milk    ? 1 cup of soy milk    ? ? cup of fat-free, yogurt sweetened with artificial sweetener    • Non-starchy vegetables:  Each serving contains about 5 g of carbohydrate   Three servings of non-starch vegetables count as 1 carbohydrate serving  ? ½ cup of cooked vegetables or 1 cup of raw vegetables  This includes beets, broccoli, cabbage, cauliflower, cucumber, mushrooms, tomatoes, and zucchini    ?  ½ cup of vegetable juice    How to use carbohydrate counting to plan meals:   • Count carbohydrate amounts using serving sizes:      ? Pasta dinner example: You plan to have pasta, tossed salad, and an 8-ounce glass of milk  Your healthcare provider tells you that you may have 4 carbohydrate servings for dinner  One carbohydrate serving of pasta is ? cup  One cup of pasta will equal 3 carbohydrate servings  An 8-ounce glass of milk will count as 1 carbohydrate serving  These amounts of food would equal 4 carbohydrate servings  One cup of tossed salad does not count toward your carbohydrate servings  • Count carbohydrate amounts using food labels:  Find the total amount of carbohydrate in a packaged food by reading the food label  Food labels tell you the serving size of the food and the total carbohydrate amount in each serving  Find the serving size on the food label and then decide how many servings you will eat  Multiply the number of servings you plan to eat by the carbohydrate amount per serving  ? Granola bar snack example: Your meal plan allows you to have 2 carbohydrate servings (30 grams) of carbohydrate for a snack  You plan to eat 1 package of granola bars, which contains 2 bars  According to the food label, the serving size of food in this package is 1 bar  Each serving (1 bar) contains 25 grams of carbohydrate  The total amount of carbohydrate in this package of granola bars would be 50 g  Based on your meal plan, you should eat only 1 bar  Follow up with your doctor as directed:  Write down your questions so you remember to ask them during your visits  © Copyright [facility] Patient 2022 Information is for End User's use only and may not be sold, redistributed or otherwise used for commercial purposes  The above information is an  only  It is not intended as medical advice for individual conditions or treatments   Talk to your doctor, nurse or pharmacist before following any medical regimen to see if it is safe and effective for you

## 2023-05-22 NOTE — TELEPHONE ENCOUNTER
Received  Transcription:    Hi, this is Amparo KELLEY calling for Britany Son, in regards to Teodoro Raman, June 15th 1975  You were calling to see how he was doing  So if you want to call back  He's doing really well  He does have a neurology appointment in June  You can call me back at [phone]  Thank you bye

## 2023-05-22 NOTE — PROGRESS NOTES
[address] [facility] - Clinic Note  [facility]     Mario Saleh III MRN: [id number] : 1975 Age: 41 y o  Assessment/Plan     1  Hospital discharge follow-up    -Patient presents to establish with provider as he has not seen a PCP since pediatric patient and to follow-up recent hospitalization with several notable updates in his health  -Extensive counseling today in office about recent diagnoses and related management and follow-up  -Patient will closely return to office in 2 weeks    2  Immunization due    - TDAP VACCINE GREATER THAN OR EQUAL TO 6YO IM  - Pneumococcal Conjugate Vaccine 20-valent (Pcv20)    3  History of CVA (cerebrovascular accident)    -Stable with current regimen  -Continue daily statin and daily aspirin  -Continue PT, OT, and speech therapy  - atorvastatin (LIPITOR) 40 mg tablet; Take 1 tablet (40 mg total) by mouth every evening  Dispense: 90 tablet; Refill: 0  - aspirin 81 mg chewable tablet; Chew 1 tablet (81 mg total) daily  Dispense: 90 tablet; Refill: 0  - AMB extended holter monitor; Future  -Appointment with neurology 2023    4  Primary hypertension    -Stable with current management  - metoprolol tartrate (LOPRESSOR) 25 mg tablet; Take 1 tablet (25 mg total) by mouth every 12 (twelve) hours  Dispense: 90 tablet; Refill: 0  - amLODIPine (NORVASC) 5 mg tablet; Take 1 tablet (5 mg total) by mouth daily  Dispense: 90 tablet; Refill: 0  - losartan (COZAAR) 25 mg tablet; Take 1 tablet (25 mg total) by mouth daily  Dispense: 90 tablet; Refill: 0    5  Acute saddle pulmonary embolism without acute cor pulmonale (HCC)    - apixaban (Eliquis) 5 mg; Take 1 tablet (5 mg total) by mouth 2 (two) times a day  Dispense: 180 tablet; Refill: 0  -Appoint with hematology oncology on 2023    6   Type 2 diabetes mellitus with other circulatory complication, without long-term current use of insulin (HCC)    -Most recent A1c 8 0  -Advised to keep blood sugar log and "return to office in 2 weeks to review, currently not on pharmacotherapy for blood sugar control  -Continue daily statin  -Continue daily ARB  -Reviewed goals  A1C (<7), blood pressure (<130/80), and cholesterol (LDL <100)  - Discussed general issues about diabetes pathophysiology and management   -Educational material distributed  -Declines referral to diabetes educator today as mother states he received extensive counseling during hospital stay including carbohydrate counting which they have been mindful of  -Foot exam completed today  -Return to office to further address related preventative care measures    7  Seizures ([address] )    -On Keppra, follow-up with Neurology scheduled in June 8  Bowel and bladder incontinence    -Mother states he is now having bowel movements regularly  -Urinating without difficulty  -Continue Flomax at this time  a1c 8 0      Srinivasa Latham III acknowledged understanding of treatment plan, all questions answered  Subjective      Srinivasameghan Latham III is a 41 y o  male who presents for hospital follow-up  Patient has not yet established with myself  Mother states he has not seen a physician since pediatric years  Patient presents with mother who also contributes to history  Mother states that they have made considerable changes in nutrition since hospital stay  Patient has decreased weight from 320s to 284 pounds today  He is limiting junk food mother describes  Mother states he is not having difficulties with urination or bowel movements  Aphasia related to CVA, he continues to work with speech therapy  He is also continuing with physical therapy and Occupational Therapy  Mother confirms medication list today and that he is indeed taking medications as prescribed  Per discharge summary:  Per IM c/s 4/19  \"Mario Foreman is a 41year old patient with no previously known PMH who had not seen a doctor for many years  He was admitted on 3/30/23 with a left MCA CVA   He was " outside of the window for systemic thrombolysis   Perfusion scan showed completed infarct in the left temporal lobe with no salvageable penumbra  No IR intervention was performed   MRI on 3/31 showed petechial hemorrhage in stroke bed with left MCA infarct/basal ganglia involvement and microhemorhage with mild mass effect without midline shift  Neurosurgery was consulted but no surgery was felt to be needed  [facility] underwent echocardiogram/SOLITARIO which both showed preserved EF 55%, no PFO no thrombus  It was suggested that he have an outpatient LOOP recorder/Zio patch placed per neurology   His thrombosis panel showed abnormal antithrombin, Protein C/S activity, but per Neuro unclear significance in setting of acute stroke and therefore wanted repeat as outpatient  [facility] was newly diagnosed diabetic with HbA1C of 8 0 and new HTN   He was transferred to [facility] on 4/8/23 with residual severe global aphasia, right visual inattention and right sided weakness      On 4/11/23, he had left sided chest pain   He ruled out for a MI and EKG was unremarkable   Cardiology saw in consult and no intervention was done   The following day he was noted to have HSU and a D-dimer was sent and was elevated at >20   Creatinine was elevated so he was started on IVF and a CTA PE protocol was ordered  [facility] was noted to have also dropped his platelets to 48 and his Heparin was stopped and a HIT workup started   Later that night, his CT was positive for a saddle embolus and he was sent back to acute   There he was placed on an Argatroban infusion   He was deemed not to be a candidate for IR intervention   Venous doppler was positive for bilateral acute DVTs   He was eventually transitioned to [facility]        Patient was referred to [facility] for inpatient rehabilitation   We are asked to assist with medical management      The following portions of the patient's history were reviewed and updated as appropriate: allergies, current medications, past family "history, past medical history, past social history, past surgical history and problem list      Past Medical History:   Diagnosis Date   • Allergic 04/01/2023   • Hypertension 03/31/2023   • Hypertensive emergency 03/30/2023   • Obesity 03/31/2023   • Seizures ([facility] [address] ) 03/31/2023   • Stroke ([facility] [address] ) 03/30/2023   • Visual impairment 03/30/2023       Allergies   Allergen Reactions   • Heparin Other (See Comments)     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   (\"The patient's serum tested positive by [facility] and supports a diagnosis of heparin-induced-thrombocytopenia\")       History reviewed  No pertinent surgical history  Family History   Problem Relation Age of Onset   • Diabetes Mother    • Arthritis Mother    • Breast cancer Mother    • Stroke Father    • Prostate cancer Paternal Grandfather    • Cancer Paternal Grandfather    • Breast cancer Paternal Grandmother        Social History     Socioeconomic History   • Marital status: Single     Spouse name: None   • Number of children: None   • Years of education: None   • Highest education level: None   Occupational History   • None   Tobacco Use   • Smoking status: Never   • Smokeless tobacco: Never   Vaping Use   • Vaping Use: Never used   Substance and Sexual Activity   • Alcohol use: Never   • Drug use: Never   • Sexual activity: Not Currently   Other Topics Concern   • None   Social History Narrative   • None     Social Determinants of Health     Financial Resource Strain: Not on file   Food Insecurity: No Food Insecurity   • Worried About Running Out of Food in the Last Year: Never true   • Ran Out of Food in the Last Year: Never true   Transportation Needs: No Transportation Needs   • Lack of Transportation (Medical): No   • Lack of Transportation (Non-Medical):  No   Physical Activity: Not on file   Stress: Not on file   Social Connections: Not on file   Intimate Partner Violence: Not on file   Housing Stability: Low Risk    • Unable to Pay " "for Housing in the Last Year: No   • Number of Places Lived in the Last Year: 1   • Unstable Housing in the Last Year: No       Current Outpatient Medications   Medication Sig Dispense Refill   • amLODIPine (NORVASC) 5 mg tablet Take 1 tablet (5 mg total) by mouth daily 90 tablet 0   • apixaban (Eliquis) 5 mg Take 1 tablet (5 mg total) by mouth 2 (two) times a day 180 tablet 0   • aspirin 81 mg chewable tablet Chew 1 tablet (81 mg total) daily 90 tablet 0   • atorvastatin (LIPITOR) 40 mg tablet Take 1 tablet (40 mg total) by mouth every evening 90 tablet 0   • calcium carbonate (TUMS) 500 mg chewable tablet Chew 2 tablets (1,000 mg total) 3 (three) times a day as needed for indigestion or heartburn  0   • famotidine (PEPCID) 20 mg tablet Take 1 tablet (20 mg total) by mouth every 12 (twelve) hours 60 tablet 0   • levETIRAcetam (KEPPRA) 750 mg tablet Take 1 tablet (750 mg total) by mouth every 12 (twelve) hours 60 tablet 0   • loratadine (CLARITIN) 10 mg tablet Take 1 tablet (10 mg total) by mouth daily as needed for allergies  0   • losartan (COZAAR) 25 mg tablet Take 1 tablet (25 mg total) by mouth daily 90 tablet 0   • metoprolol tartrate (LOPRESSOR) 25 mg tablet Take 1 tablet (25 mg total) by mouth every 12 (twelve) hours 90 tablet 0   • polyethylene glycol (GLYCOLAX) 17 GM/SCOOP powder Take 17 g by mouth daily as needed (Constipation) Can substitute for closest size available 507 g 0   • tamsulosin (FLOMAX) 0 4 mg Take 1 capsule (0 4 mg total) by mouth daily after dinner 30 capsule 0     No current facility-administered medications for this visit  Review of Systems     As noted in HPI    Objective      /78 (BP Location: Left arm, Patient Position: Sitting, Cuff Size: Large)   Pulse 78   Temp 97 8 °F (36 6 °C) (Temporal)   Ht 6' 1\" (1 854 m)   Wt 129 kg (284 lb)   SpO2 99%   BMI 37 47 kg/m²     Physical Exam  Vitals reviewed  Constitutional:       General: He is not in acute distress       " "Appearance: He is not ill-appearing or toxic-appearing  HENT:      Head: Normocephalic and atraumatic  Right Ear: External ear normal       Left Ear: External ear normal       Nose: Nose normal       Mouth/Throat:      Mouth: Mucous membranes are moist       Pharynx: Oropharynx is clear  Eyes:      Extraocular Movements: Extraocular movements intact  Conjunctiva/sclera: Conjunctivae normal       Pupils: Pupils are equal, round, and reactive to light  Cardiovascular:      Rate and Rhythm: Normal rate and regular rhythm  Pulses: Normal pulses  Heart sounds: Normal heart sounds  Pulmonary:      Effort: Pulmonary effort is normal  No respiratory distress  Breath sounds: No wheezing or rales  Abdominal:      General: Bowel sounds are normal       Palpations: Abdomen is soft  Tenderness: There is no abdominal tenderness  Musculoskeletal:      Cervical back: Neck supple  Right lower leg: No edema  Left lower leg: No edema  Lymphadenopathy:      Cervical: No cervical adenopathy  Skin:     General: Skin is warm and dry  Neurological:      Mental Status: He is alert and oriented to person, place, and time  Cranial Nerves: No facial asymmetry  Sensory: Sensation is intact  Motor: No weakness  Coordination: Coordination is intact  Gait: Gait normal       Comments: aphasia             Some portions of this record may have been generated with voice recognition software  There may be translation, syntax, or grammatical errors  Occasional wrong word or \"sound-a-like\" substitutions may have occurred due to the inherent limitations of the voice recognition software  Read the chart carefully and recognize, using context, where substations may have occurred  If you have any questions, please contact the dictating provider for clarification or correction, as needed    "

## 2023-05-22 NOTE — PROGRESS NOTES
Diabetic Foot Exam    Patient's shoes and socks removed  Right Foot/Ankle   Right Foot Inspection  Skin Exam: skin normal and skin intact  No dry skin, no warmth, no callus, no erythema, no maceration, no abnormal color, no pre-ulcer, no ulcer and no callus  Toe Exam: ROM and strength within normal limits  Sensory   Monofilament testing: intact    Left Foot/Ankle  Left Foot Inspection  Skin Exam: skin normal and skin intact  No dry skin, no warmth, no erythema, no maceration, normal color, no pre-ulcer, no ulcer and no callus  Toe Exam: ROM and strength within normal limits       Sensory   Monofilament testing: intact    Assign Risk Category  No deformity present  No loss of protective sensation  No weak pulses  Risk: 0

## 2023-05-24 NOTE — TELEPHONE ENCOUNTER
Post CVA Discharge Follow Up  Hospitalization: 3/30/23-4/8/23, 4/8/23-4/12/23 [facility], 4/12/23-4/19/23, 4/19/23-5/5/23    Called patient's mother, Max Velazquez  Since discharge, he denies experiencing any new or worsening stroke-like symptoms  She reports the patient is doing well  She reports he continues to have the following symptoms: right sided weakness and aphasia  She claims symptoms are improving  He is walking better and his speech is slightly better per her report  He is ambulating independently as well as preforming his own ADLs  She helps arrange his appointments and helps with tasks around the house  She reports the patient experiences shakiness from his waist up when he first wakes up in the morning and intermittently throughout the day  The shakiness occurs for a few seconds and it is mild  No urinary or bowel incontinence  No biting of the tongue  He is awake and alert/oriented when these episodes occur  She is unsure when these episodes started  Also reports how the patient experienced mild dizziness that lasted for a few minutes last night  She is contributing this dizziness to the recent pneumonia and tetanus vaccines the patient received on 5/22/23  Reports this was an isolated episode  Reviewed appointments - patient successfully followed up with PCP  He is scheduled to see neurology on 6/15/23  Reports patient is working with PT/OT/ST  Reviewed medications with her  Reports he is taking as prescribed with no medication side effects or signs of bleeding  She reports how the PCP refilled all of his medications, but deferred to the neurologist to refill  Will send a message to Dr Julia Wheatley     During this call, we reviewed stroke type, symptoms, personal risk factors and management, medications, and resources  Offered to mail stroke education booklet, she is agreeable to this  Confirmed mailing address and placed in mail      As for risk factors, she reports monitoring his BP at home  Last BP at home was 120/82  He is a non smoker  Encouraged for patient to follow a low salt / low cholesterol / diabetic friendly diet  Encouraged physical activity as tolerated in a safe manner  Addressed all of her questions  At the conclusion of the conversation, she denies having any further questions or concerns

## 2023-05-30 NOTE — TELEPHONE ENCOUNTER
Called Amparo to notify her of Dr Cruz Shows response  No answer  Left a voice message requesting for a return call  Provided the office's phone number  Need to confirm preferred pharmacy for keppra refill when she returns the call

## 2023-06-01 ENCOUNTER — TELEPHONE (OUTPATIENT)
Dept: NEUROLOGY | Facility: CLINIC | Age: 48
End: 2023-06-01

## 2023-06-05 DIAGNOSIS — I63.512 ACUTE ISCHEMIC LEFT MCA STROKE (HCC): ICD-10-CM

## 2023-06-07 ENCOUNTER — OFFICE VISIT (OUTPATIENT)
Dept: FAMILY MEDICINE CLINIC | Facility: MEDICAL CENTER | Age: 48
End: 2023-06-07
Payer: COMMERCIAL

## 2023-06-07 VITALS
HEART RATE: 88 BPM | WEIGHT: 279 LBS | SYSTOLIC BLOOD PRESSURE: 118 MMHG | DIASTOLIC BLOOD PRESSURE: 76 MMHG | TEMPERATURE: 98.8 F | OXYGEN SATURATION: 98 % | RESPIRATION RATE: 18 BRPM | BODY MASS INDEX: 36.98 KG/M2 | HEIGHT: 73 IN

## 2023-06-07 DIAGNOSIS — E11.59 TYPE 2 DIABETES MELLITUS WITH OTHER CIRCULATORY COMPLICATION, WITHOUT LONG-TERM CURRENT USE OF INSULIN (HCC): Primary | ICD-10-CM

## 2023-06-07 PROCEDURE — 99213 OFFICE O/P EST LOW 20 MIN: CPT | Performed by: STUDENT IN AN ORGANIZED HEALTH CARE EDUCATION/TRAINING PROGRAM

## 2023-06-07 NOTE — PROGRESS NOTES
"  [address] - Clinic Note  [facility], [place], 23     Graeme Stanley III MRN: [id number] : 1975 Age: 41 y o  Assessment/Plan     1  Type 2 diabetes mellitus with other circulatory complication, without long-term current use of insulin ([facility] [address] )    - Discussed general issues about diabetes pathophysiology and management  - Discussed foot care  - Reminded to get yearly retinal exam  -Blood sugar log reviewed today and blood sugars at goal, continue diet modifications at this time and exercise as tolerated  -Continue daily statin  -Continue daily ARB  -Return to office in 1 month and sooner as needed, check A1c at that time        Graeme Stanley III acknowledged understanding of treatment plan, all questions answered  Subjective      Graeme Stanley III is a 41 y o  male who presents for close follow-up diabetes  Patient presents with his mother who also contributes to history  They have diligently kept blood sugar log  Patient continues with physical therapy, and should he had some back spasming recently, using topical menthol products and heating pad  Patient has been adherent with his medications  They have upcoming appointments with Neurology and Hematology      The following portions of the patient's history were reviewed and updated as appropriate: allergies, current medications, past family history, past medical history, past social history, past surgical history and problem list      Past Medical History:   Diagnosis Date   • Allergic 2023   • Hypertension 2023   • Hypertensive emergency 2023   • Obesity 2023   • Seizures ([facility] [address] ) 2023   • St[facility] ) 2023   • Visual impairment 2023       Allergies   Allergen Reactions   • Heparin Other (See Comments)     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   (\"The patient's serum tested positive by [facility] and supports a diagnosis of heparin-induced-thrombocytopenia\")       History " reviewed  No pertinent surgical history  Family History   Problem Relation Age of Onset   • Diabetes Mother    • Arthritis Mother    • Breast cancer Mother    • Stroke Father    • Prostate cancer Paternal Grandfather    • Cancer Paternal Grandfather    • Breast cancer Paternal Grandmother        Social History     Socioeconomic History   • Marital status: Single     Spouse name: None   • Number of children: None   • Years of education: None   • Highest education level: None   Occupational History   • None   Tobacco Use   • Smoking status: Never   • Smokeless tobacco: Never   Vaping Use   • Vaping Use: Never used   Substance and Sexual Activity   • Alcohol use: Never   • Drug use: Never   • Sexual activity: Not Currently   Other Topics Concern   • None   Social History Narrative   • None     Social Determinants of Health     Financial Resource Strain: Not on file   Food Insecurity: No Food Insecurity (4/13/2023)    Hunger Vital Sign    • Worried About Running Out of Food in the Last Year: Never true    • Ran Out of Food in the Last Year: Never true   Transportation Needs: No Transportation Needs (4/13/2023)    PRAPARE - Transportation    • Lack of Transportation (Medical): No    • Lack of Transportation (Non-Medical):  No   Physical Activity: Not on file   Stress: Not on file   Social Connections: Not on file   Intimate Partner Violence: Not on file   Housing Stability: Low Risk  (4/13/2023)    Housing Stability Vital Sign    • Unable to Pay for Housing in the Last Year: No    • Number of Places Lived in the Last Year: 1    • Unstable Housing in the Last Year: No       Current Outpatient Medications   Medication Sig Dispense Refill   • amLODIPine (NORVASC) 5 mg tablet Take 1 tablet (5 mg total) by mouth daily 90 tablet 0   • apixaban (Eliquis) 5 mg Take 1 tablet (5 mg total) by mouth 2 (two) times a day 180 tablet 0   • aspirin 81 mg chewable tablet Chew 1 tablet (81 mg total) daily 90 tablet 0   • atorvastatin "(LIPITOR) 40 mg tablet Take 1 tablet (40 mg total) by mouth every evening 90 tablet 0   • calcium carbonate (TUMS) 500 mg chewable tablet Chew 2 tablets (1,000 mg total) 3 (three) times a day as needed for indigestion or heartburn  0   • levETIRAcetam (KEPPRA) 750 mg tablet Take 1 tablet (750 mg total) by mouth every 12 (twelve) hours 180 tablet 3   • loratadine (CLARITIN) 10 mg tablet Take 1 tablet (10 mg total) by mouth daily as needed for allergies  0   • losartan (COZAAR) 25 mg tablet Take 1 tablet (25 mg total) by mouth daily 90 tablet 0   • metoprolol tartrate (LOPRESSOR) 25 mg tablet Take 1 tablet (25 mg total) by mouth every 12 (twelve) hours 90 tablet 0   • polyethylene glycol (GLYCOLAX) 17 GM/SCOOP powder Take 17 g by mouth daily as needed (Constipation) Can substitute for closest size available 507 g 0   • tamsulosin (FLOMAX) 0 4 mg Take 1 capsule (0 4 mg total) by mouth daily after dinner 30 capsule 0   • famotidine (PEPCID) 20 mg tablet Take 1 tablet (20 mg total) by mouth every 12 (twelve) hours (Patient not taking: Reported on 6/7/2023) 60 tablet 0     No current facility-administered medications for this visit  Review of Systems     As noted in HPI    Objective      /76 (BP Location: Left arm, Patient Position: Sitting, Cuff Size: Large)   Pulse 88   Temp 98 8 °F (37 1 °C)   Resp 18   Ht 6' 1\" (1 854 m)   Wt 127 kg (279 lb)   SpO2 98%   BMI 36 81 kg/m²     Physical Exam  Vitals reviewed  Constitutional:       General: He is not in acute distress  Appearance: He is obese  He is not ill-appearing or toxic-appearing  HENT:      Head: Normocephalic and atraumatic  Mouth/Throat:      Mouth: Mucous membranes are moist       Pharynx: Oropharynx is clear  Eyes:      Conjunctiva/sclera: Conjunctivae normal    Cardiovascular:      Rate and Rhythm: Normal rate and regular rhythm  Pulses: Normal pulses  Heart sounds: Normal heart sounds     Pulmonary:      Effort: " "Pulmonary effort is normal       Breath sounds: Normal breath sounds  Abdominal:      General: Bowel sounds are normal       Palpations: Abdomen is soft  Tenderness: There is no abdominal tenderness  Musculoskeletal:      Right lower leg: No edema  Left lower leg: No edema  Skin:     General: Skin is warm and dry  Neurological:      Mental Status: He is alert  Comments: aphasia             Some portions of this record may have been generated with voice recognition software  There may be translation, syntax, or grammatical errors  Occasional wrong word or \"sound-a-like\" substitutions may have occurred due to the inherent limitations of the voice recognition software  Read the chart carefully and recognize, using context, where substations may have occurred  If you have any questions, please contact the dictating provider for clarification or correction, as needed    "

## 2023-06-09 ENCOUNTER — TELEPHONE (OUTPATIENT)
Dept: NEUROLOGY | Facility: CLINIC | Age: 48
End: 2023-06-09

## 2023-06-09 DIAGNOSIS — I63.512 ACUTE ISCHEMIC LEFT MCA STROKE (HCC): Primary | ICD-10-CM

## 2023-06-09 NOTE — TELEPHONE ENCOUNTER
Post CVA Discharge Follow Up  Hospitalization: 3/30/23-4/8/23, 4/8/23-4/12/23 [facility], 4/12/23-4/19/23, 4/19/23-5/5/23    Called patient and spoke with his mother, Dian Humphrey  Since discharge, she denies the patient experiencing any new or worsening stroke-like symptoms  She reports he continues to have the following symptoms: right sided weakness and aphasia  Reports improvement in symptoms since discharge  Reports he has been doing well, besides experiencing new back pain with PT exercises that focus on the lower back and if he sits for a prolonged time  She is unsure if the pain radiates down his leg(s)  He rates the pain a 5-6 out of 10 on the pain scale  She reports how the physical therapist instructed for the patient to notify the neurologist  They followed up with the PCP and it was recommended for conservative treatment such as heat, icy hot, etc      She also reports how she noticed the other day how the patient sometimes drool out of his right side of his mouth  Denies any new facial droop  He is able to clear his secretions and able to eat without any difficulty  He is ambulating independently as well as preforming his own ADLs  Patient manages his own medications, appointments, and affairs  Reviewed appointments - patient successfully followed up with PCP  Cardiology referral was placed by PCP  Patient scheduled for the following: neurology on 6/15/23  Reports the patient is participating with PT/OT/ST  Reviewed medications with her  There have not been any medication changes since discharge from the hospital  Reports having no difficulties obtaining medications  Reports he is taking as prescribed with no medication side effects or signs of bleeding  During this call, we reviewed stroke type, symptoms, personal risk factors and management, medications, and resources  Patient verbalizes understanding  As for risk factors, she reports monitoring his BP and BS at home   Reports how his BP and BS are WNL  Reports how the PCP is very happy with his BS log  BP at PCP office visit on 6/7/23 was 118/76  He is a non smoker  Encouraged patient to follow a low salt / low cholesterol / diabetic friendly diet  All of her questions were addressed  At the conclusion of the conversation, she denies having any further questions or concerns

## 2023-06-12 ENCOUNTER — HOSPITAL ENCOUNTER (EMERGENCY)
Facility: HOSPITAL | Age: 48
Discharge: HOME | End: 2023-06-12
Attending: EMERGENCY MEDICINE
Payer: COMMERCIAL

## 2023-06-12 VITALS
RESPIRATION RATE: 18 BRPM | OXYGEN SATURATION: 99 % | TEMPERATURE: 98.1 F | SYSTOLIC BLOOD PRESSURE: 146 MMHG | DIASTOLIC BLOOD PRESSURE: 80 MMHG | HEART RATE: 80 BPM

## 2023-06-12 DIAGNOSIS — Z71.1 WORRIED WELL: Primary | ICD-10-CM

## 2023-06-12 PROCEDURE — 99284 EMERGENCY DEPT VISIT MOD MDM: CPT

## 2023-06-12 PROCEDURE — 99283 EMERGENCY DEPT VISIT LOW MDM: CPT | Performed by: EMERGENCY MEDICINE

## 2023-06-12 NOTE — TELEPHONE ENCOUNTER
TT sent to Dr Manuel Mistry      She recommended for the patient to present to the ER due to the intermittent drooling on the right side of his mouth and to follow up with PCP regarding back pain  Called patient's primary phone number listed on file (which is also listed as the patient's mother phone number)  No answer  Left a detailed voice message advising patient needs to be evaluated in the ER immediately  Requested for a return call to confirm she received the message  Advised how this RN will call the cell phone number listed on file as well  Provided the office's phone number  Called cell phone number listed on file  No answer  Left a detailed voice message advising patient needs to be evaluated in the ER immediately  Requested for a return call to confirm they received the message  Provided the office's phone number

## 2023-06-12 NOTE — ED PROVIDER NOTES
History  Chief Complaint   Patient presents with   • Drooling     Pt arrives with mother, had a CVA March 30th, 2023. He has had expressive aphasia and originally had R sided weakness which has improved. His mother noticed last week that he has intermittent drooling from R side of his mouth. With the help of his mother pt reports this typically occurs more later in the day. Denies new weakness or headaches. Denies CP or SOB     47-year-old male with history of stroke on March 30 presents for evaluation. Patient's mom called neurology office and said she noticed that patient has slight drooling coming from the right side of the mouth which she has had ever since the stroke. RN sent the patient to the ER for evaluation. Patient denies any headache. No nausea vomiting. No worsening symptoms. Prior to Admission Medications   Prescriptions Last Dose Informant Patient Reported? Taking?    amLODIPine (NORVASC) 5 mg tablet   No No   Sig: Take 1 tablet (5 mg total) by mouth daily   apixaban (Eliquis) 5 mg   No No   Sig: Take 1 tablet (5 mg total) by mouth 2 (two) times a day   aspirin 81 mg chewable tablet   No No   Sig: Chew 1 tablet (81 mg total) daily   atorvastatin (LIPITOR) 40 mg tablet   No No   Sig: Take 1 tablet (40 mg total) by mouth every evening   calcium carbonate (TUMS) 500 mg chewable tablet   No No   Sig: Chew 2 tablets (1,000 mg total) 3 (three) times a day as needed for indigestion or heartburn   famotidine (PEPCID) 20 mg tablet   No No   Sig: Take 1 tablet (20 mg total) by mouth every 12 (twelve) hours   Patient not taking: Reported on 6/7/2023   levETIRAcetam (KEPPRA) 750 mg tablet   No No   Sig: Take 1 tablet (750 mg total) by mouth every 12 (twelve) hours   loratadine (CLARITIN) 10 mg tablet   No No   Sig: Take 1 tablet (10 mg total) by mouth daily as needed for allergies   losartan (COZAAR) 25 mg tablet   No No   Sig: Take 1 tablet (25 mg total) by mouth daily   metoprolol tartrate (LOPRESSOR) 25 mg tablet   No No   Sig: Take 1 tablet (25 mg total) by mouth every 12 (twelve) hours   polyethylene glycol (GLYCOLAX) 17 GM/SCOOP powder   No No   Sig: Take 17 g by mouth daily as needed (Constipation) Can substitute for closest size available   tamsulosin (FLOMAX) 0.4 mg   No No   Sig: Take 1 capsule (0.4 mg total) by mouth daily after dinner      Facility-Administered Medications: None       Past Medical History:   Diagnosis Date   • Allergic 04/01/2023   • Hypertension 03/31/2023   • Hypertensive emergency 03/30/2023   • Obesity 03/31/2023   • Seizures ([address]) 03/31/2023   • Stroke ([address]) 03/30/2023   • Visual impairment 03/30/2023       History reviewed. No pertinent surgical history. Family History   Problem Relation Age of Onset   • Diabetes Mother    • Arthritis Mother    • Breast cancer Mother    • Stroke Father    • Prostate cancer Paternal Grandfather    • Cancer Paternal Grandfather    • Breast cancer Paternal Grandmother      I have reviewed and agree with the history as documented. E-Cigarette/Vaping   • E-Cigarette Use Never User      E-Cigarette/Vaping Substances   • Nicotine No    • THC No    • CBD No    • Flavoring No    • Other No    • Unknown No      Social History     Tobacco Use   • Smoking status: Never   • Smokeless tobacco: Never   Vaping Use   • Vaping Use: Never used   Substance Use Topics   • Alcohol use: Never   • Drug use: Never       Review of Systems   Constitutional: Negative for chills and fever. HENT: Negative for ear pain and sore throat. Eyes: Negative for pain and visual disturbance. Respiratory: Negative for cough and shortness of breath. Cardiovascular: Negative for chest pain and palpitations. Gastrointestinal: Negative for abdominal pain and vomiting. Genitourinary: Negative for dysuria and hematuria. Musculoskeletal: Negative for arthralgias and back pain. Skin: Negative for color change and rash.    Allergic/Immunologic: Negative for environmental allergies. Neurological: Negative for seizures and syncope. Psychiatric/Behavioral: Negative for agitation. All other systems reviewed and are negative. Physical Exam  Physical Exam  Vitals and nursing note reviewed. Constitutional:       General: He is not in acute distress. Appearance: He is well-developed. HENT:      Head: Normocephalic and atraumatic. Mouth/Throat:      Mouth: Mucous membranes are moist.      Pharynx: No oropharyngeal exudate or posterior oropharyngeal erythema. Eyes:      Extraocular Movements: Extraocular movements intact. Conjunctiva/sclera: Conjunctivae normal.      Pupils: Pupils are equal, round, and reactive to light. Cardiovascular:      Rate and Rhythm: Normal rate and regular rhythm. Heart sounds: No murmur heard. Pulmonary:      Effort: Pulmonary effort is normal. No respiratory distress. Breath sounds: Normal breath sounds. Abdominal:      Palpations: Abdomen is soft. Tenderness: There is no abdominal tenderness. Musculoskeletal:         General: No swelling. Normal range of motion. Cervical back: Neck supple. Skin:     General: Skin is warm and dry. Capillary Refill: Capillary refill takes less than 2 seconds. Neurological:      Mental Status: He is alert. Comments: Chronic right-sided weakness slight if any right-sided facial droop at baseline since stroke.     Psychiatric:         Mood and Affect: Mood normal.         Vital Signs  ED Triage Vitals [06/12/23 1059]   Temperature Pulse Respirations Blood Pressure SpO2   98.1 °F (36.7 °C) 80 18 146/80 99 %      Temp Source Heart Rate Source Patient Position - Orthostatic VS BP Location FiO2 (%)   Oral -- -- -- --      Pain Score       No Pain           Vitals:    06/12/23 1059   BP: 146/80   Pulse: 80         Visual Acuity      ED Medications  Medications - No data to display    Diagnostic Studies  Results Reviewed     None                 No orders to display              Procedures  Procedures         ED Course        Patient is at baseline neuro status. He is not drooling on my exam.  Mom states everything has been stable since his stroke. No indication for emergent imaging or further work-up at this time. We will refer back to neurology for routine care. Extensive return precautions provided to mom                                      Medical Decision Making  Patient is at baseline neuro status. He is not drooling on my exam.  Mom states everything has been stable since his stroke. No indication for emergent imaging or further work-up at this time. We will refer back to neurology for routine care. Extensive return precautions provided to mom        Disposition  Final diagnoses:   Worried well     Time reflects when diagnosis was documented in both MDM as applicable and the Disposition within this note     Time User Action Codes Description Comment    6/12/2023  1:19 PM Ron Meraz Add [Z71.1] Worried well       ED Disposition     ED Disposition   Discharge    Condition   Stable    Date/Time   Mon Jun 12, 2023  1:19 PM    Comment   Sierra Rowe III discharge to home/self care. Follow-up Information     Follow up With Specialties Details Why Contact Info Additional Information    Ne[facility]   [address] [place] [address]  [phone] [facility], [address] [place] [address], [address] [place] [address], [address] [facility]          Patient's Medications   Discharge Prescriptions    No medications on file       No discharge procedures on file.     PDMP Review       Value Time User    PDMP Reviewed  Yes 4/12/2023 12:50 AM Tri Goode MD          ED Provider  Electronically Signed by           Ron Meraz DO  06/12/23 9233

## 2023-06-12 NOTE — TELEPHONE ENCOUNTER
Spoke w/pt's mom Devi Guzmán  She states patient seems ok now  She was initially reluctant to take pt to ER  States today is the only day patient doesn't have appts and she does have appts that she will now have to cancel  Advised mother we can't  what if anything is going on just by pt's external appearance  With the drooling she reported, pt really needs acute evaluation in ER  Mother agreeable  She will take [facility] to [facility]  [id number] order placed

## 2023-06-13 NOTE — TELEPHONE ENCOUNTER
Per ED discharge note:    Medical Decision Making  Patient is at baseline neuro status  He is not drooling on my exam   Mom states everything has been stable since his stroke  No indication for emergent imaging or further work-up at this time  We will refer back to neurology for routine care  Extensive return precautions provided to mom

## 2023-06-15 ENCOUNTER — OFFICE VISIT (OUTPATIENT)
Dept: NEUROLOGY | Facility: CLINIC | Age: 48
End: 2023-06-15
Payer: COMMERCIAL

## 2023-06-15 VITALS
HEIGHT: 73 IN | DIASTOLIC BLOOD PRESSURE: 80 MMHG | BODY MASS INDEX: 36.84 KG/M2 | WEIGHT: 278 LBS | SYSTOLIC BLOOD PRESSURE: 120 MMHG | HEART RATE: 79 BPM

## 2023-06-15 DIAGNOSIS — M54.9 BACK PAIN: ICD-10-CM

## 2023-06-15 DIAGNOSIS — I63.512 ACUTE ISCHEMIC LEFT MCA STROKE (HCC): Primary | ICD-10-CM

## 2023-06-15 PROBLEM — R47.01 GLOBAL APHASIA: Status: ACTIVE | Noted: 2023-06-15

## 2023-06-15 PROCEDURE — 99215 OFFICE O/P EST HI 40 MIN: CPT | Performed by: PSYCHIATRY & NEUROLOGY

## 2023-06-15 NOTE — PATIENT INSTRUCTIONS
- BP goal <130/80  BP management per PCP   - A1c goal <7%  Glucose management per PCP  - LDL goal <70  Continue statin  - Repeat thrombosis panel, pANCA, cANCA, MTHFR mutation, homocysteine   - Continue ASA and Eliquis  - Continue Keppra for now  Repeat rEEG in 3 months  If normal, consider d/c Keppra  If abnormal, plan to continue Keppra  - Follow up with cardiology for Zio patch/Loop recorder   - Order MRI thoracic and lumbar spine w/wo contrast  - Referral to OT for vision therapy  - Follow up in 3 months  -  will help with FMLA paperwork, discussed likely 2 week turn around

## 2023-06-15 NOTE — PROGRESS NOTES
Patient ID: Domitila Carter is a 57 y o  male  Assessment/Plan:    Back pain  · Back pain prior to stroke in 3/30/23  Has been participating actively in PT/OT since  However, back pain continually interfering with therapy progress  Mild relief with Tylenol  Given persistent back pain s/p conservative management for months, PT/OT recommended MRI imaging for further evaluation  Plan:  · MRI T-spine and L-spine w/wo contrast ordered    Acute ischemic left MCA stroke (HCC)  · Acute L MCA stroke seen on neuroimaging on 3/30/23  Patient with R-sided weakness and aphasia, also developed balancing issue  Has been working with PT/OT/ST for months, weakness now completely resolved; however, still with persistent through improving aphasia and balance issues  · Appears to be global aphasia presentation, including issues with fluency, naming, comprehension, and reading  No dysarthria  · Noted also to have b/l DVTs and saddle embolus of pulmonary artery at 3/30/23 hospitalization  · Currently on Eliquis 5mg BID, ASA 81mg, Lipitor 40mg, Keppra 750mg BID      - MRI brain, 3/31/23: Large L MCA infarct L basal ganglia involvement  Superimposed microhemorrhage is noted  Mass effect on the left lateral ventricle noted without midline shift   - EEG, 4/1/23: Abnormal  Due to L hemispheric slowing maximal over the L temporal region and polymorphic theta-delta activity over the L frontal-midline region  These findings indicate L hemispheric cerebral dysfunciton, likely due to a structural lesoin over the L temporal and frontal-midline region   - EKG, 4/10: NSR  - Echo, 4/12: EF 55%, normal LA, mildly dilated RA  - SOLITARIO: No PFO or thrombus    BP, 6/15/23: 118/76  A1c, 3/31/23: 8 0%  LDL, 3/31/23: 100    Plan:  - BP goal <130/80  Appears to be at goal at today's office visit  Continue BP management per PCP   - A1c goal <7%  Glucose management per PCP  - LDL goal <70  Continue statin  - Repeat thrombosis panel   Also check pANCA, cANCA, MTHFR mutation, homocysteine   - Continue ASA and Eliquis  - Continue Keppra for now  Repeat rEEG in 3 months  If normal, consider d/c Keppra  If abnormal, plan to continue Keppra  - Follow up with cardiology for Zio patch/Loop recorder   - Order MRI thoracic and lumbar spine w/wo contrast  - Referral to OT for vision therapy  - Follow up in 3 months  -  will help with FMLA paperwork, discussed likely 2 week turn around  Diagnoses and all orders for this visit:    Acute ischemic left MCA stroke [facility])  -     Ambulatory Referral to Neurology  -     Thrombosis Panel; Future  -     Homocysteine, serum; Future  -     MTHFR mutation; Future  -     Ambulatory Referral to Cardiology; Future  -     EEG awake or drowsy routine; Future  -     Ambulatory Referral to Occupational Therapy; Future  -     Anti-neutrophilic cytoplasmic antibody; Future    Back pain  -     MRI thoracic spine with and without contrast; Future  -     MRI lumbar spine with and without contrast; Future           Subjective:    Gallito Clemens III is a 50YO M with h/o HTN, T2DM, b/l DVT, saddle embolus of pulmonary artery who presents for follow-up of recent hospitalization on 3/30/23 for acute ischemic L MCA stroke with R-sided weakness (RUE, RLE) and aphasia  Currently on Eliquis 5mg BID, ASA 81mg, Lipitor 40mg, Keppra 750mg BID  Was told to continue Keppra until outpatient f/u given location, temporal slowing, and extent of lesion  Thrombosis panel with abnormal antithrombin, Protein C/S activity, but unclear significance in s/o acute stroke  Completed acute comprehensive interdisciplinary inpatient rehab following hosp discharge  Also CTA imaging c/f saddle pulmonary embolus, likely secondary to b/l LE DVTs  Today patient presents to neurology office with his mother, who assisted with history taking  Patient still with balance and speech issues since stroke, although significantly improving since stroke  Weakness resolved   Also "with reported tremors in b/l hands noticed in the mornings that resolves  No concerns for seizure-like activity at home  Still works with PT/OT/ST  Requested MRI spine by PT/OT - back pain not improving with therapy and interfering with therapy progress, pain level 5  Reports back pain in thoracic and lumbar spines - since before stroke; occasionally takes Tylenol with some pain improvement  Family history of stroke in father  Occupation: Work-  Has been out of work since the stroke  - MRI brain, 3/31/23: Large L MCA infarct L basal ganglia involvement  Superimposed microhemorrhage is noted  Mass effect on the left lateral ventricle noted without midline shift   - EEG, 4/1/23: Abnormal  Due to L hemispheric slowing maximal over the L temporal region and polymorphic theta-delta activity over the L frontal-midline region  These findings indicate L hemispheric cerebral dysfunciton, likely due to a structural lesoin over the L temporal and frontal-midline region   - EKG, 4/10: NSR  - Echo, 4/12: EF 55%, normal LA, mildly dilated RA  - SOLITARIO: No PFO or thrombus    BP, 6/15/23: 118/76  A1c, 3/31/23: 8 0%  LDL, 3/31/23: 100          The following portions of the patient's history were reviewed and updated as appropriate: allergies, current medications, past family history, past medical history, past social history, past surgical history and problem list          Objective:    Blood pressure 120/80, pulse 79, height 6' 1\" (1 854 m), weight 126 kg (278 lb)  Physical Exam  Vitals and nursing note reviewed  Constitutional:       General: He is not in acute distress  Appearance: Normal appearance  He is not ill-appearing, toxic-appearing or diaphoretic  HENT:      Head: Normocephalic and atraumatic        Right Ear: External ear normal       Left Ear: External ear normal       Nose: Nose normal       Mouth/Throat:      Mouth: Mucous membranes are moist    Eyes:      General: Lids are normal        " Right eye: No discharge  Left eye: No discharge  Extraocular Movements: Extraocular movements intact  Conjunctiva/sclera: Conjunctivae normal       Pupils: Pupils are equal, round, and reactive to light  Cardiovascular:      Rate and Rhythm: Normal rate  Heart sounds: Normal heart sounds  Pulmonary:      Effort: Pulmonary effort is normal  No respiratory distress  Musculoskeletal:         General: Normal range of motion  Cervical back: Normal range of motion and neck supple  Right lower leg: No edema  Left lower leg: No edema  Skin:     General: Skin is warm and dry  Neurological:      Mental Status: He is alert  Mental status is at baseline  Cranial Nerves: Cranial nerve deficit (Deficit of temporal field of R eye since stroke) present  No dysarthria  Sensory: No sensory deficit  Motor: Motor strength is normal No weakness  Coordination: Coordination normal       Deep Tendon Reflexes: Reflexes abnormal (diffuse hyporeflexia)  Reflex Scores:       Tricep reflexes are 1+ on the right side and 2+ on the left side  Bicep reflexes are 1+ on the right side and 2+ on the left side  Brachioradialis reflexes are 1+ on the right side and 2+ on the left side  Patellar reflexes are 1+ on the right side and 1+ on the left side  Achilles reflexes are 1+ on the right side and 1+ on the left side  Psychiatric:         Mood and Affect: Mood normal          Behavior: Behavior normal          Neurological Exam  Mental Status  Alert  Orientation: Orientation examination limited by aphasia  no dysarthria present  Speech: Significant global aphasia  Global aphasia present  Language: Issues with fluency, naming, reading, comprehension  Attention and concentration are normal     Cranial Nerves  CN II: Visual acuity is normal  Deficit of visual temporal fields, only in R eye  Left normal visual field    CN III, IV, VI: Extraocular movements intact bilaterally  Normal lids and orbits bilaterally  Pupils equal round and reactive to light bilaterally  CN V: Facial sensation is normal   CN VII: Full and symmetric facial movement  CN VIII: Hearing is normal   CN IX, X: Palate elevates symmetrically  Normal gag reflex  CN XI: Shoulder shrug strength is normal   CN XII: Tongue midline without atrophy or fasciculations  Motor  Normal muscle bulk throughout  No fasciculations present  Normal muscle tone  No abnormal involuntary movements  Strength is 5/5 throughout all four extremities  Sensory  Light touch is normal in upper and lower extremities  Reflexes                                            Right                      Left  Brachioradialis                    1+                         2+  Biceps                                 1+                         2+  Triceps                                1+                         2+  Patellar                                1+                         1+  Achilles                                1+                         1+    Coordination  Right: Finger-to-nose abnormality: Rapid alternating movement normal  Heel-to-shin normal Left: Finger-to-nose abnormality: Heel-to-shin normal   F2N coordination with tremor all throughout, b/l  ROS:    Review of Systems   Constitutional: Negative  Negative for appetite change and fever  HENT: Negative  Negative for hearing loss, tinnitus, trouble swallowing and voice change  Eyes: Positive for visual disturbance  Negative for photophobia and pain  Respiratory: Negative  Negative for shortness of breath  Cardiovascular: Negative  Negative for palpitations  Gastrointestinal: Negative  Negative for nausea and vomiting  Endocrine: Negative  Negative for cold intolerance  Genitourinary: Negative  Negative for dysuria, frequency and urgency  Musculoskeletal: Positive for back pain and gait problem  Negative for myalgias and neck pain  Skin: Negative  Negative for rash  Allergic/Immunologic: Negative  Neurological: Positive for speech difficulty  Negative for dizziness, tremors, seizures, syncope, facial asymmetry, weakness, light-headedness, numbness and headaches  Hematological: Negative  Does not bruise/bleed easily  Psychiatric/Behavioral: Negative  Negative for confusion, hallucinations and sleep disturbance

## 2023-06-15 NOTE — ASSESSMENT & PLAN NOTE
· Back pain prior to stroke in 3/30/23  Has been participating actively in PT/OT since  However, back pain continually interfering with therapy progress  Mild relief with Tylenol  Given persistent back pain s/p conservative management for months, PT/OT recommended MRI imaging for further evaluation      Plan:  · MRI T-spine and L-spine w/wo contrast ordered

## 2023-06-15 NOTE — ASSESSMENT & PLAN NOTE
· Acute L MCA stroke seen on neuroimaging on 3/30/23  Patient with R-sided weakness and aphasia, also developed balancing issue  Has been working with PT/OT/ST for months, weakness now completely resolved; however, still with persistent through improving aphasia and balance issues  · Appears to be global aphasia presentation, including issues with fluency, naming, comprehension, and reading  No dysarthria  · Noted also to have b/l DVTs and saddle embolus of pulmonary artery at 3/30/23 hospitalization  · Currently on Eliquis 5mg BID, ASA 81mg, Lipitor 40mg, Keppra 750mg BID      - MRI brain, 3/31/23: Large L MCA infarct L basal ganglia involvement  Superimposed microhemorrhage is noted  Mass effect on the left lateral ventricle noted without midline shift   - EEG, 4/1/23: Abnormal  Due to L hemispheric slowing maximal over the L temporal region and polymorphic theta-delta activity over the L frontal-midline region  These findings indicate L hemispheric cerebral dysfunciton, likely due to a structural lesoin over the L temporal and frontal-midline region   - EKG, 4/10: NSR  - Echo, 4/12: EF 55%, normal LA, mildly dilated RA  - SOLITARIO: No PFO or thrombus    BP, 6/15/23: 118/76  A1c, 3/31/23: 8 0%  LDL, 3/31/23: 100    Plan:  - BP goal <130/80  Appears to be at goal at today's office visit  Continue BP management per PCP   - A1c goal <7%  Glucose management per PCP  - LDL goal <70  Continue statin  - Repeat thrombosis panel  Also check pANCA, cANCA, MTHFR mutation, homocysteine   - Continue ASA and Eliquis  - Continue Keppra for now  Repeat rEEG in 3 months  If normal, consider d/c Keppra  If abnormal, plan to continue Keppra  - Follow up with cardiology for Zio patch/Loop recorder   - Order MRI thoracic and lumbar spine w/wo contrast  - Referral to OT for vision therapy  - Follow up in 3 months  -  will help with FMLA paperwork, discussed likely 2 week turn around

## 2023-06-26 DIAGNOSIS — R33.9 URINARY RETENTION: ICD-10-CM

## 2023-06-26 DIAGNOSIS — I10 PRIMARY HYPERTENSION: ICD-10-CM

## 2023-06-26 RX ORDER — TAMSULOSIN HYDROCHLORIDE 0.4 MG/1
0.4 CAPSULE ORAL
Qty: 30 CAPSULE | Refills: 0 | Status: SHIPPED | OUTPATIENT
Start: 2023-06-26

## 2023-06-27 ENCOUNTER — CONSULT (OUTPATIENT)
Dept: HEMATOLOGY ONCOLOGY | Facility: CLINIC | Age: 48
End: 2023-06-27
Payer: COMMERCIAL

## 2023-06-27 VITALS
HEART RATE: 96 BPM | DIASTOLIC BLOOD PRESSURE: 86 MMHG | HEIGHT: 73 IN | SYSTOLIC BLOOD PRESSURE: 120 MMHG | TEMPERATURE: 97.8 F | BODY MASS INDEX: 36.18 KG/M2 | RESPIRATION RATE: 16 BRPM | WEIGHT: 273 LBS | OXYGEN SATURATION: 99 %

## 2023-06-27 DIAGNOSIS — I26.92 ACUTE SADDLE PULMONARY EMBOLISM WITHOUT ACUTE COR PULMONALE (HCC): ICD-10-CM

## 2023-06-27 DIAGNOSIS — I63.512 ACUTE ISCHEMIC LEFT MCA STROKE (HCC): ICD-10-CM

## 2023-06-27 DIAGNOSIS — D50.8 HYPOCHROMIC ERYTHROCYTES: ICD-10-CM

## 2023-06-27 DIAGNOSIS — D75.829 HIT (HEPARIN-INDUCED THROMBOCYTOPENIA) (HCC): Primary | ICD-10-CM

## 2023-06-27 DIAGNOSIS — I82.403 DVT OF LOWER EXTREMITY, BILATERAL (HCC): ICD-10-CM

## 2023-06-27 PROCEDURE — 99205 OFFICE O/P NEW HI 60 MIN: CPT | Performed by: PHYSICIAN ASSISTANT

## 2023-06-27 NOTE — PROGRESS NOTES
[facility] - Hematology & Medical Oncology  Outpatient Visit Encounter Note      Brant Flores III 57 y o  male [id number] Date:  6/27/2023    HEMATOLOGICAL HISTORY        Clotting History Denies prior to HPI; no family hx of clotting or clotting disorder   Bleeding History Denies; Denies family hx   Cancer History Denies   Family Cancer History Mother breast cancer dx 79 no genetic mutation done, maternal grandfather colon cancer dx 76s which eventual metastasized   H/O Blood/Plt Transfusion Denies   Tobacco Use Denies   Recreational Drug Use Denies   ETOH Denies   Occupation Line flow coordinated at [employer]     Testosterone use hx: denies   No occupational hazards/exposures     Paternal grandfather with hx of stroke  Father had hx of stroke not same type approximately 7 years ago  68 Zachary Shea III is a 57 y o  here for new consultation with me today  The patient is referred by [facility]  and the reason for consultation is:  I60 5 (ICD-10-CM) - Acute ischemic left MCA stroke (HCC)   I26 92 (ICD-10-CM) - Acute saddle pulmonary embolism without acute cor pulmonale (HCC)   I82 403 (ICD-10-CM) - DVT of lower extremity, bilateral (HCC)   D75 829 (ICD-10-CM) - HIT (heparin-induced thrombocytopenia) ([facility] [address] )     In review of the chart and talking with the patient, he initially presented to emergency department 3/30 2023 via EMS was unresponsive on arrival   Per trauma H&P patient's mother heard a noise and found him on the floor unresponsive in his bedroom which provoked her to call EMS  Patient was then intubated, he was not protecting his airway, GCS reported was 9  No tPA administered due to being out of time window  Patient transferred to [facility] for neuro possible thrombectomy after imaging findings of left MCA inferior M2 division occlusion    Patient had neurosurgical evaluation at [facility], ASA, statin, clearance for chemical DVT prophylaxis no "acute neurosurgical intervention at this time  Patient was on subcutaneous heparin every 8 hours for DVT prophylaxis which started on 3/30/2023 per chart review  Patient eventually discharged to [address] was eventually found to have bilateral DVT, saddle PE and overall concern for HIT  Today, patient states he is doing well on anticoagulation currently  No signs of bleeding in stool or urine no dark tarry stools, no excessive bruising  Patient has some residual aphasia but for the most part is doing very well  Patient's mother is here to help with HPI and ROS  They state that his therapy is going well, working hard  No significant motor defects able to walk well, able to button clothes among other fine motor skills  Denies any chest pain, shortness of breath, abdominal pain, extremity edema, nausea, vomiting, diarrhea, constipation, B symptoms  I have reviewed the relevant past medical, surgical, social and family history  I have also reviewed allergies and medications for this patient  Review of Systems  Review of Systems   All other systems reviewed and are negative  OBJECTIVE     Physical Exam  Vitals:    06/27/23 1351   BP: 120/86   BP Location: Left arm   Patient Position: Sitting   Cuff Size: Large   Pulse: 96   Resp: 16   Temp: 97 8 °F (36 6 °C)   TempSrc: Temporal   SpO2: 99%   Weight: 124 kg (273 lb)   Height: 6' 1\" (1 854 m)       Physical Exam  Vitals reviewed  Constitutional:       Appearance: Normal appearance  He is not ill-appearing or toxic-appearing  HENT:      Head: Normocephalic and atraumatic  Eyes:      General: No scleral icterus  Cardiovascular:      Rate and Rhythm: Normal rate and regular rhythm  Heart sounds: Normal heart sounds  Pulmonary:      Effort: Pulmonary effort is normal  No respiratory distress  Breath sounds: Normal breath sounds  Abdominal:      Palpations: Abdomen is soft  Tenderness: There is no abdominal tenderness   " Lymphadenopathy:      Cervical: No cervical adenopathy  Skin:     General: Skin is warm and dry  Findings: No bruising or rash  Neurological:      Mental Status: He is alert  Psychiatric:         Mood and Affect: Mood normal          Behavior: Behavior normal         Imaging  Relevant imaging reviewed in chart    4/12/2020 3 fast lower limb venous duplex study complete bilateral  CONCLUSION:  Impression:  RIGHT LOWER LIMB:  Evidence suggestive of Acute occlusive deep vein thrombosis noted in the  Peroneal veins  Non Occlusive Acute deep vein thrombosis in noted in the Posterior TibIal  veins  No evidence of superficial thrombophlebitis noted  No evidence of valvular incompetence noted in the deep veins  Popliteal, posterior tibial and anterior tibial arterial Doppler waveforms are  triphasic  LEFT LOWER LIMB:  Evidence suggestive of Acute occlusive deep vein thrombosis noted in the  Posterior Tiblial, Peroneal, and Soleal veins  Evidence suggestive of Acute non occlusive deep vein thrombosis noted in the  Mid-Distal Femoral and Popliteal veins  No evidence of superficial thrombophlebitis noted  No evidence of valvular incompetence noted in the deep veins  Popliteal, posterior tibial and anterior tibial arterial Doppler waveforms are  Triphasic     4/11/2023 CTA chest PE study  IMPRESSION:  1  Saddle  embolus and extensive clot burden throughout the visualized proximal pulmonary arteries  IR consultation suggested  2   RV/LV ratio less then 0 9, suggesting no elevation of right-sided pressures; however this is discordant with extensive clot burden  3   No evidence of pulmonary infarct  04/03/2023 CT head without contrast  IMPRESSION:     Evolving left posterior MCA territory infarcts with no evidence of hemorrhagic transformation  Stable 3 to 4 mm left right midline shift  3/31/2023 MRI brain without contrast  IMPRESSION:     Large left MCA infarct left basal ganglia involvement  Superimposed microhemorrhage is noted  Mass effect on the left lateral ventricle noted without midline shift  3/30/2023 CT cerebral perfusion  IMPRESSION:     CT Perfusion  demonstrates left posterior MCA territory infarction with data described above  This examination was performed yesterday at 5:50 PM and is just now presented to me for evaluation today  3/30/2023 CTA stroke alert  IMPRESSION:  Left MCA inferior M2 division occlusion      No other significant stenosis or large vessel occlusion  No traumatic injury to the cervical spine  3/30/2023 CT chest abdomen pelvis with contrast  IMPRESSION:     1  No acute traumatic injury to the chest, abdomen, or pelvis      2   Evidence of pulmonary edema with bibasilar consolidation which may represent superimposed aspiration      3   Mild subcutaneous stranding in the anterior abdominal wall, nonspecific  This could represent soft tissue injury and clinical correlation recommended  3/30/2023 CT stroke alert brain   IMPRESSION:     Limited  No definite acute hemorrhage or large vessel distribution infarct  No mass effect or herniation  Cardio  Echo follow up limited w/color and doppler  4/123/2023  Interpretation Summary       •  Left Ventricle: Left ventricular cavity size is normal  Wall thickness is mildly increased  The left ventricular ejection fraction is 55%  Systolic function is normal  Although no diagnostic regional wall motion abnormality was identified, this possibility cannot be completely excluded on the basis of this study  Diastolic function is normal   •  IVS: There is systolic flattening of the interventricular septum consistent with right ventricle pressure overload  •  Right Ventricle: Right ventricular cavity size is mildly dilated  Wall motion is abnormal   •  Right Atrium: The atrium is mildly dilated  •  Tricuspid Valve: There is mild regurgitation  The right ventricular systolic pressure is moderately elevated   The estimated right ventricular systolic pressure is 91 00 mmHg  SOLITARIO 4/03/2023  Interpretation Summary       •  Left Ventricle: Left ventricular cavity size is normal  Wall thickness is increased  There is no concentric hypertrophy  The left ventricular ejection fraction is 55%  Systolic function is low normal  Wall motion is normal   •  Right Ventricle: Right ventricular cavity size is mildly dilated  Systolic function is normal   •  Left Atrium: The atrium is dilated  There is no thrombus  There is no mass  There is no spontaneous echo contrast   •  Atrial Septum: No patent foramen ovale detected using saline contrast injection at rest and with provocation by abdominal compression  •  Left Atrial Appendage: There is reduced function  There is no thrombus  There is no mass  There is no spontaneous echo contrast   •  Tricuspid Valve: There is mild to moderate regurgitation  Echo Complete TTE 3/31/2023  No thrombus,Interpretation Summary       •  Left Ventricle: Left ventricular cavity size is normal  Wall thickness is normal  The left ventricular ejection fraction is 55%  Systolic function is normal  Wall motion cannot be accurately assessed  •  Left Atrium: The atrium is mildly dilated  •  Aorta: The aortic root is normal in size  The ascending aorta is mildly dilated  The aortic root is 3 60 cm   The ascending aorta is 4 cm        Labs  Relevant labs reviewed in chart     Component Ref Range & Units 5/4/23  5:24 AM 4/27/23  5:14 AM 4/24/23  6:04 AM 4/20/23  7:05 AM 4/16/23  5:06 AM 4/15/23  4:49 AM 4/14/23  5:20 AM   WBC 4 31 - 10 16 Thousand/uL 5 33  5 69  6 07  7 15  9 33  8 87  8 11    RBC 3 88 - 5 62 Million/uL 4 65  4 75  4 78  5 08  5 05  5 09  4 77    Hemoglobin 12 0 - 17 0 g/dL 12 3  12 6  12 6  13 3  13 2  13 3  12 8    Hematocrit 36 5 - 49 3 % 40 0  40 3  40 5  42 4  41 9  41 9  39 3    MCV 82 - 98 fL 86  85  85  84  83  82  82    MCH 26 8 - 34 3 pg 26 5 Low   26 5 Low   26 4 Low   26 2 Low   26 1 Low  26 1 Low   26 8    MCHC 31 4 - 37 4 g/dL 30 8 Low   31 3 Low   31 1 Low   31 4  31 5  31 7  32 6    RDW 11 6 - 15 1 % 14 5  13 7  13 6  13 6  13 8  13 7  13 7    MPV 8 9 - 12 7 fL 11 1  11 0  9 8  9 4  10 5  10 6  10 6    Platelets 254 - 776 Thousands/uL 256  312  309  261  140 Low   114 Low   81 Low  CM    nRBC /100 WBCs 0  0  0  0   0  0    Neutrophils Relative 43 - 75 % 49  50  56  60   66  66    Immat GRANS % 0 - 2 % 0  0  0  0   1  1    Lymphocytes Relative 14 - 44 % 34  32  29  24   17  18    Monocytes Relative 4 - 12 % 10  9  8  8   10  9    Eosinophils Relative 0 - 6 % 6  8 High   6  7 High    5  5    Basophils Relative 0 - 1 % 1  1  1  1   1  1    Neutrophils Absolute 1 85 - 7 62 Thousands/µL 2 65  2 87  3 36  4 26   5 99  5 45    Immature Grans Absolute 0 00 - 0 20 Thousand/uL 0 01  0 01  0 01  0 02   0 04  0 04    Lymphocytes Absolute 0 60 - 4 47 Thousands/µL 1 80  1 82  1 78  1 71   1 52  1 42    Monocytes Absolute 0 17 - 1 22 Thousand/µL 0 53  0 50  0 46  0 60   0 86  0 74    Eosinophils Absolute 0 00 - 0 61 Thousand/µL 0 30  0 43  0 39  0 51   0 41  0 41    Basophils Absolute 0 00 - 0 10 Thousands/µL 0 04  0 06  0 07  0 05   0 05  0 05      Component Ref Range & Units 4/12/23  3:13 AM   BROOKE, LOW DOSE HEPARIN 0 - 20 % 93 High     BROOKE, HIGH DOSE HEPARIN 0 - 20 % <1    BROOKE, INTERPRETATION  Comment Abnormal     Comment: BROOKE Result:  POSITIVE   COMMENT:  The patient's serum tested positive by BROOKE and supports a   diagnosis of heparin-induced-thrombocytopenia (HIT)    The assay   measures serotonin release from donor platelets in the presence of   patient's serum and heparin   A positive result requires greater than   20% release in the presence of low dose (0 2 IU/mL) heparin and   inhibition of release in the presence of high dose (100 IU/mL)   heparin   The result should be interpreted in conjunction with other   HIT assays, and the context of all the clinical information including   the platelet count, the type of heparin administered, the duration   of heparin exposure, previous heparin exposure and any thrombotic   history  Component Ref Range & Units 4/11/23  5:46 PM   Heparin Induced Plt Ab 0 000 - 0 400 OD 2 871 High          3/31/2023  Prothrombin gene mutation not detected  Factor V Leiden gene mutation not detected  Anticardiolipin IgG negative, IgM negative IgA 16 equivocal  Beta-2 glycoprotein antibodies negative  Protein S activity 57%  Protein C activity 36%  No lupus anticoagulant detected   Protein S antigen total 86%, free 93%  Antithrombin III activity 90%    ASSESSMENT & PLAN      Diagnosis ICD-10-CM Associated Orders   1  HIT (heparin-induced thrombocytopenia) ([facility] )  D75 829 Ambulatory Referral to Hematology / Oncology      2  Acute ischemic left MCA stroke [facility])  D90 832 Ambulatory Referral to Hematology / Oncology     Cardiolipin antibody     CBC and differential     Comprehensive metabolic panel      3  Acute saddle pulmonary embolism without acute cor pulmonale (HCC)  I26 92 Ambulatory Referral to Hematology / Oncology     Cardiolipin antibody     CBC and differential     Comprehensive metabolic panel      4  DVT of lower extremity, bilateral ([facility] )  I82 403 Ambulatory Referral to Hematology / Oncology     Cardiolipin antibody     CBC and differential     Comprehensive metabolic panel      5  Hypochromic erythrocytes  D50 8 Iron Panel (Includes Ferritin, Iron Sat%, Iron, and TIBC)            57 y o  Currently on aspirin 81mg, eliquis 5mg PO BID, lipitor 40mg po daily, keppra 750mg PO BID  To follow with neurology in 3 months  Discussion    CVA  Patient's weakness has resolved, still with persistent aphasia but still improving  Unclear etiology currently  Patient's initial thrombosis panel was ordered at the time of patient's admission, likely protein S and protein C activity were decreased due to CVA at this time    These will need to be repeated in the future off of anticoagulation  Anticardiolipin IgA antibody was 16 and equivocal, this will need to be repeated 12 weeks from timing of initial testing which brings us to the end of Melissa  Left instructions and after visit summary for timing of repeat testing  Heparin Induced Thrombocytopenia  Heparin-induced platelet antibody as well as serotonin release assay both positive showing evidence of HIT  Patient should indefinitely avoid heparin products would even recommend medical alert bracelet for patient stating that he cannot have heparin in the future  Duration of anticoagulation therapy: Patient will need to remain on anticoagulation for at least 6 months  Ongoing work-up for etiology of patient's stroke may recommend indefinite anticoagulation at that point in time  Bilateral DVT and saddle PE likely secondary to thromboembolic complications of hit  Patient is understanding that if repeat thrombosis panel testing is consistent with antiphospholipid syndrome we may need to change anticoagulation agent from Eliquis to Coumadin  Plan/Labs  Please have Anticardiolipin antibody lab testing along with CBCdiff and CMP done after July 1st    Thrombosis panel ordered by neurology does not need to be completed - can have lab cancel this  We discussed Homocysteine and MTHFR mutational testing that it would not  from hematology standpoint, will leave these tests to your discretion along with neurologist    I will call you with the results of these labs, they may take 2 weeks to come back  I will make sure I see you back in October then to discuss indefinite/lifelong anticoagulation needs at that time  Please call office at any time if you have questions or concerns  Follow Up  • In October with me in [place]       All questions were answered to the patient's satisfaction during this encounter  They appreciated and thanked me for spending time with them   The patient knows the contact information for our office and know to reach out for any relevant concerns related to this encounter  For all other listed problems and medical diagnosis in his chart - they are managed by PCP and/or other specialists, which patient acknowledges        Hematology & Medical Oncology

## 2023-06-27 NOTE — PATIENT INSTRUCTIONS
Please have Anticardiolipin antibody lab testing along with CBCdiff and CMP done after July 1st      Thrombosis panel ordered by neurology does not need to be completed - can have lab cancel this  We discussed Homocysteine and MTHFR mutational testing that it would not , will leave these tests to your discretion if you prefer to have them done  I will call you with the results of these labs, they may take 2 weeks to come back  I will make sure I see you back in October then to discuss indefinite/lifelong anticoagulation needs at that time  Please call office at any time if you have questions or concerns

## 2023-06-30 ENCOUNTER — HOSPITAL ENCOUNTER (OUTPATIENT)
Dept: NEUROLOGY | Facility: HOSPITAL | Age: 48
End: 2023-06-30
Payer: COMMERCIAL

## 2023-06-30 DIAGNOSIS — I63.512 ACUTE ISCHEMIC LEFT MCA STROKE (HCC): ICD-10-CM

## 2023-06-30 PROCEDURE — 95816 EEG AWAKE AND DROWSY: CPT | Performed by: STUDENT IN AN ORGANIZED HEALTH CARE EDUCATION/TRAINING PROGRAM

## 2023-06-30 PROCEDURE — 95816 EEG AWAKE AND DROWSY: CPT

## 2023-07-05 ENCOUNTER — HOSPITAL ENCOUNTER (OUTPATIENT)
Facility: HOSPITAL | Age: 48
Setting detail: OBSERVATION
Discharge: HOME | End: 2023-07-06
Attending: EMERGENCY MEDICINE | Admitting: INTERNAL MEDICINE
Payer: COMMERCIAL

## 2023-07-05 ENCOUNTER — APPOINTMENT (EMERGENCY)
Dept: CT IMAGING | Facility: HOSPITAL | Age: 48
End: 2023-07-05
Payer: COMMERCIAL

## 2023-07-05 DIAGNOSIS — N17.9 ACUTE KIDNEY INJURY (HCC): Primary | ICD-10-CM

## 2023-07-05 DIAGNOSIS — R55 VASOVAGAL SYNCOPE: ICD-10-CM

## 2023-07-05 PROBLEM — E11.9 TYPE 2 DIABETES MELLITUS WITHOUT COMPLICATION, WITHOUT LONG-TERM CURRENT USE OF INSULIN (HCC): Status: ACTIVE | Noted: 2023-03-30

## 2023-07-05 PROBLEM — D68.9 COAGULOPATHY (HCC): Status: ACTIVE | Noted: 2023-07-05

## 2023-07-05 LAB
2HR DELTA HS TROPONIN: -3 NG/L
4HR DELTA HS TROPONIN: 1 NG/L
ALBUMIN SERPL BCP-MCNC: 4.4 G/DL (ref 3.5–5)
ALP SERPL-CCNC: 98 U/L (ref 34–104)
ALT SERPL W P-5'-P-CCNC: 20 U/L (ref 7–52)
ANION GAP SERPL CALCULATED.3IONS-SCNC: 8 MMOL/L
APTT PPP: 27 SECONDS (ref 23–37)
AST SERPL W P-5'-P-CCNC: 14 U/L (ref 13–39)
ATRIAL RATE: 81 BPM
BASOPHILS # BLD AUTO: 0.05 THOUSANDS/ÂΜL (ref 0–0.1)
BASOPHILS NFR BLD AUTO: 1 % (ref 0–1)
BILIRUB SERPL-MCNC: 1 MG/DL (ref 0.2–1)
BUN SERPL-MCNC: 16 MG/DL (ref 5–25)
CALCIUM SERPL-MCNC: 10.4 MG/DL (ref 8.4–10.2)
CARDIAC TROPONIN I PNL SERPL HS: 11 NG/L
CARDIAC TROPONIN I PNL SERPL HS: 12 NG/L
CARDIAC TROPONIN I PNL SERPL HS: 8 NG/L
CHLORIDE SERPL-SCNC: 105 MMOL/L (ref 96–108)
CO2 SERPL-SCNC: 27 MMOL/L (ref 21–32)
CREAT SERPL-MCNC: 1.58 MG/DL (ref 0.6–1.3)
EOSINOPHIL # BLD AUTO: 0.33 THOUSAND/ÂΜL (ref 0–0.61)
EOSINOPHIL NFR BLD AUTO: 5 % (ref 0–6)
ERYTHROCYTE [DISTWIDTH] IN BLOOD BY AUTOMATED COUNT: 14.7 % (ref 11.6–15.1)
GFR SERPL CREATININE-BSD FRML MDRD: 51 ML/MIN/1.73SQ M
GLUCOSE SERPL-MCNC: 138 MG/DL (ref 65–140)
GLUCOSE SERPL-MCNC: 151 MG/DL (ref 65–140)
GLUCOSE SERPL-MCNC: 84 MG/DL (ref 65–140)
HCT VFR BLD AUTO: 46.9 % (ref 36.5–49.3)
HGB BLD-MCNC: 15.3 G/DL (ref 12–17)
IMM GRANULOCYTES # BLD AUTO: 0.01 THOUSAND/UL (ref 0–0.2)
IMM GRANULOCYTES NFR BLD AUTO: 0 % (ref 0–2)
INR PPP: 1.28 (ref 0.84–1.19)
LYMPHOCYTES # BLD AUTO: 1.62 THOUSANDS/ÂΜL (ref 0.6–4.47)
LYMPHOCYTES NFR BLD AUTO: 23 % (ref 14–44)
MCH RBC QN AUTO: 28.1 PG (ref 26.8–34.3)
MCHC RBC AUTO-ENTMCNC: 32.6 G/DL (ref 31.4–37.4)
MCV RBC AUTO: 86 FL (ref 82–98)
MONOCYTES # BLD AUTO: 0.4 THOUSAND/ÂΜL (ref 0.17–1.22)
MONOCYTES NFR BLD AUTO: 6 % (ref 4–12)
NEUTROPHILS # BLD AUTO: 4.56 THOUSANDS/ÂΜL (ref 1.85–7.62)
NEUTS SEG NFR BLD AUTO: 65 % (ref 43–75)
NRBC BLD AUTO-RTO: 0 /100 WBCS
P AXIS: 41 DEGREES
PLATELET # BLD AUTO: 308 THOUSANDS/UL (ref 149–390)
PMV BLD AUTO: 9.8 FL (ref 8.9–12.7)
POTASSIUM SERPL-SCNC: 5 MMOL/L (ref 3.5–5.3)
PR INTERVAL: 182 MS
PROT SERPL-MCNC: 7.7 G/DL (ref 6.4–8.4)
PROTHROMBIN TIME: 15.8 SECONDS (ref 11.6–14.5)
QRS AXIS: -8 DEGREES
QRSD INTERVAL: 96 MS
QT INTERVAL: 392 MS
QTC INTERVAL: 455 MS
RBC # BLD AUTO: 5.45 MILLION/UL (ref 3.88–5.62)
SODIUM SERPL-SCNC: 140 MMOL/L (ref 135–147)
T WAVE AXIS: -8 DEGREES
VENTRICULAR RATE: 81 BPM
WBC # BLD AUTO: 6.97 THOUSAND/UL (ref 4.31–10.16)

## 2023-07-05 PROCEDURE — 85025 COMPLETE CBC W/AUTO DIFF WBC: CPT | Performed by: PHYSICIAN ASSISTANT

## 2023-07-05 PROCEDURE — 85730 THROMBOPLASTIN TIME PARTIAL: CPT | Performed by: PHYSICIAN ASSISTANT

## 2023-07-05 PROCEDURE — 82948 REAGENT STRIP/BLOOD GLUCOSE: CPT

## 2023-07-05 PROCEDURE — 93005 ELECTROCARDIOGRAM TRACING: CPT

## 2023-07-05 PROCEDURE — 83036 HEMOGLOBIN GLYCOSYLATED A1C: CPT | Performed by: INTERNAL MEDICINE

## 2023-07-05 PROCEDURE — 85610 PROTHROMBIN TIME: CPT | Performed by: PHYSICIAN ASSISTANT

## 2023-07-05 PROCEDURE — 84484 ASSAY OF TROPONIN QUANT: CPT | Performed by: PHYSICIAN ASSISTANT

## 2023-07-05 PROCEDURE — 99223 1ST HOSP IP/OBS HIGH 75: CPT | Performed by: INTERNAL MEDICINE

## 2023-07-05 PROCEDURE — 36415 COLL VENOUS BLD VENIPUNCTURE: CPT | Performed by: PHYSICIAN ASSISTANT

## 2023-07-05 PROCEDURE — G1004 CDSM NDSC: HCPCS

## 2023-07-05 PROCEDURE — 96365 THER/PROPH/DIAG IV INF INIT: CPT

## 2023-07-05 PROCEDURE — 99285 EMERGENCY DEPT VISIT HI MDM: CPT

## 2023-07-05 PROCEDURE — 70450 CT HEAD/BRAIN W/O DYE: CPT

## 2023-07-05 PROCEDURE — 80053 COMPREHEN METABOLIC PANEL: CPT | Performed by: PHYSICIAN ASSISTANT

## 2023-07-05 PROCEDURE — 84484 ASSAY OF TROPONIN QUANT: CPT | Performed by: INTERNAL MEDICINE

## 2023-07-05 PROCEDURE — 71275 CT ANGIOGRAPHY CHEST: CPT

## 2023-07-05 PROCEDURE — 93010 ELECTROCARDIOGRAM REPORT: CPT | Performed by: INTERNAL MEDICINE

## 2023-07-05 RX ORDER — LORATADINE 10 MG/1
10 TABLET ORAL DAILY PRN
Status: DISCONTINUED | OUTPATIENT
Start: 2023-07-05 | End: 2023-07-06 | Stop reason: HOSPADM

## 2023-07-05 RX ORDER — INSULIN LISPRO 100 [IU]/ML
2-12 INJECTION, SOLUTION INTRAVENOUS; SUBCUTANEOUS
Status: DISCONTINUED | OUTPATIENT
Start: 2023-07-05 | End: 2023-07-06 | Stop reason: HOSPADM

## 2023-07-05 RX ORDER — INSULIN LISPRO 100 [IU]/ML
2-12 INJECTION, SOLUTION INTRAVENOUS; SUBCUTANEOUS
Status: DISCONTINUED | OUTPATIENT
Start: 2023-07-06 | End: 2023-07-06 | Stop reason: HOSPADM

## 2023-07-05 RX ORDER — ATORVASTATIN CALCIUM 40 MG/1
40 TABLET, FILM COATED ORAL EVERY EVENING
Status: DISCONTINUED | OUTPATIENT
Start: 2023-07-05 | End: 2023-07-06 | Stop reason: HOSPADM

## 2023-07-05 RX ORDER — TAMSULOSIN HYDROCHLORIDE 0.4 MG/1
0.4 CAPSULE ORAL
Status: DISCONTINUED | OUTPATIENT
Start: 2023-07-05 | End: 2023-07-06 | Stop reason: HOSPADM

## 2023-07-05 RX ORDER — LEVETIRACETAM 500 MG/1
750 TABLET ORAL EVERY 12 HOURS SCHEDULED
Status: DISCONTINUED | OUTPATIENT
Start: 2023-07-05 | End: 2023-07-06 | Stop reason: HOSPADM

## 2023-07-05 RX ORDER — SODIUM CHLORIDE 9 MG/ML
75 INJECTION, SOLUTION INTRAVENOUS CONTINUOUS
Status: DISCONTINUED | OUTPATIENT
Start: 2023-07-05 | End: 2023-07-06 | Stop reason: HOSPADM

## 2023-07-05 RX ORDER — AMLODIPINE BESYLATE 5 MG/1
5 TABLET ORAL DAILY
Status: DISCONTINUED | OUTPATIENT
Start: 2023-07-06 | End: 2023-07-06 | Stop reason: HOSPADM

## 2023-07-05 RX ORDER — ASPIRIN 81 MG/1
81 TABLET, CHEWABLE ORAL DAILY
Status: DISCONTINUED | OUTPATIENT
Start: 2023-07-06 | End: 2023-07-06 | Stop reason: HOSPADM

## 2023-07-05 RX ORDER — ACETAMINOPHEN 325 MG/1
650 TABLET ORAL EVERY 6 HOURS PRN
Status: DISCONTINUED | OUTPATIENT
Start: 2023-07-05 | End: 2023-07-06 | Stop reason: HOSPADM

## 2023-07-05 RX ORDER — ONDANSETRON 2 MG/ML
4 INJECTION INTRAMUSCULAR; INTRAVENOUS EVERY 6 HOURS PRN
Status: DISCONTINUED | OUTPATIENT
Start: 2023-07-05 | End: 2023-07-06 | Stop reason: HOSPADM

## 2023-07-05 RX ADMIN — ATORVASTATIN CALCIUM 40 MG: 40 TABLET, FILM COATED ORAL at 18:23

## 2023-07-05 RX ADMIN — SODIUM CHLORIDE, SODIUM LACTATE, POTASSIUM CHLORIDE, AND CALCIUM CHLORIDE 500 ML: .6; .31; .03; .02 INJECTION, SOLUTION INTRAVENOUS at 12:59

## 2023-07-05 RX ADMIN — APIXABAN 5 MG: 5 TABLET, FILM COATED ORAL at 18:23

## 2023-07-05 RX ADMIN — LEVETIRACETAM 750 MG: 500 TABLET, FILM COATED ORAL at 22:13

## 2023-07-05 RX ADMIN — IOHEXOL 85 ML: 350 INJECTION, SOLUTION INTRAVENOUS at 12:44

## 2023-07-05 RX ADMIN — TAMSULOSIN HYDROCHLORIDE 0.4 MG: 0.4 CAPSULE ORAL at 18:23

## 2023-07-05 RX ADMIN — SODIUM CHLORIDE 75 ML/HR: 0.9 INJECTION, SOLUTION INTRAVENOUS at 17:38

## 2023-07-05 NOTE — ED PROVIDER NOTES
History  Chief Complaint   Patient presents with   • Syncope     Presents via EMS after a syncopal episode at rehab, pt had a stroke in march. HPI    Prior to Admission Medications   Prescriptions Last Dose Informant Patient Reported? Taking?    amLODIPine (NORVASC) 5 mg tablet   No No   Sig: Take 1 tablet (5 mg total) by mouth daily   apixaban (Eliquis) 5 mg   No No   Sig: Take 1 tablet (5 mg total) by mouth 2 (two) times a day   aspirin 81 mg chewable tablet   No No   Sig: Chew 1 tablet (81 mg total) daily   atorvastatin (LIPITOR) 40 mg tablet   No No   Sig: Take 1 tablet (40 mg total) by mouth every evening   calcium carbonate (TUMS) 500 mg chewable tablet   No No   Sig: Chew 2 tablets (1,000 mg total) 3 (three) times a day as needed for indigestion or heartburn   Patient not taking: Reported on 6/15/2023   famotidine (PEPCID) 20 mg tablet   No No   Sig: Take 1 tablet (20 mg total) by mouth every 12 (twelve) hours   Patient not taking: Reported on 6/7/2023   levETIRAcetam (KEPPRA) 750 mg tablet   No No   Sig: Take 1 tablet (750 mg total) by mouth every 12 (twelve) hours   loratadine (CLARITIN) 10 mg tablet   No No   Sig: Take 1 tablet (10 mg total) by mouth daily as needed for allergies   losartan (COZAAR) 25 mg tablet   No No   Sig: Take 1 tablet (25 mg total) by mouth daily   metoprolol tartrate (LOPRESSOR) 25 mg tablet   No No   Sig: Take 1 tablet (25 mg total) by mouth every 12 (twelve) hours   polyethylene glycol (GLYCOLAX) 17 GM/SCOOP powder   No No   Sig: Take 17 g by mouth daily as needed (Constipation) Can substitute for closest size available   tamsulosin (FLOMAX) 0.4 mg   No No   Sig: Take 1 capsule (0.4 mg total) by mouth daily after dinner      Facility-Administered Medications: None       Past Medical History:   Diagnosis Date   • Allergic 04/01/2023   • Hypertension 03/31/2023   • Hypertensive emergency 03/30/2023   • Obesity 03/31/2023   • Seizures ([address]) 03/31/2023   • Stroke ([address]) 03/30/2023 • Visual impairment 03/30/2023       History reviewed. No pertinent surgical history. Family History   Problem Relation Age of Onset   • Diabetes Mother    • Arthritis Mother    • Breast cancer Mother    • Stroke Father    • Prostate cancer Paternal Grandfather    • Cancer Paternal Grandfather    • Breast cancer Paternal Grandmother      I have reviewed and agree with the history as documented.     E-Cigarette/Vaping   • E-Cigarette Use Never User      E-Cigarette/Vaping Substances   • Nicotine No    • THC No    • CBD No    • Flavoring No    • Other No    • Unknown No      Social History     Tobacco Use   • Smoking status: Never   • Smokeless tobacco: Never   Vaping Use   • Vaping Use: Never used   Substance Use Topics   • Alcohol use: Never   • Drug use: Never       Review of Systems    Physical Exam  Physical Exam    Vital Signs  ED Triage Vitals [07/05/23 1144]   Temperature Pulse Respirations Blood Pressure SpO2   98.2 °F (36.8 °C) 75 18 110/72 98 %      Temp Source Heart Rate Source Patient Position - Orthostatic VS BP Location FiO2 (%)   Oral Monitor -- -- --      Pain Score       --           Vitals:    07/05/23 1144   BP: 110/72   Pulse: 75         Visual Acuity  Visual Acuity    Flowsheet Row Most Recent Value   L Pupil Size (mm) 3   R Pupil Size (mm) 3          ED Medications  Medications - No data to display    Diagnostic Studies  Results Reviewed     Procedure Component Value Units Date/Time    Comprehensive metabolic panel [[id number]]  (Abnormal) Collected: 07/05/23 1153    Lab Status: Final result Specimen: Blood from Arm, Right Updated: 07/05/23 1217     Sodium 140 mmol/L      Potassium 5.0 mmol/L      Chloride 105 mmol/L      CO2 27 mmol/L      ANION GAP 8 mmol/L      BUN 16 mg/dL      Creatinine 1.58 mg/dL      Glucose 151 mg/dL      Calcium 10.4 mg/dL      AST 14 U/L      ALT 20 U/L      Alkaline Phosphatase 98 U/L      Total Protein 7.7 g/dL      Albumin 4.4 g/dL      Total Bilirubin 1.00 mg/dL      eGFR 51 ml/min/1.73sq m     Narrative:      [facility] guidelines for Chronic Kidney Disease (CKD):   •  Stage 1 with normal or high GFR (GFR > 90 mL/min/1.73 square meters)  •  Stage 2 Mild CKD (GFR = 60-89 mL/min/1.73 square meters)  •  Stage 3A Moderate CKD (GFR = 45-59 mL/min/1.73 square meters)  •  Stage 3B Moderate CKD (GFR = 30-44 mL/min/1.73 square meters)  •  Stage 4 Severe CKD (GFR = 15-29 mL/min/1.73 square meters)  •  Stage 5 End Stage CKD (GFR <15 mL/min/1.73 square meters)  Note: GFR calculation is accurate only with a steady state creatinine    CBC and differential [[id number]] Collected: 07/05/23 1153    Lab Status: Final result Specimen: Blood from Arm, Right Updated: 07/05/23 1200     WBC 6.97 Thousand/uL      RBC 5.45 Million/uL      Hemoglobin 15.3 g/dL      Hematocrit 46.9 %      MCV 86 fL      MCH 28.1 pg      MCHC 32.6 g/dL      RDW 14.7 %      MPV 9.8 fL      Platelets 523 Thousands/uL      nRBC 0 /100 WBCs      Neutrophils Relative 65 %      Immat GRANS % 0 %      Lymphocytes Relative 23 %      Monocytes Relative 6 %      Eosinophils Relative 5 %      Basophils Relative 1 %      Neutrophils Absolute 4.56 Thousands/µL      Immature Grans Absolute 0.01 Thousand/uL      Lymphocytes Absolute 1.62 Thousands/µL      Monocytes Absolute 0.40 Thousand/µL      Eosinophils Absolute 0.33 Thousand/µL      Basophils Absolute 0.05 Thousands/µL     HS Troponin 0hr (reflex protocol) [[id number]] Collected: 07/05/23 1153    Lab Status: In process Specimen: Blood from Arm, Right Updated: 07/05/23 [address] [[id number]] Collected: 07/05/23 1153    Lab Status: In process Specimen: Blood from Arm, Right Updated: 07/05/23 1156    APTT [[id number]] Collected: 07/05/23 1153    Lab Status:  In process Specimen: Blood from Arm, Right Updated: 07/05/23 1156    Fingerstick Glucose (POCT) [[id number]]  (Normal) Collected: 07/05/23 1137    Lab Status: Final result Updated: 07/05/23 1138     POC Glucose 138 mg/dl                  No orders to display              Procedures  Procedures         ED Course                               SBIRT 20yo+    Flowsheet Row Most Recent Value   Initial Alcohol Screen: US AUDIT-C     1. How often do you have a drink containing alcohol? 0 Filed at: 07/05/2023 1142   2. How many drinks containing alcohol do you have on a typical day you are drinking? 0 Filed at: 07/05/2023 1142   3a. Male UNDER 65: How often do you have five or more drinks on one occasion? 0 Filed at: 07/05/2023 1142   3b. FEMALE Any Age, or MALE 65+: How often do you have 4 or more drinks on one occassion? 0 Filed at: 07/05/2023 1142   Audit-C Score 0 Filed at: 07/05/2023 1142   BANDAR: How many times in the past year have you. .. Used an illegal drug or used a prescription medication for non-medical reasons? Never Filed at: 07/05/2023 1142                    MDM    Disposition  Final diagnoses:   None     ED Disposition     None      Follow-up Information    None         Patient's Medications   Discharge Prescriptions    No medications on file       No discharge procedures on file.     PDMP Review       Value Time User    PDMP Reviewed  Yes 4/12/2023 12:50 AM Guillermina Barron MD          ED Provider  Electronically Signed by 5.45 Million/uL      Hemoglobin 15.3 g/dL      Hematocrit 46.9 %      MCV 86 fL      MCH 28.1 pg      MCHC 32.6 g/dL      RDW 14.7 %      MPV 9.8 fL      Platelets 609 Thousands/uL      nRBC 0 /100 WBCs      Neutrophils Relative 65 %      Immat GRANS % 0 %      Lymphocytes Relative 23 %      Monocytes Relative 6 %      Eosinophils Relative 5 %      Basophils Relative 1 %      Neutrophils Absolute 4.56 Thousands/µL      Immature Grans Absolute 0.01 Thousand/uL      Lymphocytes Absolute 1.62 Thousands/µL      Monocytes Absolute 0.40 Thousand/µL      Eosinophils Absolute 0.33 Thousand/µL      Basophils Absolute 0.05 Thousands/µL     HS Troponin 0hr (reflex protocol) [[id number]] Collected: 07/05/23 1153    Lab Status: In process Specimen: Blood from Arm, Right Updated: 07/05/23 [address] [[id number]] Collected: 07/05/23 1153    Lab Status: In process Specimen: Blood from Arm, Right Updated: 07/05/23 1156    APTT [[id number]] Collected: 07/05/23 1153    Lab Status: In process Specimen: Blood from Arm, Right Updated: 07/05/23 1156    Fingerstick Glucose (POCT) [[id number]]  (Normal) Collected: 07/05/23 1137    Lab Status: Final result Updated: 07/05/23 1138     POC Glucose 138 mg/dl                  CT head without contrast   Final Result by Adelaida Andrea MD (07/05 1350)      No acute intracranial abnormality. Old left posterior MCA territory infarct. Workstation performed: [id number]         CTA ED chest PE Study   Final Result by Saskia Quiles MD (07/05 1258)      No acute pulmonary emboli with resolution of previous pulmonary emboli. No acute pulmonary disease.             Workstation performed: [id number]                    Procedures  ECG 12 Lead Documentation Only    Date/Time: 7/5/2023 11:50 AM    Performed by: Manju Spicer PA-C  Authorized by: Manju Spicer PA-C    Indications / Diagnosis:  Lightheadedness/syncope  ECG reviewed by me, the ED Provider: yes Patient location:  ED  Rate:     ECG rate:  81    ECG rate assessment: normal    Rhythm:     Rhythm: sinus rhythm    Ectopy:     Ectopy: none    QRS:     QRS axis:  Normal    QRS intervals:  Normal  Conduction:     Conduction: normal    T waves:     T waves: non-specific    Comments:      No ischemic ST/T wave change             ED Course                               SBIRT 20yo+    Flowsheet Row Most Recent Value   Initial Alcohol Screen: US AUDIT-C     1. How often do you have a drink containing alcohol? 0 Filed at: 07/05/2023 1142   2. How many drinks containing alcohol do you have on a typical day you are drinking? 0 Filed at: 07/05/2023 1142   3a. Male UNDER 65: How often do you have five or more drinks on one occasion? 0 Filed at: 07/05/2023 1142   3b. FEMALE Any Age, or MALE 65+: How often do you have 4 or more drinks on one occassion? 0 Filed at: 07/05/2023 1142   Audit-C Score 0 Filed at: 07/05/2023 1142   BANDAR: How many times in the past year have you. .. Used an illegal drug or used a prescription medication for non-medical reasons? Never Filed at: 07/05/2023 1142                    Medical Decision Making  This is a 46-year-old male presenting to the emergency department for evaluation after a presumed syncopal event at physical therapy [facility]. Patient reports that while standing upright he started to feel lightheaded at which time he was brought to a seated position and does not recall whether or not he had lost consciousness. Mother states that his vital signs in the seated position were 90/50 mmHg. Patient reports compliance with his medications. Denies any recent illness. Differential diagnosis includes but is not limited to:  Suspect vasovagal syncope, will obtain imaging to evaluate for any acute intracranial abnormality, increased clot burden of known pulmonary emboli, and check lab work to evaluate for evidence of electrolyte derangements or renal impairment    Initial ED plan: Labs, imaging, anticipate admission    Final ED Assessment: Vital signs reviewed on ED presentation, examination as above. All labs and imaging independently reviewed with imaging interpreted by the Radiologist.  ECG without ischemic change, troponin within normal limits. Lab work demonstrates elevated serum creatinine of 1.58 from baseline around 0.9-1, suggestive of acute kidney injury. CT head reports no acute intracranial abnormality, and CTA chest reports no acute pulmonary emboli with resolution of prior pulmonary emboli. Test results reviewed with patient and mother at bedside. Recommended admission for IV hydration and continued care and further management per the internal medicine service which they are understanding of and agreeable with. Spoke with darlin Zheng, who accepts patient for admission. The patient has remained hemodynamically stable during ED course without any new or worsening symptoms and he is stable for admission, bridging orders placed. Acute kidney injury [facility]): acute illness or injury  Vasovagal syncope: acute illness or injury  Amount and/or Complexity of Data Reviewed  Labs: ordered. Radiology: ordered. ECG/medicine tests: ordered. Risk  Decision regarding hospitalization.           Disposition  Final diagnoses:   Acute kidney injury ([address])   Vasovagal syncope     Time reflects when diagnosis was documented in both MDM as applicable and the Disposition within this note     Time User Action Codes Description Comment    7/5/2023  2:25 PM Camillo [place] Add [R55] Vasovagal near-syncope     7/5/2023  2:25 PM Camillo [place] Add [N17.9] Acute kidney injury ([address])     7/5/2023  2:26 PM Camillo [place] Add [R55] Vasovagal syncope     7/5/2023  2:26 PM Camillo [place] Modify [N17.9] Acute kidney injury ([address])     7/5/2023  2:26 PM Robbin [place] [R55] Vasovagal near-syncope       ED Disposition     ED Disposition   Admit    Condition   Stable    Date/Time   Wed Jul 5, 2023  1:40 PM    Comment   Case was discussed with Dr. [facility] and the patient's admission status was agreed to be Admission Status: observation status to the service of Dr. Hattie Closs     Follow up With Specialties Details Why Otoniel Mullen DO Family Medicine Schedule an appointment as soon as possible for a visit in 1 week(s)  [address]  [phone]            Patient's Medications   Discharge Prescriptions    No medications on file       No discharge procedures on file.     PDMP Review       Value Time User    PDMP Reviewed  Yes 4/12/2023 12:50 AM Sana Alas MD          ED Provider  Electronically Signed by           Buffy Savage PA-C  07/10/23 [id number]

## 2023-07-06 VITALS
SYSTOLIC BLOOD PRESSURE: 145 MMHG | DIASTOLIC BLOOD PRESSURE: 91 MMHG | RESPIRATION RATE: 18 BRPM | WEIGHT: 273 LBS | HEIGHT: 73 IN | HEART RATE: 70 BPM | OXYGEN SATURATION: 97 % | TEMPERATURE: 97.8 F | BODY MASS INDEX: 36.18 KG/M2

## 2023-07-06 LAB
ALBUMIN SERPL BCP-MCNC: 3.7 G/DL (ref 3.5–5)
ALP SERPL-CCNC: 78 U/L (ref 34–104)
ALT SERPL W P-5'-P-CCNC: 15 U/L (ref 7–52)
ANION GAP SERPL CALCULATED.3IONS-SCNC: 6 MMOL/L
AST SERPL W P-5'-P-CCNC: 11 U/L (ref 13–39)
BASOPHILS # BLD AUTO: 0.04 THOUSANDS/ÂΜL (ref 0–0.1)
BASOPHILS NFR BLD AUTO: 1 % (ref 0–1)
BILIRUB SERPL-MCNC: 0.97 MG/DL (ref 0.2–1)
BUN SERPL-MCNC: 14 MG/DL (ref 5–25)
CALCIUM SERPL-MCNC: 8.7 MG/DL (ref 8.4–10.2)
CHLORIDE SERPL-SCNC: 106 MMOL/L (ref 96–108)
CO2 SERPL-SCNC: 26 MMOL/L (ref 21–32)
CREAT SERPL-MCNC: 0.99 MG/DL (ref 0.6–1.3)
EOSINOPHIL # BLD AUTO: 0.29 THOUSAND/ÂΜL (ref 0–0.61)
EOSINOPHIL NFR BLD AUTO: 4 % (ref 0–6)
ERYTHROCYTE [DISTWIDTH] IN BLOOD BY AUTOMATED COUNT: 14.6 % (ref 11.6–15.1)
GFR SERPL CREATININE-BSD FRML MDRD: 90 ML/MIN/1.73SQ M
GLUCOSE P FAST SERPL-MCNC: 84 MG/DL (ref 65–99)
GLUCOSE SERPL-MCNC: 126 MG/DL (ref 65–140)
GLUCOSE SERPL-MCNC: 74 MG/DL (ref 65–140)
GLUCOSE SERPL-MCNC: 84 MG/DL (ref 65–140)
HCT VFR BLD AUTO: 40.9 % (ref 36.5–49.3)
HGB BLD-MCNC: 13.2 G/DL (ref 12–17)
IMM GRANULOCYTES # BLD AUTO: 0.02 THOUSAND/UL (ref 0–0.2)
IMM GRANULOCYTES NFR BLD AUTO: 0 % (ref 0–2)
LYMPHOCYTES # BLD AUTO: 1.53 THOUSANDS/ÂΜL (ref 0.6–4.47)
LYMPHOCYTES NFR BLD AUTO: 22 % (ref 14–44)
MAGNESIUM SERPL-MCNC: 2.1 MG/DL (ref 1.9–2.7)
MCH RBC QN AUTO: 27.7 PG (ref 26.8–34.3)
MCHC RBC AUTO-ENTMCNC: 32.3 G/DL (ref 31.4–37.4)
MCV RBC AUTO: 86 FL (ref 82–98)
MONOCYTES # BLD AUTO: 0.53 THOUSAND/ÂΜL (ref 0.17–1.22)
MONOCYTES NFR BLD AUTO: 8 % (ref 4–12)
NEUTROPHILS # BLD AUTO: 4.5 THOUSANDS/ÂΜL (ref 1.85–7.62)
NEUTS SEG NFR BLD AUTO: 65 % (ref 43–75)
NRBC BLD AUTO-RTO: 0 /100 WBCS
PLATELET # BLD AUTO: 230 THOUSANDS/UL (ref 149–390)
PMV BLD AUTO: 9.8 FL (ref 8.9–12.7)
POTASSIUM SERPL-SCNC: 3.6 MMOL/L (ref 3.5–5.3)
PROT SERPL-MCNC: 6.4 G/DL (ref 6.4–8.4)
RBC # BLD AUTO: 4.77 MILLION/UL (ref 3.88–5.62)
SODIUM SERPL-SCNC: 138 MMOL/L (ref 135–147)
WBC # BLD AUTO: 6.91 THOUSAND/UL (ref 4.31–10.16)

## 2023-07-06 PROCEDURE — 85025 COMPLETE CBC W/AUTO DIFF WBC: CPT | Performed by: INTERNAL MEDICINE

## 2023-07-06 PROCEDURE — 82948 REAGENT STRIP/BLOOD GLUCOSE: CPT

## 2023-07-06 PROCEDURE — 83735 ASSAY OF MAGNESIUM: CPT | Performed by: INTERNAL MEDICINE

## 2023-07-06 PROCEDURE — 99239 HOSP IP/OBS DSCHRG MGMT >30: CPT | Performed by: INTERNAL MEDICINE

## 2023-07-06 PROCEDURE — 80053 COMPREHEN METABOLIC PANEL: CPT | Performed by: INTERNAL MEDICINE

## 2023-07-06 RX ADMIN — LEVETIRACETAM 750 MG: 500 TABLET, FILM COATED ORAL at 08:45

## 2023-07-06 RX ADMIN — AMLODIPINE BESYLATE 5 MG: 5 TABLET ORAL at 08:45

## 2023-07-06 RX ADMIN — APIXABAN 5 MG: 5 TABLET, FILM COATED ORAL at 08:45

## 2023-07-06 RX ADMIN — ASPIRIN 81 MG: 81 TABLET, CHEWABLE ORAL at 08:45

## 2023-07-06 NOTE — ASSESSMENT & PLAN NOTE
• Episode of syncope was witnessed.  No reports of seizure activity  • No incontinence, oral trauma, postsyncopal confusion  • Prodrome of lightheadedness while at PT  /91   Pulse 70   Temp 97.8 °F (36.6 °C)   Resp 18   Ht 6' 1" (1.854 m)   Wt 124 kg (273 lb)   SpO2 97%   BMI 36.02 kg/m²     • EKG NSR  • CT head shows No acute changes compare to prior CT  • Neuro exam reveals no changes from baseline (mild dysarthria)    · Has since resolved  · No significant findings on telemetry

## 2023-07-06 NOTE — ASSESSMENT & PLAN NOTE
Blood Pressure: 145/91      Plan:  Continue Norvasc  Discontinue losartan given JOANNE and well-controlled blood pressure

## 2023-07-06 NOTE — ASSESSMENT & PLAN NOTE
Lab Results   Component Value Date    HGBA1C 8.0 (H) 03/31/2023       Recent Labs     07/05/23  1137   POCGLU 138       Blood Sugar Average: Last 72 hrs:  (P) 138   Plan:  • Diet Consistent CHO Level 2 (5 CHO servings/75g CHO per meal)  • Insulin regimen  o Glucose checks and Insulin correction ACHS  • Goal -180 while admitted, adjusting insulin regimen as appropriate  • Monitor for hypoglycemia and treat per protocol

## 2023-07-06 NOTE — H&P
[address]  H&P  Name: Danna De Jesus III 57 y.o. male I MRN: [id number]  Unit/Bed#: [facility] I Date of Admission: 7/5/2023   Date of Service: 7/5/2023 I Hospital Day: 0      Assessment/Plan   * Acute kidney injury [facility])  Assessment & Plan  Recent Labs     07/05/23  1153   CREATININE 1.58*   EGFR 51     Estimated Creatinine Clearance: 80.6 mL/min (A) (by C-G formula based on SCr of 1.58 mg/dL (H)). • POA; (baseline 0.95)  • Likely pre-renal azotemia 2/2 dehydration from inadequate oral intake    Plan:  • UA w/ microscopic, Urinary retention protocol, Bladder scan, Daily weights, I/O  • IV Fluids: NS at 75 mL/hr  • Monitor BMP daily and observe for downward trend of creatinine  • Avoid hypoperfusion of the kidneys, minimize nephrotoxins  • RAAS Blockers/Diuretics held: Cozaar      Vasovagal syncope  Assessment & Plan  • Episode of syncope was witnessed.  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)  Assessment & Plan  Lab Results   Component Value Date    HGBA1C 8.0 (H) 03/31/2023       Recent Labs     07/05/23  1137   POCGLU 138       Blood Sugar Average: Last 72 hrs:  (P) 138   Plan:  • Diet Consistent CHO Level 2 (5 CHO servings/75g CHO per meal)  • Insulin regimen  o Glucose checks and Insulin correction ACHS  • Goal -180 while admitted, adjusting insulin regimen as appropriate  • Monitor for hypoglycemia and treat per protocol      Primary hypertension  Assessment & Plan  Blood Pressure: 144/89      Plan:  • Continue Norvasc  • Medications held: Losartan  • Monitor blood pressure         VTE Pharmacologic Prophylaxis: VTE Score: 5 High Risk (Score >/= 5) - Pharmacological DVT Prophylaxis Ordered: apixaban (Eliquis). Sequential Compression Devices Ordered. Code Status: Level 1 - Full Code   Discussion with Patient/Family: Mother and patient    Anticipated Length of Stay: Patient will be admitted on an observation basis with an anticipated length of stay of less than 2 midnights secondary to plan above. Total Time for Visit, including Counseling / Coordination of Care: 85 minutes Greater than 50% of this total time spent on direct patient counseling and coordination of care. Chief Complaint:   Chief Complaint   Patient presents with   • Syncope     Presents via EMS after a syncopal episode at rehab, pt had a stroke in march. History of Present Illness:  Leigh Perea III is a 57 y.o. male who presents with Possible syncopal episode at PT on day of admission. Mother present at bedside providing additional history. PMHx significant for HTN, HLD, DM2, pAF, acute ischemic left MCA stroke with residual aphasia and prior saddle PE on Eliquis.     During this episode, he was noted to be standing upright at the therapy bar and suddenly became lightheaded. PT noticed patient becoming increasingly lightheaded and brought the patient to a chair to sit down. Patient does not sure if he had loss consciousness at that time. Per the mother the patient, vital signs were taken at the physical therapy clinic and were noted to be 90/50.     There were no associated headache, chest pain, shortness of breath prior or after the episode. Patient has remained compliant with home medication regimen.   Patient does admits to not he does not have adequate oral intake of fluids.     On arrival to the ED, lab work was obtained showing patient having a notable LIANA with elevated creatinine. Initial troponin was negative, CT head and chest PE was negative. Review of Systems:  A 10-point review of systems was obtained. Pertinent positives and negatives are outlined in the HPI above. Remainder of review of systems are otherwise negative. Past Medical and Surgical History:   Past Medical History:   Diagnosis Date   • Allergic 04/01/2023   • Hypertension 03/31/2023   • Hypertensive emergency 03/30/2023   • Obesity 03/31/2023   • Seizures ([address]) 03/31/2023   • Stroke ([address]) 03/30/2023   • Visual impairment 03/30/2023       History reviewed. No pertinent surgical history. Meds/Allergies:  Prior to Admission medications    Medication Sig Start Date End Date Taking?  Authorizing Provider   amLODIPine (NORVASC) 5 mg tablet Take 1 tablet (5 mg total) by mouth daily 5/22/23   Anna Grewal, DO   apixaban (Eliquis) 5 mg Take 1 tablet (5 mg total) by mouth 2 (two) times a day 5/22/23 8/20/23  Anna Grewal, DO   aspirin 81 mg chewable tablet Chew 1 tablet (81 mg total) daily 5/22/23   Anna Grewal, DO   atorvastatin (LIPITOR) 40 mg tablet Take 1 tablet (40 mg total) by mouth every evening 5/22/23   Anna Grewal, DO   calcium carbonate (TUMS) 500 mg chewable tablet Chew 2 tablets (1,000 mg total) 3 (three) times a day as needed for indigestion or heartburn  Patient not taking: Reported on 6/15/2023 5/5/23   Batsheva Valadez MD   famotidine (PEPCID) 20 mg tablet Take 1 tablet (20 mg total) by mouth every 12 (twelve) hours  Patient not taking: Reported on 6/7/2023 5/5/23   Batsheva Valadez MD   levETIRAcetam (KEPPRA) 750 mg tablet Take 1 tablet (750 mg total) by mouth every 12 (twelve) hours 6/5/23   Christie Hinton MD   loratadine (CLARITIN) 10 mg tablet Take 1 tablet (10 mg total) by mouth daily as needed for allergies 5/5/23   Batsheva Valadez MD losartan (COZAAR) 25 mg tablet Take 1 tablet (25 mg total) by mouth daily 5/22/23   Mahin Molina, DO   metoprolol tartrate (LOPRESSOR) 25 mg tablet Take 1 tablet (25 mg total) by mouth every 12 (twelve) hours 6/26/23   Mahin Molina, DO   polyethylene glycol (GLYCOLAX) 17 GM/SCOOP powder Take 17 g by mouth daily as needed (Constipation) Can substitute for closest size available 5/5/23   Olivia Dickerson MD   tamsulosin [facility]) 0.4 mg Take 1 capsule (0.4 mg total) by mouth daily after dinner 6/26/23   Mahin Molina, DO     I have reviewed home medications using recent Epic encounter. 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   ("The patient's serum tested positive by [facility] and supports a diagnosis of heparin-induced-thrombocytopenia")       Social History:  Marital Status: Single   Patient Pre-hospital Living Situation: Home  Patient Pre-hospital Level of Mobility: walks with person assist  Patient Pre-hospital Diet Restrictions: none  Substance Use History:   Social History     Substance and Sexual Activity   Alcohol Use Never     Social History     Tobacco Use   Smoking Status Never   Smokeless Tobacco Never     Social History     Substance and Sexual Activity   Drug Use Never       Family History:  Family History   Problem Relation Age of Onset   • Diabetes Mother    • Arthritis Mother    • Breast cancer Mother    • Stroke Father    • Prostate cancer Paternal Grandfather    • Cancer Paternal Grandfather    • Breast cancer Paternal Grandmother        Physical Exam:     Vitals:   Blood Pressure: 144/89 (07/05/23 1841)  Pulse: 70 (07/05/23 1841)  Temperature: 98.4 °F (36.9 °C) (07/05/23 1841)  Temp Source: Oral (07/05/23 1144)  Respirations: 16 (07/05/23 1841)  Weight - Scale: 124 kg (273 lb) (07/05/23 1144)  SpO2: 98 % (07/05/23 1841)    Physical Exam  Vitals and nursing note reviewed.    Constitutional: General: He is not in acute distress. Appearance: He is well-developed. He is ill-appearing (Chronically). He is not diaphoretic. HENT:      Head: Normocephalic and atraumatic. Nose: Nose normal.   Eyes:      General: No scleral icterus. Conjunctiva/sclera: Conjunctivae normal.      Pupils: Pupils are equal, round, and reactive to light. Neck:      Thyroid: No thyromegaly. Cardiovascular:      Rate and Rhythm: Normal rate and regular rhythm. Heart sounds: Normal heart sounds. No murmur heard. No friction rub. No gallop. Pulmonary:      Effort: Pulmonary effort is normal. No respiratory distress. Breath sounds: Normal breath sounds. No stridor. No wheezing or rales. Abdominal:      General: Bowel sounds are normal. There is no distension. Palpations: Abdomen is soft. There is no mass. Tenderness: There is no abdominal tenderness. Musculoskeletal:         General: No swelling, tenderness or deformity. Cervical back: Neck supple. Skin:     General: Skin is warm and dry. Neurological:      Mental Status: He is alert. Mental status is at baseline. Cranial Nerves: Dysarthria present. Psychiatric:         Behavior: Behavior normal.         Thought Content:  Thought content normal.         Judgment: Judgment normal.          Additional Data:     Lab Results:  Results from last 7 days   Lab Units 07/05/23  1153   WBC Thousand/uL 6.97   HEMOGLOBIN g/dL 15.3   HEMATOCRIT % 46.9   PLATELETS Thousands/uL 308   NEUTROS PCT % 65   LYMPHS PCT % 23   MONOS PCT % 6   EOS PCT % 5     Results from last 7 days   Lab Units 07/05/23  1153   SODIUM mmol/L 140   POTASSIUM mmol/L 5.0   CHLORIDE mmol/L 105   CO2 mmol/L 27   BUN mg/dL 16   CREATININE mg/dL 1.58*   ANION GAP mmol/L 8   CALCIUM mg/dL 10.4*   ALBUMIN g/dL 4.4   TOTAL BILIRUBIN mg/dL 1.00   ALK PHOS U/L 98   ALT U/L 20   AST U/L 14   GLUCOSE RANDOM mg/dL 151*     Results from last 7 days   Lab Units 07/05/23  1153 INR  1.28*     Results from last 7 days   Lab Units 07/05/23  1137   POC GLUCOSE mg/dl 138               Imaging Results Reviewed as noted below  CT head without contrast   Final Result by Simón Dwyer MD (07/05 1350)      No acute intracranial abnormality. Old left posterior MCA territory infarct. Workstation performed: [id number]         CTA ED chest PE Study   Final Result by Kiki Coombs MD (07/05 1258)      No acute pulmonary emboli with resolution of previous pulmonary emboli. No acute pulmonary disease. Workstation performed: [id number]             CT head without contrast    Result Date: 7/5/2023  Impression: No acute intracranial abnormality. Old left posterior MCA territory infarct. Workstation performed: [id number]     CTA ED chest PE Study    Result Date: 7/5/2023  Impression: No acute pulmonary emboli with resolution of previous pulmonary emboli. No acute pulmonary disease. Workstation performed: [id number]     No Chest XR results available for this patient. EKG and Other Studies Reviewed on Admission:   · EKG: NSR    Recent Labs     07/05/23  1150   VENTRATE 81         ** Please Note: This note has been constructed using a voice recognition system.  **

## 2023-07-06 NOTE — DISCHARGE SUMMARY
[address]  Discharge- Hermann Camacho III 1975, 57 y.o. male MRN: [id number]  Unit/Bed#: -[facility] Encounter: [id number]  Primary Care Provider: Hank Alpers, DO   Date and time admitted to hospital: 7/5/2023 11:34 AM    Coagulopathy [facility])  Assessment & Plan  Hx of Saddle PE, CVA on Eliquis  CT PE and CT Head negative this admission    Continue Eliquis    Vasovagal syncope  Assessment & Plan  • Episode of syncope was witnessed. No reports of seizure activity  • No incontinence, oral trauma, postsyncopal confusion  • Prodrome of lightheadedness while at PT  /91   Pulse 70   Temp 97.8 °F (36.6 °C)   Resp 18   Ht 6' 1" (1.854 m)   Wt 124 kg (273 lb)   SpO2 97%   BMI 36.02 kg/m²     • EKG NSR  • CT head shows No acute changes compare to prior CT  • Neuro exam reveals no changes from baseline (mild dysarthria)    · Has since resolved  · No significant findings on telemetry    Primary hypertension  Assessment & Plan  Blood Pressure: 145/91      Plan:  Continue Norvasc  Discontinue losartan given JOANNE and well-controlled blood pressure    Type 2 diabetes mellitus without complication, without long-term current use of insulin [facility])  Assessment & Plan  Lab Results   Component Value Date    HGBA1C 8.0 (H) 03/31/2023       Recent Labs     07/05/23  1137 07/05/23  2215 07/06/23  0614   POCGLU 138 84 74       Blood Sugar Average: Last 72 hrs:  (P) [id number]   Plan:  • Continue home regimen      * Acute kidney injury [facility])  Assessment & Plan  Recent Labs     07/05/23  1153 07/06/23  0547   CREATININE 1.58* 0.99   EGFR 51 90     Estimated Creatinine Clearance: 128.6 mL/min (by C-G formula based on SCr of 0.99 mg/dL).     • POA; (baseline 0.95)  • Likely pre-renal azotemia 2/2 dehydration from inadequate oral intake    Plan:  • UA w/ microscopic, Urinary retention protocol, Bladder scan, Daily weights, I/O  • Resolved with IV fluids        Discharging Physician / Practitioner: Faustino Marcelino Lakhwinder Brandt MD  PCP: Hiro Godwin DO  Admission Date:   Admission Orders (From admission, onward)     Ordered        07/05/23 1425  Place in Observation  Once                      Discharge Date: 07/06/23    Medical Problems     Resolved Problems  Date Reviewed: 7/6/2023   None         Consultations During Hospital Stay:  · none    Procedures Performed:   · none    Significant Findings / Test Results:   CT head without contrast    Result Date: 7/5/2023  Impression: No acute intracranial abnormality. Old left posterior MCA territory infarct. Workstation performed: [id number]     CTA ED chest PE Study    Result Date: 7/5/2023  · Impression: No acute pulmonary emboli with resolution of previous pulmonary emboli. No acute pulmonary disease. Workstation performed: [id number]     Incidental Findings:   · none     Test Results Pending at Discharge (will require follow up):   · none     Outpatient Tests Requested:  · none    Complications:  none    Reason for Admission: JOANNE    Hospital Course:     Rosita Morales III is a 57 y.o. male patient who originally presented to the hospital on 7/5/2023 due to JOANNE likely secondary to volume depletion. Received IV fluids with resolution. will follow-up outpatient with primary care doctor      Please see above list of diagnoses and related plan for additional information. Condition at Discharge: stable     Discharge Day Visit / Exam:     Subjective: Denies fevers, chills, chest pain, shortness of breath  Vitals: Blood Pressure: 145/91 (07/06/23 0656)  Pulse: 70 (07/06/23 0656)  Temperature: 97.8 °F (36.6 °C) (07/06/23 0656)  Temp Source: Oral (07/06/23 0252)  Respirations: 18 (07/06/23 0656)  Height: 6' 1" (185.4 cm) (07/05/23 2300)  Weight - Scale: 124 kg (273 lb) (07/05/23 1144)  SpO2: 97 % (07/06/23 0656)  Exam:   Physical Exam  Constitutional:       General: He is not in acute distress. Appearance: He is well-developed. He is not diaphoretic.    HENT:      Head: Normocephalic and atraumatic. Nose: Nose normal.      Mouth/Throat:      Pharynx: No oropharyngeal exudate. Eyes:      General: No scleral icterus. Conjunctiva/sclera: Conjunctivae normal.   Cardiovascular:      Rate and Rhythm: Normal rate and regular rhythm. Heart sounds: Normal heart sounds. No murmur heard. No friction rub. No gallop. Pulmonary:      Effort: Pulmonary effort is normal. No respiratory distress. Breath sounds: Normal breath sounds. No wheezing or rales. Chest:      Chest wall: No tenderness. Abdominal:      General: Bowel sounds are normal. There is no distension. Palpations: Abdomen is soft. Tenderness: There is no abdominal tenderness. There is no guarding. Musculoskeletal:         General: No tenderness or deformity. Normal range of motion. Cervical back: Normal range of motion and neck supple. Skin:     General: Skin is warm and dry. Findings: No erythema. Neurological:      Mental Status: He is alert. Mental status is at baseline. Discussion with Family: pt    Discharge instructions/Information to patient and family:   See after visit summary for information provided to patient and family. Provisions for Follow-Up Care:  See after visit summary for information related to follow-up care and any pertinent home health orders. Disposition:     Home    For Discharges to [facility] SNF:   · Not Applicable to this Patient - Not Applicable to this Patient    Planned Readmission: none     Discharge Statement:  I spent 60 minutes discharging the patient. This time was spent on the day of discharge. I had direct contact with the patient on the day of discharge. Greater than 50% of the total time was spent examining patient, answering all patient questions, arranging and discussing plan of care with patient as well as directly providing post-discharge instructions.   Additional time then spent on discharge activities. Discharge Medications:  See after visit summary for reconciled discharge medications provided to patient and family.       ** Please Note: This note has been constructed using a voice recognition system **

## 2023-07-06 NOTE — ASSESSMENT & PLAN NOTE
Recent Labs     07/05/23  1153 07/06/23  0547   CREATININE 1.58* 0.99   EGFR 51 90     Estimated Creatinine Clearance: 128.6 mL/min (by C-G formula based on SCr of 0.99 mg/dL).     • POA; (baseline 0.95)  • Likely pre-renal azotemia 2/2 dehydration from inadequate oral intake    Plan:  • UA w/ microscopic, Urinary retention protocol, Bladder scan, Daily weights, I/O  • Resolved with IV fluids

## 2023-07-06 NOTE — PLAN OF CARE
Problem: PAIN - ADULT  Goal: Verbalizes/displays adequate comfort level or baseline comfort level  Description: Interventions:  - Encourage patient to monitor pain and request assistance  - Assess pain using appropriate pain scale  - Administer analgesics based on type and severity of pain and evaluate response  - Implement non-pharmacological measures as appropriate and evaluate response  - Consider cultural and social influences on pain and pain management  - Notify physician/advanced practitioner if interventions unsuccessful or patient reports new pain  Outcome: Progressing     Problem: INFECTION - ADULT  Goal: Absence or prevention of progression during hospitalization  Description: INTERVENTIONS:  - Assess and monitor for signs and symptoms of infection  - Monitor lab/diagnostic results  - Monitor all insertion sites, i.e. indwelling lines, tubes, and drains  - Monitor endotracheal if appropriate and nasal secretions for changes in amount and color  - [place] appropriate cooling/warming therapies per order  - Administer medications as ordered  - Instruct and encourage patient and family to use good hand hygiene technique  - Identify and instruct in appropriate isolation precautions for identified infection/condition  Outcome: Progressing  Goal: Absence of fever/infection during neutropenic period  Description: INTERVENTIONS:  - Monitor WBC    Outcome: Progressing     Problem: SAFETY ADULT  Goal: Patient will remain free of falls  Description: INTERVENTIONS:  - Educate patient/family on patient safety including physical limitations  - Instruct patient to call for assistance with activity   - Consult OT/PT to assist with strengthening/mobility   - Keep Call bell within reach  - Keep bed low and locked with side rails adjusted as appropriate  - Keep care items and personal belongings within reach  - Initiate and maintain comfort rounds  - Make Fall Risk Sign visible to staff  - Offer Toileting every 2 Hours, in advance of need  - Initiate/Maintain bedalarm  - Obtain necessary fall risk management equipment:   - Apply yellow socks and bracelet for high fall risk patients  - Consider moving patient to room near nurses station  Outcome: Progressing  Goal: Maintain or return to baseline ADL function  Description: INTERVENTIONS:  -  Assess patient's ability to carry out ADLs; assess patient's baseline for ADL function and identify physical deficits which impact ability to perform ADLs (bathing, care of mouth/teeth, toileting, grooming, dressing, etc.)  - Assess/evaluate cause of self-care deficits   - Assess range of motion  - Assess patient's mobility; develop plan if impaired  - Assess patient's need for assistive devices and provide as appropriate  - Encourage maximum independence but intervene and supervise when necessary  - Involve family in performance of ADLs  - Assess for home care needs following discharge   - Consider OT consult to assist with ADL evaluation and planning for discharge  - Provide patient education as appropriate  Outcome: Progressing  Goal: Maintains/Returns to pre admission functional level  Description: INTERVENTIONS:  - Perform BMAT or MOVE assessment daily.   - Set and communicate daily mobility goal to care team and patient/family/caregiver. - Collaborate with rehabilitation services on mobility goals if consulted  - Perform Range of Motion 3 times a day. - Reposition patient every 2 hours.   - Dangle patient 3 times a day  - Stand patient 3 times a day  - Ambulate patient  times a day  - Out of bed to chair 3 times a day   - Out of bed for meals 3 times a day  - Out of bed for toileting  - Record patient progress and toleration of activity level   Outcome: Progressing     Problem: DISCHARGE PLANNING  Goal: Discharge to home or other facility with appropriate resources  Description: INTERVENTIONS:  - Identify barriers to discharge w/patient and caregiver  - Arrange for needed discharge resources and transportation as appropriate  - Identify discharge learning needs (meds, wound care, etc.)  - Arrange for interpretive services to assist at discharge as needed  - Refer to Case Management Department for coordinating discharge planning if the patient needs post-hospital services based on physician/advanced practitioner order or complex needs related to functional status, cognitive ability, or social support system  Outcome: Progressing     Problem: Knowledge Deficit  Goal: Patient/family/caregiver demonstrates understanding of disease process, treatment plan, medications, and discharge instructions  Description: Complete learning assessment and assess knowledge base.   Interventions:  - Provide teaching at level of understanding  - Provide teaching via preferred learning methods  Outcome: Progressing     Problem: CARDIOVASCULAR - ADULT  Goal: Maintains optimal cardiac output and hemodynamic stability  Description: INTERVENTIONS:  - Monitor I/O, vital signs and rhythm  - Monitor for S/S and trends of decreased cardiac output  - Administer and titrate ordered vasoactive medications to optimize hemodynamic stability  - Assess quality of pulses, skin color and temperature  - Assess for signs of decreased coronary artery perfusion  - Instruct patient to report change in severity of symptoms  Outcome: Progressing  Goal: Absence of cardiac dysrhythmias or at baseline rhythm  Description: INTERVENTIONS:  - Continuous cardiac monitoring, vital signs, obtain 12 lead EKG if ordered  - Administer antiarrhythmic and heart rate control medications as ordered  - Monitor electrolytes and administer replacement therapy as ordered  Outcome: Progressing     Problem: GENITOURINARY - ADULT  Goal: Maintains or returns to baseline urinary function  Description: INTERVENTIONS:  - Assess urinary function  - Encourage oral fluids to ensure adequate hydration if ordered  - Administer IV fluids as ordered to ensure adequate hydration  - Administer ordered medications as needed  - Offer frequent toileting  - Follow urinary retention protocol if ordered  Outcome: Progressing  Goal: Absence of urinary retention  Description: INTERVENTIONS:  - Assess patient’s ability to void and empty bladder  - Monitor I/O  - Bladder scan as needed  - Discuss with physician/AP medications to alleviate retention as needed  - Discuss catheterization for long term situations as appropriate  Outcome: Progressing  Goal: Urinary catheter remains patent  Description: INTERVENTIONS:  - Assess patency of urinary catheter  - If patient has a chronic calzada, consider changing catheter if non-functioning  - Follow guidelines for intermittent irrigation of non-functioning urinary catheter  Outcome: Progressing     Problem: METABOLIC, FLUID AND ELECTROLYTES - ADULT  Goal: Electrolytes maintained within normal limits  Description: INTERVENTIONS:  - Monitor labs and assess patient for signs and symptoms of electrolyte imbalances  - Administer electrolyte replacement as ordered  - Monitor response to electrolyte replacements, including repeat lab results as appropriate  - Instruct patient on fluid and nutrition as appropriate  Outcome: Progressing  Goal: Fluid balance maintained  Description: INTERVENTIONS:  - Monitor labs   - Monitor I/O and WT  - Instruct patient on fluid and nutrition as appropriate  - Assess for signs & symptoms of volume excess or deficit  Outcome: Progressing  Goal: Glucose maintained within target range  Description: INTERVENTIONS:  - Monitor Blood Glucose as ordered  - Assess for signs and symptoms of hyperglycemia and hypoglycemia  - Administer ordered medications to maintain glucose within target range  - Assess nutritional intake and initiate nutrition service referral as needed  Outcome: Progressing

## 2023-07-06 NOTE — PLAN OF CARE
Problem: PAIN - ADULT  Goal: Verbalizes/displays adequate comfort level or baseline comfort level  Description: Interventions:  - Encourage patient to monitor pain and request assistance  - Assess pain using appropriate pain scale  - Administer analgesics based on type and severity of pain and evaluate response  - Implement non-pharmacological measures as appropriate and evaluate response  - Consider cultural and social influences on pain and pain management  - Notify physician/advanced practitioner if interventions unsuccessful or patient reports new pain  Outcome: Progressing     Problem: INFECTION - ADULT  Goal: Absence or prevention of progression during hospitalization  Description: INTERVENTIONS:  - Assess and monitor for signs and symptoms of infection  - Monitor lab/diagnostic results  - Monitor all insertion sites, i.e. indwelling lines, tubes, and drains  - Monitor endotracheal if appropriate and nasal secretions for changes in amount and color  - [place] appropriate cooling/warming therapies per order  - Administer medications as ordered  - Instruct and encourage patient and family to use good hand hygiene technique  - Identify and instruct in appropriate isolation precautions for identified infection/condition  Outcome: Progressing  Goal: Absence of fever/infection during neutropenic period  Description: INTERVENTIONS:  - Monitor WBC    Outcome: Progressing     Problem: SAFETY ADULT  Goal: Patient will remain free of falls  Description: INTERVENTIONS:  - Educate patient/family on patient safety including physical limitations  - Instruct patient to call for assistance with activity   - Consult OT/PT to assist with strengthening/mobility   - Keep Call bell within reach  - Keep bed low and locked with side rails adjusted as appropriate  - Keep care items and personal belongings within reach  - Initiate and maintain comfort rounds  - Make Fall Risk Sign visible to staff  - Offer Toileting every 2 Hours, in advance of need  - Initiate/Maintain bed alarm  - Obtain necessary fall risk management equipment  - Apply yellow socks and bracelet for high fall risk patients  - Consider moving patient to room near nurses station  Outcome: Progressing  Goal: Maintain or return to baseline ADL function  Description: INTERVENTIONS:  -  Assess patient's ability to carry out ADLs; assess patient's baseline for ADL function and identify physical deficits which impact ability to perform ADLs (bathing, care of mouth/teeth, toileting, grooming, dressing, etc.)  - Assess/evaluate cause of self-care deficits   - Assess range of motion  - Assess patient's mobility; develop plan if impaired  - Assess patient's need for assistive devices and provide as appropriate  - Encourage maximum independence but intervene and supervise when necessary  - Involve family in performance of ADLs  - Assess for home care needs following discharge   - Consider OT consult to assist with ADL evaluation and planning for discharge  - Provide patient education as appropriate  Outcome: Progressing  Goal: Maintains/Returns to pre admission functional level  Description: INTERVENTIONS:  - Perform BMAT or MOVE assessment daily.   - Set and communicate daily mobility goal to care team and patient/family/caregiver. - Collaborate with rehabilitation services on mobility goals if consulted  - Perform Range of Motion 3 times a day. - Reposition patient every 2 hours.   - Dangle patient 3 times a day  - Stand patient 3 times a day  - Ambulate patient 3 times a day  - Out of bed to chair 3 times a day   - Out of bed for meals 3 times a day  - Out of bed for toileting  - Record patient progress and toleration of activity level   Outcome: Progressing     Problem: DISCHARGE PLANNING  Goal: Discharge to home or other facility with appropriate resources  Description: INTERVENTIONS:  - Identify barriers to discharge w/patient and caregiver  - Arrange for needed discharge resources and transportation as appropriate  - Identify discharge learning needs (meds, wound care, etc.)  - Arrange for interpretive services to assist at discharge as needed  - Refer to Case Management Department for coordinating discharge planning if the patient needs post-hospital services based on physician/advanced practitioner order or complex needs related to functional status, cognitive ability, or social support system  Outcome: Progressing     Problem: Knowledge Deficit  Goal: Patient/family/caregiver demonstrates understanding of disease process, treatment plan, medications, and discharge instructions  Description: Complete learning assessment and assess knowledge base.   Interventions:  - Provide teaching at level of understanding  - Provide teaching via preferred learning methods  Outcome: Progressing     Problem: CARDIOVASCULAR - ADULT  Goal: Maintains optimal cardiac output and hemodynamic stability  Description: INTERVENTIONS:  - Monitor I/O, vital signs and rhythm  - Monitor for S/S and trends of decreased cardiac output  - Administer and titrate ordered vasoactive medications to optimize hemodynamic stability  - Assess quality of pulses, skin color and temperature  - Assess for signs of decreased coronary artery perfusion  - Instruct patient to report change in severity of symptoms  Outcome: Progressing  Goal: Absence of cardiac dysrhythmias or at baseline rhythm  Description: INTERVENTIONS:  - Continuous cardiac monitoring, vital signs, obtain 12 lead EKG if ordered  - Administer antiarrhythmic and heart rate control medications as ordered  - Monitor electrolytes and administer replacement therapy as ordered  Outcome: Progressing     Problem: GENITOURINARY - ADULT  Goal: Maintains or returns to baseline urinary function  Description: INTERVENTIONS:  - Assess urinary function  - Encourage oral fluids to ensure adequate hydration if ordered  - Administer IV fluids as ordered to ensure adequate hydration  - Administer ordered medications as needed  - Offer frequent toileting  - Follow urinary retention protocol if ordered  Outcome: Progressing  Goal: Absence of urinary retention  Description: INTERVENTIONS:  - Assess patient’s ability to void and empty bladder  - Monitor I/O  - Bladder scan as needed  - Discuss with physician/AP medications to alleviate retention as needed  - Discuss catheterization for long term situations as appropriate  Outcome: Progressing  Goal: Urinary catheter remains patent  Description: INTERVENTIONS:  - Assess patency of urinary catheter  - If patient has a chronic calzada, consider changing catheter if non-functioning  - Follow guidelines for intermittent irrigation of non-functioning urinary catheter  Outcome: Progressing     Problem: METABOLIC, FLUID AND ELECTROLYTES - ADULT  Goal: Electrolytes maintained within normal limits  Description: INTERVENTIONS:  - Monitor labs and assess patient for signs and symptoms of electrolyte imbalances  - Administer electrolyte replacement as ordered  - Monitor response to electrolyte replacements, including repeat lab results as appropriate  - Instruct patient on fluid and nutrition as appropriate  Outcome: Progressing  Goal: Fluid balance maintained  Description: INTERVENTIONS:  - Monitor labs   - Monitor I/O and WT  - Instruct patient on fluid and nutrition as appropriate  - Assess for signs & symptoms of volume excess or deficit  Outcome: Progressing  Goal: Glucose maintained within target range  Description: INTERVENTIONS:  - Monitor Blood Glucose as ordered  - Assess for signs and symptoms of hyperglycemia and hypoglycemia  - Administer ordered medications to maintain glucose within target range  - Assess nutritional intake and initiate nutrition service referral as needed  Outcome: Progressing     Problem: MOBILITY - ADULT  Goal: Maintain or return to baseline ADL function  Description: INTERVENTIONS:  -  Assess patient's ability to carry out ADLs; assess patient's baseline for ADL function and identify physical deficits which impact ability to perform ADLs (bathing, care of mouth/teeth, toileting, grooming, dressing, etc.)  - Assess/evaluate cause of self-care deficits   - Assess range of motion  - Assess patient's mobility; develop plan if impaired  - Assess patient's need for assistive devices and provide as appropriate  - Encourage maximum independence but intervene and supervise when necessary  - Involve family in performance of ADLs  - Assess for home care needs following discharge   - Consider OT consult to assist with ADL evaluation and planning for discharge  - Provide patient education as appropriate  Outcome: Progressing  Goal: Maintains/Returns to pre admission functional level  Description: INTERVENTIONS:  - Perform BMAT or MOVE assessment daily.   - Set and communicate daily mobility goal to care team and patient/family/caregiver. - Collaborate with rehabilitation services on mobility goals if consulted  - Perform Range of Motion 3 times a day. - Reposition patient every 3 hours.   - Dangle patient 3 times a day  - Stand patient 3 times a day  - Ambulate patient 3 times a day  - Out of bed to chair 3 times a day   - Out of bed for meals 3 times a day  - Out of bed for toileting  - Record patient progress and toleration of activity level   Outcome: Progressing

## 2023-07-06 NOTE — ASSESSMENT & PLAN NOTE
Lab Results   Component Value Date    HGBA1C 8.0 (H) 03/31/2023       Recent Labs     07/05/23  1137 07/05/23  2215 07/06/23  0614   POCGLU 138 84 74       Blood Sugar Average: Last 72 hrs:  (P) [id number]   Plan:  • Continue home regimen

## 2023-07-06 NOTE — ASSESSMENT & PLAN NOTE
Blood Pressure: 144/89      Plan:  • Continue Norvasc  • Medications held: Losartan  • Monitor blood pressure

## 2023-07-06 NOTE — ASSESSMENT & PLAN NOTE
Recent Labs     07/05/23  1153   CREATININE 1.58*   EGFR 51     Estimated Creatinine Clearance: 80.6 mL/min (A) (by C-G formula based on SCr of 1.58 mg/dL (H)).     • POA; (baseline 0.95)  • Likely pre-renal azotemia 2/2 dehydration from inadequate oral intake    Plan:  • UA w/ microscopic, Urinary retention protocol, Bladder scan, Daily weights, I/O  • IV Fluids: NS at 75 mL/hr  • Monitor BMP daily and observe for downward trend of creatinine  • Avoid hypoperfusion of the kidneys, minimize nephrotoxins  • RAAS Blockers/Diuretics held: Cozaar

## 2023-07-07 ENCOUNTER — APPOINTMENT (OUTPATIENT)
Dept: LAB | Facility: MEDICAL CENTER | Age: 48
End: 2023-07-07
Payer: COMMERCIAL

## 2023-07-07 ENCOUNTER — TRANSITIONAL CARE MANAGEMENT (OUTPATIENT)
Dept: FAMILY MEDICINE CLINIC | Facility: MEDICAL CENTER | Age: 48
End: 2023-07-07

## 2023-07-07 DIAGNOSIS — D50.8 HYPOCHROMIC ERYTHROCYTES: ICD-10-CM

## 2023-07-07 DIAGNOSIS — I63.512 ACUTE ISCHEMIC LEFT MCA STROKE (HCC): ICD-10-CM

## 2023-07-07 DIAGNOSIS — I26.92 ACUTE SADDLE PULMONARY EMBOLISM WITHOUT ACUTE COR PULMONALE (HCC): ICD-10-CM

## 2023-07-07 DIAGNOSIS — I82.403 DVT OF LOWER EXTREMITY, BILATERAL (HCC): ICD-10-CM

## 2023-07-07 LAB
ALBUMIN SERPL BCP-MCNC: 3.5 G/DL (ref 3.5–5)
ALP SERPL-CCNC: 90 U/L (ref 46–116)
ALT SERPL W P-5'-P-CCNC: 26 U/L (ref 12–78)
ANION GAP SERPL CALCULATED.3IONS-SCNC: 3 MMOL/L
AST SERPL W P-5'-P-CCNC: 14 U/L (ref 5–45)
BASOPHILS # BLD AUTO: 0.04 THOUSANDS/ÂΜL (ref 0–0.1)
BASOPHILS NFR BLD AUTO: 1 % (ref 0–1)
BILIRUB SERPL-MCNC: 0.91 MG/DL (ref 0.2–1)
BUN SERPL-MCNC: 10 MG/DL (ref 5–25)
CALCIUM SERPL-MCNC: 9.3 MG/DL (ref 8.3–10.1)
CHLORIDE SERPL-SCNC: 109 MMOL/L (ref 96–108)
CO2 SERPL-SCNC: 28 MMOL/L (ref 21–32)
CREAT SERPL-MCNC: 1.07 MG/DL (ref 0.6–1.3)
EOSINOPHIL # BLD AUTO: 0.27 THOUSAND/ÂΜL (ref 0–0.61)
EOSINOPHIL NFR BLD AUTO: 5 % (ref 0–6)
ERYTHROCYTE [DISTWIDTH] IN BLOOD BY AUTOMATED COUNT: 15 % (ref 11.6–15.1)
FERRITIN SERPL-MCNC: 114 NG/ML (ref 24–336)
GFR SERPL CREATININE-BSD FRML MDRD: 82 ML/MIN/1.73SQ M
GLUCOSE P FAST SERPL-MCNC: 97 MG/DL (ref 65–99)
HCT VFR BLD AUTO: 44.7 % (ref 36.5–49.3)
HGB BLD-MCNC: 14.3 G/DL (ref 12–17)
IMM GRANULOCYTES # BLD AUTO: 0.01 THOUSAND/UL (ref 0–0.2)
IMM GRANULOCYTES NFR BLD AUTO: 0 % (ref 0–2)
IRON SATN MFR SERPL: 22 % (ref 20–50)
IRON SERPL-MCNC: 72 UG/DL (ref 65–175)
LYMPHOCYTES # BLD AUTO: 1.67 THOUSANDS/ÂΜL (ref 0.6–4.47)
LYMPHOCYTES NFR BLD AUTO: 28 % (ref 14–44)
MCH RBC QN AUTO: 27.7 PG (ref 26.8–34.3)
MCHC RBC AUTO-ENTMCNC: 32 G/DL (ref 31.4–37.4)
MCV RBC AUTO: 87 FL (ref 82–98)
MONOCYTES # BLD AUTO: 0.42 THOUSAND/ÂΜL (ref 0.17–1.22)
MONOCYTES NFR BLD AUTO: 7 % (ref 4–12)
NEUTROPHILS # BLD AUTO: 3.51 THOUSANDS/ÂΜL (ref 1.85–7.62)
NEUTS SEG NFR BLD AUTO: 59 % (ref 43–75)
NRBC BLD AUTO-RTO: 0 /100 WBCS
PLATELET # BLD AUTO: 280 THOUSANDS/UL (ref 149–390)
PMV BLD AUTO: 10.8 FL (ref 8.9–12.7)
POTASSIUM SERPL-SCNC: 4.2 MMOL/L (ref 3.5–5.3)
PROT SERPL-MCNC: 6.6 G/DL (ref 6.4–8.4)
RBC # BLD AUTO: 5.16 MILLION/UL (ref 3.88–5.62)
SODIUM SERPL-SCNC: 140 MMOL/L (ref 135–147)
TIBC SERPL-MCNC: 332 UG/DL (ref 250–450)
WBC # BLD AUTO: 5.92 THOUSAND/UL (ref 4.31–10.16)

## 2023-07-07 PROCEDURE — 36415 COLL VENOUS BLD VENIPUNCTURE: CPT

## 2023-07-07 PROCEDURE — 85025 COMPLETE CBC W/AUTO DIFF WBC: CPT

## 2023-07-07 PROCEDURE — 83550 IRON BINDING TEST: CPT

## 2023-07-07 PROCEDURE — 80053 COMPREHEN METABOLIC PANEL: CPT

## 2023-07-07 PROCEDURE — 82728 ASSAY OF FERRITIN: CPT

## 2023-07-07 PROCEDURE — 86147 CARDIOLIPIN ANTIBODY EA IG: CPT

## 2023-07-07 PROCEDURE — 83540 ASSAY OF IRON: CPT

## 2023-07-08 LAB
EST. AVERAGE GLUCOSE BLD GHB EST-MCNC: 105 MG/DL
HBA1C MFR BLD: 5.3 %

## 2023-07-09 ENCOUNTER — HOSPITAL ENCOUNTER (OUTPATIENT)
Dept: MRI IMAGING | Facility: HOSPITAL | Age: 48
Discharge: HOME | End: 2023-07-09
Payer: COMMERCIAL

## 2023-07-09 DIAGNOSIS — M54.9 BACK PAIN: ICD-10-CM

## 2023-07-09 PROCEDURE — 72157 MRI CHEST SPINE W/O & W/DYE: CPT

## 2023-07-09 PROCEDURE — A9585 GADOBUTROL INJECTION: HCPCS

## 2023-07-09 PROCEDURE — G1004 CDSM NDSC: HCPCS

## 2023-07-09 PROCEDURE — 72158 MRI LUMBAR SPINE W/O & W/DYE: CPT

## 2023-07-09 RX ADMIN — GADOBUTROL 12 ML: 604.72 INJECTION INTRAVENOUS at 12:57

## 2023-07-11 LAB
CARDIOLIPIN IGA SER IA-ACNC: 14
CARDIOLIPIN IGG SER IA-ACNC: 2.5
CARDIOLIPIN IGM SER IA-ACNC: 3.1

## 2023-07-12 ENCOUNTER — OFFICE VISIT (OUTPATIENT)
Dept: FAMILY MEDICINE CLINIC | Facility: MEDICAL CENTER | Age: 48
End: 2023-07-12
Payer: COMMERCIAL

## 2023-07-12 VITALS
WEIGHT: 274.6 LBS | HEART RATE: 72 BPM | BODY MASS INDEX: 36.39 KG/M2 | HEIGHT: 73 IN | TEMPERATURE: 98 F | SYSTOLIC BLOOD PRESSURE: 132 MMHG | OXYGEN SATURATION: 98 % | DIASTOLIC BLOOD PRESSURE: 88 MMHG

## 2023-07-12 DIAGNOSIS — Z76.89 ENCOUNTER FOR SUPPORT AND COORDINATION OF TRANSITION OF CARE: Primary | ICD-10-CM

## 2023-07-12 DIAGNOSIS — N18.2 STAGE 2 CHRONIC KIDNEY DISEASE: ICD-10-CM

## 2023-07-12 PROCEDURE — 99496 TRANSJ CARE MGMT HIGH F2F 7D: CPT | Performed by: STUDENT IN AN ORGANIZED HEALTH CARE EDUCATION/TRAINING PROGRAM

## 2023-07-12 NOTE — PROGRESS NOTES
[address] [facility] [address] - Clinic Note  José Moran, 23     Blaire Cosme III MRN: [id number] : 1975 Age: 57 y.o. Assessment/Plan     1. Encounter for support and coordination of transition of care    -Patient presents for transition of care appointment after recent hospitalization for syncopal event and JOANNE  -Losartan was discontinued, we will remain off of losartan at this time as blood pressure appropriate, continue Lopressor and amlodipine at current doses  -Kidney function reviewed  -Encouraged adequate hydration especially prior to physical activity such as physical therapy  Component      Latest Ref Rng 2023   Sodium      135 - 147 mmol/L 140  138  140    Potassium      3.5 - 5.3 mmol/L 5.0  3.6  4.2    Chloride      96 - 108 mmol/L 105  106  109 (H)    CO2      21 - 32 mmol/L 27  26  28    Anion Gap      mmol/L 8  6  3    BUN      5 - 25 mg/dL 16  14  10    Creatinine      0.60 - 1.30 mg/dL 1.58 (H)  0.99  1.07    Calcium      8.3 - 10.1 mg/dL 10.4 (H)  8.7  9.3    AST      5 - 45 U/L 14  11 (L)  14    ALT      12 - 78 U/L 20  15  26    Alkaline Phosphatase      46 - 116 U/L 98  78  90    Total Protein      6.4 - 8.4 g/dL 7.7  6.4  6.6    Albumin      3.5 - 5.0 g/dL 4.4  3.7  3.5    TOTAL BILIRUBIN      0.20 - 1.00 mg/dL 1.00  0.97  0.91    eGFR      ml/min/1.73sq m 51  90  82    GLUCOSE FASTING      65 - 99 mg/dL  84  97         2. Stage 2 chronic kidney disease    - Basic metabolic panel; Future    Blaire Cosme III acknowledged understanding of treatment plan, all questions answered. Follow-up in 3 months and sooner as needed. Subjective      Blaire Cosme III is a 57 y.o. male who presents for transition of care appointment. Patient presents with his mother. Patient was hospitalized at [address] [place] [address] from 2023 to 2023. After completing physical therapy, patient became lightheaded he describes and syncopized.   911 was called and patient was ultimately admitted. Patient was noted to have JOANNE during hospital stay, he received IV fluids. Patient has had no lightheadedness or syncopal events postdischarge. Hospital course per discharge summary:    Royal Linda BERMUDEZ is a 57 y.o. male patient who originally presented to the hospital on 7/5/2023 due to JOANNE likely secondary to volume depletion. Received IV fluids with resolution. will follow-up outpatient with primary care doctor    The following portions of the patient's history were reviewed and updated as appropriate: allergies, current medications, past family history, past medical history, past social history, past surgical history and problem list.     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 No pertinent surgical history.     Family History   Problem Relation Age of Onset   • Diabetes Mother    • Arthritis Mother    • Breast cancer Mother    • Stroke Father    • Prostate cancer Paternal Grandfather    • Cancer Paternal Grandfather    • Breast cancer Paternal Grandmother        Social History     Socioeconomic History   • Marital status: Single     Spouse name: None   • Number of children: None   • Years of education: None   • Highest education level: None   Occupational History   • None   Tobacco Use   • Smoking status: Never   • Smokeless tobacco: Never   Vaping Use   • Vaping Use: Never used   Substance and Sexual Activity   • Alcohol use: Never   • Drug use: Never   • Sexual activity: Not Currently Other Topics Concern   • None   Social History Narrative   • None     Social Determinants of Health     Financial Resource Strain: Not on file   Food Insecurity: No Food Insecurity (4/13/2023)    Hunger Vital Sign    • Worried About Running Out of Food in the Last Year: Never true    • Ran Out of Food in the Last Year: Never true   Transportation Needs: No Transportation Needs (4/13/2023)    PRAPARE - Transportation    • Lack of Transportation (Medical): No    • Lack of Transportation (Non-Medical):  No   Physical Activity: Not on file   Stress: Not on file   Social Connections: Not on file   Intimate Partner Violence: Not on file   Housing Stability: Low Risk  (4/13/2023)    Housing Stability Vital Sign    • Unable to Pay for Housing in the Last Year: No    • Number of Places Lived in the Last Year: 1    • Unstable Housing in the Last Year: No       Current Outpatient Medications   Medication Sig Dispense Refill   • amLODIPine (NORVASC) 5 mg tablet Take 1 tablet (5 mg total) by mouth daily 90 tablet 0   • apixaban (Eliquis) 5 mg Take 1 tablet (5 mg total) by mouth 2 (two) times a day 180 tablet 0   • aspirin 81 mg chewable tablet Chew 1 tablet (81 mg total) daily 90 tablet 0   • atorvastatin (LIPITOR) 40 mg tablet Take 1 tablet (40 mg total) by mouth every evening 90 tablet 0   • levETIRAcetam (KEPPRA) 750 mg tablet Take 1 tablet (750 mg total) by mouth every 12 (twelve) hours 180 tablet 3   • loratadine (CLARITIN) 10 mg tablet Take 1 tablet (10 mg total) by mouth daily as needed for allergies  0   • metoprolol tartrate (LOPRESSOR) 25 mg tablet Take 1 tablet (25 mg total) by mouth every 12 (twelve) hours 90 tablet 0   • polyethylene glycol (GLYCOLAX) 17 GM/SCOOP powder Take 17 g by mouth daily as needed (Constipation) Can substitute for closest size available 507 g 0   • tamsulosin (FLOMAX) 0.4 mg Take 1 capsule (0.4 mg total) by mouth daily after dinner 30 capsule 0     No current facility-administered medications for this visit. Review of Systems     As noted in HPI    Objective      /88 (BP Location: Left arm, Patient Position: Sitting, Cuff Size: Large)   Pulse 72   Temp 98 °F (36.7 °C)   Ht 6' 1" (1.854 m)   Wt 125 kg (274 lb 9.6 oz)   SpO2 98%   BMI 36.23 kg/m²     Physical Exam  Vitals reviewed. Constitutional:       General: He is not in acute distress. Appearance: Normal appearance. HENT:      Head: Normocephalic and atraumatic. Mouth/Throat:      Mouth: Mucous membranes are moist.      Pharynx: Oropharynx is clear. Eyes:      Conjunctiva/sclera: Conjunctivae normal.   Cardiovascular:      Rate and Rhythm: Normal rate and regular rhythm. Pulses: Normal pulses. Heart sounds: Normal heart sounds. Pulmonary:      Effort: Pulmonary effort is normal.      Breath sounds: Normal breath sounds. Musculoskeletal:      Right lower leg: No edema. Left lower leg: No edema. Skin:     General: Skin is warm and dry. Neurological:      Mental Status: He is alert and oriented to person, place, and time. Psychiatric:         Behavior: Behavior normal.             Some portions of this record may have been generated with voice recognition software. There may be translation, syntax, or grammatical errors. Occasional wrong word or "sound-a-like" substitutions may have occurred due to the inherent limitations of the voice recognition software. Read the chart carefully and recognize, using context, where substations may have occurred. If you have any questions, please contact the dictating provider for clarification or correction, as needed.

## 2023-07-19 ENCOUNTER — APPOINTMENT (OUTPATIENT)
Dept: LAB | Facility: CLINIC | Age: 48
End: 2023-07-19
Payer: COMMERCIAL

## 2023-07-19 DIAGNOSIS — I63.512 ACUTE ISCHEMIC LEFT MCA STROKE (HCC): ICD-10-CM

## 2023-07-19 PROCEDURE — 85670 THROMBIN TIME PLASMA: CPT

## 2023-07-19 PROCEDURE — 86147 CARDIOLIPIN ANTIBODY EA IG: CPT

## 2023-07-19 PROCEDURE — 36415 COLL VENOUS BLD VENIPUNCTURE: CPT

## 2023-07-19 PROCEDURE — 85306 CLOT INHIBIT PROT S FREE: CPT

## 2023-07-19 PROCEDURE — 85300 ANTITHROMBIN III ACTIVITY: CPT

## 2023-07-19 PROCEDURE — 85305 CLOT INHIBIT PROT S TOTAL: CPT

## 2023-07-19 PROCEDURE — 85732 THROMBOPLASTIN TIME PARTIAL: CPT

## 2023-07-19 PROCEDURE — 86146 BETA-2 GLYCOPROTEIN ANTIBODY: CPT

## 2023-07-19 PROCEDURE — 81241 F5 GENE: CPT

## 2023-07-19 PROCEDURE — 85613 RUSSELL VIPER VENOM DILUTED: CPT

## 2023-07-19 PROCEDURE — 85705 THROMBOPLASTIN INHIBITION: CPT

## 2023-07-19 PROCEDURE — 81240 F2 GENE: CPT

## 2023-07-19 PROCEDURE — 85303 CLOT INHIBIT PROT C ACTIVITY: CPT

## 2023-07-20 LAB
DEPRECATED AT III PPP: 103 % OF NORMAL (ref 92–136)
LEFT EYE DIABETIC RETINOPATHY: NORMAL
PROT C AG ACT/NOR PPP IA: 119 % OF NORMAL (ref 60–150)
PROT S ACT/NOR PPP: 89 % (ref 71–117)
RIGHT EYE DIABETIC RETINOPATHY: NORMAL

## 2023-07-20 PROCEDURE — 2023F DILAT RTA XM W/O RTNOPTHY: CPT | Performed by: PHYSICIAN ASSISTANT

## 2023-07-21 LAB
B2 GLYCOPROT1 IGA SERPL IA-ACNC: 4.7
B2 GLYCOPROT1 IGG SERPL IA-ACNC: <0.8
B2 GLYCOPROT1 IGM SERPL IA-ACNC: <2.4
CARDIOLIPIN IGA SER IA-ACNC: 14
CARDIOLIPIN IGG SER IA-ACNC: 2.4
CARDIOLIPIN IGM SER IA-ACNC: 3.4
PROT S ACT/NOR PPP: 89 % (ref 61–136)
PROT S PPP-ACNC: 102 % (ref 60–150)

## 2023-07-24 LAB
APTT SCREEN TO CONFIRM RATIO: 0.92 RATIO (ref 0–1.34)
CONFIRM APTT/NORMAL: 46.4 SEC (ref 0–47.6)
DRVVT IMM 1:2 NP PPP: 48.9 SEC (ref 0–40.4)
DRVVT SCREEN TO CONFIRM RATIO: 1.1 RATIO (ref 0.8–1.2)
LA PPP-IMP: ABNORMAL
SCREEN APTT: 38.8 SEC (ref 0–43.5)
SCREEN DRVVT: 66.5 SEC (ref 0–47)
THROMBIN TIME: 15.2 SEC (ref 0–23)

## 2023-07-25 LAB
F2 GENE MUT ANL BLD/T: NORMAL
Lab: NORMAL

## 2023-07-27 ENCOUNTER — APPOINTMENT (EMERGENCY)
Dept: CT IMAGING | Facility: HOSPITAL | Age: 48
End: 2023-07-27
Payer: COMMERCIAL

## 2023-07-27 ENCOUNTER — TELEPHONE (OUTPATIENT)
Dept: FAMILY MEDICINE CLINIC | Facility: MEDICAL CENTER | Age: 48
End: 2023-07-27

## 2023-07-27 ENCOUNTER — HOSPITAL ENCOUNTER (EMERGENCY)
Facility: HOSPITAL | Age: 48
Discharge: HOME | End: 2023-07-27
Attending: EMERGENCY MEDICINE
Payer: COMMERCIAL

## 2023-07-27 VITALS
RESPIRATION RATE: 15 BRPM | TEMPERATURE: 98 F | DIASTOLIC BLOOD PRESSURE: 93 MMHG | OXYGEN SATURATION: 100 % | SYSTOLIC BLOOD PRESSURE: 144 MMHG | HEART RATE: 65 BPM
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
## 2023-07-27 DIAGNOSIS — R42 LIGHTHEADEDNESS: ICD-10-CM

## 2023-07-27 DIAGNOSIS — S09.90XA INJURY OF HEAD, INITIAL ENCOUNTER: Primary | ICD-10-CM

## 2023-07-27 LAB
ANION GAP SERPL CALCULATED.3IONS-SCNC: 7 MMOL/L
ATRIAL RATE: 67 BPM
BASOPHILS # BLD AUTO: 0.04 THOUSANDS/ÂΜL (ref 0–0.1)
BASOPHILS NFR BLD AUTO: 1 % (ref 0–1)
BUN SERPL-MCNC: 12 MG/DL (ref 5–25)
CALCIUM SERPL-MCNC: 9.9 MG/DL (ref 8.4–10.2)
CHLORIDE SERPL-SCNC: 106 MMOL/L (ref 96–108)
CO2 SERPL-SCNC: 27 MMOL/L (ref 21–32)
CREAT SERPL-MCNC: 1.22 MG/DL (ref 0.6–1.3)
EOSINOPHIL # BLD AUTO: 0.16 THOUSAND/ÂΜL (ref 0–0.61)
EOSINOPHIL NFR BLD AUTO: 2 % (ref 0–6)
ERYTHROCYTE [DISTWIDTH] IN BLOOD BY AUTOMATED COUNT: 14.1 % (ref 11.6–15.1)
GFR SERPL CREATININE-BSD FRML MDRD: 70 ML/MIN/1.73SQ M
GLUCOSE SERPL-MCNC: 104 MG/DL (ref 65–140)
HCT VFR BLD AUTO: 44.3 % (ref 36.5–49.3)
HGB BLD-MCNC: 14.9 G/DL (ref 12–17)
IMM GRANULOCYTES # BLD AUTO: 0.02 THOUSAND/UL (ref 0–0.2)
IMM GRANULOCYTES NFR BLD AUTO: 0 % (ref 0–2)
LYMPHOCYTES # BLD AUTO: 1.21 THOUSANDS/ÂΜL (ref 0.6–4.47)
LYMPHOCYTES NFR BLD AUTO: 18 % (ref 14–44)
MCH RBC QN AUTO: 29.1 PG (ref 26.8–34.3)
MCHC RBC AUTO-ENTMCNC: 33.6 G/DL (ref 31.4–37.4)
MCV RBC AUTO: 87 FL (ref 82–98)
MONOCYTES # BLD AUTO: 0.45 THOUSAND/ÂΜL (ref 0.17–1.22)
MONOCYTES NFR BLD AUTO: 7 % (ref 4–12)
NEUTROPHILS # BLD AUTO: 4.73 THOUSANDS/ÂΜL (ref 1.85–7.62)
NEUTS SEG NFR BLD AUTO: 72 % (ref 43–75)
NRBC BLD AUTO-RTO: 0 /100 WBCS
P AXIS: 43 DEGREES
PLATELET # BLD AUTO: 266 THOUSANDS/UL (ref 149–390)
PMV BLD AUTO: 9.8 FL (ref 8.9–12.7)
POTASSIUM SERPL-SCNC: 4.5 MMOL/L (ref 3.5–5.3)
PR INTERVAL: 196 MS
QRS AXIS: 16 DEGREES
QRSD INTERVAL: 96 MS
QT INTERVAL: 414 MS
QTC INTERVAL: 437 MS
RBC # BLD AUTO: 5.12 MILLION/UL (ref 3.88–5.62)
SODIUM SERPL-SCNC: 140 MMOL/L (ref 135–147)
T WAVE AXIS: 0 DEGREES
VENTRICULAR RATE: 67 BPM
WBC # BLD AUTO: 6.61 THOUSAND/UL (ref 4.31–10.16)

## 2023-07-27 PROCEDURE — 99284 EMERGENCY DEPT VISIT MOD MDM: CPT

## 2023-07-27 PROCEDURE — 36415 COLL VENOUS BLD VENIPUNCTURE: CPT | Performed by: EMERGENCY MEDICINE

## 2023-07-27 PROCEDURE — 96361 HYDRATE IV INFUSION ADD-ON: CPT

## 2023-07-27 PROCEDURE — 93005 ELECTROCARDIOGRAM TRACING: CPT

## 2023-07-27 PROCEDURE — 70450 CT HEAD/BRAIN W/O DYE: CPT

## 2023-07-27 PROCEDURE — 99285 EMERGENCY DEPT VISIT HI MDM: CPT | Performed by: EMERGENCY MEDICINE

## 2023-07-27 PROCEDURE — 85025 COMPLETE CBC W/AUTO DIFF WBC: CPT | Performed by: EMERGENCY MEDICINE

## 2023-07-27 PROCEDURE — 80048 BASIC METABOLIC PNL TOTAL CA: CPT | Performed by: EMERGENCY MEDICINE

## 2023-07-27 PROCEDURE — 96360 HYDRATION IV INFUSION INIT: CPT

## 2023-07-27 PROCEDURE — 93010 ELECTROCARDIOGRAM REPORT: CPT | Performed by: INTERNAL MEDICINE

## 2023-07-27 RX ADMIN — SODIUM CHLORIDE 1000 ML: 0.9 INJECTION, SOLUTION INTRAVENOUS at 12:52

## 2023-07-27 NOTE — DISCHARGE INSTRUCTIONS
Please follow up PCP. Recommend tylenol 650 mg and ibuprofen 600 mg every 6 hours as needed for pain. Please return for severe chest pain, significant shortness of breath, severely worsening symptoms, or any other concerning signs or symptoms. Please refer to the following documents for additional instructions and return precautions.

## 2023-07-27 NOTE — ED PROVIDER NOTES
History  Chief Complaint   Patient presents with   • Fall     [facility] Pt good slightly dizzy and fell, hitting his head. No LOC but + BT. Pt with a hx of a stroke with right side weakness and expressive aphasia. Pt is at his baseline      56-year-old male history of stroke with right-sided weakness and expressive aphasia on Eliquis presenting with fall and head strike. Patient reports working with occupational therapy and getting lightheaded and falling forward and hitting his head. Denies LOC. Denies any other pain or injury. Denies any chest pain shortness of breath. Denies any other complaints. Chart reviewed. Airway intact  Breathing clear to auscultation bilaterally  Circulation 2+ DP PT pulses bilaterally  Disability GCS 15  Exposure completed    Past Medical History:  04/01/2023: Allergic  03/31/2023: Hypertension  03/30/2023: Hypertensive emergency  03/31/2023: Obesity  03/31/2023: Seizures ([address])  03/30/2023: Stroke ([address])  03/30/2023: Visual impairment  Family History: non-contributory  Social History            Prior to Admission Medications   Prescriptions Last Dose Informant Patient Reported? Taking?    amLODIPine (NORVASC) 5 mg tablet   No No   Sig: Take 1 tablet (5 mg total) by mouth daily   apixaban (Eliquis) 5 mg   No No   Sig: Take 1 tablet (5 mg total) by mouth 2 (two) times a day   aspirin 81 mg chewable tablet   No No   Sig: Chew 1 tablet (81 mg total) daily   atorvastatin (LIPITOR) 40 mg tablet   No No   Sig: Take 1 tablet (40 mg total) by mouth every evening   levETIRAcetam (KEPPRA) 750 mg tablet   No No   Sig: Take 1 tablet (750 mg total) by mouth every 12 (twelve) hours   loratadine (CLARITIN) 10 mg tablet   No No   Sig: Take 1 tablet (10 mg total) by mouth daily as needed for allergies   metoprolol tartrate (LOPRESSOR) 25 mg tablet   No No   Sig: Take 1 tablet (25 mg total) by mouth every 12 (twelve) hours   polyethylene glycol (GLYCOLAX) 17 GM/SCOOP powder   No No   Sig: Take 17 g by mouth daily as needed (Constipation) Can substitute for closest size available   tamsulosin (FLOMAX) 0.4 mg   No No   Sig: Take 1 capsule (0.4 mg total) by mouth daily after dinner      Facility-Administered Medications: None       Past Medical History:   Diagnosis Date   • Allergic 04/01/2023   • Hypertension 03/31/2023   • Hypertensive emergency 03/30/2023   • Obesity 03/31/2023   • Seizures ([address]) 03/31/2023   • Stroke ([address]) 03/30/2023   • Visual impairment 03/30/2023       History reviewed. No pertinent surgical history. Family History   Problem Relation Age of Onset   • Diabetes Mother    • Arthritis Mother    • Breast cancer Mother    • Stroke Father    • Prostate cancer Paternal Grandfather    • Cancer Paternal Grandfather    • Breast cancer Paternal Grandmother      I have reviewed and agree with the history as documented. E-Cigarette/Vaping   • E-Cigarette Use Never User      E-Cigarette/Vaping Substances   • Nicotine No    • THC No    • CBD No    • Flavoring No    • Other No    • Unknown No      Social History     Tobacco Use   • Smoking status: Never   • Smokeless tobacco: Never   Vaping Use   • Vaping Use: Never used   Substance Use Topics   • Alcohol use: Never   • Drug use: Never       Review of Systems   Constitutional: Negative for appetite change, chills, diaphoresis, fever and unexpected weight change. HENT: Negative for congestion and rhinorrhea. Eyes: Negative for photophobia and visual disturbance. Respiratory: Negative for cough, chest tightness and shortness of breath. Cardiovascular: Negative for chest pain, palpitations and leg swelling. Gastrointestinal: Negative for abdominal distention, abdominal pain, blood in stool, constipation, diarrhea, nausea and vomiting. Genitourinary: Negative for dysuria and hematuria. Musculoskeletal: Negative for back pain, joint swelling, neck pain and neck stiffness.    Skin: Negative for color change, pallor, rash and wound. Neurological: Positive for light-headedness and headaches. Negative for dizziness, syncope and weakness. Psychiatric/Behavioral: Negative for agitation. All other systems reviewed and are negative. Physical Exam  Physical Exam  Vitals and nursing note reviewed. Constitutional:       General: He is not in acute distress. Appearance: Normal appearance. He is well-developed. He is not ill-appearing, toxic-appearing or diaphoretic. HENT:      Head: Normocephalic. Comments: Small hematoma and some bruising above left eyebrow     Nose: Nose normal. No congestion or rhinorrhea. Mouth/Throat:      Mouth: Mucous membranes are moist.      Pharynx: Oropharynx is clear. No oropharyngeal exudate or posterior oropharyngeal erythema. Eyes:      General: No scleral icterus. Right eye: No discharge. Left eye: No discharge. Extraocular Movements: Extraocular movements intact. Conjunctiva/sclera: Conjunctivae normal.      Pupils: Pupils are equal, round, and reactive to light. Neck:      Vascular: No JVD. Trachea: No tracheal deviation. Comments: Supple. Normal range of motion. Cardiovascular:      Rate and Rhythm: Normal rate and regular rhythm. Heart sounds: Normal heart sounds. No murmur heard. No friction rub. No gallop. Comments: Normal rate and regular rhythm  Pulmonary:      Effort: Pulmonary effort is normal. No respiratory distress. Breath sounds: Normal breath sounds. No stridor. No wheezing or rales. Comments: Clear to auscultation bilaterally  Chest:      Chest wall: No tenderness. Abdominal:      General: Bowel sounds are normal. There is no distension. Palpations: Abdomen is soft. Tenderness: There is no abdominal tenderness. There is no right CVA tenderness, left CVA tenderness, guarding or rebound. Comments: Soft, nontender, nondistended.   Normal bowel sounds throughout Musculoskeletal:         General: No swelling, tenderness, deformity or signs of injury. Normal range of motion. Cervical back: Normal range of motion and neck supple. No rigidity. No muscular tenderness. Right lower leg: No edema. Left lower leg: No edema. Lymphadenopathy:      Cervical: No cervical adenopathy. Skin:     General: Skin is warm and dry. Coloration: Skin is not pale. Findings: No erythema or rash. Neurological:      General: No focal deficit present. Mental Status: He is alert. Mental status is at baseline. Sensory: No sensory deficit. Motor: No weakness or abnormal muscle tone. Coordination: Coordination normal.      Gait: Gait normal.      Comments: Alert. Strength and sensation grossly intact. Ambulatory without difficulty at baseline. Psychiatric:         Behavior: Behavior normal.         Thought Content:  Thought content normal.         Vital Signs  ED Triage Vitals   Temperature Pulse Respirations Blood Pressure SpO2   07/27/23 1202 07/27/23 1200 07/27/23 1200 07/27/23 1200 07/27/23 1200   98 °F (36.7 °C) 72 13 128/83 97 %      Temp src Heart Rate Source Patient Position - Orthostatic VS BP Location FiO2 (%)   -- -- -- -- --             Pain Score       --                  Vitals:    07/27/23 1515 07/27/23 1520 07/27/23 1525 07/27/23 1530   BP:    144/93   Pulse: 55 58 (!) 54 65         Visual Acuity  Visual Acuity    Flowsheet Row Most Recent Value   L Pupil Size (mm) 3   R Pupil Size (mm) 3          ED Medications  Medications   sodium chloride 0.9 % bolus 1,000 mL (0 mL Intravenous Stopped 7/27/23 1536)       Diagnostic Studies  Results Reviewed     Procedure Component Value Units Date/Time    Basic metabolic panel [[id number]] Collected: 07/27/23 1238    Lab Status: Final result Specimen: Blood from Arm, Right Updated: 07/27/23 1300     Sodium 140 mmol/L      Potassium 4.5 mmol/L      Chloride 106 mmol/L      CO2 27 mmol/L      ANION GAP 7 mmol/L      BUN 12 mg/dL      Creatinine 1.22 mg/dL      Glucose 104 mg/dL      Calcium 9.9 mg/dL      eGFR 70 ml/min/1.73sq m     Narrative:      National Kidney Disease Foundation guidelines for Chronic Kidney Disease (CKD):   •  Stage 1 with normal or high GFR (GFR > 90 mL/min/1.73 square meters)  •  Stage 2 Mild CKD (GFR = 60-89 mL/min/1.73 square meters)  •  Stage 3A Moderate CKD (GFR = 45-59 mL/min/1.73 square meters)  •  Stage 3B Moderate CKD (GFR = 30-44 mL/min/1.73 square meters)  •  Stage 4 Severe CKD (GFR = 15-29 mL/min/1.73 square meters)  •  Stage 5 End Stage CKD (GFR <15 mL/min/1.73 square meters)  Note: GFR calculation is accurate only with a steady state creatinine    CBC and differential [[id number]] Collected: 07/27/23 1238    Lab Status: Final result Specimen: Blood from Arm, Right Updated: 07/27/23 1244     WBC 6.61 Thousand/uL      RBC 5.12 Million/uL      Hemoglobin 14.9 g/dL      Hematocrit 44.3 %      MCV 87 fL      MCH 29.1 pg      MCHC 33.6 g/dL      RDW 14.1 %      MPV 9.8 fL      Platelets 511 Thousands/uL      nRBC 0 /100 WBCs      Neutrophils Relative 72 %      Immat GRANS % 0 %      Lymphocytes Relative 18 %      Monocytes Relative 7 %      Eosinophils Relative 2 %      Basophils Relative 1 %      Neutrophils Absolute 4.73 Thousands/µL      Immature Grans Absolute 0.02 Thousand/uL      Lymphocytes Absolute 1.21 Thousands/µL      Monocytes Absolute 0.45 Thousand/µL      Eosinophils Absolute 0.16 Thousand/µL      Basophils Absolute 0.04 Thousands/µL                  TRAUMA - CT head wo contrast   Final Result by Ros Burgos MD (07/27 1248)      No acute intracranial abnormality. Workstation performed: [id number]                    Procedures  Procedures         ED Course                               SBIRT 22yo+    Flowsheet Row Most Recent Value   Initial Alcohol Screen: US AUDIT-C     1.  How often do you have a drink containing alcohol? 0 Filed at: 07/27/2023 1242   2. How many drinks containing alcohol do you have on a typical day you are drinking? 0 Filed at: 07/27/2023 1242   3a. Male UNDER 65: How often do you have five or more drinks on one occasion? 0 Filed at: 07/27/2023 1242   Audit-C Score 0 Filed at: 07/27/2023 1242   BANDAR: How many times in the past year have you. .. Used an illegal drug or used a prescription medication for non-medical reasons? Never Filed at: 07/27/2023 1242                    Medical Decision Making  51-year-old male history of stroke with right-sided weakness and expressive aphasia on Eliquis presenting with fall and head strike. Patient with lightheadedness prior to fall with head strike on thinners without loss of consciousness. Plan for trauma evaluation and CT imaging of head. Also plan for basic labs and fluids given preceding lightheadedness working with occupational therapy. Patient without  chest trauma or complaint. Will defer chest x-ray. Reassess. Imaging no acute process. Labs interpreted by me without significant acute process. Symptoms improved after fluids. Ambulatory at baseline. Discussed results and recommendations. Advised follow up PCP. Medication recommendations. Given instructions and return precautions. Patient/family at bedside acknowledged understanding of all written and verbal instructions and return precautions. Discharged. Amount and/or Complexity of Data Reviewed  Labs: ordered. Radiology: ordered. Disposition  Final diagnoses:   Injury of head, initial encounter   Fall from standing, initial encounter   Lightheadedness     Time reflects when diagnosis was documented in both MDM as applicable and the Disposition within this note     Time User Action Codes Description Comment    7/27/2023 12:22 PM Margi Puente Add [S09.90XA] Injury of head, initial encounter     7/27/2023 12:22 PM Margi Puente Add [G60. XXXA] Fall from standing, initial encounter     7/27/2023 12:22 PM Sara Rater Add [R42] [facility]       ED Disposition     ED Disposition   Discharge    Condition   Stable    Date/Time   Thu Jul 27, 2023  1:00 PM    [address] [facility] III discharge to home/self care. Follow-up Information     Follow up With Specialties Details Why Otoniel Mullen DO Family Medicine Schedule an appointment as soon as possible for a visit in 1 week  [address] [facility]  [phone]            Discharge Medication List as of 7/27/2023  1:12 PM      CONTINUE these medications which have NOT CHANGED    Details   amLODIPine (NORVASC) 5 mg tablet Take 1 tablet (5 mg total) by mouth daily, Starting Mon 5/22/2023, Normal      apixaban (Eliquis) 5 mg Take 1 tablet (5 mg total) by mouth 2 (two) times a day, Starting Mon 5/22/2023, Until Sun 8/20/2023, Normal      aspirin 81 mg chewable tablet Chew 1 tablet (81 mg total) daily, Starting Mon 5/22/2023, Normal      atorvastatin (LIPITOR) 40 mg tablet Take 1 tablet (40 mg total) by mouth every evening, Starting Mon 5/22/2023, Normal      levETIRAcetam (KEPPRA) 750 mg tablet Take 1 tablet (750 mg total) by mouth every 12 (twelve) hours, Starting Mon 6/5/2023, Normal      loratadine (CLARITIN) 10 mg tablet Take 1 tablet (10 mg total) by mouth daily as needed for allergies, Starting Fri 5/5/2023, No Print      metoprolol tartrate (LOPRESSOR) 25 mg tablet Take 1 tablet (25 mg total) by mouth every 12 (twelve) hours, Starting Mon 6/26/2023, Normal      polyethylene glycol (GLYCOLAX) 17 GM/SCOOP powder Take 17 g by mouth daily as needed (Constipation) Can substitute for closest size available, Starting Fri 5/5/2023, No Print      tamsulosin (FLOMAX) 0.4 mg Take 1 capsule (0.4 mg total) by mouth daily after dinner, Starting Mon 6/26/2023, Normal             No discharge procedures on file.     PDMP Review       Value Time User    PDMP Reviewed  Yes 4/12/2023 12:50 AM Terry Crockett MD ED Provider  Electronically Signed by           Dayanna Valera MD  07/30/23 4051

## 2023-07-27 NOTE — TELEPHONE ENCOUNTER
Pt's mother called to schedule f/u appt for pt. He was seen today at [facility] ED. While at OT, he got lightheaded and hit his head on the wall. He has hx of stroke. CT was done and unremarkable. Mother said pt also received a bag of fluids.       Routed to Clinical - please triage

## 2023-07-28 LAB
F5 GENE MUT ANL BLD/T: NORMAL
Lab: NORMAL

## 2023-07-31 ENCOUNTER — TELEPHONE (OUTPATIENT)
Dept: NEUROLOGY | Facility: CLINIC | Age: 48
End: 2023-07-31

## 2023-07-31 NOTE — TELEPHONE ENCOUNTER
This is Lucy Holder calling on behalf of my son Cira Patiño. Birthday 1975. I have a paper that needs to be filled out and faxed to [place] today or tomorrow at the latest. I need to know if I can bring it to the [place] ([place] office to have it filled out. It depends on whether he gets medical insurance or not and can keep going to the doctors. I've called twice already and no one has gotten back to me. Please get back to me as soon as possible at [phone]. Thank you, bye. Call returned to pt's mom Amparo. Acknowledge . States that she got a form  (medical assessment form) in the [facility] pt can get assistance from [facility] and continue to have ins coverage. Pt's ins ends today. Pt's pcp instructed her to call neurology. She will drop off the form at the [facility] office tmrw after 12 before 4 pm for Dr Carlos Khan to complete if agreeable.

## 2023-08-01 NOTE — TELEPHONE ENCOUNTER
MSW phoned Araceli Carvalho (patient's mother) at [phone] she informed that she dropped off form for [place] this morning. MSW informed that she will locate form and will assist with same. Araceli Carvalho shared that pt will lose insurance today. She is also agreeable for this writer to fax form directly to [facility] once completed. Christian Oswald,    Could you please scan form in [facility] and I can take care of form.      Thanks

## 2023-08-02 NOTE — TELEPHONE ENCOUNTER
Form sent to [facility] for Dr. Deepali An to review and if agreeable with form to sign.

## 2023-08-03 ENCOUNTER — OFFICE VISIT (OUTPATIENT)
Dept: FAMILY MEDICINE CLINIC | Facility: MEDICAL CENTER | Age: 48
End: 2023-08-03
Payer: COMMERCIAL

## 2023-08-03 VITALS
SYSTOLIC BLOOD PRESSURE: 138 MMHG | RESPIRATION RATE: 18 BRPM | WEIGHT: 268 LBS | HEART RATE: 68 BPM | DIASTOLIC BLOOD PRESSURE: 90 MMHG | BODY MASS INDEX: 35.52 KG/M2 | OXYGEN SATURATION: 97 % | TEMPERATURE: 99.4 F | HEIGHT: 73 IN

## 2023-08-03 DIAGNOSIS — Z09 HOSPITAL DISCHARGE FOLLOW-UP: Primary | ICD-10-CM

## 2023-08-03 DIAGNOSIS — Z86.73 HISTORY OF STROKE: ICD-10-CM

## 2023-08-03 PROCEDURE — 99213 OFFICE O/P EST LOW 20 MIN: CPT | Performed by: STUDENT IN AN ORGANIZED HEALTH CARE EDUCATION/TRAINING PROGRAM

## 2023-08-03 NOTE — PROGRESS NOTES
[address] [facility] [address] - Clinic Note  Ronette Councilman, [place], 23     Therese Comment III MRN: [id number] : 1975 Age: 57 y.o. Assessment/Plan     1. Hospital discharge follow-up    -Patient presents for ER follow-up after a fall which led to hitting his head against wall, he is anticoagulated with Eliquis  -ER work-up unremarkable including CT head  - 2023 CT head without contrast:   IMPRESSION:   No acute intracranial abnormality. -CBC without anemia and BMP with no electrolyte disturbances  - Holter monitor;  Future  -Normotensive today in office and normal heart rate    Component      Latest Ref Rng 2023   WBC      4.31 - 10.16 Thousand/uL 6.61    Red Blood Cell Count      3.88 - 5.62 Million/uL 5.12    Hemoglobin      12.0 - 17.0 g/dL 14.9    HCT      36.5 - 49.3 % 44.3    MCV      82 - 98 fL 87    MCH      26.8 - 34.3 pg 29.1    MCHC      31.4 - 37.4 g/dL 33.6    RDW      11.6 - 15.1 % 14.1    MPV      8.9 - 12.7 fL 9.8    Platelet Count      432 - 390 Thousands/uL 266    nRBC      /100 WBCs 0    Neutrophils %      43 - 75 % 72    Immat GRANS %      0 - 2 % 0    Lymphocytes Relative      14 - 44 % 18    Monocytes Relative      4 - 12 % 7    Eosinophils      0 - 6 % 2    Basophils Relative      0 - 1 % 1    Absolute Neutrophils      1.85 - 7.62 Thousands/µL 4.73    Immature Grans Absolute      0.00 - 0.20 Thousand/uL 0.02    Lymphocytes Absolute      0.60 - 4.47 Thousands/µL 1.21    Absolute Monocytes      0.17 - 1.22 Thousand/µL 0.45    Absolute Eosinophils      0.00 - 0.61 Thousand/µL 0.16    Basophils Absolute      0.00 - 0.10 Thousands/µL 0.04    Segmented Neutrophils Manual      45 - 77 %    Lymphocytes Manual      14 - 44 %    Monocytes Manual      4 - 12 %    Basos      0 - 1 %    Total Counted    Macrocytes    Polychromasia    Platelet Estimate      Adequate       Component      Latest Ref Rng 2023   Sodium      135 - 147 mmol/L 140    Potassium      3.5 - 5.3 mmol/L 4.5 Chloride      96 - 108 mmol/L 106    CO2      21 - 32 mmol/L 27    Anion Gap      mmol/L 7    BUN      5 - 25 mg/dL 12    Creatinine      0.60 - 1.30 mg/dL 1.22    Glucose, Random      65 - 140 mg/dL 104    Calcium      8.4 - 10.2 mg/dL 9.9    eGFR      ml/min/1.73sq m 79      Royal Linda BERMUDEZ acknowledged understanding of treatment plan, all questions answered. Subjective      Royal Linda BERMUDEZ is a 57 y.o. male who presents for ER follow-up for fall during which he hit head. Patient presents with his mother who contributes to history. Describes that he was doing exercise at OT that involved raising ball with hand and felt lightheaded and fell forward and hit head on a wall. No loss of consciousness. No headache and no visual disturbance. Mother tells me that during ER visit while he was on monitor they noticed a "slowed heart rate". Per ER note:  65-year-old male history of stroke with right-sided weakness and expressive aphasia on Eliquis presenting with fall and head strike. Patient with lightheadedness prior to fall with head strike on thinners without loss of consciousness. Plan for trauma evaluation and CT imaging of head. Also plan for basic labs and fluids given preceding lightheadedness working with occupational therapy. Patient without  chest trauma or complaint. Will defer chest x-ray. Reassess.       Imaging no acute process. Labs interpreted by me without significant acute process. Symptoms improved after fluids. Ambulatory at baseline. Discussed results and recommendations. Advised follow up PCP. Medication recommendations. Given instructions and return precautions. Patient/family at bedside acknowledged understanding of all written and verbal instructions and return precautions.  Discharged.        The following portions of the patient's history were reviewed and updated as appropriate: allergies, current medications, past family history, past medical history, past social history, past surgical history and problem list.     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 No pertinent surgical history. Family History   Problem Relation Age of Onset   • Diabetes Mother    • Arthritis Mother    • Breast cancer Mother    • Stroke Father    • Prostate cancer Paternal Grandfather    • Cancer Paternal Grandfather    • Breast cancer Paternal Grandmother        Social History     Socioeconomic History   • Marital status: Single     Spouse name: None   • Number of children: None   • Years of education: None   • Highest education level: None   Occupational History   • None   Tobacco Use   • Smoking status: Never   • Smokeless tobacco: Never   Vaping Use   • Vaping Use: Never used   Substance and Sexual Activity   • Alcohol use: Never   • Drug use: Never   • Sexual activity: Not Currently   Other Topics Concern   • None   Social History Narrative   • None     Social Determinants of Health     Financial Resource Strain: Not on file   Food Insecurity: No Food Insecurity (4/13/2023)    Hunger Vital Sign    • Worried About Running Out of Food in the Last Year: Never true    • Ran Out of Food in the Last Year: Never true   Transportation Needs: No Transportation Needs (4/13/2023)    PRAPARE - Transportation    • Lack of Transportation (Medical): No    • Lack of Transportation (Non-Medical):  No   Physical Activity: Not on file   Stress: Not on file   Social Connections: Not on file   Intimate Partner Violence: Not on file   Housing Stability: Low Risk  (4/13/2023)    Housing Stability Vital Sign    • Unable to Pay for Housing in the Last Year: No    • Number of Places Lived in the Last Year: 1    • Unstable Housing in the Last Year: No       Current Outpatient Medications   Medication Sig Dispense Refill   • amLODIPine (NORVASC) 5 mg tablet Take 1 tablet (5 mg total) by mouth daily 90 tablet 0   • apixaban (Eliquis) 5 mg Take 1 tablet (5 mg total) by mouth 2 (two) times a day 180 tablet 0   • aspirin 81 mg chewable tablet Chew 1 tablet (81 mg total) daily 90 tablet 0   • atorvastatin (LIPITOR) 40 mg tablet Take 1 tablet (40 mg total) by mouth every evening 90 tablet 0   • levETIRAcetam (KEPPRA) 750 mg tablet Take 1 tablet (750 mg total) by mouth every 12 (twelve) hours 180 tablet 3   • loratadine (CLARITIN) 10 mg tablet Take 1 tablet (10 mg total) by mouth daily as needed for allergies  0   • metoprolol tartrate (LOPRESSOR) 25 mg tablet Take 1 tablet (25 mg total) by mouth every 12 (twelve) hours 90 tablet 0   • polyethylene glycol (GLYCOLAX) 17 GM/SCOOP powder Take 17 g by mouth daily as needed (Constipation) Can substitute for closest size available 507 g 0   • tamsulosin (FLOMAX) 0.4 mg Take 1 capsule (0.4 mg total) by mouth daily after dinner 30 capsule 0     No current facility-administered medications for this visit. Review of Systems     As noted in HPI    Objective      /90 (BP Location: Left arm, Patient Position: Sitting, Cuff Size: Large)   Pulse 68   Temp 99.4 °F (37.4 °C)   Resp 18   Ht 6' 1" (1.854 m)   Wt 122 kg (268 lb)   SpO2 97%   BMI 35.36 kg/m²     Physical Exam  Vitals reviewed. Constitutional:       General: He is not in acute distress. Appearance: Normal appearance. HENT:      Head: Normocephalic and atraumatic. Eyes:      Conjunctiva/sclera: Conjunctivae normal.   Cardiovascular:      Rate and Rhythm: Normal rate and regular rhythm. Pulses: Normal pulses.       Heart sounds: Normal heart sounds. Pulmonary:      Effort: Pulmonary effort is normal.      Breath sounds: Normal breath sounds. Skin:     General: Skin is warm and dry. Neurological:      Mental Status: He is alert and oriented to person, place, and time. Some portions of this record may have been generated with voice recognition software. There may be translation, syntax, or grammatical errors. Occasional wrong word or "sound-a-like" substitutions may have occurred due to the inherent limitations of the voice recognition software. Read the chart carefully and recognize, using context, where substations may have occurred. If you have any questions, please contact the dictating provider for clarification or correction, as needed.

## 2023-08-04 ENCOUNTER — TELEPHONE (OUTPATIENT)
Dept: NEUROLOGY | Facility: CLINIC | Age: 48
End: 2023-08-04

## 2023-08-04 NOTE — TELEPHONE ENCOUNTER
I called and spoke with patient mother and informed her that patient appointment location has been changed to [facility] office location at 11:30. Patient mother ask for info to be in her Mychart.

## 2023-08-05 DIAGNOSIS — R33.9 URINARY RETENTION: ICD-10-CM

## 2023-08-07 RX ORDER — TAMSULOSIN HYDROCHLORIDE 0.4 MG/1
0.4 CAPSULE ORAL
Qty: 90 CAPSULE | Refills: 0 | Status: SHIPPED | OUTPATIENT
Start: 2023-08-07

## 2023-08-11 ENCOUNTER — HOSPITAL ENCOUNTER (OUTPATIENT)
Dept: NON INVASIVE DIAGNOSTICS | Facility: HOSPITAL | Age: 48
Discharge: HOME | End: 2023-08-11
Payer: COMMERCIAL

## 2023-08-11 DIAGNOSIS — Z86.73 HISTORY OF STROKE: ICD-10-CM

## 2023-08-11 PROCEDURE — 93225 XTRNL ECG REC<48 HRS REC: CPT

## 2023-08-11 PROCEDURE — 93226 XTRNL ECG REC<48 HR SCAN A/R: CPT

## 2023-08-14 NOTE — TELEPHONE ENCOUNTER
MSW phoned Shi Hull who informed that [employer] informed that they denied claim as it was not sent on time. Pt will continue to have insurance until September. They will be reapplying for MA closer to the time in where insurance is ending.  Shi Hull is aware about how to complete the MA application online through [employer]    MSW remains available for future social needs

## 2023-08-15 DIAGNOSIS — I10 PRIMARY HYPERTENSION: ICD-10-CM

## 2023-08-17 PROCEDURE — 93227 XTRNL ECG REC<48 HR R&I: CPT | Performed by: INTERNAL MEDICINE

## 2023-08-24 ENCOUNTER — TELEPHONE (OUTPATIENT)
Dept: HEMATOLOGY ONCOLOGY | Facility: CLINIC | Age: 48
End: 2023-08-24

## 2023-08-24 NOTE — TELEPHONE ENCOUNTER
ZENON  DR elaine CRAIN   Who are you speaking with? Parent   If it is not the patient, are they listed on an active communication consent form? Yes   Is this a ZENON or DR elaine CRAIN ZENON   Which provider is patient currently scheduled or established with? Geetha Mills PA-C   What is the original appointment date and time? 10/23/23 1:00PM   At which location is the appointment scheduled to take place? [facility]   Which provider is the patient transitioning care to? Dr. Abdirashid Javier   What is the new appointment date and time? 10/23/23 1:00PM   At which location is the new appointment scheduled to take place? [facility]   What is the reason for this change?  Provider

## 2023-08-29 ENCOUNTER — OFFICE VISIT (OUTPATIENT)
Dept: CARDIOLOGY CLINIC | Facility: MEDICAL CENTER | Age: 48
End: 2023-08-29
Payer: COMMERCIAL

## 2023-08-29 VITALS
HEIGHT: 73 IN | WEIGHT: 266 LBS | BODY MASS INDEX: 35.25 KG/M2 | HEART RATE: 93 BPM | DIASTOLIC BLOOD PRESSURE: 88 MMHG | SYSTOLIC BLOOD PRESSURE: 130 MMHG | OXYGEN SATURATION: 98 %

## 2023-08-29 DIAGNOSIS — I63.512 ACUTE ISCHEMIC LEFT MCA STROKE (HCC): ICD-10-CM

## 2023-08-29 DIAGNOSIS — I10 PRIMARY HYPERTENSION: Primary | ICD-10-CM

## 2023-08-29 DIAGNOSIS — I26.92 ACUTE SADDLE PULMONARY EMBOLISM WITHOUT ACUTE COR PULMONALE (HCC): ICD-10-CM

## 2023-08-29 DIAGNOSIS — I10 PRIMARY HYPERTENSION: ICD-10-CM

## 2023-08-29 PROCEDURE — 99214 OFFICE O/P EST MOD 30 MIN: CPT | Performed by: INTERNAL MEDICINE

## 2023-08-29 RX ORDER — AMLODIPINE BESYLATE 5 MG/1
5 TABLET ORAL DAILY
Qty: 90 TABLET | Refills: 1 | Status: SHIPPED | OUTPATIENT
Start: 2023-08-29

## 2023-08-29 RX ORDER — ASPIRIN 81 MG/1
81 TABLET, CHEWABLE ORAL DAILY
Qty: 90 TABLET | Refills: 1 | Status: SHIPPED | OUTPATIENT
Start: 2023-08-29

## 2023-08-29 RX ORDER — ATORVASTATIN CALCIUM 40 MG/1
40 TABLET, FILM COATED ORAL EVERY EVENING
Qty: 90 TABLET | Refills: 1 | Status: SHIPPED | OUTPATIENT
Start: 2023-08-29

## 2023-08-29 RX ORDER — LEVETIRACETAM 750 MG/1
750 TABLET ORAL EVERY 12 HOURS SCHEDULED
Qty: 180 TABLET | Refills: 0 | Status: CANCELLED | OUTPATIENT
Start: 2023-08-29

## 2023-08-29 NOTE — LETTER
August 29, 2023     Sofy Moreau DO  [address]    Patient: Arnulfo Lewis III   YOB: 1975   Date of Visit: 8/29/2023       Dear Dr. Anjelica Paulino:    Thank you for referring Austyn Bradford to me for evaluation. Below are my notes for this consultation. If you have questions, please do not hesitate to call me. I look forward to following your patient along with you. Sincerely,        Thyra Mortimer, MD        CC: No Recipients    Thyra Mortimer, MD  8/29/2023  2:21 PM  Incomplete                                             Cardiology Consultation     Arnulfo Lewis III  [id number]  1975  [facility] [id number]    1. Acute ischemic left MCA stroke [facility])  Ambulatory Referral to Cardiology        Chief Complaint   Patient presents with   • New Patient Visit     Had a stroke on March 30th, Nuerology referred to [facility]ARD[facility]     HPI: Patient is here for cardiac evaluation after stroke. Patient feels well, without complaints. No reported chest pain, shortness of breath, palpitations, lightheadedness, syncope, LE edema, orthopnea, PND, or significant weight changes. Patient remains active without any increased fatigue out of the ordinary.       Patient Active Problem List   Diagnosis   • Acute ischemic left MCA stroke (HCC)   • Obesity, Class III, BMI 40-49.9 (morbid obesity) ([address])   • Encephalopathy   • Type 2 diabetes mellitus without complication, without long-term current use of insulin (HCC)   • Primary hypertension   • Bowel and bladder incontinence   • Left-sided chest wall pain   • Acute kidney injury ([address])   • Thrombocytopenia (HCC)   • Saddle 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 ([address])   • Urinary retention   • DVT of lower extremity, bilateral (HCC)   • HIT (heparin-induced thrombocytopenia) ([address])   • Aphasia due to recent cerebrovascular accident (CVA)   • Rhinorrhea   • Seizures (HCC)   • Back pain   • Global aphasia   • Vasovagal syncope   • Coagulopathy [facility])     Past Medical History:   Diagnosis Date   • Allergic 04/01/2023   • Hypertension 03/31/2023   • Hypertensive emergency 03/30/2023   • Obesity 03/31/2023   • Seizures ([address]) 03/31/2023   • Stroke ([address]) 03/30/2023   • Visual impairment 03/30/2023     Social History     Socioeconomic History   • Marital status: Single     Spouse name: Not on file   • Number of children: Not on file   • Years of education: Not on file   • Highest education level: Not on file   Occupational History   • Not on file   Tobacco Use   • Smoking status: Never   • Smokeless tobacco: Never   Vaping Use   • Vaping Use: Never used   Substance and Sexual Activity   • Alcohol use: Never   • Drug use: Never   • Sexual activity: Not Currently   Other Topics Concern   • Not on file   Social History Narrative   • Not on file     Social Determinants of Health     Financial Resource Strain: Not on file   Food Insecurity: No Food Insecurity (4/13/2023)    Hunger Vital Sign    • Worried About Running Out of Food in the Last Year: Never true    • Ran Out of Food in the Last Year: Never true   Transportation Needs: No Transportation Needs (4/13/2023)    PRAPARE - Transportation    • Lack of Transportation (Medical): No    • Lack of Transportation (Non-Medical):  No   Physical Activity: Not on file   Stress: Not on file   Social Connections: Not on file   Intimate Partner Violence: Not on file   Housing Stability: Low Risk  (4/13/2023)    Housing Stability Vital Sign    • Unable to Pay for Housing in the Last Year: No    • Number of Places Lived in the Last Year: 1    • Unstable Housing in the Last Year: No      Family History   Problem Relation Age of Onset   • Diabetes Mother    • Arthritis Mother    • Breast cancer Mother    • Stroke Father    • Prostate cancer Paternal Grandfather    • Cancer Paternal Grandfather    • Breast cancer Paternal Grandmother      History reviewed. No pertinent surgical history.     Current Outpatient Medications:   •  amLODIPine (NORVASC) 5 mg tablet, Take 1 tablet (5 mg total) by mouth daily, Disp: 90 tablet, Rfl: 0  •  aspirin 81 mg chewable tablet, Chew 1 tablet (81 mg total) daily, Disp: 90 tablet, Rfl: 0  •  atorvastatin (LIPITOR) 40 mg tablet, Take 1 tablet (40 mg total) by mouth every evening, Disp: 90 tablet, Rfl: 0  •  levETIRAcetam (KEPPRA) 750 mg tablet, Take 1 tablet (750 mg total) by mouth every 12 (twelve) hours, Disp: 180 tablet, Rfl: 3  •  loratadine (CLARITIN) 10 mg tablet, Take 1 tablet (10 mg total) by mouth daily as needed for allergies, Disp: , Rfl: 0  •  metoprolol tartrate (LOPRESSOR) 25 mg tablet, Take 1 tablet (25 mg total) by mouth every 12 (twelve) hours, Disp: 90 tablet, Rfl: 1  •  polyethylene glycol (GLYCOLAX) 17 GM/SCOOP powder, Take 17 g by mouth daily as needed (Constipation) Can substitute for closest size available, Disp: 507 g, Rfl: 0  •  tamsulosin (FLOMAX) 0.4 mg, Take 1 capsule (0.4 mg total) by mouth daily after dinner, Disp: 90 capsule, Rfl: 0  •  apixaban (Eliquis) 5 mg, Take 1 tablet (5 mg total) by mouth 2 (two) times a day, Disp: 180 tablet, Rfl: 0  Allergies   Allergen Reactions   • Heparin Other (See Comments)     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   ("The patient's serum tested positive by BROOKE and supports a diagnosis of heparin-induced-thrombocytopenia")     Vitals:    08/29/23 1354   BP: 130/88   BP Location: Left arm   Patient Position: Sitting   Cuff Size: Adult   Pulse: 93   SpO2: 98%   Weight: 121 kg (266 lb)   Height: 6' 1" (1.854 m)       Labs:  Admission on 07/27/2023, Discharged on 07/27/2023   Component Date Value   • Ventricular Rate 07/27/2023 67    • Atrial Rate 07/27/2023 67    • NC Interval 07/27/2023 196    • QRSD Interval 07/27/2023 96    • QT Interval 07/27/2023 414    • QTC Interval 07/27/2023 437    • P Axis 07/27/2023 43    • QRS Axis 07/27/2023 16    • T Wave Axis 07/27/2023 0    • WBC 07/27/2023 6.61    • RBC 07/27/2023 5.12    • Hemoglobin 07/27/2023 14.9    • Hematocrit 07/27/2023 44.3    • MCV 07/27/2023 87    • MCH 07/27/2023 29.1    • MCHC 07/27/2023 33.6    • RDW 07/27/2023 14.1    • MPV 07/27/2023 9.8    • Platelets 04/78/8692 266    • nRBC 07/27/2023 0    • Neutrophils Relative 07/27/2023 72    • Immat GRANS % 07/27/2023 0    • Lymphocytes Relative 07/27/2023 18    • Monocytes Relative 07/27/2023 7    • Eosinophils Relative 07/27/2023 2    • Basophils Relative 07/27/2023 1    • Neutrophils Absolute 07/27/2023 4.73    • Immature Grans Absolute 07/27/2023 0.02    • Lymphocytes Absolute 07/27/2023 1.21    • Monocytes Absolute 07/27/2023 0.45    • Eosinophils Absolute 07/27/2023 0.16    • Basophils Absolute 07/27/2023 0.04    • Sodium 07/27/2023 140    • Potassium 07/27/2023 4.5    • Chloride 07/27/2023 106    • CO2 07/27/2023 27    • ANION GAP 07/27/2023 7    • BUN 07/27/2023 12    • Creatinine 07/27/2023 1.22    • Glucose 07/27/2023 104    • Calcium 07/27/2023 9.9    • eGFR 07/27/2023 70    Orders Only on 07/20/2023   Component Date Value   • Right Eye Diabetic Retin* 07/20/2023 None    • Left Eye Diabetic Retino* 07/20/2023 None    Appointment on 07/19/2023   Component Date Value   • AntiThrombIN III Activity 07/19/2023 103    • ANTICARDIOLIPIN IGG ANTI* 07/19/2023 2.4    • ANTICARDIOLIPIN IGA ANTI* 07/19/2023 14.0    • ANTICARDIOLIPIN IGM ANTI* 07/19/2023 3.4    • Factor V Leiden 07/19/2023 Comment    • REVIEWED BY: 07/19/2023 Comment    • PTT Lupus Anticoagulant 07/19/2023 38.8    • Dilute Viper Venom Time 07/19/2023 66.5 (H)    • DILUTE PROTHROMBIN TIME(* 07/19/2023 46.4    • THROMBIN TIME (DRVW) 07/19/2023 15.2    • DPT CONFIRM RATIO 07/19/2023 0.92    • LUPUS REFLEX INTERPRETAT* 07/19/2023 Comment:    • Protein C Activity 07/19/2023 119.0    • PROTEIN S ACTIVITY 07/19/2023 89    • Protein S Ag, Total 07/19/2023 102 • Protein S Ag, Free 07/19/2023 89    • Prothrombin Mutation 07/19/2023 Comment    • REVIEWED BY: 07/19/2023 Comment    • BETA 2 GLYCO 1 IGG 07/19/2023 <0.8    • BETA 2 GLYCO 1 IGA 07/19/2023 4.7    • BETA 2 GLYCO 1 IGM 07/19/2023 <2.4    • dRVVT Mix Interp. 07/19/2023 48.9 (H)    • DRVVT/Confirm Ratio 07/19/2023 1.1    Appointment on 07/07/2023   Component Date Value   • ANTICARDIOLIPIN IGG ANTI* 07/07/2023 2.5    • ANTICARDIOLIPIN IGA ANTI* 07/07/2023 14.0    • ANTICARDIOLIPIN IGM ANTI* 07/07/2023 3.1    • WBC 07/07/2023 5.92    • RBC 07/07/2023 5.16    • Hemoglobin 07/07/2023 14.3    • Hematocrit 07/07/2023 44.7    • MCV 07/07/2023 87    • MCH 07/07/2023 27.7    • MCHC 07/07/2023 32.0    • RDW 07/07/2023 15.0    • MPV 07/07/2023 10.8    • Platelets 52/43/2576 280    • nRBC 07/07/2023 0    • Neutrophils Relative 07/07/2023 59    • Immat GRANS % 07/07/2023 0    • Lymphocytes Relative 07/07/2023 28    • Monocytes Relative 07/07/2023 7    • Eosinophils Relative 07/07/2023 5    • Basophils Relative 07/07/2023 1    • Neutrophils Absolute 07/07/2023 3.51    • Immature Grans Absolute 07/07/2023 0.01    • Lymphocytes Absolute 07/07/2023 1.67    • Monocytes Absolute 07/07/2023 0.42    • Eosinophils Absolute 07/07/2023 0.27    • Basophils Absolute 07/07/2023 0.04    • Sodium 07/07/2023 140    • Potassium 07/07/2023 4.2    • Chloride 07/07/2023 109 (H)    • CO2 07/07/2023 28    • ANION GAP 07/07/2023 3    • BUN 07/07/2023 10    • Creatinine 07/07/2023 1.07    • Glucose, Fasting 07/07/2023 97    • Calcium 07/07/2023 9.3    • AST 07/07/2023 14    • ALT 07/07/2023 26    • Alkaline Phosphatase 07/07/2023 90    • Total Protein 07/07/2023 6.6    • Albumin 07/07/2023 3.5    • Total Bilirubin 07/07/2023 0.91    • eGFR 07/07/2023 82    • Iron Saturation 07/07/2023 22    • TIBC 07/07/2023 332    • Iron 07/07/2023 72    • Ferritin 07/07/2023 114    Admission on 07/05/2023, Discharged on 07/06/2023   Component Date Value   • POC Glucose 07/05/2023 138    • Ventricular Rate 07/05/2023 81    • Atrial Rate 07/05/2023 81    • AK Interval 07/05/2023 182    • QRSD Interval 07/05/2023 96    • QT Interval 07/05/2023 392    • QTC Interval 07/05/2023 455    • P Axis 07/05/2023 41    • QRS Axis 07/05/2023 -8    • T Wave Axis 07/05/2023 -8    • WBC 07/05/2023 6.97    • RBC 07/05/2023 5.45    • Hemoglobin 07/05/2023 15.3    • Hematocrit 07/05/2023 46.9    • MCV 07/05/2023 86    • MCH 07/05/2023 28.1    • MCHC 07/05/2023 32.6    • RDW 07/05/2023 14.7    • MPV 07/05/2023 9.8    • Platelets 43/51/4574 308    • nRBC 07/05/2023 0    • Neutrophils Relative 07/05/2023 65    • Immat GRANS % 07/05/2023 0    • Lymphocytes Relative 07/05/2023 23    • Monocytes Relative 07/05/2023 6    • Eosinophils Relative 07/05/2023 5    • Basophils Relative 07/05/2023 1    • Neutrophils Absolute 07/05/2023 4.56    • Immature Grans Absolute 07/05/2023 0.01    • Lymphocytes Absolute 07/05/2023 1.62    • Monocytes Absolute 07/05/2023 0.40    • Eosinophils Absolute 07/05/2023 0.33    • Basophils Absolute 07/05/2023 0.05    • Protime 07/05/2023 15.8 (H)    • INR 07/05/2023 1.28 (H)    • PTT 07/05/2023 27    • Sodium 07/05/2023 140    • Potassium 07/05/2023 5.0    • Chloride 07/05/2023 105    • CO2 07/05/2023 27    • ANION GAP 07/05/2023 8    • BUN 07/05/2023 16    • Creatinine 07/05/2023 1.58 (H)    • Glucose 07/05/2023 151 (H)    • Calcium 07/05/2023 10.4 (H)    • AST 07/05/2023 14    • ALT 07/05/2023 20    • Alkaline Phosphatase 07/05/2023 98    • Total Protein 07/05/2023 7.7    • Albumin 07/05/2023 4.4    • Total Bilirubin 07/05/2023 1.00    • eGFR 07/05/2023 51    • hs TnI 0hr 07/05/2023 11    • hs TnI 2hr 07/05/2023 8    • Delta 2hr hsTnI 07/05/2023 -3    • hs TnI 4hr 07/05/2023 12    • Delta 4hr hsTnI 07/05/2023 1    • Hemoglobin A1C 07/05/2023 5.3    • EAG 07/05/2023 105    • POC Glucose 07/05/2023 84    • WBC 07/06/2023 6.91    • RBC 07/06/2023 4.77    • Hemoglobin 07/06/2023 13.2    • Hematocrit 07/06/2023 40.9    • MCV 07/06/2023 86    • MCH 07/06/2023 27.7    • MCHC 07/06/2023 32.3    • RDW 07/06/2023 14.6    • MPV 07/06/2023 9.8    • Platelets 52/67/9597 230    • nRBC 07/06/2023 0    • Neutrophils Relative 07/06/2023 65    • Immat GRANS % 07/06/2023 0    • Lymphocytes Relative 07/06/2023 22    • Monocytes Relative 07/06/2023 8    • Eosinophils Relative 07/06/2023 4    • Basophils Relative 07/06/2023 1    • Neutrophils Absolute 07/06/2023 4.50    • Immature Grans Absolute 07/06/2023 0.02    • Lymphocytes Absolute 07/06/2023 1.53    • Monocytes Absolute 07/06/2023 0.53    • Eosinophils Absolute 07/06/2023 0.29    • Basophils Absolute 07/06/2023 0.04    • Sodium 07/06/2023 138    • Potassium 07/06/2023 3.6    • Chloride 07/06/2023 106    • CO2 07/06/2023 26    • ANION GAP 07/06/2023 6    • BUN 07/06/2023 14    • Creatinine 07/06/2023 0.99    • Glucose 07/06/2023 84    • Glucose, Fasting 07/06/2023 84    • Calcium 07/06/2023 8.7    • AST 07/06/2023 11 (L)    • ALT 07/06/2023 15    • Alkaline Phosphatase 07/06/2023 78    • Total Protein 07/06/2023 6.4    • Albumin 07/06/2023 3.7    • Total Bilirubin 07/06/2023 0.97    • eGFR 07/06/2023 90    • Magnesium 07/06/2023 2.1    • POC Glucose 07/06/2023 74    • POC Glucose 07/06/2023 126    Admission on 04/19/2023, Discharged on 05/05/2023   Component Date Value   • POC Glucose 04/19/2023 111    • POC Glucose 04/19/2023 129    • WBC 04/20/2023 7.15    • RBC 04/20/2023 5.08    • Hemoglobin 04/20/2023 13.3    • Hematocrit 04/20/2023 42.4    • MCV 04/20/2023 84    • MCH 04/20/2023 26.2 (L)    • MCHC 04/20/2023 31.4    • RDW 04/20/2023 13.6    • MPV 04/20/2023 9.4    • Platelets 69/22/5933 261    • nRBC 04/20/2023 0    • Neutrophils Relative 04/20/2023 60    • Immat GRANS % 04/20/2023 0    • Lymphocytes Relative 04/20/2023 24    • Monocytes Relative 04/20/2023 8    • Eosinophils Relative 04/20/2023 7 (H)    • Basophils Relative 04/20/2023 1    • Neutrophils Absolute 04/20/2023 4.26    • Immature Grans Absolute 04/20/2023 0.02    • Lymphocytes Absolute 04/20/2023 1.71    • Monocytes Absolute 04/20/2023 0.60    • Eosinophils Absolute 04/20/2023 0.51    • Basophils Absolute 04/20/2023 0.05    • Sodium 04/20/2023 138    • Potassium 04/20/2023 3.9    • Chloride 04/20/2023 110 (H)    • CO2 04/20/2023 25    • ANION GAP 04/20/2023 3 (L)    • BUN 04/20/2023 16    • Creatinine 04/20/2023 1.00    • Glucose 04/20/2023 115    • Glucose, Fasting 04/20/2023 115 (H)    • Calcium 04/20/2023 8.8    • eGFR 04/20/2023 90    • POC Glucose 04/20/2023 116    • POC Glucose 04/20/2023 131    • POC Glucose 04/20/2023 99    • POC Glucose 04/20/2023 89    • POC Glucose 04/21/2023 102    • POC Glucose 04/21/2023 112    • POC Glucose 04/21/2023 106    • POC Glucose 04/21/2023 119    • POC Glucose 04/22/2023 149 (H)    • POC Glucose 04/22/2023 143 (H)    • POC Glucose 04/22/2023 87    • POC Glucose 04/23/2023 102    • POC Glucose 04/23/2023 91    • POC Glucose 04/23/2023 86    • POC Glucose 04/23/2023 102    • WBC 04/24/2023 6.07    • RBC 04/24/2023 4.78    • Hemoglobin 04/24/2023 12.6    • Hematocrit 04/24/2023 40.5    • MCV 04/24/2023 85    • MCH 04/24/2023 26.4 (L)    • MCHC 04/24/2023 31.1 (L)    • RDW 04/24/2023 13.6    • MPV 04/24/2023 9.8    • Platelets 87/12/6509 309    • nRBC 04/24/2023 0    • Neutrophils Relative 04/24/2023 56    • Immat GRANS % 04/24/2023 0    • Lymphocytes Relative 04/24/2023 29    • Monocytes Relative 04/24/2023 8    • Eosinophils Relative 04/24/2023 6    • Basophils Relative 04/24/2023 1    • Neutrophils Absolute 04/24/2023 3.36    • Immature Grans Absolute 04/24/2023 0.01    • Lymphocytes Absolute 04/24/2023 1.78    • Monocytes Absolute 04/24/2023 0.46    • Eosinophils Absolute 04/24/2023 0.39    • Basophils Absolute 04/24/2023 0.07    • Sodium 04/24/2023 139    • Potassium 04/24/2023 3.7    • Chloride 04/24/2023 108    • CO2 04/24/2023 26    • ANION GAP 04/24/2023 5    • BUN 04/24/2023 13    • Creatinine 04/24/2023 1.01    • Glucose 04/24/2023 100    • Glucose, Fasting 04/24/2023 100 (H)    • Calcium 04/24/2023 8.9    • eGFR 04/24/2023 89    • POC Glucose 04/24/2023 95    • POC Glucose 04/24/2023 109    • POC Glucose 04/24/2023 97    • POC Glucose 04/24/2023 93    • POC Glucose 04/25/2023 87    • POC Glucose 04/25/2023 124    • POC Glucose 04/25/2023 102    • POC Glucose 04/25/2023 89    • POC Glucose 04/26/2023 88    • POC Glucose 04/26/2023 125    • POC Glucose 04/26/2023 95    • POC Glucose 04/26/2023 93    • WBC 04/27/2023 5.69    • RBC 04/27/2023 4.75    • Hemoglobin 04/27/2023 12.6    • Hematocrit 04/27/2023 40.3    • MCV 04/27/2023 85    • MCH 04/27/2023 26.5 (L)    • MCHC 04/27/2023 31.3 (L)    • RDW 04/27/2023 13.7    • MPV 04/27/2023 11.0    • Platelets 05/67/4338 312    • nRBC 04/27/2023 0    • Neutrophils Relative 04/27/2023 50    • Immat GRANS % 04/27/2023 0    • Lymphocytes Relative 04/27/2023 32    • Monocytes Relative 04/27/2023 9    • Eosinophils Relative 04/27/2023 8 (H)    • Basophils Relative 04/27/2023 1    • Neutrophils Absolute 04/27/2023 2.87    • Immature Grans Absolute 04/27/2023 0.01    • Lymphocytes Absolute 04/27/2023 1.82    • Monocytes Absolute 04/27/2023 0.50    • Eosinophils Absolute 04/27/2023 0.43    • Basophils Absolute 04/27/2023 0.06    • Sodium 04/27/2023 137    • Potassium 04/27/2023 3.8    • Chloride 04/27/2023 107    • CO2 04/27/2023 28    • ANION GAP 04/27/2023 2 (L)    • BUN 04/27/2023 11    • Creatinine 04/27/2023 1.00    • Glucose 04/27/2023 93    • Glucose, Fasting 04/27/2023 93    • Calcium 04/27/2023 8.7    • eGFR 04/27/2023 90    • POC Glucose 04/27/2023 93    • POC Glucose 04/27/2023 99    • POC Glucose 04/27/2023 97    • POC Glucose 04/27/2023 107    • POC Glucose 04/28/2023 89    • POC Glucose 04/28/2023 102    • POC Glucose 04/28/2023 103    • POC Glucose 04/28/2023 99    • WBC 05/04/2023 5.33    • RBC 05/04/2023 4.65    • Hemoglobin 05/04/2023 12.3    • Hematocrit 05/04/2023 40.0    • MCV 05/04/2023 86    • MCH 05/04/2023 26.5 (L)    • MCHC 05/04/2023 30.8 (L)    • RDW 05/04/2023 14.5    • MPV 05/04/2023 11.1    • Platelets 06/22/5178 256    • nRBC 05/04/2023 0    • Neutrophils Relative 05/04/2023 49    • Immat GRANS % 05/04/2023 0    • Lymphocytes Relative 05/04/2023 34    • Monocytes Relative 05/04/2023 10    • Eosinophils Relative 05/04/2023 6    • Basophils Relative 05/04/2023 1    • Neutrophils Absolute 05/04/2023 2.65    • Immature Grans Absolute 05/04/2023 0.01    • Lymphocytes Absolute 05/04/2023 1.80    • Monocytes Absolute 05/04/2023 0.53    • Eosinophils Absolute 05/04/2023 0.30    • Basophils Absolute 05/04/2023 0.04    • Sodium 05/04/2023 138    • Potassium 05/04/2023 3.7    • Chloride 05/04/2023 108    • CO2 05/04/2023 28    • ANION GAP 05/04/2023 2 (L)    • BUN 05/04/2023 11    • Creatinine 05/04/2023 0.95    • Glucose 05/04/2023 86    • Glucose, Fasting 05/04/2023 86    • Calcium 05/04/2023 9.0    • eGFR 05/04/2023 96    No results displayed because visit has over 200 results.       Admission on 04/08/2023, Discharged on 04/12/2023   Component Date Value   • Color, UA 04/09/2023 Yellow    • Clarity, UA 04/09/2023 Hazy    • Specific Gravity, UA 04/09/2023 1.025    • pH, UA 04/09/2023 5.0    • Leukocytes, UA 04/09/2023 Negative    • Nitrite, UA 04/09/2023 Negative    • Protein, UA 04/09/2023 Negative    • Glucose, UA 04/09/2023 Negative    • Ketones, UA 04/09/2023 Negative    • Urobilinogen, UA 04/09/2023 <2.0    • Bilirubin, UA 04/09/2023 Negative    • Occult Blood, UA 04/09/2023 Negative    • POC Glucose 04/08/2023 118    • POC Glucose 04/08/2023 193 (H)    • Sodium 04/09/2023 136    • Potassium 04/09/2023 4.4    • Chloride 04/09/2023 109 (H)    • CO2 04/09/2023 24    • ANION GAP 04/09/2023 3 (L)    • BUN 04/09/2023 20    • Creatinine 04/09/2023 1.06    • Glucose 04/09/2023 124    • Calcium 04/09/2023 8.8    • eGFR 04/09/2023 84    • POC Glucose 04/09/2023 101    • POC Glucose 04/09/2023 176 (H)    • POC Glucose 04/09/2023 155 (H)    • POC Glucose 04/09/2023 139    • POC Glucose 04/09/2023 189 (H)    • POC Glucose 04/10/2023 118    • POC Glucose 04/10/2023 176 (H)    • POC Glucose 04/10/2023 99    • hs TnI 0hr 04/10/2023 36    • Ventricular Rate 04/10/2023 86    • Atrial Rate 04/10/2023 86    • MD Interval 04/10/2023 184    • QRSD Interval 04/10/2023 108    • QT Interval 04/10/2023 402    • QTC Interval 04/10/2023 481    • P Axis 04/10/2023 27    • QRS Axis 04/10/2023 -20    • T Wave Axis 04/10/2023 -24    • hs TnI 2hr 04/10/2023 36    • Delta 2hr hsTnI 04/10/2023 0    • hs TnI 4hr 04/10/2023 35    • Delta 4hr hsTnI 04/10/2023 -1    • POC Glucose 04/10/2023 117    • POC Glucose 04/11/2023 131    • POC Glucose 04/11/2023 127    • D-Dimer, Quant 04/11/2023 >20.00 (H)    • WBC 04/11/2023 9.70    • RBC 04/11/2023 5.30    • Hemoglobin 04/11/2023 13.9    • Hematocrit 04/11/2023 44.4    • MCV 04/11/2023 84    • MCH 04/11/2023 26.2 (L)    • MCHC 04/11/2023 31.3 (L)    • RDW 04/11/2023 14.2    • MPV 04/11/2023 10.4    • Platelets 92/14/0369 48 (LL)    • nRBC 04/11/2023 0    • Neutrophils Relative 04/11/2023 72    • Immat GRANS % 04/11/2023 0    • Lymphocytes Relative 04/11/2023 14    • Monocytes Relative 04/11/2023 10    • Eosinophils Relative 04/11/2023 3    • Basophils Relative 04/11/2023 1    • Neutrophils Absolute 04/11/2023 7.01    • Immature Grans Absolute 04/11/2023 0.04    • Lymphocytes Absolute 04/11/2023 1.35    • Monocytes Absolute 04/11/2023 0.96    • Eosinophils Absolute 04/11/2023 0.28    • Basophils Absolute 04/11/2023 0.06    • Sodium 04/11/2023 135    • Potassium 04/11/2023 4.1    • Chloride 04/11/2023 104    • CO2 04/11/2023 26    • ANION GAP 04/11/2023 5    • BUN 04/11/2023 19    • Creatinine 04/11/2023 1.20    • Glucose 04/11/2023 92    • Calcium 04/11/2023 9.7    • Corrected Calcium 04/11/2023 10.2 (H)    • AST 04/11/2023 39    • ALT 04/11/2023 36    • Alkaline Phosphatase 04/11/2023 71    • Total Protein 04/11/2023 7.4    • Albumin 04/11/2023 3.4 (L)    • Total Bilirubin 04/11/2023 0.94    • eGFR 04/11/2023 72    • WBC 04/11/2023 10.05    • RBC 04/11/2023 5.24    • Hemoglobin 04/11/2023 14.1    • Hematocrit 04/11/2023 43.2    • MCV 04/11/2023 82    • MCH 04/11/2023 26.9    • MCHC 04/11/2023 32.6    • RDW 04/11/2023 14.3    • MPV 04/11/2023 10.8    • Platelets 78/62/7358 49 (LL)    • nRBC 04/11/2023 0    • Neutrophils Relative 04/11/2023 67    • Immat GRANS % 04/11/2023 1    • Lymphocytes Relative 04/11/2023 18    • Monocytes Relative 04/11/2023 10    • Eosinophils Relative 04/11/2023 3    • Basophils Relative 04/11/2023 1    • Neutrophils Absolute 04/11/2023 6.81    • Immature Grans Absolute 04/11/2023 0.05    • Lymphocytes Absolute 04/11/2023 1.77    • Monocytes Absolute 04/11/2023 1.02    • Eosinophils Absolute 04/11/2023 0.34    • Basophils Absolute 04/11/2023 0.06    • POC Glucose 04/11/2023 122    • Heparin Induced Plt Ab 04/11/2023 2.871 (H)    • Ventricular Rate 04/10/2023 84    • Atrial Rate 04/10/2023 84    • MN Interval 04/10/2023 186    • QRSD Interval 04/10/2023 110    • QT Interval 04/10/2023 418    • QTC Interval 04/10/2023 493    • P Axis 04/10/2023 24    • QRS Axis 04/10/2023 -19    • T Wave Axis 04/10/2023 -21    • Ventricular Rate 04/10/2023 83    • Atrial Rate 04/10/2023 83    • MN Interval 04/10/2023 186    • QRSD Interval 04/10/2023 110    • QT Interval 04/10/2023 408    • QTC Interval 04/10/2023 479    • P Axis 04/10/2023 28    • QRS Axis 04/10/2023 -19    • T Wave Axis 04/10/2023 -16    • POC Glucose 04/11/2023 121    No results displayed because visit has over 200 results.       Admission on 03/30/2023, Discharged on 03/30/2023   Component Date Value   • WBC 03/30/2023 8.31    • RBC 03/30/2023 5.52    • Hemoglobin 03/30/2023 14.8    • Hematocrit 03/30/2023 46.6    • MCV 03/30/2023 84    • MCH 03/30/2023 26.8    • MCHC 03/30/2023 31.8    • RDW 03/30/2023 13.4    • MPV 03/30/2023 10.4    • Platelets 79/94/9102 321    • nRBC 03/30/2023 0    • Neutrophils Relative 03/30/2023 62    • Immat GRANS % 03/30/2023 1    • Lymphocytes Relative 03/30/2023 25    • Monocytes Relative 03/30/2023 6    • Eosinophils Relative 03/30/2023 5    • Basophils Relative 03/30/2023 1    • Neutrophils Absolute 03/30/2023 5.31    • Immature Grans Absolute 03/30/2023 0.04    • Lymphocytes Absolute 03/30/2023 2.04    • Monocytes Absolute 03/30/2023 0.47    • Eosinophils Absolute 03/30/2023 0.40    • Basophils Absolute 03/30/2023 0.05    • Sodium 03/30/2023 137    • Potassium 03/30/2023 4.0    • Chloride 03/30/2023 103    • CO2 03/30/2023 25    • ANION GAP 03/30/2023 9    • BUN 03/30/2023 19    • Creatinine 03/30/2023 1.00    • Glucose 03/30/2023 275 (H)    • Calcium 03/30/2023 9.0    • AST 03/30/2023 20    • ALT 03/30/2023 37    • Alkaline Phosphatase 03/30/2023 75    • Total Protein 03/30/2023 6.7    • Albumin 03/30/2023 3.7    • Total Bilirubin 03/30/2023 0.53    • eGFR 03/30/2023 90    • Total CK 03/30/2023 81    • CKR(REACTION TIME) 03/30/2023 5.7    • CKLY30 03/30/2023 2.2    • CRTMA(RAPIDTEG MAX AMPLI* 03/30/2023 61.4    • CFFMA (FUNCTIONAL FIBRIN* 03/30/2023 20.2    • hs TnI 0hr 03/30/2023 60 (H)    • Amph/Meth UR 03/30/2023 Negative    • Barbiturate Ur 03/30/2023 Negative    • Benzodiazepine Urine 03/30/2023 Negative    • Cocaine Urine 03/30/2023 Negative    • Methadone Urine 03/30/2023 Negative    • Opiate Urine 03/30/2023 Negative    • PCP Ur 03/30/2023 Negative    • THC Urine 03/30/2023 Negative    • Oxycodone Urine 03/30/2023 Negative    • TSH 3RD GENERATON 03/30/2023 2.727    • LACTIC ACID 03/30/2023 1.7    • Ethanol Lvl 80/36/6852 <50    • Salicylate Lvl 90/12/2040 <5    • Acetaminophen Level 03/30/2023 <10 (L)    • ph, Angel ISTAT 03/30/2023 7.368    • pCO2, Angel i-STAT 03/30/2023 45.3    • pO2, Angel i-STAT 03/30/2023 28.0 (L)    • BE, i-STAT 03/30/2023 0    • HCO3, Angel i-STAT 03/30/2023 26.1    • CO2, i-STAT 03/30/2023 27    • O2 Sat, i-STAT 03/30/2023 49 (L)    • SODIUM, I-STAT 03/30/2023 139    • Potassium, i-STAT 03/30/2023 4.1    • Calcium, Ionized i-STAT 03/30/2023 1.23    • Hct, i-STAT 03/30/2023 44    • Hgb, i-STAT 03/30/2023 15.0    • Glucose, i-STAT 03/30/2023 280 (H)    • Specimen Type 03/30/2023 VENOUS    There may be more visits with results that are not included. Lab Results   Component Value Date    TRIG 90 03/31/2023    HDL 38 (L) 03/31/2023     Imaging: Holter monitor    Result Date: 8/17/2023  Narrative: INDICATIONS: "r/o arrhythmia" DESCRIPTION OF FINDINGS: The patient was in normal sinus rhythm throughout the study. The average heart rate was 67 beats per minute. The heart rate ranged from 39 to 122 beats per minute. Ventricular ectopic activity consisted of 46 beats (<0.1% of total beats). 3 were in trigeminy. There was no sustained or nonsustained ventricular tachycardia. Supraventricular ectopic activity consisted of 37 beats (<0.1% of total beats). There was no supraventricular tachycardia identified. There was no evidence of atrial fibrillation or atrial flutter. There were no significant pauses. There was no evidence of advanced degree heart block. No symptoms reported. Impression: The patient was in normal sinus rhythm throughout the study. Rare ectopy as noted above. No symptoms were reported. ----- Derick Sanchez MD [facility] [place]       Review of Systems:  Review of Systems    Physical Exam:  Physical Exam  Blood pressure 130/88, pulse 93, height 6' 1" (1.854 m), weight 121 kg (266 lb), SpO2 98 %. Discussion/Summary:  Acute ischemic left MCA stroke: Status post Holter monitor revealing no significant atrial fibrillation or flutter or other arrhythmias. Echocardiogram in April 2023 revealed normal LV size and function with mild LVH and normal diastology.   Mildly dilated RV with moderate pulmonary hypertension, consistent with prior diagnosis of acute pulmonary embolism 1 month after his stroke. He is continued on Eliquis due to pulmonary embolism, however decision to continue on Eliquis long-term will be decided based on whether he has atrial fibrillation, therefore will refer for loop recorder implantatian. Hypertension: Well-controlled on amlodipine and metoprolol.

## 2023-08-29 NOTE — PROGRESS NOTES
Cardiology Consultation     Valerie Davis LARA  [id number]  1975  [facility] [id number]    1. Primary hypertension        2. Acute ischemic left MCA stroke [facility])  Ambulatory Referral to Cardiology        Chief Complaint   Patient presents with   • New Patient Visit     Had a stroke on March 30th, [facility] referred to CARDIO     HPI: Patient is here for cardiac evaluation after stroke. Patient feels well, without complaints. No reported chest pain, shortness of breath, palpitations, lightheadedness, syncope, LE edema, orthopnea, PND, or significant weight changes. Patient remains active without any increased fatigue out of the ordinary.       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)   • Urinary retention   • DVT of lower extremity, bilateral (HCC)   • 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)   • Aphasia due to recent cerebrovascular accident (CVA)   • Rhinorrhea   • Seizures ([address])   • Back pain   • Global aphasia   • Vasovagal syncope   • Coagulopathy ([address])     Past Medical History:   Diagnosis Date   • Allergic 04/01/2023   • Hypertension 03/31/2023   • Hypertensive emergency 03/30/2023   • Obesity 03/31/2023   • Seizures ([address]) 03/31/2023   • Stroke ([address]) 03/30/2023   • Visual impairment 03/30/2023     Social History     Socioeconomic History   • Marital status: Single     Spouse name: Not on file   • Number of children: Not on file   • Years of education: Not on file   • Highest education level: Not on file   Occupational History   • Not on file   Tobacco Use   • Smoking status: Never   • Smokeless tobacco: Never   Vaping Use   • Vaping Use: Never used   Substance and Sexual Activity   • Alcohol use: Never   • Drug use: Never   • Sexual activity: Not Currently   Other Topics Concern   • Not on file   Social History Narrative   • Not on file     Social Determinants of Health     Financial Resource Strain: Not on file   Food Insecurity: No Food Insecurity (4/13/2023)    Hunger Vital Sign    • Worried About Running Out of Food in the Last Year: Never true    • Ran Out of Food in the Last Year: Never true   Transportation Needs: No Transportation Needs (4/13/2023)    PRAPARE - Transportation    • Lack of Transportation (Medical): No    • Lack of Transportation (Non-Medical): No   Physical Activity: Not on file   Stress: Not on file   Social Connections: Not on file   Intimate Partner Violence: Not on file   Housing Stability: Low Risk  (4/13/2023)    Housing Stability Vital Sign    • Unable to Pay for Housing in the Last Year: No    • Number of Places Lived in the Last Year: 1    • Unstable Housing in the Last Year: No      Family History   Problem Relation Age of Onset   • Diabetes Mother    • Arthritis Mother    • Breast cancer Mother    • Stroke Father    • Prostate cancer Paternal Grandfather    • Cancer Paternal Grandfather    • Breast cancer Paternal Grandmother      History reviewed. No pertinent surgical history.     Current Outpatient Medications:   •  amLODIPine (NORVASC) 5 mg tablet, Take 1 tablet (5 mg total) by mouth daily, Disp: 90 tablet, Rfl: 0  •  aspirin 81 mg chewable tablet, Chew 1 tablet (81 mg total) daily, Disp: 90 tablet, Rfl: 0  •  atorvastatin (LIPITOR) 40 mg tablet, Take 1 tablet (40 mg total) by mouth every evening, Disp: 90 tablet, Rfl: 0  •  levETIRAcetam (KEPPRA) 750 mg tablet, Take 1 tablet (750 mg total) by mouth every 12 (twelve) hours, Disp: 180 tablet, Rfl: 3  •  loratadine (CLARITIN) 10 mg tablet, Take 1 tablet (10 mg total) by mouth daily as needed for allergies, Disp: , Rfl: 0  •  metoprolol tartrate (LOPRESSOR) 25 mg tablet, Take 1 tablet (25 mg total) by mouth every 12 (twelve) hours, Disp: 90 tablet, Rfl: 1  •  polyethylene glycol (GLYCOLAX) 17 GM/SCOOP powder, Take 17 g by mouth daily as needed (Constipation) Can substitute for closest size available, Disp: 507 g, Rfl: 0  •  tamsulosin (FLOMAX) 0.4 mg, Take 1 capsule (0.4 mg total) by mouth daily after dinner, Disp: 90 capsule, Rfl: 0  •  apixaban (Eliquis) 5 mg, Take 1 tablet (5 mg total) by mouth 2 (two) times a day, Disp: 180 tablet, Rfl: 0  Allergies   Allergen Reactions   • Heparin Other (See Comments)     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   ("The patient's serum tested positive by BROOKE and supports a diagnosis of heparin-induced-thrombocytopenia")     Vitals:    08/29/23 1354   BP: 130/88   BP Location: Left arm   Patient Position: Sitting   Cuff Size: Adult   Pulse: 93   SpO2: 98%   Weight: 121 kg (266 lb)   Height: 6' 1" (1.854 m)       Labs:  Admission on 07/27/2023, Discharged on 07/27/2023   Component Date Value   • Ventricular Rate 07/27/2023 67    • Atrial Rate 07/27/2023 67    • ND Interval 07/27/2023 196    • QRSD Interval 07/27/2023 96    • QT Interval 07/27/2023 414    • QTC Interval 07/27/2023 437    • P Axis 07/27/2023 43    • QRS Axis 07/27/2023 16    • T Wave Axis 07/27/2023 0    • WBC 07/27/2023 6.61    • RBC 07/27/2023 5.12    • Hemoglobin 07/27/2023 14.9    • Hematocrit 07/27/2023 44.3    • MCV 07/27/2023 87    • MCH 07/27/2023 29.1    • MCHC 07/27/2023 33.6    • RDW 07/27/2023 14.1    • MPV 07/27/2023 9.8    • Platelets 34/34/0678 266    • nRBC 07/27/2023 0    • Neutrophils Relative 07/27/2023 72    • Immat GRANS % 07/27/2023 0    • Lymphocytes Relative 07/27/2023 18    • Monocytes Relative 07/27/2023 7    • Eosinophils Relative 07/27/2023 2    • Basophils Relative 07/27/2023 1    • Neutrophils Absolute 07/27/2023 4.73    • Immature Grans Absolute 07/27/2023 0.02    • Lymphocytes Absolute 07/27/2023 1.21    • Monocytes Absolute 07/27/2023 0.45    • Eosinophils Absolute 07/27/2023 0.16    • Basophils Absolute 07/27/2023 0.04    • Sodium 07/27/2023 140    • Potassium 07/27/2023 4.5    • Chloride 07/27/2023 106    • CO2 07/27/2023 27    • ANION GAP 07/27/2023 7    • BUN 07/27/2023 12    • Creatinine 07/27/2023 1.22    • Glucose 07/27/2023 104    • Calcium 07/27/2023 9.9    • eGFR 07/27/2023 70    Orders Only on 07/20/2023   Component Date Value   • Right Eye Diabetic Retin* 07/20/2023 None    • Left Eye Diabetic Retino* 07/20/2023 None    Appointment on 07/19/2023   Component Date Value   • AntiThrombIN III Activity 07/19/2023 103    • ANTICARDIOLIPIN IGG ANTI* 07/19/2023 2.4    • ANTICARDIOLIPIN IGA ANTI* 07/19/2023 14.0    • ANTICARDIOLIPIN IGM ANTI* 07/19/2023 3.4    • Factor V Leiden 07/19/2023 Comment    • REVIEWED BY: 07/19/2023 Comment    • PTT Lupus Anticoagulant 07/19/2023 38.8    • Dilute Viper Venom Time 07/19/2023 66.5 (H)    • DILUTE PROTHROMBIN TIME(* 07/19/2023 46.4    • THROMBIN TIME (DRVW) 07/19/2023 15.2    • DPT CONFIRM RATIO 07/19/2023 0.92    • LUPUS REFLEX INTERPRETAT* 07/19/2023 Comment:    • Protein C Activity 07/19/2023 119.0    • PROTEIN S ACTIVITY 07/19/2023 89    • Protein S Ag, Total 07/19/2023 102    • Protein S Ag, Free 07/19/2023 89    • Prothrombin Mutation 07/19/2023 Comment    • REVIEWED BY: 07/19/2023 Comment    • BETA 2 GLYCO 1 IGG 07/19/2023 <0.8    • BETA 2 GLYCO 1 IGA 07/19/2023 4.7    • BETA 2 GLYCO 1 IGM 07/19/2023 <2.4    • dRVVT Mix Interp. 07/19/2023 48.9 (H)    • DRVVT/Confirm Ratio 07/19/2023 1.1    Appointment on 07/07/2023   Component Date Value   • ANTICARDIOLIPIN IGG ANTI* 07/07/2023 2.5    • ANTICARDIOLIPIN IGA ANTI* 07/07/2023 14.0    • ANTICARDIOLIPIN IGM ANTI* 07/07/2023 3.1    • WBC 07/07/2023 5.92    • RBC 07/07/2023 5.16    • Hemoglobin 07/07/2023 14.3    • Hematocrit 07/07/2023 44.7    • MCV 07/07/2023 87    • MCH 07/07/2023 27.7    • MCHC 07/07/2023 32.0    • RDW 07/07/2023 15.0    • MPV 07/07/2023 10.8    • Platelets 93/60/6305 280    • nRBC 07/07/2023 0    • Neutrophils Relative 07/07/2023 59    • Immat GRANS % 07/07/2023 0    • Lymphocytes Relative 07/07/2023 28    • Monocytes Relative 07/07/2023 7    • Eosinophils Relative 07/07/2023 5    • Basophils Relative 07/07/2023 1    • Neutrophils Absolute 07/07/2023 3.51    • Immature Grans Absolute 07/07/2023 0.01    • Lymphocytes Absolute 07/07/2023 1.67    • Monocytes Absolute 07/07/2023 0.42    • Eosinophils Absolute 07/07/2023 0.27    • Basophils Absolute 07/07/2023 0.04    • Sodium 07/07/2023 140    • Potassium 07/07/2023 4.2    • Chloride 07/07/2023 109 (H)    • CO2 07/07/2023 28    • ANION GAP 07/07/2023 3    • BUN 07/07/2023 10    • Creatinine 07/07/2023 1.07    • Glucose, Fasting 07/07/2023 97    • Calcium 07/07/2023 9.3    • AST 07/07/2023 14    • ALT 07/07/2023 26    • Alkaline Phosphatase 07/07/2023 90    • Total Protein 07/07/2023 6.6    • Albumin 07/07/2023 3.5    • Total Bilirubin 07/07/2023 0.91    • eGFR 07/07/2023 82    • Iron Saturation 07/07/2023 22    • TIBC 07/07/2023 332    • Iron 07/07/2023 72    • Ferritin 07/07/2023 114    Admission on 07/05/2023, Discharged on 07/06/2023   Component Date Value   • POC Glucose 07/05/2023 138    • Ventricular Rate 07/05/2023 81    • Atrial Rate 07/05/2023 81    • OK Interval 07/05/2023 182    • QRSD Interval 07/05/2023 96    • QT Interval 07/05/2023 392    • QTC Interval 07/05/2023 455    • P Axis 07/05/2023 41    • QRS Axis 07/05/2023 -8    • T Wave Axis 07/05/2023 -8    • WBC 07/05/2023 6.97    • RBC 07/05/2023 5.45    • Hemoglobin 07/05/2023 15.3    • Hematocrit 07/05/2023 46.9    • MCV 07/05/2023 86    • MCH 07/05/2023 28.1    • MCHC 07/05/2023 32.6    • RDW 07/05/2023 14.7    • MPV 07/05/2023 9.8    • Platelets 43/97/9792 308    • nRBC 07/05/2023 0    • Neutrophils Relative 07/05/2023 65    • Immat GRANS % 07/05/2023 0    • Lymphocytes Relative 07/05/2023 23    • Monocytes Relative 07/05/2023 6    • Eosinophils Relative 07/05/2023 5    • Basophils Relative 07/05/2023 1    • Neutrophils Absolute 07/05/2023 4.56    • Immature Grans Absolute 07/05/2023 0.01    • Lymphocytes Absolute 07/05/2023 1.62    • Monocytes Absolute 07/05/2023 0.40    • Eosinophils Absolute 07/05/2023 0.33    • Basophils Absolute 07/05/2023 0.05    • Protime 07/05/2023 15.8 (H)    • INR 07/05/2023 1.28 (H)    • PTT 07/05/2023 27    • Sodium 07/05/2023 140    • Potassium 07/05/2023 5.0    • Chloride 07/05/2023 105    • CO2 07/05/2023 27    • ANION GAP 07/05/2023 8    • BUN 07/05/2023 16    • Creatinine 07/05/2023 1.58 (H)    • Glucose 07/05/2023 151 (H)    • Calcium 07/05/2023 10.4 (H)    • AST 07/05/2023 14    • ALT 07/05/2023 20    • Alkaline Phosphatase 07/05/2023 98    • Total Protein 07/05/2023 7.7    • Albumin 07/05/2023 4.4    • Total Bilirubin 07/05/2023 1.00    • eGFR 07/05/2023 51    • hs TnI 0hr 07/05/2023 11    • hs TnI 2hr 07/05/2023 8    • Delta 2hr hsTnI 07/05/2023 -3    • hs TnI 4hr 07/05/2023 12    • Delta 4hr hsTnI 07/05/2023 1    • Hemoglobin A1C 07/05/2023 5.3    • EAG 07/05/2023 105    • POC Glucose 07/05/2023 84    • WBC 07/06/2023 6.91    • RBC 07/06/2023 4.77    • Hemoglobin 07/06/2023 13.2    • Hematocrit 07/06/2023 40.9    • MCV 07/06/2023 86    • MCH 07/06/2023 27.7    • MCHC 07/06/2023 32.3    • RDW 07/06/2023 14.6    • MPV 07/06/2023 9.8    • Platelets 58/69/5883 230    • nRBC 07/06/2023 0    • Neutrophils Relative 07/06/2023 65    • Immat GRANS % 07/06/2023 0    • Lymphocytes Relative 07/06/2023 22    • Monocytes Relative 07/06/2023 8    • Eosinophils Relative 07/06/2023 4    • Basophils Relative 07/06/2023 1    • Neutrophils Absolute 07/06/2023 4.50    • Immature Grans Absolute 07/06/2023 0.02    • Lymphocytes Absolute 07/06/2023 1.53    • Monocytes Absolute 07/06/2023 0.53    • Eosinophils Absolute 07/06/2023 0.29    • Basophils Absolute 07/06/2023 0.04    • Sodium 07/06/2023 138    • Potassium 07/06/2023 3.6    • Chloride 07/06/2023 106    • CO2 07/06/2023 26    • ANION GAP 07/06/2023 6    • BUN 07/06/2023 14    • Creatinine 07/06/2023 0.99    • Glucose 07/06/2023 84    • Glucose, Fasting 07/06/2023 84    • Calcium 07/06/2023 8.7    • AST 07/06/2023 11 (L)    • ALT 07/06/2023 15    • Alkaline Phosphatase 07/06/2023 78    • Total Protein 07/06/2023 6.4    • Albumin 07/06/2023 3.7    • Total Bilirubin 07/06/2023 0.97    • eGFR 07/06/2023 90    • Magnesium 07/06/2023 2.1    • POC Glucose 07/06/2023 74    • POC Glucose 07/06/2023 126    Admission on 04/19/2023, Discharged on 05/05/2023   Component Date Value   • POC Glucose 04/19/2023 111    • POC Glucose 04/19/2023 129    • WBC 04/20/2023 7.15    • RBC 04/20/2023 5.08    • Hemoglobin 04/20/2023 13.3    • Hematocrit 04/20/2023 42.4    • MCV 04/20/2023 84    • MCH 04/20/2023 26.2 (L)    • MCHC 04/20/2023 31.4    • RDW 04/20/2023 13.6    • MPV 04/20/2023 9.4    • Platelets 92/03/7453 261    • nRBC 04/20/2023 0    • Neutrophils Relative 04/20/2023 60    • Immat GRANS % 04/20/2023 0    • Lymphocytes Relative 04/20/2023 24    • Monocytes Relative 04/20/2023 8    • Eosinophils Relative 04/20/2023 7 (H)    • Basophils Relative 04/20/2023 1    • Neutrophils Absolute 04/20/2023 4.26    • Immature Grans Absolute 04/20/2023 0.02    • Lymphocytes Absolute 04/20/2023 1.71    • Monocytes Absolute 04/20/2023 0.60    • Eosinophils Absolute 04/20/2023 0.51    • Basophils Absolute 04/20/2023 0.05    • Sodium 04/20/2023 138    • Potassium 04/20/2023 3.9    • Chloride 04/20/2023 110 (H)    • CO2 04/20/2023 25    • ANION GAP 04/20/2023 3 (L)    • BUN 04/20/2023 16    • Creatinine 04/20/2023 1.00    • Glucose 04/20/2023 115    • Glucose, Fasting 04/20/2023 115 (H)    • Calcium 04/20/2023 8.8    • eGFR 04/20/2023 90    • POC Glucose 04/20/2023 116    • POC Glucose 04/20/2023 131    • POC Glucose 04/20/2023 99    • POC Glucose 04/20/2023 89    • POC Glucose 04/21/2023 102    • POC Glucose 04/21/2023 112    • POC Glucose 04/21/2023 106    • POC Glucose 04/21/2023 119    • POC Glucose 04/22/2023 149 (H)    • POC Glucose 04/22/2023 143 (H)    • POC Glucose 04/22/2023 87    • POC Glucose 04/23/2023 102    • POC Glucose 04/23/2023 91    • POC Glucose 04/23/2023 86    • POC Glucose 04/23/2023 102    • WBC 04/24/2023 6.07    • RBC 04/24/2023 4.78    • Hemoglobin 04/24/2023 12.6    • Hematocrit 04/24/2023 40.5    • MCV 04/24/2023 85    • MCH 04/24/2023 26.4 (L)    • MCHC 04/24/2023 31.1 (L)    • RDW 04/24/2023 13.6    • MPV 04/24/2023 9.8    • Platelets 24/15/7269 309    • nRBC 04/24/2023 0    • Neutrophils Relative 04/24/2023 56    • Immat GRANS % 04/24/2023 0    • Lymphocytes Relative 04/24/2023 29    • Monocytes Relative 04/24/2023 8    • Eosinophils Relative 04/24/2023 6    • Basophils Relative 04/24/2023 1    • Neutrophils Absolute 04/24/2023 3.36    • Immature Grans Absolute 04/24/2023 0.01    • Lymphocytes Absolute 04/24/2023 1.78    • Monocytes Absolute 04/24/2023 0.46    • Eosinophils Absolute 04/24/2023 0.39    • Basophils Absolute 04/24/2023 0.07    • Sodium 04/24/2023 139    • Potassium 04/24/2023 3.7    • Chloride 04/24/2023 108    • CO2 04/24/2023 26    • ANION GAP 04/24/2023 5    • BUN 04/24/2023 13    • Creatinine 04/24/2023 1.01    • Glucose 04/24/2023 100    • Glucose, Fasting 04/24/2023 100 (H)    • Calcium 04/24/2023 8.9    • eGFR 04/24/2023 89    • POC Glucose 04/24/2023 95    • POC Glucose 04/24/2023 109    • POC Glucose 04/24/2023 97    • POC Glucose 04/24/2023 93    • POC Glucose 04/25/2023 87    • POC Glucose 04/25/2023 124    • POC Glucose 04/25/2023 102    • POC Glucose 04/25/2023 89    • POC Glucose 04/26/2023 88    • POC Glucose 04/26/2023 125    • POC Glucose 04/26/2023 95    • POC Glucose 04/26/2023 93    • WBC 04/27/2023 5.69    • RBC 04/27/2023 4.75    • Hemoglobin 04/27/2023 12.6    • Hematocrit 04/27/2023 40.3    • MCV 04/27/2023 85    • MCH 04/27/2023 26.5 (L)    • MCHC 04/27/2023 31.3 (L)    • RDW 04/27/2023 13.7    • MPV 04/27/2023 11.0    • Platelets 74/82/7169 312    • nRBC 04/27/2023 0    • Neutrophils Relative 04/27/2023 50    • Immat GRANS % 04/27/2023 0    • Lymphocytes Relative 04/27/2023 32    • Monocytes Relative 04/27/2023 9    • Eosinophils Relative 04/27/2023 8 (H)    • Basophils Relative 04/27/2023 1    • Neutrophils Absolute 04/27/2023 2.87    • Immature Grans Absolute 04/27/2023 0.01    • Lymphocytes Absolute 04/27/2023 1.82    • Monocytes Absolute 04/27/2023 0.50    • Eosinophils Absolute 04/27/2023 0.43    • Basophils Absolute 04/27/2023 0.06    • Sodium 04/27/2023 137    • Potassium 04/27/2023 3.8    • Chloride 04/27/2023 107    • CO2 04/27/2023 28    • ANION GAP 04/27/2023 2 (L)    • BUN 04/27/2023 11    • Creatinine 04/27/2023 1.00    • Glucose 04/27/2023 93    • Glucose, Fasting 04/27/2023 93    • Calcium 04/27/2023 8.7    • eGFR 04/27/2023 90    • POC Glucose 04/27/2023 93    • POC Glucose 04/27/2023 99    • POC Glucose 04/27/2023 97    • POC Glucose 04/27/2023 107    • POC Glucose 04/28/2023 89    • POC Glucose 04/28/2023 102    • POC Glucose 04/28/2023 103    • POC Glucose 04/28/2023 99    • WBC 05/04/2023 5.33    • RBC 05/04/2023 4.65    • Hemoglobin 05/04/2023 12.3    • Hematocrit 05/04/2023 40.0    • MCV 05/04/2023 86    • MCH 05/04/2023 26.5 (L)    • MCHC 05/04/2023 30.8 (L)    • RDW 05/04/2023 14.5    • MPV 05/04/2023 11.1    • Platelets 12/95/1328 256    • nRBC 05/04/2023 0    • Neutrophils Relative 05/04/2023 49    • Immat GRANS % 05/04/2023 0    • Lymphocytes Relative 05/04/2023 34    • Monocytes Relative 05/04/2023 10    • Eosinophils Relative 05/04/2023 6    • Basophils Relative 05/04/2023 1    • Neutrophils Absolute 05/04/2023 2.65    • Immature Grans Absolute 05/04/2023 0.01    • Lymphocytes Absolute 05/04/2023 1.80    • Monocytes Absolute 05/04/2023 0.53    • Eosinophils Absolute 05/04/2023 0.30    • Basophils Absolute 05/04/2023 0.04    • Sodium 05/04/2023 138    • Potassium 05/04/2023 3.7    • Chloride 05/04/2023 108    • CO2 05/04/2023 28    • ANION GAP 05/04/2023 2 (L)    • BUN 05/04/2023 11    • Creatinine 05/04/2023 0.95    • Glucose 05/04/2023 86    • Glucose, Fasting 05/04/2023 86    • Calcium 05/04/2023 9.0    • eGFR 05/04/2023 96    No results displayed because visit has over 200 results.       Admission on 04/08/2023, Discharged on 04/12/2023   Component Date Value   • Color, UA 04/09/2023 Yellow    • Clarity, UA 04/09/2023 Hazy    • Specific Gravity, UA 04/09/2023 1.025    • pH, UA 04/09/2023 5.0    • Leukocytes, UA 04/09/2023 Negative    • Nitrite, UA 04/09/2023 Negative    • Protein, UA 04/09/2023 Negative    • Glucose, UA 04/09/2023 Negative    • Ketones, UA 04/09/2023 Negative    • Urobilinogen, UA 04/09/2023 <2.0    • Bilirubin, UA 04/09/2023 Negative    • Occult Blood, UA 04/09/2023 Negative    • POC Glucose 04/08/2023 118    • POC Glucose 04/08/2023 193 (H)    • Sodium 04/09/2023 136    • Potassium 04/09/2023 4.4    • Chloride 04/09/2023 109 (H)    • CO2 04/09/2023 24    • ANION GAP 04/09/2023 3 (L)    • BUN 04/09/2023 20    • Creatinine 04/09/2023 1.06    • Glucose 04/09/2023 124    • Calcium 04/09/2023 8.8    • eGFR 04/09/2023 84    • POC Glucose 04/09/2023 101    • POC Glucose 04/09/2023 176 (H)    • POC Glucose 04/09/2023 155 (H)    • POC Glucose 04/09/2023 139    • POC Glucose 04/09/2023 189 (H)    • POC Glucose 04/10/2023 118    • POC Glucose 04/10/2023 176 (H)    • POC Glucose 04/10/2023 99    • hs TnI 0hr 04/10/2023 36    • Ventricular Rate 04/10/2023 86    • Atrial Rate 04/10/2023 86    • GA Interval 04/10/2023 184    • QRSD Interval 04/10/2023 108    • QT Interval 04/10/2023 402    • QTC Interval 04/10/2023 481    • P Axis 04/10/2023 27    • QRS Axis 04/10/2023 -20    • T Wave Axis 04/10/2023 -24    • hs TnI 2hr 04/10/2023 36    • Delta 2hr hsTnI 04/10/2023 0    • hs TnI 4hr 04/10/2023 35    • Delta 4hr hsTnI 04/10/2023 -1    • POC Glucose 04/10/2023 117    • POC Glucose 04/11/2023 131    • POC Glucose 04/11/2023 127    • D-Dimer, Quant 04/11/2023 >20.00 (H)    • WBC 04/11/2023 9.70    • RBC 04/11/2023 5.30    • Hemoglobin 04/11/2023 13.9    • Hematocrit 04/11/2023 44.4    • MCV 04/11/2023 84    • MCH 04/11/2023 26.2 (L)    • MCHC 04/11/2023 31.3 (L)    • RDW 04/11/2023 14.2    • MPV 04/11/2023 10.4    • Platelets 14/96/2039 48 (LL)    • nRBC 04/11/2023 0    • Neutrophils Relative 04/11/2023 72    • Immat GRANS % 04/11/2023 0    • Lymphocytes Relative 04/11/2023 14    • Monocytes Relative 04/11/2023 10    • Eosinophils Relative 04/11/2023 3    • Basophils Relative 04/11/2023 1    • Neutrophils Absolute 04/11/2023 7.01    • Immature Grans Absolute 04/11/2023 0.04    • Lymphocytes Absolute 04/11/2023 1.35    • Monocytes Absolute 04/11/2023 0.96    • Eosinophils Absolute 04/11/2023 0.28    • Basophils Absolute 04/11/2023 0.06    • Sodium 04/11/2023 135    • Potassium 04/11/2023 4.1    • Chloride 04/11/2023 104    • CO2 04/11/2023 26    • ANION GAP 04/11/2023 5    • BUN 04/11/2023 19    • Creatinine 04/11/2023 1.20    • Glucose 04/11/2023 92    • Calcium 04/11/2023 9.7    • Corrected Calcium 04/11/2023 10.2 (H)    • AST 04/11/2023 39    • ALT 04/11/2023 36    • Alkaline Phosphatase 04/11/2023 71    • Total Protein 04/11/2023 7.4    • Albumin 04/11/2023 3.4 (L)    • Total Bilirubin 04/11/2023 0.94    • eGFR 04/11/2023 72    • WBC 04/11/2023 10.05    • RBC 04/11/2023 5.24    • Hemoglobin 04/11/2023 14.1    • Hematocrit 04/11/2023 43.2    • MCV 04/11/2023 82    • MCH 04/11/2023 26.9    • MCHC 04/11/2023 32.6    • RDW 04/11/2023 14.3    • MPV 04/11/2023 10.8    • Platelets 45/33/9744 49 (LL) • nRBC 04/11/2023 0    • Neutrophils Relative 04/11/2023 67    • Immat GRANS % 04/11/2023 1    • Lymphocytes Relative 04/11/2023 18    • Monocytes Relative 04/11/2023 10    • Eosinophils Relative 04/11/2023 3    • Basophils Relative 04/11/2023 1    • Neutrophils Absolute 04/11/2023 6.81    • Immature Grans Absolute 04/11/2023 0.05    • Lymphocytes Absolute 04/11/2023 1.77    • Monocytes Absolute 04/11/2023 1.02    • Eosinophils Absolute 04/11/2023 0.34    • Basophils Absolute 04/11/2023 0.06    • POC Glucose 04/11/2023 122    • Heparin Induced Plt Ab 04/11/2023 2.871 (H)    • Ventricular Rate 04/10/2023 84    • Atrial Rate 04/10/2023 84    • DE Interval 04/10/2023 186    • QRSD Interval 04/10/2023 110    • QT Interval 04/10/2023 418    • QTC Interval 04/10/2023 493    • P Axis 04/10/2023 24    • QRS Axis 04/10/2023 -19    • T Wave Axis 04/10/2023 -21    • Ventricular Rate 04/10/2023 83    • Atrial Rate 04/10/2023 83    • DE Interval 04/10/2023 186    • QRSD Interval 04/10/2023 110    • QT Interval 04/10/2023 408    • QTC Interval 04/10/2023 479    • P Axis 04/10/2023 28    • QRS Axis 04/10/2023 -19    • T Wave Axis 04/10/2023 -16    • POC Glucose 04/11/2023 121    No results displayed because visit has over 200 results.       Admission on 03/30/2023, Discharged on 03/30/2023   Component Date Value   • WBC 03/30/2023 8.31    • RBC 03/30/2023 5.52    • Hemoglobin 03/30/2023 14.8    • Hematocrit 03/30/2023 46.6    • MCV 03/30/2023 84    • MCH 03/30/2023 26.8    • MCHC 03/30/2023 31.8    • RDW 03/30/2023 13.4    • MPV 03/30/2023 10.4    • Platelets 67/99/5739 321    • nRBC 03/30/2023 0    • Neutrophils Relative 03/30/2023 62    • Immat GRANS % 03/30/2023 1    • Lymphocytes Relative 03/30/2023 25    • Monocytes Relative 03/30/2023 6    • Eosinophils Relative 03/30/2023 5    • Basophils Relative 03/30/2023 1    • Neutrophils Absolute 03/30/2023 5.31    • Immature Grans Absolute 03/30/2023 0.04    • Lymphocytes Absolute 03/30/2023 2.04    • Monocytes Absolute 03/30/2023 0.47    • Eosinophils Absolute 03/30/2023 0.40    • Basophils Absolute 03/30/2023 0.05    • Sodium 03/30/2023 137    • Potassium 03/30/2023 4.0    • Chloride 03/30/2023 103    • CO2 03/30/2023 25    • ANION GAP 03/30/2023 9    • BUN 03/30/2023 19    • Creatinine 03/30/2023 1.00    • Glucose 03/30/2023 275 (H)    • Calcium 03/30/2023 9.0    • AST 03/30/2023 20    • ALT 03/30/2023 37    • Alkaline Phosphatase 03/30/2023 75    • Total Protein 03/30/2023 6.7    • Albumin 03/30/2023 3.7    • Total Bilirubin 03/30/2023 0.53    • eGFR 03/30/2023 90    • Total CK 03/30/2023 81    • CKR(REACTION TIME) 03/30/2023 5.7    • CKLY30 03/30/2023 2.2    • CRTMA(RAPIDTEG MAX AMPLI* 03/30/2023 61.4    • CFFMA (FUNCTIONAL FIBRIN* 03/30/2023 20.2    • hs TnI 0hr 03/30/2023 60 (H)    • Amph/Meth UR 03/30/2023 Negative    • Barbiturate Ur 03/30/2023 Negative    • Benzodiazepine Urine 03/30/2023 Negative    • Cocaine Urine 03/30/2023 Negative    • Methadone Urine 03/30/2023 Negative    • Opiate Urine 03/30/2023 Negative    • PCP Ur 03/30/2023 Negative    • THC Urine 03/30/2023 Negative    • Oxycodone Urine 03/30/2023 Negative    • TSH 3RD GENERATON 03/30/2023 2.727    • LACTIC ACID 03/30/2023 1.7    • Ethanol Lvl 84/56/8114 <62    • Salicylate Lvl 90/65/7064 <5    • Acetaminophen Level 03/30/2023 <10 (L)    • ph, Angel ISTAT 03/30/2023 7.368    • pCO2, Angel i-STAT 03/30/2023 45.3    • pO2, Angel i-STAT 03/30/2023 28.0 (L)    • BE, i-STAT 03/30/2023 0    • HCO3, Angel i-STAT 03/30/2023 26.1    • CO2, i-STAT 03/30/2023 27    • O2 Sat, i-STAT 03/30/2023 49 (L)    • SODIUM, I-STAT 03/30/2023 139    • Potassium, i-STAT 03/30/2023 4.1    • Calcium, Ionized i-STAT 03/30/2023 1.23    • Hct, i-STAT 03/30/2023 44    • Hgb, i-STAT 03/30/2023 15.0    • Glucose, i-STAT 03/30/2023 280 (H)    • Specimen Type 03/30/2023 VENOUS    There may be more visits with results that are not included.      Lab Results Component Value Date    TRIG 90 03/31/2023    HDL 38 (L) 03/31/2023     Imaging: Holter monitor    Result Date: 8/17/2023  Narrative: INDICATIONS: "r/o arrhythmia" DESCRIPTION OF FINDINGS: The patient was in normal sinus rhythm throughout the study. The average heart rate was 67 beats per minute. The heart rate ranged from 39 to 122 beats per minute. Ventricular ectopic activity consisted of 46 beats (<0.1% of total beats). 3 were in trigeminy. There was no sustained or nonsustained ventricular tachycardia. Supraventricular ectopic activity consisted of 37 beats (<0.1% of total beats). There was no supraventricular tachycardia identified. There was no evidence of atrial fibrillation or atrial flutter. There were no significant pauses. There was no evidence of advanced degree heart block. No symptoms reported. Impression: The patient was in normal sinus rhythm throughout the study. Rare ectopy as noted above. No symptoms were reported. ----- Lev Zheng MD [facility] [place]       Review of Systems:  Review of Systems   Constitutional: Negative for activity change, appetite change, fatigue and fever. HENT: Negative for nosebleeds and sore throat. Eyes: Negative for photophobia and visual disturbance. Respiratory: Negative for cough, chest tightness, shortness of breath and wheezing. Cardiovascular: Negative for chest pain, palpitations and leg swelling. Gastrointestinal: Negative for abdominal pain, diarrhea, nausea and vomiting. Endocrine: Negative for polyuria. Genitourinary: Negative for dysuria, frequency and hematuria. Musculoskeletal: Negative for arthralgias, back pain and gait problem. Skin: Negative for pallor and rash. Neurological: Negative for dizziness, syncope, speech difficulty and light-headedness. Hematological: Does not bruise/bleed easily. Psychiatric/Behavioral: Negative for agitation, behavioral problems and confusion. Physical Exam:  Physical Exam  Vitals reviewed. Constitutional:       General: He is not in acute distress. Appearance: He is well-developed. He is not diaphoretic. HENT:      Head: Normocephalic and atraumatic. Nose: Nose normal.   Eyes:      General: No scleral icterus. Pupils: Pupils are equal, round, and reactive to light. Neck:      Vascular: No JVD. Cardiovascular:      Rate and Rhythm: Normal rate and regular rhythm. Heart sounds: S1 normal and S2 normal. Heart sounds not distant. No murmur heard. No systolic murmur is present. No friction rub. No gallop. No S3 sounds. Pulmonary:      Effort: Pulmonary effort is normal. No respiratory distress. Breath sounds: Normal breath sounds. No wheezing or rales. Abdominal:      General: Bowel sounds are normal. There is no distension. Palpations: Abdomen is soft. Musculoskeletal:         General: No deformity. Cervical back: Normal range of motion and neck supple. Skin:     General: Skin is warm and dry. Findings: No erythema. Neurological:      Mental Status: He is alert and oriented to person, place, and time. Cranial Nerves: No cranial nerve deficit. Psychiatric:         Behavior: Behavior normal.       Blood pressure 130/88, pulse 93, height 6' 1" (1.854 m), weight 121 kg (266 lb), SpO2 98 %. Discussion/Summary:  Acute ischemic left MCA stroke: Status post Holter monitor revealing no significant atrial fibrillation or flutter or other arrhythmias. Echocardiogram in April 2023 revealed normal LV size and function with mild LVH and normal diastology. Mildly dilated RV with moderate pulmonary hypertension, consistent with prior diagnosis of acute pulmonary embolism 1 month after his stroke. He is continued on Eliquis due to pulmonary embolism, however decision to continue on Eliquis long-term will be decided based on whether he has atrial fibrillation.   At this time, it is unclear how long he needs to be on Eliquis for his saddle PE. Options are either to do Zio monitor for 2 weeks, LOOP recorder for 3 years, or to continue Eliquis indefinitely. Options presented to patient and family member and they will think about these options and let us know what they would like to do. In the meantime, they will continue on Eliquis regardless. Hypertension: Well-controlled on amlodipine and metoprolol.

## 2023-09-28 ENCOUNTER — OFFICE VISIT (OUTPATIENT)
Dept: NEUROLOGY | Facility: CLINIC | Age: 48
End: 2023-09-28
Payer: COMMERCIAL

## 2023-09-28 VITALS
HEART RATE: 70 BPM | HEIGHT: 73 IN | SYSTOLIC BLOOD PRESSURE: 150 MMHG | WEIGHT: 268.6 LBS | BODY MASS INDEX: 35.6 KG/M2 | DIASTOLIC BLOOD PRESSURE: 90 MMHG

## 2023-09-28 DIAGNOSIS — Z86.73 CHRONIC ISCHEMIC LEFT MCA STROKE: Primary | ICD-10-CM

## 2023-09-28 DIAGNOSIS — R06.83 SNORING: ICD-10-CM

## 2023-09-28 DIAGNOSIS — R56.9 SEIZURES (HCC): ICD-10-CM

## 2023-09-28 DIAGNOSIS — I26.92 SADDLE EMBOLUS OF PULMONARY ARTERY WITHOUT ACUTE COR PULMONALE (HCC): ICD-10-CM

## 2023-09-28 DIAGNOSIS — I82.403 DVT OF LOWER EXTREMITY, BILATERAL (HCC): ICD-10-CM

## 2023-09-28 PROCEDURE — 99215 OFFICE O/P EST HI 40 MIN: CPT | Performed by: PSYCHIATRY & NEUROLOGY

## 2023-09-28 NOTE — PROGRESS NOTES
Patient ID: Aleksandr Robles is a 57 y.o. male. Assessment/Plan: This is 56 y/o M who is here as a follow up for chronic left MCA stroke. Bilateral DVTs and saddle PEs patient was found to have negative work up for thrombosis panel, and lupus anticoagulant was negative. Dilute Viper Venom time is slightly elevated which is non-specific. Patient seen by cardiology and recommended loop recorder placement, and patient also seen by hematology/oncology and they recommended anticoagulation with eliquis for 6 months since there was no underlying hypercoagulable state. At this time I spoke to the patient and family in the room extensively, and I recommended getting loop recorder placement to look for PAF which would determine duration of anticoagulation, and if loop shows PAF, then he would need indefinite anticoagulation. PLAN:      Diagnoses and all orders for this visit:    Chronic ischemic left MCA stroke  Etiology of the CVA -   -for stroke prevention continue with combination of eliquis and Lipitor   -BP goal < 130/80, BP is at goal in the office. -LDL goal <70  -I advised patient to avoid using NSAIDs for headaches or other pain and to stick to tylenol if needed  -have patient call cardiology once they decide if they want to proceed with loop recorder placement. -Recommend mediterranean diet & regular exercise regimen atleast 4-5 times a week for 20-30 minutes. 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 Patient/Guardian was advised to the call the office if they have any questions and concerns in the meantime. Patient/Guardian does understand that if they have any new stroke like symptoms such as facial droop on one side, weakness/paralysis on either side, speech trouble, numbness on one side, balance issues, any vision changes, extreme dizziness or any new headache, to call 9-1-1 immediately or to proceed to the nearest ER immediately. I have spent a total time of 40 minutes on 09/28/23 in caring for this patient including Prognosis, Risks and benefits of tx options, Instructions for management, Counseling / Coordination of care, Documenting in the medical record and Reviewing / ordering tests, medicine, procedures  . Subjective:    HPI    82-year-old male who is here as a follow-up with history of stroke. Patient is doing well. Denies any new TIA symptoms he is compliant with his medications. He is tolerating his aspirin and Eliquis and atorvastatin at this point. Patient states that he is noticing an improvement as far as the symptoms expressive aphasia is concerned and he is still currently in therapy twice a week and will continue until January. Struggles mostly with comprehension but does have trouble with fluency at times. He does not have any motor deficits. No sensory deficits. The following portions of the patient's history were reviewed and updated as appropriate:   He  has a past medical history of Allergic (04/01/2023), Hypertension (03/31/2023), Hypertensive emergency (03/30/2023), Obesity (03/31/2023), Seizures ([address]) (03/31/2023), Stroke ([address]) (03/30/2023), and Visual impairment (03/30/2023).   He   Patient Active Problem List    Diagnosis Date Noted   • Vasovagal syncope 07/05/2023   • Coagulopathy ([address]) 07/05/2023   • Back pain 06/15/2023   • Global aphasia 06/15/2023   • Rhinorrhea 04/24/2023   • Aphasia due to recent cerebrovascular accident (CVA) 04/21/2023   • HIT (heparin-induced thrombocytopenia) ([address]) 04/20/2023   • Urinary retention 04/13/2023   • DVT of lower extremity, bilateral ([address]) 04/13/2023   • Saddle 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 ([address]) 04/12/2023   • Left-sided chest wall pain 04/11/2023   • Acute kidney injury ([address]) 04/11/2023   • Thrombocytopenia ([address]) 04/11/2023   • Bowel and bladder incontinence 04/08/2023   • Primary hypertension 04/02/2023   • Seizures ([address]) 03/31/2023   • Acute ischemic left MCA stroke ([address]) 03/30/2023   • Obesity, Class III, BMI 40-49.9 (morbid obesity) ([address]) 03/30/2023   • Encephalopathy 03/30/2023   • Type 2 diabetes mellitus without complication, without long-term current use of insulin ([address]) 03/30/2023     He  has no past surgical history on file. His family history includes Arthritis in his mother; Breast cancer in his mother and paternal grandmother; Cancer in his paternal grandfather; Diabetes in his mother; Prostate cancer in his paternal grandfather; Stroke in his father. He  reports that he has never smoked. He has never used smokeless tobacco. He reports that he does not drink alcohol and does not use drugs.   Current Outpatient Medications   Medication Sig Dispense Refill   • amLODIPine (NORVASC) 5 mg tablet Take 1 tablet (5 mg total) by mouth daily 90 tablet 1   • apixaban (Eliquis) 5 mg Take 1 tablet (5 mg total) by mouth 2 (two) times a day 180 tablet 0   • aspirin 81 mg chewable tablet Chew 1 tablet (81 mg total) daily 90 tablet 1   • atorvastatin (LIPITOR) 40 mg tablet Take 1 tablet (40 mg total) by mouth every evening 90 tablet 1   • levETIRAcetam (KEPPRA) 750 mg tablet Take 1 tablet (750 mg total) by mouth every 12 (twelve) hours 180 tablet 3   • loratadine (CLARITIN) 10 mg tablet Take 1 tablet (10 mg total) by mouth daily as needed for allergies  0   • metoprolol tartrate (LOPRESSOR) 25 mg tablet Take 1 tablet (25 mg total) by mouth every 12 (twelve) hours 90 tablet 1   • polyethylene glycol (GLYCOLAX) 17 GM/SCOOP powder Take 17 g by mouth daily as needed (Constipation) Can substitute for closest size available 507 g 0   • tamsulosin (FLOMAX) 0.4 mg Take 1 capsule (0.4 mg total) by mouth daily after dinner 90 capsule 0     No current facility-administered medications for this visit. Current Outpatient Medications on File Prior to Visit   Medication Sig   • amLODIPine (NORVASC) 5 mg tablet Take 1 tablet (5 mg total) by mouth daily   • apixaban (Eliquis) 5 mg Take 1 tablet (5 mg total) by mouth 2 (two) times a day   • aspirin 81 mg chewable tablet Chew 1 tablet (81 mg total) daily   • atorvastatin (LIPITOR) 40 mg tablet Take 1 tablet (40 mg total) by mouth every evening   • levETIRAcetam (KEPPRA) 750 mg tablet Take 1 tablet (750 mg total) by mouth every 12 (twelve) hours   • loratadine (CLARITIN) 10 mg tablet Take 1 tablet (10 mg total) by mouth daily as needed for allergies   • metoprolol tartrate (LOPRESSOR) 25 mg tablet Take 1 tablet (25 mg total) by mouth every 12 (twelve) hours   • polyethylene glycol (GLYCOLAX) 17 GM/SCOOP powder Take 17 g by mouth daily as needed (Constipation) Can substitute for closest size available   • tamsulosin (FLOMAX) 0.4 mg Take 1 capsule (0.4 mg total) by mouth daily after dinner     No current facility-administered medications on file prior to visit. He is allergic to heparin. .         Objective:    Blood pressure 150/90, pulse 70, height 6' 1" (1.854 m), weight 122 kg (268 lb 9.6 oz). Physical Exam  General - patient is alert   Speech - no dysarthria noted, no aphasia noted. Neuro:   Cranial nerves: PERRL, EOMI, facial sensation intact to soft touch in V1, V2 and V3, no facial asymmetry noted, uvula/palate midline, tongue midline. Motor: 5/5 throughout, normal tone, no pronator drift noted.    Sensory - intact to soft touch throughout  Reflexes - 2+ throughout  Coordination - no ataxia/dysmetria noted  Gait - normal     ROS:    Review of Systems   Constitutional: Negative for appetite change, fatigue and fever. HENT: Negative. Negative for hearing loss, tinnitus, trouble swallowing and voice change. Eyes: Negative. Negative for photophobia, pain and visual disturbance. Respiratory: Negative. Negative for shortness of breath. Cardiovascular: Negative. Negative for palpitations. Gastrointestinal: Negative. Negative for nausea and vomiting. Endocrine: Negative. Negative for cold intolerance. Genitourinary: Negative. Negative for dysuria, frequency and urgency. Musculoskeletal: Negative for back pain, gait problem, myalgias and neck pain. Skin: Negative. Negative for rash. Allergic/Immunologic: Negative. Neurological: Positive for tremors. Negative for dizziness, seizures, syncope, facial asymmetry, speech difficulty, weakness, light-headedness, numbness and headaches. Hematological: Negative. Does not bruise/bleed easily. Psychiatric/Behavioral: Negative. Negative for confusion, hallucinations and sleep disturbance.

## 2023-10-09 ENCOUNTER — APPOINTMENT (OUTPATIENT)
Dept: LAB | Facility: MEDICAL CENTER | Age: 48
End: 2023-10-09
Payer: COMMERCIAL

## 2023-10-09 DIAGNOSIS — N18.2 STAGE 2 CHRONIC KIDNEY DISEASE: ICD-10-CM

## 2023-10-09 LAB
ANION GAP SERPL CALCULATED.3IONS-SCNC: 7 MMOL/L
BUN SERPL-MCNC: 19 MG/DL (ref 5–25)
CALCIUM SERPL-MCNC: 9.9 MG/DL (ref 8.4–10.2)
CHLORIDE SERPL-SCNC: 105 MMOL/L (ref 96–108)
CO2 SERPL-SCNC: 31 MMOL/L (ref 21–32)
CREAT SERPL-MCNC: 1.15 MG/DL (ref 0.6–1.3)
GFR SERPL CREATININE-BSD FRML MDRD: 75 ML/MIN/1.73SQ M
GLUCOSE P FAST SERPL-MCNC: 96 MG/DL (ref 65–99)
POTASSIUM SERPL-SCNC: 4.9 MMOL/L (ref 3.5–5.3)
SODIUM SERPL-SCNC: 143 MMOL/L (ref 135–147)

## 2023-10-09 PROCEDURE — 36415 COLL VENOUS BLD VENIPUNCTURE: CPT

## 2023-10-09 PROCEDURE — 80048 BASIC METABOLIC PNL TOTAL CA: CPT

## 2023-10-12 ENCOUNTER — PREP FOR PROCEDURE (OUTPATIENT)
Dept: CARDIOLOGY CLINIC | Facility: CLINIC | Age: 48
End: 2023-10-12

## 2023-10-12 ENCOUNTER — TELEPHONE (OUTPATIENT)
Dept: CARDIOLOGY CLINIC | Facility: CLINIC | Age: 48
End: 2023-10-12

## 2023-10-12 DIAGNOSIS — I63.512 ACUTE ISCHEMIC LEFT MCA STROKE (HCC): Primary | ICD-10-CM

## 2023-10-12 DIAGNOSIS — I10 PRIMARY HYPERTENSION: ICD-10-CM

## 2023-10-12 NOTE — TELEPHONE ENCOUNTER
===View-only below this line===  ----- Message -----  From: Noel Mckeon MD  Sent: 10/11/2023   9:41 AM EDT  To: Cardiology Surgery Coordinator  Subject: FW: Loop recorder                                Please arrange for patient to have a loop recorder implant.    ----- Message -----  From: Yoko Lei  Sent: 10/6/2023   8:44 AM EDT  To: Noel Mckeon MD  Subject: Loop recorder                                    ----- Message from Yoko Lei sent at 10/6/2023  8:44 AM EDT -----       ----- Message from [facility], [address] [place] [address] to Noel Mckeon MD sent at 10/5/2023  1:24 PM -----   Trudi Quinonez had a visit with Dr. Sourav Prasad, his neurologist, and she saw where you suggested he get a loop recorder. She says that in his records it says that he would likely stop taking the Eliquis in 6 months, so getting the loop recorder would be good. I can't find where anyone says to stop Eliquis in 6 months, but Daniel Walker is willing to have a loop recorder inserted. We would like to schedule this. He has a follow up visit with you Nov. 1, 2023.  Thanks you, Joshua Osborn (Indra's mother)

## 2023-10-12 NOTE — TELEPHONE ENCOUNTER
Called and left voicemail for patient's mom Myles Woodward informing I currently do not have any openings to schedule the LOOP Recorder Implant procedure but will be having one soon for the [facility] so can place patient on waitlist for now and to call me back.      Thanks,  Edwina Lewis" Danielle's [facility]

## 2023-10-12 NOTE — TELEPHONE ENCOUNTER
Can we get an auth for loop recorder implant at [facility] on 11/7/23 thanks.

## 2023-10-12 NOTE — TELEPHONE ENCOUNTER
Patient's mom Ramon Stringer called back. Informed I will soon have an opening at the [facility] and can place patient on waitlist until then or can schedule at [facility]. Ramon Stringer states wants to schedule LOOP Recorder Implant at [facility]. Informed Ramon Stringer I will send a message to the  Marianne Hartmann so she can schedule the procedure at [facility]. Marianne Hartmann,  Please call patient's mom Ramon Stringer to schedule procedure at [facility].      Thanks,  Edwina "Amara Hung" Tiffanie Johnson

## 2023-10-13 ENCOUNTER — TELEPHONE (OUTPATIENT)
Dept: NEUROLOGY | Facility: CLINIC | Age: 48
End: 2023-10-13

## 2023-10-23 ENCOUNTER — OFFICE VISIT (OUTPATIENT)
Dept: HEMATOLOGY ONCOLOGY | Facility: CLINIC | Age: 48
End: 2023-10-23
Payer: COMMERCIAL

## 2023-10-23 VITALS
TEMPERATURE: 98.2 F | SYSTOLIC BLOOD PRESSURE: 136 MMHG | BODY MASS INDEX: 35.39 KG/M2 | WEIGHT: 267 LBS | RESPIRATION RATE: 16 BRPM | HEIGHT: 73 IN | HEART RATE: 72 BPM | DIASTOLIC BLOOD PRESSURE: 86 MMHG | OXYGEN SATURATION: 98 %

## 2023-10-23 DIAGNOSIS — I82.4Y3 DEEP VEIN THROMBOSIS (DVT) OF PROXIMAL VEIN OF BOTH LOWER EXTREMITIES, UNSPECIFIED CHRONICITY (HCC): ICD-10-CM

## 2023-10-23 DIAGNOSIS — I26.92 ACUTE SADDLE PULMONARY EMBOLISM WITHOUT ACUTE COR PULMONALE (HCC): ICD-10-CM

## 2023-10-23 DIAGNOSIS — I63.512 ACUTE ISCHEMIC LEFT MCA STROKE (HCC): Primary | ICD-10-CM

## 2023-10-23 DIAGNOSIS — D75.829 HIT (HEPARIN-INDUCED THROMBOCYTOPENIA) (HCC): ICD-10-CM

## 2023-10-23 PROCEDURE — 99214 OFFICE O/P EST MOD 30 MIN: CPT | Performed by: INTERNAL MEDICINE

## 2023-10-23 NOTE — PROGRESS NOTES
[address] [facility]  2001 [facility] [id number]  1975      PRIMARY HEMATOLOGIC/ONCOLOGIC DIAGNOSIS:  Left MCA inferior M2 division occlusion. Date of diagnosis 3/30/23. Large left MCA infarct ,left basal ganglia involvement w/ superimposed microhemorrhage on MRI dated 3/31/23  Saddle  embolus and extensive clot burden throughout the visualized proximal pulmonary arteries. CTA 4/11/23   Acute occlusive deep vein thrombosis noted in the right peroneal veins. Non Occlusive Acute deep vein thrombosis in noted in the right posterior Tibial veins. Acute occlusive deep vein thrombosis noted in the left Posterior Tiblial, Peroneal, and Soleal veins. Acute non occlusive deep vein thrombosis noted in the left Mid-Distal Femoral and Popliteal veins. Doppler 4/12/23  HIT    INTERIM HISTORY:  The patient presents for a follow-up. He has no new complaints. He is on anticoagulation with Eliquis 5mg PO BID. No signs of bleeding.      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 Family History   Problem Relation Age of Onset    Diabetes Mother     Arthritis Mother     Breast cancer Mother     Stroke Father     Prostate cancer Paternal Grandfather     Cancer Paternal Grandfather     Breast cancer Paternal Grandmother      Social History     Socioeconomic History    Marital status: Single     Spouse name: Not on file    Number of children: Not on file    Years of education: Not on file    Highest education level: Not on file   Occupational History    Not on file   Tobacco Use    Smoking status: Never    Smokeless tobacco: Never   Vaping Use    Vaping Use: Never used   Substance and Sexual Activity    Alcohol use: Never    Drug use: Never    Sexual activity: Not Currently   Other Topics Concern    Not on file   Social History Narrative    Not on file     Social Determinants of Health     Financial Resource Strain: Not on file   Food Insecurity: No Food Insecurity (4/13/2023)    Hunger Vital Sign     Worried About Running Out of Food in the Last Year: Never true     Ran Out of Food in the Last Year: Never true   Transportation Needs: No Transportation Needs (4/13/2023)    PRAPARE - Transportation     Lack of Transportation (Medical): No     Lack of Transportation (Non-Medical):  No   Physical Activity: Not on file   Stress: Not on file   Social Connections: Not on file   Intimate Partner Violence: Not on file   Housing Stability: Low Risk  (4/13/2023)    Housing Stability Vital Sign     Unable to Pay for Housing in the Last Year: No     Number of Places Lived in the Last Year: 1     Unstable Housing in the Last Year: No       Current Outpatient Medications:     amLODIPine (NORVASC) 5 mg tablet, Take 1 tablet (5 mg total) by mouth daily, Disp: 90 tablet, Rfl: 1    apixaban (Eliquis) 5 mg, Take 1 tablet (5 mg total) by mouth 2 (two) times a day, Disp: 180 tablet, Rfl: 0    aspirin 81 mg chewable tablet, Chew 1 tablet (81 mg total) daily, Disp: 90 tablet, Rfl: 1    atorvastatin (LIPITOR) 40 mg tablet, Take 1 tablet (40 mg total) by mouth every evening, Disp: 90 tablet, Rfl: 1    levETIRAcetam (KEPPRA) 750 mg tablet, Take 1 tablet (750 mg total) by mouth every 12 (twelve) hours, Disp: 180 tablet, Rfl: 3    loratadine (CLARITIN) 10 mg tablet, Take 1 tablet (10 mg total) by mouth daily as needed for allergies, Disp: , Rfl: 0    metoprolol tartrate (LOPRESSOR) 25 mg tablet, Take 1 tablet (25 mg total) by mouth every 12 (twelve) hours, Disp: 90 tablet, Rfl: 1    polyethylene glycol (GLYCOLAX) 17 GM/SCOOP powder, Take 17 g by mouth daily as needed (Constipation) Can substitute for closest size available, Disp: 507 g, Rfl: 0    tamsulosin (FLOMAX) 0.4 mg, Take 1 capsule (0.4 mg total) by mouth daily after dinner, Disp: 90 capsule, Rfl: 0  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   ("The patient's serum tested positive by [facility] and supports a diagnosis of heparin-induced-thrombocytopenia")     Vitals:    10/23/23 1258   BP: 136/86   Pulse: 72   Resp: 16   Temp: 98.2 °F (36.8 °C)   SpO2: 98%       ROS:  CONSTITUTIONAL:  No fever. No chills. No dizziness. No weakness. EYES:  No pain, erythema, or discharge. No blurring of vision. ENT:  No sore throat, URI symptoms. No epistaxis. No tinnitus. CARDIOVASCULAR:  No chest pain. No palpitations. No lower extremity edema. RESPIRATORY:  No shortness of breath, cough, pain with respiration, pleuritic chest pain. No hemoptysis. No dyspnea. No paroxysmal nocturnal dyspnea. GASTROINTESTINAL:  Normal appetite. No nausea, vomiting, diarrhea. No pain. No bloating. No melena. GENITOURINARY:  No frequency, urgency, nocturia. No hematuria or dysuria. MUSCULOSKELETAL:  No arthralgias or myalgias. INTEGUMENTARY:  No swelling.  No bruising. No contusions. No abrasions. No lymphangitis. NEUROLOGIC:  No headache. No neck pain. No numbness or tingling of the extremities. No weakness. PSYCHIATRIC:  No confusion. ENDOCRINE:  No fatigue. No weakness. No history of thyroid, diabetes or adrenal problems. HEMATOLOGICAL:  No bleeding. No petechiae. No bruising.     PHYSICAL EXAM:    GENERAL: AAO x 3  HEENT: AT,NC  CVS: S1S2 RRR  LUNGS: CTA b/l  ABD: NT,ND, +BS  EXTR: no edema    LABS:  I have reviewed pertinent labs:  CBC:   Lab Results   Component Value Date    WBC 6.61 07/27/2023    RBC 5.12 07/27/2023    HGB 14.9 07/27/2023    HCT 44.3 07/27/2023    MCV 87 07/27/2023     07/27/2023    MCH 29.1 07/27/2023    MCHC 33.6 07/27/2023    RDW 14.1 07/27/2023    MPV 9.8 07/27/2023    NEUTROABS 4.73 07/27/2023     CMP:   Lab Results   Component Value Date    SODIUM 143 10/09/2023    K 4.9 10/09/2023     10/09/2023    CO2 31 10/09/2023    AGAP 7 10/09/2023    BUN 19 10/09/2023    CREATININE 1.15 10/09/2023    GLUC 104 07/27/2023    GLUF 96 10/09/2023    CALCIUM 9.9 10/09/2023    AST 14 07/07/2023    ALT 26 07/07/2023    ALKPHOS 90 07/07/2023    TP 6.6 07/07/2023    ALB 3.5 07/07/2023    TBILI 0.91 07/07/2023    EGFR 75 10/09/2023     Liver Enzymes:   Lab Results   Component Value Date    AST 14 07/07/2023    ALT 26 07/07/2023    ALKPHOS 90 07/07/2023    TP 6.6 07/07/2023    ALB 3.5 07/07/2023    TBILI 0.91 07/07/2023    BILIDIR 0.18 04/12/2023     Vitamin B12 No results found for: "DRUTUFQF22"  Iron Study   Lab Results   Component Value Date    FERRITIN 114 07/07/2023    CONCFE 22 07/07/2023    TIBC 332 07/07/2023    IRON 72 07/07/2023     Folate No results found for: "FOLATE"  Magnesium   Lab Results   Component Value Date    MG 2.1 07/06/2023     Phosphorus   Lab Results   Component Value Date    PHOS 2.4 (L) 04/01/2023     Coagulation Panel   Lab Results   Component Value Date    DDIMER >20.00 (H) 04/11/2023    PROTIME 15.8 (H) 07/05/2023 INR 1.28 (H) 07/05/2023    PTT 27 07/05/2023     Linked Results    Procedure Abnormality Status   Antithrombin III Activity Normal Final result   Cardiolipin antibody  Final result   Factor 5 leiden  Final result   Lupus anticoagulant Abnormal Abnormal  Final result   Protein C activity Normal Final result   Protein S activity Normal Final result   Protein S Antigen, Total & Free  Final result   Prothrombin Gene Mutation  Final result   Beta-2 glycoprotein antibodies  Final result     IMAGING:  No results found. I reviewed the above laboratory and imaging data. ASSESSMENT:  Left MCA inferior M2 division occlusion. Date of diagnosis 3/30/23. Large left MCA infarct ,left basal ganglia involvement w/ superimposed microhemorrhage on MRI dated 3/31/23  Saddle  embolus and extensive clot burden throughout the visualized proximal pulmonary arteries. CTA 4/11/23   Acute occlusive deep vein thrombosis noted in the right peroneal veins. Non Occlusive Acute deep vein thrombosis in noted in the right posterior Tibial veins. Acute occlusive deep vein thrombosis noted in the left Posterior Tiblial, Peroneal, and Soleal veins. Acute non occlusive deep vein thrombosis noted in the left Mid-Distal Femoral and Popliteal veins. Doppler 4/12/23  HIT. Dx 4/2023    PLAN:  Continue anticoagulation. F/u in 4m  Labs prior.

## 2023-10-29 DIAGNOSIS — R33.9 URINARY RETENTION: ICD-10-CM

## 2023-10-30 RX ORDER — TAMSULOSIN HYDROCHLORIDE 0.4 MG/1
0.4 CAPSULE ORAL
Qty: 90 CAPSULE | Refills: 1 | Status: SHIPPED | OUTPATIENT
Start: 2023-10-30

## 2023-11-01 ENCOUNTER — OFFICE VISIT (OUTPATIENT)
Dept: CARDIOLOGY CLINIC | Facility: CLINIC | Age: 48
End: 2023-11-01
Payer: COMMERCIAL

## 2023-11-01 VITALS
WEIGHT: 265 LBS | HEIGHT: 73 IN | OXYGEN SATURATION: 98 % | DIASTOLIC BLOOD PRESSURE: 82 MMHG | SYSTOLIC BLOOD PRESSURE: 122 MMHG | HEART RATE: 62 BPM | BODY MASS INDEX: 35.12 KG/M2

## 2023-11-01 DIAGNOSIS — Z86.73 HISTORY OF CVA (CEREBROVASCULAR ACCIDENT): ICD-10-CM

## 2023-11-01 DIAGNOSIS — I10 PRIMARY HYPERTENSION: ICD-10-CM

## 2023-11-01 DIAGNOSIS — I63.512 ACUTE ISCHEMIC LEFT MCA STROKE (HCC): Primary | ICD-10-CM

## 2023-11-01 DIAGNOSIS — I26.92 CHRONIC SADDLE PULMONARY EMBOLISM WITHOUT ACUTE COR PULMONALE (HCC): ICD-10-CM

## 2023-11-01 DIAGNOSIS — I27.82 CHRONIC SADDLE PULMONARY EMBOLISM WITHOUT ACUTE COR PULMONALE (HCC): ICD-10-CM

## 2023-11-01 PROCEDURE — 99214 OFFICE O/P EST MOD 30 MIN: CPT | Performed by: INTERNAL MEDICINE

## 2023-11-01 NOTE — PROGRESS NOTES
Cardiology Consultation     Destinee Benitez III  [id number]  1975  [facility] [address]    1. Acute ischemic left MCA stroke ([address])        2. History of CVA (cerebrovascular accident)  Ambulatory Referral to Cardiology      3. Primary hypertension        4. Chronic saddle pulmonary embolism without acute cor pulmonale [facility])          Chief Complaint   Patient presents with    Follow-up     4 week f/u      HPI: Patient is here for cardiac evaluation after stroke. Patient feels well, without complaints. No reported chest pain, shortness of breath, palpitations, lightheadedness, syncope, LE edema, orthopnea, PND, or significant weight changes. Patient remains active without any increased fatigue out of the ordinary.       Patient Active Problem List   Diagnosis    Acute ischemic left MCA stroke (HCC)    Obesity, Class III, BMI 40-49.9 (morbid obesity) (HCC)    Encephalopathy    Type 2 diabetes mellitus without complication, without long-term current use of insulin (HCC)    Primary hypertension    Bowel and bladder incontinence    Left-sided chest wall pain    Acute kidney injury ([address])    Thrombocytopenia (HCC)    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)    Urinary retention    DVT of lower extremity, bilateral (HCC)    HIT (heparin-induced thrombocytopenia) ([address])    Aphasia due to recent cerebrovascular accident (CVA)    Rhinorrhea    Seizures ([address])    Back pain    Global aphasia    Vasovagal syncope    Coagulopathy ([address])     Past Medical History:   Diagnosis Date    Allergic 04/01/2023    Hypertension 03/31/2023    Hypertensive emergency 03/30/2023    Obesity 03/31/2023    Seizures ([address]) 03/31/2023    Stroke ([address]) 03/30/2023    Visual impairment 03/30/2023     Social History     Socioeconomic History    Marital status: Single     Spouse name: Not on file Number of children: Not on file    Years of education: Not on file    Highest education level: Not on file   Occupational History    Not on file   Tobacco Use    Smoking status: Never    Smokeless tobacco: Never   Vaping Use    Vaping Use: Never used   Substance and Sexual Activity    Alcohol use: Never    Drug use: Never    Sexual activity: Not Currently   Other Topics Concern    Not on file   Social History Narrative    Not on file     Social Determinants of Health     Financial Resource Strain: Not on file   Food Insecurity: No Food Insecurity (4/13/2023)    Hunger Vital Sign     Worried About Running Out of Food in the Last Year: Never true     Ran Out of Food in the Last Year: Never true   Transportation Needs: No Transportation Needs (4/13/2023)    PRAPARE - Transportation     Lack of Transportation (Medical): No     Lack of Transportation (Non-Medical): No   Physical Activity: Not on file   Stress: Not on file   Social Connections: Not on file   Intimate Partner Violence: Not on file   Housing Stability: Low Risk  (4/13/2023)    Housing Stability Vital Sign     Unable to Pay for Housing in the Last Year: No     Number of Places Lived in the Last Year: 1     Unstable Housing in the Last Year: No      Family History   Problem Relation Age of Onset    Diabetes Mother     Arthritis Mother     Breast cancer Mother     Stroke Father     Prostate cancer Paternal Grandfather     Cancer Paternal Grandfather     Breast cancer Paternal Grandmother      History reviewed. No pertinent surgical history.     Current Outpatient Medications:     amLODIPine (NORVASC) 5 mg tablet, Take 1 tablet (5 mg total) by mouth daily, Disp: 90 tablet, Rfl: 1    apixaban (Eliquis) 5 mg, Take 1 tablet (5 mg total) by mouth 2 (two) times a day, Disp: 180 tablet, Rfl: 0    aspirin 81 mg chewable tablet, Chew 1 tablet (81 mg total) daily, Disp: 90 tablet, Rfl: 1    atorvastatin (LIPITOR) 40 mg tablet, Take 1 tablet (40 mg total) by mouth every evening, Disp: 90 tablet, Rfl: 1    levETIRAcetam (KEPPRA) 750 mg tablet, Take 1 tablet (750 mg total) by mouth every 12 (twelve) hours, Disp: 180 tablet, Rfl: 3    loratadine (CLARITIN) 10 mg tablet, Take 1 tablet (10 mg total) by mouth daily as needed for allergies, Disp: , Rfl: 0    metoprolol tartrate (LOPRESSOR) 25 mg tablet, Take 1 tablet (25 mg total) by mouth every 12 (twelve) hours, Disp: 90 tablet, Rfl: 1    tamsulosin (FLOMAX) 0.4 mg, Take 1 capsule (0.4 mg total) by mouth daily after dinner, Disp: 90 capsule, Rfl: 1    polyethylene glycol (GLYCOLAX) 17 GM/SCOOP powder, Take 17 g by mouth daily as needed (Constipation) Can substitute for closest size available (Patient not taking: Reported on 11/1/2023), Disp: 507 g, Rfl: 0  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   ("The patient's serum tested positive by BROOKE and supports a diagnosis of heparin-induced-thrombocytopenia")     Vitals:    11/01/23 0856   BP: 122/82   BP Location: Left arm   Patient Position: Sitting   Cuff Size: Standard   Pulse: 62   SpO2: 98%   Weight: 120 kg (265 lb)   Height: 6' 1" (1.854 m)       Labs:  Appointment on 10/09/2023   Component Date Value    Sodium 10/09/2023 143     Potassium 10/09/2023 4.9     Chloride 10/09/2023 105     CO2 10/09/2023 31     ANION GAP 10/09/2023 7     BUN 10/09/2023 19     Creatinine 10/09/2023 1.15     Glucose, Fasting 10/09/2023 96     Calcium 10/09/2023 9.9     eGFR 10/09/2023 75    Admission on 07/27/2023, Discharged on 07/27/2023   Component Date Value    Ventricular Rate 07/27/2023 67     Atrial Rate 07/27/2023 67     [place] Interval 07/27/2023 196     QRSD Interval 07/27/2023 96     QT Interval 07/27/2023 414     QTC Interval 07/27/2023 437     P Axis 07/27/2023 43     QRS Axis 07/27/2023 16     T Wave [place] 07/27/2023 0     WBC 07/27/2023 6.61     RBC 07/27/2023 5.12     Hemoglobin 07/27/2023 14.9 Hematocrit 07/27/2023 44.3     MCV 07/27/2023 87     MCH 07/27/2023 29.1     MCHC 07/27/2023 33.6     RDW 07/27/2023 14.1     MPV 07/27/2023 9.8     Platelets 55/13/4545 266     nRBC 07/27/2023 0     Neutrophils Relative 07/27/2023 72     Immat GRANS % 07/27/2023 0     Lymphocytes Relative 07/27/2023 18     Monocytes Relative 07/27/2023 7     Eosinophils Relative 07/27/2023 2     Basophils Relative 07/27/2023 1     Neutrophils Absolute 07/27/2023 4.73     Immature Grans Absolute 07/27/2023 0.02     Lymphocytes Absolute 07/27/2023 1.21     Monocytes Absolute 07/27/2023 0.45     Eosinophils Absolute 07/27/2023 0.16     Basophils Absolute 07/27/2023 0.04     Sodium 07/27/2023 140     Potassium 07/27/2023 4.5     Chloride 07/27/2023 106     CO2 07/27/2023 27     ANION GAP 07/27/2023 7     BUN 07/27/2023 12     Creatinine 07/27/2023 1.22     Glucose 07/27/2023 104     Calcium 07/27/2023 9.9     eGFR 07/27/2023 70    Orders Only on 07/20/2023   Component Date Value    Right Eye Diabetic Retin* 07/20/2023 None     Left Eye Diabetic Retino* 07/20/2023 None    Appointment on 07/19/2023   Component Date Value    AntiThrombIN III Activity 07/19/2023 103     ANTICARDIOLIPIN IGG ANTI* 07/19/2023 2.4     ANTICARDIOLIPIN IGA ANTI* 07/19/2023 14.0     ANTICARDIOLIPIN IGM ANTI* 07/19/2023 3.4     Factor V Leiden 07/19/2023 Comment     REVIEWED BY: 07/19/2023 Comment     PTT Lupus Anticoagulant 07/19/2023 38.8     Dilute Viper Venom Time 07/19/2023 66.5 (H)     DILUTE PROTHROMBIN TIME(* 07/19/2023 46.4     THROMBIN TIME (DRVW) 07/19/2023 15.2     DPT CONFIRM RATIO 07/19/2023 0.92     LUPUS REFLEX INTERPRETAT* 07/19/2023 Comment:     Protein C Activity 07/19/2023 119.0     PROTEIN S ACTIVITY 07/19/2023 89     Protein S Ag, Total 07/19/2023 102     Protein S Ag, Free 07/19/2023 89     Prothrombin Mutation 07/19/2023 Comment     REVIEWED BY: 07/19/2023 Comment     BETA 2 GLYCO 1 IGG 07/19/2023 <0.8     BETA 2 GLYCO 1 IGA 07/19/2023 4.7 BETA 2 GLYCO 1 IGM 07/19/2023 <2.4     dRVVT Mix Interp. 07/19/2023 48.9 (H)     DRVVT/Confirm Ratio 07/19/2023 1.1    Appointment on 07/07/2023   Component Date Value    ANTICARDIOLIPIN IGG ANTI* 07/07/2023 2.5     ANTICARDIOLIPIN IGA ANTI* 07/07/2023 14.0     ANTICARDIOLIPIN IGM ANTI* 07/07/2023 3.1     WBC 07/07/2023 5.92     RBC 07/07/2023 5.16     Hemoglobin 07/07/2023 14.3     Hematocrit 07/07/2023 44.7     MCV 07/07/2023 87     MCH 07/07/2023 27.7     MCHC 07/07/2023 32.0     RDW 07/07/2023 15.0     MPV 07/07/2023 10.8     Platelets 59/12/8638 280     nRBC 07/07/2023 0     Neutrophils Relative 07/07/2023 59     Immat GRANS % 07/07/2023 0     Lymphocytes Relative 07/07/2023 28     Monocytes Relative 07/07/2023 7     Eosinophils Relative 07/07/2023 5     Basophils Relative 07/07/2023 1     Neutrophils Absolute 07/07/2023 3.51     Immature Grans Absolute 07/07/2023 0.01     Lymphocytes Absolute 07/07/2023 1.67     Monocytes Absolute 07/07/2023 0.42     Eosinophils Absolute 07/07/2023 0.27     Basophils Absolute 07/07/2023 0.04     Sodium 07/07/2023 140     Potassium 07/07/2023 4.2     Chloride 07/07/2023 109 (H)     CO2 07/07/2023 28     ANION GAP 07/07/2023 3     BUN 07/07/2023 10     Creatinine 07/07/2023 1.07     Glucose, Fasting 07/07/2023 97     Calcium 07/07/2023 9.3     AST 07/07/2023 14     ALT 07/07/2023 26     Alkaline Phosphatase 07/07/2023 90     Total Protein 07/07/2023 6.6     Albumin 07/07/2023 3.5     Total Bilirubin 07/07/2023 0.91     eGFR 07/07/2023 82     Iron Saturation 07/07/2023 22     TIBC 07/07/2023 332     Iron 07/07/2023 72     Ferritin 07/07/2023 114    Admission on 07/05/2023, Discharged on 07/06/2023   Component Date Value    POC Glucose 07/05/2023 138     Ventricular Rate 07/05/2023 81     Atrial Rate 07/05/2023 81     PA Interval 07/05/2023 182     QRSD Interval 07/05/2023 96     QT Interval 07/05/2023 392     QTC Interval 07/05/2023 455     P Axis 07/05/2023 41     QRS Axis 07/05/2023 -8     T Wave Axis 07/05/2023 -8     WBC 07/05/2023 6.97     RBC 07/05/2023 5.45     Hemoglobin 07/05/2023 15.3     Hematocrit 07/05/2023 46.9     MCV 07/05/2023 86     MCH 07/05/2023 28.1     MCHC 07/05/2023 32.6     RDW 07/05/2023 14.7     MPV 07/05/2023 9.8     Platelets 43/15/2390 308     nRBC 07/05/2023 0     Neutrophils Relative 07/05/2023 65     Immat GRANS % 07/05/2023 0     Lymphocytes Relative 07/05/2023 23     Monocytes Relative 07/05/2023 6     Eosinophils Relative 07/05/2023 5     Basophils Relative 07/05/2023 1     Neutrophils Absolute 07/05/2023 4.56     Immature Grans Absolute 07/05/2023 0.01     Lymphocytes Absolute 07/05/2023 1.62     Monocytes Absolute 07/05/2023 0.40     Eosinophils Absolute 07/05/2023 0.33     Basophils Absolute 07/05/2023 0.05     Protime 07/05/2023 15.8 (H)     INR 07/05/2023 1.28 (H)     PTT 07/05/2023 27     Sodium 07/05/2023 140     Potassium 07/05/2023 5.0     Chloride 07/05/2023 105     CO2 07/05/2023 27     ANION GAP 07/05/2023 8     BUN 07/05/2023 16     Creatinine 07/05/2023 1.58 (H)     Glucose 07/05/2023 151 (H)     Calcium 07/05/2023 10.4 (H)     AST 07/05/2023 14     ALT 07/05/2023 20     Alkaline Phosphatase 07/05/2023 98     Total Protein 07/05/2023 7.7     Albumin 07/05/2023 4.4     Total Bilirubin 07/05/2023 1.00     eGFR 07/05/2023 51     hs TnI 0hr 07/05/2023 11     hs TnI 2hr 07/05/2023 8     Delta 2hr hsTnI 07/05/2023 -3     hs TnI 4hr 07/05/2023 12     Delta 4hr hsTnI 07/05/2023 1     Hemoglobin A1C 07/05/2023 5.3     EAG 07/05/2023 105     POC Glucose 07/05/2023 84     WBC 07/06/2023 6.91     RBC 07/06/2023 4.77     Hemoglobin 07/06/2023 13.2     Hematocrit 07/06/2023 40.9     MCV 07/06/2023 86     MCH 07/06/2023 27.7     MCHC 07/06/2023 32.3     RDW 07/06/2023 14.6     MPV 07/06/2023 9.8     Platelets 57/24/5007 230     nRBC 07/06/2023 0     Neutrophils Relative 07/06/2023 65     Immat GRANS % 07/06/2023 0     Lymphocytes Relative 07/06/2023 22     Monocytes Relative 07/06/2023 8     Eosinophils Relative 07/06/2023 4     Basophils Relative 07/06/2023 1     Neutrophils Absolute 07/06/2023 4.50     Immature Grans Absolute 07/06/2023 0.02     Lymphocytes Absolute 07/06/2023 1.53     Monocytes Absolute 07/06/2023 0.53     Eosinophils Absolute 07/06/2023 0.29     Basophils Absolute 07/06/2023 0.04     Sodium 07/06/2023 138     Potassium 07/06/2023 3.6     Chloride 07/06/2023 106     CO2 07/06/2023 26     ANION GAP 07/06/2023 6     BUN 07/06/2023 14     Creatinine 07/06/2023 0.99     Glucose 07/06/2023 84     Glucose, Fasting 07/06/2023 84     Calcium 07/06/2023 8.7     AST 07/06/2023 11 (L)     ALT 07/06/2023 15     Alkaline Phosphatase 07/06/2023 78     Total Protein 07/06/2023 6.4     Albumin 07/06/2023 3.7     Total Bilirubin 07/06/2023 0.97     eGFR 07/06/2023 90     Magnesium 07/06/2023 2.1     POC Glucose 07/06/2023 74     POC Glucose 07/06/2023 126    Admission on 04/19/2023, Discharged on 05/05/2023   Component Date Value    POC Glucose 04/19/2023 111     POC Glucose 04/19/2023 129     WBC 04/20/2023 7.15     RBC 04/20/2023 5.08     Hemoglobin 04/20/2023 13.3     Hematocrit 04/20/2023 42.4     MCV 04/20/2023 84     MCH 04/20/2023 26.2 (L)     MCHC 04/20/2023 31.4     RDW 04/20/2023 13.6     MPV 04/20/2023 9.4     Platelets 57/34/6129 261     nRBC 04/20/2023 0     Neutrophils Relative 04/20/2023 60     Immat GRANS % 04/20/2023 0     Lymphocytes Relative 04/20/2023 24     Monocytes Relative 04/20/2023 8     Eosinophils Relative 04/20/2023 7 (H)     Basophils Relative 04/20/2023 1     Neutrophils Absolute 04/20/2023 4.26     Immature Grans Absolute 04/20/2023 0.02     Lymphocytes Absolute 04/20/2023 1.71     Monocytes Absolute 04/20/2023 0.60     Eosinophils Absolute 04/20/2023 0.51     Basophils Absolute 04/20/2023 0.05     Sodium 04/20/2023 138     Potassium 04/20/2023 3.9     Chloride 04/20/2023 110 (H)     CO2 04/20/2023 25     ANION GAP 04/20/2023 3 (L)     BUN 04/20/2023 16     Creatinine 04/20/2023 1.00     Glucose 04/20/2023 115     Glucose, Fasting 04/20/2023 115 (H)     Calcium 04/20/2023 8.8     eGFR 04/20/2023 90     POC Glucose 04/20/2023 116     POC Glucose 04/20/2023 131     POC Glucose 04/20/2023 99     POC Glucose 04/20/2023 89     POC Glucose 04/21/2023 102     POC Glucose 04/21/2023 112     POC Glucose 04/21/2023 106     POC Glucose 04/21/2023 119     POC Glucose 04/22/2023 149 (H)     POC Glucose 04/22/2023 143 (H)     POC Glucose 04/22/2023 87     POC Glucose 04/23/2023 102     POC Glucose 04/23/2023 91     POC Glucose 04/23/2023 86     POC Glucose 04/23/2023 102     WBC 04/24/2023 6.07     RBC 04/24/2023 4.78     Hemoglobin 04/24/2023 12.6     Hematocrit 04/24/2023 40.5     MCV 04/24/2023 85     MCH 04/24/2023 26.4 (L)     MCHC 04/24/2023 31.1 (L)     RDW 04/24/2023 13.6     MPV 04/24/2023 9.8     Platelets 84/03/7452 309     nRBC 04/24/2023 0     Neutrophils Relative 04/24/2023 56     Immat GRANS % 04/24/2023 0     Lymphocytes Relative 04/24/2023 29     Monocytes Relative 04/24/2023 8     Eosinophils Relative 04/24/2023 6     Basophils Relative 04/24/2023 1     Neutrophils Absolute 04/24/2023 3.36     Immature Grans Absolute 04/24/2023 0.01     Lymphocytes Absolute 04/24/2023 1.78     Monocytes Absolute 04/24/2023 0.46     Eosinophils Absolute 04/24/2023 0.39     Basophils Absolute 04/24/2023 0.07     Sodium 04/24/2023 139     Potassium 04/24/2023 3.7     Chloride 04/24/2023 108     CO2 04/24/2023 26     ANION GAP 04/24/2023 5     BUN 04/24/2023 13     Creatinine 04/24/2023 1.01     Glucose 04/24/2023 100     Glucose, Fasting 04/24/2023 100 (H)     Calcium 04/24/2023 8.9     eGFR 04/24/2023 89     POC Glucose 04/24/2023 95     POC Glucose 04/24/2023 109     POC Glucose 04/24/2023 97     POC Glucose 04/24/2023 93     POC Glucose 04/25/2023 87     POC Glucose 04/25/2023 124     POC Glucose 04/25/2023 102     POC Glucose 04/25/2023 89     POC Glucose 04/26/2023 88     POC Glucose 04/26/2023 125     POC Glucose 04/26/2023 95     POC Glucose 04/26/2023 93     WBC 04/27/2023 5.69     RBC 04/27/2023 4.75     Hemoglobin 04/27/2023 12.6     Hematocrit 04/27/2023 40.3     MCV 04/27/2023 85     MCH 04/27/2023 26.5 (L)     MCHC 04/27/2023 31.3 (L)     RDW 04/27/2023 13.7     MPV 04/27/2023 11.0     Platelets 08/58/2057 312     nRBC 04/27/2023 0     Neutrophils Relative 04/27/2023 50     Immat GRANS % 04/27/2023 0     Lymphocytes Relative 04/27/2023 32     Monocytes Relative 04/27/2023 9     Eosinophils Relative 04/27/2023 8 (H)     Basophils Relative 04/27/2023 1     Neutrophils Absolute 04/27/2023 2.87     Immature Grans Absolute 04/27/2023 0.01     Lymphocytes Absolute 04/27/2023 1.82     Monocytes Absolute 04/27/2023 0.50     Eosinophils Absolute 04/27/2023 0.43     Basophils Absolute 04/27/2023 0.06     Sodium 04/27/2023 137     Potassium 04/27/2023 3.8     Chloride 04/27/2023 107     CO2 04/27/2023 28     ANION GAP 04/27/2023 2 (L)     BUN 04/27/2023 11     Creatinine 04/27/2023 1.00     Glucose 04/27/2023 93     Glucose, Fasting 04/27/2023 93     Calcium 04/27/2023 8.7     eGFR 04/27/2023 90     POC Glucose 04/27/2023 93     POC Glucose 04/27/2023 99     POC Glucose 04/27/2023 97     POC Glucose 04/27/2023 107     POC Glucose 04/28/2023 89     POC Glucose 04/28/2023 102     POC Glucose 04/28/2023 103     POC Glucose 04/28/2023 99     WBC 05/04/2023 5.33     RBC 05/04/2023 4.65     Hemoglobin 05/04/2023 12.3     Hematocrit 05/04/2023 40.0     MCV 05/04/2023 86     MCH 05/04/2023 26.5 (L)     MCHC 05/04/2023 30.8 (L)     RDW 05/04/2023 14.5     MPV 05/04/2023 11.1     Platelets 46/76/3863 256     nRBC 05/04/2023 0     Neutrophils Relative 05/04/2023 49     Immat GRANS % 05/04/2023 0     Lymphocytes Relative 05/04/2023 34     Monocytes Relative 05/04/2023 10     Eosinophils Relative 05/04/2023 6     Basophils Relative 05/04/2023 1     Neutrophils Absolute 05/04/2023 2.65     Immature Grans Absolute 05/04/2023 0.01     Lymphocytes Absolute 05/04/2023 1.80     Monocytes Absolute 05/04/2023 0.53     Eosinophils Absolute 05/04/2023 0.30     Basophils Absolute 05/04/2023 0.04     Sodium 05/04/2023 138     Potassium 05/04/2023 3.7     Chloride 05/04/2023 108     CO2 05/04/2023 28     ANION GAP 05/04/2023 2 (L)     BUN 05/04/2023 11     Creatinine 05/04/2023 0.95     Glucose 05/04/2023 86     Glucose, Fasting 05/04/2023 86     Calcium 05/04/2023 9.0     eGFR 05/04/2023 96      Lab Results   Component Value Date    TRIG 90 03/31/2023    HDL 38 (L) 03/31/2023     Imaging: Holter monitor    Result Date: 8/17/2023  Narrative: INDICATIONS: "r/o arrhythmia" DESCRIPTION OF FINDINGS: The patient was in normal sinus rhythm throughout the study. The average heart rate was 67 beats per minute. The heart rate ranged from 39 to 122 beats per minute. Ventricular ectopic activity consisted of 46 beats (<0.1% of total beats). 3 were in trigeminy. There was no sustained or nonsustained ventricular tachycardia. Supraventricular ectopic activity consisted of 37 beats (<0.1% of total beats). There was no supraventricular tachycardia identified. There was no evidence of atrial fibrillation or atrial flutter. There were no significant pauses. There was no evidence of advanced degree heart block. No symptoms reported. Impression: The patient was in normal sinus rhythm throughout the study. Rare ectopy as noted above. No symptoms were reported. ----- Sienna Arana MD [facility] [place]       Review of Systems:  Review of Systems   Constitutional:  Negative for activity change, appetite change, fatigue and fever. HENT:  Negative for nosebleeds and sore throat. Eyes:  Negative for photophobia and visual disturbance. Respiratory:  Negative for cough, chest tightness, shortness of breath and wheezing. Cardiovascular:  Negative for chest pain, palpitations and leg swelling.    Gastrointestinal:  Negative for abdominal pain, diarrhea, nausea and vomiting. Endocrine: Negative for polyuria. Genitourinary:  Negative for dysuria, frequency and hematuria. Musculoskeletal:  Negative for arthralgias, back pain and gait problem. Skin:  Negative for pallor and rash. Neurological:  Negative for dizziness, syncope, speech difficulty and light-headedness. Hematological:  Does not bruise/bleed easily. Psychiatric/Behavioral:  Negative for agitation, behavioral problems and confusion. Physical Exam:  Physical Exam  Vitals reviewed. Constitutional:       General: He is not in acute distress. Appearance: He is well-developed. He is not diaphoretic. HENT:      Head: Normocephalic and atraumatic. Nose: Nose normal.   Eyes:      General: No scleral icterus. Pupils: Pupils are equal, round, and reactive to light. Neck:      Vascular: No JVD. Cardiovascular:      Rate and Rhythm: Normal rate and regular rhythm. Heart sounds: S1 normal and S2 normal. Heart sounds not distant. No murmur heard. No systolic murmur is present. No friction rub. No gallop. No S3 sounds. Pulmonary:      Effort: Pulmonary effort is normal. No respiratory distress. Breath sounds: Normal breath sounds. No wheezing or rales. Abdominal:      General: Bowel sounds are normal. There is no distension. Palpations: Abdomen is soft. Musculoskeletal:         General: No deformity. Cervical back: Normal range of motion and neck supple. Skin:     General: Skin is warm and dry. Findings: No erythema. Neurological:      Mental Status: He is alert and oriented to person, place, and time. Cranial Nerves: No cranial nerve deficit. Psychiatric:         Behavior: Behavior normal.       Blood pressure 122/82, pulse 62, height 6' 1" (1.854 m), weight 120 kg (265 lb), SpO2 98 %.     Discussion/Summary:  Acute ischemic left MCA stroke: Status post Holter monitor revealing no significant atrial fibrillation or flutter or other arrhythmias. Echocardiogram in April 2023 revealed normal LV size and function with mild LVH and normal diastology. Mildly dilated RV with moderate pulmonary hypertension, consistent with prior diagnosis of acute pulmonary embolism 1 month after his stroke. He is continued on Eliquis due to pulmonary embolism, however decision to continue on Eliquis long-term will be decided based on whether he has atrial fibrillation. He is on Eliquis for saddle PE that Hematology has recommended that he stay on for 1 year. Options are either to do Zio monitor for 2 weeks, LOOP recorder for 3 years, or to continue Eliquis indefinitely. He has decided to proceed with LOOP recorder, which he is scheduled for. In the meantime, they will continue on Eliquis regardless. Hypertension: Well-controlled on amlodipine and metoprolol. No changes made today.

## 2023-11-07 ENCOUNTER — HOSPITAL ENCOUNTER (OUTPATIENT)
Facility: HOSPITAL | Age: 48
Setting detail: OUTPATIENT SURGERY
Discharge: HOME | End: 2023-11-07
Attending: INTERNAL MEDICINE | Admitting: INTERNAL MEDICINE
Payer: COMMERCIAL

## 2023-11-07 VITALS
BODY MASS INDEX: 35.21 KG/M2 | SYSTOLIC BLOOD PRESSURE: 134 MMHG | HEIGHT: 73 IN | TEMPERATURE: 97.6 F | DIASTOLIC BLOOD PRESSURE: 78 MMHG | OXYGEN SATURATION: 98 % | RESPIRATION RATE: 20 BRPM | WEIGHT: 265.65 LBS | HEART RATE: 77 BPM

## 2023-11-07 DIAGNOSIS — I10 PRIMARY HYPERTENSION: ICD-10-CM

## 2023-11-07 DIAGNOSIS — I63.512 ACUTE ISCHEMIC LEFT MCA STROKE (HCC): ICD-10-CM

## 2023-11-07 PROCEDURE — 33285 INSJ SUBQ CAR RHYTHM MNTR: CPT | Performed by: INTERNAL MEDICINE

## 2023-11-07 PROCEDURE — C1764 EVENT RECORDER, CARDIAC: HCPCS | Performed by: INTERNAL MEDICINE

## 2023-11-07 DEVICE — LOOP RECORDER REVEAL LINQ II SYS DEVICE ONLY: Type: IMPLANTABLE DEVICE | Site: CHEST | Status: FUNCTIONAL

## 2023-11-13 DIAGNOSIS — I10 PRIMARY HYPERTENSION: ICD-10-CM

## 2023-11-20 ENCOUNTER — IN-CLINIC DEVICE VISIT (OUTPATIENT)
Dept: CARDIOLOGY CLINIC | Facility: CLINIC | Age: 48
End: 2023-11-20
Payer: COMMERCIAL

## 2023-11-20 DIAGNOSIS — Z95.818 PRESENCE OF OTHER CARDIAC IMPLANTS AND GRAFTS: Primary | ICD-10-CM

## 2023-11-20 PROCEDURE — 93291 INTERROG DEV EVAL SCRMS IP: CPT | Performed by: INTERNAL MEDICINE

## 2023-11-20 NOTE — PROGRESS NOTES
Results for orders placed or performed in visit on 11/20/23   Cardiac EP device report    Narrative    MDT LNQ22 ICM - ACTIVE SYSTEM IS MRI CONDITIONAL  DEVICE INTERROGATED IN THE [facility] OFFICE: WOUND CHECK: INCISION CLEAN AND DRY WITH EDGES APPROXIMATED; SUTURES REMOVED; AREA CLEANSED, 3 STERI STRIPS APPLIED; WOUND CARE AND RESTRICTIONS REVIEWED WITH PATIENT. BATTERY VOLTAGE "OK". PRESENTING RHYTHM SHOWS SR, 73 BPM; R WAVES MEASURE 0.45MV; NO DEVICE ACTIVATED EPISODES. PATIENT TAKING ASA 81, ELIQUIS, METOPROLOL TART; NO PROGRAMMING CHANGES MADE TO DEVICE PARAMETERS. NORMAL DEVICE FUNCTION.   ES

## 2023-11-25 DIAGNOSIS — I26.92 ACUTE SADDLE PULMONARY EMBOLISM WITHOUT ACUTE COR PULMONALE (HCC): ICD-10-CM

## 2023-12-04 ENCOUNTER — OFFICE VISIT (OUTPATIENT)
Dept: FAMILY MEDICINE CLINIC | Facility: MEDICAL CENTER | Age: 48
End: 2023-12-04
Payer: COMMERCIAL

## 2023-12-04 VITALS
HEART RATE: 84 BPM | DIASTOLIC BLOOD PRESSURE: 80 MMHG | TEMPERATURE: 98.5 F | WEIGHT: 272.8 LBS | OXYGEN SATURATION: 99 % | SYSTOLIC BLOOD PRESSURE: 138 MMHG | HEIGHT: 73 IN | BODY MASS INDEX: 36.15 KG/M2

## 2023-12-04 DIAGNOSIS — Z23 ENCOUNTER FOR IMMUNIZATION: ICD-10-CM

## 2023-12-04 DIAGNOSIS — Z11.59 NEED FOR HEPATITIS C SCREENING TEST: ICD-10-CM

## 2023-12-04 DIAGNOSIS — Z11.4 SCREENING FOR HIV (HUMAN IMMUNODEFICIENCY VIRUS): ICD-10-CM

## 2023-12-04 DIAGNOSIS — Z00.00 ANNUAL PHYSICAL EXAM: Primary | ICD-10-CM

## 2023-12-04 DIAGNOSIS — Z86.73 HISTORY OF CARDIOEMBOLIC CEREBROVASCULAR ACCIDENT (CVA): ICD-10-CM

## 2023-12-04 DIAGNOSIS — Z12.11 SCREEN FOR COLON CANCER: ICD-10-CM

## 2023-12-04 DIAGNOSIS — E11.9 TYPE 2 DIABETES MELLITUS WITHOUT COMPLICATION, WITHOUT LONG-TERM CURRENT USE OF INSULIN (HCC): ICD-10-CM

## 2023-12-04 PROCEDURE — 99396 PREV VISIT EST AGE 40-64: CPT | Performed by: STUDENT IN AN ORGANIZED HEALTH CARE EDUCATION/TRAINING PROGRAM

## 2023-12-04 PROCEDURE — 99213 OFFICE O/P EST LOW 20 MIN: CPT | Performed by: STUDENT IN AN ORGANIZED HEALTH CARE EDUCATION/TRAINING PROGRAM

## 2023-12-04 PROCEDURE — 90686 IIV4 VACC NO PRSV 0.5 ML IM: CPT

## 2023-12-04 PROCEDURE — 90471 IMMUNIZATION ADMIN: CPT

## 2023-12-04 NOTE — ASSESSMENT & PLAN NOTE
Under excellent control with diet control measures  Continue daily statin  Reminded about yearly dilated eye exam  Lab Results   Component Value Date    HGBA1C 5.3 07/05/2023

## 2023-12-04 NOTE — PATIENT INSTRUCTIONS
Wellness Visit for Adults   AMBULATORY CARE:   A wellness visit  is when you see your healthcare provider to get screened for health problems. Your healthcare provider will also give you advice on how to stay healthy. Write down your questions so you remember to ask them. Ask your healthcare provider how often you should have a wellness visit. What happens at a wellness visit:  Your healthcare provider will ask about your health, and your family history of health problems. This includes high blood pressure, heart disease, and cancer. He or she will ask if you have symptoms that concern you, if you smoke, and about your mood. You may also be asked about your intake of medicines, supplements, food, and alcohol. Any of the following may be done: Your weight  will be checked. Your height may also be checked so your body mass index (BMI) can be calculated. Your BMI shows if you are at a healthy weight. Your blood pressure  and heart rate will be checked. Your temperature may also be checked. Blood and urine tests  may be done. Blood tests may be done to check your cholesterol levels. Abnormal cholesterol levels increase your risk for heart disease and stroke. You may also need a blood or urine test to check for diabetes if you are at increased risk. Urine tests may be done to look for signs of an infection or kidney disease. A physical exam  includes checking your heartbeat and lungs with a stethoscope. Your healthcare provider may also check your skin to look for sun damage. Screening tests  may be recommended. A screening test is done to check for diseases that may not cause symptoms. The screening tests you may need depend on your age, gender, family history, and lifestyle habits. For example, colorectal screening may be recommended if you are 48years old or older. Screening tests you need if you are a woman:   A Pap smear  is used to screen for cervical cancer.  Pap smears are usually done every 3 to 5 years depending on your age. You may need them more often if you have had abnormal Pap smear test results in the past. Ask your healthcare provider how often you should have a Pap smear. A mammogram  is an x-ray of your breasts to screen for breast cancer. Experts recommend mammograms every 2 years starting at age 48 years. You may need a mammogram at age 52 years or younger if you have an increased risk for breast cancer. Talk to your healthcare provider about when you should start having mammograms and how often you need them. Vaccines you may need:   Get an influenza vaccine  every year. The influenza vaccine protects you from the flu. Several types of viruses cause the flu. The viruses change over time, so new vaccines are made each year. Get a tetanus-diphtheria (Td) booster vaccine  every 10 years. This vaccine protects you against tetanus and diphtheria. Tetanus is a severe infection that may cause painful muscle spasms and lockjaw. Diphtheria is a severe bacterial infection that causes a thick covering in the back of your mouth and throat. Get a human papillomavirus (HPV) vaccine  if you are female and aged 23 to 32 or male 23 to 24 and never received it. This vaccine protects you from HPV infection. HPV is the most common infection spread by sexual contact. HPV may also cause vaginal, penile, and anal cancers. Get a pneumococcal vaccine  if you are aged 72 years or older. The pneumococcal vaccine is an injection given to protect you from pneumococcal disease. Pneumococcal disease is an infection caused by pneumococcal bacteria. The infection may cause pneumonia, meningitis, or an ear infection. Get a shingles vaccine  if you are 60 or older, even if you have had shingles before. The shingles vaccine is an injection to protect you from the varicella-zoster virus. This is the same virus that causes chickenpox.  Shingles is a painful rash that develops in people who had chickenpox or have been exposed to the virus. How to eat healthy:  My Plate is a model for planning healthy meals. It shows the types and amounts of foods that should go on your plate. Fruits and vegetables make up about half of your plate, and grains and protein make up the other half. A serving of dairy is included on the side of your plate. The amount of calories and serving sizes you need depends on your age, gender, weight, and height. Examples of healthy foods are listed below:  Eat a variety of vegetables  such as dark green, red, and orange vegetables. You can also include canned vegetables low in sodium (salt) and frozen vegetables without added butter or sauces. Eat a variety of fresh fruits , canned fruit in 100% juice, frozen fruit, and dried fruit. Include whole grains. At least half of the grains you eat should be whole grains. Examples include whole-wheat bread, wheat pasta, brown rice, and whole-grain cereals such as oatmeal.    Eat a variety of protein foods such as seafood (fish and shellfish), lean meat, and poultry without skin (turkey and chicken). Examples of lean meats include pork leg, shoulder, or tenderloin, and beef round, sirloin, tenderloin, and extra lean ground beef. Other protein foods include eggs and egg substitutes, beans, peas, soy products, nuts, and seeds. Choose low-fat dairy products such as skim or 1% milk or low-fat yogurt, cheese, and cottage cheese. Limit unhealthy fats  such as butter, hard margarine, and shortening. Exercise:  Exercise at least 30 minutes per day on most days of the week. Some examples of exercise include walking, biking, dancing, and swimming. You can also fit in more physical activity by taking the stairs instead of the elevator or parking farther away from stores. Include muscle strengthening activities 2 days each week. Regular exercise provides many health benefits.  It helps you manage your weight, and decreases your risk for type 2 diabetes, heart disease, stroke, and high blood pressure. Exercise can also help improve your mood. Ask your healthcare provider about the best exercise plan for you. General health and safety guidelines:   Do not smoke. Nicotine and other chemicals in cigarettes and cigars can cause lung damage. Ask your healthcare provider for information if you currently smoke and need help to quit. E-cigarettes or smokeless tobacco still contain nicotine. Talk to your healthcare provider before you use these products. Limit alcohol. A drink of alcohol is 12 ounces of beer, 5 ounces of wine, or 1½ ounces of liquor. Lose weight, if needed. Being overweight increases your risk of certain health conditions. These include heart disease, high blood pressure, type 2 diabetes, and certain types of cancer. Protect your skin. Do not sunbathe or use tanning beds. Use sunscreen with a SPF 15 or higher. Apply sunscreen at least 15 minutes before you go outside. Reapply sunscreen every 2 hours. Wear protective clothing, hats, and sunglasses when you are outside. Drive safely. Always wear your seatbelt. Make sure everyone in your car wears a seatbelt. A seatbelt can save your life if you are in an accident. Do not use your cell phone when you are driving. This could distract you and cause an accident. Pull over if you need to make a call or send a text message. Practice safe sex. Use latex condoms if are sexually active and have more than one partner. Your healthcare provider may recommend screening tests for sexually transmitted infections (STIs). Wear helmets, lifejackets, and protective gear. Always wear a helmet when you ride a bike or motorcycle, go skiing, or play sports that could cause a head injury. Wear protective equipment when you play sports. Wear a lifejacket when you are on a boat or doing water sports.     © Copyright Yolanda Selby 2023 Information is for End User's use only and may not be sold, redistributed or otherwise used for commercial purposes. The above information is an  only. It is not intended as medical advice for individual conditions or treatments. Talk to your doctor, nurse or pharmacist before following any medical regimen to see if it is safe and effective for you.

## 2023-12-04 NOTE — PROGRESS NOTES
[facility] HEALTH MAINTENANCE OFFICE VISIT  [facility] [place]    NAME: Palomo Rosenthal III  AGE: 57 y.o. SEX: male  : 1975     DATE: 2023    Assessment and Plan     1. Annual physical exam    2. Encounter for immunization  -     influenza vaccine, quadrivalent, 0.5 mL, preservative-free, for adult and pediatric patients 6 mos+ (AFLURIA, FLUARIX, FLULAVAL, FLUZONE)    3. Need for hepatitis C screening test  -     Hepatitis C Antibody; Future    4. Screening for HIV (human immunodeficiency virus)  -     HIV 1/2 AG/AB w Reflex [facility] for 2 yr old and above; Future    5. Screen for colon cancer  -     Cologuard    6. BMI 35.0-35.9,adult  -     HEMOGLOBIN A1C W/ EAG ESTIMATION; Future    7. History of cardioembolic cerebrovascular accident (CVA)  -     Lipid Panel with Direct LDL reflex; Future    8. Type 2 diabetes mellitus without complication, without long-term current use of insulin (HCC)  Assessment & Plan:  Under excellent control with diet control measures  Continue daily statin  Reminded about yearly dilated eye exam  Lab Results   Component Value Date    HGBA1C 5.3 2023       Orders:  -     HEMOGLOBIN A1C W/ EAG ESTIMATION; Future        Patient Counseling:   Nutrition: Stressed importance of a well balanced diet, moderation of sodium/saturated fat, caloric balance and sufficient intake of fiber  Exercise: Stressed the importance of regular exercise with a goal of 150 minutes per week  Dental Health: Discussed daily flossing and brushing and regular dental visits   Has not consumed alcohol recently  Counseled both sunscreen and sun protection  Immunizations reviewed:    Influenza vaccine received today, counseled  PCV 20 vaccine up-to-date  Tetanus booster up-to-date  Informed by COVID-19 vaccine  Discussed benefits of:    Colon cancer screening, agreeable to Cologuard, order placed  Agreeable to preventative screenings for hepatitis C and HIV  BMI Counseling: Body mass index is 35.99 kg/m². Discussed with patient's BMI with him. BMI Counseling: Body mass index is 35.99 kg/m². The BMI is above normal. Nutrition recommendations include encouraging healthy choices of fruits and vegetables and increasing intake of lean protein. Exercise recommendations include exercising 3-5 times per week. Rationale for BMI follow-up plan is due to patient being overweight or obese. Depression Screening and Follow-up Plan: Patient was screened for depression during today's encounter. They screened negative with a PHQ-2 score of 0. Follow-up in 6 months and sooner as needed.   Chief Complaint     Chief Complaint   Patient presents with    Annual Exam       History of Present Illness     HPI    Well Adult Physical   Patient here for a comprehensive physical exam.      Diet and Physical Activity  Diet: "could be better"   Exercise: weekly      Depression Screen  PHQ-2/9 Depression Screening    Little interest or pleasure in doing things: 0 - not at all  Feeling down, depressed, or hopeless: 0 - not at all  PHQ-2 Score: 0  PHQ-2 Interpretation: Negative depression screen          General Health  Hearing: Normal:  bilateral  Vision: goes for regular eye exams  Dental: no dental visits for >1 year, brushes teeth once daily, and does not floss    Reproductive Health  No issues       The following portions of the patient's history were reviewed and updated as appropriate: allergies, current medications, past family history, past medical history, past social history, past surgical history and problem list.    Review of Systems     Review of Systems    As noted in HPI     Past Medical History     Past Medical History:   Diagnosis Date    Allergic 04/01/2023    Hypertension 03/31/2023    Hypertensive emergency 03/30/2023    Obesity 03/31/2023    Seizures ([address]) 03/31/2023    Stroke ([address]) 03/30/2023    Visual impairment 03/30/2023       Past Surgical History     Past Surgical History:   Procedure Laterality Date    CARDIAC ELECTROPHYSIOLOGY PROCEDURE N/A 11/7/2023    Procedure: Cardiac loop recorder implant;  Surgeon: Lasha Dias MD;  Location: [facility]; Service: Cardiology       Social History     Social History     Socioeconomic History    Marital status: Single     Spouse name: None    Number of children: None    Years of education: None    Highest education level: None   Occupational History    None   Tobacco Use    Smoking status: Never    Smokeless tobacco: Never   Vaping Use    Vaping Use: Never used   Substance and Sexual Activity    Alcohol use: Never    Drug use: Never    Sexual activity: Not Currently   Other Topics Concern    None   Social History Narrative    None     Social Determinants of Health     Financial Resource Strain: Not on file   Food Insecurity: No Food Insecurity (4/13/2023)    Hunger Vital Sign     Worried About Running Out of Food in the Last Year: Never true     Ran Out of Food in the Last Year: Never true   Transportation Needs: No Transportation Needs (4/13/2023)    PRAPARE - Transportation     Lack of Transportation (Medical): No     Lack of Transportation (Non-Medical):  No   Physical Activity: Not on file   Stress: Not on file   Social Connections: Not on file   Intimate Partner Violence: Not on file   Housing Stability: Low Risk  (4/13/2023)    Housing Stability Vital Sign     Unable to Pay for Housing in the Last Year: No     Number of Places Lived in the Last Year: 1     Unstable Housing in the Last Year: No       Family History     Family History   Problem Relation Age of Onset    Diabetes Mother     Arthritis Mother     Breast cancer Mother     Stroke Father     Prostate cancer Paternal Grandfather     Cancer Paternal Grandfather     Breast cancer Paternal Grandmother        Current Medications       Current Outpatient Medications:     amLODIPine (NORVASC) 5 mg tablet, Take 1 tablet (5 mg total) by mouth daily, Disp: 90 tablet, Rfl: 1    apixaban (Eliquis) 5 mg, Take 1 tablet (5 mg total) by mouth 2 (two) times a day, Disp: 180 tablet, Rfl: 0    aspirin 81 mg chewable tablet, Chew 1 tablet (81 mg total) daily, Disp: 90 tablet, Rfl: 1    atorvastatin (LIPITOR) 40 mg tablet, Take 1 tablet (40 mg total) by mouth every evening, Disp: 90 tablet, Rfl: 1    levETIRAcetam (KEPPRA) 750 mg tablet, Take 1 tablet (750 mg total) by mouth every 12 (twelve) hours, Disp: 180 tablet, Rfl: 3    loratadine (CLARITIN) 10 mg tablet, Take 1 tablet (10 mg total) by mouth daily as needed for allergies, Disp: , Rfl: 0    metoprolol tartrate (LOPRESSOR) 25 mg tablet, Take 1 tablet (25 mg total) by mouth every 12 (twelve) hours, Disp: 180 tablet, Rfl: 0    tamsulosin (FLOMAX) 0.4 mg, Take 1 capsule (0.4 mg total) by mouth daily after dinner, Disp: 90 capsule, Rfl: 1     Allergies     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 Constitutional:       General: He is not in acute distress. Appearance: Normal appearance. HENT:      Head: Normocephalic and atraumatic. Right Ear: External ear normal.      Left Ear: External ear normal.      Nose: Nose normal.      Mouth/Throat:      Mouth: Mucous membranes are moist.      Pharynx: Oropharynx is clear. Eyes:      Extraocular Movements: Extraocular movements intact. Conjunctiva/sclera: Conjunctivae normal.      Pupils: Pupils are equal, round, and reactive to light. Cardiovascular:      Rate and Rhythm: Normal rate and regular rhythm.       Pulses: Normal pulses. Heart sounds: Normal heart sounds. Pulmonary:      Effort: Pulmonary effort is normal.      Breath sounds: Normal breath sounds. Abdominal:      General: Abdomen is flat. Bowel sounds are normal.      Palpations: Abdomen is soft. Tenderness: There is no abdominal tenderness. Musculoskeletal:      Cervical back: Neck supple. Right lower leg: No edema. Left lower leg: No edema. Lymphadenopathy:      Cervical: No cervical adenopathy. Skin:     General: Skin is warm and dry. Capillary Refill: Capillary refill takes less than 2 seconds. Neurological:      Mental Status: He is alert and oriented to person, place, and time. Psychiatric:         Mood and Affect: Mood normal.         Behavior: Behavior normal.         Thought Content:  Thought content normal.                 Javier Keene DO  [id number] [facility] [place]

## 2023-12-06 ENCOUNTER — OFFICE VISIT (OUTPATIENT)
Age: 48
End: 2023-12-06
Payer: COMMERCIAL

## 2023-12-06 VITALS
SYSTOLIC BLOOD PRESSURE: 134 MMHG | TEMPERATURE: 98.3 F | DIASTOLIC BLOOD PRESSURE: 90 MMHG | HEART RATE: 75 BPM | BODY MASS INDEX: 36.18 KG/M2 | OXYGEN SATURATION: 95 % | WEIGHT: 273 LBS | HEIGHT: 73 IN

## 2023-12-06 DIAGNOSIS — I10 PRIMARY HYPERTENSION: ICD-10-CM

## 2023-12-06 DIAGNOSIS — E66.9 OBESITY (BMI 30-39.9): ICD-10-CM

## 2023-12-06 DIAGNOSIS — I63.512 ACUTE ISCHEMIC LEFT MCA STROKE (HCC): ICD-10-CM

## 2023-12-06 DIAGNOSIS — R06.83 SNORING: Primary | ICD-10-CM

## 2023-12-06 PROCEDURE — 99244 OFF/OP CNSLTJ NEW/EST MOD 40: CPT | Performed by: INTERNAL MEDICINE

## 2023-12-06 NOTE — PROGRESS NOTES
Assessment/Plan:     Diagnoses and all orders for this visit:    Snoring  -     Ambulatory Referral to Sleep Medicine  -     Diagnostic Sleep Study; Future    Acute ischemic left MCA stroke [facility])  -     Diagnostic Sleep Study; Future    Primary hypertension  -     Diagnostic Sleep Study; Future    Obesity (BMI 30-39.9)  -     Diagnostic Sleep Study; Future          Plan for follow up:  Patient has signs and symptoms of obstructive sleep apnea. Etiology pathogenesis of obstructive sleep apnea was discussed in detail  Consequences of untreated sleep apnea discussed with excessive daytime sleepiness, increased risk for myocardial infarction stroke atrial fibrillation discussed  Testing procedure was discussed given his underlying history with the recent stroke as well as irregular heartbeat he would benefit from getting an in lab diagnostic study  He will get an in lab all-night polysomnogram and if there is evidence of abnormal nocturnal breathing he will undergo a CPAP titration study/BiPAP titration study for maximal benefit  Patient is currently not driving yet. Cautioned against driving when sleepy. Recommend weight loss  Follow-up after the above testing. No follow-ups on file. All questions are answered to the patient's satisfaction and understanding. He verbalizes understanding. He is encouraged to call with any further questions or concerns. Portions of the record may have been created with voice recognition software. Occasional wrong word or "sound a like" substitutions may have occurred due to the inherent limitations of voice recognition software. Read the chart carefully and recognize, using context, where substitutions have occurred. a    Electronically Signed by Olya Burgos MD    ______________________________________________________________________    Chief Complaint:   Chief Complaint   Patient presents with    Sleep Apnea        Patient ID: Matti Chow is a 57 y.o. y.o. male has a past medical history of Allergic (04/01/2023), Hypertension (03/31/2023), Hypertensive emergency (03/30/2023), Obesity (03/31/2023), Seizures ([address]) (03/31/2023), Sleep apnea, obstructive, Stroke ([address]) (03/30/2023), and Visual impairment (03/30/2023). 12/6/2023  Patient presents today for initial visit. Patient is a very pleasant 61-year-old gentleman accompanied by his mother for this office visit. To evaluate for obstructive sleep apnea. Referred by his neurologist.  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  For almost 10 years recently in the month of March 2023 he stopped working after he had an stroke. Currently his daily sleep schedule is he goes to bed at around 930 to 10 PM falls asleep quickly and is out of bed at 6:30 AM to 7 AM he has multiple nocturnal awakenings he states between 3-4 times,  3 hours after he falls asleep he does wake up and sometimes this is difficult to fall asleep he states the nocturnal awakenings are mainly for nocturia, sometimes he does not know the cause when he is out of the bed he still tired and fatigued and he takes a nap in the morning even before his lunchtime. Has history of hypertension and recent stroke as discussed earlier March 2023 and also history of atrial fibrillation, status post loop recorder placement. Occupational/Exposure history: Currently not working for the past 7 months. Travel history: None  Review of Systems   Constitutional:  Positive for fatigue. HENT: Negative. Eyes: Negative. Respiratory: Negative. Snoring, no witnessed apneic spells or choking or gasping for air. Cardiovascular: Negative. Gastrointestinal: Negative. Endocrine: Negative. Genitourinary: Negative. Musculoskeletal: Negative. Allergic/Immunologic: Negative. Neurological: Negative. Hematological: Negative. Psychiatric/Behavioral:  Positive for sleep disturbance.         Social history: He reports that he has never smoked. He has never used smokeless tobacco. He reports that he does not drink alcohol and does not use drugs. Past surgical history:   Past Surgical History:   Procedure Laterality Date    CARDIAC ELECTROPHYSIOLOGY PROCEDURE N/A 11/7/2023    Procedure: Cardiac loop recorder implant;  Surgeon: Erica Lewis MD;  Location: [facility]; Service: Cardiology     Family history:   Family History   Problem Relation Age of Onset    Diabetes Mother     Arthritis Mother     Breast cancer Mother     Stroke Father     Prostate cancer Paternal Grandfather     Cancer Paternal Grandfather     Breast cancer Paternal Grandmother        Immunization History   Administered Date(s) Administered    Influenza, injectable, quadrivalent, preservative free 0.5 mL 12/04/2023    Pneumococcal Conjugate Vaccine 20-valent (Pcv20), Polysace 05/22/2023    Tdap 05/22/2023     Current Outpatient Medications   Medication Sig Dispense Refill    amLODIPine (NORVASC) 5 mg tablet Take 1 tablet (5 mg total) by mouth daily 90 tablet 1    apixaban (Eliquis) 5 mg Take 1 tablet (5 mg total) by mouth 2 (two) times a day 180 tablet 0    aspirin 81 mg chewable tablet Chew 1 tablet (81 mg total) daily 90 tablet 1    atorvastatin (LIPITOR) 40 mg tablet Take 1 tablet (40 mg total) by mouth every evening 90 tablet 1    levETIRAcetam (KEPPRA) 750 mg tablet Take 1 tablet (750 mg total) by mouth every 12 (twelve) hours 180 tablet 3    loratadine (CLARITIN) 10 mg tablet Take 1 tablet (10 mg total) by mouth daily as needed for allergies  0    metoprolol tartrate (LOPRESSOR) 25 mg tablet Take 1 tablet (25 mg total) by mouth every 12 (twelve) hours 180 tablet 0    tamsulosin (FLOMAX) 0.4 mg Take 1 capsule (0.4 mg total) by mouth daily after dinner 90 capsule 1     No current facility-administered medications for this visit.      Allergies: Heparin    Objective:  Vitals:    12/06/23 1121   BP: 134/90   Pulse: 75   Temp: 98.3 °F (36.8 °C)   SpO2: 95%   Weight: 124 kg (273 lb)   Height: 6' 1" (1.854 m)   Oxygen Therapy  SpO2: 95 %  . Wt Readings from Last 3 Encounters:   12/06/23 124 kg (273 lb)   12/04/23 124 kg (272 lb 12.8 oz)   11/07/23 121 kg (265 lb 10.5 oz)     Body mass index is 36.02 kg/m². Physical Exam  Constitutional:       Appearance: He is well-developed. HENT:      Head: Normocephalic and atraumatic. Eyes:      Pupils: Pupils are equal, round, and reactive to light. Neck:      Comments: Short and wide neck  Cardiovascular:      Rate and Rhythm: Normal rate and regular rhythm. Heart sounds: Normal heart sounds. Pulmonary:      Effort: Pulmonary effort is normal.      Breath sounds: Normal breath sounds. Musculoskeletal:         General: Normal range of motion. Cervical back: Normal range of motion and neck supple. Skin:     General: Skin is warm and dry. Neurological:      Mental Status: He is alert and oriented to person, place, and time. Psychiatric:         Behavior: Behavior normal.         Lab Review:   Reviewed pertinent labs. Diagnostics:  Reviewed pertinent labs. ESS: Total score: 8  Cardiac EP device report    Result Date: 11/20/2023  Narrative: KEHINDE GUN25 ICM - ACTIVE SYSTEM IS MRI CONDITIONAL DEVICE INTERROGATED IN THE [facility] OFFICE: WOUND CHECK: INCISION CLEAN AND DRY WITH EDGES APPROXIMATED; SUTURES REMOVED; AREA CLEANSED, 3 STERI STRIPS APPLIED; WOUND CARE AND RESTRICTIONS REVIEWED WITH PATIENT. BATTERY VOLTAGE "OK". PRESENTING RHYTHM SHOWS SR, 73 BPM; R WAVES MEASURE 0.45MV; NO DEVICE ACTIVATED EPISODES. PATIENT TAKING ASA 81, ELIQUIS, METOPROLOL TART; NO PROGRAMMING CHANGES MADE TO DEVICE PARAMETERS. NORMAL DEVICE FUNCTION. ES     Cardiac ep lab eps/ablations    Result Date: 11/7/2023  Narrative: Images from the original result were not included. Successful Linq II cardiac loop recorder implant.  Placement of implantable cardiac event recorder History and physical were reviewed Patient was examined and history was reviewed No change in patient's condition Since history and physical has been completed The pre- operative diagnosis: CVA, left MCA Postoperative diagnosis: CVA Status post Linq II loop recorder implant Procedure: placement of implantable cardiac loop recorder Surgeon: Cirilo Carr MD Assistants -none Specimens - none Estimated blood loss- none Findings-none Complications none Anesthesia-  local lidocaine 15cc by myself Description of procedure: The patient was seen before the procedure. The details of the procedure was explained and patient agreed to the same. Appropriate consent was signed. Proper time out was done. Sterile dressing and draping was done. Local lidocaine was infiltrated, in the left 4th intercostal space, about 2 cm lateral to the sternal edge. Using the stab knife an incision was made. Thereafter the  was used, moved around to form a pocket, along the long axis of the heart, in the intercostal space region. Then plunger was used to deploy the device. The plunger was disengaged and removed. The  was pulled back. Pressure was held and hemostasis was obtained. Pocket closed with two interrupted 3-0 silk sutures. Dressing was done with water resistant band aid Summary of the procedure: Linq II implanted successfully and patient tolerated the procedure well.  . .

## 2023-12-26 ENCOUNTER — TELEPHONE (OUTPATIENT)
Dept: FAMILY MEDICINE CLINIC | Facility: MEDICAL CENTER | Age: 48
End: 2023-12-26

## 2023-12-26 DIAGNOSIS — R19.5 POSITIVE COLORECTAL CANCER SCREENING USING COLOGUARD TEST: Primary | ICD-10-CM

## 2023-12-26 LAB — COLOGUARD RESULT REPORTABLE: POSITIVE

## 2023-12-26 NOTE — TELEPHONE ENCOUNTER
----- Message from Sofy Ortega DO sent at 12/26/2023  4:19 PM EST -----  Please inform patient and mother that screening Cologuard test returned positive.  I would recommend consultation with gastroenterology, referral order placed.

## 2023-12-27 ENCOUNTER — TELEPHONE (OUTPATIENT)
Age: 48
End: 2023-12-27

## 2023-12-27 NOTE — TELEPHONE ENCOUNTER
Patients GI provider:  Dr. Butler    Reason for call: Pt's mother calling in to schedule colonoscopy for patient. Patient failed OA due to having a stroke in March of 2023    Scheduled procedure/appointment date if applicable: Apt/procedure 12/27/2023

## 2023-12-28 ENCOUNTER — OFFICE VISIT (OUTPATIENT)
Dept: NEUROLOGY | Facility: CLINIC | Age: 48
End: 2023-12-28
Payer: COMMERCIAL

## 2023-12-28 ENCOUNTER — OFFICE VISIT (OUTPATIENT)
Dept: GASTROENTEROLOGY | Facility: AMBULARY SURGERY CENTER | Age: 48
End: 2023-12-28
Payer: COMMERCIAL

## 2023-12-28 ENCOUNTER — TELEPHONE (OUTPATIENT)
Dept: GASTROENTEROLOGY | Facility: AMBULARY SURGERY CENTER | Age: 48
End: 2023-12-28

## 2023-12-28 VITALS
HEART RATE: 77 BPM | SYSTOLIC BLOOD PRESSURE: 140 MMHG | WEIGHT: 277 LBS | BODY MASS INDEX: 36.71 KG/M2 | HEIGHT: 73 IN | DIASTOLIC BLOOD PRESSURE: 80 MMHG

## 2023-12-28 VITALS
BODY MASS INDEX: 36.58 KG/M2 | HEART RATE: 77 BPM | OXYGEN SATURATION: 95 % | SYSTOLIC BLOOD PRESSURE: 140 MMHG | WEIGHT: 276 LBS | HEIGHT: 73 IN | DIASTOLIC BLOOD PRESSURE: 80 MMHG

## 2023-12-28 DIAGNOSIS — Z79.01 ANTICOAGULANT LONG-TERM USE: ICD-10-CM

## 2023-12-28 DIAGNOSIS — R56.9 SEIZURES (HCC): ICD-10-CM

## 2023-12-28 DIAGNOSIS — I26.92 SADDLE EMBOLUS OF PULMONARY ARTERY WITHOUT ACUTE COR PULMONALE (HCC): Primary | ICD-10-CM

## 2023-12-28 DIAGNOSIS — R19.5 POSITIVE COLORECTAL CANCER SCREENING USING COLOGUARD TEST: Primary | ICD-10-CM

## 2023-12-28 DIAGNOSIS — I63.512 ACUTE ISCHEMIC LEFT MCA STROKE (HCC): ICD-10-CM

## 2023-12-28 DIAGNOSIS — H54.7 VISUAL PROBLEMS: ICD-10-CM

## 2023-12-28 PROCEDURE — 99244 OFF/OP CNSLTJ NEW/EST MOD 40: CPT | Performed by: INTERNAL MEDICINE

## 2023-12-28 PROCEDURE — 99215 OFFICE O/P EST HI 40 MIN: CPT | Performed by: PSYCHIATRY & NEUROLOGY

## 2023-12-28 RX ORDER — BISACODYL 5 MG/1
10 TABLET, DELAYED RELEASE ORAL ONCE
Qty: 2 TABLET | Refills: 0 | Status: SHIPPED | OUTPATIENT
Start: 2023-12-28 | End: 2023-12-28

## 2023-12-28 NOTE — PROGRESS NOTES
Consultation -  [facility]  Mario VALDES Ahsan III 1975 male         ASSESSMENT & PLAN:    Positive colorectal cancer screening using Cologuard test  Probable false positive test.  Rule out colorectal lesions including polyps or malignancy.    -Schedule for colonoscopy.    -High-fiber diet.    -Patient was given instructions about the colonoscopy prep.    -Patient was explained about the risks and benefits of the procedure. Risks including but not limited to bleeding, infection, perforation were explained in detail. Also explained about less than 100% sensitivity with the exam and other alternatives.    Anticoagulant long-term use  Patient is at increased risks because of anticoagulation therapy due to DVT and recent stroke.  Advised him to check with his hematologist about holding Eliquis for couple of days prior to the procedure.      Chief Complaint: Positive Cologuard    HPI: 48-year-old male with history of CVA in March 2023, hypertension, DVT on anticoagulation therapy with Eliquis was referred because of positive stool Cologuard.  Patient never had colonoscopy in the past.  Patient has regular bowel movements and denies any blood or mucus in the stool.  Appetite is good and denies any recent weight loss.  Denies any abdominal pain, nausea, or vomiting.  Has no heartburn or acid reflux.  Denies any difficulty swallowing.    Chaperon: Ms. Rose    REVIEW OF SYSTEMS: Review of Systems   Constitutional:  Negative for activity change, appetite change, chills, diaphoresis, fatigue, fever and unexpected weight change.   HENT:  Negative for ear discharge, ear pain, facial swelling, hearing loss, nosebleeds, sore throat, tinnitus and voice change.    Eyes:  Negative for pain, discharge, redness, itching and visual disturbance.   Respiratory:  Negative for apnea, cough, chest tightness, shortness of breath and wheezing.    Cardiovascular:  Negative for chest pain and palpitations.    Gastrointestinal:         As noted in HPI   Endocrine: Negative for cold intolerance, heat intolerance and polyuria.   Genitourinary:  Negative for difficulty urinating, dysuria, flank pain, hematuria and urgency.   Musculoskeletal:  Negative for arthralgias, back pain, gait problem, joint swelling and myalgias.   Skin:  Negative for rash and wound.   Neurological:  Negative for dizziness, tremors, seizures, speech difficulty, light-headedness, numbness and headaches.   Hematological:  Negative for adenopathy. Does not bruise/bleed easily.   Psychiatric/Behavioral:  Negative for agitation, behavioral problems and confusion. The patient is not nervous/anxious.         Past Medical History:   Diagnosis Date    Allergic 04/01/2023    Hypertension 03/31/2023    Hypertensive emergency 03/30/2023    Obesity 03/31/2023    Seizures ([facility]) 03/31/2023    Sleep apnea, obstructive     Stroke ([facility]) 03/30/2023    Visual impairment 03/30/2023      Past Surgical History:   Procedure Laterality Date    CARDIAC ELECTROPHYSIOLOGY PROCEDURE N/A 11/7/2023    Procedure: Cardiac loop recorder implant;  Surgeon: Dario Naqvi MD;  Location: [facility];  Service: Cardiology     Social History     Socioeconomic History    Marital status: Single     Spouse name: Not on file    Number of children: Not on file    Years of education: Not on file    Highest education level: Not on file   Occupational History    Not on file   Tobacco Use    Smoking status: Never    Smokeless tobacco: Never   Vaping Use    Vaping status: Never Used   Substance and Sexual Activity    Alcohol use: Never    Drug use: Never    Sexual activity: Not Currently   Other Topics Concern    Not on file   Social History Narrative    Not on file     Social Determinants of Health     Financial Resource Strain: Not on file   Food Insecurity: No Food Insecurity (4/13/2023)    Hunger Vital Sign     Worried About Running Out of Food in the Last Year: Never true     Ran  Out of Food in the Last Year: Never true   Transportation Needs: No Transportation Needs (4/13/2023)    PRAPARE - Transportation     Lack of Transportation (Medical): No     Lack of Transportation (Non-Medical): No   Physical Activity: Not on file   Stress: Not on file   Social Connections: Not on file   Intimate Partner Violence: Not on file   Housing Stability: Low Risk  (4/13/2023)    Housing Stability Vital Sign     Unable to Pay for Housing in the Last Year: No     Number of Places Lived in the Last Year: 1     Unstable Housing in the Last Year: No     Family History   Problem Relation Age of Onset    Diabetes Mother     Arthritis Mother     Breast cancer Mother     Stroke Father     Prostate cancer Paternal Grandfather     Cancer Paternal Grandfather     Breast cancer Paternal Grandmother      Heparin  Current Outpatient Medications   Medication Sig Dispense Refill    amLODIPine (NORVASC) 5 mg tablet Take 1 tablet (5 mg total) by mouth daily 90 tablet 1    apixaban (Eliquis) 5 mg Take 1 tablet (5 mg total) by mouth 2 (two) times a day 180 tablet 0    aspirin 81 mg chewable tablet Chew 1 tablet (81 mg total) daily 90 tablet 1    atorvastatin (LIPITOR) 40 mg tablet Take 1 tablet (40 mg total) by mouth every evening 90 tablet 1    bisacodyl (DULCOLAX) 5 mg EC tablet Take 2 tablets (10 mg total) by mouth once for 1 dose 2 tablet 0    levETIRAcetam (KEPPRA) 750 mg tablet Take 1 tablet (750 mg total) by mouth every 12 (twelve) hours 180 tablet 3    loratadine (CLARITIN) 10 mg tablet Take 1 tablet (10 mg total) by mouth daily as needed for allergies  0    metoprolol tartrate (LOPRESSOR) 25 mg tablet Take 1 tablet (25 mg total) by mouth every 12 (twelve) hours 180 tablet 0    polyethylene glycol (GOLYTELY) 4000 mL solution Take 4,000 mL by mouth once for 1 dose 4000 mL 0    tamsulosin (FLOMAX) 0.4 mg Take 1 capsule (0.4 mg total) by mouth daily after dinner 90 capsule 1     No current facility-administered medications  "for this visit.       Blood pressure 140/80, pulse 77, height 6' 1\" (1.854 m), weight 125 kg (276 lb), SpO2 95%.    PHYSICAL EXAM: Physical Exam  Constitutional:       Appearance: He is well-developed.   HENT:      Head: Normocephalic and atraumatic.   Eyes:      General: No scleral icterus.        Right eye: No discharge.         Left eye: No discharge.      Conjunctiva/sclera: Conjunctivae normal.      Pupils: Pupils are equal, round, and reactive to light.   Neck:      Thyroid: No thyromegaly.      Vascular: No JVD.      Trachea: No tracheal deviation.   Cardiovascular:      Rate and Rhythm: Normal rate and regular rhythm.      Heart sounds: Normal heart sounds. No murmur heard.     No friction rub. No gallop.   Pulmonary:      Effort: Pulmonary effort is normal. No respiratory distress.      Breath sounds: Normal breath sounds. No wheezing or rales.   Chest:      Chest wall: No tenderness.   Abdominal:      General: Bowel sounds are normal. There is no distension.      Palpations: Abdomen is soft. There is no mass.      Tenderness: There is no abdominal tenderness. There is no guarding or rebound.      Hernia: No hernia is present.   Musculoskeletal:      Cervical back: Neck supple.   Lymphadenopathy:      Cervical: No cervical adenopathy.   Skin:     General: Skin is warm and dry.      Findings: No erythema or rash.   Neurological:      Mental Status: He is alert and oriented to person, place, and time.   Psychiatric:         Behavior: Behavior normal.         Thought Content: Thought content normal.          Lab Results   Component Value Date    WBC 6.61 07/27/2023    HGB 14.9 07/27/2023    HCT 44.3 07/27/2023    MCV 87 07/27/2023     07/27/2023     Lab Results   Component Value Date    GLUCOSE 280 (H) 03/30/2023    CALCIUM 9.9 10/09/2023    K 4.9 10/09/2023    CO2 31 10/09/2023     10/09/2023    BUN 19 10/09/2023    CREATININE 1.15 10/09/2023     Lab Results   Component Value Date    ALT 26 " 07/07/2023    AST 14 07/07/2023    ALKPHOS 90 07/07/2023     Lab Results   Component Value Date    INR 1.28 (H) 07/05/2023    INR 1.19 04/12/2023    PROTIME 15.8 (H) 07/05/2023    PROTIME 15.3 (H) 04/12/2023       No results found.

## 2023-12-28 NOTE — ASSESSMENT & PLAN NOTE
Probable false positive test.  Rule out colorectal lesions including polyps or malignancy.    -Schedule for colonoscopy.    -High-fiber diet.    -Patient was given instructions about the colonoscopy prep.    -Patient was explained about the risks and benefits of the procedure. Risks including but not limited to bleeding, infection, perforation were explained in detail. Also explained about less than 100% sensitivity with the exam and other alternatives.

## 2023-12-28 NOTE — TELEPHONE ENCOUNTER
Our mutual patient is scheduled for procedure: colonoscopy    On: March 07 , 2024     With: Dr. Kaye Butler MD    He/She is taking the following blood thinner: Eliquis (Apixaban)    Can this be stopped  2 days prior to the procedure    Physician Approving clearance: Trice Moise MD

## 2023-12-28 NOTE — PROGRESS NOTES
Patient ID: Mario Foreman III is a 48 y.o. male.    Assessment/Plan:    This is a 47 y/o M who is here as a follow up for hx of left chronic MCA stroke, and was found to have bilateral DVTs and bilateral Pes, and patient was seen by hematology/oncology and they recommended continuation of eliquis for 1 year from April 2023. Since etiology is unclear, and there was concern for proximal source for stroke. He had a loop recorder placed to look for PAF. Since his antiphospholipid labs were abnormal.  So far loop has not shown Afib.     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)  -patient follows hematology as well    Seizures (HCC)  -patient on keppra 750mg pO BID, he did not have abnormal EEGs in the past, and he did not have any seizures while in the hospital or since then, at this time we should consider weaning off keppra, and spoke to family regarding this and they are in agreement  -will repeat EEG and if EEG does not show any changes, then will wean off keppra, and will give instructions on how to do so  -he does not drive due to vision issues along with processing delays due to aphasia 2/2 stroke   -     EEG Routine and awake; Future    Visual problems  -does have right hemianopia noted    Acute ischemic left MCA stroke (HCC)  -for secondary stroke prevention, recommend continuation of combination of aspirin, eliquis and Lipitor   -Blood Pressure goal < 130/80, BP is at goal in the office.   -LDL goal <70  -I advised patient to avoid using NSAIDs for headaches or other pain and to stick to tylenol if needed  -Recommend lifestyle modifications such as mediterranean diet & regular exercise regimen atleast 4-5 times a week for 20-30 minutes.   -I educated patient/family regarding medication compliance  -encourage smoking cessation, and control of diabetes and hypertension; defer management to primary        Follow up in 4 months     I  would be happy to see the patient sooner if any new questions/concerns arise.  Patient/Guardian was advised to the call the office if they have any questions and concerns in the meantime.     Patient/Guardian does understand that if they have any new stroke like symptoms such as facial droop on one side, weakness/paralysis on either side, speech trouble, numbness on one side, balance issues, any vision changes, extreme dizziness or any new headache, to call 9-1-1 immediately or to proceed to the nearest ER immediately.      I have spent a total time of 40 minutes on 12/28/23 in caring for this patient including Risks and benefits of tx options, Instructions for management, Documenting in the medical record, Reviewing / ordering tests, medicine, procedures  , and Obtaining or reviewing history  .     Subjective:    HPI    48-year-old male who is here as a follow-up of history of stroke.  Patient's overall doing well does not have any new TIA or CVA-like symptoms.  He is still struggling with aphasia which seems to be mildly improving he is continued on occupational therapy and speech therapy for 3-4 more weeks and that point will run out.  His mom is with him today who was able to drive to his appointment.  He cannot drive due to visual deficit since his stroke.  Has issues with processing and trouble understanding.  Still following up with cardiology as well does not have any A-fib episodes on loop recorder.  He is still continued on Keppra 750 mg twice daily according to mom he does struggle with being tired.  No seizures since he was last seen.    The following portions of the patient's history were reviewed and updated as appropriate: He  has a past medical history of Allergic (04/01/2023), Hypertension (03/31/2023), Hypertensive emergency (03/30/2023), Obesity (03/31/2023), Seizures (HCC) (03/31/2023), Sleep apnea, obstructive, Stroke (HCC) (03/30/2023), and Visual impairment (03/30/2023).  He   Patient Active  Problem List    Diagnosis Date Noted    Visual problems 12/28/2023    Vasovagal syncope 07/05/2023    Coagulopathy ([facility]) 07/05/2023    Back pain 06/15/2023    Global aphasia 06/15/2023    Rhinorrhea 04/24/2023    Aphasia due to recent cerebrovascular accident (CVA) 04/21/2023    HIT (heparin-induced thrombocytopenia) ([facility]) 04/20/2023    Urinary retention 04/13/2023    DVT of lower extremity, bilateral ([facility]) 04/13/2023    Saddle 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 ([facility]) 04/12/2023    Left-sided chest wall pain 04/11/2023    Acute kidney injury ([facility]) 04/11/2023    Thrombocytopenia ([facility]) 04/11/2023    Bowel and bladder incontinence 04/08/2023    Primary hypertension 04/02/2023    Seizures ([facility]) 03/31/2023    Acute ischemic left MCA stroke ([facility]) 03/30/2023    Obesity, Class III, BMI 40-49.9 (morbid obesity) ([facility]) 03/30/2023    Encephalopathy 03/30/2023    Type 2 diabetes mellitus without complication, without long-term current use of insulin ([facility]) 03/30/2023     He  has a past surgical history that includes Cardiac electrophysiology procedure (N/A, 11/7/2023).  His family history includes Arthritis in his mother; Breast cancer in his mother and paternal grandmother; Cancer in his paternal grandfather; Diabetes in his mother; Prostate cancer in his paternal grandfather; Stroke in his father.  He  reports that he has never smoked. He has never used smokeless tobacco. He reports that he does not drink alcohol and does not use drugs.  Current Outpatient Medications   Medication Sig Dispense Refill    amLODIPine (NORVASC) 5 mg tablet Take 1 tablet (5 mg total) by mouth daily 90 tablet 1    apixaban (Eliquis) 5 mg Take 1 tablet (5 mg total) by mouth 2 (two) times a day 180 tablet 0    aspirin 81 mg chewable tablet Chew 1 tablet (81 mg total) daily 90 tablet 1    atorvastatin (LIPITOR) 40 mg tablet Take 1 tablet (40 mg total) by mouth every evening 90 tablet 1    levETIRAcetam (KEPPRA) 750 mg tablet Take 1  "tablet (750 mg total) by mouth every 12 (twelve) hours 180 tablet 3    loratadine (CLARITIN) 10 mg tablet Take 1 tablet (10 mg total) by mouth daily as needed for allergies  0    metoprolol tartrate (LOPRESSOR) 25 mg tablet Take 1 tablet (25 mg total) by mouth every 12 (twelve) hours 180 tablet 0    tamsulosin (FLOMAX) 0.4 mg Take 1 capsule (0.4 mg total) by mouth daily after dinner 90 capsule 1     No current facility-administered medications for this visit.     Current Outpatient Medications on File Prior to Visit   Medication Sig    amLODIPine (NORVASC) 5 mg tablet Take 1 tablet (5 mg total) by mouth daily    apixaban (Eliquis) 5 mg Take 1 tablet (5 mg total) by mouth 2 (two) times a day    aspirin 81 mg chewable tablet Chew 1 tablet (81 mg total) daily    atorvastatin (LIPITOR) 40 mg tablet Take 1 tablet (40 mg total) by mouth every evening    levETIRAcetam (KEPPRA) 750 mg tablet Take 1 tablet (750 mg total) by mouth every 12 (twelve) hours    loratadine (CLARITIN) 10 mg tablet Take 1 tablet (10 mg total) by mouth daily as needed for allergies    metoprolol tartrate (LOPRESSOR) 25 mg tablet Take 1 tablet (25 mg total) by mouth every 12 (twelve) hours    tamsulosin (FLOMAX) 0.4 mg Take 1 capsule (0.4 mg total) by mouth daily after dinner     No current facility-administered medications on file prior to visit.     He is allergic to heparin..      Objective:    Blood pressure 140/80, pulse 77, height 6' 1\" (1.854 m), weight 126 kg (277 lb).    Physical Exam  General - patient is alert   Speech - mild dysarthria, and moderate aphasia, can name some objects, can repeat, but also has trouble. Comprehension is not fully intact     Neuro:   Cranial nerves: PERRL, EOMI, right hemianopia noted, facial sensation intact to soft touch in V1, V2 and V3, no facial asymmetry noted, uvula/palate midline, tongue midline.   Motor: 5/5 throughout, normal tone, no pronator drift noted.   Sensory - intact to soft touch " throughout  Coordination - no ataxia/dysmetria noted  Gait - normal      ROS:  Reviewed ROS   Review of Systems   Constitutional:  Negative for appetite change, fatigue and fever.   HENT: Negative.  Negative for hearing loss, tinnitus, trouble swallowing and voice change.    Eyes: Negative.  Negative for photophobia, pain and visual disturbance.   Respiratory: Negative.  Negative for shortness of breath.    Cardiovascular: Negative.  Negative for palpitations.   Gastrointestinal: Negative.  Negative for nausea and vomiting.   Endocrine: Negative.  Negative for cold intolerance.   Genitourinary: Negative.  Negative for dysuria, frequency and urgency.   Musculoskeletal:  Negative for back pain, gait problem, myalgias, neck pain and neck stiffness.   Skin: Negative.  Negative for rash.   Allergic/Immunologic: Negative.    Neurological: Negative.  Negative for dizziness, tremors, seizures, syncope, facial asymmetry, speech difficulty, weakness, light-headedness, numbness and headaches.   Hematological: Negative.  Does not bruise/bleed easily.   Psychiatric/Behavioral: Negative.  Negative for confusion, hallucinations and sleep disturbance.

## 2023-12-28 NOTE — PATIENT INSTRUCTIONS
Scheduled date of colonoscopy (as of today):  03/07/24  Physician performing colonoscopy:  Dr Butler  Location of colonoscopy:  [facility]   Bowel prep reviewed with patient:  golytely   Instructions reviewed with patient by:  shanell oakes   Clearances: eliquis hold

## 2023-12-28 NOTE — ASSESSMENT & PLAN NOTE
Patient is at increased risks because of anticoagulation therapy due to DVT and recent stroke.  Advised him to check with his hematologist about holding Eliquis for couple of days prior to the procedure.

## 2023-12-29 ENCOUNTER — TELEPHONE (OUTPATIENT)
Age: 48
End: 2023-12-29

## 2023-12-29 NOTE — TELEPHONE ENCOUNTER
PA for bisacodyl         Submitted via  []CMM-KEY   []Surescripts-Case ID  #  [id number]     []Faxed to plan   []Other website     Office notes sent, clinical questions answered. Awaiting determination

## 2024-01-02 NOTE — TELEPHONE ENCOUNTER
Note from payer: Details of this decision are provided on the physician outcome notice which has been faxed to the number on file.  Payer: [employer] [place] Case ID: [id number]    [phone]  Electronic appeal: Not supported  View History  Medication Being Authorized    bisacodyl (DULCOLAX) 5 mg EC tablet ()    Did not recv fax as of yet  Advising GI Office of denial

## 2024-01-29 ENCOUNTER — HOSPITAL ENCOUNTER (OUTPATIENT)
Dept: NEUROLOGY | Facility: HOSPITAL | Age: 49
Discharge: HOME | End: 2024-01-29
Payer: COMMERCIAL

## 2024-01-29 DIAGNOSIS — R56.9 SEIZURES (HCC): ICD-10-CM

## 2024-01-29 PROCEDURE — 95816 EEG AWAKE AND DROWSY: CPT

## 2024-01-29 PROCEDURE — 95816 EEG AWAKE AND DROWSY: CPT | Performed by: PSYCHIATRY & NEUROLOGY

## 2024-02-02 ENCOUNTER — TELEPHONE (OUTPATIENT)
Dept: NEUROLOGY | Facility: CLINIC | Age: 49
End: 2024-02-02

## 2024-02-02 NOTE — TELEPHONE ENCOUNTER
----- Message from Clover Nichols RN sent at 2/2/2024 11:45 AM EST -----    ----- Message -----  From: Moni Everett MD  Sent: 2/1/2024  11:45 AM EST  To: Clover Nichols RN    Take keppra half tab twice a day for 2 weeks, and then take half tab once a day for 1 week and then stop. Please call and let him know of the instructions to wean thanks

## 2024-02-10 DIAGNOSIS — I10 PRIMARY HYPERTENSION: ICD-10-CM

## 2024-02-19 ENCOUNTER — TELEPHONE (OUTPATIENT)
Dept: HEMATOLOGY ONCOLOGY | Facility: CLINIC | Age: 49
End: 2024-02-19

## 2024-02-19 DIAGNOSIS — I10 PRIMARY HYPERTENSION: ICD-10-CM

## 2024-02-19 DIAGNOSIS — I63.512 ACUTE ISCHEMIC LEFT MCA STROKE (HCC): ICD-10-CM

## 2024-02-19 DIAGNOSIS — I26.92 ACUTE SADDLE PULMONARY EMBOLISM WITHOUT ACUTE COR PULMONALE (HCC): ICD-10-CM

## 2024-02-19 RX ORDER — ASPIRIN 81 MG/1
81 TABLET, CHEWABLE ORAL DAILY
Qty: 90 TABLET | Refills: 0 | Status: SHIPPED | OUTPATIENT
Start: 2024-02-19

## 2024-02-19 RX ORDER — AMLODIPINE BESYLATE 5 MG/1
5 TABLET ORAL DAILY
Qty: 90 TABLET | Refills: 1 | Status: SHIPPED | OUTPATIENT
Start: 2024-02-19

## 2024-02-19 RX ORDER — ATORVASTATIN CALCIUM 40 MG/1
40 TABLET, FILM COATED ORAL EVERY EVENING
Qty: 90 TABLET | Refills: 1 | Status: SHIPPED | OUTPATIENT
Start: 2024-02-19

## 2024-02-20 ENCOUNTER — PATIENT MESSAGE (OUTPATIENT)
Dept: NEUROLOGY | Facility: CLINIC | Age: 49
End: 2024-02-20

## 2024-02-22 ENCOUNTER — APPOINTMENT (OUTPATIENT)
Dept: LAB | Facility: MEDICAL CENTER | Age: 49
End: 2024-02-22
Payer: COMMERCIAL

## 2024-02-22 ENCOUNTER — REMOTE DEVICE CLINIC VISIT (OUTPATIENT)
Dept: CARDIOLOGY CLINIC | Facility: CLINIC | Age: 49
End: 2024-02-22

## 2024-02-22 DIAGNOSIS — Z95.818 PRESENCE OF OTHER CARDIAC IMPLANTS AND GRAFTS: Primary | ICD-10-CM

## 2024-02-22 DIAGNOSIS — Z11.4 SCREENING FOR HIV (HUMAN IMMUNODEFICIENCY VIRUS): ICD-10-CM

## 2024-02-22 DIAGNOSIS — D75.829 HIT (HEPARIN-INDUCED THROMBOCYTOPENIA) (HCC): ICD-10-CM

## 2024-02-22 DIAGNOSIS — E11.9 TYPE 2 DIABETES MELLITUS WITHOUT COMPLICATION, WITHOUT LONG-TERM CURRENT USE OF INSULIN (HCC): ICD-10-CM

## 2024-02-22 DIAGNOSIS — Z86.73 HISTORY OF CARDIOEMBOLIC CEREBROVASCULAR ACCIDENT (CVA): ICD-10-CM

## 2024-02-22 DIAGNOSIS — I63.512 ACUTE ISCHEMIC LEFT MCA STROKE (HCC): ICD-10-CM

## 2024-02-22 DIAGNOSIS — I82.4Y3 DEEP VEIN THROMBOSIS (DVT) OF PROXIMAL VEIN OF BOTH LOWER EXTREMITIES, UNSPECIFIED CHRONICITY (HCC): ICD-10-CM

## 2024-02-22 DIAGNOSIS — I26.92 ACUTE SADDLE PULMONARY EMBOLISM WITHOUT ACUTE COR PULMONALE (HCC): ICD-10-CM

## 2024-02-22 DIAGNOSIS — Z11.59 NEED FOR HEPATITIS C SCREENING TEST: ICD-10-CM

## 2024-02-22 LAB
ALBUMIN SERPL BCP-MCNC: 4.2 G/DL (ref 3.5–5)
ALP SERPL-CCNC: 90 U/L (ref 34–104)
ALT SERPL W P-5'-P-CCNC: 45 U/L (ref 7–52)
ANION GAP SERPL CALCULATED.3IONS-SCNC: 10 MMOL/L
AST SERPL W P-5'-P-CCNC: 27 U/L (ref 13–39)
BASOPHILS # BLD AUTO: 0.05 THOUSANDS/ÂΜL (ref 0–0.1)
BASOPHILS NFR BLD AUTO: 1 % (ref 0–1)
BILIRUB SERPL-MCNC: 0.79 MG/DL (ref 0.2–1)
BUN SERPL-MCNC: 15 MG/DL (ref 5–25)
CALCIUM SERPL-MCNC: 9.7 MG/DL (ref 8.4–10.2)
CHLORIDE SERPL-SCNC: 103 MMOL/L (ref 96–108)
CHOLEST SERPL-MCNC: 114 MG/DL
CO2 SERPL-SCNC: 29 MMOL/L (ref 21–32)
CREAT SERPL-MCNC: 0.99 MG/DL (ref 0.6–1.3)
EOSINOPHIL # BLD AUTO: 0.43 THOUSAND/ÂΜL (ref 0–0.61)
EOSINOPHIL NFR BLD AUTO: 4 % (ref 0–6)
ERYTHROCYTE [DISTWIDTH] IN BLOOD BY AUTOMATED COUNT: 12.7 % (ref 11.6–15.1)
EST. AVERAGE GLUCOSE BLD GHB EST-MCNC: 123 MG/DL
GFR SERPL CREATININE-BSD FRML MDRD: 90 ML/MIN/1.73SQ M
GLUCOSE P FAST SERPL-MCNC: 127 MG/DL (ref 65–99)
HBA1C MFR BLD: 5.9 %
HCT VFR BLD AUTO: 50.3 % (ref 36.5–49.3)
HCV AB SER QL: NORMAL
HDLC SERPL-MCNC: 40 MG/DL
HGB BLD-MCNC: 16.1 G/DL (ref 12–17)
HIV 1+2 AB+HIV1 P24 AG SERPL QL IA: NORMAL
HIV 2 AB SERPL QL IA: NORMAL
HIV1 AB SERPL QL IA: NORMAL
HIV1 P24 AG SERPL QL IA: NORMAL
IMM GRANULOCYTES # BLD AUTO: 0.02 THOUSAND/UL (ref 0–0.2)
IMM GRANULOCYTES NFR BLD AUTO: 0 % (ref 0–2)
LDLC SERPL CALC-MCNC: 53 MG/DL (ref 0–100)
LYMPHOCYTES # BLD AUTO: 2.35 THOUSANDS/ÂΜL (ref 0.6–4.47)
LYMPHOCYTES NFR BLD AUTO: 23 % (ref 14–44)
MCH RBC QN AUTO: 28.4 PG (ref 26.8–34.3)
MCHC RBC AUTO-ENTMCNC: 32 G/DL (ref 31.4–37.4)
MCV RBC AUTO: 89 FL (ref 82–98)
MONOCYTES # BLD AUTO: 0.56 THOUSAND/ÂΜL (ref 0.17–1.22)
MONOCYTES NFR BLD AUTO: 6 % (ref 4–12)
NEUTROPHILS # BLD AUTO: 6.65 THOUSANDS/ÂΜL (ref 1.85–7.62)
NEUTS SEG NFR BLD AUTO: 66 % (ref 43–75)
NRBC BLD AUTO-RTO: 0 /100 WBCS
PLATELET # BLD AUTO: 361 THOUSANDS/UL (ref 149–390)
PMV BLD AUTO: 9.8 FL (ref 8.9–12.7)
POTASSIUM SERPL-SCNC: 4.7 MMOL/L (ref 3.5–5.3)
PROT SERPL-MCNC: 7.8 G/DL (ref 6.4–8.4)
RBC # BLD AUTO: 5.67 MILLION/UL (ref 3.88–5.62)
SODIUM SERPL-SCNC: 142 MMOL/L (ref 135–147)
TRIGL SERPL-MCNC: 107 MG/DL
WBC # BLD AUTO: 10.06 THOUSAND/UL (ref 4.31–10.16)

## 2024-02-22 PROCEDURE — 83036 HEMOGLOBIN GLYCOSYLATED A1C: CPT

## 2024-02-22 PROCEDURE — 80053 COMPREHEN METABOLIC PANEL: CPT

## 2024-02-22 PROCEDURE — 93298 REM INTERROG DEV EVAL SCRMS: CPT | Performed by: INTERNAL MEDICINE

## 2024-02-22 PROCEDURE — 36415 COLL VENOUS BLD VENIPUNCTURE: CPT

## 2024-02-22 PROCEDURE — 87389 HIV-1 AG W/HIV-1&-2 AB AG IA: CPT

## 2024-02-22 PROCEDURE — 86803 HEPATITIS C AB TEST: CPT

## 2024-02-22 PROCEDURE — 85025 COMPLETE CBC W/AUTO DIFF WBC: CPT

## 2024-02-22 PROCEDURE — 80061 LIPID PANEL: CPT

## 2024-02-22 NOTE — PROGRESS NOTES
"MDT LNQ22 ICM - ACTIVE SYSTEM IS MRI CONDITIONAL   CARELINK TRANSMISSION: LOOP RECORDER. PRESENTING RHYTHM NSR @ 66 BPM. BATTERY STATUS \"OK.\" NO PATIENT OR DEVICE ACTIVATED EPISODES. NORMAL DEVICE FUNCTION. DL   "

## 2024-02-26 ENCOUNTER — OFFICE VISIT (OUTPATIENT)
Dept: HEMATOLOGY ONCOLOGY | Facility: CLINIC | Age: 49
End: 2024-02-26

## 2024-02-26 VITALS
DIASTOLIC BLOOD PRESSURE: 82 MMHG | WEIGHT: 281.5 LBS | OXYGEN SATURATION: 99 % | HEIGHT: 73 IN | TEMPERATURE: 97.9 F | HEART RATE: 68 BPM | SYSTOLIC BLOOD PRESSURE: 138 MMHG | RESPIRATION RATE: 16 BRPM | BODY MASS INDEX: 37.31 KG/M2

## 2024-02-26 DIAGNOSIS — Z79.01 ANTICOAGULANT LONG-TERM USE: ICD-10-CM

## 2024-02-26 DIAGNOSIS — D75.829 HIT (HEPARIN-INDUCED THROMBOCYTOPENIA) (HCC): ICD-10-CM

## 2024-02-26 DIAGNOSIS — I26.92 CHRONIC SADDLE PULMONARY EMBOLISM WITHOUT ACUTE COR PULMONALE (HCC): ICD-10-CM

## 2024-02-26 DIAGNOSIS — D75.1 POLYCYTHEMIA: ICD-10-CM

## 2024-02-26 DIAGNOSIS — D68.9 COAGULOPATHY (HCC): ICD-10-CM

## 2024-02-26 DIAGNOSIS — I27.82 CHRONIC SADDLE PULMONARY EMBOLISM WITHOUT ACUTE COR PULMONALE (HCC): ICD-10-CM

## 2024-02-26 DIAGNOSIS — I82.5Y3 CHRONIC DEEP VEIN THROMBOSIS (DVT) OF PROXIMAL VEIN OF BOTH LOWER EXTREMITIES (HCC): Primary | ICD-10-CM

## 2024-02-26 PROCEDURE — 99214 OFFICE O/P EST MOD 30 MIN: CPT | Performed by: INTERNAL MEDICINE

## 2024-02-26 NOTE — PATIENT COMMUNICATION
Akil Reynaga MA   to [facility]    2/23/24  1:23 PM  I called patient mother to inform that forms has been faxed and scanned back into chart.

## 2024-02-26 NOTE — PROGRESS NOTES
Hematology/Oncology Outpatient Follow- up Note  Mario Foreman III 48 y.o. male MRN: @ Encounter: [id number]        Date:  2/26/2024        Assessment / Plan:    1 left MCA infarct with sequelae presently of expressive aphasia.  2 history of saddle embolus and DVT of legs.  Plan: At this point I would plan to continue his anticoagulation.  Tolerating it well and he had significant amount of clot burden.  If he continues to mobilize and is doing better perhaps later in on he could could be considered to stop the treatment by would not do so at this time.  He continues physical therapy and increasing ambulation.  His biggest problem is expressive aphasia.  He will follow-up in 3 months.  We will get a CBC and CMP.      HPI: Unfortunate 48-year-old gentleman with a history of CVA left MCA area and leaving him with primarily expressive aphasia.  He is improved significantly in terms of movement he can walk he can move his arms and legs balance is fairly good.  Was an extensive infarct.  He also developed a large deep venous thrombosis in right peroneal vein with a saddle pulmonary embolus with extensive clot burden.  The CVA was 3/31/2023 and the embolus and clot were 4/11 and 4/12/2023.  During that time he developed heparin-induced thrombocytopenia.  Thrombosis workup appeared to be negative in terms of major causes for hypercoagulable state.  On 7/19/2023 thrombosis panel everything was essentially normal including Antithrombin III anticardiolipin antibody antibeta 2 glycoprotein protein C protein S prothrombin 21084 factor V Leiden.  Lupus anticoagulant was said to be abnormal but the actual detection of lupus anticoagulant was negative.  However he had a tremendous clot burden I recommend strongly to family to continue his anticoagulation with Eliquis.  He is not complain of swelling or discomfort in the legs.      Interval History:    Left MCA inferior M2 division occlusion. Date of diagnosis 3/30/23. Large left  "MCA infarct ,left basal ganglia involvement w/ superimposed microhemorrhage on MRI dated 3/31/23  Saddle  embolus and extensive clot burden throughout the visualized proximal pulmonary arteries. CTA 4/11/23   Acute occlusive deep vein thrombosis noted in the right peroneal veins.Non Occlusive Acute deep vein thrombosis in noted in the right posterior Tibial veins. Acute occlusive deep vein thrombosis noted in the left Posterior Tiblial, Peroneal, and Soleal veins. Acute non occlusive deep vein thrombosis noted in the left Mid-Distal Femoral and Popliteal veins. Doppler 4/12/23  HIT.  Heparin-induced thrombocytopenia was diagnosed 4/2023.  Left MCA inferior M2 division occlusion. Date of diagnosis 3/30/23. Large left MCA infarct ,left basal ganglia involvement w/ superimposed microhemorrhage on MRI dated 3/31/23  Saddle  embolus and extensive clot burden throughout the visualized proximal pulmonary arteries. CTA 4/11/23   Acute occlusive deep vein thrombosis noted in the right peroneal veins.Non Occlusive Acute deep vein thrombosis in noted in the right posterior Tibial veins. Acute occlusive deep vein thrombosis noted in the left Posterior Tiblial, Peroneal, and Soleal veins. Acute non occlusive deep vein thrombosis noted in the left Mid-Distal Femoral and Popliteal veins. Doppler 4/12/23    Cancer Staging:  Cancer Staging   No matching staging information was found for the patient.      Molecular Testing:     Previous Hematologic/ Oncologic History:    Oncology History    No history exists.       Current Hematologic/ Oncologic Treatment:       Cycle 1         Test Results:    Imaging: Cardiac EP device report    Result Date: 2/22/2024  Narrative: KEHINDE LNQ22 ICM - ACTIVE SYSTEM IS MRI CONDITIONAL CARELINK TRANSMISSION: LOOP RECORDER. PRESENTING RHYTHM NSR @ 66 BPM. BATTERY STATUS \"OK.\" NO PATIENT OR DEVICE ACTIVATED EPISODES. NORMAL DEVICE FUNCTION. DL     EEG Routine and awake    Result Date: 1/29/2024  Narrative: " Table formatting from the original result was not included. ELECTROENCEPHALOGRAM Patient Name:  Mario Foreman III  MRN: [id number] :  1975 File #: [id number]  Date performed: 2024  Referring Provider: Moni Everett MD      Report date: 2024      Study type: awake EEG ICD 10 diagnosis: Localization related epilepsy, symptomatic,not intractable without status G40.209 and Episode of transient neurological symptoms R29.90 Patient History: EEG is requested to assess for seizures and/or classification of epilepsy. Patient is 48 y.o. male with left MCA stroke, seizure disorder, with aphasia and visual deficits. Current AEDs:  Levetiracetam Medications include: amlodipine, apixaban, atorvastatin, metoprolol, tamsulosin Description of Procedure: The EEG was performed with electrodes applied using the International 10-20 System.  Additional electrodes used included EOG, ECG and T1/T2 electrodes.  A single lead ECG channel is also present.  At least 16 channels are reviewed at a time and formatted into longitudinal bipolar, transverse bipolar, and referential (to common reference or calculated common reference) montages.   The EEG was recorded with the patient awake and drowsy.  The recording was technically satisfactory. EEG was recorded from 10:59 to 11:30. Findings: Background Activity: During wakefulness, the background is well-organized with anterior extremely low amplitude beta activity and very low amplitude alpha activity over the right posterior region and theta activity over the left posterior region.  There is an asymmetric 8-8.5 Hz posterior dominant rhythm that is better organized over the right posterior region.  The posterior region over the left hemisphere has 6-7 Hz theta activity.  Drowsiness is characterized by roving eye movements, attenuation of the posterior dominant rhythm, and prominent anterior beta activity. Activation Procedures: Stepped photic stimulation from 1 to 30 fps was  "performed and produced no abnormality. Abnormal Findings: There is intermittent low-moderate voltage polymorphic 1-2 Hz delta activity and low amplitude 5-7 Hz theta activity over the left temporal region. Other findings: The single lead ECG shows a regular and sinus rhythm. Interpretation: This is an abnormal 31 minutes awake and drowsy EEG due to slower posterior rhythm over the left hemisphere and intermittent left temporal theta-delta activity. These findings indicate focal cerebral dysfunction over the left temporal region, possibly structural in origin, extending to the left posterior region. The lack of epileptiform discharges on a routine EEG does not preclude the diagnosis of seizures or epilepsy. Yareli Verde MD PhD [facility]              Labs:   Lab Results   Component Value Date    WBC 10.06 02/22/2024    HGB 16.1 02/22/2024    HCT 50.3 (H) 02/22/2024    MCV 89 02/22/2024     02/22/2024     Lab Results   Component Value Date    K 4.7 02/22/2024     02/22/2024    CO2 29 02/22/2024    BUN 15 02/22/2024    CREATININE 0.99 02/22/2024    GLUCOSE 280 (H) 03/30/2023    GLUF 127 (H) 02/22/2024    CALCIUM 9.7 02/22/2024    CORRECTEDCA 10.2 (H) 04/11/2023    AST 27 02/22/2024    ALT 45 02/22/2024    ALKPHOS 90 02/22/2024    EGFR 90 02/22/2024         No results found for: \"SPEP\", \"UPEP\"    No results found for: \"PSA\"    No results found for: \"CEA\"    No results found for: \"\"    No results found for: \"AFP\"    Lab Results   Component Value Date    IRON 72 07/07/2023    TIBC 332 07/07/2023    FERRITIN 114 07/07/2023       No results found for: \"PLGMYPSW06\"      ROS: Review of Systems   Constitutional: Negative.    HENT: Negative.     Eyes: Negative.    Respiratory: Negative.     Cardiovascular: Negative.    Gastrointestinal: Negative.    Endocrine: Negative.    Genitourinary: Negative.    Musculoskeletal: Negative.    Allergic/Immunologic: " Negative.    Neurological:  Positive for speech difficulty (He has expressive aphasia.  He does not appear to have receptive aphasia.).   Hematological: Negative.          Current Medications: Reviewed  Allergies: Reviewed  PMH/FH/SH:  Reviewed      Physical Exam:    Body surface area is 2.49 meters squared.    Wt Readings from Last 3 Encounters:   02/26/24 128 kg (281 lb 8 oz)   12/28/23 125 kg (276 lb)   12/28/23 126 kg (277 lb)        Temp Readings from Last 3 Encounters:   02/26/24 97.9 °F (36.6 °C) (Temporal)   12/06/23 98.3 °F (36.8 °C)   12/04/23 98.5 °F (36.9 °C)        BP Readings from Last 3 Encounters:   02/26/24 138/82   12/28/23 140/80   12/28/23 140/80         Pulse Readings from Last 3 Encounters:   02/26/24 68   12/28/23 77   12/28/23 77     @LASTSAO2(3)@      Physical Exam  Vitals reviewed.   Constitutional:       Appearance: Normal appearance. He is normal weight.   HENT:      Head: Normocephalic and atraumatic.   Eyes:      Extraocular Movements: Extraocular movements intact.      Conjunctiva/sclera: Conjunctivae normal.      Pupils: Pupils are equal, round, and reactive to light.   Cardiovascular:      Rate and Rhythm: Normal rate and regular rhythm.      Heart sounds: Normal heart sounds.   Pulmonary:      Effort: Pulmonary effort is normal.      Breath sounds: Normal breath sounds.   Abdominal:      General: Abdomen is flat.      Palpations: Abdomen is soft.   Musculoskeletal:         General: Normal range of motion.      Cervical back: Normal range of motion and neck supple.   Skin:     General: Skin is warm and dry.   Neurological:      General: No focal deficit present.      Mental Status: He is alert and oriented to person, place, and time. Mental status is at baseline.     Again he can ambulate well.  He can move his arms and legs fairly well.  No major ataxia.  Significant expressive aphasia still present.  He does not appear to have receptive aphasia.      Goals and Barriers:  Current  Goal: Prolong Survival from Cancer.   Barriers: None.      Patient's Capacity to Self Care:  Patient is able to self care.    Code Status: [unfilled]  Advance Directive and Living Will:      Power of :

## 2024-03-29 ENCOUNTER — TELEPHONE (OUTPATIENT)
Dept: FAMILY MEDICINE CLINIC | Facility: MEDICAL CENTER | Age: 49
End: 2024-03-29

## 2024-03-29 NOTE — TELEPHONE ENCOUNTER
S/w Lyly at [facility], she said that the rx has to be put through on the insurance first to start the prior auth process, so obviously this can't start until 4/1.  Mother aware, and understands, she will take his card over to the pharmacy on or before 4/1, so they have on file and I will send a refill request to   Dr. Ortega on 4/1, mother is going to request a short supply until prior auth goes through.

## 2024-03-29 NOTE — TELEPHONE ENCOUNTER
Regarding: Prior authorization through [facility] portal  Contact: [phone]  ----- Message from Sophie Gan MA sent at 3/29/2024 10:40 AM EDT -----       ----- Message from Indra Foreman III to Sofy DO Shannon sent at 3/28/2024 12:55 PM -----   Mario has gotten medical assistance from [place] [employer] and will have insurance through [facility]. His Eliquis medication runs out March 31st and the new insurance is effective April 1st. I talked to [facility] and they said his doctor had to send prior authorizations for his medications through the [facility] portal. He still has at least 2 weeks of the other medications but needs to get the Eliquis filled as soon as possible. Last month it cost me almost $600 for a 30 day supply as there is no generic brand. I would greatly appreciate your going to the [facility] portal and authorizing his medicines.  Mario Foreman, Subscriber Number [id number], Group Number [id number]. Thank you, Amparo Foreman

## 2024-04-01 DIAGNOSIS — I26.92 ACUTE SADDLE PULMONARY EMBOLISM WITHOUT ACUTE COR PULMONALE (HCC): ICD-10-CM

## 2024-04-18 ENCOUNTER — TELEMEDICINE (OUTPATIENT)
Dept: NEUROLOGY | Facility: CLINIC | Age: 49
End: 2024-04-18
Payer: COMMERCIAL

## 2024-04-18 VITALS
HEART RATE: 74 BPM | WEIGHT: 279.1 LBS | SYSTOLIC BLOOD PRESSURE: 129 MMHG | DIASTOLIC BLOOD PRESSURE: 83 MMHG | BODY MASS INDEX: 36.99 KG/M2 | HEIGHT: 73 IN

## 2024-04-18 DIAGNOSIS — R56.9 SEIZURES (HCC): ICD-10-CM

## 2024-04-18 DIAGNOSIS — I27.82 CHRONIC SADDLE PULMONARY EMBOLISM WITHOUT ACUTE COR PULMONALE (HCC): ICD-10-CM

## 2024-04-18 DIAGNOSIS — I69.320 APHASIA DUE TO RECENT CEREBROVASCULAR ACCIDENT (CVA): ICD-10-CM

## 2024-04-18 DIAGNOSIS — I63.512 ACUTE ISCHEMIC LEFT MCA STROKE (HCC): Primary | ICD-10-CM

## 2024-04-18 DIAGNOSIS — I82.5Y3 CHRONIC DEEP VEIN THROMBOSIS (DVT) OF PROXIMAL VEIN OF BOTH LOWER EXTREMITIES (HCC): ICD-10-CM

## 2024-04-18 DIAGNOSIS — I26.92 CHRONIC SADDLE PULMONARY EMBOLISM WITHOUT ACUTE COR PULMONALE (HCC): ICD-10-CM

## 2024-04-18 PROCEDURE — 99214 OFFICE O/P EST MOD 30 MIN: CPT | Performed by: PSYCHIATRY & NEUROLOGY

## 2024-04-18 NOTE — PROGRESS NOTES
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  At this time, patient denies any new stroke-like symptoms.  He does endorse some lightheadedness with exertion.  At this time, patient should monitor his symptoms and if symptoms persist, then we will think about ordering a C-spine MRI for the patient.  The symptoms are most likely secondary to his recent stroke and overexertion.  Patient continues to have aphasia on examination and as a result, a speech therapy referral was placed.    Plan:   For secondary stroke prevention, recommend continuation of combination of aspirin, eliquis and Lipitor   Blood Pressure goal < 130/80, BP is at goal in the office.   LDL goal <70  Recommend lifestyle modifications such as mediterranean diet & regular exercise regimen atleast 4-5 times a week for 20-30 minutes.   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      Seizures (HCC)    Patient denies any seizure-like activity at this time and is currently off the Keppra as well.  We will continue to monitor patient symptoms.      The patient indicates understanding of these issues and agrees with the plan.    Return in about 6 months (around 10/18/2024).    This patient case was discussed with Dr. Moni Everett.      Encounter provider Moni Everett MD    Provider located at [address] [place] [address] [facility] [address] [id number]      Recent Visits  No visits were found meeting these conditions.  Showing recent visits within past 7 days and meeting all other requirements  Today's Visits  Date Type Provider Dept   04/18/24 Telemedicine [facility]   Showing today's visits and meeting all other requirements  Future Appointments  No visits were found meeting these conditions.  Showing future appointments within next 150 days and meeting all other requirements       The patient was identified by name and date of birth. Mario Foreman III was informed that this is a telemedicine visit and that the visit is being conducted through the Epic Embedded platform. He agrees to proceed..  My office door was closed. No one else was in the room.  He acknowledged consent and understanding of privacy and security of the video platform. The patient has agreed to participate and understands they can discontinue the visit at any time.    Patient is aware this is a billable service.       Subjective    HPI     Patient is a 48-year-old male presenting to the clinic today in regards to follow-up for his stroke and seizures.  Patient has a past medical history of vasovagal syncope, coagulopathy, aphasia due to CVA, HIT, DVT, saddle embolus, JOANNE, thrombocytopenia, hypertension, seizures,  type 2 diabetes.  Patient was last seen in the office 12/20/2023 by Dr. Everett.    Since last time the patient was seen in the office, patient states that he has had no new neurological symptoms.  In regards to his stroke prevention, patient is still taking aspirin, Eliquis, and Lipitor.  He is also followed up with cardiology since then and his loop has not shown any atrial fibrillation.     He also denies any seizures at this time.  Patient states that he has stopped the Keppra as per Dr. Everett and his mom's conversation after the EEG was obtained.    He has also seen Dr. Tavares, his [facility] doctor, since the last visit as well. Patient was told to be on Eliquis for another 1 year.     Patient does state that with overexertion such as shaving his head or trying to plot this note, patient does get lightheadedness.  The lightheadedness just last for a couple minutes and resolves with sitting down and drinking some water.  Denies any other associated symptoms.    Patient continues not to drive given his visual deficits at this time secondary to his stroke.    There are no other questions or complaints at this time.    Past Medical History:   Diagnosis Date    Allergic 04/01/2023    Hypertension 03/31/2023    Hypertensive emergency 03/30/2023    Obesity 03/31/2023    Seizures (HCC) 03/31/2023    Sleep apnea, obstructive     Stroke (HCC) 03/30/2023    Visual impairment 03/30/2023       Past Surgical History:   Procedure Laterality Date    CARDIAC ELECTROPHYSIOLOGY PROCEDURE N/A 11/7/2023    Procedure: Cardiac loop recorder implant;  Surgeon: Dario Naqvi MD;  Location: [facility];  Service: Cardiology       Current Outpatient Medications   Medication Sig Dispense Refill    amLODIPine (NORVASC) 5 mg tablet Take 1 tablet (5 mg total) by mouth daily 90 tablet 1    apixaban (Eliquis) 5 mg Take 1 tablet (5 mg total) by mouth 2 (two) times a day 180 tablet 1    aspirin 81 mg chewable tablet Chew 1 tablet (81  "mg total) daily 90 tablet 0    atorvastatin (LIPITOR) 40 mg tablet Take 1 tablet (40 mg total) by mouth every evening 90 tablet 1    loratadine (CLARITIN) 10 mg tablet Take 1 tablet (10 mg total) by mouth daily as needed for allergies  0    metoprolol tartrate (LOPRESSOR) 25 mg tablet Take 1 tablet (25 mg total) by mouth every 12 (twelve) hours 180 tablet 0    tamsulosin (FLOMAX) 0.4 mg Take 1 capsule (0.4 mg total) by mouth daily after dinner 90 capsule 1    bisacodyl (DULCOLAX) 5 mg EC tablet Take 2 tablets (10 mg total) by mouth once for 1 dose (Patient not taking: Reported on 4/18/2024) 2 tablet 0    levETIRAcetam (KEPPRA) 750 mg tablet Take 1 tablet (750 mg total) by mouth every 12 (twelve) hours (Patient not taking: Reported on 2/26/2024) 180 tablet 3    polyethylene glycol (GOLYTELY) 4000 mL solution Take 4,000 mL by mouth once for 1 dose (Patient not taking: Reported on 4/18/2024) 4000 mL 0     No current facility-administered medications for this visit.        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   (\"The patient's serum tested positive by [facility] and supports a diagnosis of heparin-induced-thrombocytopenia\")       Review of Systems   Constitutional: Negative.    HENT: Negative.     Eyes: Negative.    Respiratory: Negative.     Cardiovascular: Negative.    Gastrointestinal: Negative.    Endocrine: Negative.    Genitourinary: Negative.    Musculoskeletal: Negative.    Skin: Negative.    Allergic/Immunologic: Negative.    Neurological:  Positive for light-headedness (when using hands and arms for 15 mins).   Hematological: Negative.    Psychiatric/Behavioral: Negative.         Video Exam    Vitals:    04/18/24 0927   BP: 129/83   Pulse: 74   Weight: 127 kg (279 lb 1.6 oz)   Height: 6' 1\" (1.854 m)       Physical Exam  Vitals reviewed.   Constitutional:       Appearance: Normal appearance.   HENT:      Head: " Normocephalic and atraumatic.   Eyes:      Extraocular Movements: Extraocular movements intact.      Conjunctiva/sclera: Conjunctivae normal.   Musculoskeletal:         General: Normal range of motion.   Neurological:      Mental Status: He is alert.      Cranial Nerves: Cranial nerve deficit (Aphasic) present.      Motor: Motor function is intact.      Coordination: Coordination normal. Finger-Nose-Finger Test normal.      Comments: Patient struggles to put words together along with repetition of words and sentences   Psychiatric:         Mood and Affect: Mood normal.         Behavior: Behavior normal.          Visit Time  Total Visit Duration: 35 mins

## 2024-04-28 DIAGNOSIS — R33.9 URINARY RETENTION: ICD-10-CM

## 2024-04-28 RX ORDER — TAMSULOSIN HYDROCHLORIDE 0.4 MG/1
0.4 CAPSULE ORAL
Qty: 90 CAPSULE | Refills: 0 | Status: SHIPPED | OUTPATIENT
Start: 2024-04-28

## 2024-05-08 ENCOUNTER — TELEPHONE (OUTPATIENT)
Age: 49
End: 2024-05-08

## 2024-05-08 NOTE — TELEPHONE ENCOUNTER
Please print [facility] forms to have Dr. Everett sign. Once done please fax and scan back into chart. Thanks.

## 2024-05-12 DIAGNOSIS — I10 PRIMARY HYPERTENSION: ICD-10-CM

## 2024-05-23 ENCOUNTER — REMOTE DEVICE CLINIC VISIT (OUTPATIENT)
Dept: CARDIOLOGY CLINIC | Facility: CLINIC | Age: 49
End: 2024-05-23
Payer: COMMERCIAL

## 2024-05-23 ENCOUNTER — HOSPITAL ENCOUNTER (OUTPATIENT)
Dept: SLEEP CENTER | Facility: CLINIC | Age: 49
Discharge: HOME | End: 2024-05-23
Payer: COMMERCIAL

## 2024-05-23 DIAGNOSIS — Z95.818 PRESENCE OF OTHER CARDIAC IMPLANTS AND GRAFTS: Primary | ICD-10-CM

## 2024-05-23 DIAGNOSIS — E66.9 OBESITY (BMI 30-39.9): ICD-10-CM

## 2024-05-23 DIAGNOSIS — I10 PRIMARY HYPERTENSION: ICD-10-CM

## 2024-05-23 DIAGNOSIS — R06.83 SNORING: ICD-10-CM

## 2024-05-23 DIAGNOSIS — I63.512 ACUTE ISCHEMIC LEFT MCA STROKE (HCC): ICD-10-CM

## 2024-05-23 PROCEDURE — 93298 REM INTERROG DEV EVAL SCRMS: CPT | Performed by: INTERNAL MEDICINE

## 2024-05-23 PROCEDURE — 95810 POLYSOM 6/> YRS 4/> PARAM: CPT

## 2024-05-23 PROCEDURE — 95810 POLYSOM 6/> YRS 4/> PARAM: CPT | Performed by: INTERNAL MEDICINE

## 2024-05-23 NOTE — PROGRESS NOTES
"Results for orders placed or performed in visit on 05/23/24   Cardiac EP device report    Narrative    MDT LNQ22 ICM - ACTIVE SYSTEM IS MRI CONDITIONAL  CARELINK TRANSMISSION: LOOP: BATTERY STATUS \"OK.\" PRESENTING RHYTHM: NSR @ 75 BPM. NO DEVICE ACTIVATED EPISODES. NORMAL DEVICE FUNCTION. CH        "

## 2024-05-24 PROBLEM — G47.33 OSA (OBSTRUCTIVE SLEEP APNEA): Status: ACTIVE | Noted: 2024-05-24

## 2024-05-24 NOTE — PROGRESS NOTES
Sleep Study Documentation    Pre-Sleep Study       Sleep testing procedure explained to patient:YES    Patient napped prior to study:YES- more than 30 minutes. Napped after 2PM: no    Caffeine:Dayshift worker after 12PM.  Caffeine use:NO    Alcohol:Dayshift workers after 5PM: Alcohol use:NO    Typical day for patient:YES       Study Documentation    Sleep Study Indications: Witnessed apneas, Unrefreshing sleep, BMI > 30, Neck circumference (Male>18inches; Female >17inches), HTN, TIA/CVA, Snoring; Acute ischemic left MCA stroke; Primary hypertension; Obesity    Sleep Study: Diagnostic   Snore:Mild  Supplemental O2: no    O2 flow rate (L/min) range -  O2 flow rate (L/min) final -  Minimum SaO2 85%  Baseline SaO2 97%    EKG abnormalities: no     EEG abnormalities: no    Were abnormal behaviors in sleep observed:NO    Is Total Sleep Study Recording Time < 2 hours: N/A    Is Total Sleep Study Recording Time > 2 hours but study is incomplete: N/A    Is Total Sleep Study Recording Time 6 hours or more but sleep was not obtained: NO        Post-Sleep Study    Medication used at bedtime or during sleep study:NO    Patient reports time it took to fall asleep:less than 20 minutes    Patient reports waking up during study:3 or more times.  Patient reports returning to sleep in 10 to 30 minutes.    Patient reports sleeping 2 to 4 hours without dreaming.    Does the Patient feel this is a typical night of sleep:typical    Patient rated sleepiness: Not sleepy or tired    PAP treatment:no.

## 2024-05-26 DIAGNOSIS — I63.512 ACUTE ISCHEMIC LEFT MCA STROKE (HCC): ICD-10-CM

## 2024-05-27 RX ORDER — ASPIRIN 81 MG/1
81 TABLET, CHEWABLE ORAL DAILY
Qty: 90 TABLET | Refills: 1 | Status: SHIPPED | OUTPATIENT
Start: 2024-05-27

## 2024-05-29 PROBLEM — G47.61 PLMD (PERIODIC LIMB MOVEMENT DISORDER): Status: ACTIVE | Noted: 2024-05-29

## 2024-05-31 DIAGNOSIS — G47.33 OSA (OBSTRUCTIVE SLEEP APNEA): Primary | ICD-10-CM

## 2024-06-10 LAB

## 2024-06-17 LAB
DME PARACHUTE DELIVERY DATE ACTUAL: NORMAL
DME PARACHUTE DELIVERY DATE EXPECTED: NORMAL
DME PARACHUTE DELIVERY DATE REQUESTED: NORMAL
DME PARACHUTE ITEM DESCRIPTION: NORMAL
DME PARACHUTE ORDER STATUS: NORMAL
DME PARACHUTE SUPPLIER NAME: NORMAL
DME PARACHUTE SUPPLIER PHONE: NORMAL

## 2024-07-21 DIAGNOSIS — R33.9 URINARY RETENTION: ICD-10-CM

## 2024-07-21 RX ORDER — TAMSULOSIN HYDROCHLORIDE 0.4 MG/1
0.4 CAPSULE ORAL
Qty: 90 CAPSULE | Refills: 0 | Status: SHIPPED | OUTPATIENT
Start: 2024-07-21 | End: 2024-07-27 | Stop reason: SDUPTHER

## 2024-07-22 ENCOUNTER — APPOINTMENT (OUTPATIENT)
Dept: LAB | Facility: MEDICAL CENTER | Age: 49
End: 2024-07-22
Payer: COMMERCIAL

## 2024-07-22 DIAGNOSIS — Z79.01 ANTICOAGULANT LONG-TERM USE: ICD-10-CM

## 2024-07-22 DIAGNOSIS — D75.829 HIT (HEPARIN-INDUCED THROMBOCYTOPENIA) (HCC): ICD-10-CM

## 2024-07-22 DIAGNOSIS — D68.9 COAGULOPATHY (HCC): ICD-10-CM

## 2024-07-22 DIAGNOSIS — I82.5Y3 CHRONIC DEEP VEIN THROMBOSIS (DVT) OF PROXIMAL VEIN OF BOTH LOWER EXTREMITIES (HCC): ICD-10-CM

## 2024-07-22 DIAGNOSIS — I26.92 CHRONIC SADDLE PULMONARY EMBOLISM WITHOUT ACUTE COR PULMONALE (HCC): ICD-10-CM

## 2024-07-22 DIAGNOSIS — I27.82 CHRONIC SADDLE PULMONARY EMBOLISM WITHOUT ACUTE COR PULMONALE (HCC): ICD-10-CM

## 2024-07-22 DIAGNOSIS — D75.1 POLYCYTHEMIA: ICD-10-CM

## 2024-07-22 LAB
ALBUMIN SERPL BCG-MCNC: 3.8 G/DL (ref 3.5–5)
ALP SERPL-CCNC: 78 U/L (ref 34–104)
ALT SERPL W P-5'-P-CCNC: 28 U/L (ref 7–52)
ANION GAP SERPL CALCULATED.3IONS-SCNC: 10 MMOL/L (ref 4–13)
AST SERPL W P-5'-P-CCNC: 19 U/L (ref 13–39)
BASOPHILS # BLD AUTO: 0.04 THOUSANDS/ÂΜL (ref 0–0.1)
BASOPHILS NFR BLD AUTO: 1 % (ref 0–1)
BILIRUB SERPL-MCNC: 0.76 MG/DL (ref 0.2–1)
BUN SERPL-MCNC: 13 MG/DL (ref 5–25)
CALCIUM SERPL-MCNC: 9.2 MG/DL (ref 8.4–10.2)
CHLORIDE SERPL-SCNC: 103 MMOL/L (ref 96–108)
CO2 SERPL-SCNC: 27 MMOL/L (ref 21–32)
CREAT SERPL-MCNC: 1.01 MG/DL (ref 0.6–1.3)
EOSINOPHIL # BLD AUTO: 0.35 THOUSAND/ÂΜL (ref 0–0.61)
EOSINOPHIL NFR BLD AUTO: 5 % (ref 0–6)
ERYTHROCYTE [DISTWIDTH] IN BLOOD BY AUTOMATED COUNT: 12.8 % (ref 11.6–15.1)
GFR SERPL CREATININE-BSD FRML MDRD: 88 ML/MIN/1.73SQ M
GLUCOSE P FAST SERPL-MCNC: 91 MG/DL (ref 65–99)
HCT VFR BLD AUTO: 47.7 % (ref 36.5–49.3)
HGB BLD-MCNC: 15.2 G/DL (ref 12–17)
IMM GRANULOCYTES # BLD AUTO: 0.02 THOUSAND/UL (ref 0–0.2)
IMM GRANULOCYTES NFR BLD AUTO: 0 % (ref 0–2)
LYMPHOCYTES # BLD AUTO: 2.16 THOUSANDS/ÂΜL (ref 0.6–4.47)
LYMPHOCYTES NFR BLD AUTO: 29 % (ref 14–44)
MCH RBC QN AUTO: 29.2 PG (ref 26.8–34.3)
MCHC RBC AUTO-ENTMCNC: 31.9 G/DL (ref 31.4–37.4)
MCV RBC AUTO: 92 FL (ref 82–98)
MONOCYTES # BLD AUTO: 0.55 THOUSAND/ÂΜL (ref 0.17–1.22)
MONOCYTES NFR BLD AUTO: 7 % (ref 4–12)
NEUTROPHILS # BLD AUTO: 4.33 THOUSANDS/ÂΜL (ref 1.85–7.62)
NEUTS SEG NFR BLD AUTO: 58 % (ref 43–75)
NRBC BLD AUTO-RTO: 0 /100 WBCS
PLATELET # BLD AUTO: 309 THOUSANDS/UL (ref 149–390)
PMV BLD AUTO: 10.5 FL (ref 8.9–12.7)
POTASSIUM SERPL-SCNC: 4.4 MMOL/L (ref 3.5–5.3)
PROT SERPL-MCNC: 6.8 G/DL (ref 6.4–8.4)
RBC # BLD AUTO: 5.2 MILLION/UL (ref 3.88–5.62)
SODIUM SERPL-SCNC: 140 MMOL/L (ref 135–147)
WBC # BLD AUTO: 7.45 THOUSAND/UL (ref 4.31–10.16)

## 2024-07-22 PROCEDURE — 36415 COLL VENOUS BLD VENIPUNCTURE: CPT

## 2024-07-22 PROCEDURE — 80053 COMPREHEN METABOLIC PANEL: CPT

## 2024-07-22 PROCEDURE — 85025 COMPLETE CBC W/AUTO DIFF WBC: CPT

## 2024-07-26 ENCOUNTER — OFFICE VISIT (OUTPATIENT)
Dept: HEMATOLOGY ONCOLOGY | Facility: CLINIC | Age: 49
End: 2024-07-26
Payer: COMMERCIAL

## 2024-07-26 VITALS
WEIGHT: 280 LBS | RESPIRATION RATE: 15 BRPM | DIASTOLIC BLOOD PRESSURE: 82 MMHG | TEMPERATURE: 97.3 F | HEIGHT: 73 IN | OXYGEN SATURATION: 98 % | SYSTOLIC BLOOD PRESSURE: 122 MMHG | BODY MASS INDEX: 37.11 KG/M2 | HEART RATE: 68 BPM

## 2024-07-26 DIAGNOSIS — I27.82 CHRONIC SADDLE PULMONARY EMBOLISM WITH ACUTE COR PULMONALE (HCC): ICD-10-CM

## 2024-07-26 DIAGNOSIS — I27.82 CHRONIC SADDLE PULMONARY EMBOLISM WITHOUT ACUTE COR PULMONALE (HCC): Primary | ICD-10-CM

## 2024-07-26 DIAGNOSIS — I82.5Y3 CHRONIC DEEP VEIN THROMBOSIS (DVT) OF PROXIMAL VEIN OF BOTH LOWER EXTREMITIES (HCC): ICD-10-CM

## 2024-07-26 DIAGNOSIS — E11.59 TYPE 2 DIABETES MELLITUS WITH OTHER CIRCULATORY COMPLICATIONS (HCC): ICD-10-CM

## 2024-07-26 DIAGNOSIS — G93.40 ENCEPHALOPATHY: ICD-10-CM

## 2024-07-26 DIAGNOSIS — D69.6 THROMBOCYTOPENIA (HCC): ICD-10-CM

## 2024-07-26 DIAGNOSIS — E11.9 TYPE 2 DIABETES MELLITUS WITHOUT COMPLICATION, WITHOUT LONG-TERM CURRENT USE OF INSULIN (HCC): ICD-10-CM

## 2024-07-26 DIAGNOSIS — I10 PRIMARY HYPERTENSION: ICD-10-CM

## 2024-07-26 DIAGNOSIS — I69.320 APHASIA DUE TO RECENT CEREBROVASCULAR ACCIDENT (CVA): ICD-10-CM

## 2024-07-26 DIAGNOSIS — I26.92 CHRONIC SADDLE PULMONARY EMBOLISM WITHOUT ACUTE COR PULMONALE (HCC): Primary | ICD-10-CM

## 2024-07-26 DIAGNOSIS — I26.02 CHRONIC SADDLE PULMONARY EMBOLISM WITH ACUTE COR PULMONALE (HCC): ICD-10-CM

## 2024-07-26 PROCEDURE — 99214 OFFICE O/P EST MOD 30 MIN: CPT | Performed by: INTERNAL MEDICINE

## 2024-07-26 NOTE — PROGRESS NOTES
Hematology/Oncology Outpatient Follow- up Note  Mario Foreman III 49 y.o. male MRN: @ Encounter: [id number]        Date:  7/26/2024        Assessment / Plan:    1 left middle cerebral artery infarct with mild expressive aphasia  2 history of saddle embolus and DVT of leg.  3 history of heparin-induced thrombocytopenia  Plan because of clot burden in his history we would anticipate continuing anticoagulation indefinitely even though he is mobilizing I would not back off at this point.  He will come back here for follow-up in 6 months with a CBC and CMP he will see our physician assistant.        HPI: 49-year-old gentleman with a history of left MCA infarct and saddle embolus.  This was in 2023.  Still has some mild expressive aphasia.  He is ambulating and getting around fairly well.  Extensive hypercoagulable workup was essentially negative he remains on Eliquis.  He is also on aspirin.  He appears to be doing fairly well he denies other major problem or complaints.  Otherwise no other major suggestions at this point.  Noted in review that the neurology follow-up is suggested and I would concur that he should continue indefinitely is anticoagulation.    Interval History: Note of 2/26/2024:  Assessment / Plan:    1 left MCA infarct with sequelae presently of expressive aphasia.  2 history of saddle embolus and DVT of legs.  Plan: At this point I would plan to continue his anticoagulation.  Tolerating it well and he had significant amount of clot burden.  If he continues to mobilize and is doing better perhaps later in on he could could be considered to stop the treatment by would not do so at this time.  He continues physical therapy and increasing ambulation.  His biggest problem is expressive aphasia.  He will follow-up in 3 months.  We will get a CBC and CMP.  HPI: Unfortunate 48-year-old gentleman with a history of CVA left MCA area and leaving him with primarily expressive aphasia.  He is improved  significantly in terms of movement he can walk he can move his arms and legs balance is fairly good.  Was an extensive infarct.  He also developed a large deep venous thrombosis in right peroneal vein with a saddle pulmonary embolus with extensive clot burden.  The CVA was 3/31/2023 and the embolus and clot were 4/11 and 4/12/2023.  During that time he developed heparin-induced thrombocytopenia.  Thrombosis workup appeared to be negative in terms of major causes for hypercoagulable state.  On 7/19/2023 thrombosis panel everything was essentially normal including Antithrombin III anticardiolipin antibody antibeta 2 glycoprotein protein C protein S prothrombin 20210 factor V Leiden.  Lupus anticoagulant was said to be abnormal but the actual detection of lupus anticoagulant was negative.  However he had a tremendous clot burden I recommend strongly to family to continue his anticoagulation with Eliquis.  He is not complain of swelling or discomfort in the legs.  Interval History:    Left MCA inferior M2 division occlusion. Date of diagnosis 3/30/23. Large left MCA infarct ,left basal ganglia involvement w/ superimposed microhemorrhage on MRI dated 3/31/23  Saddle  embolus and extensive clot burden throughout the visualized proximal pulmonary arteries. CTA 4/11/23   Acute occlusive deep vein thrombosis noted in the right peroneal veins.Non Occlusive Acute deep vein thrombosis in noted in the right posterior Tibial veins. Acute occlusive deep vein thrombosis noted in the left Posterior Tiblial, Peroneal, and Soleal veins. Acute non occlusive deep vein thrombosis noted in the left Mid-Distal Femoral and Popliteal veins. Doppler 4/12/23  HIT.  Heparin-induced thrombocytopenia was diagnosed 4/2023.  Left MCA inferior M2 division occlusion. Date of diagnosis 3/30/23. Large left MCA infarct ,left basal ganglia involvement w/ superimposed microhemorrhage on MRI dated 3/31/23  Saddle  embolus and extensive clot burden  "throughout the visualized proximal pulmonary arteries. CTA 4/11/23   Acute occlusive deep vein thrombosis noted in the right peroneal veins.Non Occlusive Acute deep vein thrombosis in noted in the right posterior Tibial veins. Acute occlusive deep vein thrombosis noted in the left Posterior Tiblial, Peroneal, and Soleal veins. Acute non occlusive deep vein thrombosis noted in the left Mid-Distal Femoral and Popliteal veins. Doppler 4/12/23      Cancer Staging:  Cancer Staging   No matching staging information was found for the patient.      Molecular Testing:     Previous Hematologic/ Oncologic History:    Oncology History    No history exists.       Current Hematologic/ Oncologic Treatment:       Cycle 1         Test Results:    Imaging: No results found.          Labs:   Lab Results   Component Value Date    WBC 7.45 07/22/2024    HGB 15.2 07/22/2024    HCT 47.7 07/22/2024    MCV 92 07/22/2024     07/22/2024     Lab Results   Component Value Date    K 4.4 07/22/2024     07/22/2024    CO2 27 07/22/2024    BUN 13 07/22/2024    CREATININE 1.01 07/22/2024    GLUCOSE 280 (H) 03/30/2023    GLUF 91 07/22/2024    CALCIUM 9.2 07/22/2024    CORRECTEDCA 10.2 (H) 04/11/2023    AST 19 07/22/2024    ALT 28 07/22/2024    ALKPHOS 78 07/22/2024    EGFR 88 07/22/2024         No results found for: \"SPEP\", \"UPEP\"    No results found for: \"PSA\"    No results found for: \"CEA\"    No results found for: \"\"    No results found for: \"AFP\"    Lab Results   Component Value Date    IRON 72 07/07/2023    TIBC 332 07/07/2023    FERRITIN 114 07/07/2023       No results found for: \"TTGMGRCR52\"      ROS: Review of Systems   Constitutional: Negative.    HENT: Negative.     Eyes: Negative.    Respiratory: Negative.     Cardiovascular: Negative.    Gastrointestinal: Negative.    Endocrine: Negative.    Genitourinary: Negative.    Musculoskeletal: Negative.    Skin: Negative.    Allergic/Immunologic: Negative.    Neurological:  " Positive for speech difficulty.   Hematological: Negative.      Has a mild expressive aphasia    Current Medications: Reviewed  Allergies: Reviewed  PMH/FH/SH:  Reviewed      Physical Exam:    Body surface area is 2.48 meters squared.    Wt Readings from Last 3 Encounters:   07/26/24 127 kg (280 lb)   04/18/24 127 kg (279 lb 1.6 oz)   02/26/24 128 kg (281 lb 8 oz)        Temp Readings from Last 3 Encounters:   07/26/24 (!) 97.3 °F (36.3 °C) (Temporal)   02/26/24 97.9 °F (36.6 °C) (Temporal)   12/06/23 98.3 °F (36.8 °C)        BP Readings from Last 3 Encounters:   07/26/24 122/82   04/18/24 129/83   02/26/24 138/82         Pulse Readings from Last 3 Encounters:   07/26/24 68   04/18/24 74   02/26/24 68     @LASTSAO2(3)@      Physical Exam  Constitutional:       Appearance: Normal appearance. He is normal weight.   HENT:      Head: Normocephalic and atraumatic.   Eyes:      Extraocular Movements: Extraocular movements intact.      Conjunctiva/sclera: Conjunctivae normal.      Pupils: Pupils are equal, round, and reactive to light.   Cardiovascular:      Rate and Rhythm: Normal rate and regular rhythm.      Heart sounds: Normal heart sounds.   Pulmonary:      Effort: Pulmonary effort is normal.      Breath sounds: Normal breath sounds.   Abdominal:      General: Abdomen is flat. Bowel sounds are normal.      Palpations: Abdomen is soft.   Musculoskeletal:         General: Normal range of motion.      Cervical back: Normal range of motion and neck supple.   Skin:     General: Skin is warm and dry.   Neurological:      General: No focal deficit present.      Mental Status: He is alert and oriented to person, place, and time. Mental status is at baseline.     Ambulating around the room good strength both arms and legs some element of mild expressive aphasia      Goals and Barriers:  Current Goal: Prolong Survival from Cancer.   Barriers: None.      Patient's Capacity to Self Care:  Patient is able to self care.    Code  Status: [unfilled]  Advance Directive and Living Will:      Power of :

## 2024-07-27 DIAGNOSIS — R33.9 URINARY RETENTION: ICD-10-CM

## 2024-07-28 RX ORDER — TAMSULOSIN HYDROCHLORIDE 0.4 MG/1
0.4 CAPSULE ORAL
Qty: 100 CAPSULE | Refills: 1 | Status: SHIPPED | OUTPATIENT
Start: 2024-07-28

## 2024-08-06 ENCOUNTER — OFFICE VISIT (OUTPATIENT)
Dept: FAMILY MEDICINE CLINIC | Facility: MEDICAL CENTER | Age: 49
End: 2024-08-06
Payer: COMMERCIAL

## 2024-08-06 VITALS
WEIGHT: 279 LBS | HEART RATE: 72 BPM | BODY MASS INDEX: 36.98 KG/M2 | DIASTOLIC BLOOD PRESSURE: 78 MMHG | OXYGEN SATURATION: 99 % | SYSTOLIC BLOOD PRESSURE: 110 MMHG | HEIGHT: 73 IN | RESPIRATION RATE: 14 BRPM | TEMPERATURE: 98.2 F

## 2024-08-06 DIAGNOSIS — E66.09 CLASS 2 OBESITY DUE TO EXCESS CALORIES WITHOUT SERIOUS COMORBIDITY WITH BODY MASS INDEX (BMI) OF 36.0 TO 36.9 IN ADULT: ICD-10-CM

## 2024-08-06 DIAGNOSIS — G47.33 OSA (OBSTRUCTIVE SLEEP APNEA): ICD-10-CM

## 2024-08-06 DIAGNOSIS — E11.59 TYPE 2 DIABETES MELLITUS WITH OTHER CIRCULATORY COMPLICATIONS (HCC): Primary | ICD-10-CM

## 2024-08-06 DIAGNOSIS — I10 PRIMARY HYPERTENSION: ICD-10-CM

## 2024-08-06 PROBLEM — E66.812 CLASS 2 OBESITY DUE TO EXCESS CALORIES WITHOUT SERIOUS COMORBIDITY WITH BODY MASS INDEX (BMI) OF 36.0 TO 36.9 IN ADULT: Status: ACTIVE | Noted: 2023-03-30

## 2024-08-06 LAB
CREAT UR-MCNC: 263.3 MG/DL
MICROALBUMIN UR-MCNC: 8.9 MG/L
MICROALBUMIN/CREAT 24H UR: 3 MG/G CREATININE (ref 0–30)
SL AMB POCT HEMOGLOBIN AIC: 6 (ref ?–6.5)

## 2024-08-06 PROCEDURE — 83036 HEMOGLOBIN GLYCOSYLATED A1C: CPT | Performed by: STUDENT IN AN ORGANIZED HEALTH CARE EDUCATION/TRAINING PROGRAM

## 2024-08-06 PROCEDURE — 82043 UR ALBUMIN QUANTITATIVE: CPT | Performed by: STUDENT IN AN ORGANIZED HEALTH CARE EDUCATION/TRAINING PROGRAM

## 2024-08-06 PROCEDURE — 99214 OFFICE O/P EST MOD 30 MIN: CPT | Performed by: STUDENT IN AN ORGANIZED HEALTH CARE EDUCATION/TRAINING PROGRAM

## 2024-08-06 PROCEDURE — 82570 ASSAY OF URINE CREATININE: CPT | Performed by: STUDENT IN AN ORGANIZED HEALTH CARE EDUCATION/TRAINING PROGRAM

## 2024-08-06 NOTE — PROGRESS NOTES
[facility] - Clinic Note  Sofy Ortega DO, 24     Mario Foreman III MRN: [id number] : 1975 Age: 49 y.o.     Assessment/Plan     1. Type 2 diabetes mellitus with other circulatory complications (HCC)    -Type 2 diabetes mellitus under good control with diet control measures, continue  - POCT hemoglobin A1c 6.0  -Continue daily statin  - Albumin / creatinine urine ratio  - Lipid Panel with Direct LDL reflex; Future  - Hemoglobin A1C; Future  - Comprehensive metabolic panel; Future  - Lipid Panel with Direct LDL reflex  - Comprehensive metabolic panel  -Reminded to schedule dilated eye exam with Optometry  -Follow-up in 6 months and sooner as needed    2. Primary hypertension    -Stable with current regimen, continue Lopressor 25 mg p.o. twice daily and amlodipine 5 mg p.o. daily    3. Class 2 obesity due to excess calories without serious comorbidity with body mass index (BMI) of 36.0 to 36.9 in adult    -Continue diet and lifestyle modifications    4. KATIANA (obstructive sleep apnea)    -Appointment this month with sleep medicine      Mario Foreman III acknowledged understanding of treatment plan, all questions answered.    Subjective      Mario Foreman III is a 49 y.o. male.  who presents for follow up of diabetes.  He presents with his mother.  Patient denies foot ulcerations, increased appetite, nausea, paresthesia of the feet, polydipsia, polyuria, visual disturbances, and vomiting. Evaluation to date has included: hemoglobin A1C. Home sugars: patient does not check sugars. Current treatments: more intensive attention to diet which has been effective and Continued statin which has been effective. Last dilated eye exam : 23 .    The following portions of the patient's history were reviewed and updated as appropriate: allergies, current medications, past family history, past medical history, past social history, past surgical history and problem list.     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   (\"The patient's serum tested positive by [facility] and supports a diagnosis of heparin-induced-thrombocytopenia\")       Past Surgical History:   Procedure Laterality Date    CARDIAC ELECTROPHYSIOLOGY PROCEDURE N/A 11/7/2023    Procedure: Cardiac loop recorder implant;  Surgeon: Dario Naqvi MD;  Location: [facility];  Service: Cardiology       Family History   Problem Relation Age of Onset    Diabetes Mother     Arthritis Mother     Breast cancer Mother     Stroke Father     Prostate cancer Paternal Grandfather     Cancer Paternal Grandfather     Breast cancer Paternal Grandmother        Social History     Socioeconomic History    Marital status: Single     Spouse name: None    Number of children: None    Years of education: None    Highest education level: None   Occupational History    None   Tobacco Use    Smoking status: Never    Smokeless tobacco: Never   Vaping Use    Vaping status: Never Used   Substance and Sexual Activity    Alcohol use: Never    Drug use: Never    Sexual activity: Not Currently   Other Topics Concern    None   Social History Narrative    None     Social Determinants of Health     Financial Resource Strain: Not on file   Food Insecurity: No Food Insecurity (4/13/2023)    Hunger Vital Sign     Worried About Running Out of Food in the Last Year: Never true     Ran Out of Food in the Last Year: Never true   Transportation Needs: No Transportation Needs (4/13/2023)    PRAPARE - Transportation     Lack of Transportation (Medical): No     Lack of Transportation (Non-Medical): No   Physical Activity: Not on file " "  Stress: Not on file   Social Connections: Not on file   Intimate Partner Violence: Not on file   Housing Stability: Low Risk  (4/13/2023)    Housing Stability Vital Sign     Unable to Pay for Housing in the Last Year: No     Number of Places Lived in the Last Year: 1     Unstable Housing in the Last Year: No       Current Outpatient Medications   Medication Sig Dispense Refill    amLODIPine (NORVASC) 5 mg tablet Take 1 tablet (5 mg total) by mouth daily 90 tablet 1    apixaban (Eliquis) 5 mg Take 1 tablet (5 mg total) by mouth 2 (two) times a day 180 tablet 1    aspirin 81 mg chewable tablet Chew 1 tablet (81 mg total) daily 90 tablet 1    atorvastatin (LIPITOR) 40 mg tablet Take 1 tablet (40 mg total) by mouth every evening 90 tablet 1    loratadine (CLARITIN) 10 mg tablet Take 1 tablet (10 mg total) by mouth daily as needed for allergies  0    metoprolol tartrate (LOPRESSOR) 25 mg tablet Take 1 tablet (25 mg total) by mouth every 12 (twelve) hours 180 tablet 1    tamsulosin (FLOMAX) 0.4 mg Take 1 capsule (0.4 mg total) by mouth daily after dinner 100 capsule 1    bisacodyl (DULCOLAX) 5 mg EC tablet Take 2 tablets (10 mg total) by mouth once for 1 dose (Patient not taking: Reported on 4/18/2024) 2 tablet 0    levETIRAcetam (KEPPRA) 750 mg tablet Take 1 tablet (750 mg total) by mouth every 12 (twelve) hours (Patient not taking: Reported on 2/26/2024) 180 tablet 3    polyethylene glycol (GOLYTELY) 4000 mL solution Take 4,000 mL by mouth once for 1 dose (Patient not taking: Reported on 4/18/2024) 4000 mL 0     No current facility-administered medications for this visit.       Review of Systems     As noted in HPI    Objective      /78 (BP Location: Left arm, Patient Position: Sitting, Cuff Size: Large)   Pulse 72   Temp 98.2 °F (36.8 °C) (Temporal)   Resp 14   Ht 6' 1\" (1.854 m)   Wt 127 kg (279 lb)   SpO2 99%   BMI 36.81 kg/m²     Physical Exam  Vitals reviewed.   Constitutional:       General: He is " "not in acute distress.     Appearance: Normal appearance. He is obese.   HENT:      Head: Normocephalic and atraumatic.      Mouth/Throat:      Mouth: Mucous membranes are moist.      Pharynx: Oropharynx is clear.   Eyes:      Extraocular Movements: Extraocular movements intact.      Conjunctiva/sclera: Conjunctivae normal.      Pupils: Pupils are equal, round, and reactive to light.   Cardiovascular:      Rate and Rhythm: Normal rate and regular rhythm.      Pulses: Normal pulses.      Heart sounds: Normal heart sounds.   Pulmonary:      Effort: Pulmonary effort is normal.      Breath sounds: Normal breath sounds.   Abdominal:      General: Abdomen is flat. Bowel sounds are normal.      Palpations: Abdomen is soft.      Tenderness: There is no abdominal tenderness.   Musculoskeletal:      Cervical back: Neck supple.      Right lower leg: No edema.      Left lower leg: No edema.   Lymphadenopathy:      Cervical: No cervical adenopathy.   Skin:     General: Skin is warm and dry.      Capillary Refill: Capillary refill takes less than 2 seconds.   Neurological:      Mental Status: He is alert and oriented to person, place, and time.   Psychiatric:         Mood and Affect: Mood normal.         Behavior: Behavior normal.         Thought Content: Thought content normal.             Some portions of this record may have been generated with voice recognition software. There may be translation, syntax, or grammatical errors. Occasional wrong word or \"sound-a-like\" substitutions may have occurred due to the inherent limitations of the voice recognition software. Read the chart carefully and recognize, using context, where substations may have occurred. If you have any questions, please contact the dictating provider for clarification or correction, as needed.      "

## 2024-08-18 DIAGNOSIS — I63.512 ACUTE ISCHEMIC LEFT MCA STROKE (HCC): ICD-10-CM

## 2024-08-18 DIAGNOSIS — I10 PRIMARY HYPERTENSION: ICD-10-CM

## 2024-08-18 RX ORDER — ATORVASTATIN CALCIUM 40 MG/1
40 TABLET, FILM COATED ORAL EVERY EVENING
Qty: 90 TABLET | Refills: 1 | Status: SHIPPED | OUTPATIENT
Start: 2024-08-18

## 2024-08-18 RX ORDER — AMLODIPINE BESYLATE 5 MG/1
5 TABLET ORAL DAILY
Qty: 90 TABLET | Refills: 1 | Status: SHIPPED | OUTPATIENT
Start: 2024-08-18

## 2024-08-20 ENCOUNTER — OFFICE VISIT (OUTPATIENT)
Age: 49
End: 2024-08-20
Payer: COMMERCIAL

## 2024-08-20 VITALS
TEMPERATURE: 98 F | SYSTOLIC BLOOD PRESSURE: 114 MMHG | OXYGEN SATURATION: 98 % | WEIGHT: 283 LBS | HEART RATE: 84 BPM | RESPIRATION RATE: 16 BRPM | DIASTOLIC BLOOD PRESSURE: 76 MMHG | HEIGHT: 73 IN | BODY MASS INDEX: 37.51 KG/M2

## 2024-08-20 DIAGNOSIS — E66.9 OBESITY (BMI 30-39.9): ICD-10-CM

## 2024-08-20 DIAGNOSIS — G47.33 OSA ON CPAP: Primary | ICD-10-CM

## 2024-08-20 PROCEDURE — 99214 OFFICE O/P EST MOD 30 MIN: CPT | Performed by: INTERNAL MEDICINE

## 2024-08-20 NOTE — PROGRESS NOTES
"Assessment/Plan:   Diagnoses and all orders for this visit:    KATIANA on CPAP    Obesity (BMI 30-39.9)    Moderate obstructive sleep apnea on auto CPAP consistently using it with decreased nocturnal awakenings and feels well rested when he is up in the morning.  Reviewed CPAP download compliance for the past 30 days with 99% compliance, no evidence of any mask leak acceptable residual AHI of 0.6  Discussed with the patient to continue with the current settings  Cleaning of the supplies and change of PAP supplies discussed  Cautioned against driving when sleepy.  Recommend weight loss  Follow-up in 1 year or earlier as needed    Return in about 1 year (around 8/20/2025).  All questions are answered to the patient's satisfaction and understanding.  He verbalizes understanding.  He is encouraged to call with any further questions or concerns.    Portions of the record may have been created with voice recognition software.  Occasional wrong word or \"sound a like\" substitutions may have occurred due to the inherent limitations of voice recognition software.  Read the chart carefully and recognize, using context, where substitutions have occurred.    Electronically Signed by Hortencia Ray MD    ______________________________________________________________________    Chief Complaint:   Chief Complaint   Patient presents with    Follow-up       Patient ID: Mario is a 49 y.o. y.o. male has a past medical history of Allergic (04/01/2023), Hypertension (03/31/2023), Hypertensive emergency (03/30/2023), Obesity (03/31/2023), Seizures (HCC) (03/31/2023), Sleep apnea, obstructive, Stroke (HCC) (03/30/2023), and Visual impairment (03/30/2023).    8/20/2024  Patient presents today for follow-up visit.  Accompanied for the office visit by his mother  Patient with multiple medical problems, recent diagnosis of moderate obstructive sleep apnea by diagnostic sleep study was placed on auto CPAP consistently using it for the past 30 " days with decreased nocturnal awakenings and he states he feels well rested when he is up in the morning.      General  Pertinent negatives include no chest pain, fever, headaches, myalgias or sore throat.       Review of Systems   Constitutional: Negative.  Negative for appetite change and fever.   HENT: Negative.  Negative for ear pain, postnasal drip, rhinorrhea, sneezing, sore throat and trouble swallowing.    Eyes: Negative.    Respiratory: Negative.     Cardiovascular: Negative.  Negative for chest pain.   Gastrointestinal: Negative.    Endocrine: Negative.    Genitourinary: Negative.    Musculoskeletal: Negative.  Negative for myalgias.   Allergic/Immunologic: Negative.    Neurological: Negative.  Negative for headaches.   Psychiatric/Behavioral: Negative.         Smoking history: He reports that he has never smoked. He has never used smokeless tobacco.    The following portions of the patient's history were reviewed and updated as appropriate: allergies, current medications, past family history, past medical history, past social history, past surgical history, and problem list.    Immunization History   Administered Date(s) Administered    COVID-19 Pfizer mRNA vacc PF ezequiel-sucrose 12 yr and older (Comirnaty) 12/14/2023    Influenza, injectable, quadrivalent, preservative free 0.5 mL 12/04/2023    Pneumococcal Conjugate Vaccine 20-valent (Pcv20), Polysace 05/22/2023    Tdap 05/22/2023     Current Outpatient Medications   Medication Sig Dispense Refill    amLODIPine (NORVASC) 5 mg tablet Take 1 tablet (5 mg total) by mouth daily 90 tablet 1    apixaban (Eliquis) 5 mg Take 1 tablet (5 mg total) by mouth 2 (two) times a day 180 tablet 1    aspirin 81 mg chewable tablet Chew 1 tablet (81 mg total) daily 90 tablet 1    atorvastatin (LIPITOR) 40 mg tablet Take 1 tablet (40 mg total) by mouth every evening 90 tablet 1    loratadine (CLARITIN) 10 mg tablet Take 1 tablet (10 mg total) by mouth daily as needed for  "allergies  0    metoprolol tartrate (LOPRESSOR) 25 mg tablet Take 1 tablet (25 mg total) by mouth every 12 (twelve) hours 180 tablet 1    tamsulosin (FLOMAX) 0.4 mg Take 1 capsule (0.4 mg total) by mouth daily after dinner 100 capsule 1    levETIRAcetam (KEPPRA) 750 mg tablet Take 1 tablet (750 mg total) by mouth every 12 (twelve) hours (Patient not taking: Reported on 2/26/2024) 180 tablet 3     No current facility-administered medications for this visit.     Allergies: Heparin    Objective:  Vitals:    08/20/24 0914 08/20/24 0915   BP: 114/76    BP Location: Left arm    Patient Position: Sitting    Cuff Size: Large    Pulse: 84    Resp: 16    Temp: 98 °F (36.7 °C)    SpO2: 98% 98%   Weight: 128 kg (283 lb)    Height: 6' 1\" (1.854 m)    Oxygen Therapy  SpO2: 98 %  Oxygen Therapy: None (Room air)  .  Wt Readings from Last 3 Encounters:   08/20/24 128 kg (283 lb)   08/06/24 127 kg (279 lb)   07/26/24 127 kg (280 lb)     Body mass index is 37.34 kg/m².    Physical Exam  Constitutional:       Appearance: He is well-developed.   HENT:      Head: Normocephalic and atraumatic.   Eyes:      Pupils: Pupils are equal, round, and reactive to light.   Neck:      Comments: Short and wide neck  Cardiovascular:      Rate and Rhythm: Normal rate and regular rhythm.      Heart sounds: Normal heart sounds.   Pulmonary:      Effort: Pulmonary effort is normal.      Breath sounds: Normal breath sounds.   Musculoskeletal:         General: Normal range of motion.      Cervical back: Normal range of motion and neck supple.   Skin:     General: Skin is warm and dry.   Neurological:      Mental Status: He is alert and oriented to person, place, and time.   Psychiatric:         Behavior: Behavior normal.         Lab Review:   Office Visit on 08/06/2024   Component Date Value    Hemoglobin A1C 08/06/2024 6     Creatinine, Ur 08/06/2024 263.3     Albumin,U,Random 08/06/2024 8.9     Albumin Creat Ratio 08/06/2024 3    Appointment on 07/22/2024 "   Component Date Value    Sodium 07/22/2024 140     Potassium 07/22/2024 4.4     Chloride 07/22/2024 103     CO2 07/22/2024 27     ANION GAP 07/22/2024 10     BUN 07/22/2024 13     Creatinine 07/22/2024 1.01     Glucose, Fasting 07/22/2024 91     Calcium 07/22/2024 9.2     AST 07/22/2024 19     ALT 07/22/2024 28     Alkaline Phosphatase 07/22/2024 78     Total Protein 07/22/2024 6.8     Albumin 07/22/2024 3.8     Total Bilirubin 07/22/2024 0.76     eGFR 07/22/2024 88     WBC 07/22/2024 7.45     RBC 07/22/2024 5.20     Hemoglobin 07/22/2024 15.2     Hematocrit 07/22/2024 47.7     MCV 07/22/2024 92     MCH 07/22/2024 29.2     MCHC 07/22/2024 31.9     RDW 07/22/2024 12.8     MPV 07/22/2024 10.5     Platelets 07/22/2024 309     nRBC 07/22/2024 0     Segmented % 07/22/2024 58     Immature Grans % 07/22/2024 0     Lymphocytes % 07/22/2024 29     Monocytes % 07/22/2024 7     Eosinophils Relative 07/22/2024 5     Basophils Relative 07/22/2024 1     Absolute Neutrophils 07/22/2024 4.33     Absolute Immature Grans 07/22/2024 0.02     Absolute Lymphocytes 07/22/2024 2.16     Absolute Monocytes 07/22/2024 0.55     Eosinophils Absolute 07/22/2024 0.35     Basophils Absolute 07/22/2024 0.04    Orders Only on 06/04/2024   Component Date Value    Supplier Name 06/04/2024 [employer]     Supplier Phone Number 06/04/2024 [phone]     Order Status 06/04/2024 Delivery Successful     Delivery Request Date 06/04/2024 06/04/2024     Date Delivered  06/04/2024 06/17/2024     Supplier Name 06/04/2024 06/17/2024     Item Description 06/04/2024 CPAP Machine, Generic     Item Description 06/04/2024 PAP Mask, Nasal, Generic, Fit Upon Setup, 1 per 3 months     Item Description 06/04/2024 PAP Mask Interface Cushion, Nasal, 2 per 1 month     Item Description 06/04/2024 PAP Headgear, 1 per 6 months     Item Description 06/04/2024 PAP Humidifier, Heated     Item Description 06/04/2024 Disposable PAP Filter, 2 per 1 month      Item Description 06/04/2024 Non-Disposable PAP Filter, 1 per 6 months     Item Description 06/04/2024 PAP Machine Tubing, Heated, 1 per 3 months     Item Description 06/04/2024 Humidifier Water Chamber, 1 per 6 months     Item Description 06/04/2024 PAP Chinstrap, 1 per 6 months    Appointment on 02/22/2024   Component Date Value    WBC 02/22/2024 10.06     RBC 02/22/2024 5.67 (H)     Hemoglobin 02/22/2024 16.1     Hematocrit 02/22/2024 50.3 (H)     MCV 02/22/2024 89     MCH 02/22/2024 28.4     MCHC 02/22/2024 32.0     RDW 02/22/2024 12.7     MPV 02/22/2024 9.8     Platelets 02/22/2024 361     nRBC 02/22/2024 0     Segmented % 02/22/2024 66     Immature Grans % 02/22/2024 0     Lymphocytes % 02/22/2024 23     Monocytes % 02/22/2024 6     Eosinophils Relative 02/22/2024 4     Basophils Relative 02/22/2024 1     Absolute Neutrophils 02/22/2024 6.65     Absolute Immature Grans 02/22/2024 0.02     Absolute Lymphocytes 02/22/2024 2.35     Absolute Monocytes 02/22/2024 0.56     Eosinophils Absolute 02/22/2024 0.43     Basophils Absolute 02/22/2024 0.05     Sodium 02/22/2024 142     Potassium 02/22/2024 4.7     Chloride 02/22/2024 103     CO2 02/22/2024 29     ANION GAP 02/22/2024 10     BUN 02/22/2024 15     Creatinine 02/22/2024 0.99     Glucose, Fasting 02/22/2024 127 (H)     Calcium 02/22/2024 9.7     AST 02/22/2024 27     ALT 02/22/2024 45     Alkaline Phosphatase 02/22/2024 90     Total Protein 02/22/2024 7.8     Albumin 02/22/2024 4.2     Total Bilirubin 02/22/2024 0.79     eGFR 02/22/2024 90     Hepatitis C Ab 02/22/2024 Non-reactive     HIV-1 p24 Antigen 02/22/2024 Non-Reactive     HIV-1 Antibody 02/22/2024 Non-Reactive     HIV-2 Antibody 02/22/2024 Non-Reactive     HIV Ag-Ab 5th Gen 02/22/2024 Non-Reactive     Hemoglobin A1C 02/22/2024 5.9 (H)     EAG 02/22/2024 123     Cholesterol 02/22/2024 114     Triglycerides 02/22/2024 107     HDL, Direct 02/22/2024 40     LDL Calculated 02/22/2024 53         Diagnostics:  I have personally reviewed pertinent reports.    CPAP download compliance reviewed for the past 30 days with 99% compliance, started in June 18 when he first received that its only 1 night that he did not use it when he did not have the power no evidence of any mask leak acceptable residual AHI of 0.6    ESS: Total score: 1    Answers submitted by the patient for this visit:  Pulmonology Questionnaire (Submitted on 8/13/2024)  Chief Complaint: Primary symptoms  Chronicity: recurrent  When did you first notice your symptoms?: more than 1 month ago  Since you first noticed this problem, how has it changed?: gradually improving  Do you have shortness of breath that occurs with effort or exertion?: Yes  Do you have ear congestion?: No  Do you have heartburn?: No  Do you have fatigue?: Yes  Do you have nasal congestion?: No  Do you have shortness of breath when lying flat?: No  Do you have shortness of breath when you wake up?: No  Do you have sweats?: No  Have you experienced weight loss?: No

## 2024-08-22 ENCOUNTER — REMOTE DEVICE CLINIC VISIT (OUTPATIENT)
Dept: CARDIOLOGY CLINIC | Facility: CLINIC | Age: 49
End: 2024-08-22
Payer: COMMERCIAL

## 2024-08-22 DIAGNOSIS — I63.9 CRYPTOGENIC STROKE (HCC): Primary | ICD-10-CM

## 2024-08-22 PROCEDURE — 93298 REM INTERROG DEV EVAL SCRMS: CPT | Performed by: INTERNAL MEDICINE

## 2024-08-22 NOTE — PROGRESS NOTES
"MDT LNQ22 ICM - ACTIVE SYSTEM IS MRI CONDITIONAL   CARELINK TRANSMISSION: LOOP RECORDER. PRESENTING RHYTHM SB @ 50 BPM. BATTERY STATUS \"OK.\" NO PATIENT OR DEVICE ACTIVATED EPISODES. NORMAL DEVICE FUNCTION. DL   "

## 2024-09-19 LAB
LEFT EYE DIABETIC RETINOPATHY: NORMAL
RIGHT EYE DIABETIC RETINOPATHY: NORMAL

## 2024-10-06 DIAGNOSIS — I26.92 ACUTE SADDLE PULMONARY EMBOLISM WITHOUT ACUTE COR PULMONALE (HCC): ICD-10-CM

## 2024-10-19 DIAGNOSIS — R33.9 URINARY RETENTION: ICD-10-CM

## 2024-10-20 RX ORDER — TAMSULOSIN HYDROCHLORIDE 0.4 MG/1
0.4 CAPSULE ORAL
Qty: 90 CAPSULE | Refills: 1 | Status: SHIPPED | OUTPATIENT
Start: 2024-10-20

## 2024-10-22 ENCOUNTER — OFFICE VISIT (OUTPATIENT)
Dept: NEUROLOGY | Facility: CLINIC | Age: 49
End: 2024-10-22
Payer: COMMERCIAL

## 2024-10-22 VITALS
TEMPERATURE: 98.4 F | BODY MASS INDEX: 38.62 KG/M2 | OXYGEN SATURATION: 98 % | WEIGHT: 291.4 LBS | HEART RATE: 84 BPM | HEIGHT: 73 IN | SYSTOLIC BLOOD PRESSURE: 132 MMHG | DIASTOLIC BLOOD PRESSURE: 86 MMHG

## 2024-10-22 DIAGNOSIS — R56.9 SEIZURES (HCC): ICD-10-CM

## 2024-10-22 DIAGNOSIS — I63.9 CEREBRAL INFARCTION (HCC): Primary | ICD-10-CM

## 2024-10-22 DIAGNOSIS — D75.829 HIT (HEPARIN-INDUCED THROMBOCYTOPENIA) (HCC): ICD-10-CM

## 2024-10-22 DIAGNOSIS — I26.92 ACUTE SADDLE PULMONARY EMBOLISM WITHOUT ACUTE COR PULMONALE (HCC): ICD-10-CM

## 2024-10-22 DIAGNOSIS — I63.512 ACUTE ISCHEMIC LEFT MCA STROKE (HCC): ICD-10-CM

## 2024-10-22 DIAGNOSIS — G47.33 OSA (OBSTRUCTIVE SLEEP APNEA): ICD-10-CM

## 2024-10-22 PROCEDURE — 99215 OFFICE O/P EST HI 40 MIN: CPT | Performed by: PSYCHIATRY & NEUROLOGY

## 2024-10-22 RX ORDER — ASPIRIN 81 MG/1
81 TABLET, CHEWABLE ORAL DAILY
Qty: 90 TABLET | Refills: 1 | Status: SHIPPED | OUTPATIENT
Start: 2024-10-22

## 2024-10-22 RX ORDER — ATORVASTATIN CALCIUM 40 MG/1
40 TABLET, FILM COATED ORAL EVERY EVENING
Qty: 90 TABLET | Refills: 1 | Status: SHIPPED | OUTPATIENT
Start: 2024-10-22

## 2024-10-22 NOTE — PROGRESS NOTES
Ambulatory Visit  Name: Mario Foreman III      : 1975      MRN: [id number]  Encounter Provider: Moni Everett MD  Encounter Date: 10/22/2024   Encounter department: [facility] [place]    This is a 50 y/o  Male who is here as a follow up for history of CVA, and seizures.   Assessment & Plan  Cerebral infarction (HCC)  -for secondary stroke prevention, recommend continuation of combination of aspirin, eliquis and atorvastatin  -Blood Pressure goal < 130/80, BP is at goal today   -LDL goal <70  -snoring - wears CPAP mask at night   -OT referral placed    Counseling/stroke education -   -I advised patient to avoid using NSAIDs for headaches or other pain and to stick to tylenol if needed  -Recommend lifestyle modifications such as mediterranean diet & regular exercise regimen atleast 4-5 times a week for 20-30 minutes.   -I educated patient/family regarding medication compliance  -encourage smoking cessation, and control of diabetes and hypertension; defer management to primary   Orders:    Ambulatory Referral to Occupational Therapy; Future      Acute ischemic left MCA stroke (HCC)    Orders:    aspirin 81 mg chewable tablet; Chew 1 tablet (81 mg total) daily    atorvastatin (LIPITOR) 40 mg tablet; Take 1 tablet (40 mg total) by mouth every evening    Acute saddle pulmonary embolism without acute cor pulmonale (HCC)    Orders:    apixaban (Eliquis) 5 mg; Take 1 tablet (5 mg total) by mouth 2 (two) times a day    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  Patient/Guardian was advised to the call the office if they have any questions and concerns in the meantime.     Patient/Guardian does understand that if they have any new stroke like symptoms such as facial droop on one side, weakness/paralysis on  "either side, speech trouble, numbness on one side, balance issues, any vision changes, extreme dizziness or any new headache, to call 9-1-1 immediately or to proceed to the nearest ER immediately.        History of Present Illness     HPI    Patient is a 50 y/o male presenting for follow-up after a L MCA stroke in March 2023.     He complains of some residual “difficulties with language”, peripheral vision loss on the right side, and a tremor in the mornings. He endorses improvement of symptoms. Denies any new onset weakness, sensory loss, headaches, or vision changes. He is complaint w/ his medications and is tolerating them well without any issues.     Review of Systems   Constitutional: Negative.    HENT: Negative.     Eyes: Negative.    Respiratory: Negative.     Cardiovascular: Negative.    Gastrointestinal: Negative.    Endocrine: Negative.    Genitourinary: Negative.    Musculoskeletal: Negative.    Skin: Negative.    Allergic/Immunologic: Negative.    Neurological: Negative.    Hematological: Negative.    Psychiatric/Behavioral: Negative.     All other systems reviewed and are negative.    I have personally reviewed the MA's review of systems and made changes as necessary.      Objective     /86   Pulse 84   Temp 98.4 °F (36.9 °C) (Temporal)   Ht 6' 1\" (1.854 m)   Wt 132 kg (291 lb 6.4 oz)   SpO2 98%   BMI 38.45 kg/m²     Physical Exam  General - patient is alert   Speech - no dysarthria noted, no aphasia noted.     Neuro:   Cranial nerves: PERRL, EOMI, facial sensation intact to soft touch in V1, V2 and V3, no facial asymmetry noted, uvula/palate midline, tongue midline.   Motor: 5/5 throughout, normal tone, no pronator drift noted.   Sensory - intact to soft touch throughout  Reflexes - 2+ throughout  Coordination - no ataxia/dysmetria noted  Gait - normal    Neurologic Exam    Results/Data:  I have reviewed the results and images from the in detail with the patient.        Administrative " Statements   I have spent a total time of 40 minutes in caring for this patient on the day of the visit/encounter including Risks and benefits of tx options, Instructions for management, Patient and family education, Counseling / Coordination of care, Documenting in the medical record, Reviewing / ordering tests, medicine, procedures  , Obtaining or reviewing history  , and Communicating with other healthcare professionals .

## 2024-10-22 NOTE — ASSESSMENT & PLAN NOTE
Orders:    aspirin 81 mg chewable tablet; Chew 1 tablet (81 mg total) daily    atorvastatin (LIPITOR) 40 mg tablet; Take 1 tablet (40 mg total) by mouth every evening

## 2024-10-22 NOTE — ASSESSMENT & PLAN NOTE
Follow up in 6 months     I would be happy to see the patient sooner if any new questions/concerns arise.  Patient/Guardian was advised to the call the office if they have any questions and concerns in the meantime.     Patient/Guardian does understand that if they have any new stroke like symptoms such as facial droop on one side, weakness/paralysis on either side, speech trouble, numbness on one side, balance issues, any vision changes, extreme dizziness or any new headache, to call 9-1-1 immediately or to proceed to the nearest ER immediately.

## 2024-10-30 ENCOUNTER — OFFICE VISIT (OUTPATIENT)
Dept: CARDIOLOGY CLINIC | Facility: MEDICAL CENTER | Age: 49
End: 2024-10-30
Payer: COMMERCIAL

## 2024-10-30 VITALS
HEART RATE: 85 BPM | DIASTOLIC BLOOD PRESSURE: 80 MMHG | SYSTOLIC BLOOD PRESSURE: 140 MMHG | HEIGHT: 73 IN | WEIGHT: 293 LBS | OXYGEN SATURATION: 98 % | BODY MASS INDEX: 38.83 KG/M2

## 2024-10-30 DIAGNOSIS — I63.512 ACUTE ISCHEMIC LEFT MCA STROKE (HCC): Primary | ICD-10-CM

## 2024-10-30 DIAGNOSIS — I10 PRIMARY HYPERTENSION: ICD-10-CM

## 2024-10-30 PROCEDURE — 99214 OFFICE O/P EST MOD 30 MIN: CPT | Performed by: INTERNAL MEDICINE

## 2024-10-30 NOTE — PROGRESS NOTES
"Cardiology Follow Up    Mario VALDES Ahsan III  1975  [id number]  [facility] [place]  [address]  [address]  [facility] [address] [phone]      Assessment & Plan  Acute ischemic left MCA stroke (HCC)  status post Holter monitor revealing no significant atrial fibrillation or flutter or other arrhythmias.    Echocardiogram in April 2023 revealed normal LV size and function with mild LVH and normal diastology.  Mildly dilated RV with moderate pulmonary hypertension, consistent with prior diagnosis of acute pulmonary embolism 1 month after his stroke.    He is continued on Eliquis due to pulmonary embolism, however decision to continue on Eliquis long-term will be decided based on whether he has atrial fibrillation.    He is on Eliquis for saddle PE that Hematology has recommended that he stay on for 2 years.  Options are either to do Zio monitor for 2 weeks, LOOP recorder for 3 years, or to continue Eliquis indefinitely.  He has decided to proceed with LOOP recorder, which has not revealed any significant atrial fibrillation burden as of yet  we discussed options regarding stopping Eliquis unless he is found to have atrial fibrillation on his loop recorder interrogations versus continuing on anticoagulant long-term - for now, will continue  Primary hypertension  Relatively well-controlled on amlodipine and metoprolol.   No changes made today.    Chief Complaint   Patient presents with    Follow-up     1 year f/up. No complaints.       Interval History: Patient feels well, without complaints.  No reported chest pain, shortness of breath, palpitations, lightheadedness, syncope, LE edema, orthopnea, PND, or significant weight changes.  Patient remains active without any increased fatigue out of the ordinary.        Blood pressure 140/80, pulse 85, height 6' 1\" (1.854 m), weight 133 kg (293 lb), SpO2 98%.      Review of Systems:  Review of Systems   Constitutional:  Negative " for activity change, appetite change, fatigue and fever.   HENT:  Negative for nosebleeds and sore throat.    Eyes:  Negative for photophobia and visual disturbance.   Respiratory:  Negative for cough, chest tightness, shortness of breath and wheezing.    Cardiovascular:  Negative for chest pain, palpitations and leg swelling.   Gastrointestinal:  Negative for abdominal pain, diarrhea, nausea and vomiting.   Endocrine: Negative for polyuria.   Genitourinary:  Negative for dysuria, frequency and hematuria.   Musculoskeletal:  Negative for arthralgias, back pain and gait problem.   Skin:  Negative for pallor and rash.   Neurological:  Negative for dizziness, syncope, speech difficulty and light-headedness.   Hematological:  Does not bruise/bleed easily.   Psychiatric/Behavioral:  Negative for agitation, behavioral problems and confusion.        Physical Exam:  Physical Exam  Vitals reviewed.   Constitutional:       General: He is not in acute distress.     Appearance: Normal appearance. He is well-developed. He is not diaphoretic.   HENT:      Head: Normocephalic and atraumatic.      Nose: Nose normal.   Eyes:      General: No scleral icterus.     Extraocular Movements: Extraocular movements intact.      Pupils: Pupils are equal, round, and reactive to light.   Neck:      Vascular: No JVD.   Cardiovascular:      Rate and Rhythm: Normal rate and regular rhythm.      Heart sounds: S1 normal and S2 normal. Heart sounds not distant. No murmur heard.     No systolic murmur is present.      No friction rub. No gallop. No S3 sounds.   Pulmonary:      Effort: Pulmonary effort is normal. No respiratory distress.      Breath sounds: Normal breath sounds. No wheezing or rales.   Abdominal:      General: Bowel sounds are normal. There is no distension.      Palpations: Abdomen is soft.   Musculoskeletal:         General: No deformity.      Cervical back: Normal range of motion and neck supple.      Right lower leg: No edema.       Left lower leg: No edema.   Skin:     General: Skin is warm and dry.      Findings: No erythema.   Neurological:      Mental Status: He is alert and oriented to person, place, and time.      Cranial Nerves: No cranial nerve deficit.   Psychiatric:         Mood and Affect: Mood normal.         Behavior: Behavior normal.         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)    Urinary retention    DVT of lower extremity, bilateral (HCC)    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)    Aphasia due to recent cerebrovascular accident (CVA)    Rhinorrhea    Seizures (HCC)    Back pain    Global aphasia    Vasovagal syncope    Coagulopathy (HCC)    Visual problems    Positive colorectal cancer screening using Cologuard test    Anticoagulant long-term use    Polycythemia    KATIANA (obstructive sleep apnea)    PLMD (periodic limb movement disorder)     Past Medical History:   Diagnosis Date    Allergic 04/01/2023    Hypertension 03/31/2023    Hypertensive emergency 03/30/2023    Obesity 03/31/2023    Seizures (HCC) 03/31/2023    Sleep apnea, obstructive     Stroke ([facility]) 03/30/2023    Visual impairment 03/30/2023     Social History     Socioeconomic History    Marital status: Single     Spouse name: Not on file    Number of children: Not on file    Years of education: Not on file    Highest education level: Not on file   Occupational History    Not on file   Tobacco Use    Smoking status: Never    Smokeless tobacco: Never   Vaping Use    Vaping status: Never Used   Substance and  Sexual Activity    Alcohol use: Not Currently     Comment: occasionally, less than twice in a month    Drug use: Never    Sexual activity: Not Currently     Partners: Female     Birth control/protection: None   Other Topics Concern    Not on file   Social History Narrative    Not on file     Social Determinants of Health     Financial Resource Strain: Not on file   Food Insecurity: No Food Insecurity (4/13/2023)    Hunger Vital Sign     Worried About Running Out of Food in the Last Year: Never true     Ran Out of Food in the Last Year: Never true   Transportation Needs: No Transportation Needs (4/13/2023)    PRAPARE - Transportation     Lack of Transportation (Medical): No     Lack of Transportation (Non-Medical): No   Physical Activity: Not on file   Stress: Not on file   Social Connections: Not on file   Intimate Partner Violence: Not on file   Housing Stability: Low Risk  (4/13/2023)    Housing Stability Vital Sign     Unable to Pay for Housing in the Last Year: No     Number of Places Lived in the Last Year: 1     Unstable Housing in the Last Year: No      Family History   Problem Relation Age of Onset    Diabetes Mother     Arthritis Mother     Breast cancer Mother     Cancer Mother         breast cancer    Hypertension Mother     Stroke Father     Cancer Father         esophageal cancer    Hypertension Father     Prostate cancer Paternal Grandfather     Cancer Paternal Grandfather     Breast cancer Paternal Grandmother     Hypertension Maternal Grandfather     Hypertension Maternal Grandmother      Past Surgical History:   Procedure Laterality Date    CARDIAC ELECTROPHYSIOLOGY PROCEDURE N/A 11/7/2023    Procedure: Cardiac loop recorder implant;  Surgeon: Dario Naqvi MD;  Location: [facility];  Service: Cardiology       Current Outpatient Medications:     amLODIPine (NORVASC) 5 mg tablet, Take 1 tablet (5 mg total) by mouth daily, Disp: 90 tablet, Rfl: 1    apixaban (Eliquis) 5 mg, Take 1 tablet  "(5 mg total) by mouth 2 (two) times a day, Disp: 180 tablet, Rfl: 1    aspirin 81 mg chewable tablet, Chew 1 tablet (81 mg total) daily, Disp: 90 tablet, Rfl: 1    atorvastatin (LIPITOR) 40 mg tablet, Take 1 tablet (40 mg total) by mouth every evening, Disp: 90 tablet, Rfl: 1    loratadine (CLARITIN) 10 mg tablet, Take 1 tablet (10 mg total) by mouth daily as needed for allergies, Disp: , Rfl: 0    metoprolol tartrate (LOPRESSOR) 25 mg tablet, Take 1 tablet (25 mg total) by mouth every 12 (twelve) hours, Disp: 180 tablet, Rfl: 1  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   (\"The patient's serum tested positive by [facility] and supports a diagnosis of heparin-induced-thrombocytopenia\")       Labs:  Orders Only on 09/19/2024   Component Date Value    Right Eye Diabetic Retin* 09/19/2024 None     Left Eye Diabetic Retino* 09/19/2024 None    Office Visit on 08/06/2024   Component Date Value    Hemoglobin A1C 08/06/2024 6     Creatinine, Ur 08/06/2024 263.3     Albumin,U,Random 08/06/2024 8.9     Albumin Creat Ratio 08/06/2024 3    Appointment on 07/22/2024   Component Date Value    Sodium 07/22/2024 140     Potassium 07/22/2024 4.4     Chloride 07/22/2024 103     CO2 07/22/2024 27     ANION GAP 07/22/2024 10     BUN 07/22/2024 13     Creatinine 07/22/2024 1.01     Glucose, Fasting 07/22/2024 91     Calcium 07/22/2024 9.2     AST 07/22/2024 19     ALT 07/22/2024 28     Alkaline Phosphatase 07/22/2024 78     Total Protein 07/22/2024 6.8     Albumin 07/22/2024 3.8     Total Bilirubin 07/22/2024 0.76     eGFR 07/22/2024 88     WBC 07/22/2024 7.45     RBC 07/22/2024 5.20     Hemoglobin 07/22/2024 15.2     Hematocrit 07/22/2024 47.7     MCV 07/22/2024 92     MCH 07/22/2024 29.2     MCHC 07/22/2024 31.9     RDW 07/22/2024 12.8     MPV 07/22/2024 10.5     Platelets 07/22/2024 309     nRBC 07/22/2024 0     Segmented % 07/22/2024 58     Immature " Grans % 07/22/2024 0     Lymphocytes % 07/22/2024 29     Monocytes % 07/22/2024 7     Eosinophils Relative 07/22/2024 5     Basophils Relative 07/22/2024 1     Absolute Neutrophils 07/22/2024 4.33     Absolute Immature Grans 07/22/2024 0.02     Absolute Lymphocytes 07/22/2024 2.16     Absolute Monocytes 07/22/2024 0.55     Eosinophils Absolute 07/22/2024 0.35     Basophils Absolute 07/22/2024 0.04    Orders Only on 06/04/2024   Component Date Value    Supplier Name 06/04/2024 [employer]     Supplier Phone Number 06/04/2024 [phone]     Order Status 06/04/2024 Delivery Successful     Delivery Request Date 06/04/2024 06/04/2024     Date Delivered  06/04/2024 06/17/2024     Supplier Name 06/04/2024 06/17/2024     Item Description 06/04/2024 CPAP Machine, Generic     Item Description 06/04/2024 PAP Mask, Nasal, Generic, Fit Upon Setup, 1 per 3 months     Item Description 06/04/2024 PAP Mask Interface Cushion, Nasal, 2 per 1 month     Item Description 06/04/2024 PAP Headgear, 1 per 6 months     Item Description 06/04/2024 PAP Humidifier, Heated     Item Description 06/04/2024 Disposable PAP Filter, 2 per 1 month     Item Description 06/04/2024 Non-Disposable PAP Filter, 1 per 6 months     Item Description 06/04/2024 PAP Machine Tubing, Heated, 1 per 3 months     Item Description 06/04/2024 Humidifier Water Chamber, 1 per 6 months     Item Description 06/04/2024 PAP Chinstrap, 1 per 6 months      Lab Results   Component Value Date    TRIG 107 02/22/2024    HDL 40 02/22/2024     Imaging: No results found.

## 2024-10-30 NOTE — ASSESSMENT & PLAN NOTE
status post Holter monitor revealing no significant atrial fibrillation or flutter or other arrhythmias.    Echocardiogram in April 2023 revealed normal LV size and function with mild LVH and normal diastology.  Mildly dilated RV with moderate pulmonary hypertension, consistent with prior diagnosis of acute pulmonary embolism 1 month after his stroke.    He is continued on Eliquis due to pulmonary embolism, however decision to continue on Eliquis long-term will be decided based on whether he has atrial fibrillation.    He is on Eliquis for saddle PE that Hematology has recommended that he stay on for 2 years.  Options are either to do Zio monitor for 2 weeks, LOOP recorder for 3 years, or to continue Eliquis indefinitely.  He has decided to proceed with LOOP recorder, which has not revealed any significant atrial fibrillation burden as of yet  we discussed options regarding stopping Eliquis unless he is found to have atrial fibrillation on his loop recorder interrogations versus continuing on anticoagulant long-term - for now, will continue

## 2024-11-05 DIAGNOSIS — I10 PRIMARY HYPERTENSION: ICD-10-CM

## 2024-11-06 RX ORDER — METOPROLOL TARTRATE 25 MG/1
25 TABLET, FILM COATED ORAL EVERY 12 HOURS SCHEDULED
Qty: 180 TABLET | Refills: 1 | Status: SHIPPED | OUTPATIENT
Start: 2024-11-06

## 2024-12-13 ENCOUNTER — REMOTE DEVICE CLINIC VISIT (OUTPATIENT)
Dept: CARDIOLOGY CLINIC | Facility: CLINIC | Age: 49
End: 2024-12-13
Payer: COMMERCIAL

## 2024-12-13 DIAGNOSIS — I63.9 CRYPTOGENIC STROKE (HCC): Primary | ICD-10-CM

## 2024-12-13 PROCEDURE — 93298 REM INTERROG DEV EVAL SCRMS: CPT | Performed by: INTERNAL MEDICINE

## 2024-12-24 ENCOUNTER — RESULTS FOLLOW-UP (OUTPATIENT)
Dept: CARDIOLOGY CLINIC | Facility: CLINIC | Age: 49
End: 2024-12-24

## 2025-01-21 DIAGNOSIS — I10 PRIMARY HYPERTENSION: ICD-10-CM

## 2025-01-21 DIAGNOSIS — I26.92 CHRONIC SADDLE PULMONARY EMBOLISM WITHOUT ACUTE COR PULMONALE (HCC): Primary | ICD-10-CM

## 2025-01-21 DIAGNOSIS — I27.82 CHRONIC SADDLE PULMONARY EMBOLISM WITHOUT ACUTE COR PULMONALE (HCC): Primary | ICD-10-CM

## 2025-01-21 DIAGNOSIS — D69.6 THROMBOCYTOPENIA (HCC): ICD-10-CM

## 2025-01-21 DIAGNOSIS — I69.320 APHASIA DUE TO RECENT CEREBROVASCULAR ACCIDENT (CVA): ICD-10-CM

## 2025-01-21 DIAGNOSIS — I82.5Y3 CHRONIC DEEP VEIN THROMBOSIS (DVT) OF PROXIMAL VEIN OF BOTH LOWER EXTREMITIES (HCC): ICD-10-CM

## 2025-01-21 DIAGNOSIS — E11.9 TYPE 2 DIABETES MELLITUS WITHOUT COMPLICATION, WITHOUT LONG-TERM CURRENT USE OF INSULIN (HCC): ICD-10-CM

## 2025-01-23 ENCOUNTER — TELEPHONE (OUTPATIENT)
Dept: HEMATOLOGY ONCOLOGY | Facility: CLINIC | Age: 50
End: 2025-01-23

## 2025-01-23 NOTE — TELEPHONE ENCOUNTER
Sent mychart msg to pt reminding them they need to have labs done prior to upcoming appt.      Improved

## 2025-01-23 NOTE — TELEPHONE ENCOUNTER
Spoke with [facility] to have them fax patient's lab results to [facility]. Confirmed pt had labs completed on 1/22 and sending to [facility]

## 2025-01-27 ENCOUNTER — OFFICE VISIT (OUTPATIENT)
Dept: HEMATOLOGY ONCOLOGY | Facility: CLINIC | Age: 50
End: 2025-01-27
Payer: COMMERCIAL

## 2025-01-27 VITALS
HEIGHT: 73 IN | SYSTOLIC BLOOD PRESSURE: 132 MMHG | DIASTOLIC BLOOD PRESSURE: 80 MMHG | BODY MASS INDEX: 39.36 KG/M2 | RESPIRATION RATE: 18 BRPM | HEART RATE: 81 BPM | WEIGHT: 297 LBS | TEMPERATURE: 97.1 F | OXYGEN SATURATION: 98 %

## 2025-01-27 DIAGNOSIS — Z79.01 ANTICOAGULANT LONG-TERM USE: Primary | ICD-10-CM

## 2025-01-27 DIAGNOSIS — D75.829 HIT (HEPARIN-INDUCED THROMBOCYTOPENIA) (HCC): ICD-10-CM

## 2025-01-27 PROCEDURE — 99213 OFFICE O/P EST LOW 20 MIN: CPT | Performed by: PHYSICIAN ASSISTANT

## 2025-01-27 NOTE — ASSESSMENT & PLAN NOTE
The etiology of his thrombosis was likely HIT, critical illness and prolonged immobilization.  He has completed at least 6 to 12 months of anticoagulation which is recommended with extensive PE.  From hematology standpoint, he could be tried off of anticoagulation in this setting with repeating hypercoagulable testing however I explained to the patient he is also on Eliquis for stroke prophylaxis in setting of possible PAF.  He has a loop recorder in place for 1 year that has not shown any PAF.      I will defer decision for ongoing anticoagulation to his cardiologist and neurologist.    His thrombosis panel previously showed a weak IgA positive anticardiolipin antibody.  Protein C, S and Antithrombin III were low at the time of his presentation however repeat panel was normal.I explained to the patient/mother some of this testing is not reliable and on anticoagulation.  At this time, there is no indication to take him off of anticoagulation for testing as it would not  plan. If at anytime in the future anticoagulation is discontinued he should return to office for repeat hypercoagulable testing.

## 2025-01-27 NOTE — PROGRESS NOTES
Name: Mario Foreman III      : 1975      MRN: [id number]  Encounter Provider: Jazz Ba PA-C  Encounter Date: 2025   Encounter department: [facility] [place]  :    49-year-old male with history of L MCA stroke 2023 with development of HIT, PE/DVT in 2023 who presents as [facility]ENON/follow-up.  He has been on Eliquis 5 mg p.o. twice daily since diagnosis of his thrombosis.  Tolerating well without any bleeding.  He is asking today if anticoagulation can be discontinued.     Prior hospitalization records, thrombosis panel, related imaging, cardiology notes and neurology notes reviewed.  Assessment & Plan  Anticoagulant long-term use  The etiology of his thrombosis was likely HIT, critical illness and prolonged immobilization.  He has completed at least 6 to 12 months of anticoagulation which is recommended with extensive PE.  From hematology standpoint, he could be tried off of anticoagulation in this setting with repeating hypercoagulable testing however I explained to the patient he is also on Eliquis for stroke prophylaxis in setting of possible PAF.  He has a loop recorder in place for 1 year that has not shown any PAF.      I will defer decision for ongoing anticoagulation to his cardiologist and neurologist.    His thrombosis panel previously showed a weak IgA positive anticardiolipin antibody.  Protein C, S and Antithrombin III were low at the time of his presentation however repeat panel was normal.I explained to the patient/mother some of this testing is not reliable and on anticoagulation.  At this time, there is no indication to take him off of anticoagulation for testing as it would not  plan. If at anytime in the future anticoagulation is discontinued he should return to office for repeat hypercoagulable testing.         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)  Confirmed by  HIT+ ab serotonin release assay   on allergy  list   Has medical alert bracelet   Sub-Q fondaparinux or argatroban should be considered if bridge needed pre-operatively or for PCI  in future        RTC prn     Patients diagnosed with HIT based on clinical and laboratory data should scrupulously avoid all sources of heparin (including low molecular weight [LMW] heparin and heparin flushes) for life   Heparin is listed as allergy     History of Present Illness   No chief complaint on file.    49-year-old male who presents as transition of care for history of DVT, PE, CVA.    He has been seen by multiple providers previously.  Summarized history as below.    3/30/2023 via EMS was unresponsive on arrival. Patient was then intubated, he was not protecting his airway, GCS reported was 9. tPA not administered. Patient transferred to [facility] for neuro possible thrombectomy after imaging findings of left MCA inferior M2 division occlusion.  Patient had neurosurgical evaluation at [facility], ASA, statin, clearance for chemical DVT prophylaxis no acute neurosurgical intervention at this time.  Patient was on subcutaneous heparin every 8 hours for DVT prophylaxis which started on 3/30/2023 per chart review which was continued after he was discharged from the hospital and was sent to [facility]. Developed chest pain while in rehab on 04/11/2024 and was sent back to the emergency room.  At this time he was found to have thrombocytopenia and extensive saddle PE and DVT bilateral LE while inpatient diagnosed 04/11/2024.    1. Left MCA inferior M2 division occlusion. Date of diagnosis 3/30/23. Large left MCA infarct ,left basal ganglia involvement w/ superimposed microhemorrhage on MRI dated 3/31/23    2. Saddle pulmonary embolus and extensive clot burden throughout the visualized proximal pulmonary arteries. CTA 4/11/23.     3. Acute occlusive deep vein thrombosis noted in the right peroneal veins. Non Occlusive Acute deep vein thrombosis in noted in the right posterior Tibial veins. Acute  occlusive deep vein thrombosis noted in the left Posterior Tiblial, Peroneal, and Soleal veins. Acute non occlusive deep vein thrombosis noted in the left Mid-Distal Femoral and Popliteal veins. Doppler 4/12/23 03/2023:   beta 2 glycoprotein protein negative  prothrombin 39282 neg  factor V Leiden neg   Lupus anticoagulant negative  Protein S 57%, protein S antigen total 86, protein S antigen free 93  Protein C 36%   Antithrombin III activity 90  Equivocal anticardiolipin IgA: 16 APL-U/ml. Remainder of anticardiolipin antibodies including IgG and IgM negative    04/11/2023  Heparin Induced Plt Ab 2.871 High    Heparin Antibody by serotonin release POSITIVE     07/2023:   beta 2 glycoprotein protein negative  prothrombin [id number] neg  factor V Leiden neg   Lupus anticoagulant negative  Equivocal anticardiolipin IgA: 14 APL-U/ml.  Remainder of anticardiolipin antibodies including IgG and IgM negative  Protein S 89%   Protein C 119%  Antithrombin activity 103      Pertinent Medical History   01/27/25: no complaints. Wishes to come off of ac     Review of Systems   Respiratory:  Negative for shortness of breath.    Cardiovascular:  Negative for chest pain and leg swelling.   All other systems reviewed and are negative.          Objective   There were no vitals taken for this visit.    Physical Exam  Vitals reviewed.   HENT:      Head: Normocephalic.   Cardiovascular:      Rate and Rhythm: Normal rate and regular rhythm.   Pulmonary:      Effort: Pulmonary effort is normal.      Breath sounds: Normal breath sounds.   Abdominal:      Palpations: Abdomen is soft.      Tenderness: There is no abdominal tenderness.   Musculoskeletal:      Cervical back: Neck supple.   Skin:     Findings: No rash.   Neurological:      Mental Status: He is alert.         Labs: I have reviewed the following labs:  Results for orders placed or performed in visit on 08/06/24   Albumin / creatinine urine ratio   Result Value Ref Range     Creatinine, Ur 263.3 Reference range not established. mg/dL    Albumin,U,Random 8.9 <20.0 mg/L    Albumin Creat Ratio 3 0 - 30 mg/g creatinine   POCT hemoglobin A1c   Result Value Ref Range    Hemoglobin A1C 6 <=6.5

## 2025-01-27 NOTE — ASSESSMENT & PLAN NOTE
Confirmed by  HIT+ ab serotonin release assay   on allergy list   Has medical alert bracelet   Sub-Q fondaparinux or argatroban should be considered if bridge needed pre-operatively or for PCI  in future

## 2025-02-04 DIAGNOSIS — I63.512 ACUTE ISCHEMIC LEFT MCA STROKE (HCC): ICD-10-CM

## 2025-02-04 RX ORDER — ATORVASTATIN CALCIUM 40 MG/1
40 TABLET, FILM COATED ORAL EVERY EVENING
Qty: 90 TABLET | Refills: 1 | Status: SHIPPED | OUTPATIENT
Start: 2025-02-04

## 2025-02-09 DIAGNOSIS — I10 PRIMARY HYPERTENSION: ICD-10-CM

## 2025-02-11 RX ORDER — AMLODIPINE BESYLATE 5 MG/1
5 TABLET ORAL DAILY
Qty: 90 TABLET | Refills: 1 | Status: SHIPPED | OUTPATIENT
Start: 2025-02-11

## 2025-02-13 ENCOUNTER — OFFICE VISIT (OUTPATIENT)
Dept: FAMILY MEDICINE CLINIC | Facility: MEDICAL CENTER | Age: 50
End: 2025-02-13
Payer: COMMERCIAL

## 2025-02-13 VITALS
DIASTOLIC BLOOD PRESSURE: 80 MMHG | BODY MASS INDEX: 39.68 KG/M2 | TEMPERATURE: 97.6 F | RESPIRATION RATE: 18 BRPM | WEIGHT: 299.4 LBS | SYSTOLIC BLOOD PRESSURE: 120 MMHG | OXYGEN SATURATION: 97 % | HEIGHT: 73 IN | HEART RATE: 80 BPM

## 2025-02-13 DIAGNOSIS — E11.59 TYPE 2 DIABETES MELLITUS WITH OTHER CIRCULATORY COMPLICATIONS (HCC): ICD-10-CM

## 2025-02-13 DIAGNOSIS — Z00.00 ANNUAL PHYSICAL EXAM: Primary | ICD-10-CM

## 2025-02-13 DIAGNOSIS — R19.5 POSITIVE COLORECTAL CANCER SCREENING USING COLOGUARD TEST: ICD-10-CM

## 2025-02-13 LAB — SL AMB POCT HEMOGLOBIN AIC: 8.1 (ref ?–6.5)

## 2025-02-13 PROCEDURE — 99396 PREV VISIT EST AGE 40-64: CPT | Performed by: STUDENT IN AN ORGANIZED HEALTH CARE EDUCATION/TRAINING PROGRAM

## 2025-02-13 PROCEDURE — 99213 OFFICE O/P EST LOW 20 MIN: CPT | Performed by: STUDENT IN AN ORGANIZED HEALTH CARE EDUCATION/TRAINING PROGRAM

## 2025-02-13 PROCEDURE — 83036 HEMOGLOBIN GLYCOSYLATED A1C: CPT | Performed by: STUDENT IN AN ORGANIZED HEALTH CARE EDUCATION/TRAINING PROGRAM

## 2025-02-13 NOTE — ASSESSMENT & PLAN NOTE
Lab Results   Component Value Date    HGBA1C 8.1 (A) 02/13/2025     Type 2 diabetes mellitus under inadequate control  Counseled about new medication metformin and potential side effects, agreeable to starting  Start metformin 500 mg p.o. twice daily  Continue daily statin  Return to office in 3 months, A1c in office at that time  Orders:    POCT hemoglobin A1c    metFORMIN (GLUCOPHAGE) 500 mg tablet; Take 1 tablet (500 mg total) by mouth 2 (two) times a day with meals

## 2025-02-13 NOTE — PROGRESS NOTES
Adult Annual Physical  Name: Mario Foreman III      : 1975      MRN: [id number]  Encounter Provider: Sofy Ortega DO  Encounter Date: 2025   Encounter department: [facility] [place]    Assessment & Plan  Annual physical exam  Informed about update with COVID-19 vaccine  Immunizations otherwise UTD   Positive Cologuard test, referral to GI  Maternal grandfather history of prostate cancer       Type 2 diabetes mellitus with other circulatory complications (HCC)    Lab Results   Component Value Date    HGBA1C 8.1 (A) 2025     Type 2 diabetes mellitus under inadequate control  Counseled about new medication metformin and potential side effects, agreeable to starting  Start metformin 500 mg p.o. twice daily  Continue daily statin  Return to office in 3 months, A1c in office at that time  Orders:    POCT hemoglobin A1c    metFORMIN (GLUCOPHAGE) 500 mg tablet; Take 1 tablet (500 mg total) by mouth 2 (two) times a day with meals    Positive colorectal cancer screening using Cologuard test    Orders:    Ambulatory Referral to Gastroenterology; Future    Immunizations and preventive care screenings were discussed with patient today. Appropriate education was printed on patient's after visit summary.    Discussed risks and benefits of prostate cancer screening. We discussed the controversial history of PSA screening for prostate cancer in the [place] as well as the risk of over detection and over treatment of prostate cancer by way of PSA screening.  The patient understands that PSA blood testing is an imperfect way to screen for prostate cancer and that elevated PSA levels in the blood may also be caused by infection, inflammation, prostatic trauma or manipulation, urological procedures, or by benign prostatic enlargement.    The role of the digital rectal examination in prostate cancer screening was also discussed and I discussed with him that there is large interobserver  variability in the findings of digital rectal examination.    Counseling:  Dental Health: discussed importance of regular tooth brushing, flossing, and dental visits.  Exercise: the importance of regular exercise/physical activity was discussed. Recommend exercise 3-5 times per week for at least 30 minutes.          History of Present Illness     Adult Annual Physical:  Patient presents for annual physical.     Diet and Physical Activity:  - Diet/Nutrition: diabetic diet.  - Exercise: no formal exercise.    Depression Screening:  - PHQ-2 Score: 0    General Health:  - Sleep: sleeps well.  - Hearing: normal hearing bilateral ears.  - Vision: wears glasses and goes for regular eye exams.  - Dental: regular dental visits.     Health:    - Urinary symptoms: none.       Review of Systems    As noted in HPI  Medical History Reviewed by provider this encounter:  Tobacco  Allergies  Meds  Problems  Med Hx  Surg Hx  Fam Hx     .  Past Medical History   Past Medical History:   Diagnosis Date    Allergic 04/01/2023    Hypertension 03/31/2023    Hypertensive emergency 03/30/2023    Obesity 03/31/2023    Seizures (HCC) 03/31/2023    Sleep apnea, obstructive     Stroke (HCC) 03/30/2023    Visual impairment 03/30/2023     Past Surgical History:   Procedure Laterality Date    CARDIAC ELECTROPHYSIOLOGY PROCEDURE N/A 11/7/2023    Procedure: Cardiac loop recorder implant;  Surgeon: Dario Naqvi MD;  Location: [facility];  Service: Cardiology     Family History   Problem Relation Age of Onset    Diabetes Mother     Arthritis Mother     Breast cancer Mother     Cancer Mother         breast cancer    Hypertension Mother     Stroke Father     Cancer Father         esophageal cancer    Hypertension Father     Prostate cancer Paternal Grandfather     Cancer Paternal Grandfather     Breast cancer Paternal Grandmother     Hypertension Maternal Grandfather     Hypertension Maternal Grandmother       reports that he has  "never smoked. He has never used smokeless tobacco. He reports that he does not currently use alcohol. He reports that he does not use drugs.  Current Outpatient Medications on File Prior to Visit   Medication Sig Dispense Refill    amLODIPine (NORVASC) 5 mg tablet Take 1 tablet (5 mg total) by mouth daily 90 tablet 1    apixaban (Eliquis) 5 mg Take 1 tablet (5 mg total) by mouth 2 (two) times a day 180 tablet 1    aspirin 81 mg chewable tablet Chew 1 tablet (81 mg total) daily 90 tablet 1    atorvastatin (LIPITOR) 40 mg tablet Take 1 tablet (40 mg total) by mouth every evening 90 tablet 1    loratadine (CLARITIN) 10 mg tablet Take 1 tablet (10 mg total) by mouth daily as needed for allergies  0    metoprolol tartrate (LOPRESSOR) 25 mg tablet Take 1 tablet (25 mg total) by mouth every 12 (twelve) hours 180 tablet 1     No current facility-administered medications on file prior to visit.     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   (\"The patient's serum tested positive by [facility] and supports a diagnosis of heparin-induced-thrombocytopenia\")      Current Outpatient Medications on File Prior to Visit   Medication Sig Dispense Refill    amLODIPine (NORVASC) 5 mg tablet Take 1 tablet (5 mg total) by mouth daily 90 tablet 1    apixaban (Eliquis) 5 mg Take 1 tablet (5 mg total) by mouth 2 (two) times a day 180 tablet 1    aspirin 81 mg chewable tablet Chew 1 tablet (81 mg total) daily 90 tablet 1    atorvastatin (LIPITOR) 40 mg tablet Take 1 tablet (40 mg total) by mouth every evening 90 tablet 1    loratadine (CLARITIN) 10 mg tablet Take 1 tablet (10 mg total) by mouth daily as needed for allergies  0    metoprolol tartrate (LOPRESSOR) 25 mg tablet Take 1 tablet (25 mg total) by mouth every 12 (twelve) hours 180 tablet 1     No current facility-administered medications on file prior to visit.      Social History     Tobacco Use    " "Smoking status: Never    Smokeless tobacco: Never   Vaping Use    Vaping status: Never Used   Substance and Sexual Activity    Alcohol use: Not Currently     Comment: occasionally, less than twice in a month    Drug use: Never    Sexual activity: Not Currently     Partners: Female     Birth control/protection: None       Objective   /80 (BP Location: Left arm, Patient Position: Sitting, Cuff Size: Large)   Pulse 80   Temp 97.6 °F (36.4 °C) (Temporal)   Resp 18   Ht 6' 1\" (1.854 m)   Wt 136 kg (299 lb 6.4 oz)   SpO2 97%   BMI 39.50 kg/m²     Physical Exam  Vitals reviewed.   Constitutional:       General: He is not in acute distress.     Appearance: Normal appearance.   HENT:      Head: Normocephalic and atraumatic.      Right Ear: External ear normal.      Left Ear: External ear normal.      Nose: Nose normal.      Mouth/Throat:      Mouth: Mucous membranes are moist.      Pharynx: Oropharynx is clear.   Eyes:      Extraocular Movements: Extraocular movements intact.      Conjunctiva/sclera: Conjunctivae normal.      Pupils: Pupils are equal, round, and reactive to light.   Cardiovascular:      Rate and Rhythm: Normal rate and regular rhythm.      Pulses: Normal pulses.      Heart sounds: Normal heart sounds.   Pulmonary:      Effort: Pulmonary effort is normal.      Breath sounds: Normal breath sounds.   Abdominal:      General: Abdomen is flat. Bowel sounds are normal.      Palpations: Abdomen is soft.      Tenderness: There is no abdominal tenderness.   Musculoskeletal:      Cervical back: Neck supple.      Right lower leg: No edema.      Left lower leg: No edema.   Lymphadenopathy:      Cervical: No cervical adenopathy.   Skin:     General: Skin is warm and dry.      Capillary Refill: Capillary refill takes less than 2 seconds.   Neurological:      Mental Status: He is alert and oriented to person, place, and time.   Psychiatric:         Mood and Affect: Mood normal.         Behavior: Behavior " normal.         Thought Content: Thought content normal.

## 2025-03-06 ENCOUNTER — RESULTS FOLLOW-UP (OUTPATIENT)
Dept: CARDIOLOGY CLINIC | Facility: CLINIC | Age: 50
End: 2025-03-06

## 2025-03-06 ENCOUNTER — REMOTE DEVICE CLINIC VISIT (OUTPATIENT)
Dept: CARDIOLOGY CLINIC | Facility: CLINIC | Age: 50
End: 2025-03-06
Payer: COMMERCIAL

## 2025-03-06 DIAGNOSIS — I63.9 CRYPTOGENIC STROKE (HCC): Primary | ICD-10-CM

## 2025-03-06 PROCEDURE — 93298 REM INTERROG DEV EVAL SCRMS: CPT | Performed by: STUDENT IN AN ORGANIZED HEALTH CARE EDUCATION/TRAINING PROGRAM

## 2025-03-06 NOTE — PROGRESS NOTES
"MDT LNQ22 ICM - ACTIVE SYSTEM IS MRI CONDITIONAL   CARELINK TRANSMISSION: LOOP RECORDER. PRESENTING RHYTHM NSR @ 67 BPM. BATTERY STATUS \"OK.\" NO PATIENT OR DEVICE ACTIVATED EPISODES. NORMAL DEVICE FUNCTION. DL   "

## 2025-04-01 ENCOUNTER — TELEPHONE (OUTPATIENT)
Dept: NEUROLOGY | Facility: CLINIC | Age: 50
End: 2025-04-01

## 2025-04-01 NOTE — TELEPHONE ENCOUNTER
Called to notify change of schedule and we are in need to reschedule appointment. Offered appt on     4/17/25  patient agreed and is now scheduled, thank you

## 2025-04-16 ENCOUNTER — TELEPHONE (OUTPATIENT)
Dept: NEUROLOGY | Facility: CLINIC | Age: 50
End: 2025-04-16

## 2025-04-16 NOTE — TELEPHONE ENCOUNTER
Pt sarah  VR appt on 4/17/25 @10:30AM at the [facility] with  . Thank you for choosing [facility] for your care

## 2025-04-17 ENCOUNTER — PATIENT MESSAGE (OUTPATIENT)
Dept: NEUROLOGY | Facility: CLINIC | Age: 50
End: 2025-04-17

## 2025-04-17 ENCOUNTER — TELEMEDICINE (OUTPATIENT)
Dept: NEUROLOGY | Facility: CLINIC | Age: 50
End: 2025-04-17
Payer: COMMERCIAL

## 2025-04-17 DIAGNOSIS — R56.9 SEIZURES (HCC): ICD-10-CM

## 2025-04-17 DIAGNOSIS — I63.00 CEREBRAL INFARCTION DUE TO THROMBOSIS OF PRECEREBRAL ARTERY (HCC): Primary | ICD-10-CM

## 2025-04-17 PROCEDURE — 99215 OFFICE O/P EST HI 40 MIN: CPT | Performed by: PSYCHIATRY & NEUROLOGY

## 2025-04-17 NOTE — PROGRESS NOTES
"Virtual Regular VisitName: Mario Foreman III      : 1975      MRN: [id number]  Encounter Provider: Moni Everett MD  Encounter Date: 2025   Encounter department: 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     Secondary Prevention -   -for medications - recommend continuation of combination of aspirin, eliquis and atorvastatin.   -Blood Pressure goal < 130/80, BP is currently at goal.   -LDL goal <70, last LDL is 57 which is at goal   -KATIA[place] screening - does not want to complete the other study at this time     Therapy-  Speech therapy once a week, and then vision therapy once a week    Counseling/stroke education -   -I advised patient to avoid using NSAIDs for headaches or other pain and to stick to tylenol if needed  -Recommend lifestyle modifications such as mediterranean diet & regular exercise regimen (brisk walking) atleast 4-5 times a week for 20-30 minutes.   -I educated patient/family regarding medication compliance  -encourage control of diabetes and hypertension; defer management to primary     Orders:    Lipid panel; Future    Seizures (HCC)  Check routine Eeg    Orders:    EEG awake or drowsy routine; Future      Follow up in 6 months     I would be happy to see the patient sooner if any new questions/concerns arise.  Patient/Guardian was advised to the call the office if they have any questions and concerns in the meantime.     We discussed \"red flag\" headache symptoms including symptoms such as facial droop on one side, weakness/paralysis on either side, speech trouble, numbness on one side, balance issues, any vision changes, extreme dizziness or significant increase in severity of headache or a sudden onset severe headache, patient is to call  immediately or to proceed to the nearest ER.       History of Present Illness     HPI    This " is a 50 y/o Male who is here as a follow up for history of stroke.      He is doing speech therapy once a week, and then he is doing vision therapy once as week, and he has noticed changes with speech and vision, he is making progress. He will be continuing PT and vision therapy for atleast 14 more weeks. No new TIA/CVA like symptoms. He is on aspirin, eliquis and lipitor for stroke prevention. He does check BP at home, and it ranges between 130-140 systolic.     He does notice shaking of his hand at times.     Review of Systems   Constitutional: Negative.    HENT: Negative.     Eyes: Negative.    Respiratory: Negative.     Cardiovascular: Negative.    Gastrointestinal: Negative.    Endocrine: Negative.    Genitourinary: Negative.    Musculoskeletal: Negative.    Skin: Negative.    Allergic/Immunologic: Negative.    Neurological: Negative.    Hematological: Negative.    Psychiatric/Behavioral: Negative.     All other systems reviewed and are negative.      Objective   There were no vitals taken for this visit.    Physical Exam    Administrative Statements   Encounter provider Moni Everett MD    The Patient is located at Home and in the following state in which I hold an active license [place].    The patient was identified by name and date of birth. Mario VALDES [facility] was informed that this is a telemedicine visit and that the visit is being conducted through the Epic Embedded platform. He agrees to proceed..  My office door was closed. No one else was in the room.  He acknowledged consent and understanding of privacy and security of the video platform. The patient has agreed to participate and understands they can discontinue the visit at any time.    I have spent a total time of 40 minutes in caring for this patient on the day of the visit/encounter including Risks and benefits of tx options, Instructions for management, Counseling / Coordination of care, Documenting in the medical record, Reviewing/placing orders  in the medical record (including tests, medications, and/or procedures), Obtaining or reviewing history  , and Communicating with other healthcare professionals , not including the time spent for establishing the audio/video connection.

## 2025-04-26 LAB
CHOLEST SERPL-MCNC: 117 MG/DL
CHOLEST/HDLC SERPL: 3.1 (CALC)
HDLC SERPL-MCNC: 38 MG/DL
LDLC SERPL CALC-MCNC: 57 MG/DL (CALC)
NONHDLC SERPL-MCNC: 79 MG/DL (CALC)
TRIGL SERPL-MCNC: 134 MG/DL

## 2025-04-29 DIAGNOSIS — I10 PRIMARY HYPERTENSION: ICD-10-CM

## 2025-04-29 DIAGNOSIS — I63.512 ACUTE ISCHEMIC LEFT MCA STROKE (HCC): ICD-10-CM

## 2025-04-29 RX ORDER — ASPIRIN 81 MG
TABLET,CHEWABLE ORAL
Qty: 90 TABLET | Refills: 1 | Status: SHIPPED | OUTPATIENT
Start: 2025-04-29

## 2025-04-29 RX ORDER — METOPROLOL TARTRATE 25 MG/1
25 TABLET, FILM COATED ORAL 2 TIMES DAILY
Qty: 180 TABLET | Refills: 1 | Status: SHIPPED | OUTPATIENT
Start: 2025-04-29

## 2025-04-30 ENCOUNTER — RESULTS FOLLOW-UP (OUTPATIENT)
Dept: NEUROLOGY | Facility: CLINIC | Age: 50
End: 2025-04-30

## 2025-05-07 NOTE — TELEPHONE ENCOUNTER
----- Message from Moni Everett MD sent at 4/30/2025  1:25 PM EDT -----  Please call the patient regarding his abnormal result. His cholsterol panel looks good

## 2025-05-09 ENCOUNTER — RA CDI HCC (OUTPATIENT)
Dept: OTHER | Facility: HOSPITAL | Age: 50
End: 2025-05-09

## 2025-05-09 PROBLEM — N17.9 ACUTE KIDNEY INJURY (HCC): Status: RESOLVED | Noted: 2023-04-11 | Resolved: 2025-05-09

## 2025-05-09 PROBLEM — D75.829 HIT (HEPARIN-INDUCED THROMBOCYTOPENIA) (HCC): Status: ACTIVE | Noted: 2023-04-11

## 2025-05-09 NOTE — PROGRESS NOTES
HCC coding opportunities          Chart Reviewed number of suggestions sent to Provider: 2   I82.403  E11.65        Patients Insurance        Commercial Insurance: [employer]

## 2025-05-11 DIAGNOSIS — E11.59 TYPE 2 DIABETES MELLITUS WITH OTHER CIRCULATORY COMPLICATIONS (HCC): ICD-10-CM

## 2025-05-14 ENCOUNTER — OFFICE VISIT (OUTPATIENT)
Dept: FAMILY MEDICINE CLINIC | Facility: MEDICAL CENTER | Age: 50
End: 2025-05-14
Payer: COMMERCIAL

## 2025-05-14 VITALS
SYSTOLIC BLOOD PRESSURE: 130 MMHG | WEIGHT: 294 LBS | HEIGHT: 73 IN | HEART RATE: 71 BPM | TEMPERATURE: 97.7 F | RESPIRATION RATE: 18 BRPM | OXYGEN SATURATION: 98 % | BODY MASS INDEX: 38.97 KG/M2 | DIASTOLIC BLOOD PRESSURE: 76 MMHG

## 2025-05-14 DIAGNOSIS — I10 PRIMARY HYPERTENSION: ICD-10-CM

## 2025-05-14 DIAGNOSIS — E11.59 TYPE 2 DIABETES MELLITUS WITH OTHER CIRCULATORY COMPLICATIONS (HCC): Primary | ICD-10-CM

## 2025-05-14 LAB — SL AMB POCT HEMOGLOBIN AIC: 6.7 (ref ?–6.5)

## 2025-05-14 PROCEDURE — 83036 HEMOGLOBIN GLYCOSYLATED A1C: CPT | Performed by: STUDENT IN AN ORGANIZED HEALTH CARE EDUCATION/TRAINING PROGRAM

## 2025-05-14 PROCEDURE — 99214 OFFICE O/P EST MOD 30 MIN: CPT | Performed by: STUDENT IN AN ORGANIZED HEALTH CARE EDUCATION/TRAINING PROGRAM

## 2025-05-14 NOTE — ASSESSMENT & PLAN NOTE
Well-controlled with current regimen, continue amlodipine 5 mg p.o. daily and Lopressor 25 mg p.o. twice daily

## 2025-05-14 NOTE — PROGRESS NOTES
":  Assessment & Plan  Type 2 diabetes mellitus with other circulatory complications (HCC)    Lab Results   Component Value Date    HGBA1C 6.7 (A) 05/14/2025     Type 2 diabetes mellitus under adequate control with current regimen, continue metformin 500 mg p.o. twice daily  Continue daily statin  Orders:    Albumin / creatinine urine ratio; Future    POCT hemoglobin A1c    Primary hypertension  Well-controlled with current regimen, continue amlodipine 5 mg p.o. daily and Lopressor 25 mg p.o. twice daily           History of Present Illness     Mario Foreman III is a 49 y.o. male who returns to office for medication monitoring after starting metformin for type 2 diabetes mellitus.  He presents today with his mother.  Patient tolerating new medication well without issue.    Review of Systems    As noted in HPI  Objective   /76 (BP Location: Left arm, Patient Position: Sitting, Cuff Size: Large)   Pulse 71   Temp 97.7 °F (36.5 °C) (Temporal)   Resp 18   Ht 6' 1\" (1.854 m)   Wt 133 kg (294 lb)   SpO2 98%   BMI 38.79 kg/m²      Physical Exam  Vitals reviewed.   Constitutional:       General: He is not in acute distress.     Appearance: Normal appearance. He is obese.   HENT:      Head: Normocephalic and atraumatic.     Eyes:      Conjunctiva/sclera: Conjunctivae normal.       Cardiovascular:      Rate and Rhythm: Normal rate and regular rhythm.      Pulses: Normal pulses.      Heart sounds: Normal heart sounds.   Pulmonary:      Effort: Pulmonary effort is normal.      Breath sounds: Normal breath sounds.   Abdominal:      General: Abdomen is flat. Bowel sounds are normal.      Palpations: Abdomen is soft.      Tenderness: There is no abdominal tenderness.     Musculoskeletal:      Right lower leg: No edema.      Left lower leg: No edema.     Skin:     General: Skin is warm and dry.     Neurological:      Mental Status: He is alert.     Psychiatric:         Mood and Affect: Mood normal.         Behavior: " Behavior normal.

## 2025-05-14 NOTE — ASSESSMENT & PLAN NOTE
Lab Results   Component Value Date    HGBA1C 6.7 (A) 05/14/2025     Type 2 diabetes mellitus under adequate control with current regimen, continue metformin 500 mg p.o. twice daily  Continue daily statin  Orders:    Albumin / creatinine urine ratio; Future    POCT hemoglobin A1c

## 2025-05-27 ENCOUNTER — HOSPITAL ENCOUNTER (OUTPATIENT)
Dept: NEUROLOGY | Facility: HOSPITAL | Age: 50
Discharge: HOME | End: 2025-05-27
Payer: COMMERCIAL

## 2025-05-27 DIAGNOSIS — R56.9 SEIZURES (HCC): ICD-10-CM

## 2025-05-27 PROCEDURE — 95816 EEG AWAKE AND DROWSY: CPT | Performed by: PSYCHIATRY & NEUROLOGY

## 2025-05-27 PROCEDURE — 95816 EEG AWAKE AND DROWSY: CPT

## 2025-05-31 DIAGNOSIS — I63.512 ACUTE ISCHEMIC LEFT MCA STROKE (HCC): ICD-10-CM

## 2025-06-03 RX ORDER — ATORVASTATIN CALCIUM 40 MG/1
40 TABLET, FILM COATED ORAL EVERY EVENING
Qty: 90 TABLET | Refills: 0 | OUTPATIENT
Start: 2025-06-03

## 2025-06-11 ENCOUNTER — REMOTE DEVICE CLINIC VISIT (OUTPATIENT)
Dept: CARDIOLOGY CLINIC | Facility: CLINIC | Age: 50
End: 2025-06-11
Payer: COMMERCIAL

## 2025-06-11 DIAGNOSIS — I63.9 CRYPTOGENIC STROKE (HCC): Primary | ICD-10-CM

## 2025-06-11 PROCEDURE — 93298 REM INTERROG DEV EVAL SCRMS: CPT | Performed by: INTERNAL MEDICINE

## 2025-06-11 NOTE — PROGRESS NOTES
"MDT LNQ22 ICM - ACTIVE SYSTEM IS MRI CONDITIONAL   CARELINK TRANSMISSION: LOOP RECORDER. RESENTING RHYTHM NSR @ 60 BPM. BATTERY STATUS \"OK.\" NO PATIENT OR DEVICE ACTIVATED EPISODES. NORMAL DEVICE FUNCTION. DL   "

## 2025-06-12 ENCOUNTER — RESULTS FOLLOW-UP (OUTPATIENT)
Dept: NON INVASIVE DIAGNOSTICS | Facility: HOSPITAL | Age: 50
End: 2025-06-12

## 2025-06-29 DIAGNOSIS — I26.92 ACUTE SADDLE PULMONARY EMBOLISM WITHOUT ACUTE COR PULMONALE (HCC): ICD-10-CM

## 2025-06-30 RX ORDER — APIXABAN 5 MG/1
5 TABLET, FILM COATED ORAL 2 TIMES DAILY
Qty: 180 TABLET | Refills: 1 | Status: SHIPPED | OUTPATIENT
Start: 2025-06-30

## 2025-08-07 DIAGNOSIS — I10 PRIMARY HYPERTENSION: ICD-10-CM

## 2025-08-08 RX ORDER — AMLODIPINE BESYLATE 5 MG/1
5 TABLET ORAL DAILY
Qty: 90 TABLET | Refills: 1 | Status: SHIPPED | OUTPATIENT
Start: 2025-08-08

## 2025-08-20 ENCOUNTER — OFFICE VISIT (OUTPATIENT)
Age: 50
End: 2025-08-20
Payer: COMMERCIAL

## 2025-08-20 VITALS
BODY MASS INDEX: 38.04 KG/M2 | HEIGHT: 73 IN | OXYGEN SATURATION: 97 % | WEIGHT: 287 LBS | HEART RATE: 77 BPM | TEMPERATURE: 97.6 F | SYSTOLIC BLOOD PRESSURE: 124 MMHG | DIASTOLIC BLOOD PRESSURE: 80 MMHG

## 2025-08-20 DIAGNOSIS — G47.33 OSA ON CPAP: Primary | ICD-10-CM

## 2025-08-20 DIAGNOSIS — E66.9 OBESITY (BMI 30-39.9): ICD-10-CM

## 2025-08-20 PROCEDURE — 99214 OFFICE O/P EST MOD 30 MIN: CPT | Performed by: INTERNAL MEDICINE
